# Patient Record
Sex: FEMALE | Race: WHITE | NOT HISPANIC OR LATINO | Employment: UNEMPLOYED | URBAN - METROPOLITAN AREA
[De-identification: names, ages, dates, MRNs, and addresses within clinical notes are randomized per-mention and may not be internally consistent; named-entity substitution may affect disease eponyms.]

---

## 2020-07-21 ENCOUNTER — HOSPITAL ENCOUNTER (OUTPATIENT)
Facility: HOSPITAL | Age: 60
Discharge: HOME HEALTH | End: 2020-07-23
Attending: EMERGENCY MEDICINE | Admitting: INTERNAL MEDICINE
Payer: COMMERCIAL

## 2020-07-21 DIAGNOSIS — L73.2 HIDRADENITIS SUPPURATIVA: ICD-10-CM

## 2020-07-21 DIAGNOSIS — I24.9 ACUTE CORONARY SYNDROME: Primary | ICD-10-CM

## 2020-07-21 DIAGNOSIS — R09.02 HYPOXEMIA: ICD-10-CM

## 2020-07-21 DIAGNOSIS — R07.89 CHEST PRESSURE: ICD-10-CM

## 2020-07-21 DIAGNOSIS — I25.110 ATHEROSCLEROSIS OF NATIVE CORONARY ARTERY OF NATIVE HEART WITH UNSTABLE ANGINA PECTORIS: ICD-10-CM

## 2020-07-21 DIAGNOSIS — E03.9 ACQUIRED HYPOTHYROIDISM: ICD-10-CM

## 2020-07-21 DIAGNOSIS — R07.89 OTHER CHEST PAIN: ICD-10-CM

## 2020-07-21 DIAGNOSIS — R06.00 DYSPNEA, UNSPECIFIED TYPE: ICD-10-CM

## 2020-07-21 DIAGNOSIS — R07.9 CHEST PAIN: ICD-10-CM

## 2020-07-21 DIAGNOSIS — I10 ESSENTIAL HYPERTENSION: ICD-10-CM

## 2020-07-21 DIAGNOSIS — R53.81 PHYSICAL DEBILITY: ICD-10-CM

## 2020-07-21 DIAGNOSIS — E11.65 UNCONTROLLED TYPE 2 DIABETES MELLITUS WITH HYPERGLYCEMIA: ICD-10-CM

## 2020-07-21 LAB
ALBUMIN SERPL BCP-MCNC: 3.5 G/DL (ref 3.5–5.2)
ALP SERPL-CCNC: 97 U/L (ref 55–135)
ALT SERPL W/O P-5'-P-CCNC: 15 U/L (ref 10–44)
ANION GAP SERPL CALC-SCNC: 10 MMOL/L (ref 8–16)
AST SERPL-CCNC: 20 U/L (ref 10–40)
BASOPHILS # BLD AUTO: 0.05 K/UL (ref 0–0.2)
BASOPHILS NFR BLD: 0.3 % (ref 0–1.9)
BILIRUB SERPL-MCNC: 0.5 MG/DL (ref 0.1–1)
BNP SERPL-MCNC: 61 PG/ML (ref 0–99)
BUN SERPL-MCNC: 13 MG/DL (ref 8–23)
CALCIUM SERPL-MCNC: 8.9 MG/DL (ref 8.7–10.5)
CHLORIDE SERPL-SCNC: 91 MMOL/L (ref 95–110)
CK MB SERPL-MCNC: 14.9 NG/ML (ref 0.1–6.5)
CK SERPL-CCNC: 445 U/L (ref 20–180)
CO2 SERPL-SCNC: 30 MMOL/L (ref 23–29)
CREAT SERPL-MCNC: 0.7 MG/DL (ref 0.5–1.4)
DIFFERENTIAL METHOD: ABNORMAL
EOSINOPHIL # BLD AUTO: 0.1 K/UL (ref 0–0.5)
EOSINOPHIL NFR BLD: 0.8 % (ref 0–8)
ERYTHROCYTE [DISTWIDTH] IN BLOOD BY AUTOMATED COUNT: 15 % (ref 11.5–14.5)
EST. GFR  (AFRICAN AMERICAN): >60 ML/MIN/1.73 M^2
EST. GFR  (NON AFRICAN AMERICAN): >60 ML/MIN/1.73 M^2
GLUCOSE SERPL-MCNC: 181 MG/DL (ref 70–110)
HCT VFR BLD AUTO: 42.4 % (ref 37–48.5)
HGB BLD-MCNC: 12.8 G/DL (ref 12–16)
IMM GRANULOCYTES # BLD AUTO: 0.06 K/UL (ref 0–0.04)
IMM GRANULOCYTES NFR BLD AUTO: 0.4 % (ref 0–0.5)
LYMPHOCYTES # BLD AUTO: 2.4 K/UL (ref 1–4.8)
LYMPHOCYTES NFR BLD: 14.2 % (ref 18–48)
MCH RBC QN AUTO: 26 PG (ref 27–31)
MCHC RBC AUTO-ENTMCNC: 30.2 G/DL (ref 32–36)
MCV RBC AUTO: 86 FL (ref 82–98)
MONOCYTES # BLD AUTO: 1.4 K/UL (ref 0.3–1)
MONOCYTES NFR BLD: 7.9 % (ref 4–15)
NEUTROPHILS # BLD AUTO: 13.1 K/UL (ref 1.8–7.7)
NEUTROPHILS NFR BLD: 76.4 % (ref 38–73)
NRBC BLD-RTO: 0 /100 WBC
PLATELET # BLD AUTO: 355 K/UL (ref 150–350)
PMV BLD AUTO: 9.9 FL (ref 9.2–12.9)
POTASSIUM SERPL-SCNC: 3.8 MMOL/L (ref 3.5–5.1)
PROT SERPL-MCNC: 8.4 G/DL (ref 6–8.4)
RBC # BLD AUTO: 4.92 M/UL (ref 4–5.4)
SARS-COV-2 RDRP RESP QL NAA+PROBE: NEGATIVE
SODIUM SERPL-SCNC: 131 MMOL/L (ref 136–145)
TROPONIN I SERPL DL<=0.01 NG/ML-MCNC: <0.03 NG/ML
WBC # BLD AUTO: 17.07 K/UL (ref 3.9–12.7)

## 2020-07-21 PROCEDURE — 82553 CREATINE MB FRACTION: CPT

## 2020-07-21 PROCEDURE — G0378 HOSPITAL OBSERVATION PER HR: HCPCS

## 2020-07-21 PROCEDURE — 25000003 PHARM REV CODE 250: Performed by: EMERGENCY MEDICINE

## 2020-07-21 PROCEDURE — 85025 COMPLETE CBC W/AUTO DIFF WBC: CPT

## 2020-07-21 PROCEDURE — 80053 COMPREHEN METABOLIC PANEL: CPT

## 2020-07-21 PROCEDURE — 99285 EMERGENCY DEPT VISIT HI MDM: CPT | Mod: 25

## 2020-07-21 PROCEDURE — 84484 ASSAY OF TROPONIN QUANT: CPT | Mod: 91

## 2020-07-21 PROCEDURE — 82550 ASSAY OF CK (CPK): CPT

## 2020-07-21 PROCEDURE — U0002 COVID-19 LAB TEST NON-CDC: HCPCS

## 2020-07-21 PROCEDURE — 84484 ASSAY OF TROPONIN QUANT: CPT

## 2020-07-21 PROCEDURE — 83880 ASSAY OF NATRIURETIC PEPTIDE: CPT

## 2020-07-21 PROCEDURE — 83036 HEMOGLOBIN GLYCOSYLATED A1C: CPT

## 2020-07-21 PROCEDURE — 93005 ELECTROCARDIOGRAM TRACING: CPT | Performed by: INTERNAL MEDICINE

## 2020-07-21 PROCEDURE — 36415 COLL VENOUS BLD VENIPUNCTURE: CPT

## 2020-07-21 RX ORDER — ENOXAPARIN SODIUM 100 MG/ML
40 INJECTION SUBCUTANEOUS EVERY 24 HOURS
Status: DISCONTINUED | OUTPATIENT
Start: 2020-07-21 | End: 2020-07-21

## 2020-07-21 RX ORDER — HYDROCODONE BITARTRATE AND ACETAMINOPHEN 10; 325 MG/1; MG/1
1 TABLET ORAL EVERY 4 HOURS PRN
Status: DISCONTINUED | OUTPATIENT
Start: 2020-07-21 | End: 2020-07-23

## 2020-07-21 RX ORDER — AMLODIPINE BESYLATE 5 MG/1
5 TABLET ORAL DAILY
COMMUNITY

## 2020-07-21 RX ORDER — PROPRANOLOL HYDROCHLORIDE 20 MG/1
40 TABLET ORAL 4 TIMES DAILY
Status: DISCONTINUED | OUTPATIENT
Start: 2020-07-22 | End: 2020-07-23 | Stop reason: HOSPADM

## 2020-07-21 RX ORDER — IPRATROPIUM BROMIDE 21 UG/1
1 SPRAY, METERED NASAL 2 TIMES DAILY
Status: ON HOLD | COMMUNITY
End: 2022-06-12 | Stop reason: HOSPADM

## 2020-07-21 RX ORDER — TRIMETHOBENZAMIDE HCL 300 MG
300 CAPSULE ORAL 3 TIMES DAILY PRN
Status: DISCONTINUED | OUTPATIENT
Start: 2020-07-21 | End: 2020-07-23

## 2020-07-21 RX ORDER — ACETAMINOPHEN 325 MG/1
650 TABLET ORAL EVERY 8 HOURS PRN
Status: DISCONTINUED | OUTPATIENT
Start: 2020-07-21 | End: 2020-07-23 | Stop reason: HOSPADM

## 2020-07-21 RX ORDER — CHLORZOXAZONE 250 MG/1
250 TABLET ORAL 3 TIMES DAILY PRN
COMMUNITY

## 2020-07-21 RX ORDER — ATORVASTATIN CALCIUM 80 MG/1
80 TABLET, FILM COATED ORAL NIGHTLY
COMMUNITY

## 2020-07-21 RX ORDER — HYDROCODONE BITARTRATE AND ACETAMINOPHEN 5; 325 MG/1; MG/1
1 TABLET ORAL EVERY 4 HOURS PRN
Status: DISCONTINUED | OUTPATIENT
Start: 2020-07-21 | End: 2020-07-23

## 2020-07-21 RX ORDER — CLOPIDOGREL BISULFATE 75 MG/1
75 TABLET ORAL DAILY
Status: ON HOLD | COMMUNITY
End: 2022-06-12 | Stop reason: SDUPTHER

## 2020-07-21 RX ORDER — TRAMADOL HYDROCHLORIDE 50 MG/1
50 TABLET ORAL 3 TIMES DAILY
Status: DISCONTINUED | OUTPATIENT
Start: 2020-07-22 | End: 2020-07-23

## 2020-07-21 RX ORDER — HYDROXYZINE PAMOATE 25 MG/1
25 CAPSULE ORAL EVERY 6 HOURS PRN
Status: DISCONTINUED | OUTPATIENT
Start: 2020-07-21 | End: 2020-07-23

## 2020-07-21 RX ORDER — ENOXAPARIN SODIUM 100 MG/ML
40 INJECTION SUBCUTANEOUS EVERY 12 HOURS
Status: DISCONTINUED | OUTPATIENT
Start: 2020-07-22 | End: 2020-07-23 | Stop reason: HOSPADM

## 2020-07-21 RX ORDER — NAPROXEN SODIUM 220 MG/1
81 TABLET, FILM COATED ORAL DAILY
COMMUNITY

## 2020-07-21 RX ORDER — ASPIRIN 325 MG
325 TABLET ORAL
Status: COMPLETED | OUTPATIENT
Start: 2020-07-21 | End: 2020-07-21

## 2020-07-21 RX ORDER — MECLIZINE HCL 12.5 MG 12.5 MG/1
25 TABLET ORAL 2 TIMES DAILY PRN
Status: DISCONTINUED | OUTPATIENT
Start: 2020-07-21 | End: 2020-07-23

## 2020-07-21 RX ORDER — ESTRADIOL 0.1 MG/G
1 CREAM VAGINAL
COMMUNITY

## 2020-07-21 RX ORDER — PROPRANOLOL HYDROCHLORIDE 80 MG/1
160 CAPSULE, EXTENDED RELEASE ORAL DAILY
COMMUNITY

## 2020-07-21 RX ORDER — PROCHLORPERAZINE MALEATE 5 MG
10 TABLET ORAL 2 TIMES DAILY PRN
Status: DISCONTINUED | OUTPATIENT
Start: 2020-07-21 | End: 2020-07-23 | Stop reason: HOSPADM

## 2020-07-21 RX ORDER — METFORMIN HYDROCHLORIDE 850 MG/1
850 TABLET ORAL NIGHTLY
COMMUNITY

## 2020-07-21 RX ADMIN — NITROGLYCERIN 0.5 INCH: 20 OINTMENT TOPICAL at 06:07

## 2020-07-21 RX ADMIN — ASPIRIN 325 MG ORAL TABLET 325 MG: 325 PILL ORAL at 06:07

## 2020-07-21 NOTE — ED PROVIDER NOTES
Encounter Date: 7/21/2020       History     Chief Complaint   Patient presents with    Shortness of Breath     for 2 weeks, chest pressure for several days    Nausea     59 yo with history of morbid obesity, hypertension, diabetes mellitus, coronary artery disease, with stent placement single-vessel and October 2018.  Patient presents emergency department complaint of PND orthopnea midsternal intermittent chest pain over the last 7-10 days.  Patient states that her dyspnea has progressively gotten worse.  She has had increasing pain throughout the day.  Currently patient states that she has been compliant with medications including aspirin and Plavix.  Currently patient is chest pain-free.  Patient states she was seen by Dr. Lin cardiology approximately 1 and half years ago.        Review of patient's allergies indicates:   Allergen Reactions    Pcn [penicillins] Anaphylaxis    Adhesive     Floxacillin     Keflex [cephalexin]     Sulfa (sulfonamide antibiotics)     Zithromax [azithromycin]     Zofran [ondansetron hcl (pf)]      Dizzy vomiting       No past medical history on file.  No past surgical history on file.  No family history on file.  Social History     Tobacco Use    Smoking status: Current Every Day Smoker     Types: Cigarettes    Smokeless tobacco: Never Used   Substance Use Topics    Alcohol use: No    Drug use: No     Review of Systems   Constitutional: Positive for fatigue. Negative for fever.   HENT: Negative for sore throat.    Respiratory: Positive for shortness of breath.    Cardiovascular: Positive for chest pain. Negative for palpitations and leg swelling.   Gastrointestinal: Negative for abdominal pain, nausea and vomiting.   Genitourinary: Negative for dysuria.   Musculoskeletal: Negative for back pain.   Skin: Negative for rash.   Neurological: Negative for dizziness, weakness and light-headedness.   Hematological: Does not bruise/bleed easily.       Physical Exam     Initial  Vitals [07/21/20 1728]   BP Pulse Resp Temp SpO2   135/80 68 20 -- (!) 93 %      MAP       --         Physical Exam    Nursing note and vitals reviewed.  Constitutional: She appears well-developed and well-nourished.   HENT:   Head: Normocephalic and atraumatic.   Nose: Nose normal.   Mouth/Throat: Oropharynx is clear and moist.   Eyes: Conjunctivae and EOM are normal. Pupils are equal, round, and reactive to light.   Neck: Normal range of motion. Neck supple. No thyromegaly present. No tracheal deviation present.   Cardiovascular: Normal rate, regular rhythm, normal heart sounds and intact distal pulses. Exam reveals no gallop and no friction rub.    No murmur heard.  Pulmonary/Chest: No stridor. No respiratory distress.   Course bilateral breath sounds no adventitious sounds   Abdominal: Soft. Bowel sounds are normal. She exhibits no mass. There is no rebound and no guarding.   Musculoskeletal: Normal range of motion. No edema.   Lymphadenopathy:     She has no cervical adenopathy.   Neurological: She is alert and oriented to person, place, and time. She has normal strength and normal reflexes. GCS score is 15. GCS eye subscore is 4. GCS verbal subscore is 5. GCS motor subscore is 6.   Skin: Skin is warm and dry. Capillary refill takes less than 2 seconds.   Psychiatric: She has a normal mood and affect.         ED Course   Procedures  Labs Reviewed   CBC W/ AUTO DIFFERENTIAL   COMPREHENSIVE METABOLIC PANEL   TROPONIN I   TROPONIN I   B-TYPE NATRIURETIC PEPTIDE   CK   CK-MB   SARS-COV-2 RNA AMPLIFICATION, QUAL     EKG Readings: (Independently Interpreted)   Initial Reading: No STEMI. Rhythm: Normal Sinus Rhythm. Ectopy: No Ectopy. Axis: Normal.   Normal sinus rhythm, 67, normal axis, nonspecific ST segment changes       Imaging Results    None          Medical Decision Making:   Initial Assessment:   60-year-old female with history hypertension, coronary artery disease, diabetes mellitus, obesity here with 7-10  day history of midsternal chest pain and progressive dyspnea on exertion.  Differential Diagnosis:   Acute coronary disease, unstable angina, congestive heart failure  Clinical Tests:   Lab Tests: Ordered and Reviewed  Radiological Study: Ordered and Reviewed  Medical Tests: Ordered and Reviewed  ED Management:  Patient evaluated emergency department with complaint of chest pain.  Current at this time patient with initial 1st at the cardiac markers found to be within normal limits.  Patient with CHRISSY score 4.  Patient at this time will be admitted for further evaluation and treatment.  Patient remained hemodynamically stable in emergency department.    Additional MDM:     CHRISSY Score:   Age over 65:                                    0.00   > or = to 3 CAD risk factors:           1.00  Established CAD:                            1.00  > or = to 2 anginal events in the past 24 hours: 1.00  Use of ASA in past 7 days:              1.00  Elevated Enzymes:                         0.00  ST Depression > or = to 0.05 mV:  0.00  CHRISSY score: 4                              Clinical Impression:       ICD-10-CM ICD-9-CM   1. Acute coronary syndrome  I24.9 411.1   2. Chest pain  R07.9 786.50   3. Dyspnea, unspecified type  R06.00 786.09                                Vincent Travis MD  07/21/20 1819       Vincent Travis MD  07/21/20 2049       Vincent Travis MD  07/21/20 2052

## 2020-07-22 ENCOUNTER — HOSPITAL ENCOUNTER (OUTPATIENT)
Dept: RADIOLOGY | Facility: HOSPITAL | Age: 60
Discharge: HOME | End: 2020-07-22
Attending: INTERNAL MEDICINE | Admitting: INTERNAL MEDICINE
Payer: COMMERCIAL

## 2020-07-22 ENCOUNTER — CLINICAL SUPPORT (OUTPATIENT)
Dept: CARDIOLOGY | Facility: HOSPITAL | Age: 60
End: 2020-07-22
Attending: INTERNAL MEDICINE
Payer: COMMERCIAL

## 2020-07-22 PROBLEM — I25.110 ATHEROSCLEROSIS OF NATIVE CORONARY ARTERY OF NATIVE HEART WITH UNSTABLE ANGINA PECTORIS: Status: ACTIVE | Noted: 2020-07-22

## 2020-07-22 PROBLEM — R07.89 CHEST PRESSURE: Status: ACTIVE | Noted: 2020-07-22

## 2020-07-22 PROBLEM — E11.9 DIABETES TYPE 2, CONTROLLED: Status: ACTIVE | Noted: 2020-07-22

## 2020-07-22 LAB
ANION GAP SERPL CALC-SCNC: 7 MMOL/L (ref 8–16)
BASOPHILS # BLD AUTO: 0.07 K/UL (ref 0–0.2)
BASOPHILS NFR BLD: 0.4 % (ref 0–1.9)
BUN SERPL-MCNC: 17 MG/DL (ref 8–23)
CALCIUM SERPL-MCNC: 8.3 MG/DL (ref 8.7–10.5)
CHLORIDE SERPL-SCNC: 95 MMOL/L (ref 95–110)
CO2 SERPL-SCNC: 29 MMOL/L (ref 23–29)
CREAT SERPL-MCNC: 0.7 MG/DL (ref 0.5–1.4)
CV PHARM DOSE: 0.4 MG
CV STRESS BASE HR: 68 BPM
DIASTOLIC BLOOD PRESSURE: 96 MMHG
DIFFERENTIAL METHOD: ABNORMAL
EOSINOPHIL # BLD AUTO: 0.2 K/UL (ref 0–0.5)
EOSINOPHIL NFR BLD: 1.1 % (ref 0–8)
ERYTHROCYTE [DISTWIDTH] IN BLOOD BY AUTOMATED COUNT: 14.9 % (ref 11.5–14.5)
EST. GFR  (AFRICAN AMERICAN): >60 ML/MIN/1.73 M^2
EST. GFR  (NON AFRICAN AMERICAN): >60 ML/MIN/1.73 M^2
ESTIMATED AVG GLUCOSE: 180 MG/DL (ref 68–131)
GLUCOSE SERPL-MCNC: 142 MG/DL (ref 70–110)
GLUCOSE SERPL-MCNC: 180 MG/DL (ref 70–110)
GLUCOSE SERPL-MCNC: 208 MG/DL (ref 70–110)
GLUCOSE SERPL-MCNC: 270 MG/DL (ref 70–110)
HBA1C MFR BLD HPLC: 7.9 % (ref 4.5–6.2)
HCT VFR BLD AUTO: 39.8 % (ref 37–48.5)
HGB BLD-MCNC: 12 G/DL (ref 12–16)
IMM GRANULOCYTES # BLD AUTO: 0.09 K/UL (ref 0–0.04)
IMM GRANULOCYTES NFR BLD AUTO: 0.5 % (ref 0–0.5)
LYMPHOCYTES # BLD AUTO: 2.7 K/UL (ref 1–4.8)
LYMPHOCYTES NFR BLD: 15.7 % (ref 18–48)
MCH RBC QN AUTO: 26.7 PG (ref 27–31)
MCHC RBC AUTO-ENTMCNC: 30.2 G/DL (ref 32–36)
MCV RBC AUTO: 88 FL (ref 82–98)
MONOCYTES # BLD AUTO: 1.5 K/UL (ref 0.3–1)
MONOCYTES NFR BLD: 9 % (ref 4–15)
NEUTROPHILS # BLD AUTO: 12.6 K/UL (ref 1.8–7.7)
NEUTROPHILS NFR BLD: 73.3 % (ref 38–73)
NRBC BLD-RTO: 0 /100 WBC
OHS CV CPX 85 PERCENT MAX PREDICTED HEART RATE MALE: 129
OHS CV CPX MAX PREDICTED HEART RATE: 151
OHS CV CPX PATIENT IS FEMALE: 1
OHS CV CPX PATIENT IS MALE: 0
OHS CV CPX PEAK DIASTOLIC BLOOD PRESSURE: 96 MMHG
OHS CV CPX PEAK HEAR RATE: 91 BPM
OHS CV CPX PEAK RATE PRESSURE PRODUCT: NORMAL
OHS CV CPX PEAK SYSTOLIC BLOOD PRESSURE: 174 MMHG
OHS CV CPX PERCENT MAX PREDICTED HEART RATE ACHIEVED: 60
OHS CV CPX RATE PRESSURE PRODUCT PRESENTING: 9792
PLATELET # BLD AUTO: 358 K/UL (ref 150–350)
PMV BLD AUTO: 10.2 FL (ref 9.2–12.9)
POTASSIUM SERPL-SCNC: 4 MMOL/L (ref 3.5–5.1)
RBC # BLD AUTO: 4.5 M/UL (ref 4–5.4)
SODIUM SERPL-SCNC: 131 MMOL/L (ref 136–145)
SYSTOLIC BLOOD PRESSURE: 144 MMHG
TROPONIN I SERPL DL<=0.01 NG/ML-MCNC: <0.03 NG/ML
WBC # BLD AUTO: 17.17 K/UL (ref 3.9–12.7)

## 2020-07-22 PROCEDURE — 85025 COMPLETE CBC W/AUTO DIFF WBC: CPT

## 2020-07-22 PROCEDURE — 63600175 PHARM REV CODE 636 W HCPCS: Performed by: INTERNAL MEDICINE

## 2020-07-22 PROCEDURE — C9113 INJ PANTOPRAZOLE SODIUM, VIA: HCPCS | Performed by: INTERNAL MEDICINE

## 2020-07-22 PROCEDURE — 36415 COLL VENOUS BLD VENIPUNCTURE: CPT

## 2020-07-22 PROCEDURE — 96374 THER/PROPH/DIAG INJ IV PUSH: CPT

## 2020-07-22 PROCEDURE — A9502 TC99M TETROFOSMIN: HCPCS

## 2020-07-22 PROCEDURE — 93017 CV STRESS TEST TRACING ONLY: CPT

## 2020-07-22 PROCEDURE — 96372 THER/PROPH/DIAG INJ SC/IM: CPT | Mod: 59

## 2020-07-22 PROCEDURE — 25000003 PHARM REV CODE 250: Performed by: INTERNAL MEDICINE

## 2020-07-22 PROCEDURE — 80048 BASIC METABOLIC PNL TOTAL CA: CPT

## 2020-07-22 PROCEDURE — 84484 ASSAY OF TROPONIN QUANT: CPT

## 2020-07-22 PROCEDURE — G0378 HOSPITAL OBSERVATION PER HR: HCPCS

## 2020-07-22 RX ORDER — PANTOPRAZOLE SODIUM 40 MG/10ML
40 INJECTION, POWDER, LYOPHILIZED, FOR SOLUTION INTRAVENOUS DAILY
Status: DISCONTINUED | OUTPATIENT
Start: 2020-07-22 | End: 2020-07-23 | Stop reason: HOSPADM

## 2020-07-22 RX ORDER — REGADENOSON 0.08 MG/ML
0.4 INJECTION, SOLUTION INTRAVENOUS
Status: COMPLETED | OUTPATIENT
Start: 2020-07-22 | End: 2020-07-22

## 2020-07-22 RX ADMIN — PROCHLORPERAZINE MALEATE 10 MG: 5 TABLET ORAL at 10:07

## 2020-07-22 RX ADMIN — PROCHLORPERAZINE MALEATE 10 MG: 5 TABLET ORAL at 04:07

## 2020-07-22 RX ADMIN — HYDROCODONE BITARTRATE AND ACETAMINOPHEN 1 TABLET: 5; 325 TABLET ORAL at 02:07

## 2020-07-22 RX ADMIN — PROPRANOLOL HYDROCHLORIDE 40 MG: 20 TABLET ORAL at 09:07

## 2020-07-22 RX ADMIN — HYDROCODONE BITARTRATE AND ACETAMINOPHEN 1 TABLET: 5; 325 TABLET ORAL at 10:07

## 2020-07-22 RX ADMIN — REGADENOSON 0.4 MG: 0.08 INJECTION, SOLUTION INTRAVENOUS at 10:07

## 2020-07-22 RX ADMIN — PANTOPRAZOLE SODIUM 40 MG: 40 INJECTION, POWDER, FOR SOLUTION INTRAVENOUS at 06:07

## 2020-07-22 RX ADMIN — ENOXAPARIN SODIUM 40 MG: 100 INJECTION SUBCUTANEOUS at 09:07

## 2020-07-22 NOTE — PROGRESS NOTES
Myocardial perfusion rest images in progress at 08:18.  Patient to remain NPO status.  Theron Wilson [facility] [place]

## 2020-07-22 NOTE — PROGRESS NOTES
Pharmacist Dose Adjustment Note    Karo Leonard is a 60 y.o. female being treated with the medication enoxaparin    Patient Data:    Vital Signs (Most Recent):  Temp: 98.7 °F (37.1 °C) (07/21/20 1728)  Pulse: 60 (07/21/20 2210)  Resp: (!) 23 (07/21/20 2210)  BP: (!) 112/58 (07/21/20 2210)  SpO2: 98 % (07/21/20 2210)   Vital Signs (72h Range):  Temp:  [98.7 °F (37.1 °C)]   Pulse:  [54-68]   Resp:  [17-23]   BP: (104-135)/(50-80)   SpO2:  [93 %-98 %]      Recent Labs   Lab 07/21/20  1805   CREATININE 0.7     Serum creatinine: 0.7 mg/dL 07/21/20 1805  Estimated creatinine clearance: 103.9 mL/min    Medication:enoxaparin 40 mg q 24 hours will be changed to 40 mg q 12 hours per bmi > 40 (41.10)     Pharmacist's Name: Alex Berrios  Pharmacist's Extension: [phone]

## 2020-07-22 NOTE — SUBJECTIVE & OBJECTIVE
Past Medical History:   Diagnosis Date    Coronary artery disease     cardiac stent    Diabetes mellitus     Hypertension        History reviewed. No pertinent surgical history.    Review of patient's allergies indicates:   Allergen Reactions    Pcn [penicillins] Anaphylaxis    Adhesive     Floxacillin     Keflex [cephalexin]     Sulfa (sulfonamide antibiotics)     Zithromax [azithromycin]     Zofran [ondansetron hcl (pf)]      Dizzy vomiting         No current facility-administered medications on file prior to encounter.      Current Outpatient Medications on File Prior to Encounter   Medication Sig    hydrOXYzine (VISTARIL) 25 MG Cap Take 1 capsule (25 mg total) by mouth every 6 (six) hours as needed.    levothyroxine (SYNTHROID, LEVOTHROID) 175 MCG tablet Take 1 tablet (175 mcg total) by mouth once daily.    meclizine (ANTIVERT) 25 mg tablet Take 1 tablet (25 mg total) by mouth 2 (two) times daily as needed.    propranolol (INDERAL) 40 MG tablet Take 1 tablet (40 mg total) by mouth 4 (four) times daily.    tramadol (ULTRAM) 50 mg tablet TAKE ONE TABLET BY MOUTH THREE TIMES DAILY    benazepril (LOTENSIN) 20 MG tablet Take 1 tablet (20 mg total) by mouth once daily.    prochlorperazine (COMPAZINE) 10 MG tablet Take 1 tablet (10 mg total) by mouth 2 (two) times daily as needed.    promethazine (PHENERGAN) 25 MG tablet Take 1 tablet (25 mg total) by mouth every 4 (four) hours.    trimethobenzamide (TIGAN) 300 mg capsule Take 1 capsule (300 mg total) by mouth 3 (three) times daily as needed.     Family History     None        Tobacco Use    Smoking status: Former Smoker     Types: Cigarettes    Smokeless tobacco: Never Used    Tobacco comment: 1 year ago   Substance and Sexual Activity    Alcohol use: No    Drug use: No    Sexual activity: Not on file     Review of Systems   Constitutional: Positive for fatigue.   HENT: Negative.    Eyes: Negative.    Respiratory: Positive for shortness of  breath.    Cardiovascular: Positive for chest pain.   Gastrointestinal: Negative.    Endocrine: Negative.    Genitourinary: Negative.    Musculoskeletal: Negative.    Skin: Negative.    Allergic/Immunologic: Negative.    Neurological: Negative.    Hematological: Negative.    All other systems reviewed and are negative.    Objective:     Vital Signs (Most Recent):  Temp: 98.7 °F (37.1 °C) (07/21/20 1728)  Pulse: (!) 56 (07/21/20 2037)  Resp: 17 (07/21/20 2037)  BP: (!) 104/50 (07/21/20 2031)  SpO2: 95 % (07/21/20 2037) Vital Signs (24h Range):  Temp:  [98.7 °F (37.1 °C)] 98.7 °F (37.1 °C)  Pulse:  [54-68] 56  Resp:  [17-22] 17  SpO2:  [93 %-96 %] 95 %  BP: (104-135)/(50-80) 104/50     Weight: 112 kg (247 lb)  Body mass index is 41.1 kg/m².    Physical Exam  Vitals signs and nursing note reviewed.   Constitutional:       Appearance: She is well-developed. She is obese.   HENT:      Head: Normocephalic and atraumatic.      Right Ear: External ear normal.      Left Ear: External ear normal.      Nose: Nose normal.   Eyes:      Conjunctiva/sclera: Conjunctivae normal.      Pupils: Pupils are equal, round, and reactive to light.   Neck:      Musculoskeletal: Normal range of motion and neck supple.   Cardiovascular:      Rate and Rhythm: Normal rate and regular rhythm.      Heart sounds: Normal heart sounds.   Pulmonary:      Effort: Pulmonary effort is normal.      Breath sounds: Normal breath sounds.      Comments: Decreased entry without adventitious sounds  Abdominal:      General: Bowel sounds are normal.      Palpations: Abdomen is soft.   Musculoskeletal: Normal range of motion.   Skin:     General: Skin is warm and dry.      Capillary Refill: Capillary refill takes less than 2 seconds.   Neurological:      Mental Status: She is alert and oriented to person, place, and time.   Psychiatric:         Behavior: Behavior normal.         Thought Content: Thought content normal.         Judgment: Judgment normal.            CRANIAL NERVES     CN III, IV, VI   Pupils are equal, round, and reactive to light.       Significant Labs:   CBC:   Recent Labs   Lab 07/21/20  1805   WBC 17.07*   HGB 12.8   HCT 42.4   *     CMP:   Recent Labs   Lab 07/21/20  1805   *   K 3.8   CL 91*   CO2 30*   *   BUN 13   CREATININE 0.7   CALCIUM 8.9   PROT 8.4   ALBUMIN 3.5   BILITOT 0.5   ALKPHOS 97   AST 20   ALT 15   ANIONGAP 10   EGFRNONAA >60.0     Cardiac Markers:   Recent Labs   Lab 07/21/20  1805   BNP 61     Troponin:   Recent Labs   Lab 07/21/20  1805   TROPONINI <0.030       Significant Imaging: I have reviewed and interpreted all pertinent imaging results/findings within the past 24 hours.

## 2020-07-22 NOTE — PROGRESS NOTES
Myocardial Perfusion Rest injection R arm IV at 07:30  -Patient to remain NPO status    CHARITO Blandon MT

## 2020-07-22 NOTE — HPI
60 year old female with history of hypothyroidism, essential hypertension, morbid obesity (>250 lb), hidradenitis supprativa, and coronary artery disease (s/p 1 stent with known disease in 2 other arteries) presented to ED complaining worsening chest discomfort: tightness--patient clenching fist on chest wall, 8/10 with rads to left shoulder accompanied by dyspnea. Symptoms come and go by own accord but have been occurring more frequently over the past week. Her Cardiologist left the practice area and she has not established with another. No cough or sputum. In ED: EKG no acute segments. CXR: NAPD. Labs: leukocytosis, normal H/H; normal renal function; CKMB elevated, troponin <0.03.

## 2020-07-22 NOTE — CONSULTS
[facility]  Department of Cardiology  Consult Note      PATIENT NAME: Karo Leonard  MRN: [id number]  TODAY'S DATE: 07/22/2020  ADMIT DATE: 7/21/2020                          CONSULT REQUESTED BY: Damion Unger MD    SUBJECTIVE     PRINCIPAL PROBLEM: Chest pressure      REASON FOR CONSULT:      HPI:60-year-old lady presenting with the 2 weeks history of increasing shortness of breath and 5 day history of chest pressure on and off mostly at rest.  She is very sedentary.  The last time she left the house was for a follow-up visit with Dr. Lin after initial stenting in 2017.  Present chest discomfort did feel somewhat like previous MI but less intense.  It is in the middle of the chest.  She also reports she has been having lots of indigestion.    Have reviewed her angiogram from 2017 which demonstrates a totally occluded circumflex was opened nicely with a drug-eluting stent.  First diagonal branch had a 50% stenosis.  Right coronary has 40% lesion.  Ejection fraction was normal.  EDP was elevated.    She has chronic hidradenitis.  Hypertension and hyperlipidemia.  She has type 2 diabetes            Review of patient's allergies indicates:   Allergen Reactions    Pcn [penicillins] Anaphylaxis    Adhesive     Floxacillin     Keflex [cephalexin]     Sulfa (sulfonamide antibiotics)     Zithromax [azithromycin]     Zofran [ondansetron hcl (pf)]      Dizzy vomiting         Past Medical History:   Diagnosis Date    Coronary artery disease     cardiac stent    Diabetes mellitus     Hypertension      History reviewed. No pertinent surgical history.  Social History     Tobacco Use    Smoking status: Former Smoker     Types: Cigarettes    Smokeless tobacco: Never Used    Tobacco comment: 1 year ago   Substance Use Topics    Alcohol use: No    Drug use: No        REVIEW OF SYSTEMS  CONSTITUTIONAL:   EYES:   NEURO:   RESPIRATORY:     CARDIOVASCULAR:  Chest pressure as in HPI  GI:  Frequent  indigestion  :   SKIN:  Hidradenitis in multiple areas of the body  ENDOCRINE:   PSYCHIATRIC:   MUSCULOSKELETAL:  Has chronic back pain    OBJECTIVE     VITAL SIGNS (Most Recent)  Temp: 98.7 °F (37.1 °C) (07/22/20 1530)  Pulse: 73 (07/22/20 1530)  Resp: (!) 22 (07/22/20 1530)  BP: (!) 147/67 (07/22/20 1530)  SpO2: (!) 92 % (07/22/20 1530)    VENTILATION STATUS  Resp: (!) 22 (07/22/20 1530)  SpO2: (!) 92 % (07/22/20 1530)       I & O (Last 24H):    Intake/Output Summary (Last 24 hours) at 7/22/2020 1814  Last data filed at 7/22/2020 1300  Gross per 24 hour   Intake 440 ml   Output 2 ml   Net 438 ml       WEIGHTS  Wt Readings from Last 1 Encounters:   07/21/20 2257 108.8 kg (239 lb 13.8 oz)   07/21/20 1728 112 kg (247 lb)       PHYSICAL EXAM  GENERAL: overweight lady lying on her left side   HEENT:   NECK:   THYROID:   CARDIAC:  Normal heart sounds no murmurs  CHEST ANATOMY:   LUNGS:  Lungs clear to examination  ABDOMEN:   URINARY:   EXTREMITIES:   PERIPHERAL VASCULAR SYSTEM:   CENTRAL NERVOUS SYSTEM:  Alert oriented x3  SKIN:   MUSCLE STRENGTH & TONE:     HOME MEDICATIONS:  No current facility-administered medications on file prior to encounter.      Current Outpatient Medications on File Prior to Encounter   Medication Sig Dispense Refill    amLODIPine (NORVASC) 5 MG tablet Take 5 mg by mouth once daily.      aspirin 81 MG Chew Take 81 mg by mouth once daily.      atorvastatin (LIPITOR) 80 MG tablet Take 80 mg by mouth every evening.      chlorzoxazone (PARAFON FORTE) 250 MG tablet Take 250 mg by mouth 6 (six) times daily.      clopidogreL (PLAVIX) 75 mg tablet Take 75 mg by mouth once daily.      estradioL (ESTRACE) 0.01 % (0.1 mg/gram) vaginal cream Place 1 g vaginally every 7 days.      ipratropium (ATROVENT) 0.03 % nasal spray 1 spray by Nasal route 2 (two) times daily.      levothyroxine (SYNTHROID, LEVOTHROID) 175 MCG tablet Take 1 tablet (175 mcg total) by mouth once daily. (Patient taking  differently: Take 150 mcg by mouth once daily. ) 30 tablet 0    metFORMIN (GLUCOPHAGE) 850 MG tablet Take 850 mg by mouth nightly.      propranoloL (INDERAL LA) 80 MG 24 hr capsule Take 160 mg by mouth once daily.      prochlorperazine (COMPAZINE) 10 MG tablet Take 1 tablet (10 mg total) by mouth 2 (two) times daily as needed. 60 tablet 5       SCHEDULED MEDS:   enoxaparin  40 mg Subcutaneous Q12H    levothyroxine  175 mcg Oral Daily    pantoprazole  40 mg Intravenous Daily    propranoloL  40 mg Oral QID    traMADoL  50 mg Oral TID       CONTINUOUS INFUSIONS:    PRN MEDS:acetaminophen, dextrose 50%, dextrose 50%, HYDROcodone-acetaminophen, HYDROcodone-acetaminophen, hydrOXYzine pamoate, insulin regular, meclizine, prochlorperazine, trimethobenzamide    LABS AND DIAGNOSTICS     CBC LAST 3 DAYS  Recent Labs   Lab 07/21/20 1805 07/22/20  0318   WBC 17.07* 17.17*   RBC 4.92 4.50   HGB 12.8 12.0   HCT 42.4 39.8   MCV 86 88   MCH 26.0* 26.7*   MCHC 30.2* 30.2*   RDW 15.0* 14.9*   * 358*   MPV 9.9 10.2   GRAN 76.4*  13.1* 73.3*  12.6*   LYMPH 14.2*  2.4 15.7*  2.7   MONO 7.9  1.4* 9.0  1.5*   BASO 0.05 0.07   NRBC 0 0       COAGULATION LAST 3 DAYS  No results for input(s): LABPT, INR, APTT in the last 168 hours.    CHEMISTRY LAST 3 DAYS  Recent Labs   Lab 07/21/20 1805 07/22/20  0318   * 131*   K 3.8 4.0   CL 91* 95   CO2 30* 29   ANIONGAP 10 7*   BUN 13 17   CREATININE 0.7 0.7   * 142*   CALCIUM 8.9 8.3*   ALBUMIN 3.5  --    PROT 8.4  --    ALKPHOS 97  --    ALT 15  --    AST 20  --    BILITOT 0.5  --        CARDIAC PROFILE LAST 3 DAYS  Recent Labs   Lab 07/21/20  1805 07/21/20  2150 07/21/20 2201 07/22/20  0318   BNP 61  --   --   --    *  --   --   --    CPKMB 14.9*  --   --   --    TROPONINI <0.030 <0.030 <0.030 <0.030       ENDOCRINE LAST 3 DAYS  No results for input(s): TSH, PROCAL in the last 168 hours.    LAST ARTERIAL BLOOD GAS  ABG  No results for input(s): PH, PO2,  PCO2, HCO3, BE in the last 168 hours.    LAST 7 DAYS MICROBIOLOGY   Microbiology Results (last 7 days)     ** No results found for the last 168 hours. **          MOST RECENT IMAGING  NM Myocardial Perfusion Spect Multi Pharmacologic  CLINICAL HISTORY:  60 years (1960) Female Chest pain, acute, nonspecific  hypertension, chest tightness, previous angiogram of one stent placed  in October 2017, shortness of breath and dizziness.    TECHNIQUE:  NM MYOCARDIAL PERFUSION SPECT MULTI PHARM. 9 images obtained. A total  of 11.0 millicuries was utilized for the rest portion of the  examination with 26.8 millicuries for the stress portion of the  examination following a 1 day protocol.  The patient was stressed with  Lexiscan, 0.4 milligrams intravenously and achieved a maximum heart  rate of 91 beats per minute with a blood pressure of 163/82, (Resting  heart rate of 68, and blood pressure of 144/96).    COMPARISON:  None available.    FINDINGS:  There is normal and homogeneous radiotracer uptake by the left  ventricular myocardium with no mismatched photopenic defects to  suggest pharmacologically induced reversible ischemia.  There is a  tiny matched defect in the mid inferolateral left ventricular  myocardial wall.    There are no wall motion abnormalities with no abnormal transit left  ventricular dilatation on stress images.    Ejection fraction is calculated at 59 %.  The polar map images confirm  the planar SPECT findings, showing a tiny matched defect in the mid  inferolateral left ventricular myocardial wall covering approximately  12% of the overall left ventricular volume, favored to represent  artifact or a remote infarct. Consider correlation with EKG, symptoms  and history.    IMPRESSION:  1. No scintigraphic evidence of pharmacologically induced reversible  ischemia.  2. Estimated ejection fraction of  59 %.  3. Tiny matched defect in the mid inferolateral left ventricular  myocardial wall suggesting a  small remote infarct.    Electronically Signed by CHRAITO Herbert on 7/22/2020 12:41 PM  Treadmill Stress Test    The EKG portion of this study is negative for ischemia.    The patient reported no chest pain during the stress test.    There were no arrhythmias during stress.    ECG Stress Nuclear portion of this study will be reported separately.      ECHOCARDIOGRAM RESULTS (last 5)  No results found for this or any previous visit.    CURRENT/PREVIOUS VISIT EKG  Results for orders placed or performed during the hospital encounter of 07/21/20   EKG 12-lead    Collection Time: 07/21/20  5:31 PM    Narrative    Test Reason : R07.9,    Vent. Rate : 067 BPM     Atrial Rate : 067 BPM     P-R Int : 182 ms          QRS Dur : 088 ms      QT Int : 430 ms       P-R-T Axes : 069 038 039 degrees     QTc Int : 454 ms    Normal sinus rhythm  Borderline Nonspecific ST and T wave abnormality  No previous ECGs available  Confirmed by Jerod Moe MD ([id number]) on 7/22/2020 6:32:57 AM    Referred By: [email or web address]   SELF           Confirmed By:Jerod Moe MD           ASSESSMENT/PLAN:     Active Hospital Problems    Diagnosis    *Chest pressure    BMI 39.0-39.9,adult    Diabetes type 2, uncontrolled    Atherosclerosis of native coronary artery of native heart with unstable angina pectoris    Acute coronary syndrome    Hypothyroid    Hypertension    Hidradenitis suppurativa       ASSESSMENT & PLAN:   Five-day history of chest pressure.  Known coronary artery disease.  Type 2 diabetes  Multiple risk factors for CAD  Frequent indigestion      RECOMMENDATIONS:  If this is unstable angina I would expect some elevation of her cardiac enzymes.  Nuclear images show small amount of scarring in the lateral wall consistent with her history of lateral wall infarction.  There is no reversible ischemia.  My suspicion is that this is GI in origin.  I would restart all home medications  I would give her IV Protonix 40 mg tonight and  p.o. 40 mg twice daily  I would like to repeat ECG in the morning.  If there is no objective evidence of cardiac problem she may be discharged tomorrow        Colton Cano MD  [facility]  Department of Cardiology  Date of Service: 07/22/2020  6:14 PM

## 2020-07-22 NOTE — PROGRESS NOTES
Myocardial Perfusion  Stress images in progress at 11:27    -Myocardial perfusion Stress study images will be complete at 11:37    [facility]

## 2020-07-22 NOTE — PROGRESS NOTES
@  PATIENT INJECTED WITH 26.8mCi Tc99m MYOVIEW FOR: STRESS IMAGES.    LEXISCAN STRESS      RELEASE NPO STATUS FROM NUCLEAR MEDICINE.                   HAWA MURDOCK

## 2020-07-22 NOTE — ED NOTES
"Patient identifiers for Karo Leonard checked and correct.  LOC:  Karo Leonard is awake, alert, and aware of environment with an appropriate affect. She is oriented x 3 and speaking appropriately.  APPEARANCE:  She is resting comfortably and in no acute distress. She is clean and well groomed, patient's clothing is properly fastened.  SKIN:  The skin is warm and dry. She has normal skin turgor and moist mucus membranes. Skin is intact; no bruising or breakdown noted.  MUSCULOSKELETAL:  She is moving all extremities well, no obvious deformities noted. Pulses intact.   RESPIRATORY:  Airway is open and patent. Respirations are spontaneous and non-labored with normal effort and rate. BBS Clear  CARDIAC:  She has a normal rate and rhythm. Capillary refill < 3 seconds.  ABDOMEN:  No distention noted.  Soft and non-tender upon palpation.  NEUROLOGICAL:  PERRL. Facial expression is symmetrical. Hand grasps are equal bilaterally. Normal sensation in all extremities when touched with finger.  Allergies reported:    Review of patient's allergies indicates:   Allergen Reactions    Pcn [penicillins] Anaphylaxis    Adhesive     Floxacillin     Keflex [cephalexin]     Sulfa (sulfonamide antibiotics)     Zithromax [azithromycin]     Zofran [ondansetron hcl (pf)]      Dizzy vomiting       OTHER NOTES:    Pt complaining of SOB and chest pressure x2 weeks. Pt stated, "my shortness of breath has just been getting worse."  Pt denies fever or emesis.  +nausea reported.    "

## 2020-07-22 NOTE — H&P
[facility] Medicine  History & Physical    Patient Name: Karo Leonard  MRN: [id number]  Admission Date: 7/21/2020  Attending Physician: Teodoro Mandel MD  Primary Care Provider: Primary Doctor No         Patient information was obtained from patient, spouse/SO and ER records.     Subjective:     Principal Problem:Other chest pain    Chief Complaint:   Chief Complaint   Patient presents with    Shortness of Breath     for 2 weeks, chest pressure for several days    Nausea        HPI: 60 year old female with history of hypothyroidism, essential hypertension, morbid obesity (>250 lb), hidradenitis supprativa, and coronary artery disease (s/p 1 stent with known disease in 2 other arteries) presented to ED complaining worsening chest discomfort: tightness--patient clenching fist on chest wall, 8/10 with rads to left shoulder accompanied by dyspnea. Symptoms come and go by own accord but have been occurring more frequently over the past week. Her Cardiologist left the practice area and she has not established with another. No cough or sputum. In ED: EKG no acute segments. CXR: NAPD. Labs: leukocytosis, normal H/H; normal renal function; CKMB elevated, troponin <0.03.    Past Medical History:   Diagnosis Date    Coronary artery disease     cardiac stent    Diabetes mellitus     Hypertension        History reviewed. No pertinent surgical history.    Review of patient's allergies indicates:   Allergen Reactions    Pcn [penicillins] Anaphylaxis    Adhesive     Floxacillin     Keflex [cephalexin]     Sulfa (sulfonamide antibiotics)     Zithromax [azithromycin]     Zofran [ondansetron hcl (pf)]      Dizzy vomiting         No current facility-administered medications on file prior to encounter.      Current Outpatient Medications on File Prior to Encounter   Medication Sig    hydrOXYzine (VISTARIL) 25 MG Cap Take 1 capsule (25 mg total) by mouth every 6 (six) hours as needed.     levothyroxine (SYNTHROID, LEVOTHROID) 175 MCG tablet Take 1 tablet (175 mcg total) by mouth once daily.    meclizine (ANTIVERT) 25 mg tablet Take 1 tablet (25 mg total) by mouth 2 (two) times daily as needed.    propranolol (INDERAL) 40 MG tablet Take 1 tablet (40 mg total) by mouth 4 (four) times daily.    tramadol (ULTRAM) 50 mg tablet TAKE ONE TABLET BY MOUTH THREE TIMES DAILY    benazepril (LOTENSIN) 20 MG tablet Take 1 tablet (20 mg total) by mouth once daily.    prochlorperazine (COMPAZINE) 10 MG tablet Take 1 tablet (10 mg total) by mouth 2 (two) times daily as needed.    promethazine (PHENERGAN) 25 MG tablet Take 1 tablet (25 mg total) by mouth every 4 (four) hours.    trimethobenzamide (TIGAN) 300 mg capsule Take 1 capsule (300 mg total) by mouth 3 (three) times daily as needed.     Family History     None        Tobacco Use    Smoking status: Former Smoker     Types: Cigarettes    Smokeless tobacco: Never Used    Tobacco comment: 1 year ago   Substance and Sexual Activity    Alcohol use: No    Drug use: No    Sexual activity: Not on file     Review of Systems   Constitutional: Positive for fatigue.   HENT: Negative.    Eyes: Negative.    Respiratory: Positive for shortness of breath.    Cardiovascular: Positive for chest pain.   Gastrointestinal: Negative.    Endocrine: Negative.    Genitourinary: Negative.    Musculoskeletal: Negative.    Skin: Negative.    Allergic/Immunologic: Negative.    Neurological: Negative.    Hematological: Negative.    All other systems reviewed and are negative.    Objective:     Vital Signs (Most Recent):  Temp: 98.7 °F (37.1 °C) (07/21/20 1728)  Pulse: (!) 56 (07/21/20 2037)  Resp: 17 (07/21/20 2037)  BP: (!) 104/50 (07/21/20 2031)  SpO2: 95 % (07/21/20 2037) Vital Signs (24h Range):  Temp:  [98.7 °F (37.1 °C)] 98.7 °F (37.1 °C)  Pulse:  [54-68] 56  Resp:  [17-22] 17  SpO2:  [93 %-96 %] 95 %  BP: (104-135)/(50-80) 104/50     Weight: 112 kg (247 lb)  Body mass  index is 41.1 kg/m².    Physical Exam  Vitals signs and nursing note reviewed.   Constitutional:       Appearance: She is well-developed. She is obese.   HENT:      Head: Normocephalic and atraumatic.      Right Ear: External ear normal.      Left Ear: External ear normal.      Nose: Nose normal.   Eyes:      Conjunctiva/sclera: Conjunctivae normal.      Pupils: Pupils are equal, round, and reactive to light.   Neck:      Musculoskeletal: Normal range of motion and neck supple.   Cardiovascular:      Rate and Rhythm: Normal rate and regular rhythm.      Heart sounds: Normal heart sounds.   Pulmonary:      Effort: Pulmonary effort is normal.      Breath sounds: Normal breath sounds.      Comments: Decreased entry without adventitious sounds  Abdominal:      General: Bowel sounds are normal.      Palpations: Abdomen is soft.   Musculoskeletal: Normal range of motion.   Skin:     General: Skin is warm and dry.      Capillary Refill: Capillary refill takes less than 2 seconds.   Neurological:      Mental Status: She is alert and oriented to person, place, and time.   Psychiatric:         Behavior: Behavior normal.         Thought Content: Thought content normal.         Judgment: Judgment normal.           CRANIAL NERVES     CN III, IV, VI   Pupils are equal, round, and reactive to light.       Significant Labs:   CBC:   Recent Labs   Lab 07/21/20  1805   WBC 17.07*   HGB 12.8   HCT 42.4   *     CMP:   Recent Labs   Lab 07/21/20  1805   *   K 3.8   CL 91*   CO2 30*   *   BUN 13   CREATININE 0.7   CALCIUM 8.9   PROT 8.4   ALBUMIN 3.5   BILITOT 0.5   ALKPHOS 97   AST 20   ALT 15   ANIONGAP 10   EGFRNONAA >60.0     Cardiac Markers:   Recent Labs   Lab 07/21/20  1805   BNP 61     Troponin:   Recent Labs   Lab 07/21/20  1805   TROPONINI <0.030       Significant Imaging: I have reviewed and interpreted all pertinent imaging results/findings within the past 24 hours.    Assessment/Plan:     Acute coronary  syndrome  Serial biomarkers; stress myoview; Cardfiology consultation        VTE Risk Mitigation (From admission, onward)         Ordered     enoxaparin injection 40 mg  Every 24 hours      07/21/20 2113     IP VTE HIGH RISK PATIENT  Once      07/21/20 2113     Place sequential compression device  Until discontinued      07/21/20 2113                   Teodoro Mandel MD  Department of Hospital Medicine   [facility]

## 2020-07-22 NOTE — PROGRESS NOTES
Count includes the [facility] Medicine    Progress Note    Patient Name: Karo Leonard  MRN: [id number]  Patient Class: OP- Observation   Admission Date: 7/21/2020  6:40 PM  Length of Stay: 0  Attending Physician: GILBERT CHARLES MD  Primary Care Provider: Primary Doctor No  Face-to-Face encounter date: 07/22/2020  Chief Complaint: Shortness of Breath (for 2 weeks, chest pressure for several days) and Nausea         Patient ID: Karo Leonard is a 60 y.o. female admitted for   Active Hospital Problems    Diagnosis  POA    Acute coronary syndrome [I24.9]  Yes      Resolved Hospital Problems    Diagnosis Date Resolved POA    *Other chest pain [R07.89] 07/21/2020 Yes      HPI by Dr Mandel 07/21/2020: 60 year old female with history of hypothyroidism, essential hypertension, morbid obesity (>250 lb), hidradenitis supprativa, and coronary artery disease (s/p 1 stent with known disease in 2 other arteries) presented to ED complaining worsening chest discomfort: tightness--patient clenching fist on chest wall, 8/10 with rads to left shoulder accompanied by dyspnea. Symptoms come and go by own accord but have been occurring more frequently over the past week. Her Cardiologist left the practice area and she has not established with another. No cough or sputum. In ED: EKG no acute segments. CXR: NAPD. Labs: leukocytosis, normal H/H; normal renal function; CKMB elevated, troponin <0.03.    Subjective:    Interval History: Patient seen and examined this morning, sitting in bed, just returned from part 1 of her stress test. Reports she has been experiencing central chest pain off and on, states its a pressure not pain. Not as pronounced as her last MI in 2017 when she received a stent and reports she had 2 more blockages that were 40 % at the time and did not needed intervention then, but 1 of the 2 was the  maker which patient is concerned that the chest pain is probably coming from that blockage.    Discussed  Dr. Cano has been consulted and will await for his evaluation and recommendation.  Patient currently does not have a cardiologist  Patient also have hidradenitis supprativa, which she reports it in every fold and bleeds from time to time.   Pt did mentioned SOB which is slightly more than her usual   No Family present at bedside.  No concerns/issues overnight reported by the patient or the nursing staff.    Review of Systems All other Review of Systems were found to be negative expect for that mentioned already in HPI.     Objective:     Physical Exam  Vitals:    07/22/20 0331   BP: (!) 117/55   Pulse: 64   Resp: 19   Temp: 98.5 °F (36.9 °C)     Wt Readings from Last 1 Encounters:   07/21/20 108.8 kg (239 lb 13.8 oz)      Body mass index is 39.91 kg/m².    Vitals reviewed.  Constitutional: No distress.  Morbidly obese  HENT: NC, large neck circumference.  Supplemental oxygen via nasal cannula at 2 liter/minute  Head: Atraumatic.   Cardiovascular: Normal rate, regular rhythm and normal heart sounds.   Pulmonary/Chest: Effort normal. No wheezes.  Decrease breath sounds due to body habitus   Abdominal: Soft.  obese.  Bowel sounds are normal.  Neurological: Alert.   Skin: rash in body folds   Psych: Appropriate mood and affect, concerned     Following labs were Reviewed   CBC:  Recent Labs   Lab 07/22/20  0318   WBC 17.17*   HGB 12.0   HCT 39.8   *     CMP:  Recent Labs   Lab 07/21/20  1805 07/22/20  0318   CALCIUM 8.9 8.3*   ALBUMIN 3.5  --    PROT 8.4  --    * 131*   K 3.8 4.0   CO2 30* 29   CL 91* 95   BUN 13 17   CREATININE 0.7 0.7   ALKPHOS 97  --    ALT 15  --    AST 20  --    BILITOT 0.5  --      Last 72 hour POCT GLUCOSE  No results found for: POCTGLUCOSE     Microbiology Results (last 7 days)     ** No results found for the last 168 hours. **            X-Ray Chest AP Portable   Final Result      NM Myocardial Perfusion Spect Multi Pharmacologic    (Results Pending)         Scheduled Meds:    enoxaparin  40 mg Subcutaneous Q12H    levothyroxine  175 mcg Oral Daily    propranoloL  40 mg Oral QID    traMADoL  50 mg Oral TID     Continuous Infusions:  PRN Meds:.acetaminophen, HYDROcodone-acetaminophen, HYDROcodone-acetaminophen, hydrOXYzine pamoate, meclizine, prochlorperazine, trimethobenzamide      Assessment & Plan:     Chest Pressure r/o Acute coronary syndrome  Known CAD with Hx of Stent 2017 x1  Admitted to Cardio B  Troponin negative x3  Nuclear stress test negative  Continue aspirin Plavix  Cardiology Dr. Cano consulted, awaiting eval and recs    Diabetes type 2, uncontrolled   a1c 7.9  Hold metformin  I will initiate insulin sliding scale and Accu-Cheks AC and HS  Hypoglycemia protocol in place  Will change diet to diabetic/cardiac    Hypothyroidism  Chronic medical condition  Continue levothyroxine 175 mcg q.a.m.    Hidradenitis supprativa  Chronic medical condition  Will consult wound care as patient is bleeding from some of her lesions    BMI 39.91  Aware    VTE prophylaxis:  Enoxaparin 40 mg subcu q.12    Discharge Plannin-2 DAYS     Above encounter included review of the medical records, interviewing and examining the patient face-to-face, discussion with family and other health care providers, ordering and interpreting lab/test results and formulating a plan of care.     Medical Decision Making:      [_] Low Complexity  [_] Moderate Complexity  [x] High Complexity      Damion Unger MD  Department of Hospital Medicine   [facility]

## 2020-07-23 VITALS
WEIGHT: 239.88 LBS | OXYGEN SATURATION: 92 % | HEIGHT: 65 IN | BODY MASS INDEX: 39.97 KG/M2 | SYSTOLIC BLOOD PRESSURE: 131 MMHG | DIASTOLIC BLOOD PRESSURE: 76 MMHG | HEART RATE: 58 BPM | RESPIRATION RATE: 20 BRPM | TEMPERATURE: 97 F

## 2020-07-23 PROBLEM — R53.81 PHYSICAL DEBILITY: Status: ACTIVE | Noted: 2020-07-23

## 2020-07-23 PROBLEM — R09.02 HYPOXEMIA: Status: ACTIVE | Noted: 2020-07-23

## 2020-07-23 PROBLEM — R07.89 CHEST PRESSURE: Status: RESOLVED | Noted: 2020-07-22 | Resolved: 2020-07-23

## 2020-07-23 PROBLEM — I24.9 ACUTE CORONARY SYNDROME: Status: RESOLVED | Noted: 2020-07-21 | Resolved: 2020-07-23

## 2020-07-23 LAB
ANION GAP SERPL CALC-SCNC: 8 MMOL/L (ref 8–16)
BUN SERPL-MCNC: 17 MG/DL (ref 8–23)
CALCIUM SERPL-MCNC: 8.1 MG/DL (ref 8.7–10.5)
CHLORIDE SERPL-SCNC: 94 MMOL/L (ref 95–110)
CO2 SERPL-SCNC: 28 MMOL/L (ref 23–29)
CREAT SERPL-MCNC: 0.6 MG/DL (ref 0.5–1.4)
ERYTHROCYTE [DISTWIDTH] IN BLOOD BY AUTOMATED COUNT: 14.7 % (ref 11.5–14.5)
EST. GFR  (AFRICAN AMERICAN): >60 ML/MIN/1.73 M^2
EST. GFR  (NON AFRICAN AMERICAN): >60 ML/MIN/1.73 M^2
GLUCOSE SERPL-MCNC: 167 MG/DL (ref 70–110)
GLUCOSE SERPL-MCNC: 170 MG/DL (ref 70–110)
GLUCOSE SERPL-MCNC: 204 MG/DL (ref 70–110)
HCT VFR BLD AUTO: 38.6 % (ref 37–48.5)
HGB BLD-MCNC: 11.6 G/DL (ref 12–16)
MAGNESIUM SERPL-MCNC: 2 MG/DL (ref 1.6–2.6)
MCH RBC QN AUTO: 26.7 PG (ref 27–31)
MCHC RBC AUTO-ENTMCNC: 30.1 G/DL (ref 32–36)
MCV RBC AUTO: 89 FL (ref 82–98)
PLATELET # BLD AUTO: 328 K/UL (ref 150–350)
PMV BLD AUTO: 10.2 FL (ref 9.2–12.9)
POTASSIUM SERPL-SCNC: 4.1 MMOL/L (ref 3.5–5.1)
RBC # BLD AUTO: 4.35 M/UL (ref 4–5.4)
SODIUM SERPL-SCNC: 130 MMOL/L (ref 136–145)
WBC # BLD AUTO: 11.93 K/UL (ref 3.9–12.7)

## 2020-07-23 PROCEDURE — 96376 TX/PRO/DX INJ SAME DRUG ADON: CPT

## 2020-07-23 PROCEDURE — 93005 ELECTROCARDIOGRAM TRACING: CPT | Performed by: INTERNAL MEDICINE

## 2020-07-23 PROCEDURE — 63600175 PHARM REV CODE 636 W HCPCS: Performed by: INTERNAL MEDICINE

## 2020-07-23 PROCEDURE — 27000221 HC OXYGEN, UP TO 24 HOURS

## 2020-07-23 PROCEDURE — 36415 COLL VENOUS BLD VENIPUNCTURE: CPT

## 2020-07-23 PROCEDURE — 94640 AIRWAY INHALATION TREATMENT: CPT

## 2020-07-23 PROCEDURE — 99900035 HC TECH TIME PER 15 MIN (STAT)

## 2020-07-23 PROCEDURE — 97161 PT EVAL LOW COMPLEX 20 MIN: CPT

## 2020-07-23 PROCEDURE — 82962 GLUCOSE BLOOD TEST: CPT

## 2020-07-23 PROCEDURE — 97530 THERAPEUTIC ACTIVITIES: CPT

## 2020-07-23 PROCEDURE — G0378 HOSPITAL OBSERVATION PER HR: HCPCS

## 2020-07-23 PROCEDURE — 83735 ASSAY OF MAGNESIUM: CPT

## 2020-07-23 PROCEDURE — 25000003 PHARM REV CODE 250: Performed by: INTERNAL MEDICINE

## 2020-07-23 PROCEDURE — 85027 COMPLETE CBC AUTOMATED: CPT

## 2020-07-23 PROCEDURE — 94761 N-INVAS EAR/PLS OXIMETRY MLT: CPT

## 2020-07-23 PROCEDURE — 80048 BASIC METABOLIC PNL TOTAL CA: CPT

## 2020-07-23 PROCEDURE — 96372 THER/PROPH/DIAG INJ SC/IM: CPT | Mod: 59

## 2020-07-23 PROCEDURE — 25000242 PHARM REV CODE 250 ALT 637 W/ HCPCS: Performed by: INTERNAL MEDICINE

## 2020-07-23 PROCEDURE — C9113 INJ PANTOPRAZOLE SODIUM, VIA: HCPCS | Performed by: INTERNAL MEDICINE

## 2020-07-23 RX ORDER — IPRATROPIUM BROMIDE AND ALBUTEROL SULFATE 2.5; .5 MG/3ML; MG/3ML
3 SOLUTION RESPIRATORY (INHALATION) EVERY 6 HOURS PRN
Status: DISCONTINUED | OUTPATIENT
Start: 2020-07-23 | End: 2020-07-23 | Stop reason: HOSPADM

## 2020-07-23 RX ORDER — PANTOPRAZOLE SODIUM 40 MG/1
40 TABLET, DELAYED RELEASE ORAL 2 TIMES DAILY
Qty: 60 TABLET | Refills: 1 | Status: SHIPPED | OUTPATIENT
Start: 2020-07-23

## 2020-07-23 RX ADMIN — PROPRANOLOL HYDROCHLORIDE 40 MG: 20 TABLET ORAL at 12:07

## 2020-07-23 RX ADMIN — HUMAN INSULIN 2 UNITS: 100 INJECTION, SOLUTION SUBCUTANEOUS at 12:07

## 2020-07-23 RX ADMIN — ENOXAPARIN SODIUM 40 MG: 100 INJECTION SUBCUTANEOUS at 09:07

## 2020-07-23 RX ADMIN — LEVOTHYROXINE SODIUM 175 MCG: 25 TABLET ORAL at 05:07

## 2020-07-23 RX ADMIN — PROCHLORPERAZINE MALEATE 10 MG: 5 TABLET ORAL at 01:07

## 2020-07-23 RX ADMIN — HYDROCODONE BITARTRATE AND ACETAMINOPHEN 1 TABLET: 5; 325 TABLET ORAL at 01:07

## 2020-07-23 RX ADMIN — ACETAMINOPHEN 650 MG: 325 TABLET, FILM COATED ORAL at 05:07

## 2020-07-23 RX ADMIN — HYDROCODONE BITARTRATE AND ACETAMINOPHEN 1 TABLET: 10; 325 TABLET ORAL at 05:07

## 2020-07-23 RX ADMIN — TRIMETHOBENZAMIDE HYDROCHLORIDE 300 MG: 300 CAPSULE ORAL at 05:07

## 2020-07-23 RX ADMIN — PANTOPRAZOLE SODIUM 40 MG: 40 INJECTION, POWDER, FOR SOLUTION INTRAVENOUS at 09:07

## 2020-07-23 RX ADMIN — PROPRANOLOL HYDROCHLORIDE 40 MG: 20 TABLET ORAL at 09:07

## 2020-07-23 RX ADMIN — IPRATROPIUM BROMIDE AND ALBUTEROL SULFATE 3 ML: .5; 3 SOLUTION RESPIRATORY (INHALATION) at 09:07

## 2020-07-23 NOTE — NURSING
Notified Dr. Abrams that during initial assessment pt was found to be awake and alert on room air. During med pass at 2130 she was found to be drowsy and unable to stay awake O2 sats were 80-82 %. Placed pt on 2L NC and repeatedly aroused her and asked her to stay awake. She continued to fall back asleep and sats were still low. Pts respirations 10-12 BPM and shallow. Placed pt on oxymask 8 L and again continued to arouse her. Pts O2 sats came up to 94-95%. Had pt sit up and take deep breaths and cough. Pt stated she does not want to cough b/c it hurts to cough. Gave pt a pillow to splint with . Notified  pt stated she has had cough and sob x 5 days. Narcotics held due to drowsiness. No new orders received.       2205-pt is awake and maintaining O2 sats 92 %. On 7 L oxymask.

## 2020-07-23 NOTE — DISCHARGE SUMMARY
[facility] Medicine  Discharge Summary      Patient Name: Karo Leonard  MRN: [id number]  Admission Date: 7/21/2020  Hospital Length of Stay: 0 days  Discharge Date and Time:  07/23/2020 1:37 PM  Attending Physician: Damion Unger MD   Discharging Provider: Damion Unger MD  Primary Care Provider: Primary Doctor No      HPI:   60 year old female with history of hypothyroidism, essential hypertension, morbid obesity (>250 lb), hidradenitis supprativa, and coronary artery disease (s/p 1 stent with known disease in 2 other arteries) presented to ED complaining worsening chest discomfort: tightness--patient clenching fist on chest wall, 8/10 with rads to left shoulder accompanied by dyspnea. Symptoms come and go by own accord but have been occurring more frequently over the past week. Her Cardiologist left the practice area and she has not established with another. No cough or sputum. In ED: EKG no acute segments. CXR: NAPD. Labs: leukocytosis, normal H/H; normal renal function; CKMB elevated, troponin <0.03.    * No surgery found *      Hospital Course:   60-year-old white female with known past medical history of hypothyroidism, essential hypertension, morbid obesity, hidradenitis suppurativa and coronary artery disease status post stent 2017, previous smoker, quit 2017 admitted 07/21/2020 with chest pressure rule out ACS.  Patient was admitted to Cardio B.  Troponins were negative x3.  Nuclear stress test was negative.  Cardiology Dr. Cano was consulted who recommended resuming home medications, low-salt diet, lifestyle modification.  Patient also has uncontrolled diabetes with A1c of 7.9.  I discussed diabetic diet in depth.  Patient informed that she has dry mouth and the only thing she can take is regular hard candy to suck on as she cannot tolerate any kind of diet/sugar free candies.  Patient is mostly bedbound/chair bound.  Reports she cannot get up and walk due to her back pain and  lower extremity weakness.  PT was consulted to evaluated and recommended home health with PT  Also we noted that patient has been requiring oxygen.  We evaluated the patient for home oxygen and patient drops to 87% on room air and recovered with 5 L. requested home oxygen, patient did inform me that she was a previous smoker and stopped smoking in 2017 after her MI.  Discussed with patient that she will need to follow with a pulmonologist to rule out COPD/obesity hypoventilation syndrome/restrictive lung disease.  Patient will also need outpatient sleep study to evaluate and assess for need for CPAP at night.  Patient verbalized understanding  Patient informed me that she has a nurse practitioner that comes to her house to evaluate her.  Advised patient to make an appointment for her to come see her within 1 week of hospital discharge and to discuss the above.  Patient verbalized understanding  Discussed with patient that I will send new prescription of pantoprazole 40 mg twice a day for 6 weeks to her [facility] pharmacy, further refills to come from her primary care physician  Advised patient to avoid any medication that can cause drowsiness/sleepiness and it will suppress hers respiratory center.  Patient verbalized understanding    Patient was seen and examined on the day of discharge    Vitals reviewed.  Constitutional: No distress.  Morbidly obese  HENT: NC, large neck circumference.  Supplemental oxygen via nasal cannula at 5 liter/minute.  Broken teeth ?  Diabetic Periodontal disease  Head: Atraumatic.   Cardiovascular: Normal rate, regular rhythm and normal heart sounds.   Pulmonary/Chest: Effort normal. No wheezes.  Decrease breath sounds due to body habitus   Abdominal: Soft.  obese.  Bowel sounds are normal.  Neurological: Alert.   Skin: rash in body folds   Psych: Appropriate mood and affect,    Patient was discharged home with home oxygen at 5 liters/minute and home health with skilled nursing once a week and  PT 3 times a week       Consults:   Consults (From admission, onward)        Status Ordering Provider     Inpatient consult to Cardiology  Once     Provider:  Jerod Moe MD    Completed ELICIA LINDSAY     Inpatient consult to Hospitalist  Once     Provider:  Elicia Lindsay MD    Acknowledged ALYCE CRUZ     Inpatient consult to Registered Dietitian/Nutritionist  Once     Provider:  (Not yet assigned)    Completed MELVIN, GILBERT          No new Assessment & Plan notes have been filed under this hospital service since the last note was generated.  Service: Hospital Medicine    Final Active Diagnoses:    Diagnosis Date Noted POA    Hypoxemia [R09.02] 07/23/2020 Yes    Physical debility [R53.81] 07/23/2020 Yes    BMI 39.0-39.9,adult [Z68.39] 07/22/2020 Not Applicable    Diabetes type 2, uncontrolled [E11.65] 07/22/2020 Yes    Atherosclerosis of native coronary artery of native heart with unstable angina pectoris [I25.110] 07/22/2020 Yes    Hypothyroid [E03.9] 08/13/2013 Yes    Hypertension [I10] 08/13/2013 Yes    Hidradenitis suppurativa [L73.2] 08/13/2013 Yes      Problems Resolved During this Admission:    Diagnosis Date Noted Date Resolved POA    PRINCIPAL PROBLEM:  Chest pressure [R07.89] 07/22/2020 07/23/2020 Yes    Other chest pain [R07.89] 07/21/2020 07/21/2020 Yes    Acute coronary syndrome [I24.9] 07/21/2020 07/23/2020 Yes       Discharged Condition: fair    [place] [facility] Up:  Follow-up Information     Pls ask your Primary NP for home visit 1 wk post discharge. Schedule an appointment as soon as possible for a visit in 1 week.    Why: post hospital D/C F/U, acid reflux, uncontrolled DM2 with a1c of 7.9, need Sleep study for possible ABDI vs Obesity Hypoventilation syndrome, now on Home oxygen            pRIMARY CARDIOLOGIST. Schedule an appointment as soon as possible for a visit in 2 weeks.    Why: POST HOSP D/C F/U               Patient Instructions:     "  OXYGEN FOR HOME USE     Order Specific Question Answer Comments   Liter Flow 5    Duration Continuous    Qualifying SpO2: 87%    Testing done at: [facility]    Device home concentrator with portable unit    Length of need (in months): 99 mos    Height: 5' 5" (1.651 m)    Weight: 108.8 kg (239 lb 13.8 oz)    Alternative treatment measures have been tried or considered and deemed clinically ineffective. Yes      Ambulatory referral/consult to Home Health   Standing Status: Future   Referral Priority: Routine Referral Type: Home Health   Referral Reason: Specialty Services Required   Requested Specialty: Home Health Services   Number of Visits Requested: 1     Notify your health care provider if you experience any of the following:  difficulty breathing or increased cough     Activity as tolerated       Pending Diagnostic Studies:     None         Medications:  Reconciled Home Medications:      Medication List      START taking these medications    pantoprazole 40 MG tablet  Commonly known as: PROTONIX  Take 1 tablet (40 mg total) by mouth 2 (two) times daily.        CHANGE how you take these medications    levothyroxine 175 MCG tablet  Commonly known as: SYNTHROID, LEVOTHROID  Take 1 tablet (175 mcg total) by mouth once daily.  What changed: how much to take        CONTINUE taking these medications    amLODIPine 5 MG tablet  Commonly known as: NORVASC  Take 5 mg by mouth once daily.     aspirin 81 MG Chew  Take 81 mg by mouth once daily.     atorvastatin 80 MG tablet  Commonly known as: LIPITOR  Take 80 mg by mouth every evening.     chlorzoxazone 250 MG tablet  Commonly known as: PARAFON FORTE  Take 250 mg by mouth 6 (six) times daily.     clopidogreL 75 mg tablet  Commonly known as: PLAVIX  Take 75 mg by mouth once daily.     ESTRACE 0.01 % (0.1 mg/gram) vaginal cream  Generic drug: estradioL  Place 1 g vaginally every 7 days.     ipratropium 0.03 % nasal spray  Commonly known as: ATROVENT  1 spray by Nasal route 2 " (two) times daily.     metFORMIN 850 MG tablet  Commonly known as: GLUCOPHAGE  Take 850 mg by mouth nightly.     propranoloL 80 MG 24 hr capsule  Commonly known as: INDERAL LA  Take 160 mg by mouth once daily.        STOP taking these medications    prochlorperazine 10 MG tablet  Commonly known as: COMPAZINE            Indwelling Lines/Drains at time of discharge:   Lines/Drains/Airways     None                 Time spent on the discharge of patient: 30 minutes  Patient was seen and examined on the date of discharge and determined to be suitable for discharge.         Damion Unger MD  Department of Hospital Medicine  [facility]

## 2020-07-23 NOTE — HOSPITAL COURSE
60-year-old white female with known past medical history of hypothyroidism, essential hypertension, morbid obesity, hidradenitis suppurativa and coronary artery disease status post stent 2017, previous smoker, quit 2017 admitted 07/21/2020 with chest pressure rule out ACS.  Patient was admitted to Cardio B.  Troponins were negative x3.  Nuclear stress test was negative.  Cardiology Dr. Cano was consulted who recommended resuming home medications, low-salt diet, lifestyle modification.  Patient also has uncontrolled diabetes with A1c of 7.9.  I discussed diabetic diet in depth.  Patient informed that she has dry mouth and the only thing she can take is regular hard candy to suck on as she cannot tolerate any kind of diet/sugar free candies.  Patient is mostly bedbound/chair bound.  Reports she cannot get up and walk due to her back pain and lower extremity weakness.  PT was consulted to evaluated and recommended home health with PT  Also we noted that patient has been requiring oxygen.  We evaluated the patient for home oxygen and patient drops to 87% on room air and recovered with 5 L. requested home oxygen, patient did inform me that she was a previous smoker and stopped smoking in 2017 after her MI.  Discussed with patient that she will need to follow with a pulmonologist to rule out COPD/obesity hypoventilation syndrome/restrictive lung disease.  Patient will also need outpatient sleep study to evaluate and assess for need for CPAP at night.  Patient verbalized understanding  Patient informed me that she has a nurse practitioner that comes to her house to evaluate her.  Advised patient to make an appointment for her to come see her within 1 week of hospital discharge and to discuss the above.  Patient verbalized understanding  Discussed with patient that I will send new prescription of pantoprazole 40 mg twice a day for 6 weeks to her [facility] pharmacy, further refills to come from her primary care  physician  Advised patient to avoid any medication that can cause drowsiness/sleepiness and it will suppress hers respiratory center.  Patient verbalized understanding    Patient was seen and examined on the day of discharge    Vitals reviewed.  Constitutional: No distress.  Morbidly obese  HENT: NC, large neck circumference.  Supplemental oxygen via nasal cannula at 5 liter/minute.  Broken teeth ?  Diabetic Periodontal disease  Head: Atraumatic.   Cardiovascular: Normal rate, regular rhythm and normal heart sounds.   Pulmonary/Chest: Effort normal. No wheezes.  Decrease breath sounds due to body habitus   Abdominal: Soft.  obese.  Bowel sounds are normal.  Neurological: Alert.   Skin: rash in body folds   Psych: Appropriate mood and affect,    Patient was discharged home with home oxygen at 5 liters/minute and home health with skilled nursing once a week and PT 3 times a week

## 2020-07-23 NOTE — PT/OT/SLP EVAL
"Physical Therapy Evaluation and Discharge Note    Patient Name:  Karo Leonard   MRN:  [id number]    Recommendations:     Discharge Recommendations:  home health PT   Discharge Equipment Recommendations: oxygen   Barriers to discharge: None    Assessment:     Karo Leonard is a 60 y.o. female admitted with a medical diagnosis of Chest pressure. .  At this time, patient is functioning at their prior level of function and does not require further acute PT services. Pt lives at home with her  and reported "I've been bed ridden since 2000." Pt did report short distance ambulation while holding on to the andrade. Pt is very sedentary and appears comfortable at that level. Home Health PT is recommended  for generalized reconditioning with increased  endurance for mobilization.Pt required 5 L 02 to maintain oxygenation at 88%.  On RA at rest 02 sat was 80%.    Recent Surgery: * No surgery found *      Plan:     During this hospitalization, patient does not require further acute PT services.  Please re-consult if situation changes.      Subjective     Chief Complaint:   Patient/Family Comments/goals: home with   Pain/Comfort:  ·      Patients cultural, spiritual, Sabianist conflicts given the current situation:      Living Environment:    Prior to admission, patients level of function was independent .  Equipment used at home: none.  DME owned (not currently used): rolling walker, single point cane, bedside commode and wheelchair.  Upon discharge, patient will have assistance from her .    Objective:     Communicated with nurse prior to session.  Patient found supine with   upon PT entry to room.    General Precautions: Standard, fall   Orthopedic Precautions:    Braces:       Exams:  · Cognitive Exam:  Patient is oriented to Person, Place, Time and Situation  · RUE ROM: limited shoulder elevation to 90 degrees  · LUE ROM: limited shoulder ROM to 90 degrees of elevation  · RLE ROM: WNL  · RLE " Strength: WNL  · LLE ROM: WNL  · LLE Strength: WNL    Functional Mobility:  · Bed Mobility:     · Supine to Sit: supervision  · Sit to Supine: supervision  · Transfers:     · Sit to Stand:  supervision with no AD  · Gait: 20 ft SBA no AD 10 ft RW with greater ease.    AM-PAC 6 CLICK MOBILITY  Total Score:        Therapeutic Activities and Exercises:  PT eval and  D/C with recommendation for home 02  AM-PAC 6 CLICK MOBILITY  Total Score:      Patient left sitting at EOB with all lines intact, call button in reach and  present.    GOALS:   Multidisciplinary Problems     Physical Therapy Goals     Not on file                History:     Past Medical History:   Diagnosis Date    Coronary artery disease     cardiac stent    Diabetes mellitus     Hypertension        History reviewed. No pertinent surgical history.    Time Tracking:     PT Received On: 07/23/20  PT Start Time: 1020     PT Stop Time: 1040  PT Total Time (min): 20 min     Billable Minutes: Evaluation 10 minutes and Therapeutic Activity 10 minutes      Gloria Michael, PT  07/23/2020

## 2020-07-23 NOTE — DISCHARGE INSTRUCTIONS
New prescription sent to your pharmacy    Pantoprazole 40 mg twice a day for 6 weeks, then once daily.  Further refills to come from your primary care physician    Follow-up with your primary cardiologist in 2 weeks    Please ask your nurse practitioner who do home visits for you to visit U in 1 week post hospital discharge for referral for sleep study to rule out obstructive sleep apnea, obesity hypoventilation syndrome.  Need strict diabetic control, decrease calorie intake, weight loss for overall improvement in health

## 2020-07-23 NOTE — PLAN OF CARE
07/23/20 0911   Patient Assessment/Suction   Level of Consciousness (AVPU) alert   All Lung Fields Breath Sounds clear;diminished   PRE-TX-O2   O2 Device (Oxygen Therapy) nasal cannula   $ Is the patient on Low Flow Oxygen? Yes   Flow (L/min) 5   SpO2 (!) 94 %   Pulse Oximetry Type Intermittent   $ Pulse Oximetry - Multiple Charge Pulse Oximetry - Multiple   Pulse 73   Resp 16   Aerosol Therapy   $ Aerosol Therapy Charges Aerosol Treatment   Daily Review of Necessity (SVN) completed   Respiratory Treatment Status (SVN) given   Treatment Route (SVN) mask   Patient Position (SVN) semi-Brand's   Post Treatment Assessment (SVN) breath sounds improved;increased aeration   Signs of Intolerance (SVN) none   Breath Sounds Post-Respiratory Treatment   Post-treatment Heart Rate (beats/min) 71   Post-treatment Resp Rate (breaths/min) 15   Respiratory Evaluation   $ Care Plan Tech Time 15 min

## 2020-07-23 NOTE — PLAN OF CARE
07/23/20 1443   Final Note   Assessment Type Final Discharge Note   Anticipated Discharge Disposition Home-Health     Patient discharged with home oxygen provided by [facility]. Patient's insurance would not cover cost due to observation status and medical diagnosis. Pt to private pay for home oxygen. Home Health set-up with [facility]. Faxed need discharged documents to [facility].Spoke with Suzanne to confirm visit for Sunday.     1500 Delivered 2 portable O2 tank to room. Pt instructed to call [facility] to notify of leaving.    5440 Notified that [facility] doesn't accept [employer] so new referral sent to [facility]. Called to verify and they do accept patient's insurance.

## 2020-07-23 NOTE — PLAN OF CARE
07/23/20 1134   Home Oxygen Qualification   Room Air SpO2 At Rest (!) 87 %   SpO2 on Recovery 93 %   Recovery O2 LPM 5 LPM

## 2020-07-23 NOTE — NURSING
Patient transported downstairs via wheelchair for discharge, Oxygen in use.  to drive patient home.

## 2020-07-30 ENCOUNTER — EXTERNAL HOME HEALTH (OUTPATIENT)
Dept: HOME HEALTH SERVICES | Facility: HOSPITAL | Age: 60
End: 2020-07-30

## 2020-07-30 NOTE — PROGRESS NOTES
SOC Order # [id number]  Cert Period: 07/25 to 09/22/2020 with [facility] - Dr. Teodoro Mandel

## 2022-06-07 ENCOUNTER — HOSPITAL ENCOUNTER (OUTPATIENT)
Facility: HOSPITAL | Age: 62
Discharge: HOME | End: 2022-06-12
Attending: EMERGENCY MEDICINE | Admitting: INTERNAL MEDICINE
Payer: COMMERCIAL

## 2022-06-07 DIAGNOSIS — J44.1 COPD EXACERBATION: Primary | ICD-10-CM

## 2022-06-07 DIAGNOSIS — R07.9 CHEST PAIN, UNSPECIFIED TYPE: ICD-10-CM

## 2022-06-07 DIAGNOSIS — R07.9 CHEST PAIN: ICD-10-CM

## 2022-06-07 LAB
ALBUMIN SERPL BCP-MCNC: 3.4 G/DL (ref 3.5–5.2)
ALP SERPL-CCNC: 238 U/L (ref 55–135)
ALT SERPL W/O P-5'-P-CCNC: 37 U/L (ref 10–44)
ANION GAP SERPL CALC-SCNC: 10 MMOL/L (ref 8–16)
ANION GAP SERPL CALC-SCNC: 12 MMOL/L (ref 8–16)
AST SERPL-CCNC: 34 U/L (ref 10–40)
BASOPHILS # BLD AUTO: 0.03 K/UL (ref 0–0.2)
BASOPHILS # BLD AUTO: 0.05 K/UL (ref 0–0.2)
BASOPHILS NFR BLD: 0.3 % (ref 0–1.9)
BASOPHILS NFR BLD: 0.3 % (ref 0–1.9)
BILIRUB SERPL-MCNC: 0.8 MG/DL (ref 0.1–1)
BNP SERPL-MCNC: 70 PG/ML (ref 0–99)
BUN SERPL-MCNC: 11 MG/DL (ref 8–23)
BUN SERPL-MCNC: 9 MG/DL (ref 8–23)
CALCIUM SERPL-MCNC: 9 MG/DL (ref 8.7–10.5)
CALCIUM SERPL-MCNC: 9.2 MG/DL (ref 8.7–10.5)
CHLORIDE SERPL-SCNC: 91 MMOL/L (ref 95–110)
CHLORIDE SERPL-SCNC: 91 MMOL/L (ref 95–110)
CO2 SERPL-SCNC: 31 MMOL/L (ref 23–29)
CO2 SERPL-SCNC: 32 MMOL/L (ref 23–29)
CREAT SERPL-MCNC: 0.6 MG/DL (ref 0.5–1.4)
CREAT SERPL-MCNC: 0.6 MG/DL (ref 0.5–1.4)
DIFFERENTIAL METHOD: ABNORMAL
DIFFERENTIAL METHOD: ABNORMAL
EOSINOPHIL # BLD AUTO: 0 K/UL (ref 0–0.5)
EOSINOPHIL # BLD AUTO: 0.1 K/UL (ref 0–0.5)
EOSINOPHIL NFR BLD: 0.1 % (ref 0–8)
EOSINOPHIL NFR BLD: 0.7 % (ref 0–8)
ERYTHROCYTE [DISTWIDTH] IN BLOOD BY AUTOMATED COUNT: 15.4 % (ref 11.5–14.5)
ERYTHROCYTE [DISTWIDTH] IN BLOOD BY AUTOMATED COUNT: 15.7 % (ref 11.5–14.5)
EST. GFR  (AFRICAN AMERICAN): >60 ML/MIN/1.73 M^2
EST. GFR  (AFRICAN AMERICAN): >60 ML/MIN/1.73 M^2
EST. GFR  (NON AFRICAN AMERICAN): >60 ML/MIN/1.73 M^2
EST. GFR  (NON AFRICAN AMERICAN): >60 ML/MIN/1.73 M^2
GLUCOSE SERPL-MCNC: 185 MG/DL (ref 70–110)
GLUCOSE SERPL-MCNC: 210 MG/DL (ref 70–110)
GLUCOSE SERPL-MCNC: 298 MG/DL (ref 70–110)
HCT VFR BLD AUTO: 37.1 % (ref 37–48.5)
HCT VFR BLD AUTO: 38.1 % (ref 37–48.5)
HGB BLD-MCNC: 11.6 G/DL (ref 12–16)
HGB BLD-MCNC: 11.8 G/DL (ref 12–16)
IMM GRANULOCYTES # BLD AUTO: 0.05 K/UL (ref 0–0.04)
IMM GRANULOCYTES # BLD AUTO: 0.05 K/UL (ref 0–0.04)
IMM GRANULOCYTES NFR BLD AUTO: 0.3 % (ref 0–0.5)
IMM GRANULOCYTES NFR BLD AUTO: 0.5 % (ref 0–0.5)
INFLUENZA A, MOLECULAR: NEGATIVE
INFLUENZA B, MOLECULAR: NEGATIVE
LYMPHOCYTES # BLD AUTO: 1.2 K/UL (ref 1–4.8)
LYMPHOCYTES # BLD AUTO: 1.4 K/UL (ref 1–4.8)
LYMPHOCYTES NFR BLD: 10.6 % (ref 18–48)
LYMPHOCYTES NFR BLD: 9.2 % (ref 18–48)
MCH RBC QN AUTO: 27.4 PG (ref 27–31)
MCH RBC QN AUTO: 27.6 PG (ref 27–31)
MCHC RBC AUTO-ENTMCNC: 31 G/DL (ref 32–36)
MCHC RBC AUTO-ENTMCNC: 31.3 G/DL (ref 32–36)
MCV RBC AUTO: 88 FL (ref 82–98)
MCV RBC AUTO: 89 FL (ref 82–98)
MONOCYTES # BLD AUTO: 0.1 K/UL (ref 0.3–1)
MONOCYTES # BLD AUTO: 1.3 K/UL (ref 0.3–1)
MONOCYTES NFR BLD: 1.1 % (ref 4–15)
MONOCYTES NFR BLD: 8.7 % (ref 4–15)
NEUTROPHILS # BLD AUTO: 12.4 K/UL (ref 1.8–7.7)
NEUTROPHILS # BLD AUTO: 9.5 K/UL (ref 1.8–7.7)
NEUTROPHILS NFR BLD: 80.8 % (ref 38–73)
NEUTROPHILS NFR BLD: 87.4 % (ref 38–73)
NRBC BLD-RTO: 0 /100 WBC
NRBC BLD-RTO: 0 /100 WBC
PLATELET # BLD AUTO: 254 K/UL (ref 150–450)
PLATELET # BLD AUTO: 269 K/UL (ref 150–450)
PMV BLD AUTO: 10.1 FL (ref 9.2–12.9)
PMV BLD AUTO: 10.6 FL (ref 9.2–12.9)
POTASSIUM SERPL-SCNC: 3.7 MMOL/L (ref 3.5–5.1)
POTASSIUM SERPL-SCNC: 4 MMOL/L (ref 3.5–5.1)
PROCALCITONIN SERPL IA-MCNC: <0.05 NG/ML (ref 0–0.5)
PROCALCITONIN SERPL IA-MCNC: <0.05 NG/ML (ref 0–0.5)
PROT SERPL-MCNC: 8 G/DL (ref 6–8.4)
RBC # BLD AUTO: 4.2 M/UL (ref 4–5.4)
RBC # BLD AUTO: 4.3 M/UL (ref 4–5.4)
SARS-COV-2 RDRP RESP QL NAA+PROBE: NEGATIVE
SODIUM SERPL-SCNC: 132 MMOL/L (ref 136–145)
SODIUM SERPL-SCNC: 135 MMOL/L (ref 136–145)
SPECIMEN SOURCE: NORMAL
TROPONIN I SERPL DL<=0.01 NG/ML-MCNC: <0.03 NG/ML
TROPONIN I SERPL DL<=0.01 NG/ML-MCNC: <0.03 NG/ML
WBC # BLD AUTO: 10.89 K/UL (ref 3.9–12.7)
WBC # BLD AUTO: 15.38 K/UL (ref 3.9–12.7)

## 2022-06-07 PROCEDURE — 83880 ASSAY OF NATRIURETIC PEPTIDE: CPT | Performed by: EMERGENCY MEDICINE

## 2022-06-07 PROCEDURE — 84145 PROCALCITONIN (PCT): CPT | Performed by: STUDENT IN AN ORGANIZED HEALTH CARE EDUCATION/TRAINING PROGRAM

## 2022-06-07 PROCEDURE — 84484 ASSAY OF TROPONIN QUANT: CPT | Performed by: EMERGENCY MEDICINE

## 2022-06-07 PROCEDURE — 83036 HEMOGLOBIN GLYCOSYLATED A1C: CPT | Performed by: NURSE PRACTITIONER

## 2022-06-07 PROCEDURE — 94760 N-INVAS EAR/PLS OXIMETRY 1: CPT

## 2022-06-07 PROCEDURE — G0378 HOSPITAL OBSERVATION PER HR: HCPCS

## 2022-06-07 PROCEDURE — 80048 BASIC METABOLIC PNL TOTAL CA: CPT | Performed by: NURSE PRACTITIONER

## 2022-06-07 PROCEDURE — 93010 ELECTROCARDIOGRAM REPORT: CPT | Mod: ,,, | Performed by: INTERNAL MEDICINE

## 2022-06-07 PROCEDURE — 85025 COMPLETE CBC W/AUTO DIFF WBC: CPT | Mod: 91 | Performed by: NURSE PRACTITIONER

## 2022-06-07 PROCEDURE — 87502 INFLUENZA DNA AMP PROBE: CPT | Performed by: EMERGENCY MEDICINE

## 2022-06-07 PROCEDURE — 27000221 HC OXYGEN, UP TO 24 HOURS

## 2022-06-07 PROCEDURE — 96365 THER/PROPH/DIAG IV INF INIT: CPT

## 2022-06-07 PROCEDURE — 80053 COMPREHEN METABOLIC PANEL: CPT | Performed by: EMERGENCY MEDICINE

## 2022-06-07 PROCEDURE — 85025 COMPLETE CBC W/AUTO DIFF WBC: CPT | Performed by: EMERGENCY MEDICINE

## 2022-06-07 PROCEDURE — 63600175 PHARM REV CODE 636 W HCPCS: Performed by: NURSE PRACTITIONER

## 2022-06-07 PROCEDURE — 84145 PROCALCITONIN (PCT): CPT | Mod: 91 | Performed by: NURSE PRACTITIONER

## 2022-06-07 PROCEDURE — 94644 CONT INHLJ TX 1ST HOUR: CPT

## 2022-06-07 PROCEDURE — 25000242 PHARM REV CODE 250 ALT 637 W/ HCPCS: Performed by: STUDENT IN AN ORGANIZED HEALTH CARE EDUCATION/TRAINING PROGRAM

## 2022-06-07 PROCEDURE — 94640 AIRWAY INHALATION TREATMENT: CPT

## 2022-06-07 PROCEDURE — U0002 COVID-19 LAB TEST NON-CDC: HCPCS | Performed by: EMERGENCY MEDICINE

## 2022-06-07 PROCEDURE — 96372 THER/PROPH/DIAG INJ SC/IM: CPT | Mod: 59 | Performed by: NURSE PRACTITIONER

## 2022-06-07 PROCEDURE — 93005 ELECTROCARDIOGRAM TRACING: CPT | Performed by: INTERNAL MEDICINE

## 2022-06-07 PROCEDURE — 25000242 PHARM REV CODE 250 ALT 637 W/ HCPCS: Performed by: NURSE PRACTITIONER

## 2022-06-07 PROCEDURE — 25000003 PHARM REV CODE 250: Performed by: NURSE PRACTITIONER

## 2022-06-07 PROCEDURE — 93010 EKG 12-LEAD: ICD-10-PCS | Mod: ,,, | Performed by: INTERNAL MEDICINE

## 2022-06-07 PROCEDURE — 99285 EMERGENCY DEPT VISIT HI MDM: CPT | Mod: 25

## 2022-06-07 PROCEDURE — 63600175 PHARM REV CODE 636 W HCPCS: Performed by: STUDENT IN AN ORGANIZED HEALTH CARE EDUCATION/TRAINING PROGRAM

## 2022-06-07 PROCEDURE — 25000003 PHARM REV CODE 250: Performed by: STUDENT IN AN ORGANIZED HEALTH CARE EDUCATION/TRAINING PROGRAM

## 2022-06-07 PROCEDURE — 84484 ASSAY OF TROPONIN QUANT: CPT | Mod: 91 | Performed by: NURSE PRACTITIONER

## 2022-06-07 PROCEDURE — 96375 TX/PRO/DX INJ NEW DRUG ADDON: CPT

## 2022-06-07 PROCEDURE — 25000003 PHARM REV CODE 250: Performed by: EMERGENCY MEDICINE

## 2022-06-07 RX ORDER — ATORVASTATIN CALCIUM 40 MG/1
80 TABLET, FILM COATED ORAL NIGHTLY
Status: DISCONTINUED | OUTPATIENT
Start: 2022-06-07 | End: 2022-06-12 | Stop reason: HOSPADM

## 2022-06-07 RX ORDER — CLOPIDOGREL BISULFATE 75 MG/1
75 TABLET ORAL NIGHTLY
Status: DISCONTINUED | OUTPATIENT
Start: 2022-06-07 | End: 2022-06-12 | Stop reason: HOSPADM

## 2022-06-07 RX ORDER — IPRATROPIUM BROMIDE AND ALBUTEROL SULFATE 2.5; .5 MG/3ML; MG/3ML
3 SOLUTION RESPIRATORY (INHALATION) EVERY 6 HOURS
Status: DISCONTINUED | OUTPATIENT
Start: 2022-06-07 | End: 2022-06-12 | Stop reason: HOSPADM

## 2022-06-07 RX ORDER — IBUPROFEN 200 MG
16 TABLET ORAL
Status: DISCONTINUED | OUTPATIENT
Start: 2022-06-07 | End: 2022-06-10

## 2022-06-07 RX ORDER — GUAIFENESIN 600 MG/1
600 TABLET, EXTENDED RELEASE ORAL 2 TIMES DAILY
Status: DISCONTINUED | OUTPATIENT
Start: 2022-06-07 | End: 2022-06-12 | Stop reason: HOSPADM

## 2022-06-07 RX ORDER — AMLODIPINE BESYLATE 5 MG/1
5 TABLET ORAL DAILY
Status: DISCONTINUED | OUTPATIENT
Start: 2022-06-08 | End: 2022-06-12 | Stop reason: HOSPADM

## 2022-06-07 RX ORDER — TALC
6 POWDER (GRAM) TOPICAL NIGHTLY PRN
Status: DISCONTINUED | OUTPATIENT
Start: 2022-06-07 | End: 2022-06-12 | Stop reason: HOSPADM

## 2022-06-07 RX ORDER — PROPRANOLOL HYDROCHLORIDE 40 MG/1
40 TABLET ORAL DAILY PRN
Status: ON HOLD | COMMUNITY
Start: 2022-05-09 | End: 2022-06-12 | Stop reason: HOSPADM

## 2022-06-07 RX ORDER — IBUPROFEN 200 MG
24 TABLET ORAL
Status: DISCONTINUED | OUTPATIENT
Start: 2022-06-07 | End: 2022-06-10

## 2022-06-07 RX ORDER — LEVOTHYROXINE SODIUM 50 UG/1
150 TABLET ORAL DAILY
Status: DISCONTINUED | OUTPATIENT
Start: 2022-06-08 | End: 2022-06-07

## 2022-06-07 RX ORDER — GABAPENTIN 300 MG/1
600 CAPSULE ORAL 3 TIMES DAILY
Status: DISCONTINUED | OUTPATIENT
Start: 2022-06-07 | End: 2022-06-12 | Stop reason: HOSPADM

## 2022-06-07 RX ORDER — INSULIN ASPART 100 [IU]/ML
0-5 INJECTION, SOLUTION INTRAVENOUS; SUBCUTANEOUS
Status: DISCONTINUED | OUTPATIENT
Start: 2022-06-07 | End: 2022-06-10

## 2022-06-07 RX ORDER — ASPIRIN 325 MG
325 TABLET ORAL
Status: COMPLETED | OUTPATIENT
Start: 2022-06-07 | End: 2022-06-07

## 2022-06-07 RX ORDER — GABAPENTIN 600 MG/1
600 TABLET ORAL 3 TIMES DAILY
COMMUNITY

## 2022-06-07 RX ORDER — FLUCONAZOLE 150 MG/1
TABLET ORAL
Status: ON HOLD | COMMUNITY
Start: 2022-04-06 | End: 2022-06-12 | Stop reason: SDUPTHER

## 2022-06-07 RX ORDER — PREDNISONE 20 MG/1
40 TABLET ORAL DAILY
Status: COMPLETED | OUTPATIENT
Start: 2022-06-08 | End: 2022-06-12

## 2022-06-07 RX ORDER — IPRATROPIUM BROMIDE AND ALBUTEROL SULFATE 2.5; .5 MG/3ML; MG/3ML
3 SOLUTION RESPIRATORY (INHALATION)
Status: COMPLETED | OUTPATIENT
Start: 2022-06-07 | End: 2022-06-07

## 2022-06-07 RX ORDER — NAPROXEN SODIUM 220 MG/1
81 TABLET, FILM COATED ORAL NIGHTLY
Status: DISCONTINUED | OUTPATIENT
Start: 2022-06-07 | End: 2022-06-07

## 2022-06-07 RX ORDER — NITROGLYCERIN 0.4 MG/1
0.4 TABLET SUBLINGUAL EVERY 5 MIN PRN
Status: DISCONTINUED | OUTPATIENT
Start: 2022-06-08 | End: 2022-06-12 | Stop reason: HOSPADM

## 2022-06-07 RX ORDER — ASPIRIN 325 MG
325 TABLET, DELAYED RELEASE (ENTERIC COATED) ORAL DAILY
Status: DISCONTINUED | OUTPATIENT
Start: 2022-06-07 | End: 2022-06-12 | Stop reason: HOSPADM

## 2022-06-07 RX ORDER — DESONIDE 0.5 MG/G
1 CREAM TOPICAL 2 TIMES DAILY
COMMUNITY

## 2022-06-07 RX ORDER — DOXYCYCLINE 100 MG/1
1 TABLET ORAL 2 TIMES DAILY
Status: ON HOLD | COMMUNITY
Start: 2022-04-06 | End: 2022-06-12 | Stop reason: HOSPADM

## 2022-06-07 RX ORDER — AMLODIPINE BESYLATE 5 MG/1
5 TABLET ORAL
Status: COMPLETED | OUTPATIENT
Start: 2022-06-07 | End: 2022-06-07

## 2022-06-07 RX ORDER — PANTOPRAZOLE SODIUM 40 MG/1
40 TABLET, DELAYED RELEASE ORAL
Status: DISCONTINUED | OUTPATIENT
Start: 2022-06-07 | End: 2022-06-12 | Stop reason: HOSPADM

## 2022-06-07 RX ORDER — CHLORZOXAZONE 500 MG/1
500 TABLET ORAL 2 TIMES DAILY
Status: DISCONTINUED | OUTPATIENT
Start: 2022-06-07 | End: 2022-06-12 | Stop reason: HOSPADM

## 2022-06-07 RX ORDER — ENOXAPARIN SODIUM 100 MG/ML
40 INJECTION SUBCUTANEOUS EVERY 24 HOURS
Status: DISCONTINUED | OUTPATIENT
Start: 2022-06-07 | End: 2022-06-12 | Stop reason: HOSPADM

## 2022-06-07 RX ORDER — SODIUM CHLORIDE 0.9 % (FLUSH) 0.9 %
10 SYRINGE (ML) INJECTION
Status: DISCONTINUED | OUTPATIENT
Start: 2022-06-07 | End: 2022-06-12 | Stop reason: HOSPADM

## 2022-06-07 RX ORDER — GLUCAGON 1 MG
1 KIT INJECTION
Status: DISCONTINUED | OUTPATIENT
Start: 2022-06-07 | End: 2022-06-10

## 2022-06-07 RX ORDER — FLUCONAZOLE 50 MG/1
150 TABLET ORAL
Status: COMPLETED | OUTPATIENT
Start: 2022-06-07 | End: 2022-06-07

## 2022-06-07 RX ORDER — METHYLPREDNISOLONE SOD SUCC 125 MG
125 VIAL (EA) INJECTION
Status: COMPLETED | OUTPATIENT
Start: 2022-06-07 | End: 2022-06-07

## 2022-06-07 RX ORDER — LEVOTHYROXINE SODIUM 150 UG/1
150 TABLET ORAL DAILY
COMMUNITY
Start: 2022-05-09

## 2022-06-07 RX ORDER — CHLORZOXAZONE 250 MG/1
250 TABLET ORAL 3 TIMES DAILY
Status: DISCONTINUED | OUTPATIENT
Start: 2022-06-08 | End: 2022-06-07

## 2022-06-07 RX ORDER — PROPRANOLOL HYDROCHLORIDE 80 MG/1
160 CAPSULE, EXTENDED RELEASE ORAL DAILY
Status: DISCONTINUED | OUTPATIENT
Start: 2022-06-08 | End: 2022-06-12 | Stop reason: HOSPADM

## 2022-06-07 RX ORDER — ADALIMUMAB 40MG/0.4ML
0.4 KIT SUBCUTANEOUS
COMMUNITY
Start: 2022-03-02

## 2022-06-07 RX ADMIN — FLUCONAZOLE 150 MG: 50 TABLET ORAL at 05:06

## 2022-06-07 RX ADMIN — ASPIRIN 81 MG 81 MG: 81 TABLET ORAL at 10:06

## 2022-06-07 RX ADMIN — CHLORZOXAZONE 500 MG: 500 TABLET ORAL at 11:06

## 2022-06-07 RX ADMIN — ASPIRIN 325 MG ORAL TABLET 325 MG: 325 PILL ORAL at 12:06

## 2022-06-07 RX ADMIN — CLOPIDOGREL BISULFATE 75 MG: 75 TABLET, FILM COATED ORAL at 10:06

## 2022-06-07 RX ADMIN — IPRATROPIUM BROMIDE AND ALBUTEROL SULFATE 3 ML: .5; 3 SOLUTION RESPIRATORY (INHALATION) at 09:06

## 2022-06-07 RX ADMIN — ENOXAPARIN SODIUM 40 MG: 40 INJECTION SUBCUTANEOUS at 08:06

## 2022-06-07 RX ADMIN — METHYLPREDNISOLONE SODIUM SUCCINATE 125 MG: 125 INJECTION, POWDER, FOR SOLUTION INTRAMUSCULAR; INTRAVENOUS at 02:06

## 2022-06-07 RX ADMIN — IPRATROPIUM BROMIDE AND ALBUTEROL SULFATE 3 ML: .5; 3 SOLUTION RESPIRATORY (INHALATION) at 02:06

## 2022-06-07 RX ADMIN — AMLODIPINE BESYLATE 5 MG: 5 TABLET ORAL at 05:06

## 2022-06-07 RX ADMIN — PANTOPRAZOLE SODIUM 40 MG: 40 TABLET, DELAYED RELEASE ORAL at 08:06

## 2022-06-07 RX ADMIN — DOXYCYCLINE 100 MG: 100 INJECTION, POWDER, LYOPHILIZED, FOR SOLUTION INTRAVENOUS at 02:06

## 2022-06-07 RX ADMIN — GABAPENTIN 600 MG: 300 CAPSULE ORAL at 10:06

## 2022-06-07 RX ADMIN — ATORVASTATIN CALCIUM 80 MG: 40 TABLET, FILM COATED ORAL at 10:06

## 2022-06-07 RX ADMIN — GUAIFENESIN 600 MG: 600 TABLET, EXTENDED RELEASE ORAL at 10:06

## 2022-06-07 NOTE — ED PROVIDER NOTES
Encounter Date: 6/7/2022       History     Chief Complaint   Patient presents with    Shortness of Breath     HPI   62-year-old female with a past medical history of CAD s/p stent x1 (2017), hypothyroidism, HTN, HLD, morbid obesity, chronic respiratory failure of unknown etiology requiring p.r.n. oxygen supplementation presenting with shortness of breath.  At baseline, patient is intermittently in a wheelchair due to mobility issues, however she reports that approximately 2 days ago she was walking around her signs of which she began experiencing acutely worsening shortness of breath.  These symptoms have persisted throughout the past day; this morning, she noticed that she became acutely short of breath with minimal movement including adjusting herself in bed or sitting up, prompting emergency department evaluation. She is also experiencing some mild chest tightness, but denies chest pain, nausea/emesis, diaphoresis, pain into lower jaw or upper extremities.  Patient wheezes approximately 4 L of oxygen and as needed, reports that previously she only uses oxygen couple hours per day, although over the past approximately 2 months, she is using oxygen for several hours per day due to noticeable shortness of breath.  Patient was last seen for hospital admission 2/2 chest discomfort approximately 2 years ago; was discharged at that time after negative cardiac workup with instructions to follow-up with outpatient pulmonology due to persistent O2 requirement during her admission.  She reports that she never followed up with pulmonology.  Has continued to smoke cigarettes, but is attempting to quit currently.  Family history of reactive air disease in her mother.    Review of patient's allergies indicates:   Allergen Reactions    Pcn [penicillins] Anaphylaxis    Adhesive     Floxacillin     Keflex [cephalexin]     Sulfa (sulfonamide antibiotics)     Zithromax [azithromycin]     Zofran [ondansetron hcl (pf)]       Dizzy vomiting       Past Medical History:   Diagnosis Date    Coronary artery disease     cardiac stent    Diabetes mellitus     Hypertension      History reviewed. No pertinent surgical history.  History reviewed. No pertinent family history.  Social History     Tobacco Use    Smoking status: Former Smoker     Types: Cigarettes    Smokeless tobacco: Never Used    Tobacco comment: 1 year ago   Substance Use Topics    Alcohol use: No    Drug use: No     Review of Systems   Constitutional: Positive for fatigue. Negative for fever.   HENT: Negative for sore throat.    Respiratory: Positive for chest tightness, shortness of breath and wheezing.    Cardiovascular: Negative for chest pain, palpitations and leg swelling.   Gastrointestinal: Negative for abdominal pain, blood in stool, diarrhea, nausea and vomiting.   Genitourinary: Negative for dysuria, frequency and hematuria.   Musculoskeletal: Negative for back pain.   Skin: Negative for rash.   Neurological: Negative for weakness.   Hematological: Does not bruise/bleed easily.       Physical Exam     Initial Vitals [06/07/22 1020]   BP Pulse Resp Temp SpO2   (!) 149/103 91 17 98.9 °F (37.2 °C) 95 %      MAP       --         Physical Exam    Nursing note and vitals reviewed.  Constitutional: She appears well-developed and well-nourished. She is not diaphoretic. No distress.   Obese habitus   HENT:   Head: Normocephalic and atraumatic.   Right Ear: External ear normal.   Left Ear: External ear normal.   Eyes: Conjunctivae and EOM are normal. Pupils are equal, round, and reactive to light.   Neck: Neck supple. No JVD present.   Cardiovascular: Normal rate, regular rhythm, normal heart sounds and intact distal pulses.   Pulmonary/Chest:   Mildly increased work of breathing, respiratory rate low 20s.  Becomes more short of breath with prolonged speaking.  Scattered end-expiratory wheezing appreciable bilateral lung fields.   Abdominal: Abdomen is soft. There is no  abdominal tenderness. There is no rebound and no guarding.   Musculoskeletal:      Cervical back: Neck supple.      Comments: No appreciable lower extremity swelling bilaterally     Neurological: She is alert and oriented to person, place, and time. GCS score is 15. GCS eye subscore is 4. GCS verbal subscore is 5. GCS motor subscore is 6.   Skin: Skin is warm. Capillary refill takes less than 2 seconds. No rash noted.   Psychiatric: She has a normal mood and affect.         ED Course   Procedures  Labs Reviewed   CBC W/ AUTO DIFFERENTIAL - Abnormal; Notable for the following components:       Result Value    WBC 15.38 (*)     Hemoglobin 11.8 (*)     MCHC 31.0 (*)     RDW 15.4 (*)     Gran # (ANC) 12.4 (*)     Immature Grans (Abs) 0.05 (*)     Mono # 1.3 (*)     Gran % 80.8 (*)     Lymph % 9.2 (*)     All other components within normal limits   COMPREHENSIVE METABOLIC PANEL - Abnormal; Notable for the following components:    Sodium 135 (*)     Chloride 91 (*)     CO2 32 (*)     Glucose 185 (*)     Albumin 3.4 (*)     Alkaline Phosphatase 238 (*)     All other components within normal limits   TROPONIN I   B-TYPE NATRIURETIC PEPTIDE   SARS-COV-2 RNA AMPLIFICATION, QUAL   INFLUENZA A AND B ANTIGEN    Narrative:     Specimen Source->Nasopharyngeal Swab   PROCALCITONIN        ECG Results          EKG 12-lead (In process)  Result time 06/07/22 11:29:18    In process by Interface, Lab In [facility] (06/07/22 11:29:18)                 Narrative:    Test Reason : R07.9,    Vent. Rate : 086 BPM     Atrial Rate : 086 BPM     P-R Int : 162 ms          QRS Dur : 084 ms      QT Int : 382 ms       P-R-T Axes : 052 021 036 degrees     QTc Int : 457 ms    Normal sinus rhythm  Nonspecific ST abnormality  Abnormal ECG  When compared with ECG of 23-JUL-2020 07:21,  ND interval has decreased  Vent. rate has increased BY  28 BPM    Referred By: [email or web address]   SELF           Confirmed By:                             Imaging Results           X-Ray Chest AP Portable (Final result)  Result time 06/07/22 11:44:07    Final result by Alyssa Antonio MD (06/07/22 11:44:07)                 Narrative:    Portable chest x-ray 11:42 AM is compared to prior study dated 7/21/2020    Clinical history is chest pain    The heart is mildly enlarged. The lungs are clear. There are no acute osseous abnormalities.    IMPRESSION: Mild cardiomegaly with no acute pulmonary process    Electronically signed by:  Alyssa Antonio MD  6/7/2022 11:44 AM CDT Workstation: [id number]                               Medications   methylPREDNISolone sodium succinate injection 125 mg (has no administration in time range)   doxycycline (VIBRAMYCIN) 100mg in dextrose 5% 250 mL IVPB (ready to mix) (has no administration in time range)   aspirin tablet 325 mg (325 mg Oral Given 6/7/22 1200)   albuterol-ipratropium 2.5 mg-0.5 mg/3 mL nebulizer solution 3 mL (3 mLs Nebulization Given 6/7/22 1425)     Medical Decision Making:   ED Management:  62-year-old female presenting with acute shortness of breath.  On presentation, patient found to be mildly tachypneic with oxygen saturations in the upper 80s to low 90s.  Subsequently placed on 4 L nasal cannula with improvement in O2 sats to upper 90s.  Hypertensive 150s to 200 systolic, patient reports that she missed her home blood pressure medication dose this morning. Exam significant for mildly increased work of breathing with scattered end-expiratory wheezing. EKG normal sinus rhythm with no appreciable/concerning ischemic changes, dysrhythmias, severe neural derangements. Lab significant for leukocytosis with left shift, mild hyponatremia and hypokalemia, isolated alk-phos elevation. Cardiac biomarkers WNL. Flu/COVID swab negative.  Patient's plain film chest with her worsening cardiomegaly when compared to previous films, trace left-sided pleural effusion.  Suspect patient's respiratory distress 2/2 undiagnosed COPD, possibly with  superimposed obesity hyperventilation syndrome given body habitus.  Patient subsequently given DuoNeb breathing treatment, 125 mg IV Solu-Medrol, IV doxycycline for initial resuscitation.  Procal, and urinalysis currently pending.  Patient consult in subsequently admitted to Hospital Medicine for ongoing cardiopulmonary evaluation and treatment, as warranted.    Moody Hernandez MD PGY-3  [facility]  2:55 PM                     Clinical Impression:   Final diagnoses:  [R07.9] Chest pain                 Moody Hernandez MD  Resident  06/07/22 [id number]     negative

## 2022-06-08 ENCOUNTER — CLINICAL SUPPORT (OUTPATIENT)
Dept: CARDIOLOGY | Facility: HOSPITAL | Age: 62
End: 2022-06-08
Payer: COMMERCIAL

## 2022-06-08 VITALS — BODY MASS INDEX: 38.31 KG/M2 | WEIGHT: 229.94 LBS | HEIGHT: 65 IN

## 2022-06-08 LAB
AV INDEX (PROSTH): 0.63
AV MEAN GRADIENT: 7 MMHG
AV VALVE AREA: 2.94 CM2
BACTERIA #/AREA URNS HPF: NEGATIVE /HPF
BILIRUB UR QL STRIP: NEGATIVE
BSA FOR ECHO PROCEDURE: 2.19 M2
CLARITY UR: CLEAR
COLOR UR: YELLOW
DOP CALC AO VTI: 40.01 CM
DOP CALC LVOT AREA: 4.7 CM2
DOP CALC LVOT DIAMETER: 2.44 CM
DOP CALC LVOT PEAK VEL: 130.04 M/S
DOP CALC LVOT STROKE VOLUME: 117.59 CM3
DOP CALCLVOT PEAK VEL VTI: 25.16 CM
E WAVE DECELERATION TIME: 198.91 MSEC
E/A RATIO: 0.92
E/E' RATIO: 14.71 M/S
EJECTION FRACTION: 55 %
ESTIMATED AVG GLUCOSE: 154 MG/DL (ref 68–131)
FRACTIONAL SHORTENING: 29 % (ref 28–44)
GLUCOSE SERPL-MCNC: 288 MG/DL (ref 70–110)
GLUCOSE SERPL-MCNC: 339 MG/DL (ref 70–110)
GLUCOSE SERPL-MCNC: 377 MG/DL (ref 70–110)
GLUCOSE SERPL-MCNC: 397 MG/DL (ref 70–110)
GLUCOSE UR QL STRIP: ABNORMAL
HBA1C MFR BLD: 7 % (ref 4.5–6.2)
HGB UR QL STRIP: NEGATIVE
HYALINE CASTS #/AREA URNS LPF: 8 /LPF
IVRT: 62.16 MSEC
KETONES UR QL STRIP: ABNORMAL
LEFT ATRIUM SIZE: 4.24 CM
LEFT INTERNAL DIMENSION IN SYSTOLE: 4.19 CM (ref 2.1–4)
LEFT VENTRICLE DIASTOLIC VOLUME INDEX: 98.43 ML/M2
LEFT VENTRICLE DIASTOLIC VOLUME: 206.71 ML
LEFT VENTRICLE SYSTOLIC VOLUME INDEX: 34.9 ML/M2
LEFT VENTRICLE SYSTOLIC VOLUME: 73.39 ML
LEFT VENTRICULAR INTERNAL DIMENSION IN DIASTOLE: 5.91 CM (ref 3.5–6)
LEUKOCYTE ESTERASE UR QL STRIP: NEGATIVE
LV LATERAL E/E' RATIO: 14.71 M/S
LV SEPTAL E/E' RATIO: 14.71 M/S
MICROSCOPIC COMMENT: ABNORMAL
MV PEAK A VEL: 1.12 M/S
MV PEAK E VEL: 1.03 M/S
NITRITE UR QL STRIP: NEGATIVE
PH UR STRIP: 6 [PH] (ref 5–8)
PROT UR QL STRIP: ABNORMAL
RA PRESSURE: 3 MMHG
RBC #/AREA URNS HPF: 1 /HPF (ref 0–4)
RIGHT VENTRICULAR END-DIASTOLIC DIMENSION: 214 CM
SP GR UR STRIP: >1.03 (ref 1–1.03)
SQUAMOUS #/AREA URNS HPF: 5 /HPF
TDI LATERAL: 0.07 M/S
TDI SEPTAL: 0.07 M/S
TDI: 0.07 M/S
TRICUSPID ANNULAR PLANE SYSTOLIC EXCURSION: 236 CM
URN SPEC COLLECT METH UR: ABNORMAL
UROBILINOGEN UR STRIP-ACNC: NEGATIVE EU/DL
WBC #/AREA URNS HPF: 5 /HPF (ref 0–5)
YEAST URNS QL MICRO: ABNORMAL

## 2022-06-08 PROCEDURE — 99204 OFFICE O/P NEW MOD 45 MIN: CPT | Mod: 25,FS,,

## 2022-06-08 PROCEDURE — 99204 PR OFFICE/OUTPT VISIT, NEW, LEVL IV, 45-59 MIN: ICD-10-PCS | Mod: 25,FS,,

## 2022-06-08 PROCEDURE — 94760 N-INVAS EAR/PLS OXIMETRY 1: CPT

## 2022-06-08 PROCEDURE — 94799 UNLISTED PULMONARY SVC/PX: CPT

## 2022-06-08 PROCEDURE — 93306 ECHO (CUPID ONLY): ICD-10-PCS | Mod: 26,,, | Performed by: INTERNAL MEDICINE

## 2022-06-08 PROCEDURE — 93306 TTE W/DOPPLER COMPLETE: CPT | Mod: 26,,, | Performed by: INTERNAL MEDICINE

## 2022-06-08 PROCEDURE — 82962 GLUCOSE BLOOD TEST: CPT

## 2022-06-08 PROCEDURE — 99900031 HC PATIENT EDUCATION (STAT)

## 2022-06-08 PROCEDURE — 25000003 PHARM REV CODE 250: Performed by: NURSE PRACTITIONER

## 2022-06-08 PROCEDURE — 94640 AIRWAY INHALATION TREATMENT: CPT

## 2022-06-08 PROCEDURE — 25000003 PHARM REV CODE 250: Performed by: INTERNAL MEDICINE

## 2022-06-08 PROCEDURE — 94761 N-INVAS EAR/PLS OXIMETRY MLT: CPT

## 2022-06-08 PROCEDURE — 25000242 PHARM REV CODE 250 ALT 637 W/ HCPCS: Performed by: NURSE PRACTITIONER

## 2022-06-08 PROCEDURE — 81001 URINALYSIS AUTO W/SCOPE: CPT | Performed by: STUDENT IN AN ORGANIZED HEALTH CARE EDUCATION/TRAINING PROGRAM

## 2022-06-08 PROCEDURE — 99900035 HC TECH TIME PER 15 MIN (STAT)

## 2022-06-08 PROCEDURE — 63600175 PHARM REV CODE 636 W HCPCS: Performed by: NURSE PRACTITIONER

## 2022-06-08 PROCEDURE — 96372 THER/PROPH/DIAG INJ SC/IM: CPT | Performed by: NURSE PRACTITIONER

## 2022-06-08 PROCEDURE — 27000221 HC OXYGEN, UP TO 24 HOURS

## 2022-06-08 PROCEDURE — G0378 HOSPITAL OBSERVATION PER HR: HCPCS

## 2022-06-08 PROCEDURE — 93306 TTE W/DOPPLER COMPLETE: CPT

## 2022-06-08 RX ADMIN — ASPIRIN 325 MG: 325 TABLET, COATED ORAL at 08:06

## 2022-06-08 RX ADMIN — INSULIN ASPART 5 UNITS: 100 INJECTION, SOLUTION INTRAVENOUS; SUBCUTANEOUS at 11:06

## 2022-06-08 RX ADMIN — PROPRANOLOL HYDROCHLORIDE 160 MG: 80 CAPSULE, EXTENDED RELEASE ORAL at 08:06

## 2022-06-08 RX ADMIN — GABAPENTIN 600 MG: 300 CAPSULE ORAL at 10:06

## 2022-06-08 RX ADMIN — ENOXAPARIN SODIUM 40 MG: 40 INJECTION SUBCUTANEOUS at 04:06

## 2022-06-08 RX ADMIN — INSULIN ASPART 3 UNITS: 100 INJECTION, SOLUTION INTRAVENOUS; SUBCUTANEOUS at 08:06

## 2022-06-08 RX ADMIN — PANTOPRAZOLE SODIUM 40 MG: 40 TABLET, DELAYED RELEASE ORAL at 06:06

## 2022-06-08 RX ADMIN — IPRATROPIUM BROMIDE AND ALBUTEROL SULFATE 3 ML: .5; 3 SOLUTION RESPIRATORY (INHALATION) at 12:06

## 2022-06-08 RX ADMIN — IPRATROPIUM BROMIDE AND ALBUTEROL SULFATE 3 ML: .5; 3 SOLUTION RESPIRATORY (INHALATION) at 07:06

## 2022-06-08 RX ADMIN — INSULIN ASPART 5 UNITS: 100 INJECTION, SOLUTION INTRAVENOUS; SUBCUTANEOUS at 04:06

## 2022-06-08 RX ADMIN — IPRATROPIUM BROMIDE AND ALBUTEROL SULFATE 3 ML: .5; 3 SOLUTION RESPIRATORY (INHALATION) at 01:06

## 2022-06-08 RX ADMIN — PANTOPRAZOLE SODIUM 40 MG: 40 TABLET, DELAYED RELEASE ORAL at 03:06

## 2022-06-08 RX ADMIN — PREDNISONE 40 MG: 20 TABLET ORAL at 08:06

## 2022-06-08 RX ADMIN — ASPIRIN 325 MG: 325 TABLET, COATED ORAL at 01:06

## 2022-06-08 RX ADMIN — AMLODIPINE BESYLATE 5 MG: 5 TABLET ORAL at 08:06

## 2022-06-08 RX ADMIN — CLOPIDOGREL BISULFATE 75 MG: 75 TABLET, FILM COATED ORAL at 09:06

## 2022-06-08 RX ADMIN — CHLORZOXAZONE 500 MG: 500 TABLET ORAL at 09:06

## 2022-06-08 RX ADMIN — CHLORZOXAZONE 500 MG: 500 TABLET ORAL at 10:06

## 2022-06-08 RX ADMIN — GUAIFENESIN 600 MG: 600 TABLET, EXTENDED RELEASE ORAL at 08:06

## 2022-06-08 RX ADMIN — LEVOTHYROXINE SODIUM 150 MCG: 0.1 TABLET ORAL at 06:06

## 2022-06-08 RX ADMIN — ATORVASTATIN CALCIUM 80 MG: 40 TABLET, FILM COATED ORAL at 09:06

## 2022-06-08 NOTE — CONSULTS
[facility]  Department of Cardiology  Consult Note      PATIENT NAME: Karo Leonard  MRN: [id number]  TODAY'S DATE: 06/08/2022  ADMIT DATE: 6/7/2022                          CONSULT REQUESTED BY: Vincent Camacho MD    SUBJECTIVE     PRINCIPAL PROBLEM: COPD exacerbation      REASON FOR CONSULT:  CARLTON      HPI:  62 year old F with a PMHC of DM and essential HTn who is on home O2 for an unkwon reason came tot he Ed because Sunday she got out of her house for the first time since [place] to meet her [facility] and was then SOB having to use her O2 24/7 the days following. She normally is not dependent on her home O2. She is currently requiring 3L oxymask. She does have a hx of PCI to the L circ in 2018, but states she was asymptomatic prior to PCI. Her BNP and trop are both negative. Echo with preserved EF and grade I diastolic dysfunction. CXR clear.      Per H and P:  62-year-old  female presents to emergency room with exertional dyspnea     The patient states about 3 days ago she had onset of shortness of breath with exertion.  The shortness of breath also occurred with rest over the past 24 hours.  This concerned her so she came to the emergency room for further evaluation     The patient states she had exertional dyspnea and chest discomfort in 2017 and this is similar to the symptom she had right before she had her cardiac stent placed     The patient also complaints occasional mid sternum chest pain with radiation to her back this no associated nausea vomiting or diaphoresis with the chest discomfort.  She denied any hematemesis hemoptysis black or bloody stools lightheadedness dizziness or syncope she describes her symptoms as moderate to severe severity with no exacerbating or alleviating factors     Past medical history significant for coronary artery disease status post cardiac stent in 2017 to her circumflex.  She did have some other areas of vessel disease in her heart at that time  see below, obesity, hypertension hyperlipidemia type 2 diabetes     In the emergency room the patient was found have leukocytosis and was given doxycycline.  In the emergency room her procalcitonin level came back within normal limits and her repeat CBC wbc's were within normal limits so the doxycycline was held     Her 1st set of cardiac enzymes within normal limits her H&H were within normal limits     She will be admitted for cardiac workup.  A CT of the chest was held secondary to the limited availability     Review of patient's allergies indicates:   Allergen Reactions    Pcn [penicillins] Anaphylaxis    Adhesive     Floxacillin     Keflex [cephalexin]     Sulfa (sulfonamide antibiotics)     Zithromax [azithromycin]     Zofran [ondansetron hcl (pf)]      Dizzy vomiting         Past Medical History:   Diagnosis Date    Coronary artery disease     cardiac stent    Diabetes mellitus     Hypertension      History reviewed. No pertinent surgical history.  Social History     Tobacco Use    Smoking status: Former Smoker     Types: Cigarettes    Smokeless tobacco: Never Used    Tobacco comment: 1 year ago   Substance Use Topics    Alcohol use: No    Drug use: No        REVIEW OF SYSTEMS  CONSTITUTIONAL: Negative for chills, fatigue and fever.   EYES: No double vision, No blurred vision  NEURO: No headaches, No dizziness  RESPIRATORY: Negative for cough, shortness of breath and wheezing.    CARDIOVASCULAR: Negative for chest pain. Negative for palpitations and leg swelling.   GI: Negative for abdominal pain, No melena, diarrhea, nausea and vomiting.   : Negative for dysuria and frequency, Negative for hematuria  SKIN: Negative for bruising, Negative for edema or discoloration noted.   ENDOCRINE: Negative for polyphagia, Negative for heat intolerance, Negative for cold intolerance  PSYCHIATRIC: Negative for depression, Negative for anxiety, Negative for memory loss  MUSCULOSKELETAL: Negative for neck  pain, Negative for muscle weakness, Negative for back pain     OBJECTIVE     VITAL SIGNS (Most Recent)  Temp: 98.4 °F (36.9 °C) (06/08/22 1505)  Pulse: 68 (06/08/22 1505)  Resp: 20 (06/08/22 1505)  BP: (!) 127/59 (06/08/22 1505)  SpO2: 97 % (06/08/22 1505)    VENTILATION STATUS  Resp: 20 (06/08/22 1505)  SpO2: 97 % (06/08/22 1505)       I & O (Last 24H):    Intake/Output Summary (Last 24 hours) at 6/8/2022 1629  Last data filed at 6/8/2022 1504  Gross per 24 hour   Intake 605 ml   Output --   Net 605 ml       WEIGHTS  Wt Readings from Last 3 Encounters:   06/07/22 2333 104.3 kg (230 lb)   06/07/22 1020 104.3 kg (230 lb)   06/08/22 0938 104.3 kg (229 lb 15 oz)   07/21/20 2257 108.8 kg (239 lb 13.8 oz)   07/21/20 1728 112 kg (247 lb)       PHYSICAL EXAM  GENERAL: well built, well nourished, well-developed in no apparent distress alert and oriented.   HEENT: Normocephalic. Pupils normal and conjunctivae normal.  Mucous membranes normal, no cyanosis or icterus, trachea central,no pallor or icterus is noted..   NECK: No JVD. No bruit..   THYROID: Thyroid not enlarged. No nodules present..   CARDIAC: Regular rate and rhythm. S1 is normal.S2 is normal.No gallops, clicks or murmurs noted at this time.  CHEST ANATOMY: normal.   LUNGS: Clear to auscultation. No wheezing or rhonchi..   ABDOMEN: Soft no masses or organomegaly.  No abdomen pulsations or bruits.  Normal bowel sounds. No pulsations and no masses felt, No guarding or rebound.   URINARY: No shah catheter   EXTREMITIES: No cyanosis, clubbing or edema noted at this time., no calf tenderness bilaterally.   PERIPHERAL VASCULAR SYSTEM: Good palpable distal pulses.   CENTRAL NERVOUS SYSTEM: No focal motor or sensory deficits noted.   SKIN: Skin without lesions, moist, well perfused.   MUSCLE STRENGTH & TONE: No noteable weakness, atrophy or abnormal movement.     HOME MEDICATIONS:  No current facility-administered medications on file prior to encounter.     Current  Outpatient Medications on File Prior to Encounter   Medication Sig Dispense Refill    amLODIPine (NORVASC) 5 MG tablet Take 5 mg by mouth once daily.      aspirin 81 MG Chew Take 81 mg by mouth once daily.      atorvastatin (LIPITOR) 80 MG tablet Take 80 mg by mouth every evening.      chlorzoxazone (PARAFON FORTE) 250 MG tablet Take 250 mg by mouth 6 (six) times daily.      clopidogreL (PLAVIX) 75 mg tablet Take 75 mg by mouth once daily.      desonide (DESOWEN) 0.05 % cream Apply topically 2 (two) times daily.      estradioL (ESTRACE) 0.01 % (0.1 mg/gram) vaginal cream Place 1 g vaginally every 7 days.      gabapentin (NEURONTIN) 600 MG tablet Take 600 mg by mouth 3 (three) times daily.      HUMIRA,CF, 40 mg/0.4 mL SyKt Inject 0.4 mLs into the skin every 7 days.      LEVOXYL 150 mcg tablet Take 150 mcg by mouth once daily.      metFORMIN (GLUCOPHAGE) 850 MG tablet Take 850 mg by mouth nightly.      pantoprazole (PROTONIX) 40 MG tablet Take 1 tablet (40 mg total) by mouth 2 (two) times daily. 60 tablet 1    propranoloL (INDERAL LA) 80 MG 24 hr capsule Take 160 mg by mouth once daily.      doxycycline monohydrate 100 mg Tab Take 1 tablet by mouth 2 (two) times daily.      fluconazole (DIFLUCAN) 150 MG Tab Take by mouth.      ipratropium (ATROVENT) 0.03 % nasal spray 1 spray by Nasal route 2 (two) times daily.      levothyroxine (SYNTHROID, LEVOTHROID) 175 MCG tablet Take 1 tablet (175 mcg total) by mouth once daily. (Patient taking differently: Take 150 mcg by mouth once daily.) 30 tablet 0    propranoloL (INDERAL) 40 MG tablet Take 40 mg by mouth daily as needed.         SCHEDULED MEDS:   albuterol-ipratropium  3 mL Nebulization Q6H    amLODIPine  5 mg Oral Daily    aspirin  325 mg Oral Daily    atorvastatin  80 mg Oral QHS    chlorzoxazone  500 mg Oral BID    clopidogreL  75 mg Oral QHS    enoxaparin  40 mg Subcutaneous Daily    gabapentin  600 mg Oral TID    guaiFENesin  600 mg Oral BID     levothyroxine  150 mcg Oral Before breakfast    pantoprazole  40 mg Oral BID AC    predniSONE  40 mg Oral Daily    propranoloL  160 mg Oral Daily       CONTINUOUS INFUSIONS:    PRN MEDS:dextrose 50%, dextrose 50%, glucagon (human recombinant), glucose, glucose, insulin aspart U-100, melatonin, nitroGLYCERIN, sodium chloride 0.9%    LABS AND DIAGNOSTICS     CBC LAST 3 DAYS  Recent Labs   Lab 06/07/22  1145 06/07/22  1906   WBC 15.38* 10.89   RBC 4.30 4.20   HGB 11.8* 11.6*   HCT 38.1 37.1   MCV 89 88   MCH 27.4 27.6   MCHC 31.0* 31.3*   RDW 15.4* 15.7*    254   MPV 10.6 10.1   GRAN 80.8*  12.4* 87.4*  9.5*   LYMPH 9.2*  1.4 10.6*  1.2   MONO 8.7  1.3* 1.1*  0.1*   BASO 0.05 0.03   NRBC 0 0       COAGULATION LAST 3 DAYS  No results for input(s): LABPT, INR, APTT in the last 168 hours.    CHEMISTRY LAST 3 DAYS  Recent Labs   Lab 06/07/22  1145 06/07/22  1906   * 132*   K 3.7 4.0   CL 91* 91*   CO2 32* 31*   ANIONGAP 12 10   BUN 9 11   CREATININE 0.6 0.6   * 210*   CALCIUM 9.2 9.0   ALBUMIN 3.4*  --    PROT 8.0  --    ALKPHOS 238*  --    ALT 37  --    AST 34  --    BILITOT 0.8  --        CARDIAC PROFILE LAST 3 DAYS  Recent Labs   Lab 06/07/22  1145 06/07/22  1906   BNP 70  --    TROPONINI <0.030 <0.030       ENDOCRINE LAST 3 DAYS  Recent Labs   Lab 06/07/22  1455 06/07/22 1906   PROCAL <0.05 <0.05       LAST ARTERIAL BLOOD GAS  ABG  No results for input(s): PH, PO2, PCO2, HCO3, BE in the last 168 hours.    LAST 7 DAYS MICROBIOLOGY   Microbiology Results (last 7 days)       ** No results found for the last 168 hours. **            MOST RECENT IMAGING  Echo Saline Bubble? No  · The left ventricle is mildly enlarged with concentric remodeling and   normal systolic function.  · The estimated ejection fraction is 55%.  · Grade I left ventricular diastolic dysfunction.  · Normal right ventricular size with normal right ventricular systolic   function.  · Moderate left atrial enlargement.  ·  Normal central venous pressure (3 mmHg).         ECHOCARDIOGRAM RESULTS (last 5)  Results for orders placed during the hospital encounter of 06/07/22    Echo Saline Bubble? No    Interpretation Summary  · The left ventricle is mildly enlarged with concentric remodeling and normal systolic function.  · The estimated ejection fraction is 55%.  · Grade I left ventricular diastolic dysfunction.  · Normal right ventricular size with normal right ventricular systolic function.  · Moderate left atrial enlargement.  · Normal central venous pressure (3 mmHg).      CURRENT/PREVIOUS VISIT EKG  Results for orders placed or performed during the hospital encounter of 06/07/22   EKG 12-lead    Collection Time: 06/07/22 10:28 AM    Narrative    Test Reason : R07.9,    Vent. Rate : 086 BPM     Atrial Rate : 086 BPM     P-R Int : 162 ms          QRS Dur : 084 ms      QT Int : 382 ms       P-R-T Axes : 052 021 036 degrees     QTc Int : 457 ms    Normal sinus rhythm  Nonspecific ST abnormality  Abnormal ECG  When compared with ECG of 23-JUL-2020 07:21,  OH interval has decreased  Vent. rate has increased BY  28 BPM    Referred By: [email or web address]   SELF           Confirmed By:            ASSESSMENT/PLAN:     Active Hospital Problems    Diagnosis    *COPD exacerbation    Physical debility    Diabetes type 2, uncontrolled    Chest pain    Hypothyroid    Hypertension       ASSESSMENT & PLAN:   1. Chest pain   2. Essential HTN   3. On home O2, unsure why?   4. CAD s/p PCI 2018 to circ       RECOMMENDATIONS:  Troponin and EKG not suggestive of ACS   BNP and CXR not suggestive of acute CHF   Will obtain lexiscan in the AM as patient has not had cardiac workup in 2 years   Echo with EF 55%, moderate LA enlargement, mild LVH, grade I DD       Dasha Arcos PA-C  [facility]  Department of Cardiology  Date of Service: 06/08/2022

## 2022-06-08 NOTE — PLAN OF CARE
06/08/22 0012   Patient Assessment/Suction   Level of Consciousness (AVPU) alert   Respiratory Effort Unlabored   Expansion/Accessory Muscles/Retractions expansion symmetric   All Lung Fields Breath Sounds clear   Rhythm/Pattern, Respiratory depth regular;pattern regular   Cough Frequency infrequent   Cough Type good;nonproductive   PRE-TX-O2   O2 Device (Oxygen Therapy) Oxymask   $ Is the patient on Low Flow Oxygen? Yes   Flow (L/min) 3   SpO2 97 %   Pulse Oximetry Type Intermittent   $ Pulse Oximetry - Single Charge Pulse Oximetry - Single   Pulse 68   Resp 16   Aerosol Therapy   $ Aerosol Therapy Charges Aerosol Treatment   Daily Review of Necessity (SVN) completed   Respiratory Treatment Status (SVN) given   Treatment Route (SVN) mask   Patient Position (SVN) semi-Brand's   Post Treatment Assessment (SVN) breath sounds unchanged   Signs of Intolerance (SVN) none   Breath Sounds Post-Respiratory Treatment   Throughout All Fields Post-Treatment All Fields   Throughout All Fields Post-Treatment no change   Post-treatment Heart Rate (beats/min) 71   Post-treatment Resp Rate (breaths/min) 18

## 2022-06-08 NOTE — PLAN OF CARE
[facility]  Initial Discharge Assessment       Primary Care Provider: JED Ragland    Admission Diagnosis: Chest pain [R07.9]    Admission Date: 2022  Expected Discharge Date:     Discharge Barriers Identified: None    Payor: [employer]ET[employer] / Plan: [employer]ET[employer] CHOICE POS / Product Type: Commercial /     Extended Emergency Contact Information  Primary Emergency Contact: Wayne Leonard  Address: [address] [place] [address] [place]  Home Phone: [phone]  Mobile Phone: [phone]  Relation: Spouse    Discharge Plan A: Home  Discharge Plan B: Home    Met with patient at bedside. Verified name and . Lives with spouse in private residence. Does not leave home. Has PCP HARINDER Hernandez that comes to her home. Has DME. Does not drive. Spouse does errands and assists with ADLs. Denies going to HD or having provider services.   DCP is to home pending clinical progression     Initial Assessment (most recent)     Adult Discharge Assessment - 22 1442        Discharge Assessment    Assessment Type Discharge Planning Assessment     Confirmed/corrected address, phone number and insurance Yes     Confirmed Demographics Correct on Facesheet     Source of Information patient;family     When was your last doctors appointment? --   6 months    Does patient/caregiver understand observation status Yes     Communicated CHELE with patient/caregiver Date not available/Unable to determine     Reason For Admission Chest Pain     Lives With spouse     Do you expect to return to your current living situation? Yes     Do you have help at home or someone to help you manage your care at home? Yes     Who are your caregiver(s) and their phone number(s)? Laura Black [phone]     Prior to hospitilization cognitive status: Alert/Oriented     Current cognitive status: Alert/Oriented     Walking or Climbing Stairs Difficulty ambulation difficulty, requires equipment     Mobility Management  W/C,     Dressing/Bathing Difficulty bathing difficulty, requires equipment;bathing difficulty, assistance 1 person     Dressing/Bathing Management Shower chair; bedside commode     Do you have any problems with: --   spouse does errands    Equipment Currently Used at Home wheelchair;bedside commode     Readmission within 30 days? No     Do you currently have service(s) that help you manage your care at home? No     Do you take prescription medications? Yes     Do you have any problems affording any of your prescribed medications? --   [employer]    Is the patient taking medications as prescribed? yes     Who is going to help you get home at discharge? Spouse     How do you get to doctors appointments? family or friend will provide     Are you on dialysis? No     Do you take coumadin? No     Discharge Plan A Home     Discharge Plan B Home     Discharge Plan discussed with: Patient     Discharge Barriers Identified None        Relationship/Environment    Name(s) of Who Lives With Patient Spouse Wayne

## 2022-06-08 NOTE — CARE UPDATE
06/08/22 0743   Patient Assessment/Suction   Level of Consciousness (AVPU) alert   Respiratory Effort Unlabored   Expansion/Accessory Muscles/Retractions expansion symmetric   All Lung Fields Breath Sounds clear;diminished   Rhythm/Pattern, Respiratory pattern regular   Cough Frequency no cough   PRE-TX-O2   O2 Device (Oxygen Therapy) Oxymask   $ Is the patient on Low Flow Oxygen? Yes   Flow (L/min) 3   SpO2 95 %   Pulse Oximetry Type Intermittent   $ Pulse Oximetry - Multiple Charge Pulse Oximetry - Multiple   Pulse 88   Resp 20   Aerosol Therapy   $ Aerosol Therapy Charges Aerosol Treatment   Daily Review of Necessity (SVN) completed   Respiratory Treatment Status (SVN) given   Treatment Route (SVN) mask;oxygen   Patient Position (SVN) HOB elevated   Post Treatment Assessment (SVN) breath sounds unchanged;vital signs unchanged   Signs of Intolerance (SVN) none   Education   $ Education Bronchodilator;15 min   Respiratory Evaluation   $ Care Plan Tech Time 15 min   $ Eval/Re-eval Charges Evaluation

## 2022-06-08 NOTE — PROGRESS NOTES
"[facility] Medicine Progress Note    Subjective:     Complains of feeling tired but otherwise better.  Denies any chest pain shortness with the rest.  Has not ambulated much  Objective:   Last 24 Hour Vital Signs:  BP  Min: 118/58  Max: 129/64  Temp  Av °F (36.7 °C)  Min: 97.5 °F (36.4 °C)  Max: 98.4 °F (36.9 °C)  Pulse  Av.6  Min: 65  Max: 88  Resp  Av.1  Min: 16  Max: 20  SpO2  Av.1 %  Min: 95 %  Max: 99 %  Height  Av' 5" (165.1 cm)  Min: 5' 5" (165.1 cm)  Max: 5' 5" (165.1 cm)  Weight  Av.3 kg (229 lb 15.5 oz)  Min: 104.3 kg (229 lb 15 oz)  Max: 104.3 kg (230 lb)  I/O last 3 completed shifts:  In: 375 [P.O.:125; IV Piggyback:250]  Out: -   PHYSICAL EXAM  GENERAL: well built, well nourished, well-developed in no apparent distress alert and oriented.   HEENT: Normocephalic. Pupils normal and conjunctivae normal.  Mucous membranes normal, no cyanosis or icterus, trachea central,no pallor or icterus is noted..   NECK: No JVD. No bruit..   THYROID: Thyroid not enlarged. No nodules present..   CARDIAC: Regular rate and rhythm. S1 is normal.S2 is normal.No gallops, clicks or murmurs noted at this time.  CHEST ANATOMY: normal.   LUNGS: Clear to auscultation. No wheezing or rhonchi..   ABDOMEN: Soft no masses or organomegaly.  No abdomen pulsations or bruits.  Normal bowel sounds. No pulsations and no masses felt, No guarding or rebound.   URINARY: No shah catheter   EXTREMITIES: No cyanosis, clubbing or edema noted at this time., no calf tenderness bilaterally.   PERIPHERAL VASCULAR SYSTEM: Good palpable distal pulses.   CENTRAL NERVOUS SYSTEM: No focal motor or sensory deficits noted.   SKIN: Skin without lesions, moist, well perfused.   MUSCLE STRENGTH & TONE: No noteable weakness, atrophy or abnormal movement.      HOME MEDICATIONS:  No current facility-administered medications on file prior to encounter.         Laboratory:  Laboratory Data Reviewed: yes    Most Recent Data:  CBC: "   Lab Results   Component Value Date    WBC 10.89 06/07/2022    HGB 11.6 (L) 06/07/2022    HCT 37.1 06/07/2022     06/07/2022    MCV 88 06/07/2022    RDW 15.7 (H) 06/07/2022     BMP:   Lab Results   Component Value Date     (L) 06/07/2022    K 4.0 06/07/2022    CL 91 (L) 06/07/2022    CO2 31 (H) 06/07/2022    BUN 11 06/07/2022     (H) 06/07/2022    CALCIUM 9.0 06/07/2022    MG 2.0 07/23/2020     LFTs:   Lab Results   Component Value Date    PROT 8.0 06/07/2022    ALBUMIN 3.4 (L) 06/07/2022    BILITOT 0.8 06/07/2022    AST 34 06/07/2022    ALKPHOS 238 (H) 06/07/2022    ALT 37 06/07/2022     Coags: No results found for: INR, PROTIME, PTT  FLP: No results found for: CHOL, HDL, LDLCALC, TRIG, CHOLHDL  DM:   Lab Results   Component Value Date    HGBA1C 7.0 (H) 06/07/2022    HGBA1C 7.9 (H) 07/21/2020    CREATININE 0.6 06/07/2022     Thyroid: No results found for: TSH, FREET4, D7USRED, J5MSMAR, THYROIDAB  Anemia: No results found for: IRON, TIBC, FERRITIN, QLRRNEPV60, FOLATE  Cardiac:   Lab Results   Component Value Date    TROPONINI <0.030 06/07/2022    BNP 70 06/07/2022     Urinalysis:   Lab Results   Component Value Date    COLORU Yellow 06/08/2022    SPECGRAV >1.030 (A) 06/08/2022    NITRITE Negative 06/08/2022    KETONESU 2+ (A) 06/08/2022    UROBILINOGEN Negative 06/08/2022    WBCUA 5 06/08/2022        Radiology:  Data Reviewed: yes  XR CHEST AP PORTABLE  US LOWER EXTREMITY VEINS BILATERAL  NM MYOCARDIAL PERFUSION SPECT MULTI PHARM      Current Medications:     Infusions:       Scheduled:   albuterol-ipratropium  3 mL Nebulization Q6H    amLODIPine  5 mg Oral Daily    aspirin  325 mg Oral Daily    atorvastatin  80 mg Oral QHS    chlorzoxazone  500 mg Oral BID    clopidogreL  75 mg Oral QHS    enoxaparin  40 mg Subcutaneous Daily    gabapentin  600 mg Oral TID    guaiFENesin  600 mg Oral BID    levothyroxine  150 mcg Oral Before breakfast    pantoprazole  40 mg Oral BID AC    predniSONE   40 mg Oral Daily    propranoloL  160 mg Oral Daily        PRN:  dextrose 50%, dextrose 50%, glucagon (human recombinant), glucose, glucose, insulin aspart U-100, melatonin, nitroGLYCERIN, sodium chloride 0.9%    Lines and Day Number of Therapy:      Microbiology Data:  Reviewed: yes  Microbiology Results (last 7 days)     ** No results found for the last 168 hours. **           Antibiotics and Day Number of Therapy:  Antibiotics (From admission, onward)            None         Antivirals (From admission, onward)    None             Assessment/Plan:     Karo Leonard is a 62 y.o.female with     1.  Exertional dyspnea  -trend cardiac enzymes and troponin  -2D echo complete  -cardioprotective medications     2.  Possible bronchitis  -given a dose of doxycycline for now  -check procalcitonin results were within normal limits  -repeat CBC wbc's within normal limits  -elevated wbc's may have been reactive  -urine analysis is pending     3.  History of coronary artery disease status post cardiac stent to her circumflex in 2017  -had a 1st diagonal branch with 50% stenosis in the right coronary with a 40% lesion ejection fraction was within normal limits seen by Dr. Lin  -consult Cardiology     4.  Obesity-BMI 38.27  -follow-up PCP with discharge for weight reduction weight modification     5.  Tobacco use  -cessation discussed and encouraged  -patient states she stopped smoking cigarettes about 4 weeks ago     6.  Type 2 diabetes  -low dose NovoLog insulin sliding scale  -sliding scale protocol  -diabetic cardiac diet once eating  -hold metformin     7.  Hypothyroidism  -check TSH level  -continue levothyroxine.    Pharmacologic stress test pending    Vincent Camacho  [facility] Medicine

## 2022-06-09 ENCOUNTER — TELEPHONE (OUTPATIENT)
Dept: BARIATRICS | Facility: CLINIC | Age: 62
End: 2022-06-09
Payer: COMMERCIAL

## 2022-06-09 ENCOUNTER — CLINICAL SUPPORT (OUTPATIENT)
Dept: CARDIOLOGY | Facility: HOSPITAL | Age: 62
End: 2022-06-09
Payer: COMMERCIAL

## 2022-06-09 PROBLEM — K80.10 CALCULUS OF GALLBLADDER WITH CHRONIC CHOLECYSTITIS WITHOUT OBSTRUCTION: Status: ACTIVE | Noted: 2022-06-09

## 2022-06-09 LAB
CV STRESS BASE HR: 62 BPM
DIASTOLIC BLOOD PRESSURE: 69 MMHG
GLUCOSE SERPL-MCNC: 185 MG/DL (ref 70–110)
GLUCOSE SERPL-MCNC: 236 MG/DL (ref 70–110)
GLUCOSE SERPL-MCNC: 240 MG/DL (ref 70–110)
GLUCOSE SERPL-MCNC: 318 MG/DL (ref 70–110)
GLUCOSE SERPL-MCNC: 331 MG/DL (ref 70–110)
OHS CV CPX 85 PERCENT MAX PREDICTED HEART RATE MALE: 127
OHS CV CPX MAX PREDICTED HEART RATE: 150
OHS CV CPX PATIENT IS FEMALE: 1
OHS CV CPX PATIENT IS MALE: 0
OHS CV CPX PEAK DIASTOLIC BLOOD PRESSURE: 63 MMHG
OHS CV CPX PEAK HEAR RATE: 88 BPM
OHS CV CPX PEAK RATE PRESSURE PRODUCT: NORMAL
OHS CV CPX PEAK SYSTOLIC BLOOD PRESSURE: 127 MMHG
OHS CV CPX PERCENT MAX PREDICTED HEART RATE ACHIEVED: 59
OHS CV CPX RATE PRESSURE PRODUCT PRESENTING: 8804
SYSTOLIC BLOOD PRESSURE: 142 MMHG

## 2022-06-09 PROCEDURE — 93018 NUCLEAR STRESS TEST (CUPID ONLY): ICD-10-PCS | Mod: ,,, | Performed by: INTERNAL MEDICINE

## 2022-06-09 PROCEDURE — 63600175 PHARM REV CODE 636 W HCPCS

## 2022-06-09 PROCEDURE — 93017 CV STRESS TEST TRACING ONLY: CPT

## 2022-06-09 PROCEDURE — 93016 NUCLEAR STRESS TEST (CUPID ONLY): ICD-10-PCS | Mod: ,,, | Performed by: INTERNAL MEDICINE

## 2022-06-09 PROCEDURE — 96372 THER/PROPH/DIAG INJ SC/IM: CPT | Performed by: NURSE PRACTITIONER

## 2022-06-09 PROCEDURE — 94799 UNLISTED PULMONARY SVC/PX: CPT

## 2022-06-09 PROCEDURE — 99204 PR OFFICE/OUTPT VISIT, NEW, LEVL IV, 45-59 MIN: ICD-10-PCS | Mod: ,,, | Performed by: SURGERY

## 2022-06-09 PROCEDURE — 25000003 PHARM REV CODE 250: Performed by: NURSE PRACTITIONER

## 2022-06-09 PROCEDURE — 93018 CV STRESS TEST I&R ONLY: CPT | Mod: ,,, | Performed by: INTERNAL MEDICINE

## 2022-06-09 PROCEDURE — 99204 OFFICE O/P NEW MOD 45 MIN: CPT | Mod: ,,, | Performed by: SURGERY

## 2022-06-09 PROCEDURE — 25000242 PHARM REV CODE 250 ALT 637 W/ HCPCS: Performed by: NURSE PRACTITIONER

## 2022-06-09 PROCEDURE — 63600175 PHARM REV CODE 636 W HCPCS: Performed by: NURSE PRACTITIONER

## 2022-06-09 PROCEDURE — 99900031 HC PATIENT EDUCATION (STAT)

## 2022-06-09 PROCEDURE — 82962 GLUCOSE BLOOD TEST: CPT

## 2022-06-09 PROCEDURE — 93016 CV STRESS TEST SUPVJ ONLY: CPT | Mod: ,,, | Performed by: INTERNAL MEDICINE

## 2022-06-09 PROCEDURE — 94761 N-INVAS EAR/PLS OXIMETRY MLT: CPT

## 2022-06-09 PROCEDURE — 27000221 HC OXYGEN, UP TO 24 HOURS

## 2022-06-09 PROCEDURE — 99499 UNLISTED E&M SERVICE: CPT | Mod: ,,, | Performed by: INTERNAL MEDICINE

## 2022-06-09 PROCEDURE — G0378 HOSPITAL OBSERVATION PER HR: HCPCS

## 2022-06-09 PROCEDURE — 94640 AIRWAY INHALATION TREATMENT: CPT

## 2022-06-09 PROCEDURE — 99499 NO LOS: ICD-10-PCS | Mod: ,,, | Performed by: INTERNAL MEDICINE

## 2022-06-09 PROCEDURE — 99900035 HC TECH TIME PER 15 MIN (STAT)

## 2022-06-09 RX ORDER — REGADENOSON 0.08 MG/ML
0.4 INJECTION, SOLUTION INTRAVENOUS
Status: COMPLETED | OUTPATIENT
Start: 2022-06-09 | End: 2022-06-09

## 2022-06-09 RX ADMIN — PROPRANOLOL HYDROCHLORIDE 160 MG: 80 CAPSULE, EXTENDED RELEASE ORAL at 11:06

## 2022-06-09 RX ADMIN — REGADENOSON 0.4 MG: 0.08 INJECTION, SOLUTION INTRAVENOUS at 08:06

## 2022-06-09 RX ADMIN — CHLORZOXAZONE 500 MG: 500 TABLET ORAL at 09:06

## 2022-06-09 RX ADMIN — GUAIFENESIN 600 MG: 600 TABLET, EXTENDED RELEASE ORAL at 11:06

## 2022-06-09 RX ADMIN — IPRATROPIUM BROMIDE AND ALBUTEROL SULFATE 3 ML: .5; 3 SOLUTION RESPIRATORY (INHALATION) at 08:06

## 2022-06-09 RX ADMIN — ASPIRIN 325 MG: 325 TABLET, COATED ORAL at 11:06

## 2022-06-09 RX ADMIN — INSULIN ASPART 2 UNITS: 100 INJECTION, SOLUTION INTRAVENOUS; SUBCUTANEOUS at 11:06

## 2022-06-09 RX ADMIN — IPRATROPIUM BROMIDE AND ALBUTEROL SULFATE 3 ML: .5; 3 SOLUTION RESPIRATORY (INHALATION) at 01:06

## 2022-06-09 RX ADMIN — AMLODIPINE BESYLATE 5 MG: 5 TABLET ORAL at 11:06

## 2022-06-09 RX ADMIN — ATORVASTATIN CALCIUM 80 MG: 40 TABLET, FILM COATED ORAL at 09:06

## 2022-06-09 RX ADMIN — PREDNISONE 40 MG: 20 TABLET ORAL at 11:06

## 2022-06-09 RX ADMIN — GUAIFENESIN 600 MG: 600 TABLET, EXTENDED RELEASE ORAL at 09:06

## 2022-06-09 RX ADMIN — ENOXAPARIN SODIUM 40 MG: 40 INJECTION SUBCUTANEOUS at 04:06

## 2022-06-09 RX ADMIN — GABAPENTIN 600 MG: 300 CAPSULE ORAL at 09:06

## 2022-06-09 RX ADMIN — PANTOPRAZOLE SODIUM 40 MG: 40 TABLET, DELAYED RELEASE ORAL at 04:06

## 2022-06-09 RX ADMIN — GABAPENTIN 600 MG: 300 CAPSULE ORAL at 11:06

## 2022-06-09 RX ADMIN — CHLORZOXAZONE 500 MG: 500 TABLET ORAL at 11:06

## 2022-06-09 RX ADMIN — INSULIN ASPART 4 UNITS: 100 INJECTION, SOLUTION INTRAVENOUS; SUBCUTANEOUS at 04:06

## 2022-06-09 NOTE — TELEPHONE ENCOUNTER
----- Message from Miriam Hahn sent at 6/9/2022 10:14 AM CDT -----  Contact: HAILEY Paula; Nurse Rangel ext [phone]      Name of Who is Calling: HAILEY Rangel ext [phone]           Room/Bed# : [address] FLO[address]           Diagnosis: Biliary Pain           Referring Physician:  Dr. Bullock           Call Back Number: [phone]           Thank you

## 2022-06-09 NOTE — HPI
61 y/o with right upper quadrant post prandial sharp stabbing pain that radiates to the back.  Multiple episodes in the past.  Current one has resolved and she has just eaten.

## 2022-06-09 NOTE — PROGRESS NOTES
[facility]  Department of Cardiology  Progress Note    PATIENT NAME: Karo Leonard  MRN: [id number]  TODAY'S DATE: 06/09/2022  ADMIT DATE: 6/7/2022    SUBJECTIVE     PRINCIPLE PROBLEM: COPD exacerbation    INTERVAL HISTORY:    6/9/2022  Requiring 2L oxy mask, usually needs 4L at home on a PRN basis. Stress test negative for RI. Had a gal bladder attack last night and US showed gallstones. VSS.     Review of patient's allergies indicates:   Allergen Reactions    Pcn [penicillins] Anaphylaxis    Adhesive     Floxacillin     Keflex [cephalexin]     Sulfa (sulfonamide antibiotics)     Zithromax [azithromycin]     Zofran [ondansetron hcl (pf)]      Dizzy vomiting         REVIEW OF SYSTEMS  CARDIOVASCULAR: No recent chest pain, palpitations, arm, neck, or jaw pain  RESPIRATORY: No recent fever, cough chills, SOB or congestion  : No blood in the urine  GI: No Nausea, vomiting, constipation, diarrhea, blood, or reflux.  MUSCULOSKELETAL: No myalgias  NEURO: No lightheadedness or dizziness  EYES: No Double vision, blurry, vision or headache     OBJECTIVE     VITAL SIGNS (Most Recent)  Temp: 98.6 °F (37 °C) (06/09/22 1523)  Pulse: 69 (06/09/22 1523)  Resp: 20 (06/09/22 1523)  BP: 127/65 (06/09/22 1523)  SpO2: 99 % (06/09/22 1523)    VENTILATION STATUS  Resp: 20 (06/09/22 1523)  SpO2: 99 % (06/09/22 1523)       I & O (Last 24H):    Intake/Output Summary (Last 24 hours) at 6/9/2022 1620  Last data filed at 6/9/2022 1528  Gross per 24 hour   Intake 500 ml   Output --   Net 500 ml       WEIGHTS  Wt Readings from Last 3 Encounters:   06/09/22 0500 104.4 kg (230 lb 3.2 oz)   06/07/22 2333 104.3 kg (230 lb)   06/07/22 1020 104.3 kg (230 lb)   06/08/22 0938 104.3 kg (229 lb 15 oz)   07/21/20 2257 108.8 kg (239 lb 13.8 oz)   07/21/20 1728 112 kg (247 lb)       PHYSICAL EXAM  CONSTITUTIONAL: Well built, well nourished in no apparent distress  NECK: no carotid bruit, no JVD  LUNGS: CTA  CHEST WALL: no  tenderness  HEART: regular rate and rhythm, S1, S2 normal, no murmur, click, rub or gallop   ABDOMEN: soft, non-tender; bowel sounds normal; no masses,  no organomegaly  EXTREMITIES: Extremities normal, no edema  NEURO: AAO X 3    SCHEDULED MEDS:   albuterol-ipratropium  3 mL Nebulization Q6H    amLODIPine  5 mg Oral Daily    aspirin  325 mg Oral Daily    atorvastatin  80 mg Oral QHS    chlorzoxazone  500 mg Oral BID    clopidogreL  75 mg Oral QHS    enoxaparin  40 mg Subcutaneous Daily    gabapentin  600 mg Oral TID    guaiFENesin  600 mg Oral BID    levothyroxine  150 mcg Oral Before breakfast    pantoprazole  40 mg Oral BID AC    predniSONE  40 mg Oral Daily    propranoloL  160 mg Oral Daily       CONTINUOUS INFUSIONS:    PRN MEDS:dextrose 50%, dextrose 50%, glucagon (human recombinant), glucose, glucose, insulin aspart U-100, melatonin, nitroGLYCERIN, sodium chloride 0.9%    LABS AND DIAGNOSTICS     CBC LAST 3 DAYS  Recent Labs   Lab 06/07/22  1145 06/07/22  1906   WBC 15.38* 10.89   RBC 4.30 4.20   HGB 11.8* 11.6*   HCT 38.1 37.1   MCV 89 88   MCH 27.4 27.6   MCHC 31.0* 31.3*   RDW 15.4* 15.7*    254   MPV 10.6 10.1   GRAN 80.8*  12.4* 87.4*  9.5*   LYMPH 9.2*  1.4 10.6*  1.2   MONO 8.7  1.3* 1.1*  0.1*   BASO 0.05 0.03   NRBC 0 0       COAGULATION LAST 3 DAYS  No results for input(s): LABPT, INR, APTT in the last 168 hours.    CHEMISTRY LAST 3 DAYS  Recent Labs   Lab 06/07/22  1145 06/07/22  1906   * 132*   K 3.7 4.0   CL 91* 91*   CO2 32* 31*   ANIONGAP 12 10   BUN 9 11   CREATININE 0.6 0.6   * 210*   CALCIUM 9.2 9.0   ALBUMIN 3.4*  --    PROT 8.0  --    ALKPHOS 238*  --    ALT 37  --    AST 34  --    BILITOT 0.8  --        CARDIAC PROFILE LAST 3 DAYS  Recent Labs   Lab 06/07/22  1145 06/07/22  1906   BNP 70  --    TROPONINI <0.030 <0.030       ENDOCRINE LAST 3 DAYS  Recent Labs   Lab 06/07/22  1455 06/07/22  1906   PROCAL <0.05 <0.05       LAST ARTERIAL BLOOD  GAS  ABG  No results for input(s): PH, PO2, PCO2, HCO3, BE in the last 168 hours.    LAST 7 DAYS MICROBIOLOGY   Microbiology Results (last 7 days)     ** No results found for the last 168 hours. **          MOST RECENT IMAGING  US Abdomen Limited  Limited abdominal ultrasound (right upper quadrant)    HISTORY: Right upper quadrant abdominal pain.    The liver is at the upper end of normal in sagittal dimension measuring 16.6 cm. No focal hepatic parenchymal abnormality is demonstrated. There is a mild diffuse increase in hepatic echogenicity which suggests mild fatty infiltration. Hepatopedal flow is demonstrated within the portal vein.    Multiple gallstones are observed within the gallbladder lumen. There is borderline prominence of the gallbladder wall measuring up to 3 mm. The patient exhibits tenderness over the gallbladder fossa. There is no biliary dilatation. The common bile duct measures 4 mm.    The pancreas is partially visualized with no focal parenchymal abnormality demonstrated. Portions of the head and tail of the pancreas are obscured.    IMPRESSION:    Cholelithiasis and borderline prominence of the gallbladder wall. The patient exhibits tenderness over the gallbladder fossa.    Borderline hepatomegaly with increase in hepatic echogenicity suggestive of fatty infiltration.    Electronically signed by:  Neal Killian MD  6/9/2022 11:35 AM CDT Workstation: [id number]  NM Myocardial Perfusion Spect Multi Pharmacologic  HISTORY:  Chest pain, dyspnea, hypertension, previous myocardial infarction.    TECHNIQUE:  11 mCi technetium 99m Myoview was administered IV for the rest portion of the exam, with 25.4 mCi for the stress portion of the exam, following a 1 day protocol.  The patient was stressed with Lexiscan 0.4 mg IV, and achieved a maximum heart rate of 88 beats per minute.    FINDINGS:  Comparison to prior exam of 07/22/2020. There is normal and homogeneous radiotracer uptake by the left ventricular  myocardium, with no photopenic defects to suggest pharmacologically induced reversible ischemia or infarct.  There are no wall motion abnormalities on the gated cine images, with no abnormal transient left ventricular dilatation on stress images.    Ejection fraction is calculated at 55%.  The polar map images confirm the planar SPECT findings.    IMPRESSION:  1. No evidence of pharmacologically induced reversible ischemia or infarct.  2. Normal left ventricular wall motion.  3. Calculated ejection fraction of 55%.    Electronically signed by:  Christiano Tirado MD  6/9/2022 11:11 AM CDT Workstation: [id number]      [employer]  Results for orders placed during the hospital encounter of 06/07/22    Echo Saline Bubble? No    Interpretation Summary  · The left ventricle is mildly enlarged with concentric remodeling and normal systolic function.  · The estimated ejection fraction is 55%.  · Grade I left ventricular diastolic dysfunction.  · Normal right ventricular size with normal right ventricular systolic function.  · Moderate left atrial enlargement.  · Normal central venous pressure (3 mmHg).      CURRENT/PREVIOUS VISIT EKG  Results for orders placed or performed during the hospital encounter of 06/07/22   EKG 12-lead    Collection Time: 06/07/22 10:28 AM    Narrative    Test Reason : R07.9,    Vent. Rate : 086 BPM     Atrial Rate : 086 BPM     P-R Int : 162 ms          QRS Dur : 084 ms      QT Int : 382 ms       P-R-T Axes : 052 021 036 degrees     QTc Int : 457 ms    Normal sinus rhythm  Nonspecific ST abnormality  Abnormal ECG  When compared with ECG of 23-JUL-2020 07:21,  FL interval has decreased  Vent. rate has increased BY  28 BPM    Referred By: [email or web address]   SELF           Confirmed By:        ASSESSMENT/PLAN:     Active Hospital Problems    Diagnosis    *COPD exacerbation    Physical debility    Diabetes type 2, uncontrolled    Chest pain    Hypothyroid    Hypertension       ASSESSMENT & PLAN:   1. Chest  pain   2. Essential HTN   3. On home O2, unsure why?   4. CAD s/p PCI 2018 to circ         RECOMMENDATIONS:  Troponin and EKG not suggestive of ACS   BNP and CXR not suggestive of acute CHF   Lexiscan negative for any RI   It is uncertain why patient was placed on O2 several years ago and she does not have pulmonology care, place consult   Ok to hold Plavix in order to proceed with lap kyra if necessary   We will sign off, re-consult if needed     Dasha Arcos PA-C  [facility]  Department of Cardiology  Date of Service: 06/09/2022

## 2022-06-09 NOTE — CARE UPDATE
06/09/22 0109   Patient Assessment/Suction   Level of Consciousness (AVPU) alert   Respiratory Effort Normal   Expansion/Accessory Muscles/Retractions no use of accessory muscles   All Lung Fields Breath Sounds clear   Rhythm/Pattern, Respiratory unlabored   Cough Frequency no cough   PRE-TX-O2   O2 Device (Oxygen Therapy) Oxymask   $ Is the patient on Low Flow Oxygen? Yes   Flow (L/min) 3   SpO2 98 %   Pulse Oximetry Type Intermittent   $ Pulse Oximetry - Multiple Charge Pulse Oximetry - Multiple   Pulse 72   Resp 19   Aerosol Therapy   $ Aerosol Therapy Charges Aerosol Treatment   Daily Review of Necessity (SVN) completed   Respiratory Treatment Status (SVN) given   Treatment Route (SVN) mask   Patient Position (SVN) HOB elevated   Post Treatment Assessment (SVN) breath sounds unchanged;vital signs unchanged   Signs of Intolerance (SVN) none   Education   $ Education 15 min   Respiratory Evaluation   $ Care Plan Tech Time 15 min

## 2022-06-09 NOTE — CARE UPDATE
06/09/22 1321   Patient Assessment/Suction   Level of Consciousness (AVPU) alert   Respiratory Effort Normal   Expansion/Accessory Muscles/Retractions no use of accessory muscles   All Lung Fields Breath Sounds clear;diminished   Rhythm/Pattern, Respiratory unlabored   Cough Frequency infrequent   Cough Type nonproductive   PRE-TX-O2   O2 Device (Oxygen Therapy) room air  (placed on oxymask)   Flow (L/min)   (placed on 2)   SpO2 (!) 90 %   Pulse 67   Resp 18   Positioning HOB elevated 90 degrees   Aerosol Therapy   $ Aerosol Therapy Charges Aerosol Treatment   Daily Review of Necessity (SVN) completed   Respiratory Treatment Status (SVN) given   Treatment Route (SVN) mask;oxygen   Patient Position (SVN) HOB elevated   Post Treatment Assessment (SVN) breath sounds unchanged;vital signs unchanged   Signs of Intolerance (SVN) none   Education   $ Education Bronchodilator;15 min   Respiratory Evaluation   $ Care Plan Tech Time 15 min   $ Eval/Re-eval Charges Re-evaluation   Patient on RA to eat placed back on 2L oxymask

## 2022-06-09 NOTE — TELEPHONE ENCOUNTER
Spoke with pt's nurse. She reports that the consulting doctor has reached out to Dr. Sauer directly.

## 2022-06-09 NOTE — SUBJECTIVE & OBJECTIVE
No current facility-administered medications on file prior to encounter.     Current Outpatient Medications on File Prior to Encounter   Medication Sig    amLODIPine (NORVASC) 5 MG tablet Take 5 mg by mouth once daily.    aspirin 81 MG Chew Take 81 mg by mouth once daily.    atorvastatin (LIPITOR) 80 MG tablet Take 80 mg by mouth every evening.    chlorzoxazone (PARAFON FORTE) 250 MG tablet Take 250 mg by mouth 6 (six) times daily.    clopidogreL (PLAVIX) 75 mg tablet Take 75 mg by mouth once daily.    desonide (DESOWEN) 0.05 % cream Apply topically 2 (two) times daily.    estradioL (ESTRACE) 0.01 % (0.1 mg/gram) vaginal cream Place 1 g vaginally every 7 days.    gabapentin (NEURONTIN) 600 MG tablet Take 600 mg by mouth 3 (three) times daily.    HUMIRA,CF, 40 mg/0.4 mL SyKt Inject 0.4 mLs into the skin every 7 days.    LEVOXYL 150 mcg tablet Take 150 mcg by mouth once daily.    metFORMIN (GLUCOPHAGE) 850 MG tablet Take 850 mg by mouth nightly.    pantoprazole (PROTONIX) 40 MG tablet Take 1 tablet (40 mg total) by mouth 2 (two) times daily.    propranoloL (INDERAL LA) 80 MG 24 hr capsule Take 160 mg by mouth once daily.    doxycycline monohydrate 100 mg Tab Take 1 tablet by mouth 2 (two) times daily.    fluconazole (DIFLUCAN) 150 MG Tab Take by mouth.    ipratropium (ATROVENT) 0.03 % nasal spray 1 spray by Nasal route 2 (two) times daily.    levothyroxine (SYNTHROID, LEVOTHROID) 175 MCG tablet Take 1 tablet (175 mcg total) by mouth once daily. (Patient taking differently: Take 150 mcg by mouth once daily.)    propranoloL (INDERAL) 40 MG tablet Take 40 mg by mouth daily as needed.       Review of patient's allergies indicates:   Allergen Reactions    Pcn [penicillins] Anaphylaxis    Adhesive     Floxacillin     Keflex [cephalexin]     Sulfa (sulfonamide antibiotics)     Zithromax [azithromycin]     Zofran [ondansetron hcl (pf)]      Dizzy vomiting         Past Medical History:   Diagnosis Date    Coronary artery  disease     cardiac stent    Diabetes mellitus     Hypertension      History reviewed. No pertinent surgical history.  Family History    None       Tobacco Use    Smoking status: Former Smoker     Types: Cigarettes    Smokeless tobacco: Never Used    Tobacco comment: 1 year ago   Substance and Sexual Activity    Alcohol use: No    Drug use: No    Sexual activity: Not on file     Review of Systems   Respiratory:  Positive for shortness of breath.    Gastrointestinal:  Positive for abdominal pain and nausea.   All other systems reviewed and are negative.  Objective:     Vital Signs (Most Recent):  Temp: 98.6 °F (37 °C) (06/09/22 1523)  Pulse: 69 (06/09/22 1523)  Resp: 20 (06/09/22 1523)  BP: 127/65 (06/09/22 1523)  SpO2: 99 % (06/09/22 1523) Vital Signs (24h Range):  Temp:  [97.8 °F (36.6 °C)-98.6 °F (37 °C)] 98.6 °F (37 °C)  Pulse:  [55-75] 69  Resp:  [18-20] 20  SpO2:  [90 %-99 %] 99 %  BP: (125-165)/(56-74) 127/65     Weight: 104.4 kg (230 lb 3.2 oz)  Body mass index is 38.31 kg/m².    Physical Exam  Vitals and nursing note reviewed.   Constitutional:       General: She is not in acute distress.     Appearance: She is well-developed. She is not diaphoretic.   HENT:      Head: Normocephalic and atraumatic.      Mouth/Throat:      Pharynx: No oropharyngeal exudate.   Eyes:      General: No scleral icterus.     Conjunctiva/sclera: Conjunctivae normal.      Pupils: Pupils are equal, round, and reactive to light.   Neck:      Thyroid: No thyromegaly.      Vascular: No JVD.      Trachea: No tracheal deviation.   Cardiovascular:      Rate and Rhythm: Normal rate and regular rhythm.      Heart sounds: Normal heart sounds. No murmur heard.    No friction rub. No gallop.   Pulmonary:      Effort: Pulmonary effort is normal. No respiratory distress.      Breath sounds: Normal breath sounds. No stridor. No wheezing or rales.   Chest:      Chest wall: No tenderness.   Abdominal:      General: Bowel sounds are normal. There is  no distension.      Palpations: Abdomen is soft. There is no mass.      Tenderness: There is abdominal tenderness (very mild in ruq). There is no guarding or rebound.   Musculoskeletal:         General: No tenderness. Normal range of motion.      Cervical back: Normal range of motion and neck supple.   Lymphadenopathy:      Cervical: No cervical adenopathy.   Skin:     General: Skin is warm and dry.      Findings: No erythema or rash.   Neurological:      Mental Status: She is alert and oriented to person, place, and time.      Cranial Nerves: No cranial nerve deficit.   Psychiatric:         Behavior: Behavior normal.       Significant Labs:  I have reviewed all pertinent lab results within the past 24 hours.  CBC:   Recent Labs   Lab 06/07/22  1906   WBC 10.89   RBC 4.20   HGB 11.6*   HCT 37.1      MCV 88   MCH 27.6   MCHC 31.3*     CMP:   Recent Labs   Lab 06/07/22  1145 06/07/22  1906   * 210*   CALCIUM 9.2 9.0   ALBUMIN 3.4*  --    PROT 8.0  --    * 132*   K 3.7 4.0   CO2 32* 31*   CL 91* 91*   BUN 9 11   CREATININE 0.6 0.6   ALKPHOS 238*  --    ALT 37  --    AST 34  --    BILITOT 0.8  --        Significant Diagnostics:  I have reviewed all pertinent imaging results/findings within the past 24 hours.    US with gallstones- images independently interpretted

## 2022-06-09 NOTE — ASSESSMENT & PLAN NOTE
Suspect current episode resolving  Would continue course of antibiotics until cholecystectomy  She is eating, would all her to eat and if tolerates can be discharged  She should stop the plavix now and we will plan for surgery next week.    I spoke with primary team about plan

## 2022-06-09 NOTE — PROGRESS NOTES
[facility] Progress Note    Subjective:     Denies any chest pain, no shortness of breath.  Has not ambulated much. Eating ok with no postprandial pain.   Objective:   Last 24 Hour Vital Signs:  BP  Min: 125/56  Max: 165/68  Temp  Av.2 °F (36.8 °C)  Min: 97.8 °F (36.6 °C)  Max: 98.6 °F (37 °C)  Pulse  Av.8  Min: 55  Max: 75  Resp  Av.3  Min: 18  Max: 20  SpO2  Av.5 %  Min: 90 %  Max: 99 %  Weight  Av.4 kg (230 lb 3.2 oz)  Min: 104.4 kg (230 lb 3.2 oz)  Max: 104.4 kg (230 lb 3.2 oz)  I/O last 3 completed shifts:  In: 865 [P.O.:845; I.V.:20]  Out: -   PHYSICAL EXAM  GENERAL: well built, well nourished, well-developed in no apparent distress alert and oriented.   HEENT: Normocephalic. Pupils normal and conjunctivae normal.  Mucous membranes normal, no cyanosis or icterus, trachea central,no pallor or icterus is noted..   NECK: No JVD. No bruit..   THYROID: Thyroid not enlarged. No nodules present..   CARDIAC: Regular rate and rhythm. S1 is normal.S2 is normal.No gallops, clicks or murmurs noted at this time.  CHEST ANATOMY: normal.   LUNGS: Clear to auscultation. No wheezing or rhonchi..   ABDOMEN: Soft no masses or organomegaly.  No abdomen pulsations or bruits.  Normal bowel sounds. No pulsations and no masses felt, No guarding or rebound.   URINARY: No shah catheter   EXTREMITIES: No cyanosis, clubbing or edema noted at this time., no calf tenderness bilaterally.   PERIPHERAL VASCULAR SYSTEM: Good palpable distal pulses.   CENTRAL NERVOUS SYSTEM: No focal motor or sensory deficits noted.   SKIN: Skin without lesions, moist, well perfused.   MUSCLE STRENGTH & TONE: No noteable weakness, atrophy or abnormal movement.      HOME MEDICATIONS:  No current facility-administered medications on file prior to encounter.         Laboratory:  Laboratory Data Reviewed: yes    Most Recent Data:  CBC:   Lab Results   Component Value Date    WBC 10.89 2022    HGB 11.6 (L) 2022     HCT 37.1 06/07/2022     06/07/2022    MCV 88 06/07/2022    RDW 15.7 (H) 06/07/2022     BMP:   Lab Results   Component Value Date     (L) 06/07/2022    K 4.0 06/07/2022    CL 91 (L) 06/07/2022    CO2 31 (H) 06/07/2022    BUN 11 06/07/2022     (H) 06/07/2022    CALCIUM 9.0 06/07/2022    MG 2.0 07/23/2020     LFTs:   Lab Results   Component Value Date    PROT 8.0 06/07/2022    ALBUMIN 3.4 (L) 06/07/2022    BILITOT 0.8 06/07/2022    AST 34 06/07/2022    ALKPHOS 238 (H) 06/07/2022    ALT 37 06/07/2022     Coags: No results found for: INR, PROTIME, PTT  FLP: No results found for: CHOL, HDL, LDLCALC, TRIG, CHOLHDL  DM:   Lab Results   Component Value Date    HGBA1C 7.0 (H) 06/07/2022    HGBA1C 7.9 (H) 07/21/2020    CREATININE 0.6 06/07/2022     Thyroid: No results found for: TSH, FREET4, E6WBPEP, R6VDPVJ, THYROIDAB  Anemia: No results found for: IRON, TIBC, FERRITIN, [id number], FOLATE  Cardiac:   Lab Results   Component Value Date    TROPONINI <0.030 06/07/2022    BNP 70 06/07/2022     Urinalysis:   Lab Results   Component Value Date    COLORU Yellow 06/08/2022    SPECGRAV >1.030 (A) 06/08/2022    NITRITE Negative 06/08/2022    KETONESU 2+ (A) 06/08/2022    UROBILINOGEN Negative 06/08/2022    WBCUA 5 06/08/2022        Radiology:  Data Reviewed: yes  XR CHEST AP PORTABLE  US LOWER EXTREMITY VEINS BILATERAL  NM MYOCARDIAL PERFUSION SPECT MULTI PHARM  US ABDOMEN LIMITED      Current Medications:     Infusions:       Scheduled:   albuterol-ipratropium  3 mL Nebulization Q6H    amLODIPine  5 mg Oral Daily    aspirin  325 mg Oral Daily    atorvastatin  80 mg Oral QHS    chlorzoxazone  500 mg Oral BID    clopidogreL  75 mg Oral QHS    enoxaparin  40 mg Subcutaneous Daily    gabapentin  600 mg Oral TID    guaiFENesin  600 mg Oral BID    levothyroxine  150 mcg Oral Before breakfast    pantoprazole  40 mg Oral BID AC    predniSONE  40 mg Oral Daily    propranoloL  160 mg Oral Daily         PRN:  dextrose 50%, dextrose 50%, glucagon (human recombinant), glucose, glucose, insulin aspart U-100, melatonin, nitroGLYCERIN, sodium chloride 0.9%    Lines and Day Number of Therapy:      Microbiology Data:  Reviewed: yes  Microbiology Results (last 7 days)     ** No results found for the last 168 hours. **           Antibiotics and Day Number of Therapy:  Antibiotics (From admission, onward)            None         Antivirals (From admission, onward)    None             Assessment/Plan:     Karo Leonard is a 62 y.o.female with    Biliary colic   Atypical chest pain   Bronchitis      3.  History of coronary artery disease status post cardiac stent to her circumflex in 2017  -had a 1st diagonal branch with 50% stenosis in the right coronary with a 40% lesion ejection fraction was within normal limits seen by Dr. Lin  -consult Cardiology     4.  Obesity-BMI 38.27  -follow-up PCP with discharge for weight reduction weight modification     5.  Tobacco use  -cessation discussed and encouraged  -patient states she stopped smoking cigarettes about 4 weeks ago     6.  Type 2 diabetes  -low dose NovoLog insulin sliding scale  -sliding scale protocol  -diabetic cardiac diet once eating  -hold metformin     7.  Hypothyroidism  -check TSH level  -continue levothyroxine.    Pharmacologic stress test pending  General surgery eval pending    Vincent Camacho  [facility]

## 2022-06-09 NOTE — CONSULTS
[facility]  General Surgery  Consult Note    Patient Name: Karo Leonard  MRN: [id number]  Code Status: Full Code  Admission Date: 6/7/2022  Hospital Length of Stay: 0 days  Attending Physician: Vincent Camacho MD  Primary Care Provider: JED Ragland    Patient information was obtained from patient and ER records.     Inpatient consult to General Surgery  Consult performed by: Casey Sauer MD  Consult ordered by: Vincent Camacho MD  Reason for consult: cholecystitis  Assessment/Recommendations: Surgery when off plavix        Subjective:     Principal Problem: COPD exacerbation    History of Present Illness: 61 y/o with right upper quadrant post prandial sharp stabbing pain that radiates to the back.  Multiple episodes in the past.  Current one has resolved and she has just eaten.      No current facility-administered medications on file prior to encounter.     Current Outpatient Medications on File Prior to Encounter   Medication Sig    amLODIPine (NORVASC) 5 MG tablet Take 5 mg by mouth once daily.    aspirin 81 MG Chew Take 81 mg by mouth once daily.    atorvastatin (LIPITOR) 80 MG tablet Take 80 mg by mouth every evening.    chlorzoxazone (PARAFON FORTE) 250 MG tablet Take 250 mg by mouth 6 (six) times daily.    clopidogreL (PLAVIX) 75 mg tablet Take 75 mg by mouth once daily.    desonide (DESOWEN) 0.05 % cream Apply topically 2 (two) times daily.    estradioL (ESTRACE) 0.01 % (0.1 mg/gram) vaginal cream Place 1 g vaginally every 7 days.    gabapentin (NEURONTIN) 600 MG tablet Take 600 mg by mouth 3 (three) times daily.    HUMIRA,CF, 40 mg/0.4 mL SyKt Inject 0.4 mLs into the skin every 7 days.    LEVOXYL 150 mcg tablet Take 150 mcg by mouth once daily.    metFORMIN (GLUCOPHAGE) 850 MG tablet Take 850 mg by mouth nightly.    pantoprazole (PROTONIX) 40 MG tablet Take 1 tablet (40 mg total) by mouth 2 (two) times daily.    propranoloL (INDERAL LA) 80 MG 24 hr capsule  Take 160 mg by mouth once daily.    doxycycline monohydrate 100 mg Tab Take 1 tablet by mouth 2 (two) times daily.    fluconazole (DIFLUCAN) 150 MG Tab Take by mouth.    ipratropium (ATROVENT) 0.03 % nasal spray 1 spray by Nasal route 2 (two) times daily.    levothyroxine (SYNTHROID, LEVOTHROID) 175 MCG tablet Take 1 tablet (175 mcg total) by mouth once daily. (Patient taking differently: Take 150 mcg by mouth once daily.)    propranoloL (INDERAL) 40 MG tablet Take 40 mg by mouth daily as needed.       Review of patient's allergies indicates:   Allergen Reactions    Pcn [penicillins] Anaphylaxis    Adhesive     Floxacillin     Keflex [cephalexin]     Sulfa (sulfonamide antibiotics)     Zithromax [azithromycin]     Zofran [ondansetron hcl (pf)]      Dizzy vomiting         Past Medical History:   Diagnosis Date    Coronary artery disease     cardiac stent    Diabetes mellitus     Hypertension      History reviewed. No pertinent surgical history.  Family History    None       Tobacco Use    Smoking status: Former Smoker     Types: Cigarettes    Smokeless tobacco: Never Used    Tobacco comment: 1 year ago   Substance and Sexual Activity    Alcohol use: No    Drug use: No    Sexual activity: Not on file     Review of Systems   Respiratory:  Positive for shortness of breath.    Gastrointestinal:  Positive for abdominal pain and nausea.   All other systems reviewed and are negative.  Objective:     Vital Signs (Most Recent):  Temp: 98.6 °F (37 °C) (06/09/22 1523)  Pulse: 69 (06/09/22 1523)  Resp: 20 (06/09/22 1523)  BP: 127/65 (06/09/22 1523)  SpO2: 99 % (06/09/22 1523) Vital Signs (24h Range):  Temp:  [97.8 °F (36.6 °C)-98.6 °F (37 °C)] 98.6 °F (37 °C)  Pulse:  [55-75] 69  Resp:  [18-20] 20  SpO2:  [90 %-99 %] 99 %  BP: (125-165)/(56-74) 127/65     Weight: 104.4 kg (230 lb 3.2 oz)  Body mass index is 38.31 kg/m².    Physical Exam  Vitals and nursing note reviewed.   Constitutional:       General: She  is not in acute distress.     Appearance: She is well-developed. She is not diaphoretic.   HENT:      Head: Normocephalic and atraumatic.      Mouth/Throat:      Pharynx: No oropharyngeal exudate.   Eyes:      General: No scleral icterus.     Conjunctiva/sclera: Conjunctivae normal.      Pupils: Pupils are equal, round, and reactive to light.   Neck:      Thyroid: No thyromegaly.      Vascular: No JVD.      Trachea: No tracheal deviation.   Cardiovascular:      Rate and Rhythm: Normal rate and regular rhythm.      Heart sounds: Normal heart sounds. No murmur heard.    No friction rub. No gallop.   Pulmonary:      Effort: Pulmonary effort is normal. No respiratory distress.      Breath sounds: Normal breath sounds. No stridor. No wheezing or rales.   Chest:      Chest wall: No tenderness.   Abdominal:      General: Bowel sounds are normal. There is no distension.      Palpations: Abdomen is soft. There is no mass.      Tenderness: There is abdominal tenderness (very mild in ruq). There is no guarding or rebound.   Musculoskeletal:         General: No tenderness. Normal range of motion.      Cervical back: Normal range of motion and neck supple.   Lymphadenopathy:      Cervical: No cervical adenopathy.   Skin:     General: Skin is warm and dry.      Findings: No erythema or rash.   Neurological:      Mental Status: She is alert and oriented to person, place, and time.      Cranial Nerves: No cranial nerve deficit.   Psychiatric:         Behavior: Behavior normal.       Significant Labs:  I have reviewed all pertinent lab results within the past 24 hours.  CBC:   Recent Labs   Lab 06/07/22  1906   WBC 10.89   RBC 4.20   HGB 11.6*   HCT 37.1      MCV 88   MCH 27.6   MCHC 31.3*     CMP:   Recent Labs   Lab 06/07/22  1145 06/07/22  1906   * 210*   CALCIUM 9.2 9.0   ALBUMIN 3.4*  --    PROT 8.0  --    * 132*   K 3.7 4.0   CO2 32* 31*   CL 91* 91*   BUN 9 11   CREATININE 0.6 0.6   ALKPHOS 238*  --    ALT  37  --    AST 34  --    BILITOT 0.8  --        Significant Diagnostics:  I have reviewed all pertinent imaging results/findings within the past 24 hours.    US with gallstones- images independently interpretted      Assessment/Plan:     Calculus of gallbladder with chronic cholecystitis without obstruction  Suspect current episode resolving  Would continue course of antibiotics until cholecystectomy  She is eating, would all her to eat and if tolerates can be discharged  She should stop the plavix now and we will plan for surgery next week.    I spoke with primary team about plan      VTE Risk Mitigation (From admission, onward)         Ordered     enoxaparin injection 40 mg  Daily         06/07/22 1531     IP VTE HIGH RISK PATIENT  Once         06/07/22 1531                Thank you for your consult. I will sign off. Please contact us if you have any additional questions.    Casey Sauer MD  General Surgery  [facility]

## 2022-06-10 LAB
GLUCOSE SERPL-MCNC: 237 MG/DL (ref 70–110)
GLUCOSE SERPL-MCNC: 239 MG/DL (ref 70–110)
GLUCOSE SERPL-MCNC: 257 MG/DL (ref 70–110)
GLUCOSE SERPL-MCNC: 353 MG/DL (ref 70–110)
GLUCOSE SERPL-MCNC: 359 MG/DL (ref 70–110)
GLUCOSE SERPL-MCNC: 387 MG/DL (ref 70–110)

## 2022-06-10 PROCEDURE — 27000221 HC OXYGEN, UP TO 24 HOURS

## 2022-06-10 PROCEDURE — 25000003 PHARM REV CODE 250: Performed by: INTERNAL MEDICINE

## 2022-06-10 PROCEDURE — 94640 AIRWAY INHALATION TREATMENT: CPT

## 2022-06-10 PROCEDURE — 96372 THER/PROPH/DIAG INJ SC/IM: CPT | Performed by: NURSE PRACTITIONER

## 2022-06-10 PROCEDURE — 63600175 PHARM REV CODE 636 W HCPCS: Performed by: NURSE PRACTITIONER

## 2022-06-10 PROCEDURE — 25000003 PHARM REV CODE 250: Performed by: NURSE PRACTITIONER

## 2022-06-10 PROCEDURE — 0051A HC IMMUNIZ ADMIN, SARS-COV-2 COVID-19 VACC, TRI SUCROSE, 30MCG/0.3ML, 1ST DOSE: CPT | Performed by: INTERNAL MEDICINE

## 2022-06-10 PROCEDURE — 94761 N-INVAS EAR/PLS OXIMETRY MLT: CPT

## 2022-06-10 PROCEDURE — 94760 N-INVAS EAR/PLS OXIMETRY 1: CPT

## 2022-06-10 PROCEDURE — 25000242 PHARM REV CODE 250 ALT 637 W/ HCPCS: Performed by: NURSE PRACTITIONER

## 2022-06-10 PROCEDURE — 99900031 HC PATIENT EDUCATION (STAT)

## 2022-06-10 PROCEDURE — 99900035 HC TECH TIME PER 15 MIN (STAT)

## 2022-06-10 PROCEDURE — G0378 HOSPITAL OBSERVATION PER HR: HCPCS

## 2022-06-10 PROCEDURE — 96372 THER/PROPH/DIAG INJ SC/IM: CPT | Performed by: INTERNAL MEDICINE

## 2022-06-10 PROCEDURE — 63600175 PHARM REV CODE 636 W HCPCS: Performed by: INTERNAL MEDICINE

## 2022-06-10 PROCEDURE — 82962 GLUCOSE BLOOD TEST: CPT

## 2022-06-10 PROCEDURE — 91305 PHARM REV CODE 636 W HCPCS: CPT | Performed by: INTERNAL MEDICINE

## 2022-06-10 RX ORDER — INSULIN ASPART 100 [IU]/ML
1-10 INJECTION, SOLUTION INTRAVENOUS; SUBCUTANEOUS
Status: DISCONTINUED | OUTPATIENT
Start: 2022-06-10 | End: 2022-06-12 | Stop reason: HOSPADM

## 2022-06-10 RX ORDER — IBUPROFEN 200 MG
16 TABLET ORAL
Status: DISCONTINUED | OUTPATIENT
Start: 2022-06-10 | End: 2022-06-12 | Stop reason: HOSPADM

## 2022-06-10 RX ORDER — GLUCAGON 1 MG
1 KIT INJECTION
Status: DISCONTINUED | OUTPATIENT
Start: 2022-06-10 | End: 2022-06-12 | Stop reason: HOSPADM

## 2022-06-10 RX ORDER — IBUPROFEN 200 MG
24 TABLET ORAL
Status: DISCONTINUED | OUTPATIENT
Start: 2022-06-10 | End: 2022-06-12 | Stop reason: HOSPADM

## 2022-06-10 RX ADMIN — ASPIRIN 325 MG: 325 TABLET, COATED ORAL at 10:06

## 2022-06-10 RX ADMIN — ENOXAPARIN SODIUM 40 MG: 40 INJECTION SUBCUTANEOUS at 05:06

## 2022-06-10 RX ADMIN — INSULIN ASPART 2 UNITS: 100 INJECTION, SOLUTION INTRAVENOUS; SUBCUTANEOUS at 12:06

## 2022-06-10 RX ADMIN — PREDNISONE 40 MG: 20 TABLET ORAL at 10:06

## 2022-06-10 RX ADMIN — GABAPENTIN 600 MG: 300 CAPSULE ORAL at 04:06

## 2022-06-10 RX ADMIN — IPRATROPIUM BROMIDE AND ALBUTEROL SULFATE 3 ML: .5; 3 SOLUTION RESPIRATORY (INHALATION) at 07:06

## 2022-06-10 RX ADMIN — GABAPENTIN 600 MG: 300 CAPSULE ORAL at 10:06

## 2022-06-10 RX ADMIN — PROPRANOLOL HYDROCHLORIDE 160 MG: 80 CAPSULE, EXTENDED RELEASE ORAL at 10:06

## 2022-06-10 RX ADMIN — GUAIFENESIN 600 MG: 600 TABLET, EXTENDED RELEASE ORAL at 10:06

## 2022-06-10 RX ADMIN — INSULIN ASPART 10 UNITS: 100 INJECTION, SOLUTION INTRAVENOUS; SUBCUTANEOUS at 05:06

## 2022-06-10 RX ADMIN — ATORVASTATIN CALCIUM 80 MG: 40 TABLET, FILM COATED ORAL at 10:06

## 2022-06-10 RX ADMIN — AMLODIPINE BESYLATE 5 MG: 5 TABLET ORAL at 10:06

## 2022-06-10 RX ADMIN — CHLORZOXAZONE 500 MG: 500 TABLET ORAL at 10:06

## 2022-06-10 RX ADMIN — IPRATROPIUM BROMIDE AND ALBUTEROL SULFATE 3 ML: .5; 3 SOLUTION RESPIRATORY (INHALATION) at 12:06

## 2022-06-10 RX ADMIN — PANTOPRAZOLE SODIUM 40 MG: 40 TABLET, DELAYED RELEASE ORAL at 07:06

## 2022-06-10 RX ADMIN — PANTOPRAZOLE SODIUM 40 MG: 40 TABLET, DELAYED RELEASE ORAL at 04:06

## 2022-06-10 RX ADMIN — BNT162B2 0.3 ML: 0.23 INJECTION, SUSPENSION INTRAMUSCULAR at 11:06

## 2022-06-10 RX ADMIN — CHLORZOXAZONE 500 MG: 500 TABLET ORAL at 12:06

## 2022-06-10 RX ADMIN — LEVOTHYROXINE SODIUM 150 MCG: 0.1 TABLET ORAL at 07:06

## 2022-06-10 RX ADMIN — IPRATROPIUM BROMIDE AND ALBUTEROL SULFATE 3 ML: .5; 3 SOLUTION RESPIRATORY (INHALATION) at 01:06

## 2022-06-10 RX ADMIN — INSULIN ASPART 5 UNITS: 100 INJECTION, SOLUTION INTRAVENOUS; SUBCUTANEOUS at 10:06

## 2022-06-10 NOTE — PLAN OF CARE
Problem: Adult Inpatient Plan of Care  Goal: Absence of Hospital-Acquired Illness or Injury  Outcome: Ongoing, Progressing  Goal: Optimal Comfort and Wellbeing  Outcome: Ongoing, Progressing  Goal: Readiness for Transition of Care  Outcome: Ongoing, Progressing     Problem: Fall Injury Risk  Goal: Absence of Fall and Fall-Related Injury  Outcome: Ongoing, Progressing     Problem: Diabetes Comorbidity  Goal: Blood Glucose Level Within Targeted Range  Outcome: Ongoing, Not Progressing

## 2022-06-10 NOTE — PROGRESS NOTES
[facility] Medicine Progress Note    Subjective:     She denies any pain today.  Tolerating p.o. without issues    Objective:   Last 24 Hour Vital Signs:  BP  Min: 123/78  Max: 153/86  Temp  Av.1 °F (36.7 °C)  Min: 97.9 °F (36.6 °C)  Max: 98.4 °F (36.9 °C)  Pulse  Av.6  Min: 57  Max: 73  Resp  Av  Min: 16  Max: 20  SpO2  Av.8 %  Min: 85 %  Max: 99 %  Weight  Av.9 kg (231 lb 3.2 oz)  Min: 104.9 kg (231 lb 3.2 oz)  Max: 104.9 kg (231 lb 3.2 oz)  I/O last 3 completed shifts:  In: 1100 [P.O.:1080; I.V.:20]  Out: 1 [Urine:1]  PHYSICAL EXAM  GENERAL: well built, well nourished, well-developed in no apparent distress alert and oriented.   HEENT: Normocephalic. Pupils normal and conjunctivae normal.  Mucous membranes normal, no cyanosis or icterus, trachea central,no pallor or icterus is noted..   NECK: No JVD. No bruit..   THYROID: Thyroid not enlarged. No nodules present..   CARDIAC: Regular rate and rhythm. S1 is normal.S2 is normal.No gallops, clicks or murmurs noted at this time.  CHEST ANATOMY: normal.   LUNGS: Clear to auscultation. No wheezing or rhonchi..   ABDOMEN: Soft no masses or organomegaly.  No abdomen pulsations or bruits.  Normal bowel sounds. No pulsations and no masses felt, No guarding or rebound.   URINARY: No shah catheter   EXTREMITIES: No cyanosis, clubbing or edema noted at this time., no calf tenderness bilaterally.   PERIPHERAL VASCULAR SYSTEM: Good palpable distal pulses.   CENTRAL NERVOUS SYSTEM: No focal motor or sensory deficits noted.   SKIN: Skin without lesions, moist, well perfused.   MUSCLE STRENGTH & TONE: No noteable weakness, atrophy or abnormal movement.      HOME MEDICATIONS:  No current facility-administered medications on file prior to encounter.         Laboratory:  Laboratory Data Reviewed: yes    Most Recent Data:  CBC:   Lab Results   Component Value Date    WBC 10.89 2022    HGB 11.6 (L) 2022    HCT 37.1 2022      06/07/2022    MCV 88 06/07/2022    RDW 15.7 (H) 06/07/2022     BMP:   Lab Results   Component Value Date     (L) 06/07/2022    K 4.0 06/07/2022    CL 91 (L) 06/07/2022    CO2 31 (H) 06/07/2022    BUN 11 06/07/2022     (H) 06/07/2022    CALCIUM 9.0 06/07/2022    MG 2.0 07/23/2020     LFTs:   Lab Results   Component Value Date    PROT 8.0 06/07/2022    ALBUMIN 3.4 (L) 06/07/2022    BILITOT 0.8 06/07/2022    AST 34 06/07/2022    ALKPHOS 238 (H) 06/07/2022    ALT 37 06/07/2022     Coags: No results found for: INR, PROTIME, PTT  FLP: No results found for: CHOL, HDL, LDLCALC, TRIG, CHOLHDL  DM:   Lab Results   Component Value Date    HGBA1C 7.0 (H) 06/07/2022    HGBA1C 7.9 (H) 07/21/2020    CREATININE 0.6 06/07/2022     Thyroid: No results found for: TSH, FREET4, Q6YWNTC, U6VDPPT, THYROIDAB  Anemia: No results found for: IRON, TIBC, FERRITIN, [id number], FOLATE  Cardiac:   Lab Results   Component Value Date    TROPONINI <0.030 06/07/2022    BNP 70 06/07/2022     Urinalysis:   Lab Results   Component Value Date    COLORU Yellow 06/08/2022    SPECGRAV >1.030 (A) 06/08/2022    NITRITE Negative 06/08/2022    KETONESU 2+ (A) 06/08/2022    UROBILINOGEN Negative 06/08/2022    WBCUA 5 06/08/2022        Radiology:  Data Reviewed: yes  XR CHEST AP PORTABLE  US LOWER EXTREMITY VEINS BILATERAL  NM MYOCARDIAL PERFUSION SPECT MULTI PHARM  US ABDOMEN LIMITED      Current Medications:     Infusions:       Scheduled:   albuterol-ipratropium  3 mL Nebulization Q6H    amLODIPine  5 mg Oral Daily    aspirin  325 mg Oral Daily    atorvastatin  80 mg Oral QHS    chlorzoxazone  500 mg Oral BID    clopidogreL  75 mg Oral QHS    sars-cov-2 (covid-19)  0.3 mL Intramuscular ED 1 Time    enoxaparin  40 mg Subcutaneous Daily    gabapentin  600 mg Oral TID    guaiFENesin  600 mg Oral BID    levothyroxine  150 mcg Oral Before breakfast    pantoprazole  40 mg Oral BID AC    predniSONE  40 mg Oral Daily    propranoloL  160 mg  Oral Daily        PRN:  dextrose 50%, dextrose 50%, glucagon (human recombinant), glucose, glucose, influenza, insulin aspart U-100, melatonin, nitroGLYCERIN, sodium chloride 0.9%    Lines and Day Number of Therapy:      Microbiology Data:  Reviewed: yes  Microbiology Results (last 7 days)     ** No results found for the last 168 hours. **           Antibiotics and Day Number of Therapy:  Antibiotics (From admission, onward)            None         Antivirals (From admission, onward)    None             Assessment/Plan:     Karo Leonard is a 62 y.o.female with    Biliary colic   Atypical chest pain   Bronchitis      3.  History of coronary artery disease status post cardiac stent to her circumflex in 2017  -had a 1st diagonal branch with 50% stenosis in the right coronary with a 40% lesion ejection fraction was within normal limits seen by Dr. Lin  -consult Cardiology     4.  Obesity-BMI 38.27  -follow-up PCP with discharge for weight reduction weight modification     5.  Tobacco use  -cessation discussed and encouraged  -patient states she stopped smoking cigarettes about 4 weeks ago     6.  Type 2 diabetes  -increase NovoLog insulin sliding scale  -sliding scale protocol  -diabetic cardiac diet once eating  -hold metformin     7.  Hypothyroidism  -check TSH level  -continue levothyroxine.      Discussed case with General surgery  Plan for outpatient cholecystectomy after holding Plavix and discharged with antibiotics in the interim   Home O2 eval and Pulmonary follow-up as outpatient for suspected COPD    Vincent Camacho  [facility] Medicine

## 2022-06-10 NOTE — PROGRESS NOTES
Spoke with patient  She is tolerating a diet  Pain is minimal  She is very anxious and wants her gb out now  I think if she was having an acute flair and unable to even eat with extreme right upper quadrant tenderness I could justify going to surgery while on plavix.  Given she has had multiple episodes in the past and is currently recovering I think the safest course of action is to hold her plavix and plan for outpatient surgery next week.    I have asked Dr. Lim to do her surgery next week, he will schedule her.  She can return if she develops another severe episode prior to this.  She should go home with antibiotics.

## 2022-06-10 NOTE — CARE UPDATE
06/09/22 2022   Patient Assessment/Suction   Level of Consciousness (AVPU) alert   Respiratory Effort Normal;Unlabored   Expansion/Accessory Muscles/Retractions no use of accessory muscles   All Lung Fields Breath Sounds Anterior:;clear;diminished   Rhythm/Pattern, Respiratory unlabored   Cough Frequency infrequent   PRE-TX-O2   O2 Device (Oxygen Therapy) Oxymask   $ Is the patient on Low Flow Oxygen? Yes   Flow (L/min) 3   SpO2 99 %   Pulse Oximetry Type Intermittent   $ Pulse Oximetry - Multiple Charge Pulse Oximetry - Multiple   Pulse 62   Resp 18   Aerosol Therapy   $ Aerosol Therapy Charges Aerosol Treatment   Daily Review of Necessity (SVN) completed   Respiratory Treatment Status (SVN) given   Treatment Route (SVN) mask;oxygen   Patient Position (SVN) HOB elevated   Post Treatment Assessment (SVN) breath sounds unchanged   Signs of Intolerance (SVN) none   Breath Sounds Post-Respiratory Treatment   Post-treatment Heart Rate (beats/min) 72   Post-treatment Resp Rate (breaths/min) 18   Education   $ Education Bronchodilator;15 min   Respiratory Evaluation   $ Care Plan Tech Time 15 min

## 2022-06-10 NOTE — PLAN OF CARE
06/10/22 0734   Patient Assessment/Suction   Level of Consciousness (AVPU) alert   Respiratory Effort Unlabored   All Lung Fields Breath Sounds clear;diminished   Cough Type nonproductive   PRE-TX-O2   O2 Device (Oxygen Therapy) Oxymask   $ Is the patient on Low Flow Oxygen? Yes   Flow (L/min) 3  (decreased to 2LPM at this time)   SpO2 99 %   Pulse Oximetry Type Intermittent   $ Pulse Oximetry - Multiple Charge Pulse Oximetry - Multiple   Pulse (!) 57   Resp 18   Aerosol Therapy   $ Aerosol Therapy Charges Aerosol Treatment   Respiratory Treatment Status (SVN) given   Treatment Route (SVN) mask   Patient Position (SVN) HOB elevated   Post Treatment Assessment (SVN) increased aeration   Signs of Intolerance (SVN) none   Breath Sounds Post-Respiratory Treatment   Post-treatment Heart Rate (beats/min) 62   Post-treatment Resp Rate (breaths/min) 18   Respiratory Evaluation   $ Care Plan Tech Time 15 min

## 2022-06-10 NOTE — CONSULTS
CM acknowledges consult. But need more information. Unable to arrange Home Oxygen without walk test.   Need order for HH.

## 2022-06-11 LAB
GLUCOSE SERPL-MCNC: 235 MG/DL (ref 70–110)
GLUCOSE SERPL-MCNC: 241 MG/DL (ref 70–110)
GLUCOSE SERPL-MCNC: 333 MG/DL (ref 70–110)
GLUCOSE SERPL-MCNC: 426 MG/DL (ref 70–110)

## 2022-06-11 PROCEDURE — 27000221 HC OXYGEN, UP TO 24 HOURS

## 2022-06-11 PROCEDURE — 25000003 PHARM REV CODE 250: Performed by: INTERNAL MEDICINE

## 2022-06-11 PROCEDURE — 82962 GLUCOSE BLOOD TEST: CPT

## 2022-06-11 PROCEDURE — 25000242 PHARM REV CODE 250 ALT 637 W/ HCPCS: Performed by: NURSE PRACTITIONER

## 2022-06-11 PROCEDURE — 63600175 PHARM REV CODE 636 W HCPCS: Performed by: NURSE PRACTITIONER

## 2022-06-11 PROCEDURE — 94760 N-INVAS EAR/PLS OXIMETRY 1: CPT

## 2022-06-11 PROCEDURE — 99900035 HC TECH TIME PER 15 MIN (STAT)

## 2022-06-11 PROCEDURE — 94761 N-INVAS EAR/PLS OXIMETRY MLT: CPT

## 2022-06-11 PROCEDURE — 94640 AIRWAY INHALATION TREATMENT: CPT

## 2022-06-11 PROCEDURE — 25000003 PHARM REV CODE 250: Performed by: NURSE PRACTITIONER

## 2022-06-11 PROCEDURE — 94799 UNLISTED PULMONARY SVC/PX: CPT

## 2022-06-11 PROCEDURE — 96372 THER/PROPH/DIAG INJ SC/IM: CPT | Performed by: NURSE PRACTITIONER

## 2022-06-11 PROCEDURE — G0378 HOSPITAL OBSERVATION PER HR: HCPCS

## 2022-06-11 PROCEDURE — 99900031 HC PATIENT EDUCATION (STAT)

## 2022-06-11 RX ORDER — LEVOFLOXACIN 500 MG/1
500 TABLET, FILM COATED ORAL DAILY
Status: DISCONTINUED | OUTPATIENT
Start: 2022-06-11 | End: 2022-06-12 | Stop reason: HOSPADM

## 2022-06-11 RX ORDER — CIPROFLOXACIN 500 MG/1
500 TABLET ORAL EVERY 12 HOURS
Status: DISCONTINUED | OUTPATIENT
Start: 2022-06-11 | End: 2022-06-11

## 2022-06-11 RX ADMIN — GABAPENTIN 600 MG: 300 CAPSULE ORAL at 08:06

## 2022-06-11 RX ADMIN — ENOXAPARIN SODIUM 40 MG: 40 INJECTION SUBCUTANEOUS at 05:06

## 2022-06-11 RX ADMIN — GABAPENTIN 600 MG: 300 CAPSULE ORAL at 05:06

## 2022-06-11 RX ADMIN — LEVOFLOXACIN 500 MG: 500 TABLET, FILM COATED ORAL at 12:06

## 2022-06-11 RX ADMIN — CHLORZOXAZONE 500 MG: 500 TABLET ORAL at 08:06

## 2022-06-11 RX ADMIN — INSULIN ASPART 6 UNITS: 100 INJECTION, SOLUTION INTRAVENOUS; SUBCUTANEOUS at 08:06

## 2022-06-11 RX ADMIN — GUAIFENESIN 600 MG: 600 TABLET, EXTENDED RELEASE ORAL at 08:06

## 2022-06-11 RX ADMIN — IPRATROPIUM BROMIDE AND ALBUTEROL SULFATE 3 ML: .5; 3 SOLUTION RESPIRATORY (INHALATION) at 08:06

## 2022-06-11 RX ADMIN — LEVOTHYROXINE SODIUM 150 MCG: 0.1 TABLET ORAL at 05:06

## 2022-06-11 RX ADMIN — INSULIN ASPART 4 UNITS: 100 INJECTION, SOLUTION INTRAVENOUS; SUBCUTANEOUS at 09:06

## 2022-06-11 RX ADMIN — PANTOPRAZOLE SODIUM 40 MG: 40 TABLET, DELAYED RELEASE ORAL at 05:06

## 2022-06-11 RX ADMIN — IPRATROPIUM BROMIDE AND ALBUTEROL SULFATE 3 ML: .5; 3 SOLUTION RESPIRATORY (INHALATION) at 12:06

## 2022-06-11 RX ADMIN — AMLODIPINE BESYLATE 5 MG: 5 TABLET ORAL at 08:06

## 2022-06-11 RX ADMIN — IPRATROPIUM BROMIDE AND ALBUTEROL SULFATE 3 ML: .5; 3 SOLUTION RESPIRATORY (INHALATION) at 01:06

## 2022-06-11 RX ADMIN — ASPIRIN 325 MG: 325 TABLET, COATED ORAL at 08:06

## 2022-06-11 RX ADMIN — IPRATROPIUM BROMIDE AND ALBUTEROL SULFATE 3 ML: .5; 3 SOLUTION RESPIRATORY (INHALATION) at 07:06

## 2022-06-11 RX ADMIN — ATORVASTATIN CALCIUM 80 MG: 40 TABLET, FILM COATED ORAL at 08:06

## 2022-06-11 RX ADMIN — PREDNISONE 40 MG: 20 TABLET ORAL at 08:06

## 2022-06-11 RX ADMIN — PROPRANOLOL HYDROCHLORIDE 160 MG: 80 CAPSULE, EXTENDED RELEASE ORAL at 08:06

## 2022-06-11 RX ADMIN — INSULIN ASPART 10 UNITS: 100 INJECTION, SOLUTION INTRAVENOUS; SUBCUTANEOUS at 03:06

## 2022-06-11 NOTE — PLAN OF CARE
06/10/22 1957   Patient Assessment/Suction   Level of Consciousness (AVPU) alert   Respiratory Effort Unlabored   Expansion/Accessory Muscles/Retractions expansion symmetric   All Lung Fields Breath Sounds coarse;diminished   Rhythm/Pattern, Respiratory depth regular;pattern regular   Cough Frequency infrequent   Cough Type good;nonproductive   PRE-TX-O2   O2 Device (Oxygen Therapy) Oxymask   $ Is the patient on Low Flow Oxygen? Yes   Flow (L/min) 2.5   SpO2 97 %   Pulse Oximetry Type Intermittent   $ Pulse Oximetry - Single Charge Pulse Oximetry - Single   Pulse 66   Resp 16   Aerosol Therapy   $ Aerosol Therapy Charges Aerosol Treatment   Daily Review of Necessity (SVN) completed   Respiratory Treatment Status (SVN) given   Treatment Route (SVN) mask   Patient Position (SVN) semi-Brand's   Post Treatment Assessment (SVN) increased aeration   Signs of Intolerance (SVN) none   Breath Sounds Post-Respiratory Treatment   Throughout All Fields Post-Treatment All Fields   Throughout All Fields Post-Treatment no change   Post-treatment Heart Rate (beats/min) 68   Post-treatment Resp Rate (breaths/min) 18

## 2022-06-11 NOTE — PLAN OF CARE
06/11/22 0816   Patient Assessment/Suction   Level of Consciousness (AVPU) alert   Respiratory Effort Unlabored;Normal   Expansion/Accessory Muscles/Retractions no use of accessory muscles   All Lung Fields Breath Sounds clear;diminished   HARRISON Breath Sounds clear   LLL Breath Sounds diminished   RUL Breath Sounds clear   RML Breath Sounds clear   RLL Breath Sounds diminished   Rhythm/Pattern, Respiratory depth regular;pattern regular   Cough Frequency infrequent   Cough Type good;nonproductive   PRE-TX-O2   O2 Device (Oxygen Therapy) Oxymask   $ Is the patient on Low Flow Oxygen? Yes   Flow (L/min) 2   SpO2 96 %   Pulse Oximetry Type Intermittent   $ Pulse Oximetry - Multiple Charge Pulse Oximetry - Multiple   Pulse 64   Resp 18   Aerosol Therapy   $ Aerosol Therapy Charges Aerosol Treatment   Daily Review of Necessity (SVN) completed   Respiratory Treatment Status (SVN) given   Treatment Route (SVN) mask   Patient Position (SVN) Brand's   Post Treatment Assessment (SVN) breath sounds improved   Signs of Intolerance (SVN) none   Breath Sounds Post-Respiratory Treatment   Throughout All Fields Post-Treatment All Fields   Throughout All Fields Post-Treatment aeration increased   Post-treatment Heart Rate (beats/min) 66   Post-treatment Resp Rate (breaths/min) 18   Education   $ Education Bronchodilator;15 min   Respiratory Evaluation   $ Care Plan Tech Time 15 min   $ Eval/Re-eval Charges Re-evaluation   Evaluation For Re-Eval 3 day

## 2022-06-11 NOTE — PROGRESS NOTES
[facility] Medicine Progress Note    Subjective:   6/10:  She denies any pain today.  Tolerating p.o. without issues    :   No major issues overnight. VSS    Objective:   Last 24 Hour Vital Signs:  BP  Min: 114/66  Max: 164/104  Temp  Av.7 °F (36.5 °C)  Min: 97.5 °F (36.4 °C)  Max: 97.9 °F (36.6 °C)  Pulse  Av.5  Min: 60  Max: 70  Resp  Av  Min: 14  Max: 22  SpO2  Av.1 %  Min: 93 %  Max: 98 %  I/O last 3 completed shifts:  In: 970 [P.O.:960; I.V.:10]  Out: 2 [Urine:2]  PHYSICAL EXAM  GENERAL: well built, well nourished, well-developed in no apparent distress alert and oriented.   HEENT: Normocephalic. Pupils normal and conjunctivae normal.  Mucous membranes normal, no cyanosis or icterus, trachea central,no pallor or icterus is noted..   NECK: No JVD. No bruit..   THYROID: Thyroid not enlarged. No nodules present..   CARDIAC: Regular rate and rhythm. S1 is normal.S2 is normal.No gallops, clicks or murmurs noted at this time.  CHEST ANATOMY: normal.   LUNGS: Clear to auscultation. No wheezing or rhonchi..   ABDOMEN: Soft no masses or organomegaly.  No abdomen pulsations or bruits.  Normal bowel sounds. No pulsations and no masses felt, No guarding or rebound.   URINARY: No shah catheter   EXTREMITIES: No cyanosis, clubbing or edema noted at this time., no calf tenderness bilaterally.   PERIPHERAL VASCULAR SYSTEM: Good palpable distal pulses.   CENTRAL NERVOUS SYSTEM: No focal motor or sensory deficits noted.   SKIN: Skin without lesions, moist, well perfused.   MUSCLE STRENGTH & TONE: No noteable weakness, atrophy or abnormal movement.      HOME MEDICATIONS:  No current facility-administered medications on file prior to encounter.         Laboratory:  Laboratory Data Reviewed: yes    Most Recent Data:  CBC:   Lab Results   Component Value Date    WBC 10.89 2022    HGB 11.6 (L) 2022    HCT 37.1 2022     2022    MCV 88 2022    RDW 15.7 (H) 2022      BMP:   Lab Results   Component Value Date     (L) 06/07/2022    K 4.0 06/07/2022    CL 91 (L) 06/07/2022    CO2 31 (H) 06/07/2022    BUN 11 06/07/2022     (H) 06/07/2022    CALCIUM 9.0 06/07/2022    MG 2.0 07/23/2020     LFTs:   Lab Results   Component Value Date    PROT 8.0 06/07/2022    ALBUMIN 3.4 (L) 06/07/2022    BILITOT 0.8 06/07/2022    AST 34 06/07/2022    ALKPHOS 238 (H) 06/07/2022    ALT 37 06/07/2022     Coags: No results found for: INR, PROTIME, PTT  FLP: No results found for: CHOL, HDL, LDLCALC, TRIG, CHOLHDL  DM:   Lab Results   Component Value Date    HGBA1C 7.0 (H) 06/07/2022    HGBA1C 7.9 (H) 07/21/2020    CREATININE 0.6 06/07/2022     Thyroid: No results found for: TSH, FREET4, E9OYPWU, C6GRBKL, THYROIDAB  Anemia: No results found for: IRON, TIBC, FERRITIN, JEIKJDIA63, FOLATE  Cardiac:   Lab Results   Component Value Date    TROPONINI <0.030 06/07/2022    BNP 70 06/07/2022     Urinalysis:   Lab Results   Component Value Date    COLORU Yellow 06/08/2022    SPECGRAV >1.030 (A) 06/08/2022    NITRITE Negative 06/08/2022    KETONESU 2+ (A) 06/08/2022    UROBILINOGEN Negative 06/08/2022    WBCUA 5 06/08/2022        Radiology:  Data Reviewed: yes  XR CHEST AP PORTABLE  US LOWER EXTREMITY VEINS BILATERAL  NM MYOCARDIAL PERFUSION SPECT MULTI PHARM  US ABDOMEN LIMITED      Current Medications:     Infusions:       Scheduled:   albuterol-ipratropium  3 mL Nebulization Q6H    amLODIPine  5 mg Oral Daily    aspirin  325 mg Oral Daily    atorvastatin  80 mg Oral QHS    chlorzoxazone  500 mg Oral BID    clopidogreL  75 mg Oral QHS    enoxaparin  40 mg Subcutaneous Daily    gabapentin  600 mg Oral TID    guaiFENesin  600 mg Oral BID    levoFLOXacin  500 mg Oral Daily    levothyroxine  150 mcg Oral Before breakfast    pantoprazole  40 mg Oral BID AC    predniSONE  40 mg Oral Daily    propranoloL  160 mg Oral Daily        PRN:  dextrose 50%, dextrose 50%, glucagon (human recombinant),  "glucose, glucose, influenza, insulin aspart U-100, melatonin, nitroGLYCERIN, sodium chloride 0.9%    Lines and Day Number of Therapy:      Microbiology Data:  Reviewed: yes  Microbiology Results (last 7 days)     ** No results found for the last 168 hours. **           Antibiotics and Day Number of Therapy:  Antibiotics (From admission, onward)            Start     Stop Route Frequency Ordered    06/11/22 1200  levoFLOXacin tablet 500 mg        Note to Pharmacy: Ht: 5' 5" (1.651 m)  Wt: 104.9 kg (231 lb 3.2 oz)  Estimated Creatinine Clearance: 113.9 mL/min (based on SCr of 0.6 mg/dL).  Body mass index is 38.47 kg/m².    -- Oral Daily 06/11/22 1125         Antivirals (From admission, onward)    None             Assessment/Plan:     Karo Leonard is a 62 y.o.female with    Biliary colic   Atypical chest pain   Bronchitis      3.  History of coronary artery disease status post cardiac stent to her circumflex in 2017  -had a 1st diagonal branch with 50% stenosis in the right coronary with a 40% lesion ejection fraction was within normal limits seen by Dr. Lin  -consult Cardiology     4.  Obesity-BMI 38.27  -follow-up PCP with discharge for weight reduction weight modification     5.  Tobacco use  -cessation discussed and encouraged  -patient states she stopped smoking cigarettes about 4 weeks ago     6.  Type 2 diabetes  -increase NovoLog insulin sliding scale  -sliding scale protocol  -diabetic cardiac diet once eating  -hold metformin     7.  Hypothyroidism  -check TSH level  -continue levothyroxine.      Discussed case with General surgery  Plan for outpatient cholecystectomy after holding Plavix and discharged with antibiotics in the interim   Home O2 eval and Pulmonary follow-up as outpatient for suspected COPD  Home when able to go with O2    [facility] Medicine    "

## 2022-06-11 NOTE — CARE UPDATE
06/11/22 1115   Home Oxygen Qualification   $ Home O2 Qualification Tech time 15 minutes   Room Air SpO2 At Rest (!) 87 %   Room Air SpO2 During Ambulation (!) 87 %   SpO2 During Ambulation on O2 94 %   Heart Rate on O2 68 bpm   Ambulation O2 LPM 2 LPM   SpO2 Post Ambulation 96 %   Post Ambulation Heart Rate 64 bpm   Post Ambulation O2 LPM 2 LPM   Home O2 Eval Comments Patient requires nasal cannula at 2 liters per minute while walking to maintain SpO2 greater than or equal to 92%

## 2022-06-11 NOTE — NURSING
S/s insulin coverage increased from low to moderate at dinner time. Covid vaccine and flu vaccine ordered per pt request. Home 02 eval ordered for am. Shift uneventful.

## 2022-06-12 VITALS
BODY MASS INDEX: 37.28 KG/M2 | WEIGHT: 223.75 LBS | HEIGHT: 65 IN | SYSTOLIC BLOOD PRESSURE: 146 MMHG | RESPIRATION RATE: 18 BRPM | TEMPERATURE: 98 F | DIASTOLIC BLOOD PRESSURE: 71 MMHG | HEART RATE: 97 BPM | OXYGEN SATURATION: 97 %

## 2022-06-12 DIAGNOSIS — K80.10 CHRONIC CALCULOUS CHOLECYSTITIS: Primary | ICD-10-CM

## 2022-06-12 PROBLEM — J44.1 COPD EXACERBATION: Status: RESOLVED | Noted: 2022-06-07 | Resolved: 2022-06-12

## 2022-06-12 PROBLEM — R07.9 CHEST PAIN: Status: RESOLVED | Noted: 2020-07-21 | Resolved: 2022-06-12

## 2022-06-12 LAB
GLUCOSE SERPL-MCNC: 209 MG/DL (ref 70–110)
GLUCOSE SERPL-MCNC: 264 MG/DL (ref 70–110)

## 2022-06-12 PROCEDURE — 27000221 HC OXYGEN, UP TO 24 HOURS

## 2022-06-12 PROCEDURE — 63600175 PHARM REV CODE 636 W HCPCS: Performed by: NURSE PRACTITIONER

## 2022-06-12 PROCEDURE — 94761 N-INVAS EAR/PLS OXIMETRY MLT: CPT

## 2022-06-12 PROCEDURE — G0378 HOSPITAL OBSERVATION PER HR: HCPCS

## 2022-06-12 PROCEDURE — 25000003 PHARM REV CODE 250: Performed by: NURSE PRACTITIONER

## 2022-06-12 PROCEDURE — 25000003 PHARM REV CODE 250: Performed by: INTERNAL MEDICINE

## 2022-06-12 PROCEDURE — 94640 AIRWAY INHALATION TREATMENT: CPT

## 2022-06-12 PROCEDURE — 94760 N-INVAS EAR/PLS OXIMETRY 1: CPT

## 2022-06-12 PROCEDURE — 25000242 PHARM REV CODE 250 ALT 637 W/ HCPCS: Performed by: NURSE PRACTITIONER

## 2022-06-12 RX ORDER — IPRATROPIUM BROMIDE AND ALBUTEROL SULFATE 2.5; .5 MG/3ML; MG/3ML
3 SOLUTION RESPIRATORY (INHALATION) EVERY 6 HOURS PRN
Qty: 75 ML | Refills: 3 | Status: SHIPPED | OUTPATIENT
Start: 2022-06-12 | End: 2023-06-12

## 2022-06-12 RX ORDER — FLUCONAZOLE 150 MG/1
150 TABLET ORAL EVERY OTHER DAY
Qty: 2 TABLET | Refills: 0 | Status: SHIPPED | OUTPATIENT
Start: 2022-06-13

## 2022-06-12 RX ORDER — CLOPIDOGREL BISULFATE 75 MG/1
75 TABLET ORAL DAILY
Start: 2022-06-12

## 2022-06-12 RX ORDER — METRONIDAZOLE 500 MG/100ML
500 INJECTION, SOLUTION INTRAVENOUS
Status: CANCELLED | OUTPATIENT
Start: 2022-06-12

## 2022-06-12 RX ORDER — CIPROFLOXACIN 2 MG/ML
400 INJECTION, SOLUTION INTRAVENOUS
Status: CANCELLED | OUTPATIENT
Start: 2022-06-12

## 2022-06-12 RX ORDER — CIPROFLOXACIN 500 MG/1
500 TABLET ORAL EVERY 12 HOURS
Qty: 16 TABLET | Refills: 0 | Status: SHIPPED | OUTPATIENT
Start: 2022-06-12

## 2022-06-12 RX ADMIN — IPRATROPIUM BROMIDE AND ALBUTEROL SULFATE 3 ML: .5; 3 SOLUTION RESPIRATORY (INHALATION) at 12:06

## 2022-06-12 RX ADMIN — CHLORZOXAZONE 500 MG: 500 TABLET ORAL at 09:06

## 2022-06-12 RX ADMIN — PROPRANOLOL HYDROCHLORIDE 160 MG: 80 CAPSULE, EXTENDED RELEASE ORAL at 09:06

## 2022-06-12 RX ADMIN — GABAPENTIN 600 MG: 300 CAPSULE ORAL at 09:06

## 2022-06-12 RX ADMIN — PANTOPRAZOLE SODIUM 40 MG: 40 TABLET, DELAYED RELEASE ORAL at 06:06

## 2022-06-12 RX ADMIN — IPRATROPIUM BROMIDE AND ALBUTEROL SULFATE 3 ML: .5; 3 SOLUTION RESPIRATORY (INHALATION) at 07:06

## 2022-06-12 RX ADMIN — AMLODIPINE BESYLATE 5 MG: 5 TABLET ORAL at 09:06

## 2022-06-12 RX ADMIN — GUAIFENESIN 600 MG: 600 TABLET, EXTENDED RELEASE ORAL at 09:06

## 2022-06-12 RX ADMIN — PREDNISONE 40 MG: 20 TABLET ORAL at 09:06

## 2022-06-12 RX ADMIN — LEVOTHYROXINE SODIUM 150 MCG: 0.1 TABLET ORAL at 06:06

## 2022-06-12 RX ADMIN — LEVOFLOXACIN 500 MG: 500 TABLET, FILM COATED ORAL at 06:06

## 2022-06-12 RX ADMIN — ASPIRIN 325 MG: 325 TABLET, COATED ORAL at 09:06

## 2022-06-12 NOTE — PLAN OF CARE
06/11/22 1928   Patient Assessment/Suction   Level of Consciousness (AVPU) alert   Respiratory Effort Unlabored   Expansion/Accessory Muscles/Retractions expansion symmetric   All Lung Fields Breath Sounds coarse   Rhythm/Pattern, Respiratory depth regular;pattern regular   Cough Frequency infrequent   Cough Type good;nonproductive   PRE-TX-O2   O2 Device (Oxygen Therapy) Oxymask   $ Is the patient on Low Flow Oxygen? Yes   Oxygen Concentration (%) 1.5   SpO2 97 %   Pulse Oximetry Type Intermittent   $ Pulse Oximetry - Multiple Charge Pulse Oximetry - Multiple   Pulse 73   Resp 16   Temp 98.1 °F (36.7 °C)   BP (!) 125/59   Aerosol Therapy   $ Aerosol Therapy Charges Aerosol Treatment   Daily Review of Necessity (SVN) completed   Respiratory Treatment Status (SVN) given   Treatment Route (SVN) mask   Patient Position (SVN) semi-Brand's   Post Treatment Assessment (SVN) breath sounds unchanged   Signs of Intolerance (SVN) none   Breath Sounds Post-Respiratory Treatment   Throughout All Fields Post-Treatment All Fields   Throughout All Fields Post-Treatment aeration increased   Post-treatment Heart Rate (beats/min) 68   Post-treatment Resp Rate (breaths/min) 16   Ready to Wean/Extubation Screen   FIO2<=50 (chart decimal) 0.02

## 2022-06-12 NOTE — PLAN OF CARE
Problem: Adult Inpatient Plan of Care  Goal: Plan of Care Review  Outcome: Ongoing, Progressing  Goal: Patient-Specific Goal (Individualized)  Outcome: Ongoing, Progressing  Goal: Absence of Hospital-Acquired Illness or Injury  Outcome: Ongoing, Progressing  Goal: Optimal Comfort and Wellbeing  Outcome: Ongoing, Progressing  Goal: Readiness for Transition of Care  Outcome: Ongoing, Progressing     Problem: Diabetes Comorbidity  Goal: Blood Glucose Level Within Targeted Range  Outcome: Ongoing, Progressing     Problem: Fall Injury Risk  Goal: Absence of Fall and Fall-Related Injury  Outcome: Ongoing, Progressing     Problem: Adjustment to Illness COPD (Chronic Obstructive Pulmonary Disease)  Goal: Optimal Chronic Illness Coping  Outcome: Ongoing, Progressing     Problem: Functional Ability Impaired COPD (Chronic Obstructive Pulmonary Disease)  Goal: Optimal Level of Functional [place]  Outcome: Ongoing, Progressing     Problem: Infection COPD (Chronic Obstructive Pulmonary Disease)  Goal: Absence of Infection Signs and Symptoms  Outcome: Ongoing, Progressing     Problem: Oral Intake Inadequate COPD (Chronic Obstructive Pulmonary Disease)  Goal: Improved Nutrition Intake  Outcome: Ongoing, Progressing     Problem: Respiratory Compromise COPD (Chronic Obstructive Pulmonary Disease)  Goal: Effective Oxygenation and Ventilation  Outcome: Ongoing, Progressing

## 2022-06-12 NOTE — CARE UPDATE
Continue therapy   06/12/22 0730   Patient Assessment/Suction   Level of Consciousness (AVPU) alert   Respiratory Effort Normal;Unlabored   Expansion/Accessory Muscles/Retractions no use of accessory muscles;expansion symmetric   All Lung Fields Breath Sounds clear;diminished   HARRISON Breath Sounds clear   LLL Breath Sounds diminished   RUL Breath Sounds clear   RML Breath Sounds clear   RLL Breath Sounds diminished   Rhythm/Pattern, Respiratory unlabored;depth regular   Cough Frequency infrequent   Cough Type nonproductive;good   PRE-TX-O2   O2 Device (Oxygen Therapy) Oxymask   Flow (L/min) 1.5   SpO2 97 %   Pulse Oximetry Type Intermittent   $ Pulse Oximetry - Multiple Charge Pulse Oximetry - Multiple   Pulse 97   Resp 18   Aerosol Therapy   $ Aerosol Therapy Charges Aerosol Treatment   Daily Review of Necessity (SVN) completed   Respiratory Treatment Status (SVN) given   Treatment Route (SVN) mask;oxygen   Patient Position (SVN) semi-Brand's   Post Treatment Assessment (SVN) breath sounds unchanged   Signs of Intolerance (SVN) none   Breath Sounds Post-Respiratory Treatment   Throughout All Fields Post-Treatment All Fields   Throughout All Fields Post-Treatment no change   Post-treatment Heart Rate (beats/min) 88   Post-treatment Resp Rate (breaths/min) 18

## 2022-06-12 NOTE — PLAN OF CARE
06/12/22 1151   Final Note   Assessment Type Final Discharge Note   Anticipated Discharge Disposition Home   What phone number can be called within the next 1-3 days to see how you are doing after discharge? [phone]   Post-Acute Status   Post-Acute Authorization HME   HME Status Set-up Complete/Auth obtained   Discharge Delays None known at this time     Pt is discharging home with family. This SW got permission to pull home O2 and a home nebulizer kit from the [facility] closet and both were delivered to pt's bedside.     Pt has no further CM needs and is clear to discharge from a CM standpoint.

## 2022-06-12 NOTE — PLAN OF CARE
Problem: Adult Inpatient Plan of Care  Goal: Plan of Care Review  Outcome: Met  Goal: Patient-Specific Goal (Individualized)  Outcome: Met  Goal: Absence of Hospital-Acquired Illness or Injury  Outcome: Met  Goal: Optimal Comfort and Wellbeing  Outcome: Met  Goal: Readiness for Transition of Care  Outcome: Met     Problem: Diabetes Comorbidity  Goal: Blood Glucose Level Within Targeted Range  Outcome: Met     Problem: Fall Injury Risk  Goal: Absence of Fall and Fall-Related Injury  Outcome: Met     Problem: Adjustment to Illness COPD (Chronic Obstructive Pulmonary Disease)  Goal: Optimal Chronic Illness Coping  Outcome: Met     Problem: Functional Ability Impaired COPD (Chronic Obstructive Pulmonary Disease)  Goal: Optimal Level of Functional [place]  Outcome: Met     Problem: Infection COPD (Chronic Obstructive Pulmonary Disease)  Goal: Absence of Infection Signs and Symptoms  Outcome: Met     Problem: Oral Intake Inadequate COPD (Chronic Obstructive Pulmonary Disease)  Goal: Improved Nutrition Intake  Outcome: Met     Problem: Respiratory Compromise COPD (Chronic Obstructive Pulmonary Disease)  Goal: Effective Oxygenation and Ventilation  Outcome: Met

## 2022-06-12 NOTE — DISCHARGE SUMMARY
[facility] Hospital Medicine Discharge Summary    Date of Admit: 6/7/2022  Date of Discharge: 6/12/2022    Discharge to: Home or Self Care  Condition: good    Discharge Diagnoses     Problem Noted   Calculus of Gallbladder With Chronic Cholecystitis Without Obstruction 6/9/2022   Physical Debility 7/23/2020   Diabetes Type 2, Uncontrolled 7/22/2020   Hypothyroid 8/13/2013   Hypertension 8/13/2013   Copd Exacerbation (Resolved) 6/7/2022   Chest Pain (Resolved) 7/21/2020        Patient Active Problem List   Diagnosis    Hypothyroid    Hypertension    Hidradenitis suppurativa    Pain, chronic    BMI 39.0-39.9,adult    Diabetes type 2, uncontrolled    Atherosclerosis of native coronary artery of native heart with unstable angina pectoris    Hypoxemia    Physical debility    Calculus of gallbladder with chronic cholecystitis without obstruction        Brief History of Present Illness      Karo Leonard is a 62 y.o. female who  has a past medical history of Coronary artery disease, Diabetes mellitus, and Hypertension.  The patient presented to [facility] on 6/7/2022 with a primary complaint of Shortness of Breath  .       For the full HPI please refer to the History & Physical from this admission.    Hospital Course     Admitted with atypical chest pain and biliary colic. Underwent pharmacologic stress testing with no evidence for reversible ischemia.   Seen by general surgery and plans in place for CCY once off plavix for 5-7 days. Will be on abx in the interim.   D/c with resumed home O2 orders for COPD - pulmonary follow up established for outpt PFTs and ongoing mgmt of her COPD.   Advised her to return to the ED with any new, worsening, or recurrent symptoms.     Physical exam     Physical Exam  Vitals reviewed.   Constitutional:       Appearance: Normal appearance.   HENT:      Head: Normocephalic and atraumatic.      Mouth/Throat:      Pharynx: Oropharynx is clear.   Eyes:      General: No scleral icterus.      Pupils: Pupils are equal, round, and reactive to light.   Cardiovascular:      Rate and Rhythm: Normal rate and regular rhythm.   Pulmonary:      Effort: Pulmonary effort is normal.   Abdominal:      General: There is no distension.      Palpations: Abdomen is soft.      Tenderness: There is no guarding.   Musculoskeletal:      Cervical back: Normal range of motion.   Skin:     Capillary Refill: Capillary refill takes less than 2 seconds.   Neurological:      General: No focal deficit present.      Mental Status: She is alert and oriented to person, place, and time.           Discharge Medications        Medication List      START taking these medications    albuterol-ipratropium 2.5 mg-0.5 mg/3 mL nebulizer solution  Commonly known as: DUO-NEB  Take 3 mLs by nebulization every 6 (six) hours as needed for Wheezing or Shortness of Breath. Rescue     ciprofloxacin HCl 500 MG tablet  Commonly known as: CIPRO  Take 1 tablet (500 mg total) by mouth every 12 (twelve) hours.        CHANGE how you take these medications    fluconazole 150 MG Tab  Commonly known as: DIFLUCAN  Take 1 tablet (150 mg total) by mouth every other day.  Start taking on: June 13, 2022  What changed:   · how much to take  · when to take this     * levothyroxine 175 MCG tablet  Commonly known as: SYNTHROID, LEVOTHROID  Take 1 tablet (175 mcg total) by mouth once daily.  What changed: how much to take     * LevoxyL 150 MCG tablet  Generic drug: levothyroxine  What changed: Another medication with the same name was changed. Make sure you understand how and when to take each.     propranoloL 80 MG 24 hr capsule  Commonly known as: INDERAL [place]  What changed: Another medication with the same name was removed. Continue taking this medication, and follow the directions you see here.         * This list has 2 medication(s) that are the same as other medications prescribed for you. Read the directions carefully, and ask your doctor or other care provider to  review them with you.            CONTINUE taking these medications    amLODIPine 5 MG tablet  Commonly known as: NORVASC     aspirin 81 MG Chew     atorvastatin 80 MG tablet  Commonly known as: LIPITOR     chlorzoxazone 250 MG tablet  Commonly known as: PARAFON FORTE     clopidogreL 75 mg tablet  Commonly known as: PLAVIX  Take 1 tablet (75 mg total) by mouth once daily.     desonide 0.05 % cream  Commonly known as: DESOWEN     estradioL 0.01 % (0.1 mg/gram) vaginal cream  Commonly known as: ESTRACE     gabapentin 600 MG tablet  Commonly known as: NEURONTIN     HUMIRA(CF) 40 mg/0.4 mL Sykt  Generic drug: adalimumab     metFORMIN 850 MG tablet  Commonly known as: GLUCOPHAGE     pantoprazole 40 MG tablet  Commonly known as: PROTONIX  Take 1 tablet (40 mg total) by mouth 2 (two) times daily.        STOP taking these medications    doxycycline monohydrate 100 mg Tab     ipratropium 21 mcg (0.03 %) nasal spray  Commonly known as: ATROVENT           Where to Get Your Medications      These medications were sent to [facility]/pharmacy #[id number] - [place] - [address]  [address] [place] [address]    Hours: 24-hours Phone: [phone]   · albuterol-ipratropium 2.5 mg-0.5 mg/3 mL nebulizer solution  · ciprofloxacin HCl 500 MG tablet  · fluconazole 150 MG Tab     Information about where to get these medications is not yet available    Ask your nurse or doctor about these medications  · clopidogreL 75 mg tablet         Instructions:  1. Take all medications as prescribed  2. Keep all follow-up appointments  3. Return to the hospital or call your primary care physicians for any new, worsening, or recurrent symptoms.    Follow-Up:   Follow-up Information     [facility] Follow up.    Specialty: Family Medicine  Contact information:  [address] [facility] [address] [facility] [address] [phone]             Leonardo Wilson III, MD Follow up.    Specialties: General Surgery, Surgery  Contact information:  [address]  [address]  [place] [address]  [phone]             Heidi Luong MD Follow up.    Specialties: Pulmonary Disease, Sleep Medicine  Contact information:  [address]  [address]  [place] [id number] [phone]                       Time spent on discharge totalled 33 minutes       Vincent Camacho MD  10:59 AM  06/12/2022

## 2022-06-13 ENCOUNTER — TELEPHONE (OUTPATIENT)
Dept: SURGERY | Facility: CLINIC | Age: 62
End: 2022-06-13
Payer: COMMERCIAL

## 2022-06-13 NOTE — TELEPHONE ENCOUNTER
Spoke with patient informed message will be forwarded to  and Dr Wilson to review and advise. They will f/u with appt Date time . Understanding verbalized

## 2022-06-13 NOTE — TELEPHONE ENCOUNTER
----- Message from Lucy Chong sent at 6/13/2022  9:04 AM CDT -----  Contact: pt  Type: Needs Medical Advice    Who Called: pt  Best Call Back Number: [phone]  Inquiry/Question: pt seen in hospital over weekend advised to see provider today as will be having surgery sched for Thursday, please advise pt with schedule   Thank you~

## 2022-06-13 NOTE — TELEPHONE ENCOUNTER
----- Message from Lucy Chong sent at 6/13/2022  9:04 AM CDT -----  Contact: pt  Type: Needs Medical Advice    Who Called: pt  Best Call Back Number: [phone]  Inquiry/Question: pt seen in hospital over weekend advised to see provider today as will be having surgery sched for Thursday, please advise pt with schedule   Thank you~

## 2022-06-14 ENCOUNTER — OFFICE VISIT (OUTPATIENT)
Dept: SURGERY | Facility: CLINIC | Age: 62
End: 2022-06-14
Payer: COMMERCIAL

## 2022-06-14 ENCOUNTER — HOSPITAL ENCOUNTER (OUTPATIENT)
Dept: PREADMISSION TESTING | Facility: HOSPITAL | Age: 62
Discharge: HOME | End: 2022-06-14
Attending: SURGERY
Payer: COMMERCIAL

## 2022-06-14 VITALS
BODY MASS INDEX: 34.37 KG/M2 | SYSTOLIC BLOOD PRESSURE: 124 MMHG | HEIGHT: 66 IN | OXYGEN SATURATION: 93 % | WEIGHT: 213.88 LBS | TEMPERATURE: 98 F | RESPIRATION RATE: 18 BRPM | HEART RATE: 78 BPM | DIASTOLIC BLOOD PRESSURE: 80 MMHG

## 2022-06-14 VITALS
TEMPERATURE: 97 F | BODY MASS INDEX: 37.28 KG/M2 | WEIGHT: 223.75 LBS | HEIGHT: 65 IN | DIASTOLIC BLOOD PRESSURE: 64 MMHG | HEART RATE: 81 BPM | RESPIRATION RATE: 16 BRPM | SYSTOLIC BLOOD PRESSURE: 96 MMHG

## 2022-06-14 DIAGNOSIS — K80.10 CHRONIC CALCULOUS CHOLECYSTITIS: Primary | ICD-10-CM

## 2022-06-14 DIAGNOSIS — Z01.818 PRE-OP TESTING: Primary | ICD-10-CM

## 2022-06-14 LAB
ANION GAP SERPL CALC-SCNC: 7 MMOL/L (ref 8–16)
BUN SERPL-MCNC: 12 MG/DL (ref 8–23)
CALCIUM SERPL-MCNC: 8.7 MG/DL (ref 8.7–10.5)
CHLORIDE SERPL-SCNC: 92 MMOL/L (ref 95–110)
CO2 SERPL-SCNC: 32 MMOL/L (ref 23–29)
CREAT SERPL-MCNC: 0.8 MG/DL (ref 0.5–1.4)
EST. GFR  (AFRICAN AMERICAN): >60 ML/MIN/1.73 M^2
EST. GFR  (NON AFRICAN AMERICAN): >60 ML/MIN/1.73 M^2
GLUCOSE SERPL-MCNC: 271 MG/DL (ref 70–110)
POTASSIUM SERPL-SCNC: 3.6 MMOL/L (ref 3.5–5.1)
SARS-COV-2 RDRP RESP QL NAA+PROBE: NEGATIVE
SODIUM SERPL-SCNC: 131 MMOL/L (ref 136–145)

## 2022-06-14 PROCEDURE — 36415 COLL VENOUS BLD VENIPUNCTURE: CPT | Performed by: SURGERY

## 2022-06-14 PROCEDURE — U0002 COVID-19 LAB TEST NON-CDC: HCPCS | Performed by: SURGERY

## 2022-06-14 PROCEDURE — 99214 PR OFFICE/OUTPT VISIT, EST, LEVL IV, 30-39 MIN: ICD-10-PCS | Mod: S$GLB,,, | Performed by: SURGERY

## 2022-06-14 PROCEDURE — 99214 OFFICE O/P EST MOD 30 MIN: CPT | Mod: S$GLB,,, | Performed by: SURGERY

## 2022-06-14 PROCEDURE — 80048 BASIC METABOLIC PNL TOTAL CA: CPT | Performed by: SURGERY

## 2022-06-14 RX ORDER — DIAPER,BRIEF,INFANT-TODD,DISP
1 EACH MISCELLANEOUS 2 TIMES DAILY PRN
COMMUNITY

## 2022-06-14 RX ORDER — ASCORBIC ACID 500 MG
500 TABLET ORAL DAILY
COMMUNITY

## 2022-06-14 RX ORDER — ARNICA 200 MG/ML
1 LIQUID TOPICAL DAILY
COMMUNITY

## 2022-06-14 NOTE — PROGRESS NOTES
Subjective:       Patient ID: Karo Leonard is a 62 y.o. female.    Chief Complaint:     HPI:  62 year old female with history of COPD on home oxygen - 2L - presents to the office to discuss cholecystectomy. She was admitted to the hospital last week with shortness of breath. She also had RUQ pain. She was found to have gallstones without obvious signs of cholecystitis.  LFTs were okay on .  She was treated with breathing treatments with relief. She had cardiac evaluation with echo and stress test..  She was cleared for an acute cardiac event. Cholecystectomy recommended. She is on Plavix. Surgery postponed to hold plavix. She was been off plavix since last Friday.  She has past surgical history of  and diagnostic laparoscopy in the s.    Past Medical History:   Diagnosis Date    Coronary artery disease     cardiac stent    Diabetes mellitus     Hypertension      No past surgical history on file.  Review of patient's allergies indicates:   Allergen Reactions    Pcn [penicillins] Anaphylaxis    Adhesive     Floxacillin     Keflex [cephalexin]     Sulfa (sulfonamide antibiotics)     Zithromax [azithromycin]     Zofran [ondansetron hcl (pf)]      Dizzy vomiting       Medication List with Changes/Refills   Current Medications    ALBUTEROL-IPRATROPIUM (DUO-NEB) 2.5 MG-0.5 MG/3 ML NEBULIZER SOLUTION    Take 3 mLs by nebulization every 6 (six) hours as needed for Wheezing or Shortness of Breath. Rescue    AMLODIPINE (NORVASC) 5 MG TABLET    Take 5 mg by mouth once daily.    ASPIRIN 81 MG CHEW    Take 81 mg by mouth once daily.    ATORVASTATIN (LIPITOR) 80 MG TABLET    Take 80 mg by mouth every evening.    CHLORZOXAZONE (PARAFON FORTE) 250 MG TABLET    Take 250 mg by mouth 6 (six) times daily.    CIPROFLOXACIN HCL (CIPRO) 500 MG TABLET    Take 1 tablet (500 mg total) by mouth every 12 (twelve) hours.    CLOPIDOGREL (PLAVIX) 75 MG TABLET    Take 1 tablet (75 mg total) by mouth once daily.     DESONIDE (DESOWEN) 0.05 % CREAM    Apply topically 2 (two) times daily.    ESTRADIOL (ESTRACE) 0.01 % (0.1 MG/GRAM) VAGINAL CREAM    Place 1 g vaginally every 7 days.    FLUCONAZOLE (DIFLUCAN) 150 MG TAB    Take 1 tablet (150 mg total) by mouth every other day.    GABAPENTIN (NEURONTIN) 600 MG TABLET    Take 600 mg by mouth 3 (three) times daily.    HUMIRA,CF, 40 MG/0.4 ML SYKT    Inject 0.4 mLs into the skin every 7 days.    LEVOTHYROXINE (SYNTHROID, LEVOTHROID) 175 MCG TABLET    Take 1 tablet (175 mcg total) by mouth once daily.    LEVOXYL 150 MCG TABLET    Take 150 mcg by mouth once daily.    METFORMIN (GLUCOPHAGE) 850 MG TABLET    Take 850 mg by mouth nightly.    PANTOPRAZOLE (PROTONIX) 40 MG TABLET    Take 1 tablet (40 mg total) by mouth 2 (two) times daily.    PROPRANOLOL (INDERAL LA) 80 MG 24 HR CAPSULE    Take 160 mg by mouth once daily.     No family history on file.  Social History     Socioeconomic History    Marital status:    Tobacco Use    Smoking status: Former Smoker     Types: Cigarettes    Smokeless tobacco: Never Used    Tobacco comment: 1 year ago   Substance and Sexual Activity    Alcohol use: No    Drug use: No         Review of Systems   Constitutional: Negative for appetite change, chills, fever and unexpected weight change.   HENT: Negative for hearing loss, rhinorrhea, sore throat and voice change.    Eyes: Negative for photophobia and visual disturbance.   Respiratory: Negative for cough, choking and shortness of breath.    Cardiovascular: Negative for chest pain, palpitations and leg swelling.   Gastrointestinal: Negative for abdominal pain, blood in stool, constipation, diarrhea, nausea and vomiting.   Endocrine: Negative for cold intolerance, heat intolerance, polydipsia and polyuria.   Musculoskeletal: Negative for arthralgias, back pain, joint swelling and neck stiffness.   Skin: Negative for color change, pallor and rash.   Neurological: Negative for dizziness, seizures,  syncope and headaches.   Hematological: Negative for adenopathy. Does not bruise/bleed easily.   Psychiatric/Behavioral: Negative for agitation, behavioral problems and confusion.       Objective:      Physical Exam  Vitals reviewed.   Constitutional:       General: She is awake.      Appearance: She is obese. She is not toxic-appearing.      Comments: Patient in wheelchair.  On oxygen.   HENT:      Head: Normocephalic and atraumatic.   Pulmonary:      Effort: Pulmonary effort is normal. No tachypnea, bradypnea or respiratory distress.      Comments: Oxygen via nasal cannula.  No respiratory distress.  Abdominal:      General: There is no distension.      Palpations: Abdomen is soft.      Tenderness: There is no abdominal tenderness. There is no guarding.      Comments: Chronic red rash under the breasts and chest wall.  Rash free over the majority of the abdomen.   Musculoskeletal:      Cervical back: Neck supple.   Skin:     Coloration: Skin is not jaundiced.   Neurological:      Mental Status: She is alert and oriented to person, place, and time.   Psychiatric:         Behavior: Behavior is cooperative.         Assessment/Plan:   Chronic calculous cholecystitis    will plan on cholecystectomy this Thursday. Patient at higher risk for pulmonary issues. She has home oxygen and home CPAP.       I discussed the proposed procedures with the patient including risks, benefits, indications, alternatives and special concerns.  The patient appears to understand and agrees to go ahead with surgery.  I have made no promises, warranties or verbal agreements beyond what was discussed above.

## 2022-06-14 NOTE — DISCHARGE INSTRUCTIONS
To confirm, Your doctor has instructed you that surgery is scheduled for:   Thursday, June 16, 2022    Pre-Op will call the afternoon prior to surgery between 4:00 and 6:00 PM with the final arrival time.   Wednesday, Fiorella 15, 2022     Please report to [facility] via [facility] entrance. Check in at registration desk.    Do not eat or drink anything after midnight the night before your surgery - THIS INCLUDES  WATER, GUM, MINTS AND CANDY.  YOU MAY BRUSH YOUR TEETH BUT DO NOT SWALLOW     TAKE ONLY THESE MEDICATIONS WITH A SMALL SIP OF WATER THE MORNING OF YOUR PROCEDURE:  AMLODIPINE/INDERAL/LEVOXYL    ONLY if you are diabetic, check your sugar in the morning before your procedure.       Do not take any diabetic medicines or insulin the morning of surgery .     PLEASE NOTE:  The surgery schedule has many variables which may affect the time of your surgery case.  Family members should be available if your surgery time changes.  Plan to be here the day of your procedure between 4-6 hours.      DO NOT TAKE THESE MEDICATIONS 5-7 DAYS PRIOR to your procedure or per your surgeon's request: ALEVE, ADVIL, IBUPROFEN,  SURI SELTZER, BC , FISH OIL , VITAMIN E, HERBALS  (May take Tylenol)                                                        IMPORTANT INSTRUCTIONS    Shower the night before AND the morning of your procedure with a Chlorhexidine wash such as Hibiclens or Dial antibacterial soap from the neck down.    Do not get it on your face or in your eyes.  You may use your own shampoo and face wash. This helps your skin to be as bacteria free as possible.    DO NOT remove hair from the surgery site.  Do not shave the incision site unless you are given specific instructions to do so.    Sleep in a bed with clean sheets.  Do not sleep with a pet in the bed.   Please leave all jewelry, piercing's and valuables at home.   ONLY if you have been diagnosed with sleep apnea please bring your C-PAP machine.  ONLY if you  wear home oxygen please bring your portable oxygen tank the day of your procedure.     Make arrangements in advance for transportation home by a responsible adult.    You must make arrangements for transportation, TAXI'S, UBER'S OR LYFTS ARE NOT ALLOWED.      If you have any questions about these instructions, call Pre-Op Admit  Nursing at [phone] or the Pre-Op Day Surgery Unit at [phone].

## 2022-06-16 ENCOUNTER — ANESTHESIA EVENT (OUTPATIENT)
Dept: SURGERY | Facility: HOSPITAL | Age: 62
End: 2022-06-16
Payer: COMMERCIAL

## 2022-06-16 ENCOUNTER — ANESTHESIA (OUTPATIENT)
Dept: SURGERY | Facility: HOSPITAL | Age: 62
End: 2022-06-16
Payer: COMMERCIAL

## 2022-06-16 ENCOUNTER — HOSPITAL ENCOUNTER (OUTPATIENT)
Facility: HOSPITAL | Age: 62
Discharge: HOME | End: 2022-06-16
Attending: SURGERY | Admitting: SURGERY
Payer: COMMERCIAL

## 2022-06-16 VITALS
WEIGHT: 213.88 LBS | OXYGEN SATURATION: 92 % | HEART RATE: 88 BPM | BODY MASS INDEX: 35.63 KG/M2 | HEIGHT: 65 IN | RESPIRATION RATE: 18 BRPM | DIASTOLIC BLOOD PRESSURE: 74 MMHG | SYSTOLIC BLOOD PRESSURE: 136 MMHG | TEMPERATURE: 98 F

## 2022-06-16 DIAGNOSIS — K80.10 CALCULUS OF GALLBLADDER WITH CHRONIC CHOLECYSTITIS WITHOUT OBSTRUCTION: Primary | ICD-10-CM

## 2022-06-16 LAB
GLUCOSE SERPL-MCNC: 220 MG/DL (ref 70–110)
GLUCOSE SERPL-MCNC: 235 MG/DL (ref 70–110)

## 2022-06-16 PROCEDURE — 71000016 HC POSTOP RECOV ADDL HR: Performed by: SURGERY

## 2022-06-16 PROCEDURE — 27000673 HC TUBING BLOOD Y: Performed by: ANESTHESIOLOGY

## 2022-06-16 PROCEDURE — 63600175 PHARM REV CODE 636 W HCPCS: Performed by: SURGERY

## 2022-06-16 PROCEDURE — 25000003 PHARM REV CODE 250: Performed by: ANESTHESIOLOGY

## 2022-06-16 PROCEDURE — 71000039 HC RECOVERY, EACH ADD'L HOUR: Performed by: SURGERY

## 2022-06-16 PROCEDURE — 25000003 PHARM REV CODE 250: Performed by: NURSE ANESTHETIST, CERTIFIED REGISTERED

## 2022-06-16 PROCEDURE — 37000008 HC ANESTHESIA 1ST 15 MINUTES: Performed by: SURGERY

## 2022-06-16 PROCEDURE — 71000033 HC RECOVERY, INTIAL HOUR: Performed by: SURGERY

## 2022-06-16 PROCEDURE — 63600175 PHARM REV CODE 636 W HCPCS: Performed by: NURSE ANESTHETIST, CERTIFIED REGISTERED

## 2022-06-16 PROCEDURE — C9290 INJ, BUPIVACAINE LIPOSOME: HCPCS | Performed by: SURGERY

## 2022-06-16 PROCEDURE — 71000015 HC POSTOP RECOV 1ST HR: Performed by: SURGERY

## 2022-06-16 PROCEDURE — 36000709 HC OR TIME LEV III EA ADD 15 MIN: Performed by: SURGERY

## 2022-06-16 PROCEDURE — 47562 LAPAROSCOPIC CHOLECYSTECTOMY: CPT | Mod: ,,, | Performed by: SURGERY

## 2022-06-16 PROCEDURE — 47562 PR LAP,CHOLECYSTECTOMY: ICD-10-PCS | Mod: ,,, | Performed by: SURGERY

## 2022-06-16 PROCEDURE — 27000671 HC TUBING MICROBORE EXT: Performed by: ANESTHESIOLOGY

## 2022-06-16 PROCEDURE — 36000708 HC OR TIME LEV III 1ST 15 MIN: Performed by: SURGERY

## 2022-06-16 PROCEDURE — 25000003 PHARM REV CODE 250: Performed by: SURGERY

## 2022-06-16 PROCEDURE — 27201423 OPTIME MED/SURG SUP & DEVICES STERILE SUPPLY: Performed by: SURGERY

## 2022-06-16 PROCEDURE — 63600175 PHARM REV CODE 636 W HCPCS: Performed by: ANESTHESIOLOGY

## 2022-06-16 PROCEDURE — 27202177 HC INTRODUCER, TRACHEAL TUBE: Performed by: ANESTHESIOLOGY

## 2022-06-16 PROCEDURE — 27000080 OPTIME MED/SURG SUP & DEVICES GENERAL CLASSIFICATION: Performed by: SURGERY

## 2022-06-16 PROCEDURE — 37000009 HC ANESTHESIA EA ADD 15 MINS: Performed by: SURGERY

## 2022-06-16 PROCEDURE — 27202107 HC XP QUATRO SENSOR: Performed by: ANESTHESIOLOGY

## 2022-06-16 RX ORDER — FAMOTIDINE 10 MG/ML
INJECTION INTRAVENOUS
Status: DISCONTINUED | OUTPATIENT
Start: 2022-06-16 | End: 2022-06-16

## 2022-06-16 RX ORDER — FENTANYL CITRATE 50 UG/ML
INJECTION, SOLUTION INTRAMUSCULAR; INTRAVENOUS
Status: DISCONTINUED | OUTPATIENT
Start: 2022-06-16 | End: 2022-06-16

## 2022-06-16 RX ORDER — LIDOCAINE HYDROCHLORIDE 20 MG/ML
JELLY TOPICAL
Status: DISCONTINUED | OUTPATIENT
Start: 2022-06-16 | End: 2022-06-16

## 2022-06-16 RX ORDER — SUCCINYLCHOLINE CHLORIDE 20 MG/ML
INJECTION INTRAMUSCULAR; INTRAVENOUS
Status: DISCONTINUED | OUTPATIENT
Start: 2022-06-16 | End: 2022-06-16

## 2022-06-16 RX ORDER — SODIUM CHLORIDE, SODIUM LACTATE, POTASSIUM CHLORIDE, CALCIUM CHLORIDE 600; 310; 30; 20 MG/100ML; MG/100ML; MG/100ML; MG/100ML
INJECTION, SOLUTION INTRAVENOUS CONTINUOUS PRN
Status: DISCONTINUED | OUTPATIENT
Start: 2022-06-16 | End: 2022-06-16

## 2022-06-16 RX ORDER — HYDROCODONE BITARTRATE AND ACETAMINOPHEN 5; 325 MG/1; MG/1
1 TABLET ORAL EVERY 6 HOURS PRN
Qty: 25 TABLET | Refills: 0 | Status: SHIPPED | OUTPATIENT
Start: 2022-06-16

## 2022-06-16 RX ORDER — BUPIVACAINE HYDROCHLORIDE AND EPINEPHRINE 2.5; 5 MG/ML; UG/ML
INJECTION, SOLUTION EPIDURAL; INFILTRATION; INTRACAUDAL; PERINEURAL
Status: DISCONTINUED | OUTPATIENT
Start: 2022-06-16 | End: 2022-06-16 | Stop reason: HOSPADM

## 2022-06-16 RX ORDER — PROCHLORPERAZINE EDISYLATE 5 MG/ML
5 INJECTION INTRAMUSCULAR; INTRAVENOUS ONCE AS NEEDED
Status: DISCONTINUED | OUTPATIENT
Start: 2022-06-16 | End: 2022-06-16 | Stop reason: HOSPADM

## 2022-06-16 RX ORDER — GABAPENTIN 100 MG/1
200 CAPSULE ORAL
Status: COMPLETED | OUTPATIENT
Start: 2022-06-16 | End: 2022-06-16

## 2022-06-16 RX ORDER — CEFOXITIN SODIUM 2 G/50ML
INJECTION, SOLUTION INTRAVENOUS
Status: DISCONTINUED | OUTPATIENT
Start: 2022-06-16 | End: 2022-06-16

## 2022-06-16 RX ORDER — MIDAZOLAM HYDROCHLORIDE 1 MG/ML
INJECTION INTRAMUSCULAR; INTRAVENOUS
Status: DISCONTINUED | OUTPATIENT
Start: 2022-06-16 | End: 2022-06-16

## 2022-06-16 RX ORDER — EPHEDRINE SULFATE 50 MG/ML
INJECTION, SOLUTION INTRAVENOUS
Status: DISCONTINUED | OUTPATIENT
Start: 2022-06-16 | End: 2022-06-16

## 2022-06-16 RX ORDER — PROPOFOL 10 MG/ML
VIAL (ML) INTRAVENOUS
Status: DISCONTINUED | OUTPATIENT
Start: 2022-06-16 | End: 2022-06-16

## 2022-06-16 RX ORDER — OXYCODONE HYDROCHLORIDE 5 MG/1
5 TABLET ORAL
Status: DISCONTINUED | OUTPATIENT
Start: 2022-06-16 | End: 2022-06-16 | Stop reason: HOSPADM

## 2022-06-16 RX ORDER — PROMETHAZINE HYDROCHLORIDE 12.5 MG/1
12.5 TABLET ORAL 4 TIMES DAILY
Qty: 25 TABLET | Refills: 0 | Status: SHIPPED | OUTPATIENT
Start: 2022-06-16

## 2022-06-16 RX ORDER — FENTANYL CITRATE 50 UG/ML
25 INJECTION, SOLUTION INTRAMUSCULAR; INTRAVENOUS EVERY 5 MIN PRN
Status: DISCONTINUED | OUTPATIENT
Start: 2022-06-16 | End: 2022-06-16 | Stop reason: HOSPADM

## 2022-06-16 RX ORDER — LIDOCAINE HYDROCHLORIDE 40 MG/ML
SOLUTION TOPICAL
Status: DISCONTINUED | OUTPATIENT
Start: 2022-06-16 | End: 2022-06-16

## 2022-06-16 RX ORDER — DIPHENHYDRAMINE HYDROCHLORIDE 50 MG/ML
INJECTION INTRAMUSCULAR; INTRAVENOUS
Status: DISCONTINUED | OUTPATIENT
Start: 2022-06-16 | End: 2022-06-16

## 2022-06-16 RX ORDER — LIDOCAINE HYDROCHLORIDE 20 MG/ML
INJECTION, SOLUTION EPIDURAL; INFILTRATION; INTRACAUDAL; PERINEURAL
Status: DISCONTINUED | OUTPATIENT
Start: 2022-06-16 | End: 2022-06-16

## 2022-06-16 RX ORDER — ROCURONIUM BROMIDE 10 MG/ML
INJECTION, SOLUTION INTRAVENOUS
Status: DISCONTINUED | OUTPATIENT
Start: 2022-06-16 | End: 2022-06-16

## 2022-06-16 RX ORDER — SODIUM CHLORIDE 0.9 % (FLUSH) 0.9 %
10 SYRINGE (ML) INJECTION
Status: DISCONTINUED | OUTPATIENT
Start: 2022-06-16 | End: 2022-06-16 | Stop reason: HOSPADM

## 2022-06-16 RX ORDER — SCOLOPAMINE TRANSDERMAL SYSTEM 1 MG/1
1 PATCH, EXTENDED RELEASE TRANSDERMAL ONCE
Status: DISCONTINUED | OUTPATIENT
Start: 2022-06-16 | End: 2022-06-16 | Stop reason: HOSPADM

## 2022-06-16 RX ORDER — DIPHENHYDRAMINE HYDROCHLORIDE 50 MG/ML
6.25 INJECTION INTRAMUSCULAR; INTRAVENOUS
Status: DISCONTINUED | OUTPATIENT
Start: 2022-06-16 | End: 2022-06-16 | Stop reason: HOSPADM

## 2022-06-16 RX ADMIN — LIDOCAINE HYDROCHLORIDE 80 MG: 20 INJECTION, SOLUTION EPIDURAL; INFILTRATION; INTRACAUDAL; PERINEURAL at 01:06

## 2022-06-16 RX ADMIN — EPHEDRINE SULFATE 10 MG: 50 INJECTION INTRAVENOUS at 01:06

## 2022-06-16 RX ADMIN — FENTANYL CITRATE 25 MCG: 50 INJECTION INTRAMUSCULAR; INTRAVENOUS at 01:06

## 2022-06-16 RX ADMIN — SODIUM CHLORIDE, SODIUM LACTATE, POTASSIUM CHLORIDE, AND CALCIUM CHLORIDE: .6; .31; .03; .02 INJECTION, SOLUTION INTRAVENOUS at 01:06

## 2022-06-16 RX ADMIN — LIDOCAINE HYDROCHLORIDE 5 ML: 20 JELLY TOPICAL at 01:06

## 2022-06-16 RX ADMIN — DIPHENHYDRAMINE HYDROCHLORIDE 6.25 MG: 50 INJECTION, SOLUTION INTRAMUSCULAR; INTRAVENOUS at 01:06

## 2022-06-16 RX ADMIN — PROPOFOL 110 MG: 10 INJECTION, EMULSION INTRAVENOUS at 01:06

## 2022-06-16 RX ADMIN — FENTANYL CITRATE 25 MCG: 50 INJECTION, SOLUTION INTRAMUSCULAR; INTRAVENOUS at 03:06

## 2022-06-16 RX ADMIN — EPHEDRINE SULFATE 15 MG: 50 INJECTION INTRAVENOUS at 01:06

## 2022-06-16 RX ADMIN — MIDAZOLAM HYDROCHLORIDE 1 MG: 1 INJECTION, SOLUTION INTRAMUSCULAR; INTRAVENOUS at 01:06

## 2022-06-16 RX ADMIN — Medication 120 MG: at 01:06

## 2022-06-16 RX ADMIN — SCOPOLAMINE 1 PATCH: 1 PATCH TRANSDERMAL at 01:06

## 2022-06-16 RX ADMIN — SUGAMMADEX 200 MG: 100 INJECTION, SOLUTION INTRAVENOUS at 02:06

## 2022-06-16 RX ADMIN — LIDOCAINE HYDROCHLORIDE 4 ML: 40 SOLUTION TOPICAL at 01:06

## 2022-06-16 RX ADMIN — CEFOXITIN SODIUM 2 G: 2 INJECTION, SOLUTION INTRAVENOUS at 01:06

## 2022-06-16 RX ADMIN — FAMOTIDINE 20 MG: 10 INJECTION, SOLUTION INTRAVENOUS at 01:06

## 2022-06-16 RX ADMIN — GABAPENTIN 200 MG: 100 CAPSULE ORAL at 01:06

## 2022-06-16 RX ADMIN — ROCURONIUM BROMIDE 20 MG: 10 INJECTION, SOLUTION INTRAVENOUS at 01:06

## 2022-06-16 RX ADMIN — OXYCODONE HYDROCHLORIDE 5 MG: 5 TABLET ORAL at 03:06

## 2022-06-16 RX ADMIN — ROCURONIUM BROMIDE 5 MG: 10 INJECTION, SOLUTION INTRAVENOUS at 01:06

## 2022-06-16 NOTE — DISCHARGE INSTRUCTIONS
Follow the written instructions I reviewed with you and a copy given to you.  Keep follow up appointment with Dr Lim  Do not drive, operate machinery or sign legal documents for the next 24 hours.  No lifting over 20 pounds for six weeks.

## 2022-06-16 NOTE — BRIEF OP NOTE
[facility]  Brief Operative Note    SUMMARY     Surgery Date: 6/16/2022     Surgeon(s) and Role:     * Leonardo Wilson III, MD - Primary    Assisting Surgeon: None    Pre-op Diagnosis:  Chronic calculous cholecystitis [K80.10]    Post-op Diagnosis:  Post-Op Diagnosis Codes:     * Chronic calculous cholecystitis [K80.10]    Procedure(s) (LRB):  CHOLECYSTECTOMY, LAPAROSCOPIC (N/A)    Anesthesia: General    Operative Findings: The patient was brought to the operating room and transferred to the operating room table in the supine position.  Patient was given general anesthesia and intubated.  The abdomen was prepped and draped.  A transverse infraumbilical incision was made.  Dissection was carried down through the skin and subcutaneous tissue.  The abdomen was insufflated with the Veress needle. The abdomen was entered with the 5 mm visiport. Under direct visualization, three ports were placed.  One 12 mm was placed in the subxiphoid position, one 5 mm in the right anterior axillary line and the other 5 mm in the mid clavicular line.  The gallbladder body was retracted superiorly and the infundibulum was retracted laterally. The wall was thick. Little fibrotic.  The peritoneum overlying the cystic duct and artery was carefully taken down.  The cystic duct and cystic artery were individually isolated and dissected free 360 degrees.  They were individually divided proximally and distally. The duct was thickened. It was little too wide to accommodate a clip. It was divided with a 45 mm laparoscopic stapler.   Electrocautery was used to take the gallbladder off the liver bed.  The Endo-Catch bag was used to remove the gallbladder from the abdomen.  We irrigated out the right upper quadrant with irrigation.  We checked again and there was no evidence of any bile leak or bleeding from our clips or from the liver bed.  The patient tolerated the procedure well.  We removed all of our ports. We repaired the  skin with 4-0 Monocryl.  After that was done, the patient was extubated and taken to the recovery room in stable condition.  All lap and instrument counts were correct.    Estimated Blood Loss: 5 mL    Estimated Blood Loss has been documented.         Specimens:   Specimen (24h ago, onward)             Start     Ordered    06/16/22 1433  Specimen to Pathology - Surgery  Once        Comments: Pre-op Diagnosis: Chronic calculous cholecystitis [K80.10]Procedure(s):CHOLECYSTECTOMY, LAPAROSCOPIC Number of specimens: 1Name of specimens: GALLBLADDER     Question:  Release to patient  Answer:  Immediate    06/16/22 0795                [id number]

## 2022-06-16 NOTE — OP NOTE
Surgery Date: 6/16/2022     Surgeon(s) and Role:     * Leonardo Wilson III, MD - Primary    Assisting Surgeon: None    Pre-op Diagnosis:  Chronic calculous cholecystitis [K80.10]    Post-op Diagnosis:  Post-Op Diagnosis Codes:     * Chronic calculous cholecystitis [K80.10]    Procedure(s) (LRB):  CHOLECYSTECTOMY, LAPAROSCOPIC (N/A)    Anesthesia: General    Operative Findings: The patient was brought to the operating room and transferred to the operating room table in the supine position.  Patient was given general anesthesia and intubated.  The abdomen was prepped and draped.  A transverse infraumbilical incision was made.  Dissection was carried down through the skin and subcutaneous tissue.  The abdomen was insufflated with the Veress needle. The abdomen was entered with the 5 mm visiport. Under direct visualization, three ports were placed.  One 12 mm was placed in the subxiphoid position, one 5 mm in the right anterior axillary line and the other 5 mm in the mid clavicular line.  The gallbladder body was retracted superiorly and the infundibulum was retracted laterally. The wall was thick. Little fibrotic.  The peritoneum overlying the cystic duct and artery was carefully taken down.  The cystic duct and cystic artery were individually isolated and dissected free 360 degrees.  They were individually divided proximally and distally. The duct was thickened. It was little too wide to accommodate a clip. It was divided with a 45 mm laparoscopic stapler.   Electrocautery was used to take the gallbladder off the liver bed.  The Endo-Catch bag was used to remove the gallbladder from the abdomen.  We irrigated out the right upper quadrant with irrigation.  We checked again and there was no evidence of any bile leak or bleeding from our clips or from the liver bed.  The patient tolerated the procedure well.  We removed all of our ports. We repaired the skin with 4-0 Monocryl.  After that was done, the patient was  extubated and taken to the recovery room in stable condition.  All lap and instrument counts were correct.    Estimated Blood Loss: 5 mL    Estimated Blood Loss has been documented.         Specimens:   Specimen (24h ago, onward)             Start     Ordered    06/16/22 1433  Specimen to Pathology - Surgery  Once        Comments: Pre-op Diagnosis: Chronic calculous cholecystitis [K80.10]Procedure(s):CHOLECYSTECTOMY, LAPAROSCOPIC Number of specimens: 1Name of specimens: GALLBLADDER     Question:  Release to patient  Answer:  Immediate    06/16/22 0894                [id number]

## 2022-06-16 NOTE — TRANSFER OF CARE
"Anesthesia Transfer of Care Note    Patient: Karo Leonard    Procedure(s) Performed: Procedure(s) (LRB):  CHOLECYSTECTOMY, LAPAROSCOPIC (N/A)    Patient location: PACU    Anesthesia Type: general    Transport from OR: Transported from OR on room air with adequate spontaneous ventilation    Post pain: adequate analgesia    Post assessment: no apparent anesthetic complications    Post vital signs: stable    Level of consciousness: awake and alert    Nausea/Vomiting: no nausea/vomiting    Complications: none    Transfer of care protocol was followed      Last vitals:   Visit Vitals  BP (!) 145/95 (BP Location: Left arm, Patient Position: Sitting)   Pulse 73   Temp 36.6 °C (97.8 °F)   Resp 18   Ht 5' 5" (1.651 m)   Wt 97 kg (213 lb 13.5 oz)   SpO2 (S) (!) 92%   Breastfeeding No   BMI 35.59 kg/m²     "

## 2022-06-16 NOTE — ANESTHESIA POSTPROCEDURE EVALUATION
Anesthesia Post Evaluation    Patient: Karo Leonard    Procedure(s) Performed: Procedure(s) (LRB):  CHOLECYSTECTOMY, LAPAROSCOPIC (N/A)    Final Anesthesia Type: general      Patient location during evaluation: PACU  Patient participation: Yes- Able to Participate  Level of consciousness: awake and alert and oriented  Post-procedure vital signs: reviewed and stable  Pain management: adequate  Airway patency: patent    PONV status at discharge: No PONV  Anesthetic complications: no      Cardiovascular status: blood pressure returned to baseline and hemodynamically stable  Respiratory status: unassisted, spontaneous ventilation and face mask  Hydration status: euvolemic  Follow-up not needed.    Release to ASU with O2 via oxy-mask. Pt resp status at baseline, on chronic O2. Breathing comfortably and without complaints.    Vitals Value Taken Time   /70 06/16/22 1546   Temp 36.5 °C (97.7 °F) 06/16/22 1530   Pulse 60 06/16/22 1557   Resp 23 06/16/22 1543   SpO2 95 % 06/16/22 1557   Vitals shown include unvalidated device data.      No case tracking events are documented in the log.      Pain/Evonne Score: Pain Rating Prior to Med Admin: 6 (6/16/2022  3:43 PM)  Evonne Score: 9 (6/16/2022  3:30 PM)

## 2022-06-16 NOTE — ANESTHESIA PROCEDURE NOTES
Intubation    Date/Time: 6/16/2022 1:12 PM  Performed by: Guillaume Stanley CRNA  Authorized by: Sonido Hills MD     Intubation:     Induction:  Intravenous    Intubated:  Postinduction    Mask Ventilation:  Easy mask    Attempts:  1    Attempted By:  CRNA    Method of Intubation:  Video laryngoscopy    Blade:  Holliday 3    Laryngeal View Grade: Grade I - full view of cords      Difficult Airway Encountered?: No      Complications:  None    Airway Device:  Oral endotracheal tube    Airway Device Size:  7.0    Style/Cuff Inflation:  Cuffed    Tube secured:  21    Secured at:  The lips    Placement Verified By:  Capnometry    Complicating Factors:  None    Findings Post-Intubation:  BS equal bilateral and atraumatic/condition of teeth unchanged

## 2022-06-16 NOTE — ANESTHESIA PREPROCEDURE EVALUATION
2022  Karo Leonard is a 62 y.o., female.      Patient Active Problem List   Diagnosis    Hypothyroid    Hypertension    Hidradenitis suppurativa    Pain, chronic    BMI 39.0-39.9,adult    Diabetes type 2, uncontrolled    Atherosclerosis of native coronary artery of native heart with unstable angina pectoris    Hypoxemia    Physical debility    Calculus of gallbladder with chronic cholecystitis without obstruction       Past Surgical History:   Procedure Laterality Date     SECTION  08/1987    x2 1993    CORONARY STENT PLACEMENT  2017    EXPLORATORY LAPAROTOMY      Rast[employer]ari     HEMORRHOID SURGERY      LUMBAR DISC SURGERY      PILONIDAL CYST DRAINAGE      TONSILLECTOMY      as a child (also adenoids)        Tobacco Use:  The patient  reports that she quit smoking about 6 weeks ago. Her smoking use included cigarettes. She has a 20.00 pack-year smoking history. She has never used smokeless tobacco.     Results for orders placed or performed during the hospital encounter of 22   EKG 12-lead    Collection Time: 22 10:28 AM    Narrative    Test Reason : R07.9,    Vent. Rate : 086 BPM     Atrial Rate : 086 BPM     P-R Int : 162 ms          QRS Dur : 084 ms      QT Int : 382 ms       P-R-T Axes : 052 021 036 degrees     QTc Int : 457 ms    Normal sinus rhythm  Nonspecific ST abnormality  Abnormal ECG  When compared with ECG of 2020 07:21,  OH interval has decreased  Vent. rate has increased BY  28 BPM  Confirmed by Albert Moe MD ([id number]) on 2022 5:15:19 PM    Referred By: [email or web address]   SELF           Confirmed By:Albert Moe MD             Lab Results   Component Value Date    WBC 10.89 2022    HGB 11.6 (L) 2022    HCT 37.1 2022    MCV 88 2022     2022     BMP  Lab Results   Component  Value Date     (L) 06/14/2022    K 3.6 06/14/2022    CL 92 (L) 06/14/2022    CO2 32 (H) 06/14/2022    BUN 12 06/14/2022    CREATININE 0.8 06/14/2022    CALCIUM 8.7 06/14/2022    ANIONGAP 7 (L) 06/14/2022     (H) 06/14/2022     (H) 06/07/2022     (H) 06/07/2022       Results for orders placed during the hospital encounter of 06/07/22    Echo Saline Bubble? No    Interpretation Summary  · The left ventricle is mildly enlarged with concentric remodeling and normal systolic function.  · The estimated ejection fraction is 55%.  · Grade I left ventricular diastolic dysfunction.  · Normal right ventricular size with normal right ventricular systolic function.  · Moderate left atrial enlargement.  · Normal central venous pressure (3 mmHg).    NM Myocardial Perfusion Spect Multi Pharmacologic  Order: [id number]   Status: Final result     Visible to patient: Yes (not seen)     Next appt: 06/30/2022 at 09:00 AM in General Surgery (Leonardo Wilson III, MD)     Dx: Chest pain, unspecified type     0 Result Notes    Details    Reading Physician Reading Date Result Priority   Christiano Tirado MD  [phone] 6/9/2022      Narrative & Impression  HISTORY:  Chest pain, dyspnea, hypertension, previous myocardial infarction.     TECHNIQUE:  11 mCi technetium 99m Myoview was administered IV for the rest portion of the exam, with 25.4 mCi for the stress portion of the exam, following a 1 day protocol.  The patient was stressed with Lexiscan 0.4 mg IV, and achieved a maximum heart rate of 88 beats per minute.     FINDINGS:  Comparison to prior exam of 07/22/2020. There is normal and homogeneous radiotracer uptake by the left ventricular myocardium, with no photopenic defects to suggest pharmacologically induced reversible ischemia or infarct.  There are no wall motion abnormalities on the gated cine images, with no abnormal transient left ventricular dilatation on stress images.     Ejection fraction is  calculated at 55%.  The polar map images confirm the planar SPECT findings.     IMPRESSION:  1. No evidence of pharmacologically induced reversible ischemia or infarct.  2. Normal left ventricular wall motion.  3. Calculated ejection fraction of 55%.     Electronically signed by:  Christiano Tirado MD  6/9/2022 11:11 AM CDT Workstation: [id number]         Pre-op Assessment    I have reviewed the Patient Summary Reports.     I have reviewed the Nursing Notes. I have reviewed the NPO Status.   I have reviewed the Medications.     Review of Systems  Anesthesia Hx:  No problems with previous Anesthesia  Denies Family Hx of Anesthesia complications.   Denies Personal Hx of Anesthesia complications.   Social:  No Alcohol Use, Former Smoker    Hematology/Oncology:     Oncology Normal   Hematology Comments: Plavix therapy   EENT/Dental:EENT/Dental Normal   Cardiovascular:   Hypertension Past MI CAD asymptomatic CABG/stent  ECG has been reviewed. Patient with coronary artery stent placed 5 years ago  Myocardial infarction 5 years ago  Patient without cardiologist at this time  Recent stress test 06/09/2022 negative for reversible ischemia   Pulmonary:   Shortness of breath Sleep Apnea, CPAP Home oxygen therapy 2 L per nasal cannula  Recent hospital admission for shortness of breath - patient treatment respiratory therapies  Patient followed by Dr. Luong  Patient without daily maintenance inhaler use  Patient without rescue inhaler use  Nebulizer use q.6 hours  Oxygen saturation 92% 2 L per nasal cannula prior to procedure   Education provided regarding risk of obstructive sleep apnea     Renal/:   renal calculi    Hepatic/GI:   Patient denies nausea vomiting at this time   Musculoskeletal:   Arthritis   Spine Disorders: lumbar Degenerative disease and Chronic Pain    Neurological:   Chronic Pain Syndrome  Peripheral Neuropathy  Movement Disorder Dx, tremor   Endocrine:   Diabetes, poorly controlled, type 2 Hypothyroidism   Obesity / BMI > 30  Dermatological:   Hidradenitis suppurativa       Physical Exam  General: Well nourished, Cooperative, Alert and Oriented    Airway:  Mallampati: III   Mouth Opening: Normal  TM Distance: > 6 cm  Tongue: Normal  Neck ROM: Normal ROM    Dental:  Caps / Implants, Periodontal disease    Chest/Lungs:  Clear to auscultation, Normal Respiratory Rate    Heart:  Rate: Normal  Rhythm: Regular Rhythm  Sounds: Normal    Abdomen:  Normal, Soft, Nontender        Anesthesia Plan  Type of Anesthesia, risks & benefits discussed:    Anesthesia Type: Gen ETT  Intra-op Monitoring Plan: Standard ASA Monitors  Post Op Pain Control Plan: multimodal analgesia and IV/PO Opioids PRN  Induction:  IV  Airway Plan: Video and Direct, Post-Induction  Informed Consent: Informed consent signed with the Patient and all parties understand the risks and agree with anesthesia plan.  All questions answered.   ASA Score: 3  Anesthesia Plan Notes:   ++ check point of care glucose prior to procedure++  GETA  LTA  No Zofran  No Decadron  Benadryl 6.25 mg iv  Pepcid 20 mg iv   Scopolamine Patch ( patient informed if adhesive on scopolamine patch results in skin irritation patient is to have patch in removed immediately )  Ofirmev 1000 mg iv  Neurontin 200 mg po   Sugammadex   ABDI Precautions: Extubate patient Awake, with HOB elevated and Oral Airway in Place    Ready For Surgery From Anesthesia Perspective.     .

## 2022-06-16 NOTE — DISCHARGE SUMMARY
Discharge Summary  General Surgery      Admit Date: 6/16/2022    Discharge Date :6/16/2022    Attending Physician: Leonardo Wilson III     Discharge Physician: Leonardo Wilson III    Discharged Condition: good    Discharge Diagnosis: Chronic calculous cholecystitis [K80.10]    Treatments/Procedures: Procedure(s) (LRB):  CHOLECYSTECTOMY, LAPAROSCOPIC (N/A)    Hospital Course: Uneventful; Discharged home from Recovery    Significant Diagnostic Studies: none    Disposition: Home or Self Care    Diet: Regular    Follow up: Office 10-14 days    Activity: No heavy lifting till seen in office.    Patient Instructions:   Current Discharge Medication List      START taking these medications    Details   HYDROcodone-acetaminophen (NORCO) 5-325 mg per tablet Take 1 tablet by mouth every 6 (six) hours as needed for Pain.  Qty: 25 tablet, Refills: 0    Comments: Quantity prescribed more than 7 day supply? No         CONTINUE these medications which have NOT CHANGED    Details   albuterol-ipratropium (DUO-NEB) 2.5 mg-0.5 mg/3 mL nebulizer solution Take 3 mLs by nebulization every 6 (six) hours as needed for Wheezing or Shortness of Breath. Rescue  Qty: 75 mL, Refills: 3      amLODIPine (NORVASC) 5 MG tablet Take 5 mg by mouth once daily.    Comments: .      arnica 20 % Tinc Apply 1 application topically once daily.      ascorbic acid, vitamin C, (VITAMIN C) 500 MG tablet Take 500 mg by mouth once daily.      aspirin 81 MG Chew Take 81 mg by mouth once daily.      atorvastatin (LIPITOR) 80 MG tablet Take 80 mg by mouth every evening.      chlorzoxazone (PARAFON FORTE) 250 MG tablet Take 250 mg by mouth 3 (three) times daily as needed.      ciprofloxacin HCl (CIPRO) 500 MG tablet Take 1 tablet (500 mg total) by mouth every 12 (twelve) hours.  Qty: 16 tablet, Refills: 0      LEVOXYL 150 mcg tablet Take 150 mcg by mouth once daily.      pantoprazole (PROTONIX) 40 MG tablet Take 1 tablet (40 mg total) by mouth 2 (two) times  daily.  Qty: 60 tablet, Refills: 1    Comments: Further refills to come from your primary care physician      propranoloL (INDERAL LA) 80 MG 24 hr capsule Take 160 mg by mouth once daily.      clopidogreL (PLAVIX) 75 mg tablet Take 1 tablet (75 mg total) by mouth once daily.    Comments: HOLD UNTIL FOLLOW UP WITH SURGERY      desonide (DESOWEN) 0.05 % cream Apply 1 application topically 2 (two) times daily.      estradioL (ESTRACE) 0.01 % (0.1 mg/gram) vaginal cream Place 1 g vaginally every 7 days. Monday's      fluconazole (DIFLUCAN) 150 MG Tab Take 1 tablet (150 mg total) by mouth every other day.  Qty: 2 tablet, Refills: 0      gabapentin (NEURONTIN) 600 MG tablet Take 600 mg by mouth 3 (three) times daily.      HUMIRA,CF, 40 mg/0.4 mL SyKt Inject 0.4 mLs into the skin every 7 days. Monday's      hydrocortisone 0.5 % cream Apply 1 application topically 2 (two) times daily as needed.      metFORMIN (GLUCOPHAGE) 850 MG tablet Take 850 mg by mouth nightly.             Discharge Procedure Orders   Diet Adult Regular     Lifting restrictions   Order Comments: No lifting over twenty pounds for six weeks     Remove dressing in 48 hours

## 2022-06-30 ENCOUNTER — OFFICE VISIT (OUTPATIENT)
Dept: SURGERY | Facility: CLINIC | Age: 62
End: 2022-06-30
Payer: COMMERCIAL

## 2022-06-30 VITALS — TEMPERATURE: 98 F | HEART RATE: 73 BPM | SYSTOLIC BLOOD PRESSURE: 117 MMHG | DIASTOLIC BLOOD PRESSURE: 73 MMHG

## 2022-06-30 DIAGNOSIS — K81.2 ACUTE ON CHRONIC CHOLECYSTITIS: Primary | ICD-10-CM

## 2022-06-30 PROCEDURE — 99024 PR POST-OP FOLLOW-UP VISIT: ICD-10-PCS | Mod: S$GLB,,, | Performed by: SURGERY

## 2022-06-30 PROCEDURE — 99024 POSTOP FOLLOW-UP VISIT: CPT | Mod: S$GLB,,, | Performed by: SURGERY

## 2022-06-30 NOTE — PROGRESS NOTES
Subjective:       Patient ID: Karo Leonard is a 62 y.o. female.    Chief Complaint: Post-op Evaluation      HPI:  Patient is status post cholecystectomy.  She is feeling well.  No new complaints.  Pathology is benign in listed below.    GALLBLADDER:   - ACUTE AND CHRONIC CHOLECYSTITIS AND CHOLELITHIASIS.  Review of Systems    Objective:      Physical Exam  Constitutional:       General: She is not in acute distress.  Abdominal:      General: There is no distension.      Palpations: Abdomen is soft.      Tenderness: There is no abdominal tenderness.      Comments: Incisions are clean dry and intact.   Neurological:      Mental Status: She is alert and oriented to person, place, and time.         Assessment/Plan:   Karo was seen today for post-op evaluation.    Diagnoses and all orders for this visit:    Acute on chronic cholecystitis      Patient is status post cholecystectomy.  Pathology benign.  Doing well.  Return to clinic p.r.n..

## 2022-07-05 ENCOUNTER — IMMUNIZATION (OUTPATIENT)
Dept: PRIMARY CARE CLINIC | Facility: CLINIC | Age: 62
End: 2022-07-05
Payer: COMMERCIAL

## 2022-07-05 DIAGNOSIS — Z23 NEED FOR VACCINATION: Primary | ICD-10-CM

## 2022-07-05 PROCEDURE — 0052A COVID-19, MRNA, LNP-S, PF, 30 MCG/0.3 ML DOSE VACCINE (PFIZER): CPT | Mod: S$GLB,,, | Performed by: FAMILY MEDICINE

## 2022-07-05 PROCEDURE — 0052A COVID-19, MRNA, LNP-S, PF, 30 MCG/0.3 ML DOSE VACCINE (PFIZER): ICD-10-PCS | Mod: S$GLB,,, | Performed by: FAMILY MEDICINE

## 2022-07-05 PROCEDURE — 91305 COVID-19, MRNA, LNP-S, PF, 30 MCG/0.3 ML DOSE VACCINE (PFIZER): CPT | Mod: S$GLB,,, | Performed by: FAMILY MEDICINE

## 2022-07-05 PROCEDURE — 91305 COVID-19, MRNA, LNP-S, PF, 30 MCG/0.3 ML DOSE VACCINE (PFIZER): ICD-10-PCS | Mod: S$GLB,,, | Performed by: FAMILY MEDICINE

## 2024-06-14 ENCOUNTER — HOSPITAL ENCOUNTER (INPATIENT)
Facility: HOSPITAL | Age: 64
LOS: 6 days | Discharge: HOME | DRG: 189 | End: 2024-06-20
Attending: EMERGENCY MEDICINE | Admitting: INTERNAL MEDICINE
Payer: MEDICARE

## 2024-06-14 DIAGNOSIS — I49.9 ARRHYTHMIA: ICD-10-CM

## 2024-06-14 DIAGNOSIS — I48.0 PAROXYSMAL ATRIAL FIBRILLATION: ICD-10-CM

## 2024-06-14 DIAGNOSIS — R09.02 HYPOXIA: ICD-10-CM

## 2024-06-14 DIAGNOSIS — R07.9 CHEST PAIN: ICD-10-CM

## 2024-06-14 DIAGNOSIS — J44.1 COPD EXACERBATION: Primary | ICD-10-CM

## 2024-06-14 DIAGNOSIS — R06.02 SHORTNESS OF BREATH: ICD-10-CM

## 2024-06-14 DIAGNOSIS — J18.9 PNEUMONIA OF LEFT LOWER LOBE DUE TO INFECTIOUS ORGANISM: ICD-10-CM

## 2024-06-14 DIAGNOSIS — I48.91 A-FIB: ICD-10-CM

## 2024-06-14 PROBLEM — E11.9 DIABETES: Status: ACTIVE | Noted: 2024-06-14

## 2024-06-14 PROBLEM — Z95.5 HISTORY OF CORONARY ARTERY STENT PLACEMENT: Status: ACTIVE | Noted: 2024-06-14

## 2024-06-14 LAB
ALBUMIN SERPL BCP-MCNC: 3.4 G/DL (ref 3.5–5.2)
ALLENS TEST: ABNORMAL
ALP SERPL-CCNC: 105 U/L (ref 55–135)
ALT SERPL W/O P-5'-P-CCNC: 9 U/L (ref 10–44)
ANION GAP SERPL CALC-SCNC: 3 MMOL/L (ref 8–16)
AST SERPL-CCNC: 11 U/L (ref 10–40)
BASOPHILS # BLD AUTO: 0.03 K/UL (ref 0–0.2)
BASOPHILS NFR BLD: 0.2 % (ref 0–1.9)
BILIRUB SERPL-MCNC: 0.5 MG/DL (ref 0.1–1)
BNP SERPL-MCNC: 136 PG/ML (ref 0–99)
BUN SERPL-MCNC: 10 MG/DL (ref 8–23)
CALCIUM SERPL-MCNC: 9.2 MG/DL (ref 8.7–10.5)
CHLORIDE SERPL-SCNC: 91 MMOL/L (ref 95–110)
CO2 SERPL-SCNC: >45 MMOL/L (ref 23–29)
CREAT SERPL-MCNC: 0.5 MG/DL (ref 0.5–1.4)
DELSYS: ABNORMAL
DIFFERENTIAL METHOD BLD: ABNORMAL
EOSINOPHIL # BLD AUTO: 0.2 K/UL (ref 0–0.5)
EOSINOPHIL NFR BLD: 1.1 % (ref 0–8)
EP: 6
ERYTHROCYTE [DISTWIDTH] IN BLOOD BY AUTOMATED COUNT: 13.2 % (ref 11.5–14.5)
ERYTHROCYTE [SEDIMENTATION RATE] IN BLOOD BY WESTERGREN METHOD: 15 MM/H
EST. GFR  (NO RACE VARIABLE): >60 ML/MIN/1.73 M^2
FIO2: 45
GLUCOSE SERPL-MCNC: 241 MG/DL (ref 70–110)
GLUCOSE SERPL-MCNC: 248 MG/DL (ref 70–110)
GLUCOSE SERPL-MCNC: 325 MG/DL (ref 70–110)
HCO3 UR-SCNC: 48 MMOL/L (ref 24–28)
HCT VFR BLD AUTO: 36.9 % (ref 37–48.5)
HCT VFR BLD CALC: 37 %PCV (ref 36–54)
HGB BLD-MCNC: 10.4 G/DL (ref 12–16)
IMM GRANULOCYTES # BLD AUTO: 0.07 K/UL (ref 0–0.04)
IMM GRANULOCYTES NFR BLD AUTO: 0.5 % (ref 0–0.5)
IP: 14
LYMPHOCYTES # BLD AUTO: 1.7 K/UL (ref 1–4.8)
LYMPHOCYTES NFR BLD: 10.9 % (ref 18–48)
MCH RBC QN AUTO: 29.1 PG (ref 27–31)
MCHC RBC AUTO-ENTMCNC: 28.2 G/DL (ref 32–36)
MCV RBC AUTO: 103 FL (ref 82–98)
MIN VOL: 7.8
MODE: ABNORMAL
MONOCYTES # BLD AUTO: 1.4 K/UL (ref 0.3–1)
MONOCYTES NFR BLD: 9.2 % (ref 4–15)
NEUTROPHILS # BLD AUTO: 11.8 K/UL (ref 1.8–7.7)
NEUTROPHILS NFR BLD: 78.1 % (ref 38–73)
NRBC BLD-RTO: 0 /100 WBC
PCO2 BLDA: 98.3 MMHG (ref 35–45)
PH SMN: 7.3 [PH] (ref 7.35–7.45)
PLATELET # BLD AUTO: 208 K/UL (ref 150–450)
PMV BLD AUTO: 10.6 FL (ref 9.2–12.9)
PO2 BLDA: 54 MMHG (ref 80–100)
POC BE: 22 MMOL/L
POC IONIZED CALCIUM: 1.22 MMOL/L (ref 1.06–1.42)
POC SATURATED O2: 81 % (ref 95–100)
POC TCO2: >50 MMOL/L (ref 23–27)
POTASSIUM BLD-SCNC: 4 MMOL/L (ref 3.5–5.1)
POTASSIUM SERPL-SCNC: 4 MMOL/L (ref 3.5–5.1)
PROT SERPL-MCNC: 7.2 G/DL (ref 6–8.4)
RBC # BLD AUTO: 3.58 M/UL (ref 4–5.4)
SAMPLE: ABNORMAL
SITE: ABNORMAL
SODIUM BLD-SCNC: 138 MMOL/L (ref 136–145)
SODIUM SERPL-SCNC: 139 MMOL/L (ref 136–145)
SP02: 91
SPONT RATE: 16
TROPONIN I SERPL HS-MCNC: 4.7 PG/ML (ref 0–14.9)
WBC # BLD AUTO: 15.07 K/UL (ref 3.9–12.7)

## 2024-06-14 PROCEDURE — 96365 THER/PROPH/DIAG IV INF INIT: CPT

## 2024-06-14 PROCEDURE — 83880 ASSAY OF NATRIURETIC PEPTIDE: CPT | Performed by: EMERGENCY MEDICINE

## 2024-06-14 PROCEDURE — 99900035 HC TECH TIME PER 15 MIN (STAT)

## 2024-06-14 PROCEDURE — 85025 COMPLETE CBC W/AUTO DIFF WBC: CPT | Performed by: EMERGENCY MEDICINE

## 2024-06-14 PROCEDURE — 82330 ASSAY OF CALCIUM: CPT

## 2024-06-14 PROCEDURE — 93005 ELECTROCARDIOGRAM TRACING: CPT | Performed by: INTERNAL MEDICINE

## 2024-06-14 PROCEDURE — 36415 COLL VENOUS BLD VENIPUNCTURE: CPT | Performed by: HOSPITALIST

## 2024-06-14 PROCEDURE — 94644 CONT INHLJ TX 1ST HOUR: CPT

## 2024-06-14 PROCEDURE — 99900031 HC PATIENT EDUCATION (STAT)

## 2024-06-14 PROCEDURE — 21000000 HC CCU ICU ROOM CHARGE

## 2024-06-14 PROCEDURE — 84484 ASSAY OF TROPONIN QUANT: CPT | Performed by: EMERGENCY MEDICINE

## 2024-06-14 PROCEDURE — 82803 BLOOD GASES ANY COMBINATION: CPT

## 2024-06-14 PROCEDURE — 63600175 PHARM REV CODE 636 W HCPCS: Performed by: HOSPITALIST

## 2024-06-14 PROCEDURE — 99285 EMERGENCY DEPT VISIT HI MDM: CPT | Mod: 25

## 2024-06-14 PROCEDURE — 80053 COMPREHEN METABOLIC PANEL: CPT | Performed by: EMERGENCY MEDICINE

## 2024-06-14 PROCEDURE — 36415 COLL VENOUS BLD VENIPUNCTURE: CPT | Performed by: EMERGENCY MEDICINE

## 2024-06-14 PROCEDURE — 63600175 PHARM REV CODE 636 W HCPCS: Performed by: INTERNAL MEDICINE

## 2024-06-14 PROCEDURE — 94640 AIRWAY INHALATION TREATMENT: CPT

## 2024-06-14 PROCEDURE — 25000003 PHARM REV CODE 250: Performed by: EMERGENCY MEDICINE

## 2024-06-14 PROCEDURE — 84295 ASSAY OF SERUM SODIUM: CPT

## 2024-06-14 PROCEDURE — 25000003 PHARM REV CODE 250: Performed by: HOSPITALIST

## 2024-06-14 PROCEDURE — 5A09357 ASSISTANCE WITH RESPIRATORY VENTILATION, LESS THAN 24 CONSECUTIVE HOURS, CONTINUOUS POSITIVE AIRWAY PRESSURE: ICD-10-PCS | Performed by: HOSPITALIST

## 2024-06-14 PROCEDURE — 83036 HEMOGLOBIN GLYCOSYLATED A1C: CPT | Performed by: HOSPITALIST

## 2024-06-14 PROCEDURE — 84132 ASSAY OF SERUM POTASSIUM: CPT

## 2024-06-14 PROCEDURE — 94799 UNLISTED PULMONARY SVC/PX: CPT

## 2024-06-14 PROCEDURE — 25000242 PHARM REV CODE 250 ALT 637 W/ HCPCS: Performed by: INTERNAL MEDICINE

## 2024-06-14 PROCEDURE — 36600 WITHDRAWAL OF ARTERIAL BLOOD: CPT

## 2024-06-14 PROCEDURE — 27000221 HC OXYGEN, UP TO 24 HOURS

## 2024-06-14 PROCEDURE — 94761 N-INVAS EAR/PLS OXIMETRY MLT: CPT | Mod: XB

## 2024-06-14 PROCEDURE — 63600175 PHARM REV CODE 636 W HCPCS: Performed by: EMERGENCY MEDICINE

## 2024-06-14 PROCEDURE — 85014 HEMATOCRIT: CPT

## 2024-06-14 PROCEDURE — 96366 THER/PROPH/DIAG IV INF ADDON: CPT

## 2024-06-14 PROCEDURE — 93010 ELECTROCARDIOGRAM REPORT: CPT | Mod: ,,, | Performed by: INTERNAL MEDICINE

## 2024-06-14 PROCEDURE — 87040 BLOOD CULTURE FOR BACTERIA: CPT | Mod: 59 | Performed by: EMERGENCY MEDICINE

## 2024-06-14 PROCEDURE — 96375 TX/PRO/DX INJ NEW DRUG ADDON: CPT

## 2024-06-14 PROCEDURE — 25000242 PHARM REV CODE 250 ALT 637 W/ HCPCS: Performed by: EMERGENCY MEDICINE

## 2024-06-14 RX ORDER — CHLORZOXAZONE 750 MG/1
750 TABLET ORAL 3 TIMES DAILY PRN
Status: DISCONTINUED | OUTPATIENT
Start: 2024-06-14 | End: 2024-06-20 | Stop reason: HOSPADM

## 2024-06-14 RX ORDER — EMPAGLIFLOZIN 10 MG/1
10 TABLET, FILM COATED ORAL DAILY
COMMUNITY
Start: 2024-03-05 | End: 2024-06-14 | Stop reason: SDUPTHER

## 2024-06-14 RX ORDER — NYSTATIN 100000 [USP'U]/ML
5 SUSPENSION ORAL 4 TIMES DAILY
Status: ON HOLD | COMMUNITY
Start: 2024-05-15 | End: 2024-06-20 | Stop reason: HOSPADM

## 2024-06-14 RX ORDER — IBUPROFEN 200 MG
16 TABLET ORAL
Status: DISCONTINUED | OUTPATIENT
Start: 2024-06-14 | End: 2024-06-14

## 2024-06-14 RX ORDER — ALBUTEROL SULFATE 90 UG/1
1 AEROSOL, METERED RESPIRATORY (INHALATION) EVERY 4 HOURS PRN
COMMUNITY
Start: 2024-06-13

## 2024-06-14 RX ORDER — SITAGLIPTIN 50 MG/1
50 TABLET, FILM COATED ORAL DAILY
COMMUNITY
Start: 2024-06-10

## 2024-06-14 RX ORDER — MECLIZINE HYDROCHLORIDE 25 MG/1
25 TABLET ORAL 4 TIMES DAILY PRN
COMMUNITY
Start: 2024-05-14

## 2024-06-14 RX ORDER — EZETIMIBE 10 MG/1
10 TABLET ORAL DAILY
COMMUNITY
Start: 2024-05-22

## 2024-06-14 RX ORDER — MECLIZINE HCL 12.5 MG 12.5 MG/1
25 TABLET ORAL 4 TIMES DAILY PRN
Status: DISCONTINUED | OUTPATIENT
Start: 2024-06-14 | End: 2024-06-20 | Stop reason: HOSPADM

## 2024-06-14 RX ORDER — GLUCAGON 1 MG
1 KIT INJECTION
Status: DISCONTINUED | OUTPATIENT
Start: 2024-06-14 | End: 2024-06-15

## 2024-06-14 RX ORDER — METHYLPREDNISOLONE SOD SUCC 125 MG
125 VIAL (EA) INJECTION
Status: DISCONTINUED | OUTPATIENT
Start: 2024-06-14 | End: 2024-06-14

## 2024-06-14 RX ORDER — CHLORZOXAZONE 250 MG/1
250 TABLET ORAL 3 TIMES DAILY PRN
Status: DISCONTINUED | OUTPATIENT
Start: 2024-06-14 | End: 2024-06-14

## 2024-06-14 RX ORDER — IBUPROFEN 200 MG
16 TABLET ORAL
Status: DISCONTINUED | OUTPATIENT
Start: 2024-06-14 | End: 2024-06-15

## 2024-06-14 RX ORDER — NALOXONE HCL 0.4 MG/ML
0.02 VIAL (ML) INJECTION
Status: DISCONTINUED | OUTPATIENT
Start: 2024-06-14 | End: 2024-06-20 | Stop reason: HOSPADM

## 2024-06-14 RX ORDER — IPRATROPIUM BROMIDE AND ALBUTEROL SULFATE 2.5; .5 MG/3ML; MG/3ML
3 SOLUTION RESPIRATORY (INHALATION)
Status: DISCONTINUED | OUTPATIENT
Start: 2024-06-14 | End: 2024-06-20 | Stop reason: HOSPADM

## 2024-06-14 RX ORDER — ATORVASTATIN CALCIUM 40 MG/1
80 TABLET, FILM COATED ORAL NIGHTLY
Status: DISCONTINUED | OUTPATIENT
Start: 2024-06-14 | End: 2024-06-20 | Stop reason: HOSPADM

## 2024-06-14 RX ORDER — SODIUM CHLORIDE 0.9 % (FLUSH) 0.9 %
3 SYRINGE (ML) INJECTION
Status: DISCONTINUED | OUTPATIENT
Start: 2024-06-14 | End: 2024-06-20 | Stop reason: HOSPADM

## 2024-06-14 RX ORDER — IBUPROFEN 200 MG
24 TABLET ORAL
Status: DISCONTINUED | OUTPATIENT
Start: 2024-06-14 | End: 2024-06-14

## 2024-06-14 RX ORDER — GUAIFENESIN 600 MG/1
1200 TABLET, EXTENDED RELEASE ORAL 2 TIMES DAILY
COMMUNITY

## 2024-06-14 RX ORDER — GUAIFENESIN 600 MG/1
1200 TABLET, EXTENDED RELEASE ORAL 2 TIMES DAILY
Status: DISCONTINUED | OUTPATIENT
Start: 2024-06-14 | End: 2024-06-20 | Stop reason: HOSPADM

## 2024-06-14 RX ORDER — INSULIN ASPART 100 [IU]/ML
0-10 INJECTION, SOLUTION INTRAVENOUS; SUBCUTANEOUS
Status: DISCONTINUED | OUTPATIENT
Start: 2024-06-14 | End: 2024-06-15

## 2024-06-14 RX ORDER — SODIUM CHLORIDE 0.9 % (FLUSH) 0.9 %
10 SYRINGE (ML) INJECTION EVERY 12 HOURS PRN
Status: DISCONTINUED | OUTPATIENT
Start: 2024-06-14 | End: 2024-06-20 | Stop reason: HOSPADM

## 2024-06-14 RX ORDER — GLUCAGON 1 MG
1 KIT INJECTION
Status: DISCONTINUED | OUTPATIENT
Start: 2024-06-14 | End: 2024-06-14

## 2024-06-14 RX ORDER — IPRATROPIUM BROMIDE AND ALBUTEROL SULFATE 2.5; .5 MG/3ML; MG/3ML
3 SOLUTION RESPIRATORY (INHALATION)
Status: COMPLETED | OUTPATIENT
Start: 2024-06-14 | End: 2024-06-14

## 2024-06-14 RX ORDER — TIOTROPIUM BROMIDE 18 UG/1
1 CAPSULE ORAL; RESPIRATORY (INHALATION) DAILY
COMMUNITY
Start: 2024-05-13

## 2024-06-14 RX ORDER — ASPIRIN 325 MG
325 TABLET ORAL
Status: COMPLETED | OUTPATIENT
Start: 2024-06-14 | End: 2024-06-14

## 2024-06-14 RX ORDER — AMLODIPINE BESYLATE 5 MG/1
5 TABLET ORAL DAILY
Status: DISCONTINUED | OUTPATIENT
Start: 2024-06-14 | End: 2024-06-15

## 2024-06-14 RX ORDER — GABAPENTIN 300 MG/1
600 CAPSULE ORAL 3 TIMES DAILY
Status: DISCONTINUED | OUTPATIENT
Start: 2024-06-14 | End: 2024-06-20 | Stop reason: HOSPADM

## 2024-06-14 RX ORDER — PANTOPRAZOLE SODIUM 40 MG/1
40 TABLET, DELAYED RELEASE ORAL 2 TIMES DAILY
Status: DISCONTINUED | OUTPATIENT
Start: 2024-06-14 | End: 2024-06-20 | Stop reason: HOSPADM

## 2024-06-14 RX ORDER — LEVOFLOXACIN 5 MG/ML
750 INJECTION, SOLUTION INTRAVENOUS
Status: COMPLETED | OUTPATIENT
Start: 2024-06-14 | End: 2024-06-14

## 2024-06-14 RX ORDER — LEVOFLOXACIN 750 MG/1
750 TABLET ORAL DAILY
Status: DISCONTINUED | OUTPATIENT
Start: 2024-06-15 | End: 2024-06-18

## 2024-06-14 RX ORDER — LEVOFLOXACIN 750 MG/1
750 TABLET ORAL DAILY
Status: DISCONTINUED | OUTPATIENT
Start: 2024-06-14 | End: 2024-06-14

## 2024-06-14 RX ORDER — IBUPROFEN 200 MG
24 TABLET ORAL
Status: DISCONTINUED | OUTPATIENT
Start: 2024-06-14 | End: 2024-06-15

## 2024-06-14 RX ORDER — PREDNISONE 20 MG/1
20 TABLET ORAL 2 TIMES DAILY
Status: ON HOLD | COMMUNITY
Start: 2024-05-27 | End: 2024-06-20 | Stop reason: HOSPADM

## 2024-06-14 RX ADMIN — ASPIRIN 325 MG ORAL TABLET 325 MG: 325 PILL ORAL at 05:06

## 2024-06-14 RX ADMIN — GUAIFENESIN 1200 MG: 600 TABLET, EXTENDED RELEASE ORAL at 08:06

## 2024-06-14 RX ADMIN — IPRATROPIUM BROMIDE AND ALBUTEROL SULFATE 3 ML: .5; 3 SOLUTION RESPIRATORY (INHALATION) at 04:06

## 2024-06-14 RX ADMIN — IPRATROPIUM BROMIDE AND ALBUTEROL SULFATE 3 ML: 2.5; .5 SOLUTION RESPIRATORY (INHALATION) at 01:06

## 2024-06-14 RX ADMIN — METHYLPREDNISOLONE SODIUM SUCCINATE 40 MG: 40 INJECTION, POWDER, FOR SOLUTION INTRAMUSCULAR; INTRAVENOUS at 12:06

## 2024-06-14 RX ADMIN — IPRATROPIUM BROMIDE AND ALBUTEROL SULFATE 3 ML: 2.5; .5 SOLUTION RESPIRATORY (INHALATION) at 07:06

## 2024-06-14 RX ADMIN — LEVOFLOXACIN 750 MG: 750 INJECTION, SOLUTION INTRAVENOUS at 05:06

## 2024-06-14 RX ADMIN — CHLORZOXAZONE 750 MG: 750 TABLET ORAL at 08:06

## 2024-06-14 RX ADMIN — IPRATROPIUM BROMIDE AND ALBUTEROL SULFATE 3 ML: 2.5; .5 SOLUTION RESPIRATORY (INHALATION) at 08:06

## 2024-06-14 RX ADMIN — PANTOPRAZOLE SODIUM 40 MG: 40 TABLET, DELAYED RELEASE ORAL at 08:06

## 2024-06-14 RX ADMIN — ATORVASTATIN CALCIUM 80 MG: 40 TABLET, FILM COATED ORAL at 08:06

## 2024-06-14 RX ADMIN — INSULIN ASPART 4 UNITS: 100 INJECTION, SOLUTION INTRAVENOUS; SUBCUTANEOUS at 09:06

## 2024-06-14 RX ADMIN — GABAPENTIN 600 MG: 300 CAPSULE ORAL at 08:06

## 2024-06-14 RX ADMIN — METHYLPREDNISOLONE SODIUM SUCCINATE 40 MG: 40 INJECTION, POWDER, FOR SOLUTION INTRAMUSCULAR; INTRAVENOUS at 08:06

## 2024-06-14 RX ADMIN — AMLODIPINE BESYLATE 5 MG: 5 TABLET ORAL at 08:06

## 2024-06-14 RX ADMIN — MECLIZINE 25 MG: 12.5 TABLET ORAL at 08:06

## 2024-06-14 NOTE — ASSESSMENT & PLAN NOTE
Patient's COPD is with exacerbation noted by continued dyspnea and use of accessory muscles for breathing currently.  Patient is currently on COPD Pathway. Continue scheduled inhalers Steroids, Antibiotics, and Supplemental oxygen and monitor respiratory status closely.   Continue BiPAP and admitted to step-down  Repeat ABG in 1-2 hours  Wean BiPAP as able

## 2024-06-14 NOTE — ASSESSMENT & PLAN NOTE
"Patient's FSGs are controlled on current medication regimen.  Last A1c reviewed-   Lab Results   Component Value Date    HGBA1C 7.0 (H) 06/07/2022     Most recent fingerstick glucose reviewed- No results for input(s): "POCTGLUCOSE" in the last 24 hours.  Current correctional scale  Low  Maintain anti-hyperglycemic dose as follows-   Antihyperglycemics (From admission, onward)      None          Hold Oral hypoglycemics while patient is in the hospital.  "

## 2024-06-14 NOTE — ASSESSMENT & PLAN NOTE
Chronic, controlled. Latest blood pressure and vitals reviewed-     Temp:  [98.4 °F (36.9 °C)]   Pulse:  [55-60]   Resp:  [21-30]   BP: (134-181)/(61-76)   SpO2:  [84 %-93 %] .   Home meds for hypertension were reviewed and noted below.   Hypertension Medications               amLODIPine (NORVASC) 5 MG tablet Take 5 mg by mouth once daily.    propranoloL (INDERAL LA) 80 MG 24 hr capsule Take 160 mg by mouth once daily.            While in the hospital, will manage blood pressure as follows; Continue home antihypertensive regimen    Will utilize p.r.n. blood pressure medication only if patient's blood pressure greater than 140/90 and she develops symptoms such as worsening chest pain or shortness of breath.

## 2024-06-14 NOTE — PHARMACY MED REC
"              .        Admission Medication History     The home medication history was taken by Esthela Francis.    You may go to "Admission" then "Reconcile Home Medications" tabs to review and/or act upon these items.     The home medication list has been updated by the Pharmacy department.   Please read ALL comments highlighted in yellow.   Please address this information as you see fit.    Feel free to contact us if you have any questions or require assistance.        Medications listed below were obtained from: Patient/family and Analytic software- [employer]  No current facility-administered medications on file prior to encounter.     Current Outpatient Medications on File Prior to Encounter   Medication Sig Dispense Refill    albuterol (PROVENTIL/VENTOLIN HFA) 90 mcg/actuation inhaler Inhale 1 puff into the lungs every 4 (four) hours as needed for Wheezing or Shortness of Breath.      albuterol-ipratropium (DUO-NEB) 2.5 mg-0.5 mg/3 mL nebulizer solution Take 3 mLs by nebulization every 6 (six) hours as needed for Wheezing or Shortness of Breath. Rescue 75 mL 3    amLODIPine (NORVASC) 5 MG tablet Take 5 mg by mouth once daily.      arnica 20 % Tinc Apply 1 application topically once daily.      ascorbic acid, vitamin C, (VITAMIN C) 500 MG tablet Take 500 mg by mouth once daily.      aspirin 81 MG Chew Take 81 mg by mouth once daily.      atorvastatin (LIPITOR) 80 MG tablet Take 80 mg by mouth every evening.      chlorzoxazone (PARAFON FORTE) 250 MG tablet Take 250 mg by mouth 3 (three) times daily as needed for Muscle spasms.      clopidogreL (PLAVIX) 75 mg tablet Take 1 tablet (75 mg total) by mouth once daily.      desonide (DESOWEN) 0.05 % cream Apply 1 application topically 2 (two) times daily.      estradioL (ESTRACE) 0.01 % (0.1 mg/gram) vaginal cream Place 1 g vaginally every 7 days. Monday's      ezetimibe (ZETIA) 10 mg tablet Take 10 mg by mouth once daily.      fluconazole (DIFLUCAN) 150 MG Tab Take 1 " tablet (150 mg total) by mouth every other day. 2 tablet 0    gabapentin (NEURONTIN) 600 MG tablet Take 600 mg by mouth 3 (three) times daily.      guaiFENesin (MUCINEX) 600 mg 12 hr tablet Take 1,200 mg by mouth 2 (two) times daily.      HUMIRA,CF, 40 mg/0.4 mL SyKt Inject 0.4 mLs into the skin every 7 days.      hydrocortisone 0.5 % cream Apply 1 application topically 2 (two) times daily as needed.      JANUVIA 50 mg Tab Take 50 mg by mouth once daily.      LEVOXYL 150 mcg tablet Take 150 mcg by mouth once daily.      meclizine (ANTIVERT) 25 mg tablet Take 25 mg by mouth 4 (four) times daily as needed for Dizziness or Nausea.      pantoprazole (PROTONIX) 40 MG tablet Take 1 tablet (40 mg total) by mouth 2 (two) times daily. 60 tablet 1    predniSONE (DELTASONE) 20 MG tablet Take 20 mg by mouth 2 (two) times daily.      propranoloL (INDERAL LA) 80 MG 24 hr capsule Take 160 mg by mouth once daily.      tiotropium (SPIRIVA) 18 mcg inhalation capsule Inhale 1 capsule into the lungs once daily.      metFORMIN (GLUCOPHAGE) 850 MG tablet Take 850 mg by mouth nightly. (Patient not taking: Reported on 6/14/2024)      nystatin (MYCOSTATIN) 100,000 unit/mL suspension Take 5 mLs by mouth 4 (four) times daily. (Patient not taking: Reported on 6/14/2024)      [DISCONTINUED] ciprofloxacin HCl (CIPRO) 500 MG tablet Take 1 tablet (500 mg total) by mouth every 12 (twelve) hours. 16 tablet 0    [DISCONTINUED] HYDROcodone-acetaminophen (NORCO) 5-325 mg per tablet Take 1 tablet by mouth every 6 (six) hours as needed for Pain. 25 tablet 0    [DISCONTINUED] JARDIANCE 10 mg tablet Take 10 mg by mouth once daily.      [DISCONTINUED] promethazine (PHENERGAN) 12.5 MG Tab Take 1 tablet (12.5 mg total) by mouth 4 (four) times daily. 25 tablet 0       Potential issues to be addressed PRIOR TO DISCHARGE  Patient reported not taking the following medications: (Metformin, Nystatin). These medications remain on the home medication list. Please  address accordingly.     Esthela Francis  EXT [phone]

## 2024-06-14 NOTE — HPI
64y.o. female  with  a past medical history of Coronary artery disease, Diabetes mellitus, and Hypertension., COPD came with SOB.  Patient is ex-smoker and currently on 2 L oxygen for COPD. no history of sick contact and no fever at home.  .She became gradually worsening shortness of breath with cough with past 2 days and extreme fatigue and came to the hospital.  Patient has been using  increased oxygen 5 L at home but still having shortness of breath and called EMS and she was hypoxic  at 84 with 5 L of oxygen at arrival   Denied having chest pain, no leg swelling and patient was given IV Solu-Medrol and nebulization treatments and later started on BIPAP. ABG was done but result not reported .  Medication reconciliation was not done in the ER and not able to reconcile  On examination patient is not in extreme shortness of breath, she is able to talk out of the BiPAP, lung exam with limited air entry.  Patient has leukocytosis but no fever and chest x-ray possible pneumonia and admitted

## 2024-06-14 NOTE — NURSING
Nurses Note -- 4 Eyes      6/14/2024   4:56 PM      Skin assessed during: Admit      [] No Altered Skin Integrity Present    []Prevention Measures Documented      [x] Yes- Altered Skin Integrity Present or Discovered   [x] LDA Added if Not in Epic (Describe Wound)   [x] New Altered Skin Integrity was Present on Admit and Documented in LDA   [x] Wound Image Taken    Wound Care Consulted? No    Attending Nurse:  Alia Forte RN/Staff Member:  CARLOS Alvarez

## 2024-06-14 NOTE — ED PROVIDER NOTES
Encounter Date: 2024       History     Chief Complaint   Patient presents with    Shortness of Breath     C/o sob x 2days. Hx ofCOPD     64-year-old female presented emergency department with 2 days of gradually worsening shortness of breath.  Patient does have cough.  Patient has COPD and patient usually uses 2 L of oxygen and patient has been using 5 L of oxygen and still extremely short of breath and upon arrival EMS noted that she was hypoxic at 84 with 5 L of oxygen.  Patient has diminished breath sounds as well and patient was given Solu-Medrol and breathing treatment prior to arrival.  Patient denies any chest pain however states she feels tightness in the chest consistent with her COPD exacerbation.  Patient has history of previous cardiac stent.  Denies dysuria or hematuria.  Denies any focal weakness or numbness.      Review of patient's allergies indicates:   Allergen Reactions    Pcn [penicillins] Anaphylaxis    Adhesive     Floxacillin     Keflex [cephalexin]     Sulfa (sulfonamide antibiotics)     Zithromax [azithromycin]     Zofran [ondansetron hcl (pf)]      Dizzy vomiting       Past Medical History:   Diagnosis Date    Coronary artery disease     cardiac stent    DDD (degenerative disc disease), lumbar     Diabetes mellitus     Hypertension     Thyroid disease      Past Surgical History:   Procedure Laterality Date     SECTION  08/1987    x2 1993    CORONARY STENT PLACEMENT      EXPLORATORY LAPAROTOMY      Religious     HEMORRHOID SURGERY      LAPAROSCOPIC CHOLECYSTECTOMY N/A 2022    Procedure: CHOLECYSTECTOMY, LAPAROSCOPIC;  Surgeon: Leonardo Wilson III, MD;  Location: [facility];  Service: General;  Laterality: N/A;    LUMBAR DISC SURGERY      PILONIDAL CYST DRAINAGE      TONSILLECTOMY      as a child (also adenoids)     No family history on file.  Social History     Tobacco Use    Smoking status: Former     Current packs/day:  0.00     Average packs/day: 0.5 packs/day for 40.0 years (20.0 ttl pk-yrs)     Types: Cigarettes     Start date: 1982     Quit date: 2022     Years since quittin.1    Smokeless tobacco: Never    Tobacco comments:     1 year ago   Substance Use Topics    Alcohol use: No    Drug use: No     Review of Systems   Constitutional: Negative.    HENT: Negative.     Eyes: Negative.    Respiratory:  Positive for cough, chest tightness, shortness of breath and wheezing.    Gastrointestinal: Negative.    Endocrine: Negative.    Genitourinary: Negative.    Musculoskeletal: Negative.    Skin: Negative.    Allergic/Immunologic: Negative.    Neurological: Negative.    Hematological: Negative.    Psychiatric/Behavioral: Negative.     All other systems reviewed and are negative.      Physical Exam     Initial Vitals [24 0436]   BP Pulse Resp Temp SpO2   (!) 181/76 60 (!) 22 98.4 °F (36.9 °C) (!) 84 %      MAP       --         Physical Exam    Nursing note and vitals reviewed.  Constitutional: She appears well-developed and well-nourished.   HENT:   Head: Normocephalic and atraumatic.   Nose: Nose normal.   Mouth/Throat: Oropharynx is clear and moist.   Eyes: Conjunctivae and EOM are normal. Pupils are equal, round, and reactive to light.   Neck: Neck supple. No thyromegaly present. No tracheal deviation present. No JVD present.   Accessory muscle use noted   Normal range of motion.  Cardiovascular:  Normal rate, regular rhythm, normal heart sounds and intact distal pulses.           No murmur heard.  Pulmonary/Chest: No stridor. She is in respiratory distress. She has no wheezes. She has rhonchi. She has rales.   Diminished air movement   Abdominal: Abdomen is soft. Bowel sounds are normal. She exhibits no distension. There is no abdominal tenderness.   Musculoskeletal:         General: Edema present. No tenderness. Normal range of motion.      Cervical back: Normal range of motion and neck supple.     Neurological:  She is alert and oriented to person, place, and time. GCS score is 15. GCS eye subscore is 4. GCS verbal subscore is 5. GCS motor subscore is 6.   Skin: Skin is warm. Capillary refill takes less than 2 seconds.   Psychiatric: She has a normal mood and affect. Thought content normal.         ED Course   Critical Care    Date/Time: 6/14/2024 4:48 AM    Performed by: Zakia Adkins MD  Authorized by: Zakia Adkins MD  Direct patient critical care time: 20 minutes  Ordering / reviewing critical care time: 5 minutes  Documentation critical care time: 5 minutes  Total critical care time (exclusive of procedural time) : 30 minutes        Labs Reviewed   CBC W/ AUTO DIFFERENTIAL - Abnormal; Notable for the following components:       Result Value    WBC 15.07 (*)     RBC 3.58 (*)     Hemoglobin 10.4 (*)     Hematocrit 36.9 (*)      (*)     MCHC 28.2 (*)     Gran # (ANC) 11.8 (*)     Immature Grans (Abs) 0.07 (*)     Mono # 1.4 (*)     Gran % 78.1 (*)     Lymph % 10.9 (*)     All other components within normal limits   COMPREHENSIVE METABOLIC PANEL - Abnormal; Notable for the following components:    Chloride 91 (*)     CO2 >45 (*)     Glucose 248 (*)     Albumin 3.4 (*)     ALT 9 (*)     Anion Gap 3 (*)     All other components within normal limits   B-TYPE NATRIURETIC PEPTIDE - Abnormal; Notable for the following components:     (*)     All other components within normal limits   CULTURE, BLOOD   CULTURE, BLOOD   TROPONIN I HIGH SENSITIVITY   CBC WITHOUT DIFFERENTIAL     EKG Readings: (Independently Interpreted)   Initial Reading: No STEMI. Rhythm: Normal Sinus Rhythm. Ectopy: No Ectopy. Conduction: Normal.       Imaging Results              X-Ray Chest AP Portable (Final result)  Result time 06/14/24 05:35:09      Final result by Topher Rosario MD (06/14/24 05:35:09)                   Impression:      Basilar opacity on the left may relate to mild pleural effusion with pulmonary infiltrate/airspace disease  as well as peripheral pulmonary infiltrate at the mid left hemithorax.    Suspected mild infiltrate at the right base as well.      Electronically signed by: Topher Rosario  Date:    06/14/2024  Time:    05:35               Narrative:    EXAMINATION:  XR CHEST AP PORTABLE    CLINICAL HISTORY:  CHF;    TECHNIQUE:  Single frontal view of the chest was performed.    COMPARISON:  Chest radiograph June 7, 2022    FINDINGS:  Single portable chest view is submitted.  When accounting for position and technique and depth of inspiration, the appearance of the cardiomediastinal silhouette is stable.  Aortic atherosclerotic change noted.    There is accentuated attenuation at the lung bases which in part is likely due to overlying soft tissue attenuation, however asymmetric density at the left lung base may relate to a component of small pleural effusion as well as suspected pulmonary infiltrate/airspace disease, peripheral opacity at the mid left hemithorax may relate to pulmonary infiltrate/airspace disease as well.  There may be a component of mild infiltrate at the right base, there is no pleural fluid on the right and bilaterally there is no pneumothorax.    The osseous structures demonstrate chronic change.                                    X-Rays:   Independently Interpreted Readings:   Other Readings:  Chest x-ray shows left lower lobe infiltrate versus pleural effusion    Medications   methylPREDNISolone sodium succinate injection 125 mg (125 mg Intravenous Not Given 6/14/24 4395)   levoFLOXacin 750 mg/150 mL IVPB 750 mg (750 mg Intravenous New Bag 6/14/24 0502)   sodium chloride 0.9% flush 10 mL (has no administration in time range)   naloxone 0.4 mg/mL injection 0.02 mg (has no administration in time range)   glucose chewable tablet 16 g (has no administration in time range)   glucose chewable tablet 24 g (has no administration in time range)   dextrose 50% injection 12.5 g (has no administration in time range)    dextrose 50% injection 25 g (has no administration in time range)   glucagon (human recombinant) injection 1 mg (has no administration in time range)   albuterol-ipratropium 2.5 mg-0.5 mg/3 mL nebulizer solution 3 mL (3 mLs Nebulization Given 6/14/24 0827)   aspirin tablet 325 mg (325 mg Oral Given 6/14/24 0500)     Medical Decision Making  64-year-old female with cough and congestion and shortness of breath and hypoxia.  Patient has presentation consistent with COPD exacerbation however there is a possibility this could be secondary to pneumonia causing hypoxia and likely as pleural effusion.  CHF in the differential as well.  Patient does have edema in the legs.  Patient would need further evaluation with echocardiogram in the hospital.  Patient responded to BiPAP and breathing treatments.  Started on broad-spectrum antibiotic.  Steroid given by EMS prior to arrival.  Hospital Medicine evaluating patient for admission given patient's improved symptoms.  Hypoxia improved with BiPAP as well.    Amount and/or Complexity of Data Reviewed  Labs: ordered. Decision-making details documented in ED Course.  Radiology: ordered. Decision-making details documented in ED Course.  ECG/medicine tests: ordered. Decision-making details documented in ED Course.    Risk  OTC drugs.  Prescription drug management.                                      Clinical Impression:  Final diagnoses:  [R06.02] Shortness of breath  [J44.1] COPD exacerbation (Primary)  [R09.02] Hypoxia  [J18.9] Pneumonia of left lower lobe due to infectious organism          ED Disposition Condition    Observation Serious                Zakia Adkins MD  06/14/24 5338

## 2024-06-14 NOTE — PROGRESS NOTES
No acute events overnight.  Patient stable at this time.  Currently on BiPAP.  Family at bedside.  We will wean oxygen as tolerated.  Continue current management follow up testing and wean oxygen as tolerated

## 2024-06-14 NOTE — RESPIRATORY THERAPY
06/14/24 0445   Patient Assessment/Suction   Level of Consciousness (AVPU) alert   Respiratory Effort Short of breath;Mild   Expansion/Accessory Muscles/Retractions no use of accessory muscles   Rhythm/Pattern, Respiratory shortness of breath;tachypneic   PRE-TX-O2   Device (Oxygen Therapy) BIPAP   Oxygen Concentration (%) 45   SpO2 (!) 92 %   Pulse Oximetry Type Continuous   $ Pulse Oximetry - Multiple Charge Pulse Oximetry - Multiple   Pulse (!) 56   Resp (!) 25   Aerosol Therapy   $ Aerosol Therapy Charges Aerosol Treatment;Initial Continuous   Daily Review of Necessity (SVN) completed   Respiratory Treatment Status (SVN) given;continuous   Treatment Route (SVN) in-line   Patient Position HOB elevated   Post Treatment Assessment (SVN) patient reports breathing improved   Signs of Intolerance (SVN) none   Breath Sounds Post-Respiratory Treatment   Post-treatment Heart Rate (beats/min) 56   Post-treatment Resp Rate (breaths/min) 27   Ready to Wean/Extubation Screen   FIO2<=50 (chart decimal) 0.45   Preset CPAP/BiPAP Settings   Mode Of Delivery BiPAP S/T   $ Initial CPAP/BiPAP Setup? Yes   $ Is patient using? Yes   Size of Mask Medium/Large   Sized Appropriately? Yes   Equipment Type V60   Airway Device Type medium full face mask   Humidifier not applicable   Ipap 16   EPAP (cm H2O) 8   Pressure Support (cm H2O) 8   Set Rate (Breaths/Min) 26   ITime (sec) 1   Rise Time (sec) 3   Patient CPAP/BiPAP Settings   CPAP/BIPAP ID 16   Timed Inspiration (Sec) 1   IPAP Rise Time (sec) 3   RR Total (Breaths/Min) 26   Tidal Volume (mL) 417   VE Minute Ventilation (L/min) 12 L/min   Peak Inspiratory Pressure (cm H2O) 16   TiTOT (%) 37   Total Leak (L/Min) 0   Patient Trigger - ST Mode Only (%) 42   CPAP/BiPAP Alarms   High Pressure (cm H2O) 40   Low Pressure (cm H2O) 5   High RR (breaths/min) 50   Low RR (breaths/min) 8   Apnea (Sec) 20   Education   $ Education Bronchodilator;DME Nebulizer;DME Oxygen;DME BiPAP;30 min    Tobacco Cessation Intervention   Do you use any type of tobacco product? No   Respiratory Evaluation   $ Care Plan Tech Time 15 min   $ Respiratory Evaluation Complete   Evaluation For New Orders   Pulmonary Diagnosis copd   Home Oxygen   Has Home Oxygen? Yes   Liter Flow 2   Duration continuous   Route nasal cannula   Mode continuous   Device home concentrator with portable unit   Home Aerosol, MDI, DPI, and Other Treatments/Therapies   Home Respiratory Therapy Per Patient/Review of Chart Yes   Aerosol Home Meds/Freq albuterol q6 prn   Oxygen Care Plan   Oxygen Care Plan Per Protocol   SPO2 Goal (%) 92% non-cardiac   Rationale SpO2 is <92% (Non-cardiac Pt.);Maintain Home oxygen   Bronchodilator Care Plan   Bronchodilator Care Plan Aerosol   Aerosol Meds w/ frequency Albuterol - Ipratropium (DUO-NEB) 0.5mg-3mg(2.5ml) 3ml Nebulizer Solution0.1-0.15mg/kg PRN   Rationale Maintain home respiratory medicine   Atelectasis Care Plan   Rationale No Rational Found   Airway Clearance Care Plan   Rationale No rationale found

## 2024-06-14 NOTE — ASSESSMENT & PLAN NOTE
Patient has a diagnosis of pneumonia. The cause of the pneumonia is unknown at this time. The pneumonia is stable. The patient has the following signs/symptoms of pneumonia: persistent hypoxia . The patient does have a current oxygen requirement and the patient does have a home oxygen requirement. I have reviewed the pertinent imaging. The following cultures have been collected: Blood cultures and Sputum culture The culture results are listed below.     Current antimicrobial regimen consists of the antibiotics listed below. Will monitor patient closely and Adjust treatment plan as follows      Antibiotics (From admission, onward)      Start     Stop Route Frequency Ordered    06/14/24 0445  levoFLOXacin 750 mg/150 mL IVPB 750 mg         06/14/24 1644 IV ED 1 Time 06/14/24 0437            Microbiology Results (last 7 days)       Procedure Component Value Units Date/Time    Blood culture [[id number]] Collected: 06/14/24 0454    Order Status: Sent Specimen: Blood from Peripheral, Antecubital, Right Updated: 06/14/24 0459    Blood culture [[id number]] Collected: 06/14/24 0450    Order Status: Sent Specimen: Blood from Peripheral, Hand, Left Updated: 06/14/24 0455

## 2024-06-14 NOTE — SUBJECTIVE & OBJECTIVE
Past Medical History:   Diagnosis Date    Coronary artery disease     cardiac stent    DDD (degenerative disc disease), lumbar 2000    Diabetes mellitus     Hypertension     Thyroid disease        Past Surgical History:   Procedure Laterality Date     SECTION  08/1987    x2 1993    CORONARY STENT PLACEMENT      EXPLORATORY LAPAROTOMY      Anabaptism     HEMORRHOID SURGERY      LAPAROSCOPIC CHOLECYSTECTOMY N/A 2022    Procedure: CHOLECYSTECTOMY, LAPAROSCOPIC;  Surgeon: Leonardo Wilson III, MD;  Location: [facility];  Service: General;  Laterality: N/A;    LUMBAR DISC SURGERY      PILONIDAL CYST DRAINAGE      TONSILLECTOMY      as a child (also adenoids)       Review of patient's allergies indicates:   Allergen Reactions    Pcn [penicillins] Anaphylaxis    Adhesive     Floxacillin     Keflex [cephalexin]     Sulfa (sulfonamide antibiotics)     Zithromax [azithromycin]     Zofran [ondansetron hcl (pf)]      Dizzy vomiting         No current facility-administered medications on file prior to encounter.     Current Outpatient Medications on File Prior to Encounter   Medication Sig    albuterol-ipratropium (DUO-NEB) 2.5 mg-0.5 mg/3 mL nebulizer solution Take 3 mLs by nebulization every 6 (six) hours as needed for Wheezing or Shortness of Breath. Rescue    amLODIPine (NORVASC) 5 MG tablet Take 5 mg by mouth once daily.    arnica 20 % Tinc Apply 1 application topically once daily.    ascorbic acid, vitamin C, (VITAMIN C) 500 MG tablet Take 500 mg by mouth once daily.    aspirin 81 MG Chew Take 81 mg by mouth once daily.    atorvastatin (LIPITOR) 80 MG tablet Take 80 mg by mouth every evening.    chlorzoxazone (PARAFON FORTE) 250 MG tablet Take 250 mg by mouth 3 (three) times daily as needed.    ciprofloxacin HCl (CIPRO) 500 MG tablet Take 1 tablet (500 mg total) by mouth every 12 (twelve) hours.    clopidogreL (PLAVIX) 75 mg tablet Take 1 tablet (75 mg total) by  mouth once daily.    desonide (DESOWEN) 0.05 % cream Apply 1 application topically 2 (two) times daily.    estradioL (ESTRACE) 0.01 % (0.1 mg/gram) vaginal cream Place 1 g vaginally every 7 days. Monday's    fluconazole (DIFLUCAN) 150 MG Tab Take 1 tablet (150 mg total) by mouth every other day. (Patient not taking: Reported on 2022)    gabapentin (NEURONTIN) 600 MG tablet Take 600 mg by mouth 3 (three) times daily.    HUMIRA,CF, 40 mg/0.4 mL SyKt Inject 0.4 mLs into the skin every 7 days. Monday's    HYDROcodone-acetaminophen (NORCO) 5-325 mg per tablet Take 1 tablet by mouth every 6 (six) hours as needed for Pain. (Patient not taking: Reported on 2022)    hydrocortisone 0.5 % cream Apply 1 application topically 2 (two) times daily as needed.    LEVOXYL 150 mcg tablet Take 150 mcg by mouth once daily.    metFORMIN (GLUCOPHAGE) 850 MG tablet Take 850 mg by mouth nightly.    pantoprazole (PROTONIX) 40 MG tablet Take 1 tablet (40 mg total) by mouth 2 (two) times daily.    promethazine (PHENERGAN) 12.5 MG Tab Take 1 tablet (12.5 mg total) by mouth 4 (four) times daily. (Patient not taking: Reported on 2022)    propranoloL (INDERAL LA) 80 MG 24 hr capsule Take 160 mg by mouth once daily.     Family History    None       Tobacco Use    Smoking status: Former     Current packs/day: 0.00     Average packs/day: 0.5 packs/day for 40.0 years (20.0 ttl pk-yrs)     Types: Cigarettes     Start date: 1982     Quit date: 2022     Years since quittin.1    Smokeless tobacco: Never    Tobacco comments:     1 year ago   Substance and Sexual Activity    Alcohol use: No    Drug use: No    Sexual activity: Not Currently     Review of Systems   Constitutional:  Negative for activity change and fever.   Respiratory:  Positive for cough, chest tightness and shortness of breath. Negative for wheezing.    Cardiovascular:  Negative for chest pain and leg swelling.   Gastrointestinal:  Negative for abdominal distention  and abdominal pain.   Genitourinary:  Negative for difficulty urinating.   Skin:  Negative for color change.   Neurological:  Negative for dizziness and facial asymmetry.   Psychiatric/Behavioral:  Negative for agitation and confusion.      Objective:     Vital Signs (Most Recent):  Temp: 98.4 °F (36.9 °C) (06/14/24 0436)  Pulse: (!) 59 (06/14/24 0530)  Resp: (!) 30 (06/14/24 0530)  BP: 134/61 (06/14/24 0530)  SpO2: (!) 93 % (06/14/24 0530) Vital Signs (24h Range):  Temp:  [98.4 °F (36.9 °C)] 98.4 °F (36.9 °C)  Pulse:  [55-60] 59  Resp:  [21-30] 30  SpO2:  [84 %-93 %] 93 %  BP: (134-181)/(61-76) 134/61     Weight: 97.5 kg (215 lb)  Body mass index is 35.78 kg/m².     Physical Exam  Vitals and nursing note reviewed.   HENT:      Head: Normocephalic and atraumatic.      Nose: Nose normal.      Mouth/Throat:      Mouth: Mucous membranes are moist.   Eyes:      Conjunctiva/sclera: Conjunctivae normal.   Neck:      Vascular: No JVD.   Cardiovascular:      Rate and Rhythm: Normal rate.      Heart sounds: Normal heart sounds.   Pulmonary:      Effort: Pulmonary effort is normal.      Comments: Very limited breath sounds  Abdominal:      General: Bowel sounds are normal.      Palpations: Abdomen is soft.   Skin:     General: Skin is warm.   Neurological:      Mental Status: She is alert and oriented to person, place, and time.   Psychiatric:         Mood and Affect: Mood normal.                Significant Labs: All pertinent labs within the past 24 hours have been reviewed.  CBC:   Recent Labs   Lab 06/14/24 0446   WBC 15.07*   HGB 10.4*   HCT 36.9*        CMP:   Recent Labs   Lab 06/14/24 0446      K 4.0   CL 91*   CO2 >45*   *   BUN 10   CREATININE 0.5   CALCIUM 9.2   PROT 7.2   ALBUMIN 3.4*   BILITOT 0.5   ALKPHOS 105   AST 11   ALT 9*   ANIONGAP 3*     Cardiac Markers:   Recent Labs   Lab 06/14/24 0446   *       Significant Imaging: I have reviewed all pertinent imaging results/findings  within the past 24 hours.

## 2024-06-14 NOTE — ASSESSMENT & PLAN NOTE
Home medication     Teaching Attending Addendum H&P Note      I have seen and evaluated the patient. I discussed with Armin Cedeno and Dyllan and agree with findings and plan as documented in Dr. Daly's H&P with the following additions:    Chief Complaint  Chief Complaint   Patient presents with   • Abnormal Lab        History Of Presenting Illness  59 y.o. 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  CT Chest, Abd, and Pelvis w/o contrast showed interval increase in size of pleural-based opacity in the posterior right lung apex since prior CT examination dated 01/10/2019.  This is favored to represent pleural scarring given adjacent bronchiectasis and architectural distortion. Stable appearance of spiculated nodule within the right midlung, also likely scarring given long-term stability. Mild thickening of the colon may be related to ascites and mesenteric edema or possibly infectious/inflammatory colitis. Sequela of chronic pancreatitis.  Nephrology consulted and pt started on a Bicarb drip, Insulin drip per DKA Protocol, Ceftriaxone, Azithromycin, and Prednisone.  The pt was transferred to [facility] on 5/26.  Pt refusing Heparin drip initiation overnight due to epistaxis.  Epistaxis resolved but pt refusing reinitiation of Eliquis due to h/o epistaxis on Eliquis despite knowing risk of thromboembolism without anticoagulation.  Pt denies CP, SOB, abd pain, N/V, diarrhea, and dysuria.    Current Meds  Current Facility-Administered Medications   Medication Dose Route Frequency Provider Last Rate Last Admin   •  insulin lispro (ADMELOG,HumaLOG) - Correction Dose   Subcutaneous TID WC Mayo Sandoval   3 Units at 05/26/23 1839   • insulin lispro (ADMELOG,HumaLOG) - Correction Dose   Subcutaneous Nightly Mayo Sandoval   2 Units at 05/26/23 2148   • dextrose 50 % injection 25 g  25 g Intravenous PRN Mayo Sandoval       • dextrose 50 % injection 12.5 g  12.5 g Intravenous PRN Mayo Sandoval       • glucagon (GLUCAGEN) injection 1 mg  1 mg Intramuscular PRN Mayo Sandoval       • dextrose (GLUTOSE) 40 % gel 15 g  15 g Oral PRN Mayo Sandoval       • dextrose (GLUTOSE) 40 % gel 30 g  30 g Oral PRN Mayo Sandoval       • ipratropium-albuterol (DUONEB) 0.5-2.5 (3) MG/3ML nebulizer solution 3 mL  3 mL Nebulization Q6H Resp Mayo Sandoval   3 mL at 05/27/23 0720   • sodium chloride 0.9% infusion   Intravenous Continuous Yuan Richards  mL/hr at 05/27/23 0555 New Bag at 05/27/23 0555   • heparin (porcine) 25,000 units/250 mL in dextrose 5 % infusion  1-40 Units/kg/hr (Dosing Weight) Intravenous Continuous Felicia Holder MD   Held at 05/26/23 1421   • heparin (porcine) injection 4,300 Units  80 Units/kg (Dosing Weight) Intravenous PRN Felicia Holder MD       • heparin (porcine) injection 2,100 Units  40 Units/kg (Dosing Weight) Intravenous PRN Felicia Holder MD       • carvedilol (COREG) tablet 6.25 mg  6.25 mg Oral 2 times per day Felicia Holder MD   6.25 mg at 05/26/23 1617   • amLODIPine (NORVASC) tablet 10 mg  10 mg Oral Daily Kaylee Medrano MD   10 mg at 05/26/23 2203   • pantoprazole (PROTONIX) EC tablet 40 mg  40 mg Oral QAM AC Kaylee Medrano MD   40 mg at 05/26/23 2203   • albuterol inhaler 2 puff  2 puff Inhalation Q4H Resp PRN Kaylee Medrano MD       • predniSONE (DELTASONE) tablet 40 mg  40 mg Oral Daily with breakfast Yuan Richards,    40 mg at 05/26/23 0849   • ferrous sulfate (65 mg Fe per 325 mg) tablet 325 mg   325 mg Oral Daily with breakfast Nakul Archer MD   325 mg at 05/26/23 0849   • metroNIDAZOLE (FLAGYL) premix IVPB 500 mg  500 mg Intravenous 3 times per day Felicia Holder  mL/hr at 05/27/23 0557 500 mg at 05/27/23 0557   • cefTRIAXone (ROCEPHIN) syringe 2,000 mg  2,000 mg Intravenous Daily Richards, Yuan P, DO   2,000 mg at 05/26/23 2150   • sodium chloride 0.9% infusion   Intravenous Continuous PRN Andrey Valerio       • insulin glargine (LANTUS) injection 10 Units  10 Units Subcutaneous Nightly Mayo Sandoval   10 Units at 05/26/23 2150   • [Held by provider] apixaBAN (ELIQUIS) tablet 5 mg  5 mg Oral 2 times per day Chiara Nelson MD   5 mg at 05/25/23 0640   • aspirin chewable 81 mg  81 mg Oral Daily Chiara Nelson MD   81 mg at 05/27/23 0601   • atorvastatin (LIPITOR) tablet 80 mg  80 mg Oral Daily Chiara Nelson MD   80 mg at 05/27/23 0601   • melatonin tablet 6 mg  6 mg Oral Nightly Chiara Nelson MD   6 mg at 05/26/23 2149   • sertraline (ZOLOFT) tablet 100 mg  100 mg Oral Daily Chiara Nelson MD   100 mg at 05/27/23 0601   • sodium chloride 0.9 % flush bag 25 mL  25 mL Intravenous PRN Chiara Nelson MD       • sodium chloride 0.9% infusion   Intravenous Continuous PRN Chiara Nelson MD       • sodium chloride 0.9% infusion   Intravenous Continuous PRN Chiara Nelson MD       • sodium chloride (NORMAL SALINE) 0.9 % bolus 500 mL  500 mL Intravenous PRN Chiara Nelson MD       • sodium chloride 0.9 % flush bag 25 mL  25 mL Intravenous PRN Chiara Nelson MD       • acetaminophen (TYLENOL) tablet 650 mg  650 mg Oral Q4H PRN Chiara Nelson MD   650 mg at 05/26/23 2217   • ondansetron (ZOFRAN) injection 4 mg  4 mg Intravenous BID PRN Chiara Nelson MD       • cholestyramine/aspartame (QUESTRAN LIGHT) packet 4 g  4 g Oral TID WC Chiara Nelson MD   4 g at 05/26/23 1839   • fluticasone-vilanterol (BREO ELLIPTA) 100-25 MCG/ACT inhaler 1 puff  1 puff Inhalation  Daily Resp Chiara Nelson MD   1 puff at 05/26/23 0820   • folic acid (FOLATE) tablet 1 mg  1 mg Oral Daily Chiara Nelson MD   1 mg at 05/27/23 0600   • lipase-protease-amylase 3,000-9,500-15,000 units (CREON) per capsule 3 capsule  3 capsule Oral TID WC Chiara Nelson MD   3 capsule at 05/26/23 1839   • loperamide (IMODIUM) capsule 2 mg  2 mg Oral 4x Daily PRN Chiara Nelson MD   2 mg at 05/26/23 2149   • mirtazapine (REMERON) tablet 15 mg  15 mg Oral Nightly Chiara Nelson MD   15 mg at 05/26/23 2149        I/O's    Intake/Output Summary (Last 24 hours) at 5/27/2023 0720  Last data filed at 5/26/2023 1800  Gross per 24 hour   Intake 926.32 ml   Output 150 ml   Net 776.32 ml       Last Recorded Vitals  Vitals with min/max:    Vital Last Value 24 Hour Range   Temperature 98.1 °F (36.7 °C) (05/27/23 0306) Temp  Min: 97.5 °F (36.4 °C)  Max: 99.5 °F (37.5 °C)   Pulse 80 (05/27/23 0650) Pulse  Min: 80  Max: 96   Respiratory 16 (05/27/23 0306) Resp  Min: 14  Max: 28   Non-Invasive  Blood Pressure (!) 162/77 (MD notified) (05/27/23 0650) BP  Min: 152/83  Max: 180/77   Pulse Oximetry 100 % (05/27/23 0650) SpO2  Min: 93 %  Max: 100 %   Arterial   Blood Pressure   No data recorded        Body mass index is 17.37 kg/m².    Physical Exam  GENERAL:  In no apparent distress  CHEST:  Chest with clear breath sounds bilaterally.   CARDIAC:  Regular rate and rhythm.  S1 and S2  VASCULAR:  No Edema.    ABDOMEN:  Soft, without detectable tenderness.  No sign of distention.  No   rebound or guarding, and no masses palpated.  MUSCULOSKELETAL:  Good range of motion of all major joints.    PSYCH:  No depression or anxiety.    Labs   Recent Results (from the past 48 hour(s))   Cortisol, AM    Collection Time: 05/25/23  7:54 AM   Result Value Ref Range    Cortisol, AM 31.3 (H) 5.2 - 22.5 mcg/dL   Thyroid Stimulating Hormone Reflex    Collection Time: 05/25/23  7:54 AM   Result Value Ref Range    TSH 1.963 0.350 - 5.000 mcUnits/mL    Basic Metabolic Panel    Collection Time: 05/25/23  7:54 AM   Result Value Ref Range    Fasting Status      Sodium 139 135 - 145 mmol/L    Potassium 3.8 3.4 - 5.1 mmol/L    Chloride 106 97 - 110 mmol/L    Carbon Dioxide 14 (L) 21 - 32 mmol/L    Anion Gap 23 (H) 7 - 19 mmol/L    Glucose 498 (HH) 70 - 99 mg/dL    BUN 59 (H) 6 - 20 mg/dL    Creatinine 2.75 (H) 0.51 - 0.95 mg/dL    Glomerular Filtration Rate 19 (L) >=60    BUN/Cr 21 7 - 25    Calcium 7.5 (L) 8.4 - 10.2 mg/dL   Alpha 1 Antitrypsin    Collection Time: 05/25/23  7:54 AM   Result Value Ref Range    Alpha 1 Antitrypsin 164 90 - 200 mg/dL   Haptoglobin    Collection Time: 05/25/23  7:54 AM   Result Value Ref Range    Haptoglobin 186 30 - 200 mg/dL   GLUCOSE, BEDSIDE - POINT OF CARE    Collection Time: 05/25/23  7:57 AM   Result Value Ref Range    GLUCOSE, BEDSIDE - POINT OF CARE 436 (H) 70 - 99 mg/dL   BLOOD GAS, VENOUS WITH COOXIMETRY - RESPIRATORY    Collection Time: 05/25/23  8:18 AM   Result Value Ref Range    CONDITION - RESPIRATORY RA     CARBOXYHEMOGLOBIN - RESPIRATORY 1.7 (H) <1.5 %    FIO2 - RESPIRATORY 21 %    HEMOGLOBIN - RESPIRATORY 7.2 (L) 12.0 - 15.5 g/dL    METHEMOGLOBIN - RESPIRATORY 1.2 <=1.6 %    SITE - RESPIRATORY Venous     TEMPERATURE - RESPIRATORY 37.0 degrees    BASE EXCESS / DEFICIT, VENOUS - RESPIRATORY -14 (L) -2 - 2 mmol/L    HCO3, VENOUS - RESPIRATORY 12.6 (L) 22.0 - 28.0 mmol/L    PCO2, VENOUS - RESPIRATORY 33 (L) 38 - 51 mm Hg    PH, VENOUS - RESPIRATORY 7.19 (L) 7.35 - 7.45 Units    O2 SATURATION, VENOUS - RESPIRATORY 71 60 - 80 %    PO2, VENOUS - RESPIRATORY 37 35 - 42 mm Hg    OXYHEMOGLOBIN, VENOUS - RESPIRATORY 69.0 60.0 - 80.0 %   POTASSIUM - RESPIRATORY    Collection Time: 05/25/23  8:18 AM   Result Value Ref Range    POTASSIUM - RESPIRATORY 4.1 3.4 - 5.1 mmol/L   SODIUM - RESPIRATORY    Collection Time: 05/25/23  8:18 AM   Result Value Ref Range    SODIUM - RESPIRATORY 137 135 - 145 mmol/L   CALCIUM, IONIZED -  RESPIRATORY    Collection Time: 05/25/23  8:18 AM   Result Value Ref Range    CALCIUM, IONIZED - RESPIRATORY 1.17 1.15 - 1.29 mmol/L   CHLORIDE - RESPIRATORY    Collection Time: 05/25/23  8:18 AM   Result Value Ref Range    CHLORIDE - RESPIRATORY 108 97 - 110 mmol/L   LACTIC ACID, VENOUS - RESPIRATORY    Collection Time: 05/25/23  8:18 AM   Result Value Ref Range    LACTIC ACID, VENOUS - RESPIRATORY 1.4 <2.0 mmol/L   GLUCOSE, BEDSIDE - POINT OF CARE    Collection Time: 05/25/23  9:30 AM   Result Value Ref Range    GLUCOSE, BEDSIDE - POINT OF CARE 430 (H) 70 - 99 mg/dL   GLUCOSE, BEDSIDE - POINT OF CARE    Collection Time: 05/25/23 10:21 AM   Result Value Ref Range    GLUCOSE, BEDSIDE - POINT OF CARE 393 (H) 70 - 99 mg/dL   GLUCOSE, BEDSIDE - POINT OF CARE    Collection Time: 05/25/23 11:10 AM   Result Value Ref Range    GLUCOSE, BEDSIDE - POINT OF CARE 386 (H) 70 - 99 mg/dL   GLUCOSE, BEDSIDE - POINT OF CARE    Collection Time: 05/25/23 12:04 PM   Result Value Ref Range    GLUCOSE, BEDSIDE - POINT OF CARE 322 (H) 70 - 99 mg/dL   Basic Metabolic Panel    Collection Time: 05/25/23 12:29 PM   Result Value Ref Range    Fasting Status      Sodium 140 135 - 145 mmol/L    Potassium 3.5 3.4 - 5.1 mmol/L    Chloride 110 97 - 110 mmol/L    Carbon Dioxide 16 (L) 21 - 32 mmol/L    Anion Gap 18 7 - 19 mmol/L    Glucose 344 (H) 70 - 99 mg/dL    BUN 58 (H) 6 - 20 mg/dL    Creatinine 2.62 (H) 0.51 - 0.95 mg/dL    Glomerular Filtration Rate 20 (L) >=60    BUN/Cr 22 7 - 25    Calcium 7.6 (L) 8.4 - 10.2 mg/dL   CBC with Automated Differential (performable only)    Collection Time: 05/25/23 12:29 PM   Result Value Ref Range    WBC 1.4 (L) 4.2 - 11.0 K/mcL    RBC 2.34 (L) 4.00 - 5.20 mil/mcL    HGB 6.7 (LL) 12.0 - 15.5 g/dL    HCT 20.8 (L) 36.0 - 46.5 %    MCV 88.9 78.0 - 100.0 fl    MCH 28.6 26.0 - 34.0 pg    MCHC 32.2 32.0 - 36.5 g/dL    RDW-CV 17.2 (H) 11.0 - 15.0 %    RDW-SD 55.9 (H) 39.0 - 50.0 fL     140 - 450 K/mcL    NRBC  4 (H) <=0 /100 WBC    Neutrophil, Percent 76 %    Lymphocytes, Percent 20 %    Mono, Percent 2 %    Eosinophils, Percent 0 %    Basophils, Percent 0 %    Immature Granulocytes 2 %    Absolute Neutrophils 1.0 (L) 1.8 - 7.7 K/mcL    Absolute Lymphocytes 0.3 (L) 1.0 - 4.0 K/mcL    Absolute Monocytes 0.0 (L) 0.3 - 0.9 K/mcL    Absolute Eosinophils  0.0 0.0 - 0.5 K/mcL    Absolute Basophils 0.0 0.0 - 0.3 K/mcL    Absolute Immature Granulocytes 0.0 0.0 - 0.2 K/mcL   Lactate Dehydrogenase    Collection Time: 05/25/23 12:29 PM   Result Value Ref Range    LD, Total 153 82 - 240 Units/L   Manual Differential    Collection Time: 05/25/23 12:29 PM   Result Value Ref Range    Levon Cells Few    GLUCOSE, BEDSIDE - POINT OF CARE    Collection Time: 05/25/23  1:14 PM   Result Value Ref Range    GLUCOSE, BEDSIDE - POINT OF CARE 249 (H) 70 - 99 mg/dL   BLOOD GAS, VENOUS WITH COOXIMETRY - RESPIRATORY    Collection Time: 05/25/23  1:35 PM   Result Value Ref Range    CONDITION - RESPIRATORY 6L MASK     CARBOXYHEMOGLOBIN - RESPIRATORY 1.5 (H) <1.5 %    FIO2 - RESPIRATORY 45 %    HEMOGLOBIN - RESPIRATORY 6.4 (LL) 12.0 - 15.5 g/dL    METHEMOGLOBIN - RESPIRATORY 0.0 <=1.6 %    SITE - RESPIRATORY Right Radial     TEMPERATURE - RESPIRATORY 37.0 degrees    BASE EXCESS / DEFICIT, VENOUS - RESPIRATORY -13 (L) -2 - 2 mmol/L    HCO3, VENOUS - RESPIRATORY 14.1 (L) 22.0 - 28.0 mmol/L    PCO2, VENOUS - RESPIRATORY 37 (L) 38 - 51 mm Hg    PH, VENOUS - RESPIRATORY 7.19 (L) 7.35 - 7.45 Units    O2 SATURATION, VENOUS - RESPIRATORY 68 60 - 80 %    PO2, VENOUS - RESPIRATORY 36 35 - 42 mm Hg    OXYHEMOGLOBIN, VENOUS - RESPIRATORY 66.8 60.0 - 80.0 %   POTASSIUM - RESPIRATORY    Collection Time: 05/25/23  1:35 PM   Result Value Ref Range    POTASSIUM - RESPIRATORY 3.7 3.4 - 5.1 mmol/L   SODIUM - RESPIRATORY    Collection Time: 05/25/23  1:35 PM   Result Value Ref Range    SODIUM - RESPIRATORY 140 135 - 145 mmol/L   CALCIUM, IONIZED - RESPIRATORY    Collection  Time: 05/25/23  1:35 PM   Result Value Ref Range    CALCIUM, IONIZED - RESPIRATORY 1.12 (L) 1.15 - 1.29 mmol/L   GLUCOSE - RESPIRATORY    Collection Time: 05/25/23  1:35 PM   Result Value Ref Range    GLUCOSE - RESPIRATORY 258 (H) 65 - 99 mg/dL   CHLORIDE - RESPIRATORY    Collection Time: 05/25/23  1:35 PM   Result Value Ref Range    CHLORIDE - RESPIRATORY 114 (H) 97 - 110 mmol/L   LACTIC ACID, VENOUS - RESPIRATORY    Collection Time: 05/25/23  1:35 PM   Result Value Ref Range    LACTIC ACID, VENOUS - RESPIRATORY 2.4 (HH) <2.0 mmol/L   Prepare Red Blood Cells: 1 Units    Collection Time: 05/25/23  1:42 PM   Result Value Ref Range    UNIT BLOOD TYPE O Pos     ISBT BLOOD TYPE 5100     BLOOD EXPIRATION DATE [id number]     UNIT NUMBER [id number]     DISPENSE STATUS Transfused     PRODUCT ID Red Blood Cells     PRODUCT CODE [id number]     PRODUCT DESCRIPTION RBC AS-1 LR     CROSSMATCH RESULT Compatible     ISSUE DATE/TIME [id number]    TYPE/SCREEN    Collection Time: 05/25/23  1:50 PM   Result Value Ref Range    ABO/RH(D) O Rh Positive     ANTIBODY SCREEN Negative     TYPE AND SCREEN EXPIRATION DATE 05/28/2023 23:59    GLUCOSE, BEDSIDE - POINT OF CARE    Collection Time: 05/25/23  2:08 PM   Result Value Ref Range    GLUCOSE, BEDSIDE - POINT OF CARE 202 (H) 70 - 99 mg/dL   GLUCOSE, BEDSIDE - POINT OF CARE    Collection Time: 05/25/23  3:06 PM   Result Value Ref Range    GLUCOSE, BEDSIDE - POINT OF CARE 211 (H) 70 - 99 mg/dL   Partial Thromboplastin Time    Collection Time: 05/25/23  3:20 PM   Result Value Ref Range    PTT 36 (H) 22 - 30 sec   Prothrombin Time    Collection Time: 05/25/23  3:20 PM   Result Value Ref Range    Prothrombin Time 17.4 (H) 9.7 - 11.8 sec    INR 1.7     GLUCOSE, BEDSIDE - POINT OF CARE    Collection Time: 05/25/23  4:05 PM   Result Value Ref Range    GLUCOSE, BEDSIDE - POINT OF CARE 172 (H) 70 - 99 mg/dL   Basic Metabolic Panel    Collection Time: 05/25/23  4:28 PM   Result Value Ref  Range    Fasting Status      Sodium 140 135 - 145 mmol/L    Potassium 3.7 3.4 - 5.1 mmol/L    Chloride 111 (H) 97 - 110 mmol/L    Carbon Dioxide 18 (L) 21 - 32 mmol/L    Anion Gap 15 7 - 19 mmol/L    Glucose 188 (H) 70 - 99 mg/dL    BUN 54 (H) 6 - 20 mg/dL    Creatinine 2.48 (H) 0.51 - 0.95 mg/dL    Glomerular Filtration Rate 22 (L) >=60    BUN/Cr 22 7 - 25    Calcium 7.3 (L) 8.4 - 10.2 mg/dL   Magnesium    Collection Time: 05/25/23  4:28 PM   Result Value Ref Range    Magnesium 1.6 (L) 1.7 - 2.4 mg/dL   BLOOD GAS, VENOUS WITH COOXIMETRY - RESPIRATORY    Collection Time: 05/25/23  4:32 PM   Result Value Ref Range    CONDITION - RESPIRATORY RA     CARBOXYHEMOGLOBIN - RESPIRATORY 1.6 (H) <1.5 %    FIO2 - RESPIRATORY 21 %    HEMOGLOBIN - RESPIRATORY 7.1 (L) 12.0 - 15.5 g/dL    METHEMOGLOBIN - RESPIRATORY 0.0 <=1.6 %    SITE - RESPIRATORY Venous     TEMPERATURE - RESPIRATORY 37.0 degrees    BASE EXCESS / DEFICIT, VENOUS - RESPIRATORY -13 (L) -2 - 2 mmol/L    HCO3, VENOUS - RESPIRATORY 14.1 (L) 22.0 - 28.0 mmol/L    PCO2, VENOUS - RESPIRATORY 37 (L) 38 - 51 mm Hg    PH, VENOUS - RESPIRATORY 7.19 (L) 7.35 - 7.45 Units    O2 SATURATION, VENOUS - RESPIRATORY 70 60 - 80 %    PO2, VENOUS - RESPIRATORY 37 35 - 42 mm Hg    OXYHEMOGLOBIN, VENOUS - RESPIRATORY 68.7 60.0 - 80.0 %   POTASSIUM - RESPIRATORY    Collection Time: 05/25/23  4:32 PM   Result Value Ref Range    POTASSIUM - RESPIRATORY 3.9 3.4 - 5.1 mmol/L   SODIUM - RESPIRATORY    Collection Time: 05/25/23  4:32 PM   Result Value Ref Range    SODIUM - RESPIRATORY 139 135 - 145 mmol/L   CALCIUM, IONIZED - RESPIRATORY    Collection Time: 05/25/23  4:32 PM   Result Value Ref Range    CALCIUM, IONIZED - RESPIRATORY 1.20 1.15 - 1.29 mmol/L   GLUCOSE - RESPIRATORY    Collection Time: 05/25/23  4:32 PM   Result Value Ref Range    GLUCOSE - RESPIRATORY 179 (H) 65 - 99 mg/dL   CHLORIDE - RESPIRATORY    Collection Time: 05/25/23  4:32 PM   Result Value Ref Range    CHLORIDE -  RESPIRATORY 112 (H) 97 - 110 mmol/L   LACTIC ACID, VENOUS - RESPIRATORY    Collection Time: 05/25/23  4:32 PM   Result Value Ref Range    LACTIC ACID, VENOUS - RESPIRATORY 3.0 (HH) <2.0 mmol/L   GLUCOSE, BEDSIDE - POINT OF CARE    Collection Time: 05/25/23  5:06 PM   Result Value Ref Range    GLUCOSE, BEDSIDE - POINT OF CARE 156 (H) 70 - 99 mg/dL   GLUCOSE, BEDSIDE - POINT OF CARE    Collection Time: 05/25/23  6:00 PM   Result Value Ref Range    GLUCOSE, BEDSIDE - POINT OF CARE 137 (H) 70 - 99 mg/dL   GLUCOSE, BEDSIDE - POINT OF CARE    Collection Time: 05/25/23  7:00 PM   Result Value Ref Range    GLUCOSE, BEDSIDE - POINT OF CARE 141 (H) 70 - 99 mg/dL   GLUCOSE, BEDSIDE - POINT OF CARE    Collection Time: 05/25/23  8:09 PM   Result Value Ref Range    GLUCOSE, BEDSIDE - POINT OF CARE 122 (H) 70 - 99 mg/dL   Basic Metabolic Panel    Collection Time: 05/25/23  8:51 PM   Result Value Ref Range    Fasting Status      Sodium 138 135 - 145 mmol/L    Potassium 4.2 3.4 - 5.1 mmol/L    Chloride 109 97 - 110 mmol/L    Carbon Dioxide 18 (L) 21 - 32 mmol/L    Anion Gap 15 7 - 19 mmol/L    Glucose 150 (H) 70 - 99 mg/dL    BUN 53 (H) 6 - 20 mg/dL    Creatinine 2.35 (H) 0.51 - 0.95 mg/dL    Glomerular Filtration Rate 23 (L) >=60    BUN/Cr 23 7 - 25    Calcium 7.6 (L) 8.4 - 10.2 mg/dL   Magnesium    Collection Time: 05/25/23  8:51 PM   Result Value Ref Range    Magnesium 1.5 (L) 1.7 - 2.4 mg/dL   Hemoglobin and Hematocrit    Collection Time: 05/25/23  8:51 PM   Result Value Ref Range    HGB 7.6 (L) 12.0 - 15.5 g/dL    HCT 23.8 (L) 36.0 - 46.5 %   BLOOD GAS, VENOUS WITH COOXIMETRY - RESPIRATORY    Collection Time: 05/25/23  8:58 PM   Result Value Ref Range    CARBOXYHEMOGLOBIN - RESPIRATORY 1.7 (H) <1.5 %    FIO2 - RESPIRATORY 21 %    HEMOGLOBIN - RESPIRATORY 8.2 (L) 12.0 - 15.5 g/dL    METHEMOGLOBIN - RESPIRATORY 0.6 <=1.6 %    SITE - RESPIRATORY Venous     TEMPERATURE - RESPIRATORY 37.0 degrees    BASE EXCESS / DEFICIT, VENOUS -  RESPIRATORY -12 (L) -2 - 2 mmol/L    HCO3, VENOUS - RESPIRATORY 14.6 (L) 22.0 - 28.0 mmol/L    PCO2, VENOUS - RESPIRATORY 34 (L) 38 - 51 mm Hg    PH, VENOUS - RESPIRATORY 7.24 (L) 7.35 - 7.45 Units    O2 SATURATION, VENOUS - RESPIRATORY 76 60 - 80 %    PO2, VENOUS - RESPIRATORY 39 35 - 42 mm Hg    OXYHEMOGLOBIN, VENOUS - RESPIRATORY 73.8 60.0 - 80.0 %   POTASSIUM - RESPIRATORY    Collection Time: 05/25/23  8:58 PM   Result Value Ref Range    POTASSIUM - RESPIRATORY 4.3 3.4 - 5.1 mmol/L   SODIUM - RESPIRATORY    Collection Time: 05/25/23  8:58 PM   Result Value Ref Range    SODIUM - RESPIRATORY 136 135 - 145 mmol/L   CALCIUM, IONIZED - RESPIRATORY    Collection Time: 05/25/23  8:58 PM   Result Value Ref Range    CALCIUM, IONIZED - RESPIRATORY 1.20 1.15 - 1.29 mmol/L   GLUCOSE - RESPIRATORY    Collection Time: 05/25/23  8:58 PM   Result Value Ref Range    GLUCOSE - RESPIRATORY 148 (H) 65 - 99 mg/dL   CHLORIDE - RESPIRATORY    Collection Time: 05/25/23  8:58 PM   Result Value Ref Range    CHLORIDE - RESPIRATORY 111 (H) 97 - 110 mmol/L   LACTIC ACID, VENOUS - RESPIRATORY    Collection Time: 05/25/23  8:58 PM   Result Value Ref Range    LACTIC ACID, VENOUS - RESPIRATORY 1.9 <2.0 mmol/L   GLUCOSE, BEDSIDE - POINT OF CARE    Collection Time: 05/25/23  9:08 PM   Result Value Ref Range    GLUCOSE, BEDSIDE - POINT OF CARE 153 (H) 70 - 99 mg/dL   GLUCOSE, BEDSIDE - POINT OF CARE    Collection Time: 05/25/23 10:18 PM   Result Value Ref Range    GLUCOSE, BEDSIDE - POINT OF CARE 154 (H) 70 - 99 mg/dL   GLUCOSE, BEDSIDE - POINT OF CARE    Collection Time: 05/25/23 11:01 PM   Result Value Ref Range    GLUCOSE, BEDSIDE - POINT OF CARE 170 (H) 70 - 99 mg/dL   GLUCOSE, BEDSIDE - POINT OF CARE    Collection Time: 05/26/23 12:18 AM   Result Value Ref Range    GLUCOSE, BEDSIDE - POINT OF CARE 159 (H) 70 - 99 mg/dL   BLOOD GAS, VENOUS WITH COOXIMETRY - RESPIRATORY    Collection Time: 05/26/23 12:56 AM   Result Value Ref Range     CARBOXYHEMOGLOBIN - RESPIRATORY 1.5 (H) <1.5 %    FIO2 - RESPIRATORY 21 %    HEMOGLOBIN - RESPIRATORY 7.3 (L) 12.0 - 15.5 g/dL    METHEMOGLOBIN - RESPIRATORY 0.3 <=1.6 %    SITE - RESPIRATORY Venous     TEMPERATURE - RESPIRATORY 37.0 degrees    BASE EXCESS / DEFICIT, VENOUS - RESPIRATORY -10 (L) -2 - 2 mmol/L    HCO3, VENOUS - RESPIRATORY 15.4 (L) 22.0 - 28.0 mmol/L    PCO2, VENOUS - RESPIRATORY 32 (L) 38 - 51 mm Hg    PH, VENOUS - RESPIRATORY 7.29 (L) 7.35 - 7.45 Units    O2 SATURATION, VENOUS - RESPIRATORY 86 (H) 60 - 80 %    PO2, VENOUS - RESPIRATORY 47 (H) 35 - 42 mm Hg    OXYHEMOGLOBIN, VENOUS - RESPIRATORY 84.0 (H) 60.0 - 80.0 %   POTASSIUM - RESPIRATORY    Collection Time: 05/26/23 12:56 AM   Result Value Ref Range    POTASSIUM - RESPIRATORY 4.4 3.4 - 5.1 mmol/L   SODIUM - RESPIRATORY    Collection Time: 05/26/23 12:56 AM   Result Value Ref Range    SODIUM - RESPIRATORY 136 135 - 145 mmol/L   CALCIUM, IONIZED - RESPIRATORY    Collection Time: 05/26/23 12:56 AM   Result Value Ref Range    CALCIUM, IONIZED - RESPIRATORY 1.20 1.15 - 1.29 mmol/L   GLUCOSE - RESPIRATORY    Collection Time: 05/26/23 12:56 AM   Result Value Ref Range    GLUCOSE - RESPIRATORY 178 (H) 65 - 99 mg/dL   CHLORIDE - RESPIRATORY    Collection Time: 05/26/23 12:56 AM   Result Value Ref Range    CHLORIDE - RESPIRATORY 112 (H) 97 - 110 mmol/L   LACTIC ACID, VENOUS - RESPIRATORY    Collection Time: 05/26/23 12:56 AM   Result Value Ref Range    LACTIC ACID, VENOUS - RESPIRATORY 0.7 <2.0 mmol/L   BLOOD GAS, VENOUS WITH COOXIMETRY - RESPIRATORY    Collection Time: 05/26/23  4:22 AM   Result Value Ref Range    CARBOXYHEMOGLOBIN - RESPIRATORY 1.5 (H) <1.5 %    FIO2 - RESPIRATORY 21 %    HEMOGLOBIN - RESPIRATORY 7.8 (L) 12.0 - 15.5 g/dL    METHEMOGLOBIN - RESPIRATORY 0.4 <=1.6 %    SITE - RESPIRATORY Venous     TEMPERATURE - RESPIRATORY 37.0 degrees    BASE EXCESS / DEFICIT, VENOUS - RESPIRATORY -11 (L) -2 - 2 mmol/L    HCO3, VENOUS - RESPIRATORY  14.8 (L) 22.0 - 28.0 mmol/L    PCO2, VENOUS - RESPIRATORY 30 (L) 38 - 51 mm Hg    PH, VENOUS - RESPIRATORY 7.30 (L) 7.35 - 7.45 Units    O2 SATURATION, VENOUS - RESPIRATORY 90 (H) 60 - 80 %    PO2, VENOUS - RESPIRATORY 54 (H) 35 - 42 mm Hg    OXYHEMOGLOBIN, VENOUS - RESPIRATORY 87.8 (H) 60.0 - 80.0 %   POTASSIUM - RESPIRATORY    Collection Time: 05/26/23  4:22 AM   Result Value Ref Range    POTASSIUM - RESPIRATORY 4.2 3.4 - 5.1 mmol/L   SODIUM - RESPIRATORY    Collection Time: 05/26/23  4:22 AM   Result Value Ref Range    SODIUM - RESPIRATORY 136 135 - 145 mmol/L   CALCIUM, IONIZED - RESPIRATORY    Collection Time: 05/26/23  4:22 AM   Result Value Ref Range    CALCIUM, IONIZED - RESPIRATORY 1.23 1.15 - 1.29 mmol/L   GLUCOSE - RESPIRATORY    Collection Time: 05/26/23  4:22 AM   Result Value Ref Range    GLUCOSE - RESPIRATORY 136 (H) 65 - 99 mg/dL   CHLORIDE - RESPIRATORY    Collection Time: 05/26/23  4:22 AM   Result Value Ref Range    CHLORIDE - RESPIRATORY 112 (H) 97 - 110 mmol/L   LACTIC ACID, VENOUS - RESPIRATORY    Collection Time: 05/26/23  4:22 AM   Result Value Ref Range    LACTIC ACID, VENOUS - RESPIRATORY 0.6 <2.0 mmol/L   Magnesium    Collection Time: 05/26/23  4:50 AM   Result Value Ref Range    Magnesium 2.3 1.7 - 2.4 mg/dL   Comprehensive Metabolic Panel    Collection Time: 05/26/23  4:50 AM   Result Value Ref Range    Fasting Status      Sodium 140 135 - 145 mmol/L    Potassium 4.1 3.4 - 5.1 mmol/L    Chloride 110 97 - 110 mmol/L    Carbon Dioxide 17 (L) 21 - 32 mmol/L    Anion Gap 17 7 - 19 mmol/L    Glucose 142 (H) 70 - 99 mg/dL    BUN 50 (H) 6 - 20 mg/dL    Creatinine 2.31 (H) 0.51 - 0.95 mg/dL    Glomerular Filtration Rate 23 (L) >=60    BUN/Cr 22 7 - 25    Calcium 7.7 (L) 8.4 - 10.2 mg/dL    Bilirubin, Total 0.2 0.2 - 1.0 mg/dL    GOT/AST 21 <=37 Units/L    GPT/ALT 17 <64 Units/L    Alkaline Phosphatase 154 (H) 45 - 117 Units/L    Albumin 2.6 (L) 3.6 - 5.1 g/dL    Protein, Total 5.9 (L) 6.4 - 8.2  g/dL    Globulin 3.3 2.0 - 4.0 g/dL    A/G Ratio 0.8 (L) 1.0 - 2.4   CBC with Automated Differential (performable only)    Collection Time: 05/26/23  4:50 AM   Result Value Ref Range    WBC 4.4 4.2 - 11.0 K/mcL    RBC 2.67 (L) 4.00 - 5.20 mil/mcL    HGB 7.3 (L) 12.0 - 15.5 g/dL    HCT 23.0 (L) 36.0 - 46.5 %    MCV 86.1 78.0 - 100.0 fl    MCH 27.3 26.0 - 34.0 pg    MCHC 31.7 (L) 32.0 - 36.5 g/dL    RDW-CV 19.2 (H) 11.0 - 15.0 %    RDW-SD 60.5 (H) 39.0 - 50.0 fL     140 - 450 K/mcL    NRBC 2 (H) <=0 /100 WBC    Neutrophil, Percent 51 %    Lymphocytes, Percent 18 %    Mono, Percent 30 %    Eosinophils, Percent 0 %    Basophils, Percent 0 %    Immature Granulocytes 1 %    Absolute Neutrophils 2.2 1.8 - 7.7 K/mcL    Absolute Lymphocytes 0.8 (L) 1.0 - 4.0 K/mcL    Absolute Monocytes 1.3 (H) 0.3 - 0.9 K/mcL    Absolute Eosinophils  0.0 0.0 - 0.5 K/mcL    Absolute Basophils 0.0 0.0 - 0.3 K/mcL    Absolute Immature Granulocytes 0.0 0.0 - 0.2 K/mcL   Manual Differential    Collection Time: 05/26/23  4:50 AM   Result Value Ref Range    Levon Cells Few     Ovalocytes Few    GLUCOSE, BEDSIDE - POINT OF CARE    Collection Time: 05/26/23  5:22 AM   Result Value Ref Range    GLUCOSE, BEDSIDE - POINT OF CARE 108 (H) 70 - 99 mg/dL   WBC Stool    Collection Time: 05/26/23  9:25 AM   Result Value Ref Range    Stool for WBCs Positive (A) Negative   GLUCOSE, BEDSIDE - POINT OF CARE    Collection Time: 05/26/23 12:04 PM   Result Value Ref Range    GLUCOSE, BEDSIDE - POINT OF CARE 147 (H) 70 - 99 mg/dL   Basic Metabolic Panel    Collection Time: 05/26/23  3:10 PM   Result Value Ref Range    Fasting Status      Sodium 140 135 - 145 mmol/L    Potassium 4.0 3.4 - 5.1 mmol/L    Chloride 113 (H) 97 - 110 mmol/L    Carbon Dioxide 16 (L) 21 - 32 mmol/L    Anion Gap 15 7 - 19 mmol/L    Glucose 200 (H) 70 - 99 mg/dL    BUN 43 (H) 6 - 20 mg/dL    Creatinine 2.02 (H) 0.51 - 0.95 mg/dL    Glomerular Filtration Rate 28 (L) >=60    BUN/Cr 21 7 -  25    Calcium 7.3 (L) 8.4 - 10.2 mg/dL   CBC No Differential    Collection Time: 05/26/23  3:10 PM   Result Value Ref Range    WBC 3.4 (L) 4.2 - 11.0 K/mcL    RBC 2.92 (L) 4.00 - 5.20 mil/mcL    HGB 8.1 (L) 12.0 - 15.5 g/dL    HCT 25.8 (L) 36.0 - 46.5 %    MCV 88.4 78.0 - 100.0 fl    MCH 27.7 26.0 - 34.0 pg    MCHC 31.4 (L) 32.0 - 36.5 g/dL     140 - 450 K/mcL    RDW-CV 19.8 (H) 11.0 - 15.0 %    RDW-SD 63.6 (H) 39.0 - 50.0 fL    NRBC 1 (H) <=0 /100 WBC   GLUCOSE, BEDSIDE - POINT OF CARE    Collection Time: 05/26/23  4:40 PM   Result Value Ref Range    GLUCOSE, BEDSIDE - POINT OF CARE 267 (H) 70 - 99 mg/dL   GLUCOSE, BEDSIDE - POINT OF CARE    Collection Time: 05/26/23  7:52 PM   Result Value Ref Range    GLUCOSE, BEDSIDE - POINT OF CARE 290 (H) 70 - 99 mg/dL   GLUCOSE, BEDSIDE - POINT OF CARE    Collection Time: 05/27/23  6:10 AM   Result Value Ref Range    GLUCOSE, BEDSIDE - POINT OF CARE 104 (H) 70 - 99 mg/dL       Imaging  CT CHEST ABDOMEN PELVIS WO CONTRAST   Final Result   Chest:   1.   Small left and trace right pleural effusions.   2.   Interval increase in size of pleural-based opacity in the posterior   right lung apex since prior CT examination dated 01/10/2019.  This is   favored to represent pleural scarring given adjacent bronchiectasis and   architectural distortion.  Follow-up with CT of the chest could be   performed in three months to ensure stability.   3.   Stable appearance of spiculated nodule within the right midlung, also   likely scarring given long-term stability.   4.   Additional noncalcified pulmonary nodules appear similar to the prior   examination with interval resolution of several nodules in the right lower   lobe seen in prior examination.   5.   Severe emphysematous changes.      Abdomen and pelvis:   1.   Mild thickening of the colon may be related to ascites and mesenteric   edema or possibly infectious/inflammatory colitis.  Clinical correlation   advised.   2.    Cholelithiasis.   3.   Sequela of chronic pancreatitis.   4.   Additional findings as above.         Electronically Signed by: RICK TOMAS M.D.    Signed on: 5/24/2023 8:11 PM    Workstation ID: [id number]      XR CHEST AP OR PA   Final Result   Cannot exclude pneumonia at the left lung base       Electronically Signed by: ROSALBA MOORE M.D.    Signed on: 5/24/2023 5:49 PM    Workstation ID: [id number]      US VASC UPPER EXTREMITY VENOUS DUPLEX LEFT    (Results Pending)        Assessment/Plan  59 y.o. female with PMH of hypertensive HD with CVA, DM type I with complications of nephropathy, CKD, Stage III, dyslipidemia, COPD, LUE DVT on Eliquis, chronic alcohol and cocaine use, chronic pancreatitis, and depression who was admitted to the MICU on 5/24 from [facility] for weakness secondary to AGMA with CO2= 8 secondary to DKA, KENDALL secondary to acute on chronic diarrhea from chronic pancreatic insufficiency, sepsis secondary to possible PNA, and COPD exacerbation.  The pt was transferred to [facility] on 5/26.  # AGMA secondary to DKA with h/o DM type I  - Hgb A1C in March= 13.6%.  - Closed AG.  BS better controlled.  Pt off Insulin drip.  Continue Lantus and Supplemental Insulin.   # KENDALL on CKD, Stage III secondary to acute on chronic diarrhea from chronic pancreatic insufficiency  - Renal function improving.  - Nephrology consultation appreciated.    - Continue IVF hydration, Cholestyramine TID, Creon TID, and to monitor renal function.  Avoid nephrotoxic agents.  # Sepsis secondary to possible PNA and Pseudomonas UTI  - Pt afebrile with leukopenia.  - CT Chest, Abd, and Pelvis w/o contrast showed interval increase in size of pleural-based opacity in the posterior right lung apex since prior CT examination dated 01/10/2019.  This is favored to represent pleural scarring given adjacent bronchiectasis and architectural distortion. Stable appearance of spiculated nodule within the right midlung, also  likely scarring given long-term stability. Mild thickening of the colon may be related to ascites and mesenteric edema or possibly infectious/inflammatory colitis. Sequela of chronic pancreatitis.    - Blood Cultures x2- NGTD.  - Will D/C Ceftriaxone and Flagyl and start Levaquin x5 days.  Continue routine IS.  # Acute COPD exacerbation  - Alpha-1-Antitrypsin WNL.  F/U Aspergillus Ag.  - Continue Prednisone burst x5 days, Breo Ellipta, and Duo-nebs.  # LUE DVT on Eliquis  - Venous Doppler U/S RUE with Occlusive thrombus within one of the paired left brachial veins.  - Pt refusing reinitiation of Eliquis despite known thrombus.  Pt aware of risks of further thromboembolism with refusal of anticoagulation.     # Hypertensive HD with CVA  - Continue ASA, Coreg BID, Norvasc, and Atorvastatin.  # Anemia of CKD  - H/H stable s/p 1 unit PRBC.  - Continue Ferrous Sulfate and to monitor H/H.  Transfuse if Hgb <7.  # Depression  - Continue Mirtazapine and Sertraline.  # Severe protein calorie malnutrition  - Nutrition consulted.  # Disposition  - D/C back to [facility].    GI and DVT Prophylaxis  Protonix and SCDs    Code Status  Code Status Information     Code Status    Full Resuscitation           PCP  Gray Fraser MD Sonali Jain, MD, MD  5/27/2023 7:20 AM

## 2024-06-14 NOTE — H&P
[facility] - Emergency Dept  Hospital Medicine  History & Physical    Patient Name: Karo Leonard  MRN: [id number]  Patient Class: OP- Observation  Admission Date: 2024  Attending Physician: Bryan Nevarez MD   Primary Care Provider: Navneet Hernandez FNP         Patient information was obtained from patient and ER records.     Subjective:     Principal Problem:COPD exacerbation    Chief Complaint:   Chief Complaint   Patient presents with    Shortness of Breath     C/o sob x 2days. Hx ofCOPD        HPI: 64y.o. female  with  a past medical history of Coronary artery disease, Diabetes mellitus, and Hypertension., COPD came with SOB.  Patient is ex-smoker and currently on 2 L oxygen for COPD. no history of sick contact and no fever at home.  .She became gradually worsening shortness of breath with cough with past 2 days and extreme fatigue and came to the hospital.  Patient has been using  increased oxygen 5 L at home but still having shortness of breath and called EMS and she was hypoxic  at 84 with 5 L of oxygen at arrival   Denied having chest pain, no leg swelling and patient was given IV Solu-Medrol and nebulization treatments and later started on BIPAP. ABG was done but result not reported .  Medication reconciliation was not done in the ER and not able to reconcile  On examination patient is not in extreme shortness of breath, she is able to talk out of the BiPAP, lung exam with limited air entry.  Patient has leukocytosis but no fever and chest x-ray possible pneumonia and admitted    Past Medical History:   Diagnosis Date    Coronary artery disease 2017    cardiac stent    DDD (degenerative disc disease), lumbar 2000    Diabetes mellitus     Hypertension     Thyroid disease        Past Surgical History:   Procedure Laterality Date     SECTION  08/1987    x2 1993    CORONARY STENT PLACEMENT  2017    EXPLORATORY LAPAROTOMY      Religious      HEMORRHOID SURGERY  1990    LAPAROSCOPIC CHOLECYSTECTOMY N/A 6/16/2022    Procedure: CHOLECYSTECTOMY, LAPAROSCOPIC;  Surgeon: Leonardo Wilson III, MD;  Location: [facility] OR;  Service: General;  Laterality: N/A;    LUMBAR DISC SURGERY  2000    PILONIDAL CYST DRAINAGE  1976    TONSILLECTOMY      as a child (also adenoids)       Review of patient's allergies indicates:   Allergen Reactions    Pcn [penicillins] Anaphylaxis    Adhesive     Floxacillin     Keflex [cephalexin]     Sulfa (sulfonamide antibiotics)     Zithromax [azithromycin]     Zofran [ondansetron hcl (pf)]      Dizzy vomiting         No current facility-administered medications on file prior to encounter.     Current Outpatient Medications on File Prior to Encounter   Medication Sig    albuterol-ipratropium (DUO-NEB) 2.5 mg-0.5 mg/3 mL nebulizer solution Take 3 mLs by nebulization every 6 (six) hours as needed for Wheezing or Shortness of Breath. Rescue    amLODIPine (NORVASC) 5 MG tablet Take 5 mg by mouth once daily.    arnica 20 % Tinc Apply 1 application topically once daily.    ascorbic acid, vitamin C, (VITAMIN C) 500 MG tablet Take 500 mg by mouth once daily.    aspirin 81 MG Chew Take 81 mg by mouth once daily.    atorvastatin (LIPITOR) 80 MG tablet Take 80 mg by mouth every evening.    chlorzoxazone (PARAFON FORTE) 250 MG tablet Take 250 mg by mouth 3 (three) times daily as needed.    ciprofloxacin HCl (CIPRO) 500 MG tablet Take 1 tablet (500 mg total) by mouth every 12 (twelve) hours.    clopidogreL (PLAVIX) 75 mg tablet Take 1 tablet (75 mg total) by mouth once daily.    desonide (DESOWEN) 0.05 % cream Apply 1 application topically 2 (two) times daily.    estradioL (ESTRACE) 0.01 % (0.1 mg/gram) vaginal cream Place 1 g vaginally every 7 days. Monday's    fluconazole (DIFLUCAN) 150 MG Tab Take 1 tablet (150 mg total) by mouth every other day. (Patient not taking: Reported on 6/30/2022)    gabapentin (NEURONTIN) 600 MG tablet Take 600 mg by mouth  3 (three) times daily.    HUMIRA,CF, 40 mg/0.4 mL SyKt Inject 0.4 mLs into the skin every 7 days. Monday's    HYDROcodone-acetaminophen (NORCO) 5-325 mg per tablet Take 1 tablet by mouth every 6 (six) hours as needed for Pain. (Patient not taking: Reported on 2022)    hydrocortisone 0.5 % cream Apply 1 application topically 2 (two) times daily as needed.    LEVOXYL 150 mcg tablet Take 150 mcg by mouth once daily.    metFORMIN (GLUCOPHAGE) 850 MG tablet Take 850 mg by mouth nightly.    pantoprazole (PROTONIX) 40 MG tablet Take 1 tablet (40 mg total) by mouth 2 (two) times daily.    promethazine (PHENERGAN) 12.5 MG Tab Take 1 tablet (12.5 mg total) by mouth 4 (four) times daily. (Patient not taking: Reported on 2022)    propranoloL (INDERAL LA) 80 MG 24 hr capsule Take 160 mg by mouth once daily.     Family History    None       Tobacco Use    Smoking status: Former     Current packs/day: 0.00     Average packs/day: 0.5 packs/day for 40.0 years (20.0 ttl pk-yrs)     Types: Cigarettes     Start date: 1982     Quit date: 2022     Years since quittin.1    Smokeless tobacco: Never    Tobacco comments:     1 year ago   Substance and Sexual Activity    Alcohol use: No    Drug use: No    Sexual activity: Not Currently     Review of Systems   Constitutional:  Negative for activity change and fever.   Respiratory:  Positive for cough, chest tightness and shortness of breath. Negative for wheezing.    Cardiovascular:  Negative for chest pain and leg swelling.   Gastrointestinal:  Negative for abdominal distention and abdominal pain.   Genitourinary:  Negative for difficulty urinating.   Skin:  Negative for color change.   Neurological:  Negative for dizziness and facial asymmetry.   Psychiatric/Behavioral:  Negative for agitation and confusion.      Objective:     Vital Signs (Most Recent):  Temp: 98.4 °F (36.9 °C) (24 0436)  Pulse: (!) 59 (24)  Resp: (!) 30 (24)  BP: 134/61  (06/14/24 0530)  SpO2: (!) 93 % (06/14/24 0530) Vital Signs (24h Range):  Temp:  [98.4 °F (36.9 °C)] 98.4 °F (36.9 °C)  Pulse:  [55-60] 59  Resp:  [21-30] 30  SpO2:  [84 %-93 %] 93 %  BP: (134-181)/(61-76) 134/61     Weight: 97.5 kg (215 lb)  Body mass index is 35.78 kg/m².     Physical Exam  Vitals and nursing note reviewed.   HENT:      Head: Normocephalic and atraumatic.      Nose: Nose normal.      Mouth/Throat:      Mouth: Mucous membranes are moist.   Eyes:      Conjunctiva/sclera: Conjunctivae normal.   Neck:      Vascular: No JVD.   Cardiovascular:      Rate and Rhythm: Normal rate.      Heart sounds: Normal heart sounds.   Pulmonary:      Effort: Pulmonary effort is normal.      Comments: Very limited breath sounds  Abdominal:      General: Bowel sounds are normal.      Palpations: Abdomen is soft.   Skin:     General: Skin is warm.   Neurological:      Mental Status: She is alert and oriented to person, place, and time.   Psychiatric:         Mood and Affect: Mood normal.                Significant Labs: All pertinent labs within the past 24 hours have been reviewed.  CBC:   Recent Labs   Lab 06/14/24 0446   WBC 15.07*   HGB 10.4*   HCT 36.9*        CMP:   Recent Labs   Lab 06/14/24 0446      K 4.0   CL 91*   CO2 >45*   *   BUN 10   CREATININE 0.5   CALCIUM 9.2   PROT 7.2   ALBUMIN 3.4*   BILITOT 0.5   ALKPHOS 105   AST 11   ALT 9*   ANIONGAP 3*     Cardiac Markers:   Recent Labs   Lab 06/14/24 0446   *       Significant Imaging: I have reviewed all pertinent imaging results/findings within the past 24 hours.  Assessment/Plan:     * COPD exacerbation  Patient's COPD is with exacerbation noted by continued dyspnea and use of accessory muscles for breathing currently.  Patient is currently on COPD Pathway. Continue scheduled inhalers Steroids, Antibiotics, and Supplemental oxygen and monitor respiratory status closely.   Continue BiPAP and admitted to step-down  Repeat ABG in  "1-2 hours  Wean BiPAP as able      Pneumonia  Patient has a diagnosis of pneumonia. The cause of the pneumonia is unknown at this time. The pneumonia is stable. The patient has the following signs/symptoms of pneumonia: persistent hypoxia . The patient does have a current oxygen requirement and the patient does have a home oxygen requirement. I have reviewed the pertinent imaging. The following cultures have been collected: Blood cultures and Sputum culture The culture results are listed below.     Current antimicrobial regimen consists of the antibiotics listed below. Will monitor patient closely and Adjust treatment plan as follows      Antibiotics (From admission, onward)      Start     Stop Route Frequency Ordered    06/14/24 0445  levoFLOXacin 750 mg/150 mL IVPB 750 mg         06/14/24 1644 IV ED 1 Time 06/14/24 0437            Microbiology Results (last 7 days)       Procedure Component Value Units Date/Time    Blood culture [[id number]] Collected: 06/14/24 0454    Order Status: Sent Specimen: Blood from Peripheral, Antecubital, Right Updated: 06/14/24 0459    Blood culture [[id number]] Collected: 06/14/24 0450    Order Status: Sent Specimen: Blood from Peripheral, Hand, Left Updated: 06/14/24 0458            Diabetes  Patient's FSGs are controlled on current medication regimen.  Last A1c reviewed-   Lab Results   Component Value Date    HGBA1C 7.0 (H) 06/07/2022     Most recent fingerstick glucose reviewed- No results for input(s): "POCTGLUCOSE" in the last 24 hours.  Current correctional scale  Low  Maintain anti-hyperglycemic dose as follows-   Antihyperglycemics (From admission, onward)      None          Hold Oral hypoglycemics while patient is in the hospital.    History of coronary artery stent placement    Aware, denied chest pain  Continue home medication  Reconciliation needs to be done when ready      Hypertension  Chronic, controlled. Latest blood pressure and vitals reviewed-     Temp:  [98.4 °F " (36.9 °C)]   Pulse:  [55-60]   Resp:  [21-30]   BP: (134-181)/(61-76)   SpO2:  [84 %-93 %] .   Home meds for hypertension were reviewed and noted below.   Hypertension Medications               amLODIPine (NORVASC) 5 MG tablet Take 5 mg by mouth once daily.    propranoloL (INDERAL LA) 80 MG 24 hr capsule Take 160 mg by mouth once daily.            While in the hospital, will manage blood pressure as follows; Continue home antihypertensive regimen    Will utilize p.r.n. blood pressure medication only if patient's blood pressure greater than 140/90 and she develops symptoms such as worsening chest pain or shortness of breath.    Hypothyroid    Home medication      VTE Risk Mitigation (From admission, onward)           Ordered     IP VTE LOW RISK PATIENT  Once         06/14/24 0544     Place sequential compression device  Until discontinued         06/14/24 0544                       On 06/14/2024, patient should be placed in hospital observation services under my care.             Bryan Nevarez MD  Department of Hospital Medicine  [facility] - Emergency Dept

## 2024-06-14 NOTE — ASSESSMENT & PLAN NOTE
Aware, denied chest pain  Continue home medication  Reconciliation needs to be done when ready

## 2024-06-14 NOTE — PLAN OF CARE
Met with patient and Wayne Leonard (Spouse)  [phone] (Mobile)  at bedside to complete initial assessment. Patient / family reports patient DOES NOT have a living will and NO ONE is medical POA.     Patient has home oxygen in use that is provided thru [facility].    [facility] - Emergency Dept  Initial Discharge Assessment       Primary Care Provider: Navneet Hernandez FNP    Admission Diagnosis: COPD exacerbation [J44.1]    Admission Date: 6/14/2024  Expected Discharge Date: 6/17/2024    Transition of Care Barriers: None    Payor: MEDICARE / Plan: MEDICARE PART A & B / Product Type: Government /     Extended Emergency Contact Information  Primary Emergency Contact: Wayne Leonard  Address: [address] [place] [address] [facility]  Home Phone: [phone]  Mobile Phone: [phone]  Relation: Spouse    Discharge Plan A: Home with family  Discharge Plan B: Home with family      [id number] [place] - [address]  [address]  [facility] [address]  Phone: [phone] Fax: [phone]    [facility] [place] [place] - [address]  [address]  [place] [address]  Phone: [phone] Fax: [phone]    [id number] [place] - [address]  [address]  [facility] [address]  Phone: [phone] Fax: [phone]      Initial Assessment (most recent)       Adult Discharge Assessment - 06/14/24 1248          Discharge Assessment    Assessment Type Discharge Planning Assessment     Confirmed/corrected address, phone number and insurance Yes     Confirmed Demographics Correct on Facesheet     Source of Information patient;family     Communicated CHELE with patient/caregiver No     Reason For Admission COPD exacerbation     People in Home spouse     Facility Arrived From: home     Do you expect to return to your current living situation? Yes     Do you have help at home or someone to help you manage your care  at home? Yes     Who are your caregiver(s) and their phone number(s)? Wayne Leonard (Spouse)  [phone] (Mobile)     Current cognitive status: Alert/Oriented     Walking or Climbing Stairs Difficulty yes     Walking or Climbing Stairs ambulation difficulty, assistance 1 person;ambulation difficulty, requires equipment;transferring difficulty, assistance 1 person     Dressing/Bathing Difficulty yes     Dressing/Bathing bathing difficulty, assistance 1 person;dressing difficulty, assistance 1 person;bathing difficulty, requires equipment     Equipment Currently Used at Home 3-in-1 commode;bedside commode;cane, straight;CPAP;nebulizer;oxygen;shower chair;walker, rolling;wheelchair     Readmission within 30 days? No     Patient currently being followed by outpatient case management? No     Do you currently have service(s) that help you manage your care at home? No     Do you have prescription coverage? Yes     Coverage Medicare A and B, [employer]     Who is going to help you get home at discharge? Wayne Leonard (Spouse)  [phone] (Mobile)     How do you get to doctors appointments? other (see comments)   Patient's PCP LEXII Hernandez NP does house calls.    Are you on dialysis? No     Do you take coumadin? No     Discharge Plan A Home with family     Discharge Plan B Home with family     DME Needed Upon Discharge  none     Discharge Plan discussed with: Patient;Spouse/sig other     Name(s) and Number(s) Wayne Leonard (Spouse)  [phone] (Mobile)     Transition of Care Barriers None        OTHER    Name(s) of People in Home Wayne Leonard (Spouse)  [phone] (Mobile)

## 2024-06-14 NOTE — Clinical Note
Diagnosis: COPD exacerbation [947377]  Future Attending Provider: GALO FRIAS [[id number]]  Place in Observation: [facility] [[id number]]

## 2024-06-15 LAB
ANION GAP SERPL CALC-SCNC: 3 MMOL/L (ref 8–16)
BASOPHILS # BLD AUTO: 0.01 K/UL (ref 0–0.2)
BASOPHILS NFR BLD: 0.1 % (ref 0–1.9)
BUN SERPL-MCNC: 10 MG/DL (ref 8–23)
CALCIUM SERPL-MCNC: 9.5 MG/DL (ref 8.7–10.5)
CHLORIDE SERPL-SCNC: 89 MMOL/L (ref 95–110)
CO2 SERPL-SCNC: 45 MMOL/L (ref 23–29)
CREAT SERPL-MCNC: 0.5 MG/DL (ref 0.5–1.4)
DIFFERENTIAL METHOD BLD: ABNORMAL
EOSINOPHIL # BLD AUTO: 0 K/UL (ref 0–0.5)
EOSINOPHIL NFR BLD: 0 % (ref 0–8)
ERYTHROCYTE [DISTWIDTH] IN BLOOD BY AUTOMATED COUNT: 13.5 % (ref 11.5–14.5)
EST. GFR  (NO RACE VARIABLE): >60 ML/MIN/1.73 M^2
ESTIMATED AVG GLUCOSE: 154 MG/DL (ref 68–131)
GLUCOSE SERPL-MCNC: 284 MG/DL (ref 70–110)
GLUCOSE SERPL-MCNC: 311 MG/DL (ref 70–110)
GLUCOSE SERPL-MCNC: 316 MG/DL (ref 70–110)
GLUCOSE SERPL-MCNC: 351 MG/DL (ref 70–110)
GLUCOSE SERPL-MCNC: 368 MG/DL (ref 70–110)
GLUCOSE SERPL-MCNC: 370 MG/DL (ref 70–110)
HBA1C MFR BLD: 7 % (ref 4.5–6.2)
HCT VFR BLD AUTO: 34.4 % (ref 37–48.5)
HGB BLD-MCNC: 10 G/DL (ref 12–16)
IMM GRANULOCYTES # BLD AUTO: 0.04 K/UL (ref 0–0.04)
IMM GRANULOCYTES NFR BLD AUTO: 0.4 % (ref 0–0.5)
LYMPHOCYTES # BLD AUTO: 0.7 K/UL (ref 1–4.8)
LYMPHOCYTES NFR BLD: 7.3 % (ref 18–48)
MAGNESIUM SERPL-MCNC: 1.6 MG/DL (ref 1.6–2.6)
MCH RBC QN AUTO: 28.7 PG (ref 27–31)
MCHC RBC AUTO-ENTMCNC: 29.1 G/DL (ref 32–36)
MCV RBC AUTO: 99 FL (ref 82–98)
MONOCYTES # BLD AUTO: 0.3 K/UL (ref 0.3–1)
MONOCYTES NFR BLD: 3 % (ref 4–15)
NEUTROPHILS # BLD AUTO: 8 K/UL (ref 1.8–7.7)
NEUTROPHILS NFR BLD: 89.2 % (ref 38–73)
NRBC BLD-RTO: 0 /100 WBC
PHOSPHATE SERPL-MCNC: 2.4 MG/DL (ref 2.7–4.5)
PLATELET # BLD AUTO: 185 K/UL (ref 150–450)
PMV BLD AUTO: 10.8 FL (ref 9.2–12.9)
POTASSIUM SERPL-SCNC: 4.1 MMOL/L (ref 3.5–5.1)
RBC # BLD AUTO: 3.49 M/UL (ref 4–5.4)
SODIUM SERPL-SCNC: 137 MMOL/L (ref 136–145)
WBC # BLD AUTO: 8.94 K/UL (ref 3.9–12.7)

## 2024-06-15 PROCEDURE — 99900031 HC PATIENT EDUCATION (STAT)

## 2024-06-15 PROCEDURE — 63600175 PHARM REV CODE 636 W HCPCS: Performed by: HOSPITALIST

## 2024-06-15 PROCEDURE — 80048 BASIC METABOLIC PNL TOTAL CA: CPT | Performed by: INTERNAL MEDICINE

## 2024-06-15 PROCEDURE — 25000003 PHARM REV CODE 250: Performed by: INTERNAL MEDICINE

## 2024-06-15 PROCEDURE — 36415 COLL VENOUS BLD VENIPUNCTURE: CPT | Performed by: INTERNAL MEDICINE

## 2024-06-15 PROCEDURE — 94640 AIRWAY INHALATION TREATMENT: CPT

## 2024-06-15 PROCEDURE — 25000003 PHARM REV CODE 250: Performed by: HOSPITALIST

## 2024-06-15 PROCEDURE — 83735 ASSAY OF MAGNESIUM: CPT | Performed by: INTERNAL MEDICINE

## 2024-06-15 PROCEDURE — 63600175 PHARM REV CODE 636 W HCPCS: Performed by: INTERNAL MEDICINE

## 2024-06-15 PROCEDURE — 27000221 HC OXYGEN, UP TO 24 HOURS

## 2024-06-15 PROCEDURE — 94660 CPAP INITIATION&MGMT: CPT

## 2024-06-15 PROCEDURE — 93005 ELECTROCARDIOGRAM TRACING: CPT | Performed by: INTERNAL MEDICINE

## 2024-06-15 PROCEDURE — 25000242 PHARM REV CODE 250 ALT 637 W/ HCPCS: Performed by: INTERNAL MEDICINE

## 2024-06-15 PROCEDURE — 94761 N-INVAS EAR/PLS OXIMETRY MLT: CPT

## 2024-06-15 PROCEDURE — 21000000 HC CCU ICU ROOM CHARGE

## 2024-06-15 PROCEDURE — 99900035 HC TECH TIME PER 15 MIN (STAT)

## 2024-06-15 PROCEDURE — 82962 GLUCOSE BLOOD TEST: CPT

## 2024-06-15 PROCEDURE — 84100 ASSAY OF PHOSPHORUS: CPT | Performed by: INTERNAL MEDICINE

## 2024-06-15 PROCEDURE — 85025 COMPLETE CBC W/AUTO DIFF WBC: CPT | Performed by: INTERNAL MEDICINE

## 2024-06-15 PROCEDURE — 93010 ELECTROCARDIOGRAM REPORT: CPT | Mod: ,,, | Performed by: INTERNAL MEDICINE

## 2024-06-15 RX ORDER — GLUCAGON 1 MG
1 KIT INJECTION
Status: DISCONTINUED | OUTPATIENT
Start: 2024-06-15 | End: 2024-06-20 | Stop reason: HOSPADM

## 2024-06-15 RX ORDER — INSULIN GLARGINE 100 [IU]/ML
15 INJECTION, SOLUTION SUBCUTANEOUS NIGHTLY
Status: DISCONTINUED | OUTPATIENT
Start: 2024-06-15 | End: 2024-06-17

## 2024-06-15 RX ORDER — LANOLIN ALCOHOL/MO/W.PET/CERES
800 CREAM (GRAM) TOPICAL
Status: DISCONTINUED | OUTPATIENT
Start: 2024-06-15 | End: 2024-06-20 | Stop reason: HOSPADM

## 2024-06-15 RX ORDER — SODIUM,POTASSIUM PHOSPHATES 280-250MG
2 POWDER IN PACKET (EA) ORAL
Status: DISCONTINUED | OUTPATIENT
Start: 2024-06-15 | End: 2024-06-20 | Stop reason: HOSPADM

## 2024-06-15 RX ORDER — HYDROCODONE BITARTRATE AND ACETAMINOPHEN 5; 325 MG/1; MG/1
1 TABLET ORAL EVERY 6 HOURS PRN
Status: DISCONTINUED | OUTPATIENT
Start: 2024-06-15 | End: 2024-06-20 | Stop reason: HOSPADM

## 2024-06-15 RX ORDER — ENOXAPARIN SODIUM 100 MG/ML
40 INJECTION SUBCUTANEOUS EVERY 24 HOURS
Status: DISCONTINUED | OUTPATIENT
Start: 2024-06-15 | End: 2024-06-15

## 2024-06-15 RX ORDER — OXYCODONE HYDROCHLORIDE 5 MG/1
5 TABLET ORAL EVERY 6 HOURS PRN
Status: DISCONTINUED | OUTPATIENT
Start: 2024-06-15 | End: 2024-06-15

## 2024-06-15 RX ORDER — FUROSEMIDE 10 MG/ML
40 INJECTION INTRAMUSCULAR; INTRAVENOUS EVERY 12 HOURS
Status: DISCONTINUED | OUTPATIENT
Start: 2024-06-15 | End: 2024-06-20 | Stop reason: HOSPADM

## 2024-06-15 RX ORDER — IBUPROFEN 200 MG
24 TABLET ORAL
Status: DISCONTINUED | OUTPATIENT
Start: 2024-06-15 | End: 2024-06-20 | Stop reason: HOSPADM

## 2024-06-15 RX ORDER — HYDROMORPHONE HYDROCHLORIDE 1 MG/ML
0.4 INJECTION, SOLUTION INTRAMUSCULAR; INTRAVENOUS; SUBCUTANEOUS EVERY 6 HOURS PRN
Status: DISCONTINUED | OUTPATIENT
Start: 2024-06-15 | End: 2024-06-15

## 2024-06-15 RX ORDER — NAPROXEN SODIUM 220 MG/1
81 TABLET, FILM COATED ORAL DAILY
Status: DISCONTINUED | OUTPATIENT
Start: 2024-06-15 | End: 2024-06-20 | Stop reason: HOSPADM

## 2024-06-15 RX ORDER — INSULIN ASPART 100 [IU]/ML
0-15 INJECTION, SOLUTION INTRAVENOUS; SUBCUTANEOUS
Status: DISCONTINUED | OUTPATIENT
Start: 2024-06-15 | End: 2024-06-20 | Stop reason: HOSPADM

## 2024-06-15 RX ORDER — CLOPIDOGREL BISULFATE 75 MG/1
75 TABLET ORAL DAILY
Status: DISCONTINUED | OUTPATIENT
Start: 2024-06-15 | End: 2024-06-16

## 2024-06-15 RX ORDER — IBUPROFEN 200 MG
16 TABLET ORAL
Status: DISCONTINUED | OUTPATIENT
Start: 2024-06-15 | End: 2024-06-20 | Stop reason: HOSPADM

## 2024-06-15 RX ORDER — ENOXAPARIN SODIUM 100 MG/ML
1 INJECTION SUBCUTANEOUS EVERY 12 HOURS
Status: DISCONTINUED | OUTPATIENT
Start: 2024-06-16 | End: 2024-06-16

## 2024-06-15 RX ADMIN — Medication 800 MG: at 01:06

## 2024-06-15 RX ADMIN — ENOXAPARIN SODIUM 40 MG: 40 INJECTION SUBCUTANEOUS at 05:06

## 2024-06-15 RX ADMIN — CLOPIDOGREL BISULFATE 75 MG: 75 TABLET, FILM COATED ORAL at 03:06

## 2024-06-15 RX ADMIN — POTASSIUM, SODIUM PHOSPHATES 280 MG-160 MG-250 MG ORAL POWDER PACKET 2 PACKET: POWDER IN PACKET at 08:06

## 2024-06-15 RX ADMIN — ASPIRIN 81 MG 81 MG: 81 TABLET ORAL at 03:06

## 2024-06-15 RX ADMIN — CHLORZOXAZONE 750 MG: 750 TABLET ORAL at 05:06

## 2024-06-15 RX ADMIN — DEXTROSE MONOHYDRATE 5 MG/HR: 50 INJECTION, SOLUTION INTRAVENOUS at 06:06

## 2024-06-15 RX ADMIN — METHYLPREDNISOLONE SODIUM SUCCINATE 40 MG: 40 INJECTION, POWDER, FOR SOLUTION INTRAMUSCULAR; INTRAVENOUS at 08:06

## 2024-06-15 RX ADMIN — HYDROCODONE BITARTRATE AND ACETAMINOPHEN 1 TABLET: 5; 325 TABLET ORAL at 08:06

## 2024-06-15 RX ADMIN — INSULIN ASPART 10 UNITS: 100 INJECTION, SOLUTION INTRAVENOUS; SUBCUTANEOUS at 08:06

## 2024-06-15 RX ADMIN — Medication 800 MG: at 08:06

## 2024-06-15 RX ADMIN — CHLORZOXAZONE 750 MG: 750 TABLET ORAL at 08:06

## 2024-06-15 RX ADMIN — LEVOFLOXACIN 750 MG: 750 TABLET, FILM COATED ORAL at 08:06

## 2024-06-15 RX ADMIN — INSULIN ASPART 8 UNITS: 100 INJECTION, SOLUTION INTRAVENOUS; SUBCUTANEOUS at 08:06

## 2024-06-15 RX ADMIN — METHYLPREDNISOLONE SODIUM SUCCINATE 40 MG: 40 INJECTION, POWDER, FOR SOLUTION INTRAMUSCULAR; INTRAVENOUS at 01:06

## 2024-06-15 RX ADMIN — OXYCODONE HYDROCHLORIDE 5 MG: 5 TABLET ORAL at 02:06

## 2024-06-15 RX ADMIN — AMLODIPINE BESYLATE 5 MG: 5 TABLET ORAL at 08:06

## 2024-06-15 RX ADMIN — INSULIN ASPART 15 UNITS: 100 INJECTION, SOLUTION INTRAVENOUS; SUBCUTANEOUS at 05:06

## 2024-06-15 RX ADMIN — ATORVASTATIN CALCIUM 80 MG: 40 TABLET, FILM COATED ORAL at 08:06

## 2024-06-15 RX ADMIN — GABAPENTIN 600 MG: 300 CAPSULE ORAL at 02:06

## 2024-06-15 RX ADMIN — METHYLPREDNISOLONE SODIUM SUCCINATE 40 MG: 40 INJECTION, POWDER, FOR SOLUTION INTRAMUSCULAR; INTRAVENOUS at 05:06

## 2024-06-15 RX ADMIN — PANTOPRAZOLE SODIUM 40 MG: 40 TABLET, DELAYED RELEASE ORAL at 05:06

## 2024-06-15 RX ADMIN — IPRATROPIUM BROMIDE AND ALBUTEROL SULFATE 3 ML: 2.5; .5 SOLUTION RESPIRATORY (INHALATION) at 02:06

## 2024-06-15 RX ADMIN — IPRATROPIUM BROMIDE AND ALBUTEROL SULFATE 3 ML: 2.5; .5 SOLUTION RESPIRATORY (INHALATION) at 08:06

## 2024-06-15 RX ADMIN — GUAIFENESIN 1200 MG: 600 TABLET, EXTENDED RELEASE ORAL at 08:06

## 2024-06-15 RX ADMIN — OXYCODONE HYDROCHLORIDE 5 MG: 5 TABLET ORAL at 08:06

## 2024-06-15 RX ADMIN — OXYCODONE HYDROCHLORIDE 5 MG: 5 TABLET ORAL at 12:06

## 2024-06-15 RX ADMIN — IPRATROPIUM BROMIDE AND ALBUTEROL SULFATE 3 ML: 2.5; .5 SOLUTION RESPIRATORY (INHALATION) at 07:06

## 2024-06-15 RX ADMIN — FUROSEMIDE 40 MG: 10 INJECTION, SOLUTION INTRAVENOUS at 03:06

## 2024-06-15 RX ADMIN — GABAPENTIN 600 MG: 300 CAPSULE ORAL at 08:06

## 2024-06-15 RX ADMIN — INSULIN GLARGINE 15 UNITS: 100 INJECTION, SOLUTION SUBCUTANEOUS at 08:06

## 2024-06-15 RX ADMIN — DEXTROSE MONOHYDRATE 500 MG: 50 INJECTION, SOLUTION INTRAVENOUS at 05:06

## 2024-06-15 NOTE — ASSESSMENT & PLAN NOTE
"Patient's FSGs are controlled on current medication regimen.  Last A1c reviewed-   Lab Results   Component Value Date    HGBA1C 7.0 (H) 06/14/2024     Most recent fingerstick glucose reviewed- No results for input(s): "POCTGLUCOSE" in the last 24 hours.  Current correctional scale  Low  Maintain anti-hyperglycemic dose as follows-   Antihyperglycemics (From admission, onward)      Start     Stop Route Frequency Ordered    06/15/24 2100  insulin glargine U-100 (Lantus) pen 15 Units         -- SubQ Nightly 06/15/24 1516    06/15/24 1616  insulin aspart U-100 pen 0-15 Units         -- SubQ Before meals & nightly PRN 06/15/24 1516          Hold Oral hypoglycemics while patient is in the hospital.    Patient has severe uncontrolled hyperglycemia secondary to steroids.  Add on Lantus 15 units q.h.s. plus high-dose sliding scale  "

## 2024-06-15 NOTE — SUBJECTIVE & OBJECTIVE
Interval History:   Seen in the multidisciplinary rounds, feel better, no fever or chills, no nausea vomiting diarrhea, glucose uncontrolled, bicarb still severe elevated.     Review of Systems   Constitutional:  Negative for activity change and fever.   HENT:  Negative for ear discharge, facial swelling and sore throat.    Eyes:  Negative for photophobia, pain and visual disturbance.   Respiratory:  Positive for shortness of breath. Negative for apnea and cough.    Cardiovascular:  Negative for chest pain and leg swelling.   Gastrointestinal:  Negative for abdominal pain and blood in stool.   Endocrine: Negative for cold intolerance and heat intolerance.   Musculoskeletal:  Negative for back pain and gait problem.   Skin:  Negative for pallor and rash.   Neurological:  Negative for speech difficulty and headaches.   Psychiatric/Behavioral:  Negative for confusion, hallucinations and suicidal ideas.    All other systems reviewed and are negative.    Objective:     Vital Signs (Most Recent):  Temp: 97.8 °F (36.6 °C) (06/15/24 1100)  Pulse: 69 (06/15/24 1500)  Resp: 20 (06/15/24 1500)  BP: 138/61 (06/15/24 1500)  SpO2: (!) 93 % (06/15/24 1500) Vital Signs (24h Range):  Temp:  [97.8 °F (36.6 °C)-98.2 °F (36.8 °C)] 97.8 °F (36.6 °C)  Pulse:  [62-79] 69  Resp:  [10-33] 20  SpO2:  [86 %-99 %] 93 %  BP: (136-189)/() 138/61     Weight: 97.5 kg (215 lb)  Body mass index is 35.78 kg/m².    Intake/Output Summary (Last 24 hours) at 6/15/2024 1519  Last data filed at 6/15/2024 0927  Gross per 24 hour   Intake 0 ml   Output 1250 ml   Net -1250 ml         Physical Exam  Constitutional:       Appearance: Normal appearance.   HENT:      Head: Normocephalic and atraumatic.      Nose: Nose normal.   Eyes:      Extraocular Movements: Extraocular movements intact.      Pupils: Pupils are equal, round, and reactive to light.   Cardiovascular:      Rate and Rhythm: Normal rate and regular rhythm.      Heart sounds: No murmur  heard.  Pulmonary:      Effort: Pulmonary effort is normal.      Breath sounds: Rales present.   Abdominal:      General: Abdomen is flat.      Palpations: Abdomen is soft.   Musculoskeletal:         General: Swelling present. Normal range of motion.      Cervical back: Normal range of motion and neck supple.   Skin:     General: Skin is warm and dry.   Neurological:      General: No focal deficit present.      Mental Status: She is alert and oriented to person, place, and time.   Psychiatric:         Mood and Affect: Mood normal.             Significant Labs: All pertinent labs within the past 24 hours have been reviewed.  CBC:   Recent Labs   Lab 06/14/24 0446 06/14/24  0503 06/15/24  0434   WBC 15.07*  --  8.94   HGB 10.4*  --  10.0*   HCT 36.9* 37 34.4*     --  185     CMP:   Recent Labs   Lab 06/14/24 0446 06/15/24  0434    137   K 4.0 4.1   CL 91* 89*   CO2 >45* 45*   * 311*   BUN 10 10   CREATININE 0.5 0.5   CALCIUM 9.2 9.5   PROT 7.2  --    ALBUMIN 3.4*  --    BILITOT 0.5  --    ALKPHOS 105  --    AST 11  --    ALT 9*  --    ANIONGAP 3* 3*       Significant Imaging: I have reviewed all pertinent imaging results/findings within the past 24 hours.  I have reviewed and interpreted all pertinent imaging results/findings within the past 24 hours.    X-Ray Chest AP Portable    Result Date: 6/14/2024  EXAMINATION: XR CHEST AP PORTABLE CLINICAL HISTORY: CHF; TECHNIQUE: Single frontal view of the chest was performed. COMPARISON: Chest radiograph June 7, 2022 FINDINGS: Single portable chest view is submitted.  When accounting for position and technique and depth of inspiration, the appearance of the cardiomediastinal silhouette is stable.  Aortic atherosclerotic change noted. There is accentuated attenuation at the lung bases which in part is likely due to overlying soft tissue attenuation, however asymmetric density at the left lung base may relate to a component of small pleural effusion as  well as suspected pulmonary infiltrate/airspace disease, peripheral opacity at the mid left hemithorax may relate to pulmonary infiltrate/airspace disease as well.  There may be a component of mild infiltrate at the right base, there is no pleural fluid on the right and bilaterally there is no pneumothorax. The osseous structures demonstrate chronic change.     Basilar opacity on the left may relate to mild pleural effusion with pulmonary infiltrate/airspace disease as well as peripheral pulmonary infiltrate at the mid left hemithorax. Suspected mild infiltrate at the right base as well. Electronically signed by: Topher Rosario Date:    06/14/2024 Time:    05:35  - pulls last radiology orders

## 2024-06-15 NOTE — HOSPITAL COURSE
64y.o. female  with  a past medical history of Coronary artery disease, Diabetes mellitus, and Hypertension.,  ABDI,  COPD came with SOB. Patient is ex-smoker and currently on 2 L oxygen for COPD.  ABG shows severe compensated CO2 retention.  Patient stated that she has CPAP at home however the machine has been broken for years.  Patient developed AFib with RVR 6/15, started on Cardizem drip and converted to normal sinus rhythm, started on full-dose Lovenox later converted to Eliquis as per Cardiology recommendation, also discontinue the Plavix, continue aspirin.  Patient received 5 days of Levaquin.  Patient later developed AFib with RVR again converted to normal sinus rhythm again with p.o. Cardizem.  Patient also found to have severe hyperthyroidism, patient was taking Synthroid 150 mcg once a day, so we have hold this medication and recommended repeat TSH free T4 in 2-3 weeks.  CT chest is negative for pulmonary embolism.  Now patient much improved with aggressive diuresis steroids and antibiotics, patient has been -12 L since admission.  Oxygen level down to 2 L which is her baseline.  Patient is educated extensively about using CPAP at home, she says she will get a new CPAP  machine.

## 2024-06-15 NOTE — PLAN OF CARE
Problem: COPD (Chronic Obstructive Pulmonary Disease)  Goal: Optimal Chronic Illness Coping  Outcome: Progressing  Goal: Optimal Level of Functional [place]  Outcome: Progressing  Goal: Absence of Infection Signs and Symptoms  Outcome: Progressing  Goal: Improved Oral Intake  Outcome: Progressing  Goal: Effective Oxygenation and Ventilation  Outcome: Progressing     Problem: Adult Inpatient Plan of Care  Goal: Plan of Care Review  Outcome: Progressing  Goal: Patient-Specific Goal (Individualized)  Outcome: Progressing  Goal: Absence of Hospital-Acquired Illness or Injury  Outcome: Progressing  Goal: Optimal Comfort and Wellbeing  Outcome: Progressing  Goal: Readiness for Transition of Care  Outcome: Progressing     Problem: Diabetes Comorbidity  Goal: Blood Glucose Level Within Targeted Range  Outcome: Progressing     Problem: Pneumonia  Goal: Fluid Balance  Outcome: Progressing  Goal: Resolution of Infection Signs and Symptoms  Outcome: Progressing  Goal: Effective Oxygenation and Ventilation  Outcome: Progressing     Problem: Wound  Goal: Optimal Coping  Outcome: Progressing  Goal: Optimal Functional Ability  Outcome: Progressing  Goal: Absence of Infection Signs and Symptoms  Outcome: Progressing  Goal: Improved Oral Intake  Outcome: Progressing  Goal: Optimal Pain Control and Function  Outcome: Progressing  Goal: Skin Health and Integrity  Outcome: Progressing  Goal: Optimal Wound Healing  Outcome: Progressing     Problem: Skin Injury Risk Increased  Goal: Skin Health and Integrity  Outcome: Progressing

## 2024-06-15 NOTE — ASSESSMENT & PLAN NOTE
Patient's COPD is with exacerbation noted by continued dyspnea and use of accessory muscles for breathing currently.  Patient is currently on COPD Pathway. Continue scheduled inhalers Steroids, Antibiotics, and Supplemental oxygen and monitor respiratory status closely.   Continue BiPAP and admitted to step-down    ABG shows severe acute on chronic CO2 retention, most likely secondary to obesity hypoventilation syndrome versus ABDI versus COPD.  Patient does have CPAP at home however has not used it for years.   We will ask the patient to reuse it.   Patient also looks like fluid overload, we will give 1 dose IV acetazolamide.  We will give IV Lasix 40 b.i.d.

## 2024-06-15 NOTE — ASSESSMENT & PLAN NOTE
Patient has a diagnosis of pneumonia. The cause of the pneumonia is unknown at this time. The pneumonia is stable. The patient has the following signs/symptoms of pneumonia: persistent hypoxia . The patient does have a current oxygen requirement and the patient does have a home oxygen requirement. I have reviewed the pertinent imaging. The following cultures have been collected: Blood cultures and Sputum culture The culture results are listed below.     Current antimicrobial regimen consists of the antibiotics listed below. Will monitor patient closely and Adjust treatment plan as follows      Antibiotics (From admission, onward)    Start     Stop Route Frequency Ordered    06/15/24 0900  levoFLOXacin tablet 750 mg  (MEDICATIONS - ANTIBIOTICS FOR COPD PATHWAY)         06/20/24 0859 Oral Daily 06/14/24 0822          Microbiology Results (last 7 days)     Procedure Component Value Units Date/Time    Blood culture [[id number]] Collected: 06/14/24 0450    Order Status: Completed Specimen: Blood from Peripheral, Hand, Left Updated: 06/15/24 0632     Blood Culture, Routine No Growth to date      No Growth to date    Blood culture [[id number]] Collected: 06/14/24 0454    Order Status: Completed Specimen: Blood from Peripheral, Antecubital, Right Updated: 06/15/24 0632     Blood Culture, Routine No Growth to date      No Growth to date

## 2024-06-15 NOTE — PLAN OF CARE
Problem: COPD (Chronic Obstructive Pulmonary Disease)  Goal: Optimal Chronic Illness Coping  Outcome: Progressing  Goal: Optimal Level of Functional [place]  Outcome: Progressing  Goal: Absence of Infection Signs and Symptoms  Outcome: Progressing  Goal: Improved Oral Intake  Outcome: Progressing  Goal: Effective Oxygenation and Ventilation  Outcome: Progressing     Problem: Adult Inpatient Plan of Care  Goal: Plan of Care Review  Outcome: Progressing  Goal: Patient-Specific Goal (Individualized)  Outcome: Progressing  Goal: Absence of Hospital-Acquired Illness or Injury  Outcome: Progressing  Goal: Optimal Comfort and Wellbeing  Outcome: Progressing  Goal: Readiness for Transition of Care  Outcome: Progressing     Problem: Diabetes Comorbidity  Goal: Blood Glucose Level Within Targeted Range  Outcome: Progressing     Problem: Pneumonia  Goal: Fluid Balance  Outcome: Progressing  Goal: Resolution of Infection Signs and Symptoms  Outcome: Progressing  Goal: Effective Oxygenation and Ventilation  Outcome: Progressing     Problem: Wound  Goal: Optimal Coping  Outcome: Progressing  Goal: Optimal Functional Ability  Outcome: Progressing  Goal: Absence of Infection Signs and Symptoms  Outcome: Progressing  Goal: Improved Oral Intake  Outcome: Progressing  Goal: Optimal Pain Control and Function  Outcome: Progressing  Goal: Skin Health and Integrity  Outcome: Progressing  Goal: Optimal Wound Healing  Outcome: Progressing     Problem: Skin Injury Risk Increased  Goal: Skin Health and Integrity  Outcome: Progressing

## 2024-06-15 NOTE — PROGRESS NOTES
[facility]  Progress Note    Patient Name: Karo Leonard  MRN: [id number]  Patient Class: IP- Inpatient   Admission Date: 6/14/2024  Length of Stay: 1 days  Attending Physician: Elsy Peoples MD  Primary Care Provider: Navneet Hernandez FNP        Subjective:     Principal Problem:COPD exacerbation        HPI:  64y.o. female  with  a past medical history of Coronary artery disease, Diabetes mellitus, and Hypertension., COPD came with SOB.  Patient is ex-smoker and currently on 2 L oxygen for COPD. no history of sick contact and no fever at home.  .She became gradually worsening shortness of breath with cough with past 2 days and extreme fatigue and came to the hospital.  Patient has been using  increased oxygen 5 L at home but still having shortness of breath and called EMS and she was hypoxic  at 84 with 5 L of oxygen at arrival   Denied having chest pain, no leg swelling and patient was given IV Solu-Medrol and nebulization treatments and later started on BIPAP. ABG was done but result not reported .  Medication reconciliation was not done in the ER and not able to reconcile  On examination patient is not in extreme shortness of breath, she is able to talk out of the BiPAP, lung exam with limited air entry.  Patient has leukocytosis but no fever and chest x-ray possible pneumonia and admitted    Overview/Hospital Course:  64y.o. female  with  a past medical history of Coronary artery disease, Diabetes mellitus, and Hypertension.,  ABDI,  COPD came with SOB. Patient is ex-smoker and currently on 2 L oxygen for COPD.  ABG shows severe compensated CO2 retention.  Patient stated that she has CPAP at home however the machine has been broken for years.     Interval History:   Seen in the multidisciplinary rounds, feel better, no fever or chills, no nausea vomiting diarrhea, glucose uncontrolled, bicarb still severe elevated.     Review of Systems   Constitutional:  Negative for  activity change and fever.   HENT:  Negative for ear discharge, facial swelling and sore throat.    Eyes:  Negative for photophobia, pain and visual disturbance.   Respiratory:  Positive for shortness of breath. Negative for apnea and cough.    Cardiovascular:  Negative for chest pain and leg swelling.   Gastrointestinal:  Negative for abdominal pain and blood in stool.   Endocrine: Negative for cold intolerance and heat intolerance.   Musculoskeletal:  Negative for back pain and gait problem.   Skin:  Negative for pallor and rash.   Neurological:  Negative for speech difficulty and headaches.   Psychiatric/Behavioral:  Negative for confusion, hallucinations and suicidal ideas.    All other systems reviewed and are negative.    Objective:     Vital Signs (Most Recent):  Temp: 97.8 °F (36.6 °C) (06/15/24 1100)  Pulse: 69 (06/15/24 1500)  Resp: 20 (06/15/24 1500)  BP: 138/61 (06/15/24 1500)  SpO2: (!) 93 % (06/15/24 1500) Vital Signs (24h Range):  Temp:  [97.8 °F (36.6 °C)-98.2 °F (36.8 °C)] 97.8 °F (36.6 °C)  Pulse:  [62-79] 69  Resp:  [10-33] 20  SpO2:  [86 %-99 %] 93 %  BP: (136-189)/() 138/61     Weight: 97.5 kg (215 lb)  Body mass index is 35.78 kg/m².    Intake/Output Summary (Last 24 hours) at 6/15/2024 1519  Last data filed at 6/15/2024 0927  Gross per 24 hour   Intake 0 ml   Output 1250 ml   Net -1250 ml         Physical Exam  Constitutional:       Appearance: Normal appearance.   HENT:      Head: Normocephalic and atraumatic.      Nose: Nose normal.   Eyes:      Extraocular Movements: Extraocular movements intact.      Pupils: Pupils are equal, round, and reactive to light.   Cardiovascular:      Rate and Rhythm: Normal rate and regular rhythm.      Heart sounds: No murmur heard.  Pulmonary:      Effort: Pulmonary effort is normal.      Breath sounds: Rales present.   Abdominal:      General: Abdomen is flat.      Palpations: Abdomen is soft.   Musculoskeletal:         General: Swelling present. Normal  range of motion.      Cervical back: Normal range of motion and neck supple.   Skin:     General: Skin is warm and dry.   Neurological:      General: No focal deficit present.      Mental Status: She is alert and oriented to person, place, and time.   Psychiatric:         Mood and Affect: Mood normal.             Significant Labs: All pertinent labs within the past 24 hours have been reviewed.  CBC:   Recent Labs   Lab 06/14/24  0446 06/14/24  0503 06/15/24  0434   WBC 15.07*  --  8.94   HGB 10.4*  --  10.0*   HCT 36.9* 37 34.4*     --  185     CMP:   Recent Labs   Lab 06/14/24  0446 06/15/24  0434    137   K 4.0 4.1   CL 91* 89*   CO2 >45* 45*   * 311*   BUN 10 10   CREATININE 0.5 0.5   CALCIUM 9.2 9.5   PROT 7.2  --    ALBUMIN 3.4*  --    BILITOT 0.5  --    ALKPHOS 105  --    AST 11  --    ALT 9*  --    ANIONGAP 3* 3*       Significant Imaging: I have reviewed all pertinent imaging results/findings within the past 24 hours.  I have reviewed and interpreted all pertinent imaging results/findings within the past 24 hours.    X-Ray Chest AP Portable    Result Date: 6/14/2024  EXAMINATION: XR CHEST AP PORTABLE CLINICAL HISTORY: CHF; TECHNIQUE: Single frontal view of the chest was performed. COMPARISON: Chest radiograph June 7, 2022 FINDINGS: Single portable chest view is submitted.  When accounting for position and technique and depth of inspiration, the appearance of the cardiomediastinal silhouette is stable.  Aortic atherosclerotic change noted. There is accentuated attenuation at the lung bases which in part is likely due to overlying soft tissue attenuation, however asymmetric density at the left lung base may relate to a component of small pleural effusion as well as suspected pulmonary infiltrate/airspace disease, peripheral opacity at the mid left hemithorax may relate to pulmonary infiltrate/airspace disease as well.  There may be a component of mild infiltrate at the right base, there is  "no pleural fluid on the right and bilaterally there is no pneumothorax. The osseous structures demonstrate chronic change.     Basilar opacity on the left may relate to mild pleural effusion with pulmonary infiltrate/airspace disease as well as peripheral pulmonary infiltrate at the mid left hemithorax. Suspected mild infiltrate at the right base as well. Electronically signed by: Topher Rosario Date:    06/14/2024 Time:    05:35  - pulls last radiology orders      Assessment/Plan:      * COPD exacerbation  Patient's COPD is with exacerbation noted by continued dyspnea and use of accessory muscles for breathing currently.  Patient is currently on COPD Pathway. Continue scheduled inhalers Steroids, Antibiotics, and Supplemental oxygen and monitor respiratory status closely.   Continue BiPAP and admitted to step-down    ABG shows severe acute on chronic CO2 retention, most likely secondary to obesity hypoventilation syndrome versus ABDI versus COPD.  Patient does have CPAP at home however has not used it for years.   We will ask the patient to reuse it.   Patient also looks like fluid overload, we will give 1 dose IV acetazolamide.  We will give IV Lasix 40 b.i.d.    Diabetes  Patient's FSGs are controlled on current medication regimen.  Last A1c reviewed-   Lab Results   Component Value Date    HGBA1C 7.0 (H) 06/14/2024     Most recent fingerstick glucose reviewed- No results for input(s): "POCTGLUCOSE" in the last 24 hours.  Current correctional scale  Low  Maintain anti-hyperglycemic dose as follows-   Antihyperglycemics (From admission, onward)      Start     Stop Route Frequency Ordered    06/15/24 2100  insulin glargine U-100 (Lantus) pen 15 Units         -- SubQ Nightly 06/15/24 1516    06/15/24 1616  insulin aspart U-100 pen 0-15 Units         -- SubQ Before meals & nightly PRN 06/15/24 1516          Hold Oral hypoglycemics while patient is in the hospital.    Patient has severe uncontrolled hyperglycemia " secondary to steroids.  Add on Lantus 15 units q.h.s. plus high-dose sliding scale    Pneumonia  Patient has a diagnosis of pneumonia. The cause of the pneumonia is unknown at this time. The pneumonia is stable. The patient has the following signs/symptoms of pneumonia: persistent hypoxia . The patient does have a current oxygen requirement and the patient does have a home oxygen requirement. I have reviewed the pertinent imaging. The following cultures have been collected: Blood cultures and Sputum culture The culture results are listed below.     Current antimicrobial regimen consists of the antibiotics listed below. Will monitor patient closely and Adjust treatment plan as follows      Antibiotics (From admission, onward)      Start     Stop Route Frequency Ordered    06/15/24 0900  levoFLOXacin tablet 750 mg  (MEDICATIONS - ANTIBIOTICS FOR COPD PATHWAY)         06/20/24 0859 Oral Daily 06/14/24 0822            Microbiology Results (last 7 days)       Procedure Component Value Units Date/Time    Blood culture [[id number]] Collected: 06/14/24 0450    Order Status: Completed Specimen: Blood from Peripheral, Hand, Left Updated: 06/15/24 0632     Blood Culture, Routine No Growth to date      No Growth to date    Blood culture [[id number]] Collected: 06/14/24 0454    Order Status: Completed Specimen: Blood from Peripheral, Antecubital, Right Updated: 06/15/24 0632     Blood Culture, Routine No Growth to date      No Growth to date            History of coronary artery stent placement    Aware, denied chest pain  Continue home medication  Reconciliation needs to be done when ready      Hypertension  Chronic, controlled. Latest blood pressure and vitals reviewed-     Temp:  [98.4 °F (36.9 °C)]   Pulse:  [55-60]   Resp:  [21-30]   BP: (134-181)/(61-76)   SpO2:  [84 %-93 %] .   Home meds for hypertension were reviewed and noted below.   Hypertension Medications               amLODIPine (NORVASC) 5 MG tablet Take 5 mg by  mouth once daily.    propranoloL (INDERAL LA) 80 MG 24 hr capsule Take 160 mg by mouth once daily.            While in the hospital, will manage blood pressure as follows; Continue home antihypertensive regimen    Will utilize p.r.n. blood pressure medication only if patient's blood pressure greater than 140/90 and she develops symptoms such as worsening chest pain or shortness of breath.    Hypothyroid    Home medication  Check TSH free T4      VTE Risk Mitigation (From admission, onward)           Ordered     IP VTE LOW RISK PATIENT  Once         06/14/24 0544     Place sequential compression device  Until discontinued         06/14/24 0544                    Discharge Planning   CHELE: 6/17/2024     Code Status: Full Code   Is the patient medically ready for discharge?:     Reason for patient still in hospital (select all that apply): Patient trending condition and Treatment  Discharge Plan A: Home with family                  Elsy Peoples MD  Department of Hospital Medicine   [facility]

## 2024-06-16 ENCOUNTER — CLINICAL SUPPORT (OUTPATIENT)
Dept: CARDIOLOGY | Facility: HOSPITAL | Age: 64
End: 2024-06-16
Attending: HOSPITALIST
Payer: MEDICARE

## 2024-06-16 VITALS — HEIGHT: 65 IN | BODY MASS INDEX: 35.82 KG/M2 | WEIGHT: 215 LBS

## 2024-06-16 PROBLEM — I48.91 NEW ONSET A-FIB: Status: ACTIVE | Noted: 2024-06-16

## 2024-06-16 LAB
ANION GAP SERPL CALC-SCNC: 2 MMOL/L (ref 8–16)
AORTIC ROOT ANNULUS: 3.4 CM
AORTIC VALVE CUSP SEPERATION: 1.6 CM
AV INDEX (PROSTH): 0.66
AV MEAN GRADIENT: 7 MMHG
AV PEAK GRADIENT: 12 MMHG
AV VALVE AREA BY VELOCITY RATIO: 2.09 CM²
AV VALVE AREA: 2.09 CM²
AV VELOCITY RATIO: 0.67
BASOPHILS # BLD AUTO: 0 K/UL (ref 0–0.2)
BASOPHILS NFR BLD: 0 % (ref 0–1.9)
BNP SERPL-MCNC: 237 PG/ML (ref 0–99)
BSA FOR ECHO PROCEDURE: 2.11 M2
BUN SERPL-MCNC: 13 MG/DL (ref 8–23)
CALCIUM SERPL-MCNC: 8.8 MG/DL (ref 8.7–10.5)
CHLORIDE SERPL-SCNC: 89 MMOL/L (ref 95–110)
CO2 SERPL-SCNC: 42 MMOL/L (ref 23–29)
CREAT SERPL-MCNC: 0.6 MG/DL (ref 0.5–1.4)
CV ECHO LV RWT: 0.48 CM
DIFFERENTIAL METHOD BLD: ABNORMAL
DOP CALC AO PEAK VEL: 1.71 M/S
DOP CALC AO VTI: 39.9 CM
DOP CALC LVOT AREA: 3.1 CM2
DOP CALC LVOT DIAMETER: 2 CM
DOP CALC LVOT PEAK VEL: 1.14 M/S
DOP CALC LVOT STROKE VOLUME: 83.21 CM3
DOP CALC MV VTI: 48.3 CM
DOP CALCLVOT PEAK VEL VTI: 26.5 CM
E WAVE DECELERATION TIME: 232 MSEC
E/A RATIO: 1.12
E/E' RATIO: 14.21 M/S
ECHO LV POSTERIOR WALL: 1.4 CM (ref 0.6–1.1)
EOSINOPHIL # BLD AUTO: 0 K/UL (ref 0–0.5)
EOSINOPHIL NFR BLD: 0 % (ref 0–8)
ERYTHROCYTE [DISTWIDTH] IN BLOOD BY AUTOMATED COUNT: 13.5 % (ref 11.5–14.5)
EST. GFR  (NO RACE VARIABLE): >60 ML/MIN/1.73 M^2
FRACTIONAL SHORTENING: 22 % (ref 28–44)
GLUCOSE SERPL-MCNC: 318 MG/DL (ref 70–110)
GLUCOSE SERPL-MCNC: 337 MG/DL (ref 70–110)
GLUCOSE SERPL-MCNC: 368 MG/DL (ref 70–110)
GLUCOSE SERPL-MCNC: 389 MG/DL (ref 70–110)
GLUCOSE SERPL-MCNC: 419 MG/DL (ref 70–110)
HCT VFR BLD AUTO: 32.6 % (ref 37–48.5)
HGB BLD-MCNC: 9.7 G/DL (ref 12–16)
IMM GRANULOCYTES # BLD AUTO: 0.05 K/UL (ref 0–0.04)
IMM GRANULOCYTES NFR BLD AUTO: 0.4 % (ref 0–0.5)
INTERVENTRICULAR SEPTUM: 1 CM (ref 0.6–1.1)
LEFT INTERNAL DIMENSION IN SYSTOLE: 4.5 CM (ref 2.1–4)
LEFT VENTRICLE DIASTOLIC VOLUME INDEX: 81.65 ML/M2
LEFT VENTRICLE DIASTOLIC VOLUME: 166.56 ML
LEFT VENTRICLE MASS INDEX: 146 G/M2
LEFT VENTRICLE SYSTOLIC VOLUME INDEX: 45.3 ML/M2
LEFT VENTRICLE SYSTOLIC VOLUME: 92.45 ML
LEFT VENTRICULAR INTERNAL DIMENSION IN DIASTOLE: 5.8 CM (ref 3.5–6)
LEFT VENTRICULAR MASS: 297 G
LV LATERAL E/E' RATIO: 13.5 M/S
LV SEPTAL E/E' RATIO: 15 M/S
LVOT MG: 3 MMHG
LVOT MV: 0.77 CM/S
LYMPHOCYTES # BLD AUTO: 0.7 K/UL (ref 1–4.8)
LYMPHOCYTES NFR BLD: 5.5 % (ref 18–48)
MAGNESIUM SERPL-MCNC: 1.7 MG/DL (ref 1.6–2.6)
MCH RBC QN AUTO: 28.8 PG (ref 27–31)
MCHC RBC AUTO-ENTMCNC: 29.8 G/DL (ref 32–36)
MCV RBC AUTO: 97 FL (ref 82–98)
MONOCYTES # BLD AUTO: 0.4 K/UL (ref 0.3–1)
MONOCYTES NFR BLD: 3.2 % (ref 4–15)
MV MEAN GRADIENT: 5 MMHG
MV PEAK A VEL: 1.21 M/S
MV PEAK E VEL: 1.35 M/S
MV PEAK GRADIENT: 10 MMHG
MV STENOSIS PRESSURE HALF TIME: 84 MS
MV VALVE AREA BY CONTINUITY EQUATION: 1.72 CM2
MV VALVE AREA P 1/2 METHOD: 2.62 CM2
NEUTROPHILS # BLD AUTO: 10.7 K/UL (ref 1.8–7.7)
NEUTROPHILS NFR BLD: 90.9 % (ref 38–73)
NRBC BLD-RTO: 0 /100 WBC
PHOSPHATE SERPL-MCNC: 3.1 MG/DL (ref 2.7–4.5)
PISA MRMAX VEL: 3.34 M/S
PLATELET # BLD AUTO: 182 K/UL (ref 150–450)
PMV BLD AUTO: 11 FL (ref 9.2–12.9)
POTASSIUM SERPL-SCNC: 4.4 MMOL/L (ref 3.5–5.1)
PV MV: 0.73 M/S
PV PEAK GRADIENT: 5 MMHG
PV PEAK VELOCITY: 1.07 M/S
RBC # BLD AUTO: 3.37 M/UL (ref 4–5.4)
SODIUM SERPL-SCNC: 133 MMOL/L (ref 136–145)
T4 FREE SERPL-MCNC: 1.03 NG/DL (ref 0.71–1.51)
TDI LATERAL: 0.1 M/S
TDI SEPTAL: 0.09 M/S
TDI: 0.1 M/S
TRICUSPID ANNULAR PLANE SYSTOLIC EXCURSION: 2.61 CM
TSH SERPL DL<=0.005 MIU/L-ACNC: 0.1 UIU/ML (ref 0.34–5.6)
WBC # BLD AUTO: 11.79 K/UL (ref 3.9–12.7)
Z-SCORE OF LEFT VENTRICULAR DIMENSION IN END DIASTOLE: -0.47
Z-SCORE OF LEFT VENTRICULAR DIMENSION IN END SYSTOLE: 1.49

## 2024-06-16 PROCEDURE — 84100 ASSAY OF PHOSPHORUS: CPT | Performed by: INTERNAL MEDICINE

## 2024-06-16 PROCEDURE — 94761 N-INVAS EAR/PLS OXIMETRY MLT: CPT

## 2024-06-16 PROCEDURE — 25000003 PHARM REV CODE 250: Performed by: HOSPITALIST

## 2024-06-16 PROCEDURE — 27000221 HC OXYGEN, UP TO 24 HOURS

## 2024-06-16 PROCEDURE — 97530 THERAPEUTIC ACTIVITIES: CPT

## 2024-06-16 PROCEDURE — 63600175 PHARM REV CODE 636 W HCPCS: Performed by: INTERNAL MEDICINE

## 2024-06-16 PROCEDURE — 97162 PT EVAL MOD COMPLEX 30 MIN: CPT

## 2024-06-16 PROCEDURE — 85025 COMPLETE CBC W/AUTO DIFF WBC: CPT | Performed by: INTERNAL MEDICINE

## 2024-06-16 PROCEDURE — 25000003 PHARM REV CODE 250: Performed by: INTERNAL MEDICINE

## 2024-06-16 PROCEDURE — 93010 ELECTROCARDIOGRAM REPORT: CPT | Mod: ,,, | Performed by: INTERNAL MEDICINE

## 2024-06-16 PROCEDURE — 99900031 HC PATIENT EDUCATION (STAT)

## 2024-06-16 PROCEDURE — 93005 ELECTROCARDIOGRAM TRACING: CPT | Performed by: INTERNAL MEDICINE

## 2024-06-16 PROCEDURE — 83735 ASSAY OF MAGNESIUM: CPT | Performed by: INTERNAL MEDICINE

## 2024-06-16 PROCEDURE — 80048 BASIC METABOLIC PNL TOTAL CA: CPT | Performed by: INTERNAL MEDICINE

## 2024-06-16 PROCEDURE — 84443 ASSAY THYROID STIM HORMONE: CPT | Performed by: HOSPITALIST

## 2024-06-16 PROCEDURE — 93306 TTE W/DOPPLER COMPLETE: CPT

## 2024-06-16 PROCEDURE — 84439 ASSAY OF FREE THYROXINE: CPT | Performed by: HOSPITALIST

## 2024-06-16 PROCEDURE — 25000242 PHARM REV CODE 250 ALT 637 W/ HCPCS: Performed by: INTERNAL MEDICINE

## 2024-06-16 PROCEDURE — 82962 GLUCOSE BLOOD TEST: CPT

## 2024-06-16 PROCEDURE — 21000000 HC CCU ICU ROOM CHARGE

## 2024-06-16 PROCEDURE — 94640 AIRWAY INHALATION TREATMENT: CPT

## 2024-06-16 PROCEDURE — 63600175 PHARM REV CODE 636 W HCPCS: Performed by: HOSPITALIST

## 2024-06-16 PROCEDURE — 83880 ASSAY OF NATRIURETIC PEPTIDE: CPT | Performed by: HOSPITALIST

## 2024-06-16 PROCEDURE — 93306 TTE W/DOPPLER COMPLETE: CPT | Mod: 26,,, | Performed by: INTERNAL MEDICINE

## 2024-06-16 PROCEDURE — 36415 COLL VENOUS BLD VENIPUNCTURE: CPT | Performed by: INTERNAL MEDICINE

## 2024-06-16 PROCEDURE — 99900035 HC TECH TIME PER 15 MIN (STAT)

## 2024-06-16 PROCEDURE — 94660 CPAP INITIATION&MGMT: CPT

## 2024-06-16 PROCEDURE — 25000003 PHARM REV CODE 250

## 2024-06-16 PROCEDURE — 99223 1ST HOSP IP/OBS HIGH 75: CPT | Mod: 25,,, | Performed by: INTERNAL MEDICINE

## 2024-06-16 RX ORDER — DILTIAZEM HYDROCHLORIDE 30 MG/1
30 TABLET, FILM COATED ORAL EVERY 6 HOURS
Status: DISCONTINUED | OUTPATIENT
Start: 2024-06-16 | End: 2024-06-17

## 2024-06-16 RX ORDER — PREDNISONE 20 MG/1
40 TABLET ORAL DAILY
Status: DISCONTINUED | OUTPATIENT
Start: 2024-06-17 | End: 2024-06-20 | Stop reason: HOSPADM

## 2024-06-16 RX ORDER — MAGNESIUM SULFATE HEPTAHYDRATE 40 MG/ML
2 INJECTION, SOLUTION INTRAVENOUS ONCE
Status: COMPLETED | OUTPATIENT
Start: 2024-06-16 | End: 2024-06-16

## 2024-06-16 RX ADMIN — FUROSEMIDE 40 MG: 10 INJECTION, SOLUTION INTRAVENOUS at 08:06

## 2024-06-16 RX ADMIN — CHLORZOXAZONE 750 MG: 750 TABLET ORAL at 08:06

## 2024-06-16 RX ADMIN — HYDROCODONE BITARTRATE AND ACETAMINOPHEN 1 TABLET: 5; 325 TABLET ORAL at 05:06

## 2024-06-16 RX ADMIN — DEXTROSE MONOHYDRATE 500 MG: 50 INJECTION, SOLUTION INTRAVENOUS at 10:06

## 2024-06-16 RX ADMIN — ASPIRIN 81 MG 81 MG: 81 TABLET ORAL at 08:06

## 2024-06-16 RX ADMIN — GABAPENTIN 600 MG: 300 CAPSULE ORAL at 08:06

## 2024-06-16 RX ADMIN — DILTIAZEM HYDROCHLORIDE 30 MG: 30 TABLET, FILM COATED ORAL at 08:06

## 2024-06-16 RX ADMIN — INSULIN ASPART 15 UNITS: 100 INJECTION, SOLUTION INTRAVENOUS; SUBCUTANEOUS at 12:06

## 2024-06-16 RX ADMIN — Medication 800 MG: at 10:06

## 2024-06-16 RX ADMIN — GABAPENTIN 600 MG: 300 CAPSULE ORAL at 03:06

## 2024-06-16 RX ADMIN — CLOPIDOGREL BISULFATE 75 MG: 75 TABLET, FILM COATED ORAL at 08:06

## 2024-06-16 RX ADMIN — INSULIN GLARGINE 15 UNITS: 100 INJECTION, SOLUTION SUBCUTANEOUS at 08:06

## 2024-06-16 RX ADMIN — HYDROCODONE BITARTRATE AND ACETAMINOPHEN 1 TABLET: 5; 325 TABLET ORAL at 12:06

## 2024-06-16 RX ADMIN — PANTOPRAZOLE SODIUM 40 MG: 40 TABLET, DELAYED RELEASE ORAL at 05:06

## 2024-06-16 RX ADMIN — CHLORZOXAZONE 750 MG: 750 TABLET ORAL at 05:06

## 2024-06-16 RX ADMIN — DEXTROSE MONOHYDRATE 5 MG/HR: 50 INJECTION, SOLUTION INTRAVENOUS at 05:06

## 2024-06-16 RX ADMIN — GUAIFENESIN 1200 MG: 600 TABLET, EXTENDED RELEASE ORAL at 08:06

## 2024-06-16 RX ADMIN — ENOXAPARIN SODIUM 100 MG: 100 INJECTION SUBCUTANEOUS at 08:06

## 2024-06-16 RX ADMIN — MAGNESIUM SULFATE HEPTAHYDRATE 2 G: 40 INJECTION, SOLUTION INTRAVENOUS at 03:06

## 2024-06-16 RX ADMIN — INSULIN ASPART 12 UNITS: 100 INJECTION, SOLUTION INTRAVENOUS; SUBCUTANEOUS at 05:06

## 2024-06-16 RX ADMIN — METHYLPREDNISOLONE SODIUM SUCCINATE 40 MG: 40 INJECTION, POWDER, FOR SOLUTION INTRAMUSCULAR; INTRAVENOUS at 05:06

## 2024-06-16 RX ADMIN — APIXABAN 5 MG: 5 TABLET, FILM COATED ORAL at 08:06

## 2024-06-16 RX ADMIN — ATORVASTATIN CALCIUM 80 MG: 40 TABLET, FILM COATED ORAL at 08:06

## 2024-06-16 RX ADMIN — DILTIAZEM HYDROCHLORIDE 30 MG: 30 TABLET, FILM COATED ORAL at 03:06

## 2024-06-16 RX ADMIN — INSULIN ASPART 15 UNITS: 100 INJECTION, SOLUTION INTRAVENOUS; SUBCUTANEOUS at 07:06

## 2024-06-16 RX ADMIN — LEVOTHYROXINE SODIUM 150 MCG: 0.1 TABLET ORAL at 05:06

## 2024-06-16 RX ADMIN — LEVOFLOXACIN 750 MG: 750 TABLET, FILM COATED ORAL at 08:06

## 2024-06-16 RX ADMIN — HYDROCODONE BITARTRATE AND ACETAMINOPHEN 1 TABLET: 5; 325 TABLET ORAL at 08:06

## 2024-06-16 RX ADMIN — INSULIN ASPART 8 UNITS: 100 INJECTION, SOLUTION INTRAVENOUS; SUBCUTANEOUS at 08:06

## 2024-06-16 RX ADMIN — IPRATROPIUM BROMIDE AND ALBUTEROL SULFATE 3 ML: 2.5; .5 SOLUTION RESPIRATORY (INHALATION) at 01:06

## 2024-06-16 RX ADMIN — IPRATROPIUM BROMIDE AND ALBUTEROL SULFATE 3 ML: 2.5; .5 SOLUTION RESPIRATORY (INHALATION) at 07:06

## 2024-06-16 NOTE — PT/OT/SLP EVAL
Physical Therapy Evaluation    Patient Name:  Karo Leonard   MRN:  [id number]    Recommendations:     Discharge Recommendations: Low Intensity Therapy   Discharge Equipment Recommendations: none   Barriers to discharge:  medical status    Assessment:     Karo Leonard is a 64 y.o. female admitted with a medical diagnosis of COPD exacerbation.  She presents with the following impairments/functional limitations: weakness, impaired endurance, impaired self care skills, impaired functional mobility, gait instability, decreased lower extremity function, pain, impaired cardiopulmonary response to activity Pt found HOB elevated and turned slightly left in bed and positioned with pillows and agreeable to working with PT. Pt A & O x  4 and has the following co-morbidities: CAD, DM, HTN, COPD on 2L home O2, hypothyroid.  Pt tolerated session fair and required Tino for safe mobilization during session today. Pt is limited by weakness and pain L knee due to exacerbation of previous injury. Pt would benefit from acute PT during hospitalization to increase strength, endurance and safety with mobility and would benefit from low intensity therapy prior to discharge home. .    Rehab Prognosis: Fair; patient would benefit from acute skilled PT services to address these deficits and reach maximum level of function.    Recent Surgery: * No surgery found *      Plan:     During this hospitalization, patient to be seen 5 x/week to address the identified rehab impairments via gait training, therapeutic activities, therapeutic exercises and progress toward the following goals:    Plan of Care Expires:  07/16/24    Subjective     Chief Complaint: pain L knee  Patient/Family Comments/goals: go home  Pain/Comfort:  Pain Rating 1: other (see comments) (not rated)  Location - Side 1: Left  Location 1: knee    Patients cultural, spiritual, Mu-ism conflicts given the current situation:      Living Environment:  Pt lives with    Prior to admission, patients level of function was assist of 1 for ADLs.  Equipment used at home:  .  DME owned (not currently used): none.  Upon discharge, patient will have assistance from .    Objective:     Communicated with RNAlia prior to session.  Patient found HOB elevated with telemetry, peripheral IV, blood pressure cuff, pulse ox (continuous), PureWick, bed alarm  upon PT entry to room.    General Precautions: Standard, fall  Orthopedic Precautions:    Braces:    Respiratory Status: Nasal cannula, flow 5 L/min    Exams:  Cognitive Exam:  Patient is oriented to Person, Place, Time, and Situation  RLE Strength: 3/5  LLE Strength: 4/5    Functional Mobility:  Bed Mobility:     Rolling Left:  supervision  Rolling Right: supervision  Supine to Sit: minimum assistance  Sit to Supine: minimum assistance  Transfers:     Sit to Stand:  minimum assistance with rolling walker  Bed to Chair: minimum assistance with  rolling walker  using  Step Transfer      AM-PAC 6 CLICK MOBILITY  Total Score:14       Treatment & Education:  Pt was educated on the following: call light use, importance of OOB activity and functional mobility to negate the negative effects of prolonged bed rest during this hospitalization, safe transfers/ambulation and discharge planning recommendations/options.     Patient left up in chair with all lines intact, call button in reach, chair alarm on, RN notified, and  present.    GOALS:   Multidisciplinary Problems       Physical Therapy Goals          Problem: Physical Therapy    Goal Priority Disciplines Outcome Goal Variances Interventions   Physical Therapy Goal     PT, PT/OT Progressing     Description: 1. Supine to sit with Stand-by Assistance  2. Sit to stand transfer with Stand-by Assistance  3.. Bed to chair transfer with Supervision using Rolling Walker  4. Gait  x 50 feet with Minimal Assistance using Rolling Walker.                         History:     Past  Medical History:   Diagnosis Date    Coronary artery disease     cardiac stent    DDD (degenerative disc disease), lumbar     Diabetes mellitus     Hypertension     Thyroid disease        Past Surgical History:   Procedure Laterality Date     SECTION  08/1987    x2 1993    CORONARY STENT PLACEMENT      EXPLORATORY LAPAROTOMY      Yarsani     HEMORRHOID SURGERY      LAPAROSCOPIC CHOLECYSTECTOMY N/A 2022    Procedure: CHOLECYSTECTOMY, LAPAROSCOPIC;  Surgeon: Leonardo Wilson III, MD;  Location: [facility];  Service: General;  Laterality: N/A;    LUMBAR DISC SURGERY      PILONIDAL CYST DRAINAGE      TONSILLECTOMY      as a child (also adenoids)       Time Tracking:     PT Received On: 24  PT Start Time: 1421     PT Stop Time: 1453  PT Total Time (min): 32 min     Billable Minutes: Evaluation 8 and Therapeutic Activity 2024

## 2024-06-16 NOTE — PROGRESS NOTES
[facility]  Progress Note    Patient Name: Karo Leonard  MRN: [id number]  Patient Class: IP- Inpatient   Admission Date: 6/14/2024  Length of Stay: 2 days  Attending Physician: Elsy Peoples MD  Primary Care Provider: Navneet Hernandez FNP        Subjective:     Principal Problem:COPD exacerbation        HPI:  64y.o. female  with  a past medical history of Coronary artery disease, Diabetes mellitus, and Hypertension., COPD came with SOB.  Patient is ex-smoker and currently on 2 L oxygen for COPD. no history of sick contact and no fever at home.  .She became gradually worsening shortness of breath with cough with past 2 days and extreme fatigue and came to the hospital.  Patient has been using  increased oxygen 5 L at home but still having shortness of breath and called EMS and she was hypoxic  at 84 with 5 L of oxygen at arrival   Denied having chest pain, no leg swelling and patient was given IV Solu-Medrol and nebulization treatments and later started on BIPAP. ABG was done but result not reported .  Medication reconciliation was not done in the ER and not able to reconcile  On examination patient is not in extreme shortness of breath, she is able to talk out of the BiPAP, lung exam with limited air entry.  Patient has leukocytosis but no fever and chest x-ray possible pneumonia and admitted    Overview/Hospital Course:  64y.o. female  with  a past medical history of Coronary artery disease, Diabetes mellitus, and Hypertension.,  ABDI,  COPD came with SOB. Patient is ex-smoker and currently on 2 L oxygen for COPD.  ABG shows severe compensated CO2 retention.  Patient stated that she has CPAP at home however the machine has been broken for years.  Patient developed AFib with RVR 6/15, started on Cardizem drip and converted to normal sinus rhythm, started on full-dose Lovenox later converted to Eliquis as per Cardiology recommendation, also discontinue the Plavix, continue  aspirin.     Interval History:   Seen in the multidisciplinary rounds, -4.8 L, feel much better, on 5 L nasal cannula, leg edema improved.  Developed AFib with RVR overnight and converted to normal sinus rhythm Cardizem drip.         Review of Systems   Constitutional:  Negative for activity change and fever.   HENT:  Negative for ear discharge, facial swelling and sore throat.    Eyes:  Negative for photophobia, pain and visual disturbance.   Respiratory:  Positive for shortness of breath. Negative for apnea and cough.    Cardiovascular:  Negative for chest pain and leg swelling.   Gastrointestinal:  Negative for abdominal pain and blood in stool.   Endocrine: Negative for cold intolerance and heat intolerance.   Musculoskeletal:  Negative for back pain and gait problem.   Skin:  Negative for pallor and rash.   Neurological:  Negative for speech difficulty and headaches.   Psychiatric/Behavioral:  Negative for confusion, hallucinations and suicidal ideas.    All other systems reviewed and are negative.    Objective:     Vital Signs (Most Recent):  Temp: 99.1 °F (37.3 °C) (06/16/24 1100)  Pulse: 63 (06/16/24 1100)  Resp: 20 (06/16/24 1100)  BP: 137/63 (06/16/24 1100)  SpO2: 98 % (06/16/24 1100) Vital Signs (24h Range):  Temp:  [98.2 °F (36.8 °C)-99.6 °F (37.6 °C)] 99.1 °F (37.3 °C)  Pulse:  [] 63  Resp:  [4-31] 20  SpO2:  [88 %-98 %] 98 %  BP: ()/() 137/63     Weight: 97.5 kg (215 lb)  Body mass index is 35.78 kg/m².    Intake/Output Summary (Last 24 hours) at 6/16/2024 1210  Last data filed at 6/16/2024 1152  Gross per 24 hour   Intake 410 ml   Output 5300 ml   Net -4890 ml         Physical Exam  Constitutional:       Appearance: Normal appearance.   HENT:      Head: Normocephalic and atraumatic.      Nose: Nose normal.   Eyes:      Extraocular Movements: Extraocular movements intact.      Pupils: Pupils are equal, round, and reactive to light.   Cardiovascular:      Rate and Rhythm: Normal rate  and regular rhythm.      Heart sounds: No murmur heard.  Pulmonary:      Effort: Pulmonary effort is normal.      Breath sounds: Rales present.   Abdominal:      General: Abdomen is flat.      Palpations: Abdomen is soft.   Musculoskeletal:         General: Swelling present. Normal range of motion.      Cervical back: Normal range of motion and neck supple.   Skin:     General: Skin is warm and dry.   Neurological:      General: No focal deficit present.      Mental Status: She is alert and oriented to person, place, and time.   Psychiatric:         Mood and Affect: Mood normal.             Significant Labs: All pertinent labs within the past 24 hours have been reviewed.  CBC:   Recent Labs   Lab 06/15/24  0434 06/16/24  0435   WBC 8.94 11.79   HGB 10.0* 9.7*   HCT 34.4* 32.6*    182     CMP:   Recent Labs   Lab 06/15/24  0434 06/16/24  0435    133*   K 4.1 4.4   CL 89* 89*   CO2 45* 42*   * 419*   BUN 10 13   CREATININE 0.5 0.6   CALCIUM 9.5 8.8   ANIONGAP 3* 2*       Significant Imaging: I have reviewed all pertinent imaging results/findings within the past 24 hours.  I have reviewed and interpreted all pertinent imaging results/findings within the past 24 hours.    X-Ray Chest AP Portable    Result Date: 6/14/2024  EXAMINATION: XR CHEST AP PORTABLE CLINICAL HISTORY: CHF; TECHNIQUE: Single frontal view of the chest was performed. COMPARISON: Chest radiograph June 7, 2022 FINDINGS: Single portable chest view is submitted.  When accounting for position and technique and depth of inspiration, the appearance of the cardiomediastinal silhouette is stable.  Aortic atherosclerotic change noted. There is accentuated attenuation at the lung bases which in part is likely due to overlying soft tissue attenuation, however asymmetric density at the left lung base may relate to a component of small pleural effusion as well as suspected pulmonary infiltrate/airspace disease, peripheral opacity at the mid left  hemithorax may relate to pulmonary infiltrate/airspace disease as well.  There may be a component of mild infiltrate at the right base, there is no pleural fluid on the right and bilaterally there is no pneumothorax. The osseous structures demonstrate chronic change.     Basilar opacity on the left may relate to mild pleural effusion with pulmonary infiltrate/airspace disease as well as peripheral pulmonary infiltrate at the mid left hemithorax. Suspected mild infiltrate at the right base as well. Electronically signed by: Topher Rosario Date:    06/14/2024 Time:    05:35  - pulls last radiology orders      Assessment/Plan:      * COPD exacerbation  Patient's COPD is with exacerbation noted by continued dyspnea and use of accessory muscles for breathing currently.  Patient is currently on COPD Pathway. Continue scheduled inhalers Steroids, Antibiotics, and Supplemental oxygen and monitor respiratory status closely.   Continue BiPAP and admitted to step-down    ABG shows severe acute on chronic CO2 retention, most likely secondary to obesity hypoventilation syndrome versus ABDI versus COPD.  Patient does have CPAP at home however has not used it for years.   We will ask the patient to reuse it.   Improved with IV Lasix,  we will continue IV Lasix and acetazolamide.   Discontinue IV steroids due to severe hyperglycemia.   P.o. prednisone  Out of bed to chair    New onset a-fib  Patient with Paroxysmal (<7 days) atrial fibrillation which is controlled currently with Calcium Channel Blocker. Patient is currently in sinus rhythm.[id number] Score: 4.. Anticoagulation indicated. Anticoagulation done with Eliquis .    We will start the patient on Eliquis, continue aspirin.  Discontinue Plavix    Diabetes  Patient's FSGs are controlled on current medication regimen.  Last A1c reviewed-   Lab Results   Component Value Date    HGBA1C 7.0 (H) 06/14/2024     Most recent fingerstick glucose reviewed- No results for input(s):  ""POCTGLUCOSE" in the last 24 hours.  Current correctional scale  Low  Maintain anti-hyperglycemic dose as follows-   Antihyperglycemics (From admission, onward)      Start     Stop Route Frequency Ordered    06/15/24 2100  insulin glargine U-100 (Lantus) pen 15 Units         -- SubQ Nightly 06/15/24 1516    06/15/24 1616  insulin aspart U-100 pen 0-15 Units         -- SubQ Before meals & nightly PRN 06/15/24 1516          Hold Oral hypoglycemics while patient is in the hospital.    Patient has severe uncontrolled hyperglycemia secondary to steroids.  Add on Lantus 15 units q.h.s. plus high-dose sliding scale    Pneumonia  Patient has a diagnosis of pneumonia. The cause of the pneumonia is unknown at this time. The pneumonia is stable. The patient has the following signs/symptoms of pneumonia: persistent hypoxia . The patient does have a current oxygen requirement and the patient does have a home oxygen requirement. I have reviewed the pertinent imaging. The following cultures have been collected: Blood cultures and Sputum culture The culture results are listed below.     Current antimicrobial regimen consists of the antibiotics listed below. Will monitor patient closely and Adjust treatment plan as follows      Antibiotics (From admission, onward)      Start     Stop Route Frequency Ordered    06/15/24 0900  levoFLOXacin tablet 750 mg  (MEDICATIONS - ANTIBIOTICS FOR COPD PATHWAY)         06/20/24 0859 Oral Daily 06/14/24 0822            Microbiology Results (last 7 days)       Procedure Component Value Units Date/Time    Blood culture [[id number]] Collected: 06/14/24 0450    Order Status: Completed Specimen: Blood from Peripheral, Hand, Left Updated: 06/15/24 0632     Blood Culture, Routine No Growth to date      No Growth to date    Blood culture [[id number]] Collected: 06/14/24 0454    Order Status: Completed Specimen: Blood from Peripheral, Antecubital, Right Updated: 06/15/24 0632     Blood Culture, Routine No " Growth to date      No Growth to date            History of coronary artery stent placement    Aware, denied chest pain  Continue home medication  Reconciliation needs to be done when ready      Hypertension  Chronic, controlled. Latest blood pressure and vitals reviewed-     Temp:  [98.4 °F (36.9 °C)]   Pulse:  [55-60]   Resp:  [21-30]   BP: (134-181)/(61-76)   SpO2:  [84 %-93 %] .   Home meds for hypertension were reviewed and noted below.   Hypertension Medications               amLODIPine (NORVASC) 5 MG tablet Take 5 mg by mouth once daily.    propranoloL (INDERAL LA) 80 MG 24 hr capsule Take 160 mg by mouth once daily.            While in the hospital, will manage blood pressure as follows; Continue home antihypertensive regimen    Will utilize p.r.n. blood pressure medication only if patient's blood pressure greater than 140/90 and she develops symptoms such as worsening chest pain or shortness of breath.    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  Hold Synthroid 150 mcg once a day.  Repeat TSH free T4 in 2-3 weeks, and restart low-dose.      VTE Risk Mitigation (From admission, onward)           Ordered     apixaban tablet 5 mg  2 times daily         06/16/24 1159     IP VTE LOW RISK PATIENT  Once         06/14/24 0544     Place sequential compression device  Until discontinued         06/14/24 0544                    Discharge Planning   CHELE: 6/17/2024     Code Status: Full Code   Is the patient medically ready for discharge?:     Reason for patient still in hospital (select all that apply): Patient trending condition and Treatment  Discharge Plan A: Home with family                  Elsy Peoples MD  Department of Hospital Medicine   [facility]

## 2024-06-16 NOTE — CONSULTS
[facility]  Department of Cardiology  Consult Note      PATIENT NAME: Karo Leonard  MRN: [id number]  TODAY'S DATE: 06/16/2024  ADMIT DATE: 6/14/2024                          CONSULT REQUESTED BY: Elsy Peoples MD    SUBJECTIVE     PRINCIPAL PROBLEM: COPD exacerbation      REASON FOR CONSULT:    New onset afib with RVR      HPI:    Patient is a 64 y.o. female with PMH of CAD s/p PCI, DM, HTN, Thyroid disease, and COPD who presented to the ED with progressively worseing shortness of breath and cough x 2 days.  Patient wears 2 L NC at home but had to increase to 5L.  Patient was found to be hypoxic on 5 L per EMS.  Placed on BIPAP in ED.  CXR concerning for pneumonia.  Patient admitted for Pneumonia and COPD exacerbation.  No known history of AF.  Patient went into AFRVR overnight and was started on diltiazem gtt and converted back to NSR this am on her own. Remains on diltiazem gtt during examination.  Takes aspirin and Plavix daily.      FROM H&P     Shortness of Breath       C/o sob x 2days. Hx ofCOPD         HPI: 64y.o. female  with  a past medical history of Coronary artery disease, Diabetes mellitus, and Hypertension., COPD came with SOB.  Patient is ex-smoker and currently on 2 L oxygen for COPD. no history of sick contact and no fever at home.  .She became gradually worsening shortness of breath with cough with past 2 days and extreme fatigue and came to the hospital.  Patient has been using  increased oxygen 5 L at home but still having shortness of breath and called EMS and she was hypoxic  at 84 with 5 L of oxygen at arrival   Denied having chest pain, no leg swelling and patient was given IV Solu-Medrol and nebulization treatments and later started on BIPAP. ABG was done but result not reported .  Medication reconciliation was not done in the ER and not able to reconcile  On examination patient is not in extreme shortness of breath, she is able to talk out of the BiPAP, lung exam with  limited air entry.  Patient has leukocytosis but no fever and chest x-ray possible pneumonia and admitted      Review of patient's allergies indicates:   Allergen Reactions    Pcn [penicillins] Anaphylaxis    Adhesive     Floxacillin     Keflex [cephalexin]     Sulfa (sulfonamide antibiotics)     Zithromax [azithromycin]     Zofran [ondansetron hcl (pf)]      Dizzy vomiting         Past Medical History:   Diagnosis Date    Coronary artery disease     cardiac stent    DDD (degenerative disc disease), lumbar     Diabetes mellitus     Hypertension     Thyroid disease      Past Surgical History:   Procedure Laterality Date     SECTION  08/1987    x2 1993    CORONARY STENT PLACEMENT      EXPLORATORY LAPAROTOMY      Mandaeism     HEMORRHOID SURGERY      LAPAROSCOPIC CHOLECYSTECTOMY N/A 2022    Procedure: CHOLECYSTECTOMY, LAPAROSCOPIC;  Surgeon: Leonardo Wilson III, MD;  Location: [facility];  Service: General;  Laterality: N/A;    LUMBAR DISC SURGERY      PILONIDAL CYST DRAINAGE      TONSILLECTOMY      as a child (also adenoids)     Social History     Tobacco Use    Smoking status: Former     Current packs/day: 0.00     Average packs/day: 0.5 packs/day for 40.0 years (20.0 ttl pk-yrs)     Types: Cigarettes     Start date: 1982     Quit date: 2022     Years since quittin.1    Smokeless tobacco: Never    Tobacco comments:     1 year ago   Substance Use Topics    Alcohol use: No    Drug use: No        REVIEW OF SYSTEMS    As mentioned in HPI    OBJECTIVE     VITAL SIGNS (Most Recent)  Temp: 99.1 °F (37.3 °C) (24 1100)  Pulse: 63 (24 1100)  Resp: 20 (24 1100)  BP: 137/63 (24 1100)  SpO2: 98 % (24 1100)    VENTILATION STATUS  Resp: 20 (24 1100)  SpO2: 98 % (24 1100)  Oxygen Concentration (%):  [45] 45        I & O (Last 24H):  Intake/Output Summary (Last 24 hours) at 2024 1141  Last data filed at 2024  0957  Gross per 24 hour   Intake 410 ml   Output 4500 ml   Net -4090 ml       WEIGHTS  Wt Readings from Last 3 Encounters:   06/14/24 1813 97.5 kg (215 lb)   06/14/24 0436 97.5 kg (215 lb)   06/16/24 1026 97.5 kg (215 lb)   06/16/22 1215 97 kg (213 lb 13.5 oz)       PHYSICAL EXAM    CONSTITUTIONAL: NAD  HEENT: Normocephalic. No pallor  NECK: no JVD  LUNGS: Coarse breath sounds  HEART: regular rate and rhythm, S1, S2 normal, no murmur   ABDOMEN: soft, non-tender, bowel sounds normal  EXTREMITIES: No edema  SKIN: No rash  NEURO: AAO X 3  PSYCH: normal affect      HOME MEDICATIONS:  No current facility-administered medications on file prior to encounter.     Current Outpatient Medications on File Prior to Encounter   Medication Sig Dispense Refill    albuterol (PROVENTIL/VENTOLIN HFA) 90 mcg/actuation inhaler Inhale 1 puff into the lungs every 4 (four) hours as needed for Wheezing or Shortness of Breath.      albuterol-ipratropium (DUO-NEB) 2.5 mg-0.5 mg/3 mL nebulizer solution Take 3 mLs by nebulization every 6 (six) hours as needed for Wheezing or Shortness of Breath. Rescue 75 mL 3    amLODIPine (NORVASC) 5 MG tablet Take 5 mg by mouth once daily.      arnica 20 % Tinc Apply 1 application topically once daily.      ascorbic acid, vitamin C, (VITAMIN C) 500 MG tablet Take 500 mg by mouth once daily.      aspirin 81 MG Chew Take 81 mg by mouth once daily.      atorvastatin (LIPITOR) 80 MG tablet Take 80 mg by mouth every evening.      chlorzoxazone (PARAFON FORTE) 250 MG tablet Take 750 mg by mouth 4 (four) times daily as needed for Muscle spasms.      clopidogreL (PLAVIX) 75 mg tablet Take 1 tablet (75 mg total) by mouth once daily.      desonide (DESOWEN) 0.05 % cream Apply 1 application topically 2 (two) times daily.      estradioL (ESTRACE) 0.01 % (0.1 mg/gram) vaginal cream Place 1 g vaginally every 7 days. Monday's      ezetimibe (ZETIA) 10 mg tablet Take 10 mg by mouth once daily.      fluconazole (DIFLUCAN)  150 MG Tab Take 1 tablet (150 mg total) by mouth every other day. 2 tablet 0    gabapentin (NEURONTIN) 600 MG tablet Take 600 mg by mouth 3 (three) times daily.      guaiFENesin (MUCINEX) 600 mg 12 hr tablet Take 1,200 mg by mouth 2 (two) times daily.      HUMIRA,CF, 40 mg/0.4 mL SyKt Inject 0.4 mLs into the skin every 7 days.      hydrocortisone 0.5 % cream Apply 1 application topically 2 (two) times daily as needed.      JANUVIA 50 mg Tab Take 50 mg by mouth once daily.      LEVOXYL 150 mcg tablet Take 150 mcg by mouth once daily.      meclizine (ANTIVERT) 25 mg tablet Take 25 mg by mouth 4 (four) times daily as needed for Dizziness or Nausea.      pantoprazole (PROTONIX) 40 MG tablet Take 1 tablet (40 mg total) by mouth 2 (two) times daily. 60 tablet 1    predniSONE (DELTASONE) 20 MG tablet Take 20 mg by mouth 2 (two) times daily.      propranoloL (INDERAL LA) 80 MG 24 hr capsule Take 160 mg by mouth once daily.      tiotropium (SPIRIVA) 18 mcg inhalation capsule Inhale 1 capsule into the lungs once daily.      metFORMIN (GLUCOPHAGE) 850 MG tablet Take 850 mg by mouth nightly. (Patient not taking: Reported on 6/14/2024)      nystatin (MYCOSTATIN) 100,000 unit/mL suspension Take 5 mLs by mouth 4 (four) times daily. (Patient not taking: Reported on 6/14/2024)         SCHEDULED MEDS:   albuterol-ipratropium  3 mL Nebulization Q6H WAKE    aspirin  81 mg Oral Daily    atorvastatin  80 mg Oral QHS    clopidogreL  75 mg Oral Daily    enoxparin  1 mg/kg Subcutaneous Q12H (prophylaxis, 0900/2100)    furosemide (LASIX) injection  40 mg Intravenous Q12H    gabapentin  600 mg Oral TID    guaiFENesin  1,200 mg Oral BID    insulin glargine U-100  15 Units Subcutaneous QHS    levoFLOXacin  750 mg Oral Daily    levothyroxine  150 mcg Oral Daily    pantoprazole  40 mg Oral BID    [START ON 6/17/2024] predniSONE  40 mg Oral Daily       CONTINUOUS INFUSIONS:   dilTIAZem  0-15 mg/hr Intravenous Continuous 0 mL/hr at 06/16/24 0950 0  "mg/hr at 06/16/24 0950       PRN MEDS:  Current Facility-Administered Medications:     chlorzoxazone, 750 mg, Oral, TID PRN    dextrose 50%, 12.5 g, Intravenous, PRN    dextrose 50%, 25 g, Intravenous, PRN    glucagon (human recombinant), 1 mg, Intramuscular, PRN    glucose, 16 g, Oral, PRN    glucose, 24 g, Oral, PRN    HYDROcodone-acetaminophen, 1 tablet, Oral, Q6H PRN    insulin aspart U-100, 0-15 Units, Subcutaneous, QID (AC + HS) PRN    magnesium oxide, 800 mg, Oral, PRN    magnesium oxide, 800 mg, Oral, PRN    meclizine, 25 mg, Oral, QID PRN    naloxone, 0.02 mg, Intravenous, PRN    potassium bicarbonate, 35 mEq, Oral, PRN    potassium bicarbonate, 50 mEq, Oral, PRN    potassium bicarbonate, 60 mEq, Oral, PRN    potassium, sodium phosphates, 2 packet, Oral, PRN    potassium, sodium phosphates, 2 packet, Oral, PRN    potassium, sodium phosphates, 2 packet, Oral, PRN    sodium chloride 0.9%, 10 mL, Intravenous, Q12H PRN    sodium chloride 0.9%, 3 mL, Intravenous, PRN    LABS AND DIAGNOSTICS     CBC LAST 3 DAYS  Recent Labs   Lab 06/14/24  0446 06/14/24  0503 06/15/24  0434 06/16/24  0435   WBC 15.07*  --  8.94 11.79   RBC 3.58*  --  3.49* 3.37*   HGB 10.4*  --  10.0* 9.7*   HCT 36.9* 37 34.4* 32.6*   *  --  99* 97   MCH 29.1  --  28.7 28.8   MCHC 28.2*  --  29.1* 29.8*   RDW 13.2  --  13.5 13.5     --  185 182   MPV 10.6  --  10.8 11.0   GRAN 78.1*  11.8*  --  89.2*  8.0* 90.9*  10.7*   LYMPH 10.9*  1.7  --  7.3*  0.7* 5.5*  0.7*   MONO 9.2  1.4*  --  3.0*  0.3 3.2*  0.4   BASO 0.03  --  0.01 0.00   NRBC 0  --  0 0       COAGULATION LAST 3 DAYS  No results for input(s): "LABPT", "INR", "APTT" in the last 168 hours.    CHEMISTRY LAST 3 DAYS  Recent Labs   Lab 06/14/24  0446 06/14/24  0503 06/15/24  0434 06/16/24  0435     --  137 133*   K 4.0  --  4.1 4.4   CL 91*  --  89* 89*   CO2 >45*  --  45* 42*   ANIONGAP 3*  --  3* 2*   BUN 10  --  10 13   CREATININE 0.5  --  0.5 0.6   * "  --  311* 419*   CALCIUM 9.2  --  9.5 8.8   PH  --  7.297*  --   --    MG  --   --  1.6 1.7   ALBUMIN 3.4*  --   --   --    PROT 7.2  --   --   --    ALKPHOS 105  --   --   --    ALT 9*  --   --   --    AST 11  --   --   --    BILITOT 0.5  --   --   --        CARDIAC PROFILE LAST 3 DAYS  Recent Labs   Lab 06/14/24  0446 06/16/24  0435   * 237*   TROPONINIHS 4.7  --        ENDOCRINE LAST 3 DAYS  Recent Labs   Lab 06/16/24  0435   TSH 0.096*       LAST ARTERIAL BLOOD GAS  ABG  Recent Labs   Lab 06/14/24  0503   PH 7.297*   PO2 54*   PCO2 98.3*   HCO3 48.0*   BE 22*       LAST 7 DAYS MICROBIOLOGY   Microbiology Results (last 7 days)       Procedure Component Value Units Date/Time    Blood culture [[id number]] Collected: 06/14/24 0450    Order Status: Completed Specimen: Blood from Peripheral, Hand, Left Updated: 06/16/24 0632     Blood Culture, Routine No Growth to date      No Growth to date      No Growth to date    Blood culture [[id number]] Collected: 06/14/24 0454    Order Status: Completed Specimen: Blood from Peripheral, Antecubital, Right Updated: 06/16/24 0632     Blood Culture, Routine No Growth to date      No Growth to date      No Growth to date            MOST RECENT IMAGING  Echo    Left Ventricle: The left ventricle is normal in size. Mildly increased   wall thickness. Unable to assess wall motion. There is low normal systolic   function with a visually estimated ejection fraction of 50 - 55%.    Right Ventricle: Right ventricle was not well visualized due to poor   acoustic window. Systolic function is normal.    Left Atrium: Left atrium is mildly dilated.    Mitral Valve: There is no stenosis. The mean pressure gradient across   the mitral valve is 5 mmHg at a heart rate of  bpm.      ECHOCARDIOGRAM RESULTS (last 5)  Results for orders placed during the hospital encounter of 06/14/24    Echo    Interpretation Summary    Left Ventricle: The left ventricle is normal in size. Mildly increased wall  thickness. Unable to assess wall motion. There is low normal systolic function with a visually estimated ejection fraction of 50 - 55%.    Right Ventricle: Right ventricle was not well visualized due to poor acoustic window. Systolic function is normal.    Left Atrium: Left atrium is mildly dilated.    Mitral Valve: There is no stenosis. The mean pressure gradient across the mitral valve is 5 mmHg at a heart rate of  bpm.      Results for orders placed during the hospital encounter of 06/07/22    Echo Saline Bubble? No    Interpretation Summary  · The left ventricle is mildly enlarged with concentric remodeling and normal systolic function.  · The estimated ejection fraction is 55%.  · Grade I left ventricular diastolic dysfunction.  · Normal right ventricular size with normal right ventricular systolic function.  · Moderate left atrial enlargement.  · Normal central venous pressure (3 mmHg).      CURRENT/PREVIOUS VISIT EKG  Results for orders placed or performed during the hospital encounter of 06/14/24   EKG 12-lead    Collection Time: 06/15/24  6:12 PM   Result Value Ref Range    QRS Duration 90 ms    [facility] QTC Calculation 456 ms    Narrative    Test Reason : I48.91,    Vent. Rate : 137 BPM     Atrial Rate : 084 BPM     P-R Int : 000 ms          QRS Dur : 090 ms      QT Int : 302 ms       P-R-T Axes : 000 059 -66 degrees     QTc Int : 456 ms    Atrial fibrillation with rapid ventricular response  Nonspecific ST and T wave abnormality  Abnormal ECG  When compared with ECG of 14-JUN-2024 04:38,  Atrial fibrillation has replaced Sinus rhythm  Vent. rate has increased BY  75 BPM  Non-specific change in ST segment in Inferior leads  ST now depressed in Lateral leads  Nonspecific T wave abnormality now evident in Inferior leads  Nonspecific T wave abnormality now evident in Lateral leads    Referred By: [email or web address]   SELF           Confirmed By:            ASSESSMENT/PLAN:     Active Hospital Problems    Diagnosis    *COPD  exacerbation    History of coronary artery stent placement    Pneumonia    Diabetes    Hypertension    Hypothyroid       ASSESSMENT & PLAN:     New Onset PAF RVR  COPD exacerbation  CAD s/p PCI 2017  Pneumonia  Hypertension  Thyroid disease- TSH low 0.096 on levothyroxine  Chronic Anemia  Diabetes Mellitus  Obesity      RECOMMENDATIONS:    New onset AF this admission.  Placed on cardizem gtt and converted into SR.      Recommend anticoagulation with Eliquis 5 mg BID.  CHADSVASC score 5 points.  Serial CBC.  Stop Plavix.  Continue aspirin 81 mg daily. Hx CAD s/p PCI.  ECHO this admission shows EF 50-55% and no acute valvular abnormalities.  Start PO cardizem 30 mg q 6 hours.  Hold for HR <60 and SBP <100.  Continue to check and replace potassium and magnesium. Goal for potassium is 4.0, and goal for magnesium is 2.0.    Low TSH.  On levothyroxine.  Will defer to hospital medicine for management of drug induced hyperthyroid.  Thank you for the consult.  We will continue to follow.       Dasha Lincoln NP  Department of Cardiology  Date of Service: 06/16/2024

## 2024-06-16 NOTE — PLAN OF CARE
Problem: COPD (Chronic Obstructive Pulmonary Disease)  Goal: Optimal Chronic Illness Coping  Outcome: Progressing  Goal: Optimal Level of Functional [place]  Outcome: Progressing  Goal: Absence of Infection Signs and Symptoms  Outcome: Progressing  Goal: Improved Oral Intake  Outcome: Progressing  Goal: Effective Oxygenation and Ventilation  Outcome: Progressing     Problem: Adult Inpatient Plan of Care  Goal: Plan of Care Review  Outcome: Progressing  Goal: Patient-Specific Goal (Individualized)  Outcome: Progressing  Goal: Absence of Hospital-Acquired Illness or Injury  Outcome: Progressing  Goal: Optimal Comfort and Wellbeing  Outcome: Progressing  Goal: Readiness for Transition of Care  Outcome: Progressing     Problem: Diabetes Comorbidity  Goal: Blood Glucose Level Within Targeted Range  Outcome: Progressing     Problem: Pneumonia  Goal: Fluid Balance  Outcome: Progressing  Goal: Resolution of Infection Signs and Symptoms  Outcome: Progressing  Goal: Effective Oxygenation and Ventilation  Outcome: Progressing     Problem: Wound  Goal: Optimal Coping  Outcome: Progressing  Goal: Optimal Functional Ability  Outcome: Progressing  Goal: Absence of Infection Signs and Symptoms  Outcome: Progressing  Goal: Improved Oral Intake  Outcome: Progressing  Goal: Optimal Pain Control and Function  Outcome: Progressing  Goal: Skin Health and Integrity  Outcome: Progressing  Goal: Optimal Wound Healing  Outcome: Progressing     Problem: Skin Injury Risk Increased  Goal: Skin Health and Integrity  Outcome: Progressing

## 2024-06-16 NOTE — ASSESSMENT & PLAN NOTE
Home medication  Now patient has drug induced hyperthyroidism, which likely the culprit of AFib with RVR.  Hold Synthroid 150 mcg once a day.  Repeat TSH free T4 in 2-3 weeks, and restart low-dose.

## 2024-06-16 NOTE — SUBJECTIVE & OBJECTIVE
Interval History:   Seen in the multidisciplinary rounds, -4.8 L, feel much better, on 5 L nasal cannula, leg edema improved.  Developed AFib with RVR overnight and converted to normal sinus rhythm Cardizem drip.         Review of Systems   Constitutional:  Negative for activity change and fever.   HENT:  Negative for ear discharge, facial swelling and sore throat.    Eyes:  Negative for photophobia, pain and visual disturbance.   Respiratory:  Positive for shortness of breath. Negative for apnea and cough.    Cardiovascular:  Negative for chest pain and leg swelling.   Gastrointestinal:  Negative for abdominal pain and blood in stool.   Endocrine: Negative for cold intolerance and heat intolerance.   Musculoskeletal:  Negative for back pain and gait problem.   Skin:  Negative for pallor and rash.   Neurological:  Negative for speech difficulty and headaches.   Psychiatric/Behavioral:  Negative for confusion, hallucinations and suicidal ideas.    All other systems reviewed and are negative.    Objective:     Vital Signs (Most Recent):  Temp: 99.1 °F (37.3 °C) (06/16/24 1100)  Pulse: 63 (06/16/24 1100)  Resp: 20 (06/16/24 1100)  BP: 137/63 (06/16/24 1100)  SpO2: 98 % (06/16/24 1100) Vital Signs (24h Range):  Temp:  [98.2 °F (36.8 °C)-99.6 °F (37.6 °C)] 99.1 °F (37.3 °C)  Pulse:  [] 63  Resp:  [4-31] 20  SpO2:  [88 %-98 %] 98 %  BP: ()/() 137/63     Weight: 97.5 kg (215 lb)  Body mass index is 35.78 kg/m².    Intake/Output Summary (Last 24 hours) at 6/16/2024 1210  Last data filed at 6/16/2024 1152  Gross per 24 hour   Intake 410 ml   Output 5300 ml   Net -4890 ml         Physical Exam  Constitutional:       Appearance: Normal appearance.   HENT:      Head: Normocephalic and atraumatic.      Nose: Nose normal.   Eyes:      Extraocular Movements: Extraocular movements intact.      Pupils: Pupils are equal, round, and reactive to light.   Cardiovascular:      Rate and Rhythm: Normal rate and regular  rhythm.      Heart sounds: No murmur heard.  Pulmonary:      Effort: Pulmonary effort is normal.      Breath sounds: Rales present.   Abdominal:      General: Abdomen is flat.      Palpations: Abdomen is soft.   Musculoskeletal:         General: Swelling present. Normal range of motion.      Cervical back: Normal range of motion and neck supple.   Skin:     General: Skin is warm and dry.   Neurological:      General: No focal deficit present.      Mental Status: She is alert and oriented to person, place, and time.   Psychiatric:         Mood and Affect: Mood normal.             Significant Labs: All pertinent labs within the past 24 hours have been reviewed.  CBC:   Recent Labs   Lab 06/15/24  0434 06/16/24  0435   WBC 8.94 11.79   HGB 10.0* 9.7*   HCT 34.4* 32.6*    182     CMP:   Recent Labs   Lab 06/15/24  0434 06/16/24  0435    133*   K 4.1 4.4   CL 89* 89*   CO2 45* 42*   * 419*   BUN 10 13   CREATININE 0.5 0.6   CALCIUM 9.5 8.8   ANIONGAP 3* 2*       Significant Imaging: I have reviewed all pertinent imaging results/findings within the past 24 hours.  I have reviewed and interpreted all pertinent imaging results/findings within the past 24 hours.    X-Ray Chest AP Portable    Result Date: 6/14/2024  EXAMINATION: XR CHEST AP PORTABLE CLINICAL HISTORY: CHF; TECHNIQUE: Single frontal view of the chest was performed. COMPARISON: Chest radiograph June 7, 2022 FINDINGS: Single portable chest view is submitted.  When accounting for position and technique and depth of inspiration, the appearance of the cardiomediastinal silhouette is stable.  Aortic atherosclerotic change noted. There is accentuated attenuation at the lung bases which in part is likely due to overlying soft tissue attenuation, however asymmetric density at the left lung base may relate to a component of small pleural effusion as well as suspected pulmonary infiltrate/airspace disease, peripheral opacity at the mid left hemithorax  may relate to pulmonary infiltrate/airspace disease as well.  There may be a component of mild infiltrate at the right base, there is no pleural fluid on the right and bilaterally there is no pneumothorax. The osseous structures demonstrate chronic change.     Basilar opacity on the left may relate to mild pleural effusion with pulmonary infiltrate/airspace disease as well as peripheral pulmonary infiltrate at the mid left hemithorax. Suspected mild infiltrate at the right base as well. Electronically signed by: Topher Rosario Date:    06/14/2024 Time:    05:35  - pulls last radiology orders

## 2024-06-16 NOTE — ASSESSMENT & PLAN NOTE
Patient's COPD is with exacerbation noted by continued dyspnea and use of accessory muscles for breathing currently.  Patient is currently on COPD Pathway. Continue scheduled inhalers Steroids, Antibiotics, and Supplemental oxygen and monitor respiratory status closely.   Continue BiPAP and admitted to step-down    ABG shows severe acute on chronic CO2 retention, most likely secondary to obesity hypoventilation syndrome versus ABDI versus COPD.  Patient does have CPAP at home however has not used it for years.   We will ask the patient to reuse it.   Improved with IV Lasix,  we will continue IV Lasix and acetazolamide.   Discontinue IV steroids due to severe hyperglycemia.   P.o. prednisone  Out of bed to chair

## 2024-06-16 NOTE — PLAN OF CARE
Problem: Physical Therapy  Goal: Physical Therapy Goal  Description: 1. Supine to sit with Stand-by Assistance  2. Sit to stand transfer with Stand-by Assistance  3.. Bed to chair transfer with Supervision using Rolling Walker  4. Gait  x 50 feet with Minimal Assistance using Rolling Walker.    Outcome: Progressing

## 2024-06-16 NOTE — ASSESSMENT & PLAN NOTE
Patient with Paroxysmal (<7 days) atrial fibrillation which is controlled currently with Calcium Channel Blocker. Patient is currently in sinus rhythm.[id number] Score: 4.. Anticoagulation indicated. Anticoagulation done with Eliquis .    We will start the patient on Eliquis, continue aspirin.  Discontinue Plavix

## 2024-06-17 PROBLEM — J96.12 ACUTE HYPOXIC ON CHRONIC HYPERCAPNIC RESPIRATORY FAILURE: Status: ACTIVE | Noted: 2024-06-17

## 2024-06-17 PROBLEM — J96.01 ACUTE HYPOXIC ON CHRONIC HYPERCAPNIC RESPIRATORY FAILURE: Status: ACTIVE | Noted: 2024-06-17

## 2024-06-17 LAB
ANION GAP SERPL CALC-SCNC: 2 MMOL/L (ref 8–16)
BASOPHILS # BLD AUTO: 0.01 K/UL (ref 0–0.2)
BASOPHILS NFR BLD: 0.1 % (ref 0–1.9)
BUN SERPL-MCNC: 18 MG/DL (ref 8–23)
CALCIUM SERPL-MCNC: 8.8 MG/DL (ref 8.7–10.5)
CHLORIDE SERPL-SCNC: 93 MMOL/L (ref 95–110)
CO2 SERPL-SCNC: 40 MMOL/L (ref 23–29)
CREAT SERPL-MCNC: 0.5 MG/DL (ref 0.5–1.4)
DIFFERENTIAL METHOD BLD: ABNORMAL
EOSINOPHIL # BLD AUTO: 0 K/UL (ref 0–0.5)
EOSINOPHIL NFR BLD: 0 % (ref 0–8)
ERYTHROCYTE [DISTWIDTH] IN BLOOD BY AUTOMATED COUNT: 13.3 % (ref 11.5–14.5)
EST. GFR  (NO RACE VARIABLE): >60 ML/MIN/1.73 M^2
GLUCOSE SERPL-MCNC: 216 MG/DL (ref 70–110)
GLUCOSE SERPL-MCNC: 237 MG/DL (ref 70–110)
GLUCOSE SERPL-MCNC: 297 MG/DL (ref 70–110)
GLUCOSE SERPL-MCNC: 309 MG/DL (ref 70–110)
GLUCOSE SERPL-MCNC: 380 MG/DL (ref 70–110)
HCT VFR BLD AUTO: 35.8 % (ref 37–48.5)
HGB BLD-MCNC: 10.7 G/DL (ref 12–16)
IMM GRANULOCYTES # BLD AUTO: 0.13 K/UL (ref 0–0.04)
IMM GRANULOCYTES NFR BLD AUTO: 1.1 % (ref 0–0.5)
LYMPHOCYTES # BLD AUTO: 1 K/UL (ref 1–4.8)
LYMPHOCYTES NFR BLD: 8.5 % (ref 18–48)
MAGNESIUM SERPL-MCNC: 2.1 MG/DL (ref 1.6–2.6)
MCH RBC QN AUTO: 28.6 PG (ref 27–31)
MCHC RBC AUTO-ENTMCNC: 29.9 G/DL (ref 32–36)
MCV RBC AUTO: 96 FL (ref 82–98)
MONOCYTES # BLD AUTO: 1.1 K/UL (ref 0.3–1)
MONOCYTES NFR BLD: 8.8 % (ref 4–15)
NEUTROPHILS # BLD AUTO: 9.9 K/UL (ref 1.8–7.7)
NEUTROPHILS NFR BLD: 81.5 % (ref 38–73)
NRBC BLD-RTO: 0 /100 WBC
PHOSPHATE SERPL-MCNC: 3.5 MG/DL (ref 2.7–4.5)
PLATELET # BLD AUTO: 172 K/UL (ref 150–450)
PMV BLD AUTO: 11 FL (ref 9.2–12.9)
POTASSIUM SERPL-SCNC: 3.9 MMOL/L (ref 3.5–5.1)
RBC # BLD AUTO: 3.74 M/UL (ref 4–5.4)
SODIUM SERPL-SCNC: 135 MMOL/L (ref 136–145)
WBC # BLD AUTO: 12.08 K/UL (ref 3.9–12.7)

## 2024-06-17 PROCEDURE — 25000003 PHARM REV CODE 250: Performed by: HOSPITALIST

## 2024-06-17 PROCEDURE — 93005 ELECTROCARDIOGRAM TRACING: CPT | Performed by: INTERNAL MEDICINE

## 2024-06-17 PROCEDURE — 94660 CPAP INITIATION&MGMT: CPT

## 2024-06-17 PROCEDURE — 25000003 PHARM REV CODE 250

## 2024-06-17 PROCEDURE — 99900035 HC TECH TIME PER 15 MIN (STAT)

## 2024-06-17 PROCEDURE — 82962 GLUCOSE BLOOD TEST: CPT

## 2024-06-17 PROCEDURE — 93010 ELECTROCARDIOGRAM REPORT: CPT | Mod: 76,,, | Performed by: INTERNAL MEDICINE

## 2024-06-17 PROCEDURE — 63600175 PHARM REV CODE 636 W HCPCS: Performed by: HOSPITALIST

## 2024-06-17 PROCEDURE — 25000003 PHARM REV CODE 250: Performed by: INTERNAL MEDICINE

## 2024-06-17 PROCEDURE — 80048 BASIC METABOLIC PNL TOTAL CA: CPT | Performed by: INTERNAL MEDICINE

## 2024-06-17 PROCEDURE — 25000242 PHARM REV CODE 250 ALT 637 W/ HCPCS: Performed by: INTERNAL MEDICINE

## 2024-06-17 PROCEDURE — 94640 AIRWAY INHALATION TREATMENT: CPT

## 2024-06-17 PROCEDURE — 99900031 HC PATIENT EDUCATION (STAT)

## 2024-06-17 PROCEDURE — 27000221 HC OXYGEN, UP TO 24 HOURS

## 2024-06-17 PROCEDURE — 36415 COLL VENOUS BLD VENIPUNCTURE: CPT | Performed by: INTERNAL MEDICINE

## 2024-06-17 PROCEDURE — 84100 ASSAY OF PHOSPHORUS: CPT | Performed by: INTERNAL MEDICINE

## 2024-06-17 PROCEDURE — 21000000 HC CCU ICU ROOM CHARGE

## 2024-06-17 PROCEDURE — 63700000 PHARM REV CODE 250 ALT 637 W/O HCPCS: Performed by: HOSPITALIST

## 2024-06-17 PROCEDURE — 94761 N-INVAS EAR/PLS OXIMETRY MLT: CPT

## 2024-06-17 PROCEDURE — 85025 COMPLETE CBC W/AUTO DIFF WBC: CPT | Performed by: INTERNAL MEDICINE

## 2024-06-17 PROCEDURE — 83735 ASSAY OF MAGNESIUM: CPT | Performed by: INTERNAL MEDICINE

## 2024-06-17 PROCEDURE — 27100171 HC OXYGEN HIGH FLOW UP TO 24 HOURS

## 2024-06-17 RX ORDER — INSULIN GLARGINE 100 [IU]/ML
25 INJECTION, SOLUTION SUBCUTANEOUS NIGHTLY
Status: DISCONTINUED | OUTPATIENT
Start: 2024-06-17 | End: 2024-06-18

## 2024-06-17 RX ORDER — DILTIAZEM HYDROCHLORIDE 60 MG/1
60 TABLET, FILM COATED ORAL EVERY 6 HOURS
Status: DISCONTINUED | OUTPATIENT
Start: 2024-06-17 | End: 2024-06-20 | Stop reason: HOSPADM

## 2024-06-17 RX ORDER — METOPROLOL TARTRATE 1 MG/ML
INJECTION, SOLUTION INTRAVENOUS
Status: COMPLETED
Start: 2024-06-17 | End: 2024-06-17

## 2024-06-17 RX ORDER — METOPROLOL TARTRATE 1 MG/ML
5 INJECTION, SOLUTION INTRAVENOUS EVERY 5 MIN PRN
Status: COMPLETED | OUTPATIENT
Start: 2024-06-17 | End: 2024-06-17

## 2024-06-17 RX ADMIN — FLUCONAZOLE 150 MG: 100 TABLET ORAL at 10:06

## 2024-06-17 RX ADMIN — DILTIAZEM HYDROCHLORIDE 30 MG: 30 TABLET, FILM COATED ORAL at 08:06

## 2024-06-17 RX ADMIN — INSULIN ASPART 6 UNITS: 100 INJECTION, SOLUTION INTRAVENOUS; SUBCUTANEOUS at 08:06

## 2024-06-17 RX ADMIN — IPRATROPIUM BROMIDE AND ALBUTEROL SULFATE 3 ML: 2.5; .5 SOLUTION RESPIRATORY (INHALATION) at 07:06

## 2024-06-17 RX ADMIN — INSULIN ASPART 12 UNITS: 100 INJECTION, SOLUTION INTRAVENOUS; SUBCUTANEOUS at 05:06

## 2024-06-17 RX ADMIN — INSULIN GLARGINE 25 UNITS: 100 INJECTION, SOLUTION SUBCUTANEOUS at 08:06

## 2024-06-17 RX ADMIN — APIXABAN 5 MG: 5 TABLET, FILM COATED ORAL at 08:06

## 2024-06-17 RX ADMIN — GABAPENTIN 600 MG: 300 CAPSULE ORAL at 02:06

## 2024-06-17 RX ADMIN — HYDROCODONE BITARTRATE AND ACETAMINOPHEN 1 TABLET: 5; 325 TABLET ORAL at 02:06

## 2024-06-17 RX ADMIN — FUROSEMIDE 40 MG: 10 INJECTION, SOLUTION INTRAVENOUS at 08:06

## 2024-06-17 RX ADMIN — ATORVASTATIN CALCIUM 80 MG: 40 TABLET, FILM COATED ORAL at 08:06

## 2024-06-17 RX ADMIN — METOPROLOL TARTRATE 5 MG: 1 INJECTION, SOLUTION INTRAVENOUS at 12:06

## 2024-06-17 RX ADMIN — METOPROLOL TARTRATE 5 MG: 1 INJECTION, SOLUTION INTRAVENOUS at 10:06

## 2024-06-17 RX ADMIN — GUAIFENESIN 1200 MG: 600 TABLET, EXTENDED RELEASE ORAL at 08:06

## 2024-06-17 RX ADMIN — IPRATROPIUM BROMIDE AND ALBUTEROL SULFATE 3 ML: 2.5; .5 SOLUTION RESPIRATORY (INHALATION) at 01:06

## 2024-06-17 RX ADMIN — PREDNISONE 40 MG: 20 TABLET ORAL at 08:06

## 2024-06-17 RX ADMIN — DILTIAZEM HYDROCHLORIDE 60 MG: 60 TABLET, FILM COATED ORAL at 01:06

## 2024-06-17 RX ADMIN — LEVOFLOXACIN 750 MG: 750 TABLET, FILM COATED ORAL at 08:06

## 2024-06-17 RX ADMIN — PANTOPRAZOLE SODIUM 40 MG: 40 TABLET, DELAYED RELEASE ORAL at 05:06

## 2024-06-17 RX ADMIN — HYDROCODONE BITARTRATE AND ACETAMINOPHEN 1 TABLET: 5; 325 TABLET ORAL at 05:06

## 2024-06-17 RX ADMIN — INSULIN ASPART 15 UNITS: 100 INJECTION, SOLUTION INTRAVENOUS; SUBCUTANEOUS at 12:06

## 2024-06-17 RX ADMIN — DILTIAZEM HYDROCHLORIDE 60 MG: 60 TABLET, FILM COATED ORAL at 08:06

## 2024-06-17 RX ADMIN — ASPIRIN 81 MG 81 MG: 81 TABLET ORAL at 08:06

## 2024-06-17 RX ADMIN — DEXTROSE MONOHYDRATE 500 MG: 50 INJECTION, SOLUTION INTRAVENOUS at 10:06

## 2024-06-17 RX ADMIN — CHLORZOXAZONE 750 MG: 750 TABLET ORAL at 05:06

## 2024-06-17 RX ADMIN — GABAPENTIN 600 MG: 300 CAPSULE ORAL at 08:06

## 2024-06-17 RX ADMIN — HYDROCODONE BITARTRATE AND ACETAMINOPHEN 1 TABLET: 5; 325 TABLET ORAL at 10:06

## 2024-06-17 NOTE — ASSESSMENT & PLAN NOTE
Patient with Hypercapnic and Hypoxic Respiratory failure which is Acute on chronic.  she is on home oxygen at 2.5 LPM. Supplemental oxygen was provided and noted-      .   Signs/symptoms of respiratory failure include- tachypnea, increased work of breathing, and respiratory distress. Contributing diagnoses includes - CHF, COPD, and Pneumonia Labs and images were reviewed. Patient Has recent ABG, which has been reviewed. Will treat underlying causes and adjust management of respiratory failure as follows-     Patient apparently has long history of chronic hypoxic and hypercapnic respiratory failure, most likely multifactorial, including CHF, COPD, obesity hypoventilation syndrome, untreated ABDI.   Patient is still quite hypoxic despite aggressive diuresis, steroids, nebulizer treatment.    We will get a CTA of the chest , if still no improvement in a.m. may consult pulmonologist.

## 2024-06-17 NOTE — PLAN OF CARE
6/17/24 1400 Spoke with Audrey with [facility] and they will schedule visit for next Tues.  Will need to notify of discharge    1225 Present for IDR. Discussed recommendations from PT for low intensity therapy and pt declines. States that they have had HH in the past and do not feel that she is needing this at discharge as she can resume instructions they had from therapy in the past. Discussed that HH would also provide nurse to monitor pt and OT but at this time pt and family decline. Plan to return home with outpt. CM called PCP and she is affiliated with [facility].  Message left requesting c/b to see what they need at discharge to resume care. Will continue to follow.       06/17/24 1220   Post-Acute Status   Post-Acute Authorization Home Health   Home Health Status Patient declined/refused   Hospital Resources/Appts/Education Provided Appointment suggestion unavailable   Discharge Delays None known at this time   Discharge Plan   Discharge Plan A Home with family   Discharge Plan B Home Health

## 2024-06-17 NOTE — PT/OT/SLP PROGRESS
Physical Therapy      Patient Name:  Karo Leonard   MRN:  [id number]    Patient not seen today secondary to RN hold with AM attempt due to pt in Afib RVR.   With PM attempt, pt sleeping heavily, and spouse requested withhold afternoon attempt due to pt's level of drowsiness from meds. Will follow-up 6/18/24.

## 2024-06-17 NOTE — PLAN OF CARE
Problem: COPD (Chronic Obstructive Pulmonary Disease)  Goal: Optimal Chronic Illness Coping  Outcome: Progressing  Goal: Optimal Level of Functional [place]  Outcome: Progressing  Goal: Absence of Infection Signs and Symptoms  Outcome: Progressing  Goal: Improved Oral Intake  Outcome: Progressing  Goal: Effective Oxygenation and Ventilation  Outcome: Progressing     Problem: Adult Inpatient Plan of Care  Goal: Plan of Care Review  Outcome: Progressing  Goal: Patient-Specific Goal (Individualized)  Outcome: Progressing  Goal: Absence of Hospital-Acquired Illness or Injury  Outcome: Progressing  Goal: Optimal Comfort and Wellbeing  Outcome: Progressing  Goal: Readiness for Transition of Care  Outcome: Progressing     Problem: Diabetes Comorbidity  Goal: Blood Glucose Level Within Targeted Range  Outcome: Progressing     Problem: Pneumonia  Goal: Fluid Balance  Outcome: Progressing  Goal: Resolution of Infection Signs and Symptoms  Outcome: Progressing  Goal: Effective Oxygenation and Ventilation  Outcome: Progressing     Problem: Wound  Goal: Optimal Coping  Outcome: Progressing  Goal: Optimal Functional Ability  Outcome: Progressing  Goal: Absence of Infection Signs and Symptoms  Outcome: Progressing  Goal: Improved Oral Intake  Outcome: Progressing  Goal: Optimal Pain Control and Function  Outcome: Progressing  Goal: Skin Health and Integrity  Outcome: Progressing  Goal: Optimal Wound Healing  Outcome: Progressing     Problem: Skin Injury Risk Increased  Goal: Skin Health and Integrity  Outcome: Progressing

## 2024-06-17 NOTE — SUBJECTIVE & OBJECTIVE
Interval History:    -1.5 L, feel okay, back to normal sinus rhythm, pending CTA of the chest.  Afebrile.  Still has bibasilar crackles.  Glucose uncontrolled due to prednisone.     Review of Systems   Constitutional:  Negative for activity change and fever.   HENT:  Negative for ear discharge, facial swelling and sore throat.    Eyes:  Negative for photophobia, pain and visual disturbance.   Respiratory:  Positive for shortness of breath. Negative for apnea and cough.    Cardiovascular:  Negative for chest pain and leg swelling.   Gastrointestinal:  Negative for abdominal pain and blood in stool.   Endocrine: Negative for cold intolerance and heat intolerance.   Musculoskeletal:  Negative for back pain and gait problem.   Skin:  Negative for pallor and rash.   Neurological:  Negative for speech difficulty and headaches.   Psychiatric/Behavioral:  Negative for confusion, hallucinations and suicidal ideas.    All other systems reviewed and are negative.    Objective:     Vital Signs (Most Recent):  Temp: 97.8 °F (36.6 °C) (06/17/24 0701)  Pulse: 65 (06/17/24 0756)  Resp: 18 (06/17/24 0756)  BP: (!) 127/56 (06/17/24 1056)  SpO2: 100 % (06/17/24 0756) Vital Signs (24h Range):  Temp:  [97.3 °F (36.3 °C)-98 °F (36.7 °C)] 97.8 °F (36.6 °C)  Pulse:  [54-73] 65  Resp:  [9-39] 18  SpO2:  [93 %-100 %] 100 %  BP: (111-154)/(52-79) 127/56     Weight: 97.5 kg (215 lb)  Body mass index is 35.78 kg/m².    Intake/Output Summary (Last 24 hours) at 6/17/2024 1121  Last data filed at 6/17/2024 1032  Gross per 24 hour   Intake 415.83 ml   Output 1950 ml   Net -1534.17 ml         Physical Exam  Constitutional:       Appearance: Normal appearance.   HENT:      Head: Normocephalic and atraumatic.      Nose: Nose normal.   Eyes:      Extraocular Movements: Extraocular movements intact.      Pupils: Pupils are equal, round, and reactive to light.   Cardiovascular:      Rate and Rhythm: Normal rate and regular rhythm.      Heart sounds: No  murmur heard.  Pulmonary:      Effort: Pulmonary effort is normal.      Breath sounds: Rales present.   Abdominal:      General: Abdomen is flat.      Palpations: Abdomen is soft.   Musculoskeletal:         General: Swelling present. Normal range of motion.      Cervical back: Normal range of motion and neck supple.   Skin:     General: Skin is warm and dry.   Neurological:      General: No focal deficit present.      Mental Status: She is alert and oriented to person, place, and time.   Psychiatric:         Mood and Affect: Mood normal.             Significant Labs: All pertinent labs within the past 24 hours have been reviewed.  CBC:   Recent Labs   Lab 06/16/24 0435 06/17/24  0345   WBC 11.79 12.08   HGB 9.7* 10.7*   HCT 32.6* 35.8*    172     CMP:   Recent Labs   Lab 06/16/24 0435 06/17/24  0345   * 135*   K 4.4 3.9   CL 89* 93*   CO2 42* 40*   * 237*   BUN 13 18   CREATININE 0.6 0.5   CALCIUM 8.8 8.8   ANIONGAP 2* 2*       Significant Imaging: I have reviewed all pertinent imaging results/findings within the past 24 hours.  I have reviewed and interpreted all pertinent imaging results/findings within the past 24 hours.    X-Ray Chest AP Portable    Result Date: 6/14/2024  EXAMINATION: XR CHEST AP PORTABLE CLINICAL HISTORY: CHF; TECHNIQUE: Single frontal view of the chest was performed. COMPARISON: Chest radiograph June 7, 2022 FINDINGS: Single portable chest view is submitted.  When accounting for position and technique and depth of inspiration, the appearance of the cardiomediastinal silhouette is stable.  Aortic atherosclerotic change noted. There is accentuated attenuation at the lung bases which in part is likely due to overlying soft tissue attenuation, however asymmetric density at the left lung base may relate to a component of small pleural effusion as well as suspected pulmonary infiltrate/airspace disease, peripheral opacity at the mid left hemithorax may relate to pulmonary  infiltrate/airspace disease as well.  There may be a component of mild infiltrate at the right base, there is no pleural fluid on the right and bilaterally there is no pneumothorax. The osseous structures demonstrate chronic change.     Basilar opacity on the left may relate to mild pleural effusion with pulmonary infiltrate/airspace disease as well as peripheral pulmonary infiltrate at the mid left hemithorax. Suspected mild infiltrate at the right base as well. Electronically signed by: Topher Rosario Date:    06/14/2024 Time:    05:35  - pulls last radiology orders

## 2024-06-17 NOTE — PROGRESS NOTES
[facility] Medicine  Progress Note    Patient Name: Karo Leonard  MRN: [id number]  Patient Class: IP- Inpatient   Admission Date: 6/14/2024  Length of Stay: 3 days  Attending Physician: Elsy Peoples MD  Primary Care Provider: Navneet Hernandez FNP        Subjective:     Principal Problem:COPD exacerbation        HPI:  64y.o. female  with  a past medical history of Coronary artery disease, Diabetes mellitus, and Hypertension., COPD came with SOB.  Patient is ex-smoker and currently on 2 L oxygen for COPD. no history of sick contact and no fever at home.  .She became gradually worsening shortness of breath with cough with past 2 days and extreme fatigue and came to the hospital.  Patient has been using  increased oxygen 5 L at home but still having shortness of breath and called EMS and she was hypoxic  at 84 with 5 L of oxygen at arrival   Denied having chest pain, no leg swelling and patient was given IV Solu-Medrol and nebulization treatments and later started on BIPAP. ABG was done but result not reported .  Medication reconciliation was not done in the ER and not able to reconcile  On examination patient is not in extreme shortness of breath, she is able to talk out of the BiPAP, lung exam with limited air entry.  Patient has leukocytosis but no fever and chest x-ray possible pneumonia and admitted    Overview/Hospital Course:  64y.o. female  with  a past medical history of Coronary artery disease, Diabetes mellitus, and Hypertension.,  ABDI,  COPD came with SOB. Patient is ex-smoker and currently on 2 L oxygen for COPD.  ABG shows severe compensated CO2 retention.  Patient stated that she has CPAP at home however the machine has been broken for years.  Patient developed AFib with RVR 6/15, started on Cardizem drip and converted to normal sinus rhythm, started on full-dose Lovenox later converted to Eliquis as per Cardiology recommendation, also discontinue the Plavix, continue  aspirin.     Interval History:   Seen in the multidisciplinary rounds,  feel okay, -1.5 L, still on 5 L nasal cannula, go hypoxic quickly after decreased oxygen, complaining of left knee pain.  No fever or chills.  Patient stated that she has not quite active for past 18 years.  She went back to [facility] with RVR earlier today, improved with IV metoprolol.       Review of Systems   Constitutional:  Negative for activity change and fever.   HENT:  Negative for ear discharge, facial swelling and sore throat.    Eyes:  Negative for photophobia, pain and visual disturbance.   Respiratory:  Positive for shortness of breath. Negative for apnea and cough.    Cardiovascular:  Negative for chest pain and leg swelling.   Gastrointestinal:  Negative for abdominal pain and blood in stool.   Endocrine: Negative for cold intolerance and heat intolerance.   Musculoskeletal:  Negative for back pain and gait problem.   Skin:  Negative for pallor and rash.   Neurological:  Negative for speech difficulty and headaches.   Psychiatric/Behavioral:  Negative for confusion, hallucinations and suicidal ideas.    All other systems reviewed and are negative.    Objective:     Vital Signs (Most Recent):  Temp: 97.8 °F (36.6 °C) (06/17/24 0701)  Pulse: 65 (06/17/24 0756)  Resp: 18 (06/17/24 0756)  BP: (!) 127/56 (06/17/24 1056)  SpO2: 100 % (06/17/24 0756) Vital Signs (24h Range):  Temp:  [97.3 °F (36.3 °C)-98 °F (36.7 °C)] 97.8 °F (36.6 °C)  Pulse:  [54-73] 65  Resp:  [9-39] 18  SpO2:  [93 %-100 %] 100 %  BP: (111-154)/(52-79) 127/56     Weight: 97.5 kg (215 lb)  Body mass index is 35.78 kg/m².    Intake/Output Summary (Last 24 hours) at 6/17/2024 1121  Last data filed at 6/17/2024 1032  Gross per 24 hour   Intake 415.83 ml   Output 1950 ml   Net -1534.17 ml         Physical Exam  Constitutional:       Appearance: Normal appearance.   HENT:      Head: Normocephalic and atraumatic.      Nose: Nose normal.   Eyes:      Extraocular Movements:  Extraocular movements intact.      Pupils: Pupils are equal, round, and reactive to light.   Cardiovascular:      Rate and Rhythm: Normal rate and regular rhythm.      Heart sounds: No murmur heard.  Pulmonary:      Effort: Pulmonary effort is normal.      Breath sounds: Rales present.   Abdominal:      General: Abdomen is flat.      Palpations: Abdomen is soft.   Musculoskeletal:         General: Swelling present. Normal range of motion.      Cervical back: Normal range of motion and neck supple.   Skin:     General: Skin is warm and dry.   Neurological:      General: No focal deficit present.      Mental Status: She is alert and oriented to person, place, and time.   Psychiatric:         Mood and Affect: Mood normal.             Significant Labs: All pertinent labs within the past 24 hours have been reviewed.  CBC:   Recent Labs   Lab 06/16/24 0435 06/17/24  0345   WBC 11.79 12.08   HGB 9.7* 10.7*   HCT 32.6* 35.8*    172     CMP:   Recent Labs   Lab 06/16/24 0435 06/17/24  0345   * 135*   K 4.4 3.9   CL 89* 93*   CO2 42* 40*   * 237*   BUN 13 18   CREATININE 0.6 0.5   CALCIUM 8.8 8.8   ANIONGAP 2* 2*       Significant Imaging: I have reviewed all pertinent imaging results/findings within the past 24 hours.  I have reviewed and interpreted all pertinent imaging results/findings within the past 24 hours.    X-Ray Chest AP Portable    Result Date: 6/14/2024  EXAMINATION: XR CHEST AP PORTABLE CLINICAL HISTORY: CHF; TECHNIQUE: Single frontal view of the chest was performed. COMPARISON: Chest radiograph June 7, 2022 FINDINGS: Single portable chest view is submitted.  When accounting for position and technique and depth of inspiration, the appearance of the cardiomediastinal silhouette is stable.  Aortic atherosclerotic change noted. There is accentuated attenuation at the lung bases which in part is likely due to overlying soft tissue attenuation, however asymmetric density at the left lung base  may relate to a component of small pleural effusion as well as suspected pulmonary infiltrate/airspace disease, peripheral opacity at the mid left hemithorax may relate to pulmonary infiltrate/airspace disease as well.  There may be a component of mild infiltrate at the right base, there is no pleural fluid on the right and bilaterally there is no pneumothorax. The osseous structures demonstrate chronic change.     Basilar opacity on the left may relate to mild pleural effusion with pulmonary infiltrate/airspace disease as well as peripheral pulmonary infiltrate at the mid left hemithorax. Suspected mild infiltrate at the right base as well. Electronically signed by: Topher Rosario Date:    06/14/2024 Time:    05:35  - pulls last radiology orders      Assessment/Plan:      * COPD exacerbation  Patient's COPD is with exacerbation noted by continued dyspnea and use of accessory muscles for breathing currently.  Patient is currently on COPD Pathway. Continue scheduled inhalers Steroids, Antibiotics, and Supplemental oxygen and monitor respiratory status closely.   Continue BiPAP and admitted to step-down    ABG shows severe acute on chronic CO2 retention, most likely secondary to obesity hypoventilation syndrome versus ABDI versus COPD.  Patient does have CPAP at home however has not used it for years.   We will ask the patient to reuse it.   Improved with IV Lasix,  we will continue IV Lasix and acetazolamide.   Discontinue IV steroids due to severe hyperglycemia.   P.o. prednisone  Out of bed to chair    Acute hypoxic on chronic hypercapnic respiratory failure  Patient with Hypercapnic and Hypoxic Respiratory failure which is Acute on chronic.  she is on home oxygen at 2.5 LPM. Supplemental oxygen was provided and noted-      .   Signs/symptoms of respiratory failure include- tachypnea, increased work of breathing, and respiratory distress. Contributing diagnoses includes - CHF, COPD, and Pneumonia Labs and images  "were reviewed. Patient Has recent ABG, which has been reviewed. Will treat underlying causes and adjust management of respiratory failure as follows-     Patient apparently has long history of chronic hypoxic and hypercapnic respiratory failure, most likely multifactorial, including CHF, COPD, obesity hypoventilation syndrome, untreated ABDI.   Patient is still quite hypoxic despite aggressive diuresis, steroids, nebulizer treatment.    We will get a CTA of the chest     New onset a-fib  Patient with Paroxysmal (<7 days) atrial fibrillation which is controlled currently with Calcium Channel Blocker. Patient is currently in sinus rhythm.[id number] Score: 4.. Anticoagulation indicated. Anticoagulation done with Eliquis .    We will start the patient on Eliquis, continue aspirin.  Discontinue Plavix    Diabetes  Patient's FSGs are controlled on current medication regimen.  Last A1c reviewed-   Lab Results   Component Value Date    HGBA1C 7.0 (H) 06/14/2024     Most recent fingerstick glucose reviewed- No results for input(s): "POCTGLUCOSE" in the last 24 hours.  Current correctional scale  Low  Maintain anti-hyperglycemic dose as follows-   Antihyperglycemics (From admission, onward)      Start     Stop Route Frequency Ordered    06/15/24 2100  insulin glargine U-100 (Lantus) pen 15 Units         -- SubQ Nightly 06/15/24 1516    06/15/24 1616  insulin aspart U-100 pen 0-15 Units         -- SubQ Before meals & nightly PRN 06/15/24 1516          Hold Oral hypoglycemics while patient is in the hospital.    Patient has severe uncontrolled hyperglycemia secondary to steroids.  Add on Lantus 15 units q.h.s. plus high-dose sliding scale    Pneumonia  Patient has a diagnosis of pneumonia. The cause of the pneumonia is unknown at this time. The pneumonia is stable. The patient has the following signs/symptoms of pneumonia: persistent hypoxia . The patient does have a current oxygen requirement and the patient does have a home " oxygen requirement. I have reviewed the pertinent imaging. The following cultures have been collected: Blood cultures and Sputum culture The culture results are listed below.     Current antimicrobial regimen consists of the antibiotics listed below. Will monitor patient closely and Adjust treatment plan as follows      Antibiotics (From admission, onward)      Start     Stop Route Frequency Ordered    06/15/24 0900  levoFLOXacin tablet 750 mg  (MEDICATIONS - ANTIBIOTICS FOR COPD PATHWAY)         06/20/24 0859 Oral Daily 06/14/24 0822            Microbiology Results (last 7 days)       Procedure Component Value Units Date/Time    Blood culture [[id number]] Collected: 06/14/24 0450    Order Status: Completed Specimen: Blood from Peripheral, Hand, Left Updated: 06/15/24 0632     Blood Culture, Routine No Growth to date      No Growth to date    Blood culture [[id number]] Collected: 06/14/24 0454    Order Status: Completed Specimen: Blood from Peripheral, Antecubital, Right Updated: 06/15/24 0632     Blood Culture, Routine No Growth to date      No Growth to date            History of coronary artery stent placement    Aware, denied chest pain  Continue home medication  Reconciliation needs to be done when ready      Hypertension  Chronic, controlled. Latest blood pressure and vitals reviewed-     Temp:  [98.4 °F (36.9 °C)]   Pulse:  [55-60]   Resp:  [21-30]   BP: (134-181)/(61-76)   SpO2:  [84 %-93 %] .   Home meds for hypertension were reviewed and noted below.   Hypertension Medications               amLODIPine (NORVASC) 5 MG tablet Take 5 mg by mouth once daily.    propranoloL (INDERAL LA) 80 MG 24 hr capsule Take 160 mg by mouth once daily.            While in the hospital, will manage blood pressure as follows; Continue home antihypertensive regimen    Will utilize p.r.n. blood pressure medication only if patient's blood pressure greater than 140/90 and she develops symptoms such as worsening chest pain or  shortness of breath.    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  Hold Synthroid 150 mcg once a day.  Repeat TSH free T4 in 2-3 weeks, and restart low-dose.      VTE Risk Mitigation (From admission, onward)           Ordered     apixaban tablet 5 mg  2 times daily         06/16/24 1159     IP VTE LOW RISK PATIENT  Once         06/14/24 0544     Place sequential compression device  Until discontinued         06/14/24 0544                    Discharge Planning   CHELE: 6/19/2024     Code Status: Full Code   Is the patient medically ready for discharge?:     Reason for patient still in hospital (select all that apply): Patient trending condition and Treatment  Discharge Plan A: Home with family                  Elsy Peoples MD  Department of Hospital Medicine   [facility]

## 2024-06-17 NOTE — PROGRESS NOTES
[facility]  Progress Note    Patient Name: Karo Leonard  MRN: [id number]  Patient Class: IP- Inpatient   Admission Date: 6/14/2024  Length of Stay: 3 days  Attending Physician: Elsy Peoples MD  Primary Care Provider: Navneet Hernandez FNP        Subjective:     Principal Problem:COPD exacerbation        HPI:  64y.o. female  with  a past medical history of Coronary artery disease, Diabetes mellitus, and Hypertension., COPD came with SOB.  Patient is ex-smoker and currently on 2 L oxygen for COPD. no history of sick contact and no fever at home.  .She became gradually worsening shortness of breath with cough with past 2 days and extreme fatigue and came to the hospital.  Patient has been using  increased oxygen 5 L at home but still having shortness of breath and called EMS and she was hypoxic  at 84 with 5 L of oxygen at arrival   Denied having chest pain, no leg swelling and patient was given IV Solu-Medrol and nebulization treatments and later started on BIPAP. ABG was done but result not reported .  Medication reconciliation was not done in the ER and not able to reconcile  On examination patient is not in extreme shortness of breath, she is able to talk out of the BiPAP, lung exam with limited air entry.  Patient has leukocytosis but no fever and chest x-ray possible pneumonia and admitted    Overview/Hospital Course:  64y.o. female  with  a past medical history of Coronary artery disease, Diabetes mellitus, and Hypertension.,  ABDI,  COPD came with SOB. Patient is ex-smoker and currently on 2 L oxygen for COPD.  ABG shows severe compensated CO2 retention.  Patient stated that she has CPAP at home however the machine has been broken for years.  Patient developed AFib with RVR 6/15, started on Cardizem drip and converted to normal sinus rhythm, started on full-dose Lovenox later converted to Eliquis as per Cardiology recommendation, also discontinue the Plavix, continue  aspirin.     No new subjective & objective note has been filed under this hospital service since the last note was generated.      Assessment/Plan:      * COPD exacerbation  Patient's COPD is with exacerbation noted by continued dyspnea and use of accessory muscles for breathing currently.  Patient is currently on COPD Pathway. Continue scheduled inhalers Steroids, Antibiotics, and Supplemental oxygen and monitor respiratory status closely.   Continue BiPAP and admitted to step-down    ABG shows severe acute on chronic CO2 retention, most likely secondary to obesity hypoventilation syndrome versus ABDI versus COPD.  Patient does have CPAP at home however has not used it for years.   We will ask the patient to reuse it.   Improved with IV Lasix,  we will continue IV Lasix and acetazolamide.   Discontinue IV steroids due to severe hyperglycemia.   P.o. prednisone  Out of bed to chair    Acute hypoxic on chronic hypercapnic respiratory failure  Patient with Hypercapnic and Hypoxic Respiratory failure which is Acute on chronic.  she is on home oxygen at 2.5 LPM. Supplemental oxygen was provided and noted-      .   Signs/symptoms of respiratory failure include- tachypnea, increased work of breathing, and respiratory distress. Contributing diagnoses includes - CHF, COPD, and Pneumonia Labs and images were reviewed. Patient Has recent ABG, which has been reviewed. Will treat underlying causes and adjust management of respiratory failure as follows-     Patient apparently has long history of chronic hypoxic and hypercapnic respiratory failure, most likely multifactorial, including CHF, COPD, obesity hypoventilation syndrome, untreated ABDI.   Patient is still quite hypoxic despite aggressive diuresis, steroids, nebulizer treatment.    We will get a CTA of the chest     New onset a-fib  Patient with Paroxysmal (<7 days) atrial fibrillation which is controlled currently with Calcium Channel Blocker. Patient is currently in  "sinus rhythm.[id number] Score: 4.. Anticoagulation indicated. Anticoagulation done with Eliquis .    We will start the patient on Eliquis, continue aspirin.  Discontinue Plavix    Diabetes  Patient's FSGs are controlled on current medication regimen.  Last A1c reviewed-   Lab Results   Component Value Date    HGBA1C 7.0 (H) 06/14/2024     Most recent fingerstick glucose reviewed- No results for input(s): "POCTGLUCOSE" in the last 24 hours.  Current correctional scale  Low  Maintain anti-hyperglycemic dose as follows-   Antihyperglycemics (From admission, onward)      Start     Stop Route Frequency Ordered    06/15/24 2100  insulin glargine U-100 (Lantus) pen 15 Units         -- SubQ Nightly 06/15/24 1516    06/15/24 1616  insulin aspart U-100 pen 0-15 Units         -- SubQ Before meals & nightly PRN 06/15/24 1516          Hold Oral hypoglycemics while patient is in the hospital.    Patient has severe uncontrolled hyperglycemia secondary to steroids.  Add on Lantus 15 units q.h.s. plus high-dose sliding scale    Pneumonia  Patient has a diagnosis of pneumonia. The cause of the pneumonia is unknown at this time. The pneumonia is stable. The patient has the following signs/symptoms of pneumonia: persistent hypoxia . The patient does have a current oxygen requirement and the patient does have a home oxygen requirement. I have reviewed the pertinent imaging. The following cultures have been collected: Blood cultures and Sputum culture The culture results are listed below.     Current antimicrobial regimen consists of the antibiotics listed below. Will monitor patient closely and Adjust treatment plan as follows      Antibiotics (From admission, onward)      Start     Stop Route Frequency Ordered    06/15/24 0900  levoFLOXacin tablet 750 mg  (MEDICATIONS - ANTIBIOTICS FOR COPD PATHWAY)         06/20/24 0859 Oral Daily 06/14/24 0822            Microbiology Results (last 7 days)       Procedure Component Value Units " Date/Time    Blood culture [[id number]] Collected: 06/14/24 0450    Order Status: Completed Specimen: Blood from Peripheral, Hand, Left Updated: 06/15/24 0632     Blood Culture, Routine No Growth to date      No Growth to date    Blood culture [[id number]] Collected: 06/14/24 0454    Order Status: Completed Specimen: Blood from Peripheral, Antecubital, Right Updated: 06/15/24 0632     Blood Culture, Routine No Growth to date      No Growth to date            History of coronary artery stent placement    Aware, denied chest pain  Continue home medication  Reconciliation needs to be done when ready      Hypertension  Chronic, controlled. Latest blood pressure and vitals reviewed-     Temp:  [98.4 °F (36.9 °C)]   Pulse:  [55-60]   Resp:  [21-30]   BP: (134-181)/(61-76)   SpO2:  [84 %-93 %] .   Home meds for hypertension were reviewed and noted below.   Hypertension Medications               amLODIPine (NORVASC) 5 MG tablet Take 5 mg by mouth once daily.    propranoloL (INDERAL LA) 80 MG 24 hr capsule Take 160 mg by mouth once daily.            While in the hospital, will manage blood pressure as follows; Continue home antihypertensive regimen    Will utilize p.r.n. blood pressure medication only if patient's blood pressure greater than 140/90 and she develops symptoms such as worsening chest pain or shortness of breath.    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  Hold Synthroid 150 mcg once a day.  Repeat TSH free T4 in 2-3 weeks, and restart low-dose.      VTE Risk Mitigation (From admission, onward)           Ordered     apixaban tablet 5 mg  2 times daily         06/16/24 1159     IP VTE LOW RISK PATIENT  Once         06/14/24 0544     Place sequential compression device  Until discontinued         06/14/24 0544                    Discharge Planning   CHELE: 6/19/2024     Code Status: Full Code   Is the patient medically ready for  discharge?:     Reason for patient still in hospital (select all that apply): Patient trending condition and Treatment  Discharge Plan A: Home with family        Back to AFib with RVR again, we will increase Cardizem to 60 mg q.6h, continue Eliquis.           Elsy Peoples MD  Department of Hospital Medicine   [facility]

## 2024-06-17 NOTE — PLAN OF CARE
Problem: COPD (Chronic Obstructive Pulmonary Disease)  Goal: Optimal Chronic Illness Coping  Outcome: Progressing  Goal: Optimal Level of Functional [place]  Outcome: Progressing  Goal: Absence of Infection Signs and Symptoms  Outcome: Progressing  Goal: Improved Oral Intake  Outcome: Progressing  Goal: Effective Oxygenation and Ventilation  Outcome: Progressing     Problem: Adult Inpatient Plan of Care  Goal: Plan of Care Review  Outcome: Progressing  Goal: Patient-Specific Goal (Individualized)  Outcome: Progressing  Goal: Absence of Hospital-Acquired Illness or Injury  Outcome: Progressing  Goal: Optimal Comfort and Wellbeing  Outcome: Progressing  Goal: Readiness for Transition of Care  Outcome: Progressing     Problem: Diabetes Comorbidity  Goal: Blood Glucose Level Within Targeted Range  Outcome: Progressing     Problem: Pneumonia  Goal: Fluid Balance  Outcome: Progressing  Goal: Resolution of Infection Signs and Symptoms  Outcome: Progressing  Goal: Effective Oxygenation and Ventilation  Outcome: Progressing     Problem: Wound  Goal: Optimal Coping  Outcome: Progressing  Goal: Optimal Functional Ability  Outcome: Progressing  Goal: Absence of Infection Signs and Symptoms  Outcome: Progressing  Goal: Improved Oral Intake  Outcome: Progressing  Goal: Optimal Pain Control and Function  Outcome: Progressing  Goal: Skin Health and Integrity  Outcome: Progressing  Goal: Optimal Wound Healing  Outcome: Progressing     Problem: Skin Injury Risk Increased  Goal: Skin Health and Integrity  Outcome: Progressing      No

## 2024-06-18 LAB
ANION GAP SERPL CALC-SCNC: 2 MMOL/L (ref 8–16)
ANION GAP SERPL CALC-SCNC: 6 MMOL/L (ref 8–16)
BUN SERPL-MCNC: 21 MG/DL (ref 8–23)
BUN SERPL-MCNC: 23 MG/DL (ref 8–23)
CALCIUM SERPL-MCNC: 8.3 MG/DL (ref 8.7–10.5)
CALCIUM SERPL-MCNC: 9.3 MG/DL (ref 8.7–10.5)
CHLORIDE SERPL-SCNC: 91 MMOL/L (ref 95–110)
CHLORIDE SERPL-SCNC: 93 MMOL/L (ref 95–110)
CO2 SERPL-SCNC: 36 MMOL/L (ref 23–29)
CO2 SERPL-SCNC: 40 MMOL/L (ref 23–29)
CREAT SERPL-MCNC: 0.6 MG/DL (ref 0.5–1.4)
CREAT SERPL-MCNC: 0.7 MG/DL (ref 0.5–1.4)
EST. GFR  (NO RACE VARIABLE): >60 ML/MIN/1.73 M^2
EST. GFR  (NO RACE VARIABLE): >60 ML/MIN/1.73 M^2
GLUCOSE SERPL-MCNC: 170 MG/DL (ref 70–110)
GLUCOSE SERPL-MCNC: 213 MG/DL (ref 70–110)
GLUCOSE SERPL-MCNC: 294 MG/DL (ref 70–110)
GLUCOSE SERPL-MCNC: 314 MG/DL (ref 70–110)
GLUCOSE SERPL-MCNC: 348 MG/DL (ref 70–110)
GLUCOSE SERPL-MCNC: 395 MG/DL (ref 70–110)
MAGNESIUM SERPL-MCNC: 1.8 MG/DL (ref 1.6–2.6)
MAGNESIUM SERPL-MCNC: 1.9 MG/DL (ref 1.6–2.6)
PHOSPHATE SERPL-MCNC: 3.1 MG/DL (ref 2.7–4.5)
POTASSIUM SERPL-SCNC: 3.5 MMOL/L (ref 3.5–5.1)
POTASSIUM SERPL-SCNC: 3.8 MMOL/L (ref 3.5–5.1)
POTASSIUM SERPL-SCNC: 4 MMOL/L (ref 3.5–5.1)
POTASSIUM SERPL-SCNC: 4.4 MMOL/L (ref 3.5–5.1)
SODIUM SERPL-SCNC: 133 MMOL/L (ref 136–145)
SODIUM SERPL-SCNC: 135 MMOL/L (ref 136–145)
TROPONIN I SERPL HS-MCNC: 34.3 PG/ML (ref 0–14.9)

## 2024-06-18 PROCEDURE — 82962 GLUCOSE BLOOD TEST: CPT

## 2024-06-18 PROCEDURE — 83735 ASSAY OF MAGNESIUM: CPT | Performed by: INTERNAL MEDICINE

## 2024-06-18 PROCEDURE — 84100 ASSAY OF PHOSPHORUS: CPT | Performed by: INTERNAL MEDICINE

## 2024-06-18 PROCEDURE — 25000003 PHARM REV CODE 250: Performed by: INTERNAL MEDICINE

## 2024-06-18 PROCEDURE — 94761 N-INVAS EAR/PLS OXIMETRY MLT: CPT

## 2024-06-18 PROCEDURE — 97530 THERAPEUTIC ACTIVITIES: CPT | Mod: CQ

## 2024-06-18 PROCEDURE — 25000003 PHARM REV CODE 250: Performed by: HOSPITALIST

## 2024-06-18 PROCEDURE — 27100171 HC OXYGEN HIGH FLOW UP TO 24 HOURS

## 2024-06-18 PROCEDURE — 94640 AIRWAY INHALATION TREATMENT: CPT

## 2024-06-18 PROCEDURE — 83735 ASSAY OF MAGNESIUM: CPT | Mod: 91 | Performed by: INTERNAL MEDICINE

## 2024-06-18 PROCEDURE — 99900035 HC TECH TIME PER 15 MIN (STAT)

## 2024-06-18 PROCEDURE — 93010 ELECTROCARDIOGRAM REPORT: CPT | Mod: ,,, | Performed by: INTERNAL MEDICINE

## 2024-06-18 PROCEDURE — 36415 COLL VENOUS BLD VENIPUNCTURE: CPT | Performed by: INTERNAL MEDICINE

## 2024-06-18 PROCEDURE — 80048 BASIC METABOLIC PNL TOTAL CA: CPT | Mod: 91 | Performed by: INTERNAL MEDICINE

## 2024-06-18 PROCEDURE — 94660 CPAP INITIATION&MGMT: CPT

## 2024-06-18 PROCEDURE — 99900031 HC PATIENT EDUCATION (STAT)

## 2024-06-18 PROCEDURE — 80048 BASIC METABOLIC PNL TOTAL CA: CPT | Performed by: INTERNAL MEDICINE

## 2024-06-18 PROCEDURE — 36415 COLL VENOUS BLD VENIPUNCTURE: CPT | Performed by: HOSPITALIST

## 2024-06-18 PROCEDURE — 63600175 PHARM REV CODE 636 W HCPCS: Performed by: HOSPITALIST

## 2024-06-18 PROCEDURE — 84484 ASSAY OF TROPONIN QUANT: CPT | Performed by: INTERNAL MEDICINE

## 2024-06-18 PROCEDURE — 97116 GAIT TRAINING THERAPY: CPT | Mod: CQ

## 2024-06-18 PROCEDURE — 93005 ELECTROCARDIOGRAM TRACING: CPT | Performed by: INTERNAL MEDICINE

## 2024-06-18 PROCEDURE — 25000242 PHARM REV CODE 250 ALT 637 W/ HCPCS: Performed by: INTERNAL MEDICINE

## 2024-06-18 PROCEDURE — 25500020 PHARM REV CODE 255: Performed by: HOSPITALIST

## 2024-06-18 PROCEDURE — 84132 ASSAY OF SERUM POTASSIUM: CPT | Mod: 91 | Performed by: HOSPITALIST

## 2024-06-18 PROCEDURE — 21000000 HC CCU ICU ROOM CHARGE

## 2024-06-18 RX ORDER — MAGNESIUM SULFATE 1 G/100ML
1 INJECTION INTRAVENOUS ONCE
Status: COMPLETED | OUTPATIENT
Start: 2024-06-19 | End: 2024-06-19

## 2024-06-18 RX ORDER — INSULIN GLARGINE 100 [IU]/ML
30 INJECTION, SOLUTION SUBCUTANEOUS NIGHTLY
Status: DISCONTINUED | OUTPATIENT
Start: 2024-06-18 | End: 2024-06-20 | Stop reason: HOSPADM

## 2024-06-18 RX ORDER — ACETAZOLAMIDE 250 MG/1
250 TABLET ORAL 2 TIMES DAILY
Status: DISCONTINUED | OUTPATIENT
Start: 2024-06-18 | End: 2024-06-20 | Stop reason: HOSPADM

## 2024-06-18 RX ADMIN — IPRATROPIUM BROMIDE AND ALBUTEROL SULFATE 3 ML: 2.5; .5 SOLUTION RESPIRATORY (INHALATION) at 07:06

## 2024-06-18 RX ADMIN — GUAIFENESIN 1200 MG: 600 TABLET, EXTENDED RELEASE ORAL at 08:06

## 2024-06-18 RX ADMIN — ASPIRIN 81 MG 81 MG: 81 TABLET ORAL at 08:06

## 2024-06-18 RX ADMIN — IPRATROPIUM BROMIDE AND ALBUTEROL SULFATE 3 ML: 2.5; .5 SOLUTION RESPIRATORY (INHALATION) at 08:06

## 2024-06-18 RX ADMIN — INSULIN GLARGINE 30 UNITS: 100 INJECTION, SOLUTION SUBCUTANEOUS at 09:06

## 2024-06-18 RX ADMIN — POTASSIUM BICARBONATE 50 MEQ: 977.5 TABLET, EFFERVESCENT ORAL at 12:06

## 2024-06-18 RX ADMIN — HYDROCODONE BITARTRATE AND ACETAMINOPHEN 1 TABLET: 5; 325 TABLET ORAL at 11:06

## 2024-06-18 RX ADMIN — APIXABAN 5 MG: 5 TABLET, FILM COATED ORAL at 09:06

## 2024-06-18 RX ADMIN — GUAIFENESIN 1200 MG: 600 TABLET, EXTENDED RELEASE ORAL at 09:06

## 2024-06-18 RX ADMIN — ACETAZOLAMIDE 250 MG: 250 TABLET ORAL at 09:06

## 2024-06-18 RX ADMIN — GABAPENTIN 600 MG: 300 CAPSULE ORAL at 09:06

## 2024-06-18 RX ADMIN — PANTOPRAZOLE SODIUM 40 MG: 40 TABLET, DELAYED RELEASE ORAL at 05:06

## 2024-06-18 RX ADMIN — FUROSEMIDE 40 MG: 10 INJECTION, SOLUTION INTRAVENOUS at 09:06

## 2024-06-18 RX ADMIN — POTASSIUM BICARBONATE 50 MEQ: 977.5 TABLET, EFFERVESCENT ORAL at 05:06

## 2024-06-18 RX ADMIN — INSULIN ASPART 8 UNITS: 100 INJECTION, SOLUTION INTRAVENOUS; SUBCUTANEOUS at 09:06

## 2024-06-18 RX ADMIN — ATORVASTATIN CALCIUM 80 MG: 40 TABLET, FILM COATED ORAL at 09:06

## 2024-06-18 RX ADMIN — PREDNISONE 40 MG: 20 TABLET ORAL at 08:06

## 2024-06-18 RX ADMIN — DEXTROSE MONOHYDRATE 5 MG/HR: 50 INJECTION, SOLUTION INTRAVENOUS at 10:06

## 2024-06-18 RX ADMIN — FUROSEMIDE 40 MG: 10 INJECTION, SOLUTION INTRAVENOUS at 08:06

## 2024-06-18 RX ADMIN — DILTIAZEM HYDROCHLORIDE 60 MG: 60 TABLET, FILM COATED ORAL at 01:06

## 2024-06-18 RX ADMIN — HYDROCODONE BITARTRATE AND ACETAMINOPHEN 1 TABLET: 5; 325 TABLET ORAL at 05:06

## 2024-06-18 RX ADMIN — INSULIN ASPART 12 UNITS: 100 INJECTION, SOLUTION INTRAVENOUS; SUBCUTANEOUS at 12:06

## 2024-06-18 RX ADMIN — IPRATROPIUM BROMIDE AND ALBUTEROL SULFATE 3 ML: 2.5; .5 SOLUTION RESPIRATORY (INHALATION) at 01:06

## 2024-06-18 RX ADMIN — DILTIAZEM HYDROCHLORIDE 60 MG: 60 TABLET, FILM COATED ORAL at 08:06

## 2024-06-18 RX ADMIN — INSULIN ASPART 3 UNITS: 100 INJECTION, SOLUTION INTRAVENOUS; SUBCUTANEOUS at 08:06

## 2024-06-18 RX ADMIN — INSULIN ASPART 15 UNITS: 100 INJECTION, SOLUTION INTRAVENOUS; SUBCUTANEOUS at 05:06

## 2024-06-18 RX ADMIN — HYDROCODONE BITARTRATE AND ACETAMINOPHEN 1 TABLET: 5; 325 TABLET ORAL at 10:06

## 2024-06-18 RX ADMIN — LEVOFLOXACIN 750 MG: 750 TABLET, FILM COATED ORAL at 08:06

## 2024-06-18 RX ADMIN — GABAPENTIN 600 MG: 300 CAPSULE ORAL at 08:06

## 2024-06-18 RX ADMIN — APIXABAN 5 MG: 5 TABLET, FILM COATED ORAL at 08:06

## 2024-06-18 RX ADMIN — Medication 800 MG: at 11:06

## 2024-06-18 RX ADMIN — IOHEXOL 100 ML: 350 INJECTION, SOLUTION INTRAVENOUS at 02:06

## 2024-06-18 RX ADMIN — GABAPENTIN 600 MG: 300 CAPSULE ORAL at 03:06

## 2024-06-18 NOTE — PLAN OF CARE
Problem: COPD (Chronic Obstructive Pulmonary Disease)  Goal: Optimal Chronic Illness Coping  Outcome: Progressing  Goal: Optimal Level of Functional [place]  Outcome: Progressing  Goal: Absence of Infection Signs and Symptoms  Outcome: Progressing  Goal: Improved Oral Intake  Outcome: Progressing  Goal: Effective Oxygenation and Ventilation  Outcome: Progressing     Problem: Adult Inpatient Plan of Care  Goal: Plan of Care Review  Outcome: Progressing  Goal: Patient-Specific Goal (Individualized)  Outcome: Progressing  Goal: Absence of Hospital-Acquired Illness or Injury  Outcome: Progressing  Goal: Optimal Comfort and Wellbeing  Outcome: Progressing  Goal: Readiness for Transition of Care  Outcome: Progressing     Problem: Diabetes Comorbidity  Goal: Blood Glucose Level Within Targeted Range  Outcome: Progressing     Problem: Pneumonia  Goal: Fluid Balance  Outcome: Progressing  Goal: Resolution of Infection Signs and Symptoms  Outcome: Progressing  Goal: Effective Oxygenation and Ventilation  Outcome: Progressing     Problem: Wound  Goal: Optimal Coping  Outcome: Progressing  Goal: Optimal Functional Ability  Outcome: Progressing  Goal: Absence of Infection Signs and Symptoms  Outcome: Progressing  Goal: Improved Oral Intake  Outcome: Progressing  Goal: Optimal Pain Control and Function  Outcome: Progressing  Goal: Skin Health and Integrity  Outcome: Progressing  Goal: Optimal Wound Healing  Outcome: Progressing     Problem: Skin Injury Risk Increased  Goal: Skin Health and Integrity  Outcome: Progressing

## 2024-06-18 NOTE — ASSESSMENT & PLAN NOTE
Patient has a diagnosis of pneumonia. The cause of the pneumonia is unknown at this time. The pneumonia is stable. The patient has the following signs/symptoms of pneumonia: persistent hypoxia . The patient does have a current oxygen requirement and the patient does have a home oxygen requirement. I have reviewed the pertinent imaging. The following cultures have been collected: Blood cultures and Sputum culture The culture results are listed below.     Current antimicrobial regimen consists of the antibiotics listed below. Will monitor patient closely and Adjust treatment plan as follows      Antibiotics (From admission, onward)      None            Microbiology Results (last 7 days)       Procedure Component Value Units Date/Time    Blood culture [[id number]] Collected: 06/14/24 0454    Order Status: Completed Specimen: Blood from Peripheral, Antecubital, Right Updated: 06/18/24 0632     Blood Culture, Routine No Growth to date      No Growth to date      No Growth to date      No Growth to date      No Growth to date    Blood culture [[id number]] Collected: 06/14/24 0450    Order Status: Completed Specimen: Blood from Peripheral, Hand, Left Updated: 06/18/24 0632     Blood Culture, Routine No Growth to date      No Growth to date      No Growth to date      No Growth to date      No Growth to date          Status post Levaquin for 5 days, discontinue it.

## 2024-06-18 NOTE — NURSING
Transport came to bring pt to CT. Pt refused stating she wants her pain medicine to kick in before her going down to CT. Educated pt on need for CT and that provider wants test done as soon as possible. Transport will return to take pt to CT in an hour. Pt also refusing any medication for constipation despite not having BM for 4 days. Educated pt on need for medication with her taking pain medicine. Pt refused stating that she will take something tomorrow.

## 2024-06-18 NOTE — PROGRESS NOTES
[facility] Medicine  Progress Note    Patient Name: Karo eLonard  MRN: [id number]  Patient Class: IP- Inpatient   Admission Date: 6/14/2024  Length of Stay: 4 days  Attending Physician: Elsy Peoples MD  Primary Care Provider: Navneet Hernandez FNP        Subjective:     Principal Problem:COPD exacerbation        HPI:  64y.o. female  with  a past medical history of Coronary artery disease, Diabetes mellitus, and Hypertension., COPD came with SOB.  Patient is ex-smoker and currently on 2 L oxygen for COPD. no history of sick contact and no fever at home.  .She became gradually worsening shortness of breath with cough with past 2 days and extreme fatigue and came to the hospital.  Patient has been using  increased oxygen 5 L at home but still having shortness of breath and called EMS and she was hypoxic  at 84 with 5 L of oxygen at arrival   Denied having chest pain, no leg swelling and patient was given IV Solu-Medrol and nebulization treatments and later started on BIPAP. ABG was done but result not reported .  Medication reconciliation was not done in the ER and not able to reconcile  On examination patient is not in extreme shortness of breath, she is able to talk out of the BiPAP, lung exam with limited air entry.  Patient has leukocytosis but no fever and chest x-ray possible pneumonia and admitted    Overview/Hospital Course:  64y.o. female  with  a past medical history of Coronary artery disease, Diabetes mellitus, and Hypertension.,  ABDI,  COPD came with SOB. Patient is ex-smoker and currently on 2 L oxygen for COPD.  ABG shows severe compensated CO2 retention.  Patient stated that she has CPAP at home however the machine has been broken for years.  Patient developed AFib with RVR 6/15, started on Cardizem drip and converted to normal sinus rhythm, started on full-dose Lovenox later converted to Eliquis as per Cardiology recommendation, also discontinue the Plavix, continue  aspirin.     Interval History:    -1.5 L, feel okay, back to normal sinus rhythm, pending CTA of the chest.  Afebrile.  Still has bibasilar crackles.  Glucose uncontrolled due to prednisone.     Review of Systems   Constitutional:  Negative for activity change and fever.   HENT:  Negative for ear discharge, facial swelling and sore throat.    Eyes:  Negative for photophobia, pain and visual disturbance.   Respiratory:  Positive for shortness of breath. Negative for apnea and cough.    Cardiovascular:  Negative for chest pain and leg swelling.   Gastrointestinal:  Negative for abdominal pain and blood in stool.   Endocrine: Negative for cold intolerance and heat intolerance.   Musculoskeletal:  Negative for back pain and gait problem.   Skin:  Negative for pallor and rash.   Neurological:  Negative for speech difficulty and headaches.   Psychiatric/Behavioral:  Negative for confusion, hallucinations and suicidal ideas.    All other systems reviewed and are negative.    Objective:     Vital Signs (Most Recent):  Temp: 97.8 °F (36.6 °C) (06/17/24 0701)  Pulse: 65 (06/17/24 0756)  Resp: 18 (06/17/24 0756)  BP: (!) 127/56 (06/17/24 1056)  SpO2: 100 % (06/17/24 0756) Vital Signs (24h Range):  Temp:  [97.3 °F (36.3 °C)-98 °F (36.7 °C)] 97.8 °F (36.6 °C)  Pulse:  [54-73] 65  Resp:  [9-39] 18  SpO2:  [93 %-100 %] 100 %  BP: (111-154)/(52-79) 127/56     Weight: 97.5 kg (215 lb)  Body mass index is 35.78 kg/m².    Intake/Output Summary (Last 24 hours) at 6/17/2024 1121  Last data filed at 6/17/2024 1032  Gross per 24 hour   Intake 415.83 ml   Output 1950 ml   Net -1534.17 ml         Physical Exam  Constitutional:       Appearance: Normal appearance.   HENT:      Head: Normocephalic and atraumatic.      Nose: Nose normal.   Eyes:      Extraocular Movements: Extraocular movements intact.      Pupils: Pupils are equal, round, and reactive to light.   Cardiovascular:      Rate and Rhythm: Normal rate and regular rhythm.      Heart  sounds: No murmur heard.  Pulmonary:      Effort: Pulmonary effort is normal.      Breath sounds: Rales present.   Abdominal:      General: Abdomen is flat.      Palpations: Abdomen is soft.   Musculoskeletal:         General: Swelling present. Normal range of motion.      Cervical back: Normal range of motion and neck supple.   Skin:     General: Skin is warm and dry.   Neurological:      General: No focal deficit present.      Mental Status: She is alert and oriented to person, place, and time.   Psychiatric:         Mood and Affect: Mood normal.             Significant Labs: All pertinent labs within the past 24 hours have been reviewed.  CBC:   Recent Labs   Lab 06/16/24 0435 06/17/24  0345   WBC 11.79 12.08   HGB 9.7* 10.7*   HCT 32.6* 35.8*    172     CMP:   Recent Labs   Lab 06/16/24 0435 06/17/24  0345   * 135*   K 4.4 3.9   CL 89* 93*   CO2 42* 40*   * 237*   BUN 13 18   CREATININE 0.6 0.5   CALCIUM 8.8 8.8   ANIONGAP 2* 2*       Significant Imaging: I have reviewed all pertinent imaging results/findings within the past 24 hours.  I have reviewed and interpreted all pertinent imaging results/findings within the past 24 hours.    X-Ray Chest AP Portable    Result Date: 6/14/2024  EXAMINATION: XR CHEST AP PORTABLE CLINICAL HISTORY: CHF; TECHNIQUE: Single frontal view of the chest was performed. COMPARISON: Chest radiograph June 7, 2022 FINDINGS: Single portable chest view is submitted.  When accounting for position and technique and depth of inspiration, the appearance of the cardiomediastinal silhouette is stable.  Aortic atherosclerotic change noted. There is accentuated attenuation at the lung bases which in part is likely due to overlying soft tissue attenuation, however asymmetric density at the left lung base may relate to a component of small pleural effusion as well as suspected pulmonary infiltrate/airspace disease, peripheral opacity at the mid left hemithorax may relate to  pulmonary infiltrate/airspace disease as well.  There may be a component of mild infiltrate at the right base, there is no pleural fluid on the right and bilaterally there is no pneumothorax. The osseous structures demonstrate chronic change.     Basilar opacity on the left may relate to mild pleural effusion with pulmonary infiltrate/airspace disease as well as peripheral pulmonary infiltrate at the mid left hemithorax. Suspected mild infiltrate at the right base as well. Electronically signed by: Topher Rosario Date:    06/14/2024 Time:    05:35  - pulls last radiology orders      Assessment/Plan:      * COPD exacerbation  Patient's COPD is with exacerbation noted by continued dyspnea and use of accessory muscles for breathing currently.  Patient is currently on COPD Pathway. Continue scheduled inhalers Steroids, Antibiotics, and Supplemental oxygen and monitor respiratory status closely.   Continue BiPAP and admitted to step-down    ABG shows severe acute on chronic CO2 retention, most likely secondary to obesity hypoventilation syndrome versus ABDI versus COPD.  Patient does have CPAP at home however has not used it for years.   We will ask the patient to reuse it.   Improved with IV Lasix,  we will continue IV Lasix and acetazolamide.   Discontinue IV steroids due to severe hyperglycemia.   P.o. prednisone  Out of bed to chair    Acute hypoxic on chronic hypercapnic respiratory failure  Patient with Hypercapnic and Hypoxic Respiratory failure which is Acute on chronic.  she is on home oxygen at 2.5 LPM. Supplemental oxygen was provided and noted-      .   Signs/symptoms of respiratory failure include- tachypnea, increased work of breathing, and respiratory distress. Contributing diagnoses includes - CHF, COPD, and Pneumonia Labs and images were reviewed. Patient Has recent ABG, which has been reviewed. Will treat underlying causes and adjust management of respiratory failure as follows-     Patient apparently  "has long history of chronic hypoxic and hypercapnic respiratory failure, most likely multifactorial, including CHF, COPD, obesity hypoventilation syndrome, untreated ABDI.   Patient is still quite hypoxic despite aggressive diuresis, steroids, nebulizer treatment.    We will get a CTA of the chest , if still no improvement in a.m. may consult pulmonologist.     New onset a-fib  Patient with Paroxysmal (<7 days) atrial fibrillation which is controlled currently with Calcium Channel Blocker. Patient is currently in sinus rhythm.[id number] Score: 4.. Anticoagulation indicated. Anticoagulation done with Eliquis .    We will start the patient on Eliquis, continue aspirin.  Discontinue Plavix    Diabetes  Patient's FSGs are controlled on current medication regimen.  Last A1c reviewed-   Lab Results   Component Value Date    HGBA1C 7.0 (H) 06/14/2024     Most recent fingerstick glucose reviewed- No results for input(s): "POCTGLUCOSE" in the last 24 hours.  Current correctional scale  Low  Maintain anti-hyperglycemic dose as follows-   Antihyperglycemics (From admission, onward)      Start     Stop Route Frequency Ordered    06/18/24 2100  insulin glargine U-100 (Lantus) pen 30 Units         -- SubQ Nightly 06/18/24 0732    06/15/24 1616  insulin aspart U-100 pen 0-15 Units         -- SubQ Before meals & nightly PRN 06/15/24 1516          Hold Oral hypoglycemics while patient is in the hospital.    Patient has severe uncontrolled hyperglycemia secondary to steroids.  Add on Lantus 15 units q.h.s. plus high-dose sliding scale    Pneumonia  Patient has a diagnosis of pneumonia. The cause of the pneumonia is unknown at this time. The pneumonia is stable. The patient has the following signs/symptoms of pneumonia: persistent hypoxia . The patient does have a current oxygen requirement and the patient does have a home oxygen requirement. I have reviewed the pertinent imaging. The following cultures have been collected: Blood " cultures and Sputum culture The culture results are listed below.     Current antimicrobial regimen consists of the antibiotics listed below. Will monitor patient closely and Adjust treatment plan as follows      Antibiotics (From admission, onward)      None            Microbiology Results (last 7 days)       Procedure Component Value Units Date/Time    Blood culture [[id number]] Collected: 06/14/24 0454    Order Status: Completed Specimen: Blood from Peripheral, Antecubital, Right Updated: 06/18/24 0632     Blood Culture, Routine No Growth to date      No Growth to date      No Growth to date      No Growth to date      No Growth to date    Blood culture [[id number]] Collected: 06/14/24 0450    Order Status: Completed Specimen: Blood from Peripheral, Hand, Left Updated: 06/18/24 0632     Blood Culture, Routine No Growth to date      No Growth to date      No Growth to date      No Growth to date      No Growth to date          Status post Levaquin for 5 days, discontinue it.     History of coronary artery stent placement    Aware, denied chest pain  Continue home medication  Reconciliation needs to be done when ready      Hypertension  Chronic, controlled. Latest blood pressure and vitals reviewed-     Temp:  [98.4 °F (36.9 °C)]   Pulse:  [55-60]   Resp:  [21-30]   BP: (134-181)/(61-76)   SpO2:  [84 %-93 %] .   Home meds for hypertension were reviewed and noted below.   Hypertension Medications               amLODIPine (NORVASC) 5 MG tablet Take 5 mg by mouth once daily.    propranoloL (INDERAL LA) 80 MG 24 hr capsule Take 160 mg by mouth once daily.            While in the hospital, will manage blood pressure as follows; Continue home antihypertensive regimen    Will utilize p.r.n. blood pressure medication only if patient's blood pressure greater than 140/90 and she develops symptoms such as worsening chest pain or shortness of breath.    Hypothyroid    Home medication  Now patient has drug induced  hyperthyroidism, which likely the culprit of AFib with RVR.  Hold Synthroid 150 mcg once a day.  Repeat TSH free T4 in 2-3 weeks, and restart low-dose.      VTE Risk Mitigation (From admission, onward)           Ordered     apixaban tablet 5 mg  2 times daily         06/16/24 1159     IP VTE LOW RISK PATIENT  Once         06/14/24 0544     Place sequential compression device  Until discontinued         06/14/24 0544                    Discharge Planning   CHELE: 6/20/2024     Code Status: Full Code   Is the patient medically ready for discharge?:     Reason for patient still in hospital (select all that apply): Patient trending condition and Treatment  Discharge Plan A: Home with family   Discharge Delays: None known at this time              Elsy Peoples MD  Department of Hospital Medicine   [facility]

## 2024-06-18 NOTE — PT/OT/SLP PROGRESS
Physical Therapy Treatment    Patient Name:  Karo Leonard   MRN:  [id number]    Recommendations:     Discharge Recommendations: Low Intensity Therapy  Discharge Equipment Recommendations: none  Barriers to discharge:  Medical status    Assessment:     Karo Leonard is a 64 y.o. female admitted with a medical diagnosis of COPD exacerbation.  She presents with the following impairments/functional limitations: weakness, impaired endurance, impaired self care skills, impaired functional mobility, gait instability, pain, impaired cardiopulmonary response to activity. Pt sitting in bed side chair with c/o increased LBP. Pt completed two gait trials ambulating 60 feet x2 min a with rw, requiring cues for safe distancing from AD. Pt requested to return to bed requiring min a from sit to supine and rolling to R side.     Rehab Prognosis: Fair; patient would benefit from acute skilled PT services to address these deficits and reach maximum level of function.    Recent Surgery: * No surgery found *      Plan:     During this hospitalization, patient to be seen 5 x/week to address the identified rehab impairments via gait training, therapeutic activities, therapeutic exercises and progress toward the following goals:    Plan of Care Expires:  07/16/24    Subjective     Chief Complaint: LBP  Patient/Family Comments/goals: Pt agreeable to PT  Pain/Comfort:  Pain Rating 1: 8/10  Location - Orientation 1: lower  Location 1: back  Pain Addressed 1: Nurse notified, Cessation of Activity  Pain Rating Post-Intervention 1: 8/10      Objective:     Communicated with RN prior to session.  Patient found up in chair with chair check, PureWick, peripheral IV, telemetry upon PT entry to room.     General Precautions: Standard, fall  Orthopedic Precautions:    Braces:    Respiratory Status: Room air     Functional Mobility:  Bed Mobility:     Rolling Left:  minimum assistance  Sit to Supine: minimum assistance  Transfers:     Sit to  Stand:  minimum assistance with rolling walker  Gait: 60' x 2 min a rw      AM-PAC 6 CLICK MOBILITY  Turning over in bed (including adjusting bedclothes, sheets and blankets)?: 3  Sitting down on and standing up from a chair with arms (e.g., wheelchair, bedside commode, etc.): 3  Moving from lying on back to sitting on the side of the bed?: 3  Moving to and from a bed to a chair (including a wheelchair)?: 3  Need to walk in hospital room?: 2  Climbing 3-5 steps with a railing?: 1  Basic Mobility Total Score: 15       Treatment & Education:  Pt was educated on the following: call light use, importance of OOB activity and functional mobility to negate the negative effects of prolonged bed rest during this hospitalization, safe transfers/ambulation and discharge planning recommendations/options.     Patient left HOB elevated with all lines intact, call button in reach, and bed alarm on..    GOALS:   Multidisciplinary Problems       Physical Therapy Goals          Problem: Physical Therapy    Goal Priority Disciplines Outcome Goal Variances Interventions   Physical Therapy Goal     PT, PT/OT Progressing     Description: 1. Supine to sit with Stand-by Assistance  2. Sit to stand transfer with Stand-by Assistance  3.. Bed to chair transfer with Supervision using Rolling Walker  4. Gait  x 50 feet with Minimal Assistance using Rolling Walker.                         Time Tracking:     PT Received On: 06/18/24  PT Start Time: 1055     PT Stop Time: 1118  PT Total Time (min): 23 min     Billable Minutes: Gait Training 15 and Therapeutic Activity 8    Treatment Type: Treatment  PT/PTA: PTA     Number of PTA visits since last PT visit: 1 06/18/2024

## 2024-06-18 NOTE — ASSESSMENT & PLAN NOTE
"Patient's FSGs are controlled on current medication regimen.  Last A1c reviewed-   Lab Results   Component Value Date    HGBA1C 7.0 (H) 06/14/2024     Most recent fingerstick glucose reviewed- No results for input(s): "POCTGLUCOSE" in the last 24 hours.  Current correctional scale  Low  Maintain anti-hyperglycemic dose as follows-   Antihyperglycemics (From admission, onward)    Start     Stop Route Frequency Ordered    06/18/24 2100  insulin glargine U-100 (Lantus) pen 30 Units         -- SubQ Nightly 06/18/24 0732    06/15/24 1616  insulin aspart U-100 pen 0-15 Units         -- SubQ Before meals & nightly PRN 06/15/24 1516        Hold Oral hypoglycemics while patient is in the hospital.    Patient has severe uncontrolled hyperglycemia secondary to steroids.  Add on Lantus 15 units q.h.s. plus high-dose sliding scale  "

## 2024-06-19 LAB
ANION GAP SERPL CALC-SCNC: 4 MMOL/L (ref 8–16)
BACTERIA BLD CULT: NORMAL
BACTERIA BLD CULT: NORMAL
BUN SERPL-MCNC: 20 MG/DL (ref 8–23)
CALCIUM SERPL-MCNC: 8.7 MG/DL (ref 8.7–10.5)
CHLORIDE SERPL-SCNC: 94 MMOL/L (ref 95–110)
CO2 SERPL-SCNC: 37 MMOL/L (ref 23–29)
CREAT SERPL-MCNC: 0.6 MG/DL (ref 0.5–1.4)
EST. GFR  (NO RACE VARIABLE): >60 ML/MIN/1.73 M^2
GLUCOSE SERPL-MCNC: 246 MG/DL (ref 70–110)
GLUCOSE SERPL-MCNC: 248 MG/DL (ref 70–110)
GLUCOSE SERPL-MCNC: 347 MG/DL (ref 70–110)
GLUCOSE SERPL-MCNC: 392 MG/DL (ref 70–110)
GLUCOSE SERPL-MCNC: 426 MG/DL (ref 70–110)
MAGNESIUM SERPL-MCNC: 2.1 MG/DL (ref 1.6–2.6)
PHOSPHATE SERPL-MCNC: 2.9 MG/DL (ref 2.7–4.5)
POTASSIUM SERPL-SCNC: 3.9 MMOL/L (ref 3.5–5.1)
SODIUM SERPL-SCNC: 135 MMOL/L (ref 136–145)

## 2024-06-19 PROCEDURE — 99900031 HC PATIENT EDUCATION (STAT)

## 2024-06-19 PROCEDURE — 63600175 PHARM REV CODE 636 W HCPCS: Performed by: INTERNAL MEDICINE

## 2024-06-19 PROCEDURE — 25000003 PHARM REV CODE 250: Performed by: INTERNAL MEDICINE

## 2024-06-19 PROCEDURE — 25000003 PHARM REV CODE 250: Performed by: HOSPITALIST

## 2024-06-19 PROCEDURE — 63600175 PHARM REV CODE 636 W HCPCS: Performed by: HOSPITALIST

## 2024-06-19 PROCEDURE — 83735 ASSAY OF MAGNESIUM: CPT | Performed by: INTERNAL MEDICINE

## 2024-06-19 PROCEDURE — 36415 COLL VENOUS BLD VENIPUNCTURE: CPT | Performed by: INTERNAL MEDICINE

## 2024-06-19 PROCEDURE — 84100 ASSAY OF PHOSPHORUS: CPT | Performed by: INTERNAL MEDICINE

## 2024-06-19 PROCEDURE — 21000000 HC CCU ICU ROOM CHARGE

## 2024-06-19 PROCEDURE — 94761 N-INVAS EAR/PLS OXIMETRY MLT: CPT

## 2024-06-19 PROCEDURE — 94640 AIRWAY INHALATION TREATMENT: CPT

## 2024-06-19 PROCEDURE — 80048 BASIC METABOLIC PNL TOTAL CA: CPT | Performed by: INTERNAL MEDICINE

## 2024-06-19 PROCEDURE — 97110 THERAPEUTIC EXERCISES: CPT | Mod: CQ

## 2024-06-19 PROCEDURE — 82962 GLUCOSE BLOOD TEST: CPT

## 2024-06-19 PROCEDURE — 94660 CPAP INITIATION&MGMT: CPT

## 2024-06-19 PROCEDURE — 25000242 PHARM REV CODE 250 ALT 637 W/ HCPCS: Performed by: INTERNAL MEDICINE

## 2024-06-19 PROCEDURE — 99900035 HC TECH TIME PER 15 MIN (STAT)

## 2024-06-19 PROCEDURE — 27100171 HC OXYGEN HIGH FLOW UP TO 24 HOURS

## 2024-06-19 RX ORDER — INSULIN GLARGINE 100 [IU]/ML
15 INJECTION, SOLUTION SUBCUTANEOUS DAILY
Status: DISCONTINUED | OUTPATIENT
Start: 2024-06-19 | End: 2024-06-20 | Stop reason: HOSPADM

## 2024-06-19 RX ORDER — MECLIZINE HCL 12.5 MG 12.5 MG/1
25 TABLET ORAL 3 TIMES DAILY PRN
Status: CANCELLED | OUTPATIENT
Start: 2024-06-19

## 2024-06-19 RX ORDER — METOPROLOL SUCCINATE 25 MG/1
25 TABLET, EXTENDED RELEASE ORAL 2 TIMES DAILY
Status: DISCONTINUED | OUTPATIENT
Start: 2024-06-19 | End: 2024-06-20 | Stop reason: HOSPADM

## 2024-06-19 RX ADMIN — ATORVASTATIN CALCIUM 80 MG: 40 TABLET, FILM COATED ORAL at 08:06

## 2024-06-19 RX ADMIN — IPRATROPIUM BROMIDE AND ALBUTEROL SULFATE 3 ML: 2.5; .5 SOLUTION RESPIRATORY (INHALATION) at 07:06

## 2024-06-19 RX ADMIN — PANTOPRAZOLE SODIUM 40 MG: 40 TABLET, DELAYED RELEASE ORAL at 05:06

## 2024-06-19 RX ADMIN — APIXABAN 5 MG: 5 TABLET, FILM COATED ORAL at 08:06

## 2024-06-19 RX ADMIN — GUAIFENESIN 1200 MG: 600 TABLET, EXTENDED RELEASE ORAL at 08:06

## 2024-06-19 RX ADMIN — INSULIN ASPART 15 UNITS: 100 INJECTION, SOLUTION INTRAVENOUS; SUBCUTANEOUS at 05:06

## 2024-06-19 RX ADMIN — METOPROLOL SUCCINATE 25 MG: 25 TABLET, FILM COATED, EXTENDED RELEASE ORAL at 08:06

## 2024-06-19 RX ADMIN — ASPIRIN 81 MG 81 MG: 81 TABLET ORAL at 08:06

## 2024-06-19 RX ADMIN — DEXTROSE MONOHYDRATE 5 MG/HR: 50 INJECTION, SOLUTION INTRAVENOUS at 05:06

## 2024-06-19 RX ADMIN — GABAPENTIN 600 MG: 300 CAPSULE ORAL at 02:06

## 2024-06-19 RX ADMIN — HYDROCODONE BITARTRATE AND ACETAMINOPHEN 1 TABLET: 5; 325 TABLET ORAL at 08:06

## 2024-06-19 RX ADMIN — INSULIN GLARGINE 15 UNITS: 100 INJECTION, SOLUTION SUBCUTANEOUS at 08:06

## 2024-06-19 RX ADMIN — ACETAZOLAMIDE 250 MG: 250 TABLET ORAL at 08:06

## 2024-06-19 RX ADMIN — DILTIAZEM HYDROCHLORIDE 60 MG: 60 TABLET, FILM COATED ORAL at 02:06

## 2024-06-19 RX ADMIN — INSULIN ASPART 15 UNITS: 100 INJECTION, SOLUTION INTRAVENOUS; SUBCUTANEOUS at 11:06

## 2024-06-19 RX ADMIN — DILTIAZEM HYDROCHLORIDE 60 MG: 60 TABLET, FILM COATED ORAL at 08:06

## 2024-06-19 RX ADMIN — INSULIN ASPART 6 UNITS: 100 INJECTION, SOLUTION INTRAVENOUS; SUBCUTANEOUS at 08:06

## 2024-06-19 RX ADMIN — IPRATROPIUM BROMIDE AND ALBUTEROL SULFATE 3 ML: 2.5; .5 SOLUTION RESPIRATORY (INHALATION) at 02:06

## 2024-06-19 RX ADMIN — GABAPENTIN 600 MG: 300 CAPSULE ORAL at 08:06

## 2024-06-19 RX ADMIN — INSULIN ASPART 8 UNITS: 100 INJECTION, SOLUTION INTRAVENOUS; SUBCUTANEOUS at 08:06

## 2024-06-19 RX ADMIN — HYDROCODONE BITARTRATE AND ACETAMINOPHEN 1 TABLET: 5; 325 TABLET ORAL at 04:06

## 2024-06-19 RX ADMIN — IPRATROPIUM BROMIDE AND ALBUTEROL SULFATE 3 ML: 2.5; .5 SOLUTION RESPIRATORY (INHALATION) at 08:06

## 2024-06-19 RX ADMIN — FUROSEMIDE 40 MG: 10 INJECTION, SOLUTION INTRAVENOUS at 08:06

## 2024-06-19 RX ADMIN — INSULIN GLARGINE 30 UNITS: 100 INJECTION, SOLUTION SUBCUTANEOUS at 08:06

## 2024-06-19 RX ADMIN — HYDROCODONE BITARTRATE AND ACETAMINOPHEN 1 TABLET: 5; 325 TABLET ORAL at 10:06

## 2024-06-19 RX ADMIN — METOPROLOL SUCCINATE 25 MG: 25 TABLET, FILM COATED, EXTENDED RELEASE ORAL at 10:06

## 2024-06-19 RX ADMIN — DILTIAZEM HYDROCHLORIDE 60 MG: 60 TABLET, FILM COATED ORAL at 01:06

## 2024-06-19 RX ADMIN — MAGNESIUM SULFATE HEPTAHYDRATE 1 G: 1 INJECTION, SOLUTION INTRAVENOUS at 12:06

## 2024-06-19 RX ADMIN — MECLIZINE 25 MG: 12.5 TABLET ORAL at 02:06

## 2024-06-19 RX ADMIN — PREDNISONE 40 MG: 20 TABLET ORAL at 08:06

## 2024-06-19 NOTE — SUBJECTIVE & OBJECTIVE
Interval History:   Seen in the multidisciplinary rounds, on 3 L nasal cannula, oxygenation improved, -2.5 L, CTA of the chest reviewed ruled out pulmonary embolism.  Back to AFib with RVR overnight converted to normal sinus rhythm with Cardizem drip.     Review of Systems   Constitutional:  Negative for activity change and fever.   HENT:  Negative for ear discharge, facial swelling and sore throat.    Eyes:  Negative for photophobia, pain and visual disturbance.   Respiratory:  Positive for shortness of breath. Negative for apnea and cough.    Cardiovascular:  Negative for chest pain and leg swelling.   Gastrointestinal:  Negative for abdominal pain and blood in stool.   Endocrine: Negative for cold intolerance and heat intolerance.   Musculoskeletal:  Negative for back pain and gait problem.   Skin:  Negative for pallor and rash.   Neurological:  Negative for speech difficulty and headaches.   Psychiatric/Behavioral:  Negative for confusion, hallucinations and suicidal ideas.    All other systems reviewed and are negative.    Objective:     Vital Signs (Most Recent):  Temp: 97.8 °F (36.6 °C) (06/19/24 0715)  Pulse: 89 (06/19/24 1016)  Resp: 12 (06/19/24 1000)  BP: 139/61 (06/19/24 1016)  SpO2: 98 % (06/19/24 1000) Vital Signs (24h Range):  Temp:  [97.8 °F (36.6 °C)-98.9 °F (37.2 °C)] 97.8 °F (36.6 °C)  Pulse:  [] 89  Resp:  [10-24] 12  SpO2:  [94 %-100 %] 98 %  BP: (102-150)/() 139/61     Weight: 94.9 kg (209 lb 4.8 oz)  Body mass index is 34.83 kg/m².    Intake/Output Summary (Last 24 hours) at 6/19/2024 1130  Last data filed at 6/19/2024 0929  Gross per 24 hour   Intake 1157.33 ml   Output 3650 ml   Net -2492.67 ml         Physical Exam  Constitutional:       Appearance: Normal appearance.   HENT:      Head: Normocephalic and atraumatic.      Nose: Nose normal.   Eyes:      Extraocular Movements: Extraocular movements intact.      Pupils: Pupils are equal, round, and reactive to light.  "  Cardiovascular:      Rate and Rhythm: Normal rate and regular rhythm.      Heart sounds: No murmur heard.  Pulmonary:      Effort: Pulmonary effort is normal.      Breath sounds: Rales present.   Abdominal:      General: Abdomen is flat.      Palpations: Abdomen is soft.   Musculoskeletal:         General: Swelling present. Normal range of motion.      Cervical back: Normal range of motion and neck supple.   Skin:     General: Skin is warm and dry.   Neurological:      General: No focal deficit present.      Mental Status: She is alert and oriented to person, place, and time.   Psychiatric:         Mood and Affect: Mood normal.             Significant Labs: All pertinent labs within the past 24 hours have been reviewed.  CBC:   No results for input(s): "WBC", "HGB", "HCT", "PLT" in the last 48 hours.    CMP:   Recent Labs   Lab 06/18/24  0436 06/18/24  0947 06/18/24  1734 06/18/24  2257 06/19/24  0457   *  --   --  133* 135*   K 3.5   < > 4.4 4.0 3.9   CL 93*  --   --  91* 94*   CO2 40*  --   --  36* 37*   *  --   --  294* 246*   BUN 23  --   --  21 20   CREATININE 0.6  --   --  0.7 0.6   CALCIUM 8.3*  --   --  9.3 8.7   ANIONGAP 2*  --   --  6* 4*    < > = values in this interval not displayed.       Significant Imaging: I have reviewed all pertinent imaging results/findings within the past 24 hours.  I have reviewed and interpreted all pertinent imaging results/findings within the past 24 hours.    X-Ray Chest AP Portable    Result Date: 6/14/2024  EXAMINATION: XR CHEST AP PORTABLE CLINICAL HISTORY: CHF; TECHNIQUE: Single frontal view of the chest was performed. COMPARISON: Chest radiograph June 7, 2022 FINDINGS: Single portable chest view is submitted.  When accounting for position and technique and depth of inspiration, the appearance of the cardiomediastinal silhouette is stable.  Aortic atherosclerotic change noted. There is accentuated attenuation at the lung bases which in part is likely due " to overlying soft tissue attenuation, however asymmetric density at the left lung base may relate to a component of small pleural effusion as well as suspected pulmonary infiltrate/airspace disease, peripheral opacity at the mid left hemithorax may relate to pulmonary infiltrate/airspace disease as well.  There may be a component of mild infiltrate at the right base, there is no pleural fluid on the right and bilaterally there is no pneumothorax. The osseous structures demonstrate chronic change.     Basilar opacity on the left may relate to mild pleural effusion with pulmonary infiltrate/airspace disease as well as peripheral pulmonary infiltrate at the mid left hemithorax. Suspected mild infiltrate at the right base as well. Electronically signed by: Topher Rosario Date:    06/14/2024 Time:    05:35  - pulls last radiology orders

## 2024-06-19 NOTE — PLAN OF CARE
1536 Received c/b from Nell who reports that she had her machine exchanged 10/26/22. She is not eligible for any new machine for 5 years from original  in December of 2020. Informed pt spouse via phone.    4286 Patient reports that she has a home CPAP but does not use it because she read about the [employer] recalls on CPAP machines.  Unsure of where it was from and CM met with spouse this afternoon and he reports it was from [employer] and pt received it in 2021. CM called [employer] and spoke with rep Camejo who is looking into this and will call CM back       06/19/24 9145   Post-Acute Status   Post-Acute Authorization [facility]

## 2024-06-19 NOTE — NURSING
2000- Pt went into afib, called md and orders given and completed.  .  2245-  contacted md and pt cont to be in afib rvr, orders given for diltiazem gtt.  Orders carried out.  Cont to monitor.  Awaiting lab orders

## 2024-06-19 NOTE — PROGRESS NOTES
[facility]  Progress Note    Patient Name: Karo Leonard  MRN: [id number]  Patient Class: IP- Inpatient   Admission Date: 6/14/2024  Length of Stay: 5 days  Attending Physician: Elsy Peoples MD  Primary Care Provider: Navneet Hernandez FNP        Subjective:     Principal Problem:COPD exacerbation        HPI:  64y.o. female  with  a past medical history of Coronary artery disease, Diabetes mellitus, and Hypertension., COPD came with SOB.  Patient is ex-smoker and currently on 2 L oxygen for COPD. no history of sick contact and no fever at home.  .She became gradually worsening shortness of breath with cough with past 2 days and extreme fatigue and came to the hospital.  Patient has been using  increased oxygen 5 L at home but still having shortness of breath and called EMS and she was hypoxic  at 84 with 5 L of oxygen at arrival   Denied having chest pain, no leg swelling and patient was given IV Solu-Medrol and nebulization treatments and later started on BIPAP. ABG was done but result not reported .  Medication reconciliation was not done in the ER and not able to reconcile  On examination patient is not in extreme shortness of breath, she is able to talk out of the BiPAP, lung exam with limited air entry.  Patient has leukocytosis but no fever and chest x-ray possible pneumonia and admitted    Overview/Hospital Course:  64y.o. female  with  a past medical history of Coronary artery disease, Diabetes mellitus, and Hypertension.,  ABDI,  COPD came with SOB. Patient is ex-smoker and currently on 2 L oxygen for COPD.  ABG shows severe compensated CO2 retention.  Patient stated that she has CPAP at home however the machine has been broken for years.  Patient developed AFib with RVR 6/15, started on Cardizem drip and converted to normal sinus rhythm, started on full-dose Lovenox later converted to Eliquis as per Cardiology recommendation, also discontinue the Plavix, continue  aspirin.     Interval History:   Seen in the multidisciplinary rounds, on 3 L nasal cannula, oxygenation improved, -2.5 L, CTA of the chest reviewed ruled out pulmonary embolism.  Back to AFib with RVR overnight converted to normal sinus rhythm with Cardizem drip.     Review of Systems   Constitutional:  Negative for activity change and fever.   HENT:  Negative for ear discharge, facial swelling and sore throat.    Eyes:  Negative for photophobia, pain and visual disturbance.   Respiratory:  Positive for shortness of breath. Negative for apnea and cough.    Cardiovascular:  Negative for chest pain and leg swelling.   Gastrointestinal:  Negative for abdominal pain and blood in stool.   Endocrine: Negative for cold intolerance and heat intolerance.   Musculoskeletal:  Negative for back pain and gait problem.   Skin:  Negative for pallor and rash.   Neurological:  Negative for speech difficulty and headaches.   Psychiatric/Behavioral:  Negative for confusion, hallucinations and suicidal ideas.    All other systems reviewed and are negative.    Objective:     Vital Signs (Most Recent):  Temp: 97.8 °F (36.6 °C) (06/19/24 0715)  Pulse: 89 (06/19/24 1016)  Resp: 12 (06/19/24 1000)  BP: 139/61 (06/19/24 1016)  SpO2: 98 % (06/19/24 1000) Vital Signs (24h Range):  Temp:  [97.8 °F (36.6 °C)-98.9 °F (37.2 °C)] 97.8 °F (36.6 °C)  Pulse:  [] 89  Resp:  [10-24] 12  SpO2:  [94 %-100 %] 98 %  BP: (102-150)/() 139/61     Weight: 94.9 kg (209 lb 4.8 oz)  Body mass index is 34.83 kg/m².    Intake/Output Summary (Last 24 hours) at 6/19/2024 1130  Last data filed at 6/19/2024 0929  Gross per 24 hour   Intake 1157.33 ml   Output 3650 ml   Net -2492.67 ml         Physical Exam  Constitutional:       Appearance: Normal appearance.   HENT:      Head: Normocephalic and atraumatic.      Nose: Nose normal.   Eyes:      Extraocular Movements: Extraocular movements intact.      Pupils: Pupils are equal, round, and reactive to light.  "  Cardiovascular:      Rate and Rhythm: Normal rate and regular rhythm.      Heart sounds: No murmur heard.  Pulmonary:      Effort: Pulmonary effort is normal.      Breath sounds: Rales present.   Abdominal:      General: Abdomen is flat.      Palpations: Abdomen is soft.   Musculoskeletal:         General: Swelling present. Normal range of motion.      Cervical back: Normal range of motion and neck supple.   Skin:     General: Skin is warm and dry.   Neurological:      General: No focal deficit present.      Mental Status: She is alert and oriented to person, place, and time.   Psychiatric:         Mood and Affect: Mood normal.             Significant Labs: All pertinent labs within the past 24 hours have been reviewed.  CBC:   No results for input(s): "WBC", "HGB", "HCT", "PLT" in the last 48 hours.    CMP:   Recent Labs   Lab 06/18/24  0436 06/18/24  0947 06/18/24  1734 06/18/24  2257 06/19/24  0457   *  --   --  133* 135*   K 3.5   < > 4.4 4.0 3.9   CL 93*  --   --  91* 94*   CO2 40*  --   --  36* 37*   *  --   --  294* 246*   BUN 23  --   --  21 20   CREATININE 0.6  --   --  0.7 0.6   CALCIUM 8.3*  --   --  9.3 8.7   ANIONGAP 2*  --   --  6* 4*    < > = values in this interval not displayed.       Significant Imaging: I have reviewed all pertinent imaging results/findings within the past 24 hours.  I have reviewed and interpreted all pertinent imaging results/findings within the past 24 hours.    X-Ray Chest AP Portable    Result Date: 6/14/2024  EXAMINATION: XR CHEST AP PORTABLE CLINICAL HISTORY: CHF; TECHNIQUE: Single frontal view of the chest was performed. COMPARISON: Chest radiograph June 7, 2022 FINDINGS: Single portable chest view is submitted.  When accounting for position and technique and depth of inspiration, the appearance of the cardiomediastinal silhouette is stable.  Aortic atherosclerotic change noted. There is accentuated attenuation at the lung bases which in part is likely due " to overlying soft tissue attenuation, however asymmetric density at the left lung base may relate to a component of small pleural effusion as well as suspected pulmonary infiltrate/airspace disease, peripheral opacity at the mid left hemithorax may relate to pulmonary infiltrate/airspace disease as well.  There may be a component of mild infiltrate at the right base, there is no pleural fluid on the right and bilaterally there is no pneumothorax. The osseous structures demonstrate chronic change.     Basilar opacity on the left may relate to mild pleural effusion with pulmonary infiltrate/airspace disease as well as peripheral pulmonary infiltrate at the mid left hemithorax. Suspected mild infiltrate at the right base as well. Electronically signed by: Topher Rosario Date:    06/14/2024 Time:    05:35  - pulls last radiology orders      Assessment/Plan:      * COPD exacerbation  Patient's COPD is with exacerbation noted by continued dyspnea and use of accessory muscles for breathing currently.  Patient is currently on COPD Pathway. Continue scheduled inhalers Steroids, Antibiotics, and Supplemental oxygen and monitor respiratory status closely.   Continue BiPAP and admitted to step-down    ABG shows severe acute on chronic CO2 retention, most likely secondary to obesity hypoventilation syndrome versus ABDI versus COPD.  Patient does have CPAP at home however has not used it for years.   We will ask the patient to reuse it.   Improved with IV Lasix,  we will continue IV Lasix and acetazolamide.   Discontinue IV steroids due to severe hyperglycemia.   P.o. prednisone  Out of bed to chair    Acute hypoxic on chronic hypercapnic respiratory failure  Patient with Hypercapnic and Hypoxic Respiratory failure which is Acute on chronic.  she is on home oxygen at 2.5 LPM. Supplemental oxygen was provided and noted-      .   Signs/symptoms of respiratory failure include- tachypnea, increased work of breathing, and respiratory  "distress. Contributing diagnoses includes - CHF, COPD, and Pneumonia Labs and images were reviewed. Patient Has recent ABG, which has been reviewed. Will treat underlying causes and adjust management of respiratory failure as follows-     Patient apparently has long history of chronic hypoxic and hypercapnic respiratory failure, most likely multifactorial, including CHF, COPD, obesity hypoventilation syndrome, untreated ABDI.   Patient is still quite hypoxic despite aggressive diuresis, steroids, nebulizer treatment.    We will get a CTA of the chest , if still no improvement in a.m. may consult pulmonologist.     New onset a-fib  Patient with Paroxysmal (<7 days) atrial fibrillation which is controlled currently with Calcium Channel Blocker. Patient is currently in sinus rhythm.[id number] Score: 4.. Anticoagulation indicated. Anticoagulation done with Eliquis .    We will start the patient on Eliquis, continue aspirin.  Discontinue Plavix  Continue Cardizem 60 mg q.6  Add on metoprolol 25 mg b.i.d.  Wean off Cardizem drip    Diabetes  Patient's FSGs are controlled on current medication regimen.  Last A1c reviewed-   Lab Results   Component Value Date    HGBA1C 7.0 (H) 06/14/2024     Most recent fingerstick glucose reviewed- No results for input(s): "POCTGLUCOSE" in the last 24 hours.  Current correctional scale  Low  Maintain anti-hyperglycemic dose as follows-   Antihyperglycemics (From admission, onward)      Start     Stop Route Frequency Ordered    06/18/24 2100  insulin glargine U-100 (Lantus) pen 30 Units         -- SubQ Nightly 06/18/24 0732    06/15/24 1616  insulin aspart U-100 pen 0-15 Units         -- SubQ Before meals & nightly PRN 06/15/24 1516          Hold Oral hypoglycemics while patient is in the hospital.    Patient has severe uncontrolled hyperglycemia secondary to steroids.  Add on Lantus 15 units q.h.s. plus high-dose sliding scale    Pneumonia  Patient has a diagnosis of pneumonia. The cause of " the pneumonia is unknown at this time. The pneumonia is stable. The patient has the following signs/symptoms of pneumonia: persistent hypoxia . The patient does have a current oxygen requirement and the patient does have a home oxygen requirement. I have reviewed the pertinent imaging. The following cultures have been collected: Blood cultures and Sputum culture The culture results are listed below.     Current antimicrobial regimen consists of the antibiotics listed below. Will monitor patient closely and Adjust treatment plan as follows      Antibiotics (From admission, onward)      None            Microbiology Results (last 7 days)       Procedure Component Value Units Date/Time    Blood culture [[id number]] Collected: 06/14/24 0454    Order Status: Completed Specimen: Blood from Peripheral, Antecubital, Right Updated: 06/18/24 0632     Blood Culture, Routine No Growth to date      No Growth to date      No Growth to date      No Growth to date      No Growth to date    Blood culture [[id number]] Collected: 06/14/24 0450    Order Status: Completed Specimen: Blood from Peripheral, Hand, Left Updated: 06/18/24 0632     Blood Culture, Routine No Growth to date      No Growth to date      No Growth to date      No Growth to date      No Growth to date          Status post Levaquin for 5 days, discontinue it.     History of coronary artery stent placement    Aware, denied chest pain  Continue home medication  Reconciliation needs to be done when ready      Hypertension  Chronic, controlled. Latest blood pressure and vitals reviewed-     Temp:  [98.4 °F (36.9 °C)]   Pulse:  [55-60]   Resp:  [21-30]   BP: (134-181)/(61-76)   SpO2:  [84 %-93 %] .   Home meds for hypertension were reviewed and noted below.   Hypertension Medications               amLODIPine (NORVASC) 5 MG tablet Take 5 mg by mouth once daily.    propranoloL (INDERAL LA) 80 MG 24 hr capsule Take 160 mg by mouth once daily.            While in the hospital,  will manage blood pressure as follows; Continue home antihypertensive regimen    Will utilize p.r.n. blood pressure medication only if patient's blood pressure greater than 140/90 and she develops symptoms such as worsening chest pain or shortness of breath.    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  Hold Synthroid 150 mcg once a day.  Repeat TSH free T4 in 2-3 weeks, and restart low-dose.      VTE Risk Mitigation (From admission, onward)           Ordered     apixaban tablet 5 mg  2 times daily         06/16/24 1159     IP VTE LOW RISK PATIENT  Once         06/14/24 0544     Place sequential compression device  Until discontinued         06/14/24 0544                    Discharge Planning   CHELE: 6/21/2024     Code Status: Full Code   Is the patient medically ready for discharge?:     Reason for patient still in hospital (select all that apply): Patient trending condition and Treatment  Discharge Plan A: Home with family   Discharge Delays: None known at this time              Elsy Peoples MD  Department of Hospital Medicine   [facility]

## 2024-06-19 NOTE — PT/OT/SLP PROGRESS
Physical Therapy Treatment    Patient Name:  Karo Leonard   MRN:  [id number]    Recommendations:     Discharge Recommendations: Low Intensity Therapy  Discharge Equipment Recommendations: none  Barriers to discharge:  weakness, impaired endurance, impaired self care skills, impaired functional mobility,    Assessment:     Karo Leonard is a 64 y.o. female admitted with a medical diagnosis of COPD exacerbation.  She presents with the following impairments/functional limitations: weakness, impaired endurance, impaired self care skills, impaired functional mobility, gait instability, pain, impaired cardiopulmonary response to activity.    Pt found sitting up in bedside chair, agreeable to skilled physical therapy session today. Pt reports vertigo spells since sitting up in chair. RN notified and requested to keep pt sitting in chair for session today.     Pt performed sitting therapeutic exercises today including ankle pumps, LAQ's, marches, hip AB/ADD, and glute sets x15 reps each without any complaints.     She was left sitting up in bedside chair with chair alarm on, call light within reach, all needs met, and RN notified.     Rehab Prognosis: Fair; patient would benefit from acute skilled PT services to address these deficits and reach maximum level of function.    Recent Surgery: * No surgery found *      Plan:     During this hospitalization, patient to be seen 5 x/week to address the identified rehab impairments via gait training, therapeutic activities, therapeutic exercises and progress toward the following goals:    Plan of Care Expires:  07/16/24    Subjective     Chief Complaint: vertigo  Patient/Family Comments/goals: to get better and go home  Pain/Comfort:         Objective:     Communicated with RN prior to session.  Patient found up in chair with chair check, PureWick, peripheral IV, telemetry upon PT entry to room.     General Precautions: Standard, fall  Orthopedic Precautions:    Braces:     Respiratory Status: Nasal cannula, flow 3 L/min     Functional Mobility:  No functional mobility performed today.       AM-PAC 6 CLICK MOBILITY          Treatment & Education:  Pt educated on importance of time OOB, importance of intermittent mobility, safe techniques for transfers/ambulation, discharge recommendations/options, and use of call light for assistance and fall prevention.      Patient left up in chair with all lines intact, call button in reach, chair alarm on, and RN notified..    GOALS:   Multidisciplinary Problems       Physical Therapy Goals          Problem: Physical Therapy    Goal Priority Disciplines Outcome Goal Variances Interventions   Physical Therapy Goal     PT, PT/OT Progressing     Description: 1. Supine to sit with Stand-by Assistance  2. Sit to stand transfer with Stand-by Assistance  3.. Bed to chair transfer with Supervision using Rolling Walker  4. Gait  x 50 feet with Minimal Assistance using Rolling Walker.                         Time Tracking:     PT Received On: 06/19/24  PT Start Time: 1355     PT Stop Time: 1409  PT Total Time (min): 14 min     Billable Minutes: Therapeutic Exercise 14    Treatment Type: Treatment  PT/PTA: PTA     Number of PTA visits since last PT visit: 2     06/19/2024

## 2024-06-19 NOTE — ASSESSMENT & PLAN NOTE
Patient with Paroxysmal (<7 days) atrial fibrillation which is controlled currently with Calcium Channel Blocker. Patient is currently in sinus rhythm.[id number] Score: 4.. Anticoagulation indicated. Anticoagulation done with Eliquis .    We will start the patient on Eliquis, continue aspirin.  Discontinue Plavix  Continue Cardizem 60 mg q.6  Add on metoprolol 25 mg b.i.d.  Wean off Cardizem drip

## 2024-06-20 ENCOUNTER — TELEPHONE (OUTPATIENT)
Dept: CARDIOLOGY | Facility: CLINIC | Age: 64
End: 2024-06-20

## 2024-06-20 VITALS
HEART RATE: 64 BPM | SYSTOLIC BLOOD PRESSURE: 139 MMHG | TEMPERATURE: 98 F | RESPIRATION RATE: 17 BRPM | OXYGEN SATURATION: 100 % | HEIGHT: 65 IN | WEIGHT: 210.75 LBS | BODY MASS INDEX: 35.11 KG/M2 | DIASTOLIC BLOOD PRESSURE: 63 MMHG

## 2024-06-20 PROBLEM — E66.01 SEVERE OBESITY (BMI 35.0-39.9) WITH COMORBIDITY: Status: ACTIVE | Noted: 2024-06-20

## 2024-06-20 LAB
ANION GAP SERPL CALC-SCNC: 4 MMOL/L (ref 8–16)
BUN SERPL-MCNC: 26 MG/DL (ref 8–23)
CALCIUM SERPL-MCNC: 8.6 MG/DL (ref 8.7–10.5)
CHLORIDE SERPL-SCNC: 93 MMOL/L (ref 95–110)
CO2 SERPL-SCNC: 36 MMOL/L (ref 23–29)
CREAT SERPL-MCNC: 0.6 MG/DL (ref 0.5–1.4)
EST. GFR  (NO RACE VARIABLE): >60 ML/MIN/1.73 M^2
GLUCOSE SERPL-MCNC: 237 MG/DL (ref 70–110)
GLUCOSE SERPL-MCNC: 296 MG/DL (ref 70–110)
MAGNESIUM SERPL-MCNC: 2 MG/DL (ref 1.6–2.6)
PHOSPHATE SERPL-MCNC: 4 MG/DL (ref 2.7–4.5)
POTASSIUM SERPL-SCNC: 3.9 MMOL/L (ref 3.5–5.1)
SODIUM SERPL-SCNC: 133 MMOL/L (ref 136–145)

## 2024-06-20 PROCEDURE — 27100171 HC OXYGEN HIGH FLOW UP TO 24 HOURS

## 2024-06-20 PROCEDURE — 80048 BASIC METABOLIC PNL TOTAL CA: CPT | Performed by: INTERNAL MEDICINE

## 2024-06-20 PROCEDURE — 83735 ASSAY OF MAGNESIUM: CPT | Performed by: INTERNAL MEDICINE

## 2024-06-20 PROCEDURE — 63700000 PHARM REV CODE 250 ALT 637 W/O HCPCS: Performed by: HOSPITALIST

## 2024-06-20 PROCEDURE — 94660 CPAP INITIATION&MGMT: CPT

## 2024-06-20 PROCEDURE — 25000003 PHARM REV CODE 250: Performed by: HOSPITALIST

## 2024-06-20 PROCEDURE — 84100 ASSAY OF PHOSPHORUS: CPT | Performed by: INTERNAL MEDICINE

## 2024-06-20 PROCEDURE — 63600175 PHARM REV CODE 636 W HCPCS: Performed by: HOSPITALIST

## 2024-06-20 PROCEDURE — 94761 N-INVAS EAR/PLS OXIMETRY MLT: CPT

## 2024-06-20 PROCEDURE — 99900035 HC TECH TIME PER 15 MIN (STAT)

## 2024-06-20 PROCEDURE — 25000242 PHARM REV CODE 250 ALT 637 W/ HCPCS: Performed by: INTERNAL MEDICINE

## 2024-06-20 PROCEDURE — 99900031 HC PATIENT EDUCATION (STAT)

## 2024-06-20 PROCEDURE — 97110 THERAPEUTIC EXERCISES: CPT | Mod: CQ

## 2024-06-20 PROCEDURE — 36415 COLL VENOUS BLD VENIPUNCTURE: CPT | Performed by: INTERNAL MEDICINE

## 2024-06-20 PROCEDURE — 94640 AIRWAY INHALATION TREATMENT: CPT

## 2024-06-20 RX ORDER — BISACODYL 10 MG/1
10 SUPPOSITORY RECTAL ONCE
Status: DISCONTINUED | OUTPATIENT
Start: 2024-06-20 | End: 2024-06-20 | Stop reason: HOSPADM

## 2024-06-20 RX ORDER — FUROSEMIDE 40 MG/1
40 TABLET ORAL DAILY
Qty: 90 TABLET | Refills: 0 | Status: SHIPPED | OUTPATIENT
Start: 2024-06-20 | End: 2024-09-18

## 2024-06-20 RX ORDER — ACETAZOLAMIDE 250 MG/1
250 TABLET ORAL 2 TIMES DAILY
Qty: 60 TABLET | Refills: 0 | Status: SHIPPED | OUTPATIENT
Start: 2024-06-20 | End: 2024-07-20

## 2024-06-20 RX ORDER — LACTULOSE 10 G/15ML
20 SOLUTION ORAL ONCE
Status: COMPLETED | OUTPATIENT
Start: 2024-06-20 | End: 2024-06-20

## 2024-06-20 RX ORDER — DILTIAZEM HYDROCHLORIDE 240 MG/1
240 CAPSULE, COATED, EXTENDED RELEASE ORAL DAILY
Qty: 90 CAPSULE | Refills: 0 | Status: SHIPPED | OUTPATIENT
Start: 2024-06-20 | End: 2024-09-18

## 2024-06-20 RX ORDER — POTASSIUM CHLORIDE 750 MG/1
10 TABLET, EXTENDED RELEASE ORAL DAILY
Qty: 90 TABLET | Refills: 0 | Status: SHIPPED | OUTPATIENT
Start: 2024-06-20 | End: 2024-09-18

## 2024-06-20 RX ORDER — METHYLPREDNISOLONE 4 MG/1
TABLET ORAL
Qty: 21 EACH | Refills: 0 | Status: SHIPPED | OUTPATIENT
Start: 2024-06-20 | End: 2024-07-11

## 2024-06-20 RX ADMIN — DILTIAZEM HYDROCHLORIDE 60 MG: 60 TABLET, FILM COATED ORAL at 08:06

## 2024-06-20 RX ADMIN — IPRATROPIUM BROMIDE AND ALBUTEROL SULFATE 3 ML: 2.5; .5 SOLUTION RESPIRATORY (INHALATION) at 07:06

## 2024-06-20 RX ADMIN — ACETAZOLAMIDE 250 MG: 250 TABLET ORAL at 08:06

## 2024-06-20 RX ADMIN — ASPIRIN 81 MG 81 MG: 81 TABLET ORAL at 08:06

## 2024-06-20 RX ADMIN — INSULIN ASPART 6 UNITS: 100 INJECTION, SOLUTION INTRAVENOUS; SUBCUTANEOUS at 08:06

## 2024-06-20 RX ADMIN — GUAIFENESIN 1200 MG: 600 TABLET, EXTENDED RELEASE ORAL at 08:06

## 2024-06-20 RX ADMIN — HYDROCODONE BITARTRATE AND ACETAMINOPHEN 1 TABLET: 5; 325 TABLET ORAL at 10:06

## 2024-06-20 RX ADMIN — INSULIN GLARGINE 15 UNITS: 100 INJECTION, SOLUTION SUBCUTANEOUS at 08:06

## 2024-06-20 RX ADMIN — FLUCONAZOLE 150 MG: 100 TABLET ORAL at 01:06

## 2024-06-20 RX ADMIN — PREDNISONE 40 MG: 20 TABLET ORAL at 08:06

## 2024-06-20 RX ADMIN — GABAPENTIN 600 MG: 300 CAPSULE ORAL at 08:06

## 2024-06-20 RX ADMIN — FUROSEMIDE 40 MG: 10 INJECTION, SOLUTION INTRAVENOUS at 08:06

## 2024-06-20 RX ADMIN — PANTOPRAZOLE SODIUM 40 MG: 40 TABLET, DELAYED RELEASE ORAL at 05:06

## 2024-06-20 RX ADMIN — APIXABAN 5 MG: 5 TABLET, FILM COATED ORAL at 08:06

## 2024-06-20 RX ADMIN — DILTIAZEM HYDROCHLORIDE 60 MG: 60 TABLET, FILM COATED ORAL at 01:06

## 2024-06-20 RX ADMIN — LACTULOSE 20 G: 20 SOLUTION ORAL at 10:06

## 2024-06-20 NOTE — ASSESSMENT & PLAN NOTE
Patient with Hypercapnic and Hypoxic Respiratory failure which is Acute on chronic.  she is on home oxygen at 2.5 LPM. Supplemental oxygen was provided and noted- Oxygen Concentration (%):  [40] 40    .   Signs/symptoms of respiratory failure include- tachypnea, increased work of breathing, and respiratory distress. Contributing diagnoses includes - CHF, COPD, and Pneumonia Labs and images were reviewed. Patient Has recent ABG, which has been reviewed. Will treat underlying causes and adjust management of respiratory failure as follows-     Patient apparently has long history of chronic hypoxic and hypercapnic respiratory failure, most likely multifactorial, including CHF, COPD, obesity hypoventilation syndrome, untreated ABDI.   Patient is still quite hypoxic despite aggressive diuresis, steroids, nebulizer treatment.    We will get a CTA of the chest , if still no improvement in a.m. may consult pulmonologist.

## 2024-06-20 NOTE — ASSESSMENT & PLAN NOTE
Chronic, controlled. Latest blood pressure and vitals reviewed-     Temp:  [97.8 °F (36.6 °C)-98 °F (36.7 °C)]   Pulse:  [47-85]   Resp:  [12-27]   BP: (122-162)/(56-81)   SpO2:  [96 %-100 %] .   Home meds for hypertension were reviewed and noted below.   Hypertension Medications               amLODIPine (NORVASC) 5 MG tablet Take 5 mg by mouth once daily.    propranoloL (INDERAL LA) 80 MG 24 hr capsule Take 160 mg by mouth once daily.            While in the hospital, will manage blood pressure as follows; Continue home antihypertensive regimen    Will utilize p.r.n. blood pressure medication only if patient's blood pressure greater than 140/90 and she develops symptoms such as worsening chest pain or shortness of breath.

## 2024-06-20 NOTE — NURSING
Pt brought down via wc with all belongings to family transport with home O2 @ 2LNC. Safety maintained.

## 2024-06-20 NOTE — PT/OT/SLP PROGRESS
Physical Therapy Treatment    Patient Name:  Karo Leonard   MRN:  [id number]    Recommendations:     Discharge Recommendations: Low Intensity Therapy  Discharge Equipment Recommendations: none  Barriers to discharge: None    Assessment:     Karo Leonard is a 64 y.o. female admitted with a medical diagnosis of COPD exacerbation.  She presents with the following impairments/functional limitations: weakness, impaired endurance, impaired functional mobility, pain, impaired cardiopulmonary response to activity.    1st attempt declined due to patient on BSC.    2nd attempt pt declined all activity beyond bed exercises performed in L sidelying (refused roll into supine due to fear of back pain provocation). Participated well with exercise training.     Rehab Prognosis: Good; patient would benefit from acute skilled PT services to address these deficits and reach maximum level of function.    Recent Surgery: * No surgery found *      Plan:     During this hospitalization, patient to be seen 5 x/week to address the identified rehab impairments via gait training, therapeutic activities, therapeutic exercises and progress toward the following goals:    Plan of Care Expires:  07/16/24    Subjective     Chief Complaint: I don't want to move again. My back hurts, and I don't want to shift my purewick out of place   Patient/Family Comments/goals: do bed exercises  Pain/Comfort:  Pain Rating 1: other (see comments) (unrated)  Location 1: back  Pain Addressed 1: Cessation of Activity      Objective:     Communicated with nurse prior to session.  Patient found left sidelying with bed alarm, PureWick, peripheral IV, telemetry, pulse ox (continuous) upon PT entry to room.     General Precautions: Standard, fall  Orthopedic Precautions: N/A  Braces: N/A  Respiratory Status: Nasal cannula, flow 3 L/min     Functional Mobility:  Bed Mobility:         AM-PAC 6 CLICK MOBILITY          Treatment & Education:  -Pt educ: benefits of  participation with PT, proper form and frequency of bed exercises, call light, fall prevention  -All exercises performed in L sidelying at pt request: sidelying clams, adductor pillow squeeze isometrics, AP, abdominal isometric contractions, glut sets. Therapist provided VC/TC as needed for proper form, pace, and breathing pattern    Patient left left sidelying with all lines intact, call button in reach, bed alarm on, nurse notified, and spouse present..    GOALS:   Multidisciplinary Problems       Physical Therapy Goals          Problem: Physical Therapy    Goal Priority Disciplines Outcome Goal Variances Interventions   Physical Therapy Goal     PT, PT/OT Progressing     Description: 1. Supine to sit with Stand-by Assistance  2. Sit to stand transfer with Stand-by Assistance  3.. Bed to chair transfer with Supervision using Rolling Walker  4. Gait  x 200 feet with Stand by Assistance using Rolling Walker.                         Time Tracking:     PT Received On: 06/20/24  PT Start Time: 1106     PT Stop Time: 1121  PT Total Time (min): 15 min     Billable Minutes: Therapeutic Exercise 15    Treatment Type: Treatment  PT/PTA: PTA     Number of PTA visits since last PT visit: 3     06/20/2024

## 2024-06-20 NOTE — ASSESSMENT & PLAN NOTE
"Patient's FSGs are controlled on current medication regimen.  Last A1c reviewed-   Lab Results   Component Value Date    HGBA1C 7.0 (H) 06/14/2024     Most recent fingerstick glucose reviewed- No results for input(s): "POCTGLUCOSE" in the last 24 hours.  Current correctional scale  Low  Maintain anti-hyperglycemic dose as follows-   Antihyperglycemics (From admission, onward)    Start     Stop Route Frequency Ordered    06/19/24 0830  insulin glargine U-100 (Lantus) pen 15 Units         -- SubQ Daily 06/19/24 0827    06/18/24 2100  insulin glargine U-100 (Lantus) pen 30 Units         -- SubQ Nightly 06/18/24 0732    06/15/24 1616  insulin aspart U-100 pen 0-15 Units         -- SubQ Before meals & nightly PRN 06/15/24 1516        Hold Oral hypoglycemics while patient is in the hospital.    Patient has severe uncontrolled hyperglycemia secondary to steroids.  Add on Lantus 15 units q.h.s. plus high-dose sliding scale  "

## 2024-06-20 NOTE — PLAN OF CARE
CM present during IDR and pt reports spouse is coming to pick her up and will be bringing O2 tank. TCC with pt set with [facility] NP and pt to be seen Tuesday.  In basket message sent to Dr Lantigua's office requesting f/u appointment be called to pt. Met with pt who had spouse present and discussed aftercare and again offered HH with pt and spouse again declining.  Discharge orders and chart reviewed with no further post-acute discharge needs identified at this time.  At this time, patient is cleared for discharge from Case Management standpoint.   Eliquis coupon given to pt spouse        06/20/24 1132   Final Note   Assessment Type Final Discharge Note   Anticipated Discharge Disposition Home   What phone number can be called within the next 1-3 days to see how you are doing after discharge? [phone]   Hospital Resources/Appts/Education Provided Appointments scheduled and added to AVS   Post-Acute Status   Discharge Delays None known at this time

## 2024-06-20 NOTE — RESPIRATORY THERAPY
06/19/24 2007   Patient Assessment/Suction   Level of Consciousness (AVPU) alert   Respiratory Effort Normal;Unlabored   Expansion/Accessory Muscles/Retractions no use of accessory muscles   All Lung Fields Breath Sounds Anterior:;diminished   Rhythm/Pattern, Respiratory unlabored;pattern regular;depth regular;no shortness of breath reported   Skin Integrity   $ Wound Care Tech Time 15 min   Area Observed Right;Left;Behind ear;Cheek;Upper lip;Nares   Skin Appearance without discoloration   PRE-TX-O2   Device (Oxygen Therapy) nasal cannula with humidification   Flow (L/min) (Oxygen Therapy) 3.5   SpO2 98 %   Pulse Oximetry Type Continuous   $ Pulse Oximetry - Multiple Charge Pulse Oximetry - Multiple   Pulse 65   Resp 14   Aerosol Therapy   $ Aerosol Therapy Charges Aerosol Treatment   Daily Review of Necessity (SVN) completed   Respiratory Treatment Status (SVN) given   Treatment Route (SVN) mask;oxygen   Patient Position HOB elevated   Post Treatment Assessment (SVN) breath sounds improved   Signs of Intolerance (SVN) none   Breath Sounds Post-Respiratory Treatment   Post-treatment Heart Rate (beats/min) 65   Post-treatment Resp Rate (breaths/min) 14   Preset CPAP/BiPAP Settings   Mode Of Delivery Standby  (wants to go on after night meds, will call)   Equipment Type V60   Education   $ Education Bronchodilator;DME Nebulizer;DME Oxygen;DME BiPAP;15 min   Respiratory Evaluation   $ Care Plan Tech Time 15 min

## 2024-06-20 NOTE — TELEPHONE ENCOUNTER
----- Message from Paloma Garcia RN sent at 6/20/2024 11:16 AM CDT -----  Regarding: Hospital follow up  Please call patient with hospital follow up with Dr Maki within a week. Discharged 6/20/24

## 2024-06-20 NOTE — ASSESSMENT & PLAN NOTE
Patient with Paroxysmal (<7 days) atrial fibrillation which is controlled currently with Calcium Channel Blocker. Patient is currently in sinus rhythm.[id number] Score: 4.. Anticoagulation indicated. Anticoagulation done with Eliquis .    We will start the patient on Eliquis, continue aspirin.  Discontinue Plavix  Continue Cardizem 60 mg q.6  Add on metoprolol 25 mg b.i.d.  Wean off Cardizem drip

## 2024-06-20 NOTE — PLAN OF CARE
Problem: COPD (Chronic Obstructive Pulmonary Disease)  Goal: Optimal Chronic Illness Coping  Outcome: Met  Goal: Optimal Level of Functional [place]  Outcome: Met  Goal: Absence of Infection Signs and Symptoms  Outcome: Met  Goal: Improved Oral Intake  Outcome: Met  Goal: Effective Oxygenation and Ventilation  Outcome: Met     Problem: Adult Inpatient Plan of Care  Goal: Plan of Care Review  Outcome: Met  Goal: Patient-Specific Goal (Individualized)  Outcome: Met  Goal: Absence of Hospital-Acquired Illness or Injury  Outcome: Met  Goal: Optimal Comfort and Wellbeing  Outcome: Met  Goal: Readiness for Transition of Care  Outcome: Met     Problem: Diabetes Comorbidity  Goal: Blood Glucose Level Within Targeted Range  Outcome: Met     Problem: Pneumonia  Goal: Fluid Balance  Outcome: Met  Goal: Resolution of Infection Signs and Symptoms  Outcome: Met  Goal: Effective Oxygenation and Ventilation  Outcome: Met     Problem: Wound  Goal: Optimal Coping  Outcome: Met  Goal: Optimal Functional Ability  Outcome: Met  Goal: Absence of Infection Signs and Symptoms  Outcome: Met  Goal: Improved Oral Intake  Outcome: Met  Goal: Optimal Pain Control and Function  Outcome: Met  Goal: Skin Health and Integrity  Outcome: Met  Goal: Optimal Wound Healing  Outcome: Met     Problem: Skin Injury Risk Increased  Goal: Skin Health and Integrity  Outcome: Met

## 2024-06-20 NOTE — DISCHARGE SUMMARY
[facility]  Discharge Summary      Patient Name: Karo Leonard  MRN: [id number]  SERGO: [id number]  Patient Class: IP- Inpatient  Admission Date: 6/14/2024  Hospital Length of Stay: 6 days  Discharge Date and Time: 6/20/2024  2:46 PM  Attending Physician: No att. providers found   Discharging Provider: Elsy Peoples MD  Primary Care Provider: Navneet Hernandez FNP    Primary Care Team: Networked reference to record PCT     HPI:   64y.o. female  with  a past medical history of Coronary artery disease, Diabetes mellitus, and Hypertension., COPD came with SOB.  Patient is ex-smoker and currently on 2 L oxygen for COPD. no history of sick contact and no fever at home.  .She became gradually worsening shortness of breath with cough with past 2 days and extreme fatigue and came to the hospital.  Patient has been using  increased oxygen 5 L at home but still having shortness of breath and called EMS and she was hypoxic  at 84 with 5 L of oxygen at arrival   Denied having chest pain, no leg swelling and patient was given IV Solu-Medrol and nebulization treatments and later started on BIPAP. ABG was done but result not reported .  Medication reconciliation was not done in the ER and not able to reconcile  On examination patient is not in extreme shortness of breath, she is able to talk out of the BiPAP, lung exam with limited air entry.  Patient has leukocytosis but no fever and chest x-ray possible pneumonia and admitted    * No surgery found *      Hospital Course:   64y.o. female  with  a past medical history of Coronary artery disease, Diabetes mellitus, and Hypertension.,  ABDI,  COPD came with SOB. Patient is ex-smoker and currently on 2 L oxygen for COPD.  ABG shows severe compensated CO2 retention.  Patient stated that she has CPAP at home however the machine has been broken for years.  Patient developed AFib with RVR 6/15, started on Cardizem drip and converted to normal sinus  rhythm, started on full-dose Lovenox later converted to Eliquis as per Cardiology recommendation, also discontinue the Plavix, continue aspirin.  Patient received 5 days of Levaquin.  Patient later developed AFib with RVR again converted to normal sinus rhythm again with p.o. Cardizem.  Patient also found to have severe hyperthyroidism, patient was taking Synthroid 150 mcg once a day, so we have hold this medication and recommended repeat TSH free T4 in 2-3 weeks.  CT chest is negative for pulmonary embolism.  Now patient much improved with aggressive diuresis steroids and antibiotics, patient has been -12 L since admission.  Oxygen level down to 2 L which is her baseline.  Patient is educated extensively about using CPAP at home, she says she will get a new CPAP  machine.      Goals of Care Treatment Preferences:  Code Status: Full Code      Consults:   Consults (From admission, onward)          Status Ordering Provider     Inpatient consult to Cardiology  Once        Provider:  Ryne Maki MD    Completed СЕРГЕЙ JUNG            Pulmonary  * COPD exacerbation  Patient's COPD is with exacerbation noted by continued dyspnea and use of accessory muscles for breathing currently.  Patient is currently on COPD Pathway. Continue scheduled inhalers Steroids, Antibiotics, and Supplemental oxygen and monitor respiratory status closely.   Continue BiPAP and admitted to step-down    ABG shows severe acute on chronic CO2 retention, most likely secondary to obesity hypoventilation syndrome versus ABDI versus COPD.  Patient does have CPAP at home however has not used it for years.   We will ask the patient to reuse it.   Improved with IV Lasix,  we will continue IV Lasix and acetazolamide.   Discontinue IV steroids due to severe hyperglycemia.   P.o. prednisone  Out of bed to chair    Pneumonia  Patient has a diagnosis of pneumonia. The cause of the pneumonia is unknown at this time. The pneumonia is stable. The patient has  the following signs/symptoms of pneumonia: persistent hypoxia . The patient does have a current oxygen requirement and the patient does have a home oxygen requirement. I have reviewed the pertinent imaging. The following cultures have been collected: Blood cultures and Sputum culture The culture results are listed below.     Current antimicrobial regimen consists of the antibiotics listed below. Will monitor patient closely and Adjust treatment plan as follows      Antibiotics (From admission, onward)      None            Microbiology Results (last 7 days)       Procedure Component Value Units Date/Time    Blood culture [[id number]] Collected: 06/14/24 0450    Order Status: Completed Specimen: Blood from Peripheral, Hand, Left Updated: 06/19/24 0632     Blood Culture, Routine No growth after 5 days.    Blood culture [[id number]] Collected: 06/14/24 0454    Order Status: Completed Specimen: Blood from Peripheral, Antecubital, Right Updated: 06/19/24 0632     Blood Culture, Routine No growth after 5 days.          Status post Levaquin for 5 days, discontinue it.     Cardiac/Vascular  New onset a-fib  Patient with Paroxysmal (<7 days) atrial fibrillation which is controlled currently with Calcium Channel Blocker. Patient is currently in sinus rhythm.[id number] Score: 4.. Anticoagulation indicated. Anticoagulation done with Eliquis .    We will start the patient on Eliquis, continue aspirin.  Discontinue Plavix  Continue Cardizem 60 mg q.6  Add on metoprolol 25 mg b.i.d.  Wean off Cardizem drip    History of coronary artery stent placement    Aware, denied chest pain  Continue home medication  Reconciliation needs to be done when ready      Hypertension  Chronic, controlled. Latest blood pressure and vitals reviewed-     Temp:  [97.8 °F (36.6 °C)-98 °F (36.7 °C)]   Pulse:  [47-85]   Resp:  [12-27]   BP: (122-162)/(56-81)   SpO2:  [96 %-100 %] .   Home meds for hypertension were reviewed and noted below.   Hypertension  "Medications               amLODIPine (NORVASC) 5 MG tablet Take 5 mg by mouth once daily.    propranoloL (INDERAL LA) 80 MG 24 hr capsule Take 160 mg by mouth once daily.            While in the hospital, will manage blood pressure as follows; Continue home antihypertensive regimen    Will utilize p.r.n. blood pressure medication only if patient's blood pressure greater than 140/90 and she develops symptoms such as worsening chest pain or shortness of breath.    Endocrine  Diabetes  Patient's FSGs are controlled on current medication regimen.  Last A1c reviewed-   Lab Results   Component Value Date    HGBA1C 7.0 (H) 06/14/2024     Most recent fingerstick glucose reviewed- No results for input(s): "POCTGLUCOSE" in the last 24 hours.  Current correctional scale  Low  Maintain anti-hyperglycemic dose as follows-   Antihyperglycemics (From admission, onward)      Start     Stop Route Frequency Ordered    06/19/24 0830  insulin glargine U-100 (Lantus) pen 15 Units         -- SubQ Daily 06/19/24 0827    06/18/24 2100  insulin glargine U-100 (Lantus) pen 30 Units         -- SubQ Nightly 06/18/24 0732    06/15/24 1616  insulin aspart U-100 pen 0-15 Units         -- SubQ Before meals & nightly PRN 06/15/24 1516          Hold Oral hypoglycemics while patient is in the hospital.    Patient has severe uncontrolled hyperglycemia secondary to steroids.  Add on Lantus 15 units q.h.s. plus high-dose sliding scale    Hypothyroid    Home medication  Now patient has drug induced hyperthyroidism, which likely the culprit of AFib with RVR.  Hold Synthroid 150 mcg once a day.  Repeat TSH free T4 in 2-3 weeks, and restart low-dose.    Other  Acute hypoxic on chronic hypercapnic respiratory failure  Patient with Hypercapnic and Hypoxic Respiratory failure which is Acute on chronic.  she is on home oxygen at 2.5 LPM. Supplemental oxygen was provided and noted- Oxygen Concentration (%):  [40] 40    .   Signs/symptoms of respiratory failure " include- tachypnea, increased work of breathing, and respiratory distress. Contributing diagnoses includes - CHF, COPD, and Pneumonia Labs and images were reviewed. Patient Has recent ABG, which has been reviewed. Will treat underlying causes and adjust management of respiratory failure as follows-     Patient apparently has long history of chronic hypoxic and hypercapnic respiratory failure, most likely multifactorial, including CHF, COPD, obesity hypoventilation syndrome, untreated ABDI.   Patient is still quite hypoxic despite aggressive diuresis, steroids, nebulizer treatment.    We will get a CTA of the chest , if still no improvement in a.m. may consult pulmonologist.       Final Active Diagnoses:    Diagnosis Date Noted POA    PRINCIPAL PROBLEM:  COPD exacerbation [J44.1] 06/14/2024 Yes    Acute hypoxic on chronic hypercapnic respiratory failure [J96.01, J96.12] 06/17/2024 Yes    New onset a-fib [I48.91] 06/16/2024 No    History of coronary artery stent placement [Z95.5] 06/14/2024 Not Applicable    Pneumonia [J18.9] 06/14/2024 Yes    Diabetes [E11.9] 06/14/2024 Yes    Hypertension [I10] 08/13/2013 Yes    Hypothyroid [E03.9] 08/13/2013 Yes      Problems Resolved During this Admission:       Discharged Condition: stable    Disposition: Home or Self Care    Follow Up:   Follow-up Information       Navneet Hernandez FNP. Schedule an appointment as soon as possible for a visit in 1 week(s).    Specialty: Family Medicine  Why: Will see you next Tuesday, 6/25/24  Contact information:  [address] [facility] [address] [phone]               Ryne Maki MD. Schedule an appointment as soon as possible for a visit in 1 week(s).    Specialties: Interventional Cardiology, Cardiology  Contact information:  [address] [facility] [address]  [facility] [address] [phone]                           Patient Instructions:   No discharge procedures on file.    Significant Diagnostic Studies: Labs: CMP  "  Recent Labs   Lab 06/18/24  2257 06/19/24  0457 06/20/24  0400   * 135* 133*   K 4.0 3.9 3.9   CL 91* 94* 93*   CO2 36* 37* 36*   * 246* 296*   BUN 21 20 26*   CREATININE 0.7 0.6 0.6   CALCIUM 9.3 8.7 8.6*   ANIONGAP 6* 4* 4*    and CBC No results for input(s): "WBC", "HGB", "HCT", "PLT" in the last 48 hours.    Pending Diagnostic Studies:       None           Medications:  Reconciled Home Medications:      Medication List        START taking these medications      acetaZOLAMIDE 250 MG tablet  Commonly known as: DIAMOX  Take 1 tablet (250 mg total) by mouth 2 (two) times daily.     apixaban 5 mg Tab  Commonly known as: ELIQUIS  Take 1 tablet (5 mg total) by mouth 2 (two) times daily.     diltiaZEM 240 MG 24 hr capsule  Commonly known as: CARDIZEM CD  Take 1 capsule (240 mg total) by mouth once daily.     furosemide 40 MG tablet  Commonly known as: LASIX  Take 1 tablet (40 mg total) by mouth once daily.     metFORMIN 850 MG tablet  Commonly known as: GLUCOPHAGE  Take 850 mg by mouth nightly.     methylPREDNISolone 4 mg tablet  Commonly known as: MEDROL DOSEPACK  use as directed     potassium chloride SA 10 MEQ tablet  Commonly known as: K-DUR,KLOR-CON M  Take 1 tablet (10 mEq total) by mouth once daily.            CONTINUE taking these medications      albuterol 90 mcg/actuation inhaler  Commonly known as: PROVENTIL/VENTOLIN HFA  Inhale 1 puff into the lungs every 4 (four) hours as needed for Wheezing or Shortness of Breath.     albuterol-ipratropium 2.5 mg-0.5 mg/3 mL nebulizer solution  Commonly known as: DUO-NEB  Take 3 mLs by nebulization every 6 (six) hours as needed for Wheezing or Shortness of Breath. Rescue     arnica 20 % Tinc  Apply 1 application topically once daily.     ascorbic acid (vitamin C) 500 MG tablet  Commonly known as: VITAMIN C  Take 500 mg by mouth once daily.     aspirin 81 MG Chew  Take 81 mg by mouth once daily.     atorvastatin 80 MG tablet  Commonly known as: LIPITOR  Take " 80 mg by mouth every evening.     chlorzoxazone 250 MG tablet  Commonly known as: PARAFON FORTE  Take 750 mg by mouth 4 (four) times daily as needed for Muscle spasms.     desonide 0.05 % cream  Commonly known as: DESOWEN  Apply 1 application topically 2 (two) times daily.     estradioL 0.01 % (0.1 mg/gram) vaginal cream  Commonly known as: ESTRACE  Place 1 g vaginally every 7 days. Monday's     ezetimibe 10 mg tablet  Commonly known as: ZETIA  Take 10 mg by mouth once daily.     gabapentin 600 MG tablet  Commonly known as: NEURONTIN  Take 600 mg by mouth 3 (three) times daily.     guaiFENesin 600 mg 12 hr tablet  Commonly known as: MUCINEX  Take 1,200 mg by mouth 2 (two) times daily.     hydrocortisone 0.5 % cream  Apply 1 application topically 2 (two) times daily as needed.     JANUVIA 50 mg Tab  Generic drug: SITagliptin phosphate  Take 50 mg by mouth once daily.     meclizine 25 mg tablet  Commonly known as: ANTIVERT  Take 25 mg by mouth 4 (four) times daily as needed for Dizziness or Nausea.     pantoprazole 40 MG tablet  Commonly known as: PROTONIX  Take 1 tablet (40 mg total) by mouth 2 (two) times daily.     propranoloL 80 MG 24 hr capsule  Commonly known as: INDERAL LA  Take 160 mg by mouth once daily.     tiotropium 18 mcg inhalation capsule  Commonly known as: SPIRIVA  Inhale 1 capsule into the lungs once daily.            STOP taking these medications      amLODIPine 5 MG tablet  Commonly known as: NORVASC     clopidogreL 75 mg tablet  Commonly known as: PLAVIX     fluconazole 150 MG Tab  Commonly known as: DIFLUCAN     HUMIRA(CF) 40 mg/0.4 mL Sykt  Generic drug: adalimumab     LevoxyL 150 mcg tablet  Generic drug: levothyroxine     nystatin 100,000 unit/mL suspension  Commonly known as: MYCOSTATIN     predniSONE 20 MG tablet  Commonly known as: DELTASONE            CTA Chest Non-Coronary (PE Studies)    Result Date: 6/18/2024  CMS MANDATED QUALITY DATA - CT RADIATION - 436 All CT scans at this facility  utilize dose modulation, iterative reconstruction, and/or weight based dosing when appropriate to reduce radiation dose to as low as reasonably achievable. CLINICAL HISTORY: ([id number])63 y/o  (1960) F Pulmonary embolism (PE) suspected, unknown D-dimer; TECHNIQUE: (RADHA#[id number], exam time 6/18/2024 14:56) CTA CHEST NON CORONARY (PE STUDIES) TFG081 Axial CT examination of the chest with attention to the pulmonary arteries was performed using contiguous axial images from the diaphragms to the lung apices following the intravenous administration of 100 cc Omnipaque 350 non-ionic contrast material. Images were reviewed using lung, mediastinal, and bone windows. The study was performed with thin sections with subsequent 3-D reformats/MIP/reconstructions in multiple planes. COMPARISON: None. FINDINGS: CTA PE evaluation: This is a high quality study for the evaluation of pulmonary embolism . The pulmonary arteries are normal in appearance without pulmonary emboli noted up to the segmental level, noting the limitations of CT technique for identifying small or isolated subsegmental emboli. CT Chest: Visualized neck: Normal Lungs: Patchy alveolar opacities within the left upper lobe and lingula with atelectasis in the left lung base. The right upper and middle lobes are clear. There is atelectasis in the right lung base Airway: The trachea and central bronchial tree appear normal. Pleura: There is no pleural effusion. There is no pneumothorax. Cardiovascular: There is moderate cardiomegaly. There are no findings of right heart failure. The pulmonary artery is normal in size. There is a trace pericardial effusion  likely physiologic. The aorta and great vessels are normal in course and caliber  noting atherosclerotic calcifications in the aorta and coronary arteries. Mediastinum: There is no supraclavicular  axillary  mediastinal  or hilar lymphadenopathy. Soft tissues: The peripheral soft tissues appear normal.  Musculoskeletal: There are moderate degenerative changes of the thoracic spine.  There are no suspicious osseous lesions. Esophagus: The esophagus appears grossly normal. Upper Abdomen: Prior cholecystectomy     1. No evidence of pulmonary embolism to the segmental arterial level. If there is continued clinical concern, consider further evaluation with lower extremity doppler. 2. Patchy nodular alveolar opacities in the left upper lobe and lingula suggestive of pneumonia.  There is atelectasis in the lung bases. Moderate cardiomegaly Prior cholecystectomy Electronically signed by: Alyssa Antonio Date:    06/18/2024 Time:    15:01    X-Ray Knee Complete 4 or more Views Left    Result Date: 6/17/2024  EXAMINATION: XR KNEE COMP 4 OR MORE VIEWS LEFT CLINICAL HISTORY: Left knee pain post fall FINDINGS: Four views of the left knee show no acute fracture, dislocation or destructive osseous lesion.  The joint spaces are fairly well preserved, with no significant osteophyte formation or periarticular erosions.  The bones are diffusely osteopenic.     Negative for acute fracture or dislocation. Electronically signed by: Christiano Tirado Date:    06/17/2024 Time:    10:38    Echo    Result Date: 6/16/2024    Left Ventricle: The left ventricle is normal in size. Mildly increased wall thickness. Unable to assess wall motion. There is low normal systolic function with a visually estimated ejection fraction of 50 - 55%.   Right Ventricle: Right ventricle was not well visualized due to poor acoustic window. Systolic function is normal.   Left Atrium: Left atrium is mildly dilated.   Mitral Valve: There is no stenosis. The mean pressure gradient across the mitral valve is 5 mmHg at a heart rate of  bpm.     X-Ray Chest AP Portable    Result Date: 6/14/2024  EXAMINATION: XR CHEST AP PORTABLE CLINICAL HISTORY: CHF; TECHNIQUE: Single frontal view of the chest was performed. COMPARISON: Chest radiograph June 7, 2022 FINDINGS: Single  portable chest view is submitted.  When accounting for position and technique and depth of inspiration, the appearance of the cardiomediastinal silhouette is stable.  Aortic atherosclerotic change noted. There is accentuated attenuation at the lung bases which in part is likely due to overlying soft tissue attenuation, however asymmetric density at the left lung base may relate to a component of small pleural effusion as well as suspected pulmonary infiltrate/airspace disease, peripheral opacity at the mid left hemithorax may relate to pulmonary infiltrate/airspace disease as well.  There may be a component of mild infiltrate at the right base, there is no pleural fluid on the right and bilaterally there is no pneumothorax. The osseous structures demonstrate chronic change.     Basilar opacity on the left may relate to mild pleural effusion with pulmonary infiltrate/airspace disease as well as peripheral pulmonary infiltrate at the mid left hemithorax. Suspected mild infiltrate at the right base as well. Electronically signed by: Topher Rosario Date:    06/14/2024 Time:    05:35  - pulls last radiology orders    Indwelling Lines/Drains at time of discharge:   Lines/Drains/Airways       None                   Time spent on the discharge of patient: 35 minutes         Elsy Peoples MD  Department of Hospital Medicine  [facility]

## 2024-06-20 NOTE — ASSESSMENT & PLAN NOTE
Patient has a diagnosis of pneumonia. The cause of the pneumonia is unknown at this time. The pneumonia is stable. The patient has the following signs/symptoms of pneumonia: persistent hypoxia . The patient does have a current oxygen requirement and the patient does have a home oxygen requirement. I have reviewed the pertinent imaging. The following cultures have been collected: Blood cultures and Sputum culture The culture results are listed below.     Current antimicrobial regimen consists of the antibiotics listed below. Will monitor patient closely and Adjust treatment plan as follows      Antibiotics (From admission, onward)    None          Microbiology Results (last 7 days)     Procedure Component Value Units Date/Time    Blood culture [[id number]] Collected: 06/14/24 0450    Order Status: Completed Specimen: Blood from Peripheral, Hand, Left Updated: 06/19/24 0632     Blood Culture, Routine No growth after 5 days.    Blood culture [[id number]] Collected: 06/14/24 0454    Order Status: Completed Specimen: Blood from Peripheral, Antecubital, Right Updated: 06/19/24 0632     Blood Culture, Routine No growth after 5 days.        Status post Levaquin for 5 days, discontinue it.

## 2024-06-20 NOTE — NURSING
IV dc'd. Tolerated well. DC instructions, follow up apps, and meds reviewed with pt and . Pt had some questions and would like to speak with hospitalist before dc home. MD Peoples notified.

## 2024-06-20 NOTE — CARE UPDATE
06/14/24 2026   Patient Assessment/Suction   Level of Consciousness (AVPU) alert   Respiratory Effort Unlabored   Expansion/Accessory Muscles/Retractions no use of accessory muscles   All Lung Fields Breath Sounds diminished   Rhythm/Pattern, Respiratory no shortness of breath reported   Cough Frequency infrequent   Cough Type no productive sputum   PRE-TX-O2   Device (Oxygen Therapy) nasal cannula with humidification   $ Is the patient on Low Flow Oxygen? Yes   Flow (L/min) (Oxygen Therapy) 5   SpO2 96 %   Pulse Oximetry Type Continuous   $ Pulse Oximetry - Multiple Charge Pulse Oximetry - Multiple   Pulse 77   Resp 15   Positioning   Head of Bed (HOB) Positioning HOB elevated;HOB at 30 degrees   Aerosol Therapy   $ Aerosol Therapy Charges Aerosol Treatment   Daily Review of Necessity (SVN) completed   Respiratory Treatment Status (SVN) given   Treatment Route (SVN) mask   Patient Position semi-Brand's   Post Treatment Assessment (SVN) breath sounds improved   Signs of Intolerance (SVN) none   Breath Sounds Post-Respiratory Treatment   Throughout All Fields Post-Treatment All Fields   Throughout All Fields Post-Treatment aeration increased   Post-treatment Heart Rate (beats/min) 66   Post-treatment Resp Rate (breaths/min) 18   Education   $ Education Bronchodilator;15 min       
   06/15/24 0728   Patient Assessment/Suction   Level of Consciousness (AVPU) alert   Respiratory Effort Unlabored   Expansion/Accessory Muscles/Retractions no use of accessory muscles   All Lung Fields Breath Sounds diminished   Rhythm/Pattern, Respiratory depth regular;pattern regular   Cough Frequency no cough   Skin Integrity   $ Wound Care Tech Time 15 min   Area Observed Bridge of nose   Skin Appearance without discoloration   PRE-TX-O2   Device (Oxygen Therapy) Oxymask   $ Is the patient on Low Flow Oxygen? Yes   Flow (L/min) (Oxygen Therapy) 4   SpO2 99 %   Pulse Oximetry Type Continuous   $ Pulse Oximetry - Multiple Charge Pulse Oximetry - Multiple   Pulse 71   Resp 16   Aerosol Therapy   $ Aerosol Therapy Charges Aerosol Treatment   Daily Review of Necessity (SVN) completed   Respiratory Treatment Status (SVN) given   Treatment Route (SVN) mask   Patient Position semi-Brand's   Post Treatment Assessment (SVN) increased aeration   Signs of Intolerance (SVN) none   Breath Sounds Post-Respiratory Treatment   Throughout All Fields Post-Treatment aeration increased   Post-treatment Heart Rate (beats/min) 66   Post-treatment Resp Rate (breaths/min) 15   Preset CPAP/BiPAP Settings   $ CPAP/BiPAP Daily Charge BiPAP/CPAP Daily   Education   $ Education Bronchodilator;15 min   Respiratory Evaluation   $ Care Plan Tech Time 15 min       
   06/15/24 2031   Patient Assessment/Suction   Level of Consciousness (AVPU) alert   Respiratory Effort Unlabored   Expansion/Accessory Muscles/Retractions no use of accessory muscles   All Lung Fields Breath Sounds diminished   Rhythm/Pattern, Respiratory no shortness of breath reported   Cough Frequency infrequent   Cough Type no productive sputum   PRE-TX-O2   Device (Oxygen Therapy) nasal cannula with humidification   $ Is the patient on Low Flow Oxygen? Yes   Flow (L/min) (Oxygen Therapy) 5   SpO2 96 %   Pulse Oximetry Type Continuous   $ Pulse Oximetry - Multiple Charge Pulse Oximetry - Multiple   Pulse (!) 133   Resp 15   BP (!) 108/57   Positioning   Head of Bed (HOB) Positioning HOB elevated;HOB at 30 degrees   Aerosol Therapy   $ Aerosol Therapy Charges Aerosol Treatment   Daily Review of Necessity (SVN) completed   Respiratory Treatment Status (SVN) given   Treatment Route (SVN) mask   Patient Position semi-Brand's   Post Treatment Assessment (SVN) breath sounds improved   Signs of Intolerance (SVN) none   Education   $ Education Bronchodilator;15 min       
   06/16/24 0738   Patient Assessment/Suction   Level of Consciousness (AVPU) alert   Respiratory Effort Unlabored   Expansion/Accessory Muscles/Retractions no use of accessory muscles   All Lung Fields Breath Sounds diminished   Rhythm/Pattern, Respiratory depth regular;pattern regular   Cough Frequency no cough   Skin Integrity   $ Wound Care Tech Time 15 min   Area Observed Bridge of nose   Skin Appearance without discoloration   PRE-TX-O2   Device (Oxygen Therapy) nasal cannula with humidification   $ Is the patient on Low Flow Oxygen? Yes   Oxygen Concentration (%) 45   SpO2 (!) 91 %   Pulse Oximetry Type Continuous   $ Pulse Oximetry - Multiple Charge Pulse Oximetry - Multiple   Pulse 61   Resp 16   Aerosol Therapy   $ Aerosol Therapy Charges Aerosol Treatment   Daily Review of Necessity (SVN) completed   Respiratory Treatment Status (SVN) given   Treatment Route (SVN) mask   Patient Position semi-Brand's   Post Treatment Assessment (SVN) increased aeration   Signs of Intolerance (SVN) none   Breath Sounds Post-Respiratory Treatment   Throughout All Fields Post-Treatment aeration increased   Post-treatment Heart Rate (beats/min) 57   Post-treatment Resp Rate (breaths/min) 15   Ready to Wean/Extubation Screen   FIO2<=50 (chart decimal) 0.45   Preset CPAP/BiPAP Settings   $ CPAP/BiPAP Daily Charge BiPAP/CPAP Daily   Education   $ Education Bronchodilator;15 min       
   06/16/24 1930   Patient Assessment/Suction   Level of Consciousness (AVPU) alert   Respiratory Effort Unlabored   Expansion/Accessory Muscles/Retractions no use of accessory muscles   All Lung Fields Breath Sounds diminished;clear   Rhythm/Pattern, Respiratory no shortness of breath reported   Cough Frequency infrequent   Cough Type no productive sputum   PRE-TX-O2   Device (Oxygen Therapy) nasal cannula with humidification   $ Is the patient on Low Flow Oxygen? Yes   Flow (L/min) (Oxygen Therapy) 5   SpO2 99 %   Pulse Oximetry Type Continuous   $ Pulse Oximetry - Multiple Charge Pulse Oximetry - Multiple   Pulse 70   Resp 15   Positioning   Head of Bed (HOB) Positioning HOB elevated;HOB at 30 degrees   Aerosol Therapy   $ Aerosol Therapy Charges Aerosol Treatment   Daily Review of Necessity (SVN) completed   Respiratory Treatment Status (SVN) given   Treatment Route (SVN) mask   Patient Position semi-Brand's   Post Treatment Assessment (SVN) breath sounds improved   Signs of Intolerance (SVN) none   Education   $ Education BiPAP;15 min       
   06/17/24 0756   Patient Assessment/Suction   Level of Consciousness (AVPU) alert   Respiratory Effort Normal;Unlabored   Expansion/Accessory Muscles/Retractions no retractions;no use of accessory muscles   All Lung Fields Breath Sounds diminished;clear   Rhythm/Pattern, Respiratory no shortness of breath reported;pattern regular;unlabored   Cough Frequency infrequent   Cough Type nonproductive;no productive sputum   Skin Integrity   $ Wound Care Tech Time 15 min   Area Observed Left;Right;Behind ear;Cheek;Upper lip;Nares   Skin Appearance without discoloration   PRE-TX-O2   Device (Oxygen Therapy) nasal cannula with humidification   $ Is the patient on Low Flow Oxygen? Yes   Flow (L/min) (Oxygen Therapy) 3   SpO2 100 %   Pulse Oximetry Type Continuous   $ Pulse Oximetry - Multiple Charge Pulse Oximetry - Multiple   Pulse 65   Resp 18   /63   Aerosol Therapy   $ Aerosol Therapy Charges Aerosol Treatment   Daily Review of Necessity (SVN) completed   Respiratory Treatment Status (SVN) given   Treatment Route (SVN) mask   Patient Position semi-Brand's;side-lying, left   Post Treatment Assessment (SVN) breath sounds unchanged   Signs of Intolerance (SVN) none   Breath Sounds Post-Respiratory Treatment   Throughout All Fields Post-Treatment All Fields   Throughout All Fields Post-Treatment no change   Post-treatment Heart Rate (beats/min) 67   Post-treatment Resp Rate (breaths/min) 18   Preset CPAP/BiPAP Settings   Mode Of Delivery BiPAP S/T;Standby   $ CPAP/BiPAP Daily Charge BiPAP/CPAP Daily   Education   $ Education Bronchodilator;BiPAP;15 min       
   06/17/24 1942   Patient Assessment/Suction   Level of Consciousness (AVPU) alert   Respiratory Effort Normal   Expansion/Accessory Muscles/Retractions no use of accessory muscles   All Lung Fields Breath Sounds clear;diminished   Rhythm/Pattern, Respiratory unlabored   Cough Frequency no cough   Skin Integrity   $ Wound Care Tech Time 15 min   Area Observed Left;Right;Bridge of nose;Nares   Skin Appearance without discoloration   PRE-TX-O2   Device (Oxygen Therapy) high flow nasal cannula   $ Is the patient on High Flow Oxygen? Yes   Flow (L/min) (Oxygen Therapy) 4   SpO2 (!) 93 %   Pulse Oximetry Type Continuous   $ Pulse Oximetry - Multiple Charge Pulse Oximetry - Multiple   Pulse 63   Resp 19   Temp 97.8 °F (36.6 °C)   BP (!) 124/58   Aerosol Therapy   $ Aerosol Therapy Charges Aerosol Treatment   Daily Review of Necessity (SVN) completed   Respiratory Treatment Status (SVN) given   Treatment Route (SVN) mask   Patient Position semi-Brand's   Post Treatment Assessment (SVN) breath sounds unchanged;vital signs unchanged   Signs of Intolerance (SVN) none   Preset CPAP/BiPAP Settings   Mode Of Delivery BiPAP;Standby   Education   $ Education Bronchodilator;BiPAP;15 min   Respiratory Evaluation   $ Care Plan Tech Time 15 min       
   06/19/24 0737   Patient Assessment/Suction   Level of Consciousness (AVPU) alert   Respiratory Effort Normal;Unlabored   Expansion/Accessory Muscles/Retractions expansion symmetric;no retractions;no use of accessory muscles   All Lung Fields Breath Sounds Anterior:;Lateral:;diminished   Rhythm/Pattern, Respiratory pattern regular;unlabored   Skin Integrity   $ Wound Care Tech Time 15 min   Area Observed Bridge of nose   Skin Appearance without discoloration   PRE-TX-O2   Device (Oxygen Therapy) CPAP   $ Is the patient on High Flow Oxygen? Yes   Oxygen Concentration (%) 40   SpO2 100 %   Pulse Oximetry Type Continuous   $ Pulse Oximetry - Multiple Charge Pulse Oximetry - Multiple   Pulse 73   Resp 16   Aerosol Therapy   $ Aerosol Therapy Charges Aerosol Treatment   Daily Review of Necessity (SVN) completed   Respiratory Treatment Status (SVN) given   Treatment Route (SVN) oxygen;in-line   Patient Position HOB elevated   Post Treatment Assessment (SVN) breath sounds improved   Signs of Intolerance (SVN) none   Ready to Wean/Extubation Screen   FIO2<=50 (chart decimal) 0.4   Preset CPAP/BiPAP Settings   Mode Of Delivery CPAP   $ CPAP/BiPAP Daily Charge BiPAP/CPAP Daily   $ Is patient using? Yes   Size of Mask Small/Medium   Sized Appropriately? Yes   Equipment Type V60   Airway Device Type medium full face mask   CPAP (cm H2O) 10   ITime (sec) 1   Rise Time (sec) 3   Patient CPAP/BiPAP Settings   CPAP/BIPAP ID 16   Timed Inspiration (Sec) 1   IPAP Rise Time (sec) 3   RR Total (Breaths/Min) 16   Tidal Volume (mL) 411   VE Minute Ventilation (L/min) 7.1 L/min   Peak Inspiratory Pressure (cm H2O) 10   TiTOT (%) 24   Total Leak (L/Min) 10   Patient Trigger - ST Mode Only (%) 100   CPAP/BiPAP Alarms   High Pressure (cm H2O) 40   Low Pressure (cm H2O) 5   High RR (breaths/min) 50   Low RR (breaths/min) 8   Apnea (Sec) 20   Education   $ Education Bronchodilator;15 min       
   06/1958   Patient Assessment/Suction   Level of Consciousness (AVPU) alert   Respiratory Effort Normal   Expansion/Accessory Muscles/Retractions no use of accessory muscles   All Lung Fields Breath Sounds diminished   Rhythm/Pattern, Respiratory unlabored   Cough Frequency no cough   Skin Integrity   $ Wound Care Tech Time 15 min   Area Observed Bridge of nose   Skin Appearance without discoloration   PRE-TX-O2   Device (Oxygen Therapy) high flow nasal cannula   $ Is the patient on High Flow Oxygen? Yes   Flow (L/min) (Oxygen Therapy) 3   SpO2 97 %   Pulse Oximetry Type Continuous   $ Pulse Oximetry - Multiple Charge Pulse Oximetry - Multiple   Pulse (!) 125   Resp 19   Aerosol Therapy   $ Aerosol Therapy Charges Aerosol Treatment   Daily Review of Necessity (SVN) completed   Respiratory Treatment Status (SVN) given   Treatment Route (SVN) mask   Patient Position HOB elevated   Post Treatment Assessment (SVN) breath sounds unchanged;vital signs unchanged   Signs of Intolerance (SVN) none   Preset CPAP/BiPAP Settings   Mode Of Delivery CPAP;Standby   Education   $ Education 15 min;Bronchodilator   Respiratory Evaluation   $ Care Plan Tech Time 15 min       
   06/20/24 0713   Patient Assessment/Suction   Level of Consciousness (AVPU) alert   Respiratory Effort Normal;Unlabored   Expansion/Accessory Muscles/Retractions expansion symmetric;no retractions;no use of accessory muscles   All Lung Fields Breath Sounds Anterior:;Lateral:;diminished   Rhythm/Pattern, Respiratory pattern regular;unlabored   Skin Integrity   $ Wound Care Tech Time 15 min   Area Observed Left;Right;Bridge of nose   Skin Appearance without discoloration   PRE-TX-O2   Device (Oxygen Therapy) CPAP   $ Is the patient on High Flow Oxygen? Yes   Oxygen Concentration (%) 40   SpO2 100 %   Pulse Oximetry Type Continuous   $ Pulse Oximetry - Multiple Charge Pulse Oximetry - Multiple   Pulse (!) 51   Resp 14   Aerosol Therapy   $ Aerosol Therapy Charges Aerosol Treatment   Daily Review of Necessity (SVN) completed   Respiratory Treatment Status (SVN) given   Treatment Route (SVN) oxygen;in-line   Patient Position HOB elevated   Post Treatment Assessment (SVN) breath sounds improved   Signs of Intolerance (SVN) none   Ready to Wean/Extubation Screen   FIO2<=50 (chart decimal) 0.4   Preset CPAP/BiPAP Settings   Mode Of Delivery CPAP   $ CPAP/BiPAP Daily Charge BiPAP/CPAP Daily   $ Is patient using? Yes   Size of Mask Small/Medium   Sized Appropriately? Yes   Equipment Type V60   Airway Device Type medium full face mask   CPAP (cm H2O) 10   ITime (sec) 1   Rise Time (sec) 3   Patient CPAP/BiPAP Settings   CPAP/BIPAP ID 16   Timed Inspiration (Sec) 1   IPAP Rise Time (sec) 3   RR Total (Breaths/Min) 14   Tidal Volume (mL) 723   VE Minute Ventilation (L/min) 11.4 L/min   Peak Inspiratory Pressure (cm H2O) 10   TiTOT (%) 25   Total Leak (L/Min) 40   Patient Trigger - ST Mode Only (%) 100   CPAP/BiPAP Alarms   High Pressure (cm H2O) 40   Low Pressure (cm H2O) 5   High RR (breaths/min) 50   Low RR (breaths/min) 8   Apnea (Sec) 20   Education   $ Education Bronchodilator;15 min       
How Severe Is Your Rash?: mild
Is This A New Presentation, Or A Follow-Up?: Rash

## 2024-06-21 ENCOUNTER — TELEPHONE (OUTPATIENT)
Dept: CARDIOLOGY | Facility: CLINIC | Age: 64
End: 2024-06-21

## 2024-06-21 ENCOUNTER — NURSE TRIAGE (OUTPATIENT)
Dept: ADMINISTRATIVE | Facility: CLINIC | Age: 64
End: 2024-06-21

## 2024-06-21 NOTE — TELEPHONE ENCOUNTER
----- Message from Darrick Tong sent at 6/21/2024  2:11 PM CDT -----  Type:  Sooner Apoointment Request    Caller is requesting a sooner appointment.  Caller declined first available appointment listed below.  Caller will not accept being placed on the waitlist and is requesting a message be sent to doctor.  Name of Caller:PT  When is the first available appointment?7/11  Symptoms:HOSP F/U- AFIB  Would the patient rather a call back or a response via [facility]? CALL  Best Call Back Number: [phone]  Additional Information: Pt states she would like to get in as early next week in the morning if possible. Thank you

## 2024-06-21 NOTE — TELEPHONE ENCOUNTER
[facility] RN Patient calling with question with medication list.   Humira she takes for skin condition.   When she went to ED was told to stop humira on discharge.  Care advise  callback by PCP Today.  Denies sob now.  For any symptoms  callback [facility] RN,  Just got dgx with Afib,  denies sob since being in the hospital.    Reason for Disposition   Caller has NON-URGENT medicine question about med that PCP or specialist prescribed and triager unable to answer question    Additional Information   Negative: Intentional drug overdose and suicidal thoughts or ideas   Negative: Drug overdose and triager unable to answer question   Negative: Caller requesting a renewal or refill of a medicine patient is currently taking   Negative: Caller requesting information unrelated to medicine   Negative: Caller requesting information about COVID-19 Vaccine   Negative: Caller requesting information about Emergency Contraception   Negative: Caller requesting information about Combined Birth Control Pills   Negative: Caller requesting information about Progestin Birth Control Pills   Negative: Caller requesting information about Post-Op pain or medicines   Negative: Caller requesting a prescription antibiotic (such as penicillin) for Strep throat and has a positive culture result   Negative: Caller requesting a prescription anti-viral med (such as Tamiflu) and has influenza (flu) symptoms   Negative: Immunization reaction suspected   Negative: Rash while taking a medicine or within 3 days of stopping it   Negative: Asthma and having symptoms of asthma (cough, wheezing, etc.)   Negative: Symptom of illness (e.g., headache, abdominal pain, earache, vomiting) that are more than mild   Negative: Breastfeeding questions about mother's medicines and diet   Negative: MORE THAN A DOUBLE DOSE of a prescription or over-the-counter (OTC) drug   Negative: DOUBLE DOSE (an extra dose or lesser amount) of prescription drug and any symptoms (e.g., dizziness,  nausea, pain, sleepiness)   Negative: DOUBLE DOSE (an extra dose or lesser amount) of over-the-counter (OTC) drug and any symptoms (e.g., dizziness, nausea, pain, sleepiness)   Negative: Took another person's prescription drug   Negative: DOUBLE DOSE (an extra dose or lesser amount) of prescription drug and NO symptoms  (Exception: A double dose of antibiotics.)   Negative: Diabetes drug error or overdose (e.g., took wrong type of insulin or took extra dose)   Negative: Caller has medication question about med NOT prescribed by PCP and triager unable to answer question (e.g., compatibility with other med, storage)   Negative: Prescription not at pharmacy and was prescribed by PCP recently  (Exception: triager has access to EMR and prescription is recorded there. Go to Home Care and confirm for pharmacy.)   Negative: Pharmacy calling with prescription question and triager unable to answer question   Negative: Caller has URGENT medicine question about med that PCP or specialist prescribed and triager unable to answer question    Protocols used: Medication Question Call-A-[place]

## 2024-06-22 LAB
OHS QRS DURATION: 84 MS
OHS QRS DURATION: 86 MS
OHS QRS DURATION: 88 MS
OHS QRS DURATION: 88 MS
OHS QRS DURATION: 90 MS
OHS QRS DURATION: 96 MS
OHS QTC CALCULATION: 399 MS
OHS QTC CALCULATION: 403 MS
OHS QTC CALCULATION: 428 MS
OHS QTC CALCULATION: 436 MS
OHS QTC CALCULATION: 445 MS
OHS QTC CALCULATION: 456 MS

## 2024-06-25 ENCOUNTER — PATIENT OUTREACH (OUTPATIENT)
Dept: ADMINISTRATIVE | Facility: CLINIC | Age: 64
End: 2024-06-25

## 2024-06-25 NOTE — PROGRESS NOTES
C3 nurse attempted to contact Karo Leonard for a TCC post hospital discharge follow up call. No answer.   Per AVS the pt. Has a scheduled HOSPFU with [facility] PCP Navneet Hernandez on 06/25/24.  The pt. does not have an [facility] PCP.      Per AVS the pt. Has a scheduled HOSPFU with [facility] PCP Navneet Hernandez on 06/25/24.

## 2024-06-26 NOTE — PROGRESS NOTES
C3 nurse spoke with Karo Leonard for a TCC post hospital discharge follow up call. Per AVS and per pts. Statement.  She had a scheduled HOSPFU with [facility] PCP Navneet Hernandez on 06/25/24.

## 2024-06-26 NOTE — PROGRESS NOTES
2nd attempt for C3 nurse attempted to contact Karo Leonard's  [place] for a TCC post hospital discharge follow up call. The patient is unable to conduct the call @ this time. The patient requested a callback.

## 2024-07-09 ENCOUNTER — TELEPHONE (OUTPATIENT)
Dept: PULMONOLOGY | Facility: CLINIC | Age: 64
End: 2024-07-09
Payer: MEDICARE

## 2024-07-09 ENCOUNTER — OFFICE VISIT (OUTPATIENT)
Dept: CARDIOLOGY | Facility: CLINIC | Age: 64
End: 2024-07-09
Payer: MEDICARE

## 2024-07-09 DIAGNOSIS — I48.0 PAROXYSMAL ATRIAL FIBRILLATION: Primary | ICD-10-CM

## 2024-07-09 DIAGNOSIS — E66.01 SEVERE OBESITY (BMI 35.0-39.9) WITH COMORBIDITY: ICD-10-CM

## 2024-07-09 DIAGNOSIS — L73.2 HIDRADENITIS SUPPURATIVA: ICD-10-CM

## 2024-07-09 DIAGNOSIS — D64.9 ANEMIA, UNSPECIFIED TYPE: ICD-10-CM

## 2024-07-09 DIAGNOSIS — J44.9 COPD MIXED TYPE: ICD-10-CM

## 2024-07-09 DIAGNOSIS — R93.3 ABNORMAL FINDINGS ON DIAGNOSTIC IMAGING OF OTHER PARTS OF DIGESTIVE TRACT: ICD-10-CM

## 2024-07-09 DIAGNOSIS — E11.9 DIABETES MELLITUS WITHOUT COMPLICATION: ICD-10-CM

## 2024-07-09 DIAGNOSIS — G47.30 SLEEP APNEA, UNSPECIFIED TYPE: ICD-10-CM

## 2024-07-09 DIAGNOSIS — G47.34 OXYGEN DESATURATION DURING SLEEP: ICD-10-CM

## 2024-07-09 DIAGNOSIS — I50.9 CONGESTIVE HEART FAILURE, UNSPECIFIED HF CHRONICITY, UNSPECIFIED HEART FAILURE TYPE: ICD-10-CM

## 2024-07-09 PROCEDURE — 99214 OFFICE O/P EST MOD 30 MIN: CPT | Mod: PBBFAC,PN,25 | Performed by: GENERAL PRACTICE

## 2024-07-09 PROCEDURE — 99999 PR PBB SHADOW E&M-EST. PATIENT-LVL IV: CPT | Mod: PBBFAC,,, | Performed by: GENERAL PRACTICE

## 2024-07-09 PROCEDURE — 99214 OFFICE O/P EST MOD 30 MIN: CPT | Mod: S$PBB,,, | Performed by: GENERAL PRACTICE

## 2024-07-09 PROCEDURE — 93005 ELECTROCARDIOGRAM TRACING: CPT | Mod: PBBFAC,PN | Performed by: GENERAL PRACTICE

## 2024-07-09 RX ORDER — FUROSEMIDE 20 MG/1
20 TABLET ORAL DAILY
Qty: 90 TABLET | Refills: 0 | Status: SHIPPED | OUTPATIENT
Start: 2024-07-09 | End: 2024-10-07

## 2024-07-09 NOTE — TELEPHONE ENCOUNTER
Ilda monreal from Dr Bruner's office requesting if we can work with getting pt seen earlier in the day since her car doesn't have ac     lmom that I could get her seen by an NP earlier in the day, if she was ok with this to please call me back

## 2024-07-09 NOTE — PROGRESS NOTES
Subjective:    Patient ID:  Karo Leonard is a 64 y.o. female who presents for follow-up of   Chief Complaint   Patient presents with    Hospital Follow Up       HPI:  SHE COMES IN TODAY FOR FOLLOW-UP AFTER HOSPITALIZATION PNEUMONIA she atrial fibrillation in the hospital which converted with Cardizem she was placed on Eliquis.  And she is given some diuretics for heart failure.  She had a sleep apnea but the CPAP was not working is now wearing his CPAP he has had no more problems with irregular heartbeat or fast heartbeats.  He has had no problem with edema a does not feel like she needs so much diuretic per have a low TSH and her thyroid is being held her glucose is been elevated in the 300s.  Tolerate Jardiance in the past secondary to yeast infection she had a stress test in 2020 2-for ischemia she has a history of a heart attack in stent.  She is on Eliquis and a baby aspirin.  She is on Inderal LA and Cardizem .  He has a little swelling in her lower extremities she is sitting in her wheelchair most of the day.  EKG today reveals normal sinus rhythm nonspecific T-wave abnormality    6/14/24  ld F with a PMHC of DM and essential HTn who is on home O2 for an unkwon reason came tot he Ed because Sunday she got out of her house for the first time since [place] to meet her new [employer] and was then SOB having to use her O2 24/7 the days following. She normally is not dependent on her home O2. She is currently requiring 3L oxymask. She does have a hx of PCI to the L circ in 2018, but states she was asymptomatic prior to PCI. Her BNP and trop are both negative. Echo with preserved EF and grade I diastolic dysfunction. CXR clear.       Per H and P:  62-year-old  female presents to emergency room with exertional dyspnea     The patient states about 3 days ago she had onset of shortness of breath with exertion.  The shortness of breath also occurred with rest over the past 24 hours.  This concerned her  so she came to the emergency room for further evaluation     The patient states she had exertional dyspnea and chest discomfort in 2017 and this is similar to the symptom she had right before she had her cardiac stent placed     The patient also complaints occasional mid sternum chest pain with radiation to her back this no associated nausea vomiting or diaphoresis with the chest discomfort.  She denied any hematemesis hemoptysis black or bloody stools lightheadedness dizziness or syncope she describes her symptoms as moderate to severe severity with no exacerbating or alleviating factors     Past medical history significant for coronary artery disease status post cardiac stent in 2017 to her circumflex.  She did have some other areas of vessel disease in her heart at that time see below, obesity, hypertension hyperlipidemia type 2 diabetes     In the emergency room the patient was found have leukocytosis and was given doxycycline.  In the emergency room her procalcitonin level came back within normal limits and her repeat CBC wbc's were within normal limits so the doxycycline was held     Her 1st set of cardiac enzymes within normal limits her H&H were within normal limits     She will be admitted for cardiac workup.  A CT of the chest was held secondary to the limited availability   Review of patient's allergies indicates:   Allergen Reactions    Pcn [penicillins] Anaphylaxis    Adhesive     Floxacillin     Keflex [cephalexin]     Sulfa (sulfonamide antibiotics)     Zithromax [azithromycin]     Zofran [ondansetron hcl (pf)]      Dizzy vomiting         Past Medical History:   Diagnosis Date    Coronary artery disease 2017    cardiac stent    DDD (degenerative disc disease), lumbar     Diabetes mellitus     Hypertension     Thyroid disease      Past Surgical History:   Procedure Laterality Date     SECTION  08/1987    x2 1993    CORONARY STENT PLACEMENT  2017    EXPLORATORY  LAPAROTOMY      [place]     HEMORRHOID SURGERY      LAPAROSCOPIC CHOLECYSTECTOMY N/A 2022    Procedure: CHOLECYSTECTOMY, LAPAROSCOPIC;  Surgeon: Leonardo Wilson III, MD;  Location: [facility];  Service: General;  Laterality: N/A;    LUMBAR DISC SURGERY      PILONIDAL CYST DRAINAGE      TONSILLECTOMY      as a child (also adenoids)     Social History     Tobacco Use    Smoking status: Former     Current packs/day: 0.00     Average packs/day: 0.5 packs/day for 40.0 years (20.0 ttl pk-yrs)     Types: Cigarettes     Start date: 1982     Quit date: 2022     Years since quittin.1    Smokeless tobacco: Never    Tobacco comments:     1 year ago   Substance Use Topics    Alcohol use: No    Drug use: No     No family history on file.     Review of Systems:   Constitution: Negative for diaphoresis and fever.   HEENT: Negative for nosebleeds.    Cardiovascular: Negative for chest pain       No dyspnea on exertion       No leg swelling        No palpitations  Respiratory: Negative for shortness of breath and wheezing.    Hematologic/Lymphatic: Negative for bleeding problem. Does not bruise/bleed easily.   Skin: Negative for color change and rash.   Musculoskeletal: Negative for falls and myalgias.   Gastrointestinal: Negative for hematemesis and hematochezia.   Genitourinary: Negative for hematuria.   Neurological: Negative for dizziness and light-headedness.   Psychiatric/Behavioral: Negative for altered mental status and memory loss.          Objective:        There were no vitals filed for this visit.    Lab Results   Component Value Date    WBC 12.08 2024    HGB 10.7 (L) 2024    HCT 35.8 (L) 2024     2024    ALT 9 (L) 2024    AST 11 2024     (L) 2024    K 3.9 2024    CL 93 (L) 2024    CREATININE 0.6 2024    BUN 26 (H) 2024    CO2 36 (H) 2024    TSH 0.096 (L) 2024    HGBA1C 7.0 (H) 2024         ECHOCARDIOGRAM RESULTS  Results for orders placed during the hospital encounter of 06/14/24    Echo    Interpretation Summary    Left Ventricle: The left ventricle is normal in size. Mildly increased wall thickness. Unable to assess wall motion. There is low normal systolic function with a visually estimated ejection fraction of 50 - 55%.    Right Ventricle: Right ventricle was not well visualized due to poor acoustic window. Systolic function is normal.    Left Atrium: Left atrium is mildly dilated.    Mitral Valve: There is no stenosis. The mean pressure gradient across the mitral valve is 5 mmHg at a heart rate of  bpm.        CURRENT/PREVIOUS VISIT EKG  Results for orders placed or performed during the hospital encounter of 06/14/24   EKG 12-lead    Collection Time: 06/18/24  8:20 PM   Result Value Ref Range    QRS Duration 86 ms    [facility] QTC Calculation 445 ms    Narrative    Test Reason : I48.91,    Vent. Rate : 102 BPM     Atrial Rate : 100 BPM     P-R Int : 000 ms          QRS Dur : 086 ms      QT Int : 342 ms       P-R-T Axes : 000 019 027 degrees     QTc Int : 445 ms    Atrial fibrillation with rapid ventricular response  Abnormal ECG  When compared with ECG of 17-JUN-2024 13:22,  Atrial fibrillation has replaced Sinus rhythm  Confirmed by Silvio Baca MD ([id number]) on 6/22/2024 4:10:23 PM    Referred By: [email or web address]   SELF           Confirmed By:Silvio Baca MD     No valid procedures specified.   Results for orders placed during the hospital encounter of 06/07/22    Nuclear Stress Test    Interpretation Summary    The nuclear portion of this study will be reported separately.    The EKG portion of this study is negative for ischemia.    The patient reported no chest pain during the stress test.    There were no arrhythmias during stress.      Physical Exam:  CONSTITUTIONAL: No fever, no chills  HEENT: Normocephalic, atraumatic,pupils reactive to light                 NECK:  No JVD no carotid  bruit  CVS: S1S2+, RRR, no murmurs,   LUNGS: Clear  ABDOMEN: Soft, NT, BS+  EXTREMITIES: No cyanosis, edema  : No shah catheter  NEURO: AAO X 3  PSY: Normal affect      Medication List with Changes/Refills   Current Medications    ALBUTEROL (PROVENTIL/VENTOLIN HFA) 90 MCG/ACTUATION INHALER    Inhale 1 puff into the lungs every 4 (four) hours as needed for Wheezing or Shortness of Breath.    ALBUTEROL-IPRATROPIUM (DUO-NEB) 2.5 MG-0.5 MG/3 ML NEBULIZER SOLUTION    Take 3 mLs by nebulization every 6 (six) hours as needed for Wheezing or Shortness of Breath. Rescue    APIXABAN (ELIQUIS) 5 MG TAB    Take 1 tablet (5 mg total) by mouth 2 (two) times daily.    ARNICA 20 % TINC    Apply 1 application topically once daily.    ASCORBIC ACID, VITAMIN C, (VITAMIN C) 500 MG TABLET    Take 500 mg by mouth once daily.    ASPIRIN 81 MG CHEW    Take 81 mg by mouth once daily.    ATORVASTATIN (LIPITOR) 80 MG TABLET    Take 80 mg by mouth every evening.    CHLORZOXAZONE (PARAFON FORTE) 250 MG TABLET    Take 750 mg by mouth 4 (four) times daily as needed for Muscle spasms.    DESONIDE (DESOWEN) 0.05 % CREAM    Apply 1 application topically 2 (two) times daily.    DILTIAZEM (CARDIZEM CD) 240 MG 24 HR CAPSULE    Take 1 capsule (240 mg total) by mouth once daily.    ESTRADIOL (ESTRACE) 0.01 % (0.1 MG/GRAM) VAGINAL CREAM    Place 1 g vaginally every 7 days. Monday's    EZETIMIBE (ZETIA) 10 MG TABLET    Take 10 mg by mouth once daily.    GABAPENTIN (NEURONTIN) 600 MG TABLET    Take 600 mg by mouth 3 (three) times daily.    GUAIFENESIN (MUCINEX) 600 MG 12 HR TABLET    Take 1,200 mg by mouth 2 (two) times daily.    HYDROCORTISONE 0.5 % CREAM    Apply 1 application topically 2 (two) times daily as needed.    JANUVIA 50 MG TAB    Take 50 mg by mouth once daily.    MECLIZINE (ANTIVERT) 25 MG TABLET    Take 25 mg by mouth 4 (four) times daily as needed for Dizziness or Nausea.    PANTOPRAZOLE (PROTONIX) 40 MG TABLET    Take 1 tablet (40 mg  total) by mouth 2 (two) times daily.    POTASSIUM CHLORIDE SA (K-DUR,KLOR-CON M) 10 MEQ TABLET    Take 1 tablet (10 mEq total) by mouth once daily.    PROPRANOLOL (INDERAL LA) 80 MG 24 HR CAPSULE    Take 160 mg by mouth once daily.    TIOTROPIUM (SPIRIVA) 18 MCG INHALATION CAPSULE    Inhale 1 capsule into the lungs once daily.   Changed and/or Refilled Medications    Modified Medication Previous Medication    FUROSEMIDE (LASIX) 20 MG TABLET furosemide (LASIX) 40 MG tablet       Take 1 tablet (20 mg total) by mouth once daily.    Take 1 tablet (40 mg total) by mouth once daily.   Discontinued Medications    ACETAZOLAMIDE (DIAMOX) 250 MG TABLET    Take 1 tablet (250 mg total) by mouth 2 (two) times daily.    METFORMIN (GLUCOPHAGE) 850 MG TABLET    Take 850 mg by mouth nightly.    METHYLPREDNISOLONE (MEDROL DOSEPACK) 4 MG TABLET    use as directed             Assessment:       1. Paroxysmal atrial fibrillation    2. Severe obesity (BMI 35.0-39.9) with comorbidity    3. Congestive heart failure, unspecified HF chronicity, unspecified heart failure type    4. Diabetes mellitus without complication    5. COPD mixed type    6. Oxygen desaturation during sleep    7. Sleep apnea, unspecified type    8. Hidradenitis suppurativa    9. Anemia, unspecified type    10. Abnormal findings on diagnostic imaging of other parts of digestive tract         Plan:     Problem List Items Addressed This Visit          Derm    Hidradenitis suppurativa       Endocrine    Severe obesity (BMI 35.0-39.9) with comorbidity     Other Visit Diagnoses       Paroxysmal atrial fibrillation    -  Primary    Relevant Orders    IN OFFICE EKG 12-LEAD (to [place])    Ambulatory referral/consult to Pulmonology    TSH    T4, Free    CBC Without Differential    Comprehensive Metabolic Panel    Lipid Panel    Magnesium    Congestive heart failure, unspecified HF chronicity, unspecified heart failure type        Diabetes mellitus without complication        COPD  mixed type        Oxygen desaturation during sleep        Sleep apnea, unspecified type        Relevant Orders    Ambulatory referral/consult to Pulmonology    TSH    T4, Free    CBC Without Differential    Comprehensive Metabolic Panel    Lipid Panel    Magnesium    Anemia, unspecified type        Abnormal findings on diagnostic imaging of other parts of digestive tract        Relevant Orders    Lipid Panel          Paroxysmal atrial fibrillation she is currently in sinus rhythm.  Will continue the Inderal LA and the Cardizem CD plus the Eliquis.  She has not a candidate for EP referral or ablation as she has only had 1 episode and is currently maintaining sinus rhythm    Heart failure preserved ejection fraction her EF is 50 55% and currently no sign of heart failure will decrease the Lasix to 20 mg a day and the Diamox can be dropped off.  She probably does not need Diamox because of her COPD    COPD oxygen dependent recommend that she have a pulmonologist on her case    Sleep apnea continue to wear her CPAP she may benefit from pulmonary for sleep specialist in addition.    History of myocardial infarction no sign of active problem continue the aspirin and the statin.  There is no cholesterol level in the chart so will recheck the cholesterol    Diabetes poorly controlled she will be conferring with her nurse practitioner.    HYPERTHYROID RECHECK LEVELS SINCE SYNTHROID HELD.  Follow up in about 3 months (around 10/9/2024).    The patients questions were answered, they verbalized understanding, and agreed with the treatment plan.     NATHANIEL CHEN MD  [facility]

## 2024-07-11 ENCOUNTER — TELEPHONE (OUTPATIENT)
Dept: PULMONOLOGY | Facility: CLINIC | Age: 64
End: 2024-07-11
Payer: MEDICARE

## 2024-07-11 NOTE — TELEPHONE ENCOUNTER
----- Message from Beatriz Dumont sent at 7/11/2024 12:42 PM CDT -----  Contact: pt [phone]  Type:  Patient Returning Call    Who Called:  Pt   Who Left Message for Patient:  Razia   Does the patient know what this is regarding?:  Sooner appt   Best Call Back Number:  [phone]    Additional Information:  Pt stated she has been trying to call back but was having a hard time getting through due to phone outage issues

## 2024-07-13 LAB
OHS QRS DURATION: 84 MS
OHS QTC CALCULATION: 420 MS

## 2024-08-22 ENCOUNTER — TELEPHONE (OUTPATIENT)
Dept: PULMONOLOGY | Facility: CLINIC | Age: 64
End: 2024-08-22
Payer: MEDICARE

## 2024-08-22 NOTE — TELEPHONE ENCOUNTER
Placed a call to the pt. Pt had a fall 2 days ago. Pt will call the clinic when she is ready for her follow up appointment.

## 2024-08-22 NOTE — TELEPHONE ENCOUNTER
----- Message from Ruchi Queen sent at 8/22/2024  2:43 PM CDT -----  Pt cancelled appt for tomorrow   pt fell

## 2024-08-25 ENCOUNTER — HOSPITAL ENCOUNTER (INPATIENT)
Facility: HOSPITAL | Age: 64
LOS: 23 days | Discharge: NURSING, REHAB OR LONG-TERM CARE | DRG: 207 | End: 2024-09-17
Attending: STUDENT IN AN ORGANIZED HEALTH CARE EDUCATION/TRAINING PROGRAM | Admitting: HOSPITALIST
Payer: MEDICARE

## 2024-08-25 DIAGNOSIS — J96.01 ACUTE RESPIRATORY FAILURE WITH HYPOXIA AND HYPERCAPNIA: Primary | ICD-10-CM

## 2024-08-25 DIAGNOSIS — I48.91 A-FIB: ICD-10-CM

## 2024-08-25 DIAGNOSIS — J44.1 COPD WITH ACUTE EXACERBATION: ICD-10-CM

## 2024-08-25 DIAGNOSIS — R06.02 SHORTNESS OF BREATH: ICD-10-CM

## 2024-08-25 DIAGNOSIS — R07.9 CHEST PAIN: ICD-10-CM

## 2024-08-25 DIAGNOSIS — J96.01 ACUTE HYPOXIC RESPIRATORY FAILURE: ICD-10-CM
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
## 2024-08-25 DIAGNOSIS — J96.01 ACUTE HYPOXIC ON CHRONIC HYPERCAPNIC RESPIRATORY FAILURE: ICD-10-CM

## 2024-08-25 DIAGNOSIS — J96.12 ACUTE HYPOXIC ON CHRONIC HYPERCAPNIC RESPIRATORY FAILURE: ICD-10-CM

## 2024-08-25 DIAGNOSIS — K59.81 OGILVIE'S SYNDROME: ICD-10-CM

## 2024-08-25 DIAGNOSIS — J96.02 ACUTE RESPIRATORY FAILURE WITH HYPOXIA AND HYPERCAPNIA: Primary | ICD-10-CM

## 2024-08-25 PROBLEM — G93.41 ACUTE METABOLIC ENCEPHALOPATHY: Status: ACTIVE | Noted: 2024-08-25

## 2024-08-25 LAB
ALBUMIN SERPL BCP-MCNC: 3.5 G/DL (ref 3.5–5.2)
ALLENS TEST: ABNORMAL
ALP SERPL-CCNC: 85 U/L (ref 55–135)
ALT SERPL W/O P-5'-P-CCNC: 12 U/L (ref 10–44)
ANION GAP SERPL CALC-SCNC: 10 MMOL/L (ref 8–16)
AST SERPL-CCNC: 37 U/L (ref 10–40)
BASOPHILS # BLD AUTO: 0.01 K/UL (ref 0–0.2)
BASOPHILS NFR BLD: 0.1 % (ref 0–1.9)
BILIRUB SERPL-MCNC: 0.6 MG/DL (ref 0.1–1)
BILIRUB UR QL STRIP: NEGATIVE
BNP SERPL-MCNC: 68 PG/ML (ref 0–99)
BUN SERPL-MCNC: 11 MG/DL (ref 8–23)
CALCIUM SERPL-MCNC: 8.3 MG/DL (ref 8.7–10.5)
CHLORIDE SERPL-SCNC: 72 MMOL/L (ref 95–110)
CK SERPL-CCNC: 1048 U/L (ref 20–180)
CLARITY UR: CLEAR
CO2 SERPL-SCNC: >45 MMOL/L (ref 23–29)
COLOR UR: YELLOW
CREAT SERPL-MCNC: 0.8 MG/DL (ref 0.5–1.4)
DELSYS: ABNORMAL
DIFFERENTIAL METHOD BLD: ABNORMAL
EOSINOPHIL # BLD AUTO: 0 K/UL (ref 0–0.5)
EOSINOPHIL NFR BLD: 0.4 % (ref 0–8)
ERYTHROCYTE [DISTWIDTH] IN BLOOD BY AUTOMATED COUNT: 15.7 % (ref 11.5–14.5)
ERYTHROCYTE [SEDIMENTATION RATE] IN BLOOD BY WESTERGREN METHOD: 10 MM/H
ERYTHROCYTE [SEDIMENTATION RATE] IN BLOOD BY WESTERGREN METHOD: 14 MM/H
ERYTHROCYTE [SEDIMENTATION RATE] IN BLOOD BY WESTERGREN METHOD: 18 MM/H
EST. GFR  (NO RACE VARIABLE): >60 ML/MIN/1.73 M^2
FIO2: 35
FIO2: 50
FIO2: 50
FLOW: 15
GLUCOSE SERPL-MCNC: 201 MG/DL (ref 70–110)
GLUCOSE SERPL-MCNC: 203 MG/DL (ref 70–110)
GLUCOSE SERPL-MCNC: 206 MG/DL (ref 70–110)
GLUCOSE SERPL-MCNC: 218 MG/DL (ref 70–110)
GLUCOSE SERPL-MCNC: 222 MG/DL (ref 70–110)
GLUCOSE SERPL-MCNC: 226 MG/DL (ref 70–110)
GLUCOSE SERPL-MCNC: 229 MG/DL (ref 70–110)
GLUCOSE SERPL-MCNC: 235 MG/DL (ref 70–110)
GLUCOSE UR QL STRIP: NEGATIVE
HCO3 UR-SCNC: 51.5 MMOL/L (ref 24–28)
HCO3 UR-SCNC: 52.4 MMOL/L (ref 24–28)
HCO3 UR-SCNC: 54.1 MMOL/L (ref 24–28)
HCO3 UR-SCNC: 54.6 MMOL/L (ref 24–28)
HCT VFR BLD AUTO: 30.7 % (ref 37–48.5)
HCT VFR BLD CALC: 31 %PCV (ref 36–54)
HCT VFR BLD CALC: 34 %PCV (ref 36–54)
HCT VFR BLD CALC: 34 %PCV (ref 36–54)
HCT VFR BLD CALC: 35 %PCV (ref 36–54)
HGB BLD-MCNC: 9.4 G/DL (ref 12–16)
HGB UR QL STRIP: NEGATIVE
IMM GRANULOCYTES # BLD AUTO: 0.07 K/UL (ref 0–0.04)
IMM GRANULOCYTES NFR BLD AUTO: 0.8 % (ref 0–0.5)
INFLUENZA A, MOLECULAR: NEGATIVE
INFLUENZA B, MOLECULAR: NEGATIVE
KETONES UR QL STRIP: NEGATIVE
LEUKOCYTE ESTERASE UR QL STRIP: NEGATIVE
LYMPHOCYTES # BLD AUTO: 1.3 K/UL (ref 1–4.8)
LYMPHOCYTES NFR BLD: 14.4 % (ref 18–48)
MAGNESIUM SERPL-MCNC: 1.4 MG/DL (ref 1.6–2.6)
MCH RBC QN AUTO: 30.1 PG (ref 27–31)
MCHC RBC AUTO-ENTMCNC: 30.6 G/DL (ref 32–36)
MCV RBC AUTO: 98 FL (ref 82–98)
MODE: ABNORMAL
MONOCYTES # BLD AUTO: 0.8 K/UL (ref 0.3–1)
MONOCYTES NFR BLD: 8.6 % (ref 4–15)
NEUTROPHILS # BLD AUTO: 6.8 K/UL (ref 1.8–7.7)
NEUTROPHILS NFR BLD: 75.7 % (ref 38–73)
NITRITE UR QL STRIP: NEGATIVE
NRBC BLD-RTO: 0 /100 WBC
OHS QRS DURATION: 104 MS
OHS QTC CALCULATION: 399 MS
PCO2 BLDA: 113.4 MMHG (ref 35–45)
PCO2 BLDA: 59.7 MMHG (ref 35–45)
PCO2 BLDA: 65.6 MMHG (ref 35–45)
PCO2 BLDA: 90.9 MMHG (ref 35–45)
PEEP: 5
PH SMN: 7.29 [PH] (ref 7.35–7.45)
PH SMN: 7.37 [PH] (ref 7.35–7.45)
PH SMN: 7.53 [PH] (ref 7.35–7.45)
PH SMN: 7.54 [PH] (ref 7.35–7.45)
PH UR STRIP: 6 [PH] (ref 5–8)
PIP: 39
PLATELET # BLD AUTO: 258 K/UL (ref 150–450)
PMV BLD AUTO: 9.9 FL (ref 9.2–12.9)
PO2 BLDA: 112 MMHG (ref 80–100)
PO2 BLDA: 61 MMHG (ref 80–100)
PO2 BLDA: 64 MMHG (ref 80–100)
PO2 BLDA: 66 MMHG (ref 80–100)
POC BE: 27 MMOL/L
POC BE: 28 MMOL/L
POC BE: 29 MMOL/L
POC BE: >30 MMOL/L
POC IONIZED CALCIUM: 0.99 MMOL/L (ref 1.06–1.42)
POC IONIZED CALCIUM: 1 MMOL/L (ref 1.06–1.42)
POC IONIZED CALCIUM: 1.05 MMOL/L (ref 1.06–1.42)
POC IONIZED CALCIUM: 1.1 MMOL/L (ref 1.06–1.42)
POC SATURATED O2: 89 % (ref 95–100)
POC SATURATED O2: 93 % (ref 95–100)
POC SATURATED O2: 94 % (ref 95–100)
POC SATURATED O2: 97 % (ref 95–100)
POC TCO2: >50 MMOL/L (ref 23–27)
POTASSIUM BLD-SCNC: 3.2 MMOL/L (ref 3.5–5.1)
POTASSIUM BLD-SCNC: 3.4 MMOL/L (ref 3.5–5.1)
POTASSIUM BLD-SCNC: 3.4 MMOL/L (ref 3.5–5.1)
POTASSIUM BLD-SCNC: 3.5 MMOL/L (ref 3.5–5.1)
POTASSIUM SERPL-SCNC: 3.5 MMOL/L (ref 3.5–5.1)
PROT SERPL-MCNC: 6.7 G/DL (ref 6–8.4)
PROT UR QL STRIP: ABNORMAL
RBC # BLD AUTO: 3.12 M/UL (ref 4–5.4)
SAMPLE: ABNORMAL
SARS-COV-2 RDRP RESP QL NAA+PROBE: NEGATIVE
SITE: ABNORMAL
SODIUM BLD-SCNC: 121 MMOL/L (ref 136–145)
SODIUM BLD-SCNC: 121 MMOL/L (ref 136–145)
SODIUM BLD-SCNC: 124 MMOL/L (ref 136–145)
SODIUM BLD-SCNC: 125 MMOL/L (ref 136–145)
SODIUM SERPL-SCNC: 127 MMOL/L (ref 136–145)
SP GR UR STRIP: 1.02 (ref 1–1.03)
SP02: 96
SPECIMEN SOURCE: NORMAL
T4 FREE SERPL-MCNC: 0.29 NG/DL (ref 0.71–1.51)
TROPONIN I SERPL HS-MCNC: 5.3 PG/ML (ref 0–14.9)
TROPONIN I SERPL HS-MCNC: 7.8 PG/ML (ref 0–14.9)
TSH SERPL DL<=0.005 MIU/L-ACNC: 41.03 UIU/ML (ref 0.34–5.6)
URN SPEC COLLECT METH UR: ABNORMAL
UROBILINOGEN UR STRIP-ACNC: ABNORMAL EU/DL
VT: 400
VT: 450
VT: 500
WBC # BLD AUTO: 8.96 K/UL (ref 3.9–12.7)

## 2024-08-25 PROCEDURE — 99291 CRITICAL CARE FIRST HOUR: CPT | Mod: ,,, | Performed by: INTERNAL MEDICINE

## 2024-08-25 PROCEDURE — 94002 VENT MGMT INPAT INIT DAY: CPT

## 2024-08-25 PROCEDURE — 99900035 HC TECH TIME PER 15 MIN (STAT)

## 2024-08-25 PROCEDURE — 36415 COLL VENOUS BLD VENIPUNCTURE: CPT | Performed by: INTERNAL MEDICINE

## 2024-08-25 PROCEDURE — 84443 ASSAY THYROID STIM HORMONE: CPT | Performed by: INTERNAL MEDICINE

## 2024-08-25 PROCEDURE — 25000242 PHARM REV CODE 250 ALT 637 W/ HCPCS: Performed by: STUDENT IN AN ORGANIZED HEALTH CARE EDUCATION/TRAINING PROGRAM

## 2024-08-25 PROCEDURE — 36600 WITHDRAWAL OF ARTERIAL BLOOD: CPT

## 2024-08-25 PROCEDURE — 85025 COMPLETE CBC W/AUTO DIFF WBC: CPT | Performed by: STUDENT IN AN ORGANIZED HEALTH CARE EDUCATION/TRAINING PROGRAM

## 2024-08-25 PROCEDURE — 93005 ELECTROCARDIOGRAM TRACING: CPT | Performed by: INTERNAL MEDICINE

## 2024-08-25 PROCEDURE — 80053 COMPREHEN METABOLIC PANEL: CPT | Performed by: STUDENT IN AN ORGANIZED HEALTH CARE EDUCATION/TRAINING PROGRAM

## 2024-08-25 PROCEDURE — 82550 ASSAY OF CK (CPK): CPT | Performed by: STUDENT IN AN ORGANIZED HEALTH CARE EDUCATION/TRAINING PROGRAM

## 2024-08-25 PROCEDURE — 63600175 PHARM REV CODE 636 W HCPCS

## 2024-08-25 PROCEDURE — 25000003 PHARM REV CODE 250: Performed by: STUDENT IN AN ORGANIZED HEALTH CARE EDUCATION/TRAINING PROGRAM

## 2024-08-25 PROCEDURE — 84439 ASSAY OF FREE THYROXINE: CPT | Performed by: INTERNAL MEDICINE

## 2024-08-25 PROCEDURE — 94799 UNLISTED PULMONARY SVC/PX: CPT

## 2024-08-25 PROCEDURE — 87070 CULTURE OTHR SPECIMN AEROBIC: CPT | Performed by: STUDENT IN AN ORGANIZED HEALTH CARE EDUCATION/TRAINING PROGRAM

## 2024-08-25 PROCEDURE — 63600175 PHARM REV CODE 636 W HCPCS: Performed by: STUDENT IN AN ORGANIZED HEALTH CARE EDUCATION/TRAINING PROGRAM

## 2024-08-25 PROCEDURE — 94640 AIRWAY INHALATION TREATMENT: CPT

## 2024-08-25 PROCEDURE — 25000242 PHARM REV CODE 250 ALT 637 W/ HCPCS: Performed by: HOSPITALIST

## 2024-08-25 PROCEDURE — 63600175 PHARM REV CODE 636 W HCPCS: Performed by: INTERNAL MEDICINE

## 2024-08-25 PROCEDURE — 31500 INSERT EMERGENCY AIRWAY: CPT

## 2024-08-25 PROCEDURE — 99291 CRITICAL CARE FIRST HOUR: CPT

## 2024-08-25 PROCEDURE — 94761 N-INVAS EAR/PLS OXIMETRY MLT: CPT

## 2024-08-25 PROCEDURE — 87205 SMEAR GRAM STAIN: CPT | Performed by: STUDENT IN AN ORGANIZED HEALTH CARE EDUCATION/TRAINING PROGRAM

## 2024-08-25 PROCEDURE — U0002 COVID-19 LAB TEST NON-CDC: HCPCS | Performed by: STUDENT IN AN ORGANIZED HEALTH CARE EDUCATION/TRAINING PROGRAM

## 2024-08-25 PROCEDURE — 96361 HYDRATE IV INFUSION ADD-ON: CPT

## 2024-08-25 PROCEDURE — 99900031 HC PATIENT EDUCATION (STAT)

## 2024-08-25 PROCEDURE — 83880 ASSAY OF NATRIURETIC PEPTIDE: CPT | Performed by: STUDENT IN AN ORGANIZED HEALTH CARE EDUCATION/TRAINING PROGRAM

## 2024-08-25 PROCEDURE — 81003 URINALYSIS AUTO W/O SCOPE: CPT | Performed by: STUDENT IN AN ORGANIZED HEALTH CARE EDUCATION/TRAINING PROGRAM

## 2024-08-25 PROCEDURE — 5A1955Z RESPIRATORY VENTILATION, GREATER THAN 96 CONSECUTIVE HOURS: ICD-10-PCS | Performed by: STUDENT IN AN ORGANIZED HEALTH CARE EDUCATION/TRAINING PROGRAM

## 2024-08-25 PROCEDURE — 82962 GLUCOSE BLOOD TEST: CPT

## 2024-08-25 PROCEDURE — 87502 INFLUENZA DNA AMP PROBE: CPT | Performed by: STUDENT IN AN ORGANIZED HEALTH CARE EDUCATION/TRAINING PROGRAM

## 2024-08-25 PROCEDURE — 94660 CPAP INITIATION&MGMT: CPT | Mod: XB

## 2024-08-25 PROCEDURE — 84132 ASSAY OF SERUM POTASSIUM: CPT

## 2024-08-25 PROCEDURE — 25000003 PHARM REV CODE 250

## 2024-08-25 PROCEDURE — 96374 THER/PROPH/DIAG INJ IV PUSH: CPT

## 2024-08-25 PROCEDURE — 99900026 HC AIRWAY MAINTENANCE (STAT)

## 2024-08-25 PROCEDURE — 82330 ASSAY OF CALCIUM: CPT

## 2024-08-25 PROCEDURE — 25000003 PHARM REV CODE 250: Performed by: INTERNAL MEDICINE

## 2024-08-25 PROCEDURE — 27100171 HC OXYGEN HIGH FLOW UP TO 24 HOURS

## 2024-08-25 PROCEDURE — 93010 ELECTROCARDIOGRAM REPORT: CPT | Mod: ,,, | Performed by: INTERNAL MEDICINE

## 2024-08-25 PROCEDURE — 84484 ASSAY OF TROPONIN QUANT: CPT | Mod: 91 | Performed by: STUDENT IN AN ORGANIZED HEALTH CARE EDUCATION/TRAINING PROGRAM

## 2024-08-25 PROCEDURE — 51702 INSERT TEMP BLADDER CATH: CPT

## 2024-08-25 PROCEDURE — 85014 HEMATOCRIT: CPT

## 2024-08-25 PROCEDURE — 83735 ASSAY OF MAGNESIUM: CPT | Performed by: STUDENT IN AN ORGANIZED HEALTH CARE EDUCATION/TRAINING PROGRAM

## 2024-08-25 PROCEDURE — 96375 TX/PRO/DX INJ NEW DRUG ADDON: CPT

## 2024-08-25 PROCEDURE — 20000000 HC ICU ROOM

## 2024-08-25 PROCEDURE — 63600175 PHARM REV CODE 636 W HCPCS: Performed by: HOSPITALIST

## 2024-08-25 PROCEDURE — 84295 ASSAY OF SERUM SODIUM: CPT

## 2024-08-25 PROCEDURE — 0BH17EZ INSERTION OF ENDOTRACHEAL AIRWAY INTO TRACHEA, VIA NATURAL OR ARTIFICIAL OPENING: ICD-10-PCS | Performed by: STUDENT IN AN ORGANIZED HEALTH CARE EDUCATION/TRAINING PROGRAM

## 2024-08-25 PROCEDURE — 82803 BLOOD GASES ANY COMBINATION: CPT

## 2024-08-25 PROCEDURE — 25000003 PHARM REV CODE 250: Performed by: HOSPITALIST

## 2024-08-25 RX ORDER — FENTANYL CITRATE 50 UG/ML
INJECTION, SOLUTION INTRAMUSCULAR; INTRAVENOUS
Status: COMPLETED
Start: 2024-08-25 | End: 2024-08-25

## 2024-08-25 RX ORDER — CHLORZOXAZONE 750 MG/1
1 TABLET ORAL EVERY 6 HOURS
COMMUNITY
Start: 2024-07-13

## 2024-08-25 RX ORDER — IPRATROPIUM BROMIDE AND ALBUTEROL SULFATE 2.5; .5 MG/3ML; MG/3ML
3 SOLUTION RESPIRATORY (INHALATION) EVERY 6 HOURS
Status: DISCONTINUED | OUTPATIENT
Start: 2024-08-25 | End: 2024-09-16

## 2024-08-25 RX ORDER — CALCIUM GLUCONATE 20 MG/ML
1 INJECTION, SOLUTION INTRAVENOUS
Status: DISCONTINUED | OUTPATIENT
Start: 2024-08-25 | End: 2024-09-17 | Stop reason: HOSPADM

## 2024-08-25 RX ORDER — SUCCINYLCHOLINE CHLORIDE 20 MG/ML INJECTION SOLUTION
1.5 SOLUTION
Status: COMPLETED | OUTPATIENT
Start: 2024-08-25 | End: 2024-08-25

## 2024-08-25 RX ORDER — CALCIUM GLUCONATE 20 MG/ML
2 INJECTION, SOLUTION INTRAVENOUS
Status: DISCONTINUED | OUTPATIENT
Start: 2024-08-25 | End: 2024-09-17 | Stop reason: HOSPADM

## 2024-08-25 RX ORDER — IBUPROFEN 200 MG
16 TABLET ORAL
Status: DISCONTINUED | OUTPATIENT
Start: 2024-08-25 | End: 2024-08-27

## 2024-08-25 RX ORDER — NOREPINEPHRINE BITARTRATE/D5W 4MG/250ML
0-3 PLASTIC BAG, INJECTION (ML) INTRAVENOUS CONTINUOUS
Status: DISCONTINUED | OUTPATIENT
Start: 2024-08-25 | End: 2024-08-30

## 2024-08-25 RX ORDER — GLUCAGON 1 MG
1 KIT INJECTION
Status: DISCONTINUED | OUTPATIENT
Start: 2024-08-25 | End: 2024-08-27

## 2024-08-25 RX ORDER — ETOMIDATE 2 MG/ML
30 INJECTION INTRAVENOUS
Status: COMPLETED | OUTPATIENT
Start: 2024-08-25 | End: 2024-08-25

## 2024-08-25 RX ORDER — FENTANYL CITRATE 50 UG/ML
75 INJECTION, SOLUTION INTRAMUSCULAR; INTRAVENOUS
Status: COMPLETED | OUTPATIENT
Start: 2024-08-25 | End: 2024-08-25

## 2024-08-25 RX ORDER — FAMOTIDINE 10 MG/ML
20 INJECTION INTRAVENOUS EVERY 12 HOURS
Status: DISCONTINUED | OUTPATIENT
Start: 2024-08-25 | End: 2024-09-17 | Stop reason: HOSPADM

## 2024-08-25 RX ORDER — FLUTICASONE FUROATE, UMECLIDINIUM BROMIDE AND VILANTEROL TRIFENATATE 200; 62.5; 25 UG/1; UG/1; UG/1
1 POWDER RESPIRATORY (INHALATION) DAILY
COMMUNITY
Start: 2024-08-23

## 2024-08-25 RX ORDER — LEVOFLOXACIN 5 MG/ML
750 INJECTION, SOLUTION INTRAVENOUS
Status: COMPLETED | OUTPATIENT
Start: 2024-08-25 | End: 2024-08-25

## 2024-08-25 RX ORDER — MUPIROCIN 20 MG/G
OINTMENT TOPICAL 2 TIMES DAILY
Status: COMPLETED | OUTPATIENT
Start: 2024-08-25 | End: 2024-08-29

## 2024-08-25 RX ORDER — MAGNESIUM SULFATE HEPTAHYDRATE 40 MG/ML
2 INJECTION, SOLUTION INTRAVENOUS
Status: DISCONTINUED | OUTPATIENT
Start: 2024-08-25 | End: 2024-09-17 | Stop reason: HOSPADM

## 2024-08-25 RX ORDER — SODIUM CHLORIDE 9 MG/ML
INJECTION, SOLUTION INTRAVENOUS CONTINUOUS
Status: DISCONTINUED | OUTPATIENT
Start: 2024-08-25 | End: 2024-08-30

## 2024-08-25 RX ORDER — MIDAZOLAM HYDROCHLORIDE 2 MG/2ML
2.5 INJECTION, SOLUTION INTRAMUSCULAR; INTRAVENOUS ONCE
Status: COMPLETED | OUTPATIENT
Start: 2024-08-26 | End: 2024-08-26

## 2024-08-25 RX ORDER — ENOXAPARIN SODIUM 100 MG/ML
40 INJECTION SUBCUTANEOUS EVERY 12 HOURS
Status: DISCONTINUED | OUTPATIENT
Start: 2024-08-25 | End: 2024-09-15

## 2024-08-25 RX ORDER — MAGNESIUM SULFATE HEPTAHYDRATE 40 MG/ML
4 INJECTION, SOLUTION INTRAVENOUS
Status: DISCONTINUED | OUTPATIENT
Start: 2024-08-25 | End: 2024-09-17 | Stop reason: HOSPADM

## 2024-08-25 RX ORDER — IBUPROFEN 200 MG
24 TABLET ORAL
Status: DISCONTINUED | OUTPATIENT
Start: 2024-08-25 | End: 2024-08-27

## 2024-08-25 RX ORDER — POTASSIUM CHLORIDE 29.8 MG/ML
40 INJECTION INTRAVENOUS
Status: DISCONTINUED | OUTPATIENT
Start: 2024-08-25 | End: 2024-09-17 | Stop reason: HOSPADM

## 2024-08-25 RX ORDER — POTASSIUM CHLORIDE 29.8 MG/ML
80 INJECTION INTRAVENOUS
Status: DISCONTINUED | OUTPATIENT
Start: 2024-08-25 | End: 2024-09-17 | Stop reason: HOSPADM

## 2024-08-25 RX ORDER — NALOXONE HCL 0.4 MG/ML
0.4 VIAL (ML) INJECTION
Status: COMPLETED | OUTPATIENT
Start: 2024-08-25 | End: 2024-08-25

## 2024-08-25 RX ORDER — METHYLPREDNISOLONE SOD SUCC 125 MG
80 VIAL (EA) INJECTION EVERY 8 HOURS
Status: DISCONTINUED | OUTPATIENT
Start: 2024-08-25 | End: 2024-08-27

## 2024-08-25 RX ORDER — METHYLPREDNISOLONE SOD SUCC 125 MG
125 VIAL (EA) INJECTION
Status: COMPLETED | OUTPATIENT
Start: 2024-08-25 | End: 2024-08-25

## 2024-08-25 RX ORDER — CALCIUM GLUCONATE 20 MG/ML
3 INJECTION, SOLUTION INTRAVENOUS
Status: DISCONTINUED | OUTPATIENT
Start: 2024-08-25 | End: 2024-09-17 | Stop reason: HOSPADM

## 2024-08-25 RX ORDER — IPRATROPIUM BROMIDE AND ALBUTEROL SULFATE 2.5; .5 MG/3ML; MG/3ML
3 SOLUTION RESPIRATORY (INHALATION)
Status: COMPLETED | OUTPATIENT
Start: 2024-08-25 | End: 2024-08-25

## 2024-08-25 RX ORDER — INSULIN ASPART 100 [IU]/ML
0-5 INJECTION, SOLUTION INTRAVENOUS; SUBCUTANEOUS
Status: DISCONTINUED | OUTPATIENT
Start: 2024-08-25 | End: 2024-08-27

## 2024-08-25 RX ORDER — ATROPINE SULFATE 0.1 MG/ML
INJECTION INTRAVENOUS
Status: COMPLETED
Start: 2024-08-25 | End: 2024-08-25

## 2024-08-25 RX ORDER — NOREPINEPHRINE BITARTRATE/D5W 4MG/250ML
PLASTIC BAG, INJECTION (ML) INTRAVENOUS
Status: COMPLETED
Start: 2024-08-25 | End: 2024-08-25

## 2024-08-25 RX ORDER — PROMETHAZINE HYDROCHLORIDE 25 MG/1
25 TABLET ORAL EVERY 6 HOURS PRN
COMMUNITY
Start: 2024-08-20

## 2024-08-25 RX ORDER — POTASSIUM CHLORIDE 14.9 MG/ML
60 INJECTION INTRAVENOUS
Status: DISCONTINUED | OUTPATIENT
Start: 2024-08-25 | End: 2024-09-17 | Stop reason: HOSPADM

## 2024-08-25 RX ORDER — ATROPINE SULFATE 0.1 MG/ML
1 INJECTION INTRAVENOUS ONCE
Status: COMPLETED | OUTPATIENT
Start: 2024-08-25 | End: 2024-08-25

## 2024-08-25 RX ORDER — SODIUM CHLORIDE 0.9 % (FLUSH) 0.9 %
10 SYRINGE (ML) INJECTION
Status: DISCONTINUED | OUTPATIENT
Start: 2024-08-25 | End: 2024-09-17 | Stop reason: HOSPADM

## 2024-08-25 RX ORDER — FENTANYL CITRATE-0.9 % NACL/PF 10 MCG/ML
0-250 PLASTIC BAG, INJECTION (ML) INTRAVENOUS CONTINUOUS
Status: DISCONTINUED | OUTPATIENT
Start: 2024-08-25 | End: 2024-09-01

## 2024-08-25 RX ORDER — LEVOFLOXACIN 5 MG/ML
750 INJECTION, SOLUTION INTRAVENOUS
Status: DISCONTINUED | OUTPATIENT
Start: 2024-08-26 | End: 2024-08-31

## 2024-08-25 RX ADMIN — IPRATROPIUM BROMIDE AND ALBUTEROL SULFATE 3 ML: 2.5; .5 SOLUTION RESPIRATORY (INHALATION) at 01:08

## 2024-08-25 RX ADMIN — ATROPINE SULFATE 1 MG: 0.1 INJECTION INTRAVENOUS at 04:08

## 2024-08-25 RX ADMIN — Medication 0.05 MCG/KG/MIN: at 04:08

## 2024-08-25 RX ADMIN — SODIUM CHLORIDE, POTASSIUM CHLORIDE, SODIUM LACTATE AND CALCIUM CHLORIDE 250 ML: 600; 310; 30; 20 INJECTION, SOLUTION INTRAVENOUS at 03:08

## 2024-08-25 RX ADMIN — METHYLPREDNISOLONE SODIUM SUCCINATE 80 MG: 125 INJECTION, POWDER, FOR SOLUTION INTRAMUSCULAR; INTRAVENOUS at 09:08

## 2024-08-25 RX ADMIN — Medication 25 MCG/HR: at 05:08

## 2024-08-25 RX ADMIN — CALCIUM GLUCONATE 2 G: 20 INJECTION, SOLUTION INTRAVENOUS at 12:08

## 2024-08-25 RX ADMIN — FENTANYL CITRATE 75 MCG: 50 INJECTION INTRAMUSCULAR; INTRAVENOUS at 05:08

## 2024-08-25 RX ADMIN — NOREPINEPHRINE BITARTRATE 0.05 MCG/KG/MIN: 4 INJECTION, SOLUTION INTRAVENOUS at 04:08

## 2024-08-25 RX ADMIN — FENTANYL CITRATE 75 MCG: 50 INJECTION INTRAMUSCULAR; INTRAVENOUS at 04:08

## 2024-08-25 RX ADMIN — METHYLPREDNISOLONE SODIUM SUCCINATE 125 MG: 125 INJECTION, POWDER, FOR SOLUTION INTRAMUSCULAR; INTRAVENOUS at 03:08

## 2024-08-25 RX ADMIN — POTASSIUM BICARBONATE 35 MEQ: 391 TABLET, EFFERVESCENT ORAL at 12:08

## 2024-08-25 RX ADMIN — MAGNESIUM SULFATE HEPTAHYDRATE 4 G: 40 INJECTION, SOLUTION INTRAVENOUS at 10:08

## 2024-08-25 RX ADMIN — IPRATROPIUM BROMIDE AND ALBUTEROL SULFATE 3 ML: 2.5; .5 SOLUTION RESPIRATORY (INHALATION) at 03:08

## 2024-08-25 RX ADMIN — Medication 190 MG: at 04:08

## 2024-08-25 RX ADMIN — INSULIN ASPART 1 UNITS: 100 INJECTION, SOLUTION INTRAVENOUS; SUBCUTANEOUS at 09:08

## 2024-08-25 RX ADMIN — NALOXONE HYDROCHLORIDE 0.4 MG: 0.4 INJECTION, SOLUTION INTRAMUSCULAR; INTRAVENOUS; SUBCUTANEOUS at 04:08

## 2024-08-25 RX ADMIN — FENTANYL CITRATE 75 MCG: 50 INJECTION, SOLUTION INTRAMUSCULAR; INTRAVENOUS at 04:08

## 2024-08-25 RX ADMIN — FAMOTIDINE 20 MG: 10 INJECTION INTRAVENOUS at 08:08

## 2024-08-25 RX ADMIN — SODIUM CHLORIDE: 9 INJECTION, SOLUTION INTRAVENOUS at 03:08

## 2024-08-25 RX ADMIN — FENTANYL CITRATE 75 MCG: 50 INJECTION, SOLUTION INTRAMUSCULAR; INTRAVENOUS at 05:08

## 2024-08-25 RX ADMIN — MUPIROCIN 1 G: 20 OINTMENT TOPICAL at 10:08

## 2024-08-25 RX ADMIN — ENOXAPARIN SODIUM 40 MG: 40 INJECTION SUBCUTANEOUS at 10:08

## 2024-08-25 RX ADMIN — ETOMIDATE 30 MG: 2 INJECTION INTRAVENOUS at 04:08

## 2024-08-25 RX ADMIN — MUPIROCIN 1 G: 20 OINTMENT TOPICAL at 08:08

## 2024-08-25 RX ADMIN — LEVOFLOXACIN 750 MG: 5 INJECTION, SOLUTION INTRAVENOUS at 03:08

## 2024-08-25 RX ADMIN — POTASSIUM BICARBONATE 35 MEQ: 391 TABLET, EFFERVESCENT ORAL at 10:08

## 2024-08-25 RX ADMIN — FAMOTIDINE 20 MG: 10 INJECTION INTRAVENOUS at 10:08

## 2024-08-25 RX ADMIN — IPRATROPIUM BROMIDE AND ALBUTEROL SULFATE 3 ML: 2.5; .5 SOLUTION RESPIRATORY (INHALATION) at 07:08

## 2024-08-25 RX ADMIN — ENOXAPARIN SODIUM 40 MG: 40 INJECTION SUBCUTANEOUS at 08:08

## 2024-08-25 RX ADMIN — METHYLPREDNISOLONE SODIUM SUCCINATE 80 MG: 125 INJECTION, POWDER, FOR SOLUTION INTRAMUSCULAR; INTRAVENOUS at 03:08

## 2024-08-25 NOTE — ASSESSMENT & PLAN NOTE
Per Dr. Funes, patient w/ significant resp distress on presentation, then tired out and required intubation support. Per RN, patient had mimed removing ET tube, but she is currently in no state from a resp failure standpoint to safely do so, so will cont sedation w/ fentanyl to enable vent support.

## 2024-08-25 NOTE — ASSESSMENT & PLAN NOTE
L side, D#1 Abx, cont w/ levofloxacin for likely strep. Suspect she does NOT have sepsis, as BP fell s/p positive pressure application and sedation. Getting chest CT to further eval and ensure no loculated effusion.

## 2024-08-25 NOTE — ASSESSMENT & PLAN NOTE
Per Dr. Funes, patient had wheezing on arrival, and her serum bicarb demonstrates significant metabolic compensation for pCO2 retention. Cont steroids, duonebs, suspect PNA is trigger so provide Abx for likely strep.

## 2024-08-25 NOTE — CARE UPDATE
08/25/24 0855   Patient Assessment/Suction   Level of Consciousness (AVPU) responds to pain   Respiratory Effort Normal;Unlabored   Expansion/Accessory Muscles/Retractions no use of accessory muscles   All Lung Fields Breath Sounds coarse   Rhythm/Pattern, Respiratory assisted mechanically   PRE-TX-O2   Device (Oxygen Therapy) ventilator   Oxygen Concentration (%) 50   Vent Select   Charged w/in last 24h YES   Preset Conventional Ventilator Settings   Vent ID [id number]   Vent Type    Ventilation Type VC   Vent Mode A/C   Humidity HME   Set Rate 10 BPM   Vt Set 400 mL   PEEP/CPAP 5 cmH20   Peak Flow 60 L/min   Peak End Inspiratory Pressure 25 cmH20   I-Trigger Type  V-TRIG   Trigger Sensitivity Flow/I-Trigger 3 L/min   Patient Ventilator Parameters   Resp Rate Total 10 br/min   Peak Airway Pressure 30 cmH20   Mean Airway Pressure 8.1 cmH20   Plateau Pressure 0 cmH20   Exhaled Vt 401 mL   Total Ve 3.96 L/m   I:E Ratio Measured 1:7.3   Auto PEEP 0 cmH20   Tubing ID (mm) 7.5 mm   Tube Type ET   Conventional Ventilator Alarms   Alarms On Y   Ve High Alarm 20 L/min   Ve Low Alarm 2 L/min   Vt High Alarm 1000 mL   Vt Low Alarm 200 mL   Resp Rate High Alarm 40 br/min   Apnea Rate 10   Apnea Volume (mL) 0 mL   Apnea Oxygen Concentration  100   Apnea Flow Rate (L/min) 60   T Apnea 20 sec(s)   Ready to Wean/Extubation Screen   FIO2<=50 (chart decimal) 0.5   MV<16L (chart vol.) 3.96   PEEP <=8 (chart #) 5   Vital Capacity   Vital Capacity (mL) 0   Education   $ Education Ventilator Oxygen;15 min   Transport Patient   $ Transport Tech Time Charge 15 min   Oxygen Method Transport Vent   Transport to ct, icu   Toleration Good   ETT Secure Yes   Ambu and mask at bedside Yes

## 2024-08-25 NOTE — H&P
[facility] - Emergency Dept  Hospital Medicine  History & Physical    Patient Name: Karo Leonard  MRN: [id number]  Patient Class: IP- Inpatient  Admission Date: 2024  Attending Physician: Yosi Robertson MD   Primary Care Provider: Navneet Hernandez FNP         Patient information was obtained from ER records.     Subjective:     Principal Problem:Acute hypoxic on chronic hypercapnic respiratory failure    Chief Complaint:   Chief Complaint   Patient presents with    Low oxygen      Patient arrived via [place] [facility] transports unit 31. Per medic, patient is oxygen dependent and was found down by  lying on the oxygen tubing with low oxygen saturation. On EMS arrival patient's oxygen was in the 80s. Patient is drowsy but easily arousal. Patient received Narcan 4 mg intranasal and a DUONEB.          HPI: 64F p/w fall in the context of shortness of breath. EMS reportedly gave narcan w/o appreciable response, then she was given duoneb en route to [facility] ED. Upon arrival, she was tachypneic, had respy sounding phonation, but she denied chest pain, fever, or chills. She was placed on BiPAP, and initially demonstrated improvement. However, she became less responsive and appeared to tire out. She was given additional narcan w/o appreciable response, so she was intubated. She had some hypotension after receiving sedation, so she was placed on low dose levophed. Pickens was placed, lasix was given, as was Abx. Large bruise noted on LLE, so x-ray ordered.     Past Medical History:   Diagnosis Date    Coronary artery disease     cardiac stent    DDD (degenerative disc disease), lumbar     Diabetes mellitus     Hypertension     Thyroid disease        Past Surgical History:   Procedure Laterality Date     SECTION  08/1987    x2 1993    CORONARY STENT PLACEMENT      EXPLORATORY LAPAROTOMY      [place]     HEMORRHOID SURGERY      LAPAROSCOPIC  CHOLECYSTECTOMY N/A 6/16/2022    Procedure: CHOLECYSTECTOMY, LAPAROSCOPIC;  Surgeon: Leonardo Wilson III, MD;  Location: [facility] OR;  Service: General;  Laterality: N/A;    LUMBAR DISC SURGERY  2000    PILONIDAL CYST DRAINAGE  1976    TONSILLECTOMY      as a child (also adenoids)       Review of patient's allergies indicates:   Allergen Reactions    Pcn [penicillins] Anaphylaxis    Adhesive     Floxacillin     Keflex [cephalexin]     Sulfa (sulfonamide antibiotics)     Zithromax [azithromycin]     Zofran [ondansetron hcl (pf)]      Dizzy vomiting         No current facility-administered medications on file prior to encounter.     Current Outpatient Medications on File Prior to Encounter   Medication Sig    albuterol (PROVENTIL/VENTOLIN HFA) 90 mcg/actuation inhaler Inhale 1 puff into the lungs every 4 (four) hours as needed for Wheezing or Shortness of Breath.    albuterol-ipratropium (DUO-NEB) 2.5 mg-0.5 mg/3 mL nebulizer solution Take 3 mLs by nebulization every 6 (six) hours as needed for Wheezing or Shortness of Breath. Rescue    apixaban (ELIQUIS) 5 mg Tab Take 1 tablet (5 mg total) by mouth 2 (two) times daily.    arnica 20 % Tinc Apply 1 application topically once daily.    ascorbic acid, vitamin C, (VITAMIN C) 500 MG tablet Take 500 mg by mouth once daily.    aspirin 81 MG Chew Take 81 mg by mouth once daily.    atorvastatin (LIPITOR) 80 MG tablet Take 80 mg by mouth every evening.    chlorzoxazone (PARAFON FORTE) 250 MG tablet Take 750 mg by mouth 4 (four) times daily as needed for Muscle spasms.    desonide (DESOWEN) 0.05 % cream Apply 1 application topically 2 (two) times daily.    diltiaZEM (CARDIZEM CD) 240 MG 24 hr capsule Take 1 capsule (240 mg total) by mouth once daily.    estradioL (ESTRACE) 0.01 % (0.1 mg/gram) vaginal cream Place 1 g vaginally every 7 days. Monday's    ezetimibe (ZETIA) 10 mg tablet Take 10 mg by mouth once daily. (Patient not taking: Reported on 6/26/2024)    furosemide (LASIX)  20 MG tablet Take 1 tablet (20 mg total) by mouth once daily.    gabapentin (NEURONTIN) 600 MG tablet Take 600 mg by mouth 3 (three) times daily.    guaiFENesin (MUCINEX) 600 mg 12 hr tablet Take 1,200 mg by mouth 2 (two) times daily. (Patient not taking: Reported on 7/9/2024)    hydrocortisone 0.5 % cream Apply 1 application topically 2 (two) times daily as needed.    JANUVIA 50 mg Tab Take 50 mg by mouth once daily.    meclizine (ANTIVERT) 25 mg tablet Take 25 mg by mouth 4 (four) times daily as needed for Dizziness or Nausea. (Patient not taking: Reported on 7/9/2024)    pantoprazole (PROTONIX) 40 MG tablet Take 1 tablet (40 mg total) by mouth 2 (two) times daily.    potassium chloride SA (K-DUR,KLOR-CON M) 10 MEQ tablet Take 1 tablet (10 mEq total) by mouth once daily.    propranoloL (INDERAL LA) 80 MG 24 hr capsule Take 160 mg by mouth once daily.    tiotropium (SPIRIVA) 18 mcg inhalation capsule Inhale 1 capsule into the lungs once daily.     Family History    None       Tobacco Use    Smoking status: Not on file    Smokeless tobacco: Not on file   Substance and Sexual Activity    Alcohol use: No    Drug use: No    Sexual activity: Not Currently     Review of Systems   Constitutional:  Negative for chills and fever.   Respiratory:  Positive for apnea and shortness of breath.    Cardiovascular:  Positive for leg swelling.   Neurological:  Positive for speech difficulty.     Objective:     Vital Signs (Most Recent):  Temp: 97.4 °F (36.3 °C) (08/25/24 0208)  Pulse: 61 (08/25/24 0434)  Resp: 18 (08/25/24 0501)  BP: (!) 142/60 (08/25/24 0434)  SpO2: 100 % (08/25/24 0434) Vital Signs (24h Range):  Temp:  [97.4 °F (36.3 °C)] 97.4 °F (36.3 °C)  Pulse:  [49-61] 61  Resp:  [9-25] 18  SpO2:  [85 %-100 %] 100 %  BP: (109-144)/(55-61) 142/60     Weight: 127 kg (280 lb)  Body mass index is 46.59 kg/m².     Physical Exam  Vitals and nursing note reviewed.   Constitutional:       General: She is in acute distress.       Appearance: She is obese. She is ill-appearing.   Eyes:      Pupils: Pupils are equal, round, and reactive to light.      Comments: Not tracking   Cardiovascular:      Rate and Rhythm: Normal rate and regular rhythm.      Heart sounds: No murmur heard.  Pulmonary:      Breath sounds: Rhonchi (inspiratory bilat anterior segs) present. No wheezing.   Abdominal:      General: Bowel sounds are normal. There is distension (morbid).      Palpations: Abdomen is soft.      Tenderness: There is no abdominal tenderness.   Musculoskeletal:         General: Swelling (L knee) present.      Right lower leg: Edema (2+) present.      Left lower leg: Edema (2+) present.   Skin:     Findings: Bruising (L knee) present.   Neurological:      Comments: No purposeful movement w/ tactile stimuliation              CRANIAL NERVES     CN III, IV, VI   Pupils are equal, round, and reactive to light.       Significant Labs: All pertinent labs within the past 24 hours have been reviewed.  BMP:   Recent Labs   Lab 08/25/24  0233   *   *   K 3.5   CL 72*   CO2 >45*   BUN 11   CREATININE 0.8   CALCIUM 8.3*   MG 1.4*     CBC:   Recent Labs   Lab 08/25/24  0233   WBC 8.96   HGB 9.4*   HCT 30.7*          Significant Imaging: I have reviewed all pertinent imaging results/findings within the past 24 hours.  CXR: I have reviewed all pertinent results/findings within the past 24 hours and my personal findings are:  bilat infiltrates w/ opacification on L  Assessment/Plan:   64F p/w resp failure in the context of morbid obesity and chronic hypoxic resp failure from COPD    * Acute hypoxic on chronic hypercapnic respiratory failure  Per Dr. Funes, patient w/ significant resp distress on presentation, then tired out and required intubation support. Per RN, patient had mimed removing ET tube, but she is currently in no state from a resp failure standpoint to safely do so, so will cont sedation w/ fentanyl to enable vent  support.    Fall  Given ecchymosis on L knee, get x-ray to r/o fxr.    Acute metabolic encephalopathy  Hypercapnic narcosis w/ pCO2 > 100 on POC testing. Getting HCT to ensure no additional abnl.      Pneumonia  L side, D#1 Abx, cont w/ levofloxacin for likely strep. Suspect she does NOT have sepsis, as BP fell s/p positive pressure application and sedation. Getting chest CT to further eval and ensure no loculated effusion.    COPD exacerbation  Per Dr. Funes, patient had wheezing on arrival, and her serum bicarb demonstrates significant metabolic compensation for pCO2 retention. Cont steroids, duonebs, suspect PNA is trigger so provide Abx for likely strep.      VTE Risk Mitigation (From admission, onward)           Ordered     enoxaparin injection 40 mg  Every 12 hours         08/25/24 0627     IP VTE HIGH RISK PATIENT  Once         08/25/24 0627     Place sequential compression device  Until discontinued         08/25/24 0627                       50 minutes of critical care time spent this AM, formulating above plan, discussing patient's ED course w/ Dr. Funes, and coordinating care w/ RN. Patient remains critically ill w/ high-risk for mortality from resp failure.       UYEN HSU MD  Department of Hospital Medicine  [facility] - Emergency Dept

## 2024-08-25 NOTE — SUBJECTIVE & OBJECTIVE
Past Medical History:   Diagnosis Date    Coronary artery disease     cardiac stent    DDD (degenerative disc disease), lumbar 2000    Diabetes mellitus     Hypertension     Thyroid disease        Past Surgical History:   Procedure Laterality Date     SECTION  08/1987    x2 1993    CORONARY STENT PLACEMENT      EXPLORATORY LAPAROTOMY      [place]     HEMORRHOID SURGERY      LAPAROSCOPIC CHOLECYSTECTOMY N/A 2022    Procedure: CHOLECYSTECTOMY, LAPAROSCOPIC;  Surgeon: Leonardo Wilson III, MD;  Location: [facility];  Service: General;  Laterality: N/A;    LUMBAR DISC SURGERY      PILONIDAL CYST DRAINAGE      TONSILLECTOMY      as a child (also adenoids)       Review of patient's allergies indicates:   Allergen Reactions    Pcn [penicillins] Anaphylaxis    Adhesive     Floxacillin     Keflex [cephalexin]     Sulfa (sulfonamide antibiotics)     Zithromax [azithromycin]     Zofran [ondansetron hcl (pf)]      Dizzy vomiting         No current facility-administered medications on file prior to encounter.     Current Outpatient Medications on File Prior to Encounter   Medication Sig    albuterol (PROVENTIL/VENTOLIN HFA) 90 mcg/actuation inhaler Inhale 1 puff into the lungs every 4 (four) hours as needed for Wheezing or Shortness of Breath.    albuterol-ipratropium (DUO-NEB) 2.5 mg-0.5 mg/3 mL nebulizer solution Take 3 mLs by nebulization every 6 (six) hours as needed for Wheezing or Shortness of Breath. Rescue    apixaban (ELIQUIS) 5 mg Tab Take 1 tablet (5 mg total) by mouth 2 (two) times daily.    arnica 20 % Tinc Apply 1 application topically once daily.    ascorbic acid, vitamin C, (VITAMIN C) 500 MG tablet Take 500 mg by mouth once daily.    aspirin 81 MG Chew Take 81 mg by mouth once daily.    atorvastatin (LIPITOR) 80 MG tablet Take 80 mg by mouth every evening.    chlorzoxazone (PARAFON FORTE) 250 MG tablet Take 750 mg by mouth 4 (four) times daily as  needed for Muscle spasms.    desonide (DESOWEN) 0.05 % cream Apply 1 application topically 2 (two) times daily.    diltiaZEM (CARDIZEM CD) 240 MG 24 hr capsule Take 1 capsule (240 mg total) by mouth once daily.    estradioL (ESTRACE) 0.01 % (0.1 mg/gram) vaginal cream Place 1 g vaginally every 7 days. Monday's    ezetimibe (ZETIA) 10 mg tablet Take 10 mg by mouth once daily. (Patient not taking: Reported on 6/26/2024)    furosemide (LASIX) 20 MG tablet Take 1 tablet (20 mg total) by mouth once daily.    gabapentin (NEURONTIN) 600 MG tablet Take 600 mg by mouth 3 (three) times daily.    guaiFENesin (MUCINEX) 600 mg 12 hr tablet Take 1,200 mg by mouth 2 (two) times daily. (Patient not taking: Reported on 7/9/2024)    hydrocortisone 0.5 % cream Apply 1 application topically 2 (two) times daily as needed.    JANUVIA 50 mg Tab Take 50 mg by mouth once daily.    meclizine (ANTIVERT) 25 mg tablet Take 25 mg by mouth 4 (four) times daily as needed for Dizziness or Nausea. (Patient not taking: Reported on 7/9/2024)    pantoprazole (PROTONIX) 40 MG tablet Take 1 tablet (40 mg total) by mouth 2 (two) times daily.    potassium chloride SA (K-DUR,KLOR-CON M) 10 MEQ tablet Take 1 tablet (10 mEq total) by mouth once daily.    propranoloL (INDERAL LA) 80 MG 24 hr capsule Take 160 mg by mouth once daily.    tiotropium (SPIRIVA) 18 mcg inhalation capsule Inhale 1 capsule into the lungs once daily.     Family History    None       Tobacco Use    Smoking status: Not on file    Smokeless tobacco: Not on file   Substance and Sexual Activity    Alcohol use: No    Drug use: No    Sexual activity: Not Currently     Review of Systems   Constitutional:  Negative for chills and fever.   Respiratory:  Positive for apnea and shortness of breath.    Cardiovascular:  Positive for leg swelling.   Neurological:  Positive for speech difficulty.     Objective:     Vital Signs (Most Recent):  Temp: 97.4 °F (36.3 °C) (08/25/24 0208)  Pulse: 61 (08/25/24  0434)  Resp: 18 (08/25/24 0501)  BP: (!) 142/60 (08/25/24 0434)  SpO2: 100 % (08/25/24 0434) Vital Signs (24h Range):  Temp:  [97.4 °F (36.3 °C)] 97.4 °F (36.3 °C)  Pulse:  [49-61] 61  Resp:  [9-25] 18  SpO2:  [85 %-100 %] 100 %  BP: (109-144)/(55-61) 142/60     Weight: 127 kg (280 lb)  Body mass index is 46.59 kg/m².     Physical Exam  Vitals and nursing note reviewed.   Constitutional:       General: She is in acute distress.      Appearance: She is obese. She is ill-appearing.   Eyes:      Pupils: Pupils are equal, round, and reactive to light.      Comments: Not tracking   Cardiovascular:      Rate and Rhythm: Normal rate and regular rhythm.      Heart sounds: No murmur heard.  Pulmonary:      Breath sounds: Rhonchi (inspiratory bilat anterior segs) present. No wheezing.   Abdominal:      General: Bowel sounds are normal. There is distension (morbid).      Palpations: Abdomen is soft.      Tenderness: There is no abdominal tenderness.   Musculoskeletal:         General: Swelling (L knee) present.      Right lower leg: Edema (2+) present.      Left lower leg: Edema (2+) present.   Skin:     Findings: Bruising (L knee) present.   Neurological:      Comments: No purposeful movement w/ tactile stimuliation              CRANIAL NERVES     CN III, IV, VI   Pupils are equal, round, and reactive to light.       Significant Labs: All pertinent labs within the past 24 hours have been reviewed.  BMP:   Recent Labs   Lab 08/25/24  0233   *   *   K 3.5   CL 72*   CO2 >45*   BUN 11   CREATININE 0.8   CALCIUM 8.3*   MG 1.4*     CBC:   Recent Labs   Lab 08/25/24  0233   WBC 8.96   HGB 9.4*   HCT 30.7*          Significant Imaging: I have reviewed all pertinent imaging results/findings within the past 24 hours.  CXR: I have reviewed all pertinent results/findings within the past 24 hours and my personal findings are:  bilat infiltrates w/ opacification on L

## 2024-08-25 NOTE — PLAN OF CARE
Calcium, Mag, Potassium replaced. NS 100ml/hr and 100mcg/hr Fentanyl. No BM. UOP - 1200mls.     Problem: Adult Inpatient Plan of Care  Goal: Plan of Care Review  Outcome: Progressing  Goal: Patient-Specific Goal (Individualized)  Outcome: Progressing  Goal: Absence of Hospital-Acquired Illness or Injury  Outcome: Progressing  Goal: Optimal Comfort and Wellbeing  Outcome: Progressing  Goal: Readiness for Transition of Care  Outcome: Progressing     Problem: Bariatric Environmental Safety  Goal: Safety Maintained with Care  Outcome: Progressing     Problem: Skin Injury Risk Increased  Goal: Skin Health and Integrity  Outcome: Progressing     Problem: Diabetes Comorbidity  Goal: Blood Glucose Level Within Targeted Range  Outcome: Progressing     Problem: Pneumonia  Goal: Fluid Balance  Outcome: Progressing  Goal: Resolution of Infection Signs and Symptoms  Outcome: Progressing  Goal: Effective Oxygenation and Ventilation  Outcome: Progressing     Problem: Infection  Goal: Absence of Infection Signs and Symptoms  Outcome: Progressing     Problem: Fall Injury Risk  Goal: Absence of Fall and Fall-Related Injury  Outcome: Progressing     Problem: Restraint, Nonviolent  Goal: Absence of Harm or Injury  Outcome: Progressing

## 2024-08-25 NOTE — HPI
64F p/w fall in the context of shortness of breath. EMS reportedly gave narcan w/o appreciable response, then she was given duoneb en route to [facility] ED. Upon arrival, she was tachypneic, had respy sounding phonation, but she denied chest pain, fever, or chills. She was placed on BiPAP, and initially demonstrated improvement. However, she became less responsive and appeared to tire out. She was given additional narcan w/o appreciable response, so she was intubated. She had some hypotension after receiving sedation, so she was placed on low dose levophed. Pickens was placed, lasix was given, as was Abx. Large bruise noted on LLE, so x-ray ordered.

## 2024-08-25 NOTE — CARE UPDATE
08/25/24 0717   Patient Assessment/Suction   Level of Consciousness (AVPU) responds to pain   Respiratory Effort Normal;Unlabored   Expansion/Accessory Muscles/Retractions no use of accessory muscles   All Lung Fields Breath Sounds coarse   Rhythm/Pattern, Respiratory assisted mechanically   Cough Frequency with stimulation   Cough Type assisted   Suction Method tracheal   $ Suction Charges Inline Suction Procedure Stat Charge   Secretions Amount moderate   Secretions Color yellow   Secretions Characteristics thick   PRE-TX-O2   Device (Oxygen Therapy) ventilator   Oxygen Concentration (%) 50   SpO2 100 %   Pulse Oximetry Type Continuous   Pulse (!) 54   Resp 14   Aerosol Therapy   $ Aerosol Therapy Charges Aerosol Treatment   Daily Review of Necessity (SVN) completed   Respiratory Treatment Status (SVN) given   Treatment Route (SVN) in-line   Patient Position HOB elevated   Post Treatment Assessment (SVN) breath sounds unchanged   Signs of Intolerance (SVN) none   Breath Sounds Post-Respiratory Treatment   Throughout All Fields Post-Treatment All Fields   Throughout All Fields Post-Treatment no change   Post-treatment Heart Rate (beats/min) 55   Post-treatment Resp Rate (breaths/min) 14        Airway - Non-Surgical 08/25/24 0427 Endotracheal Tube   Placement Date/Time: 08/25/24 0427   Present Prior to Hospital Arrival?: No  Method of Intubation: Glidescope  Inserted by: MD  Staff/Resident Name(s): Dr. Funes  Airway Device: Endotracheal Tube  Mask Ventilation: Easy  Airway Device Size: 7.5  St...   Secured at 24 cm   Measured At Gum line   Secured Location Center   Secured by Commercial tube camargo   Position of ETT in xRay In good position   Bite Block secure and patent   Site Condition Cool;Dry   Status Intact;Secured;Patent   Site Assessment Clean;Dry   Cuff Pressure   (mlt)   General Safety Checklist   Safety Promotion/Fall Prevention side rails raised   Airway Safety   Is Ambu Bag and Mask with Patient?  Yes, Adult Ambu Bag and Mask   Suction set is at the bedside? Yes   Vent Select   Conventional Vent Y   $ Ventilator Subsequent 1   Charged w/in last 24h YES   Preset Conventional Ventilator Settings   Vent ID [id number]   Vent Type    Ventilation Type VC   Vent Mode A/C   Humidity HME   Set Rate 14 BPM   Vt Set 450 mL   PEEP/CPAP 5.1 cmH20   Waveform RAMP   Peak Flow 60 L/min   Trigger Sensitivity Flow/I-Trigger 3 L/min   Patient Ventilator Parameters   Resp Rate Total 14 br/min   Peak Airway Pressure 40 cmH20   Mean Airway Pressure 11 cmH20   Plateau Pressure 30 cmH20   Exhaled Vt 470 mL   Total Ve 6.59 L/m   I:E Ratio Measured 1:4.2   Tubing ID (mm) 7.5 mm   Tube Type ET   Conventional Ventilator Alarms   Alarms On Y   Ve High Alarm 25 L/min   Ve Low Alarm 2.5 L/min   Vt High Alarm 1000 mL   Vt Low Alarm 250 mL   Resp Rate High Alarm 40 br/min   Apnea Rate 24   Apnea Volume (mL) 1 mL   Apnea Oxygen Concentration  100   Apnea Flow Rate (L/min) 60   T Apnea 20 sec(s)   Ready to Wean/Extubation Screen   FIO2<=50 (chart decimal) 0.5   MV<16L (chart vol.) 6.59   PEEP <=8 (chart #) 5.1   Ready to Wean Parameters   F/VT Ratio<105 (RSBI) (!) 29.79   Education   $ Education Bronchodilator;Ventilator Oxygen;45 min   Labs   $ Was an ABG obtained?   (reinaldo garciax2)   $ Labs Tech Time 45 min

## 2024-08-25 NOTE — CONSULTS
Pulmonary/Critical Care Consult      Patient name: Karo Leonard  MRN: [id number]  Date: 2024    Admit Date: 2024  Consult Requested By: Yosi Robertson MD    Reason for Consult: Respiratory failure, COPD    HPI:    2024 - 65 yo ASCVD, DM, HTN. COPD(cigarette use) had increased SOB at home and a fall.  EMS was called and brought to ER.  Initially tried on BiPAP without response and was subsequently intubated and placed on ventilation.  Initial ABG showed over ventilation and we have adjusted and ABG are getting better.  She is sedated and not able to provide any history.  D/W  who says that she was supposed to see a pulmonologist but couldn't make appointment, she has been a chronic smoker.  She had increase SOB for a few days.  She did have a fall at home but he reports that she was not down for long.  He does report that she has been having recurring falls at home.      Review of Systems    Review of Systems   Unable to perform ROS: Intubated       Past Medical History    Past Medical History:   Diagnosis Date    Coronary artery disease 2017    cardiac stent    DDD (degenerative disc disease), lumbar 2000    Diabetes mellitus     Hypertension     Thyroid disease        Past Surgical History    Past Surgical History:   Procedure Laterality Date     SECTION  08/1987    x2 1993    CORONARY STENT PLACEMENT      EXPLORATORY LAPAROTOMY      [place]     HEMORRHOID SURGERY      LAPAROSCOPIC CHOLECYSTECTOMY N/A 2022    Procedure: CHOLECYSTECTOMY, LAPAROSCOPIC;  Surgeon: Leonardo Wilson III, MD;  Location: [facility];  Service: General;  Laterality: N/A;    LUMBAR DISC SURGERY      PILONIDAL CYST DRAINAGE      TONSILLECTOMY      as a child (also adenoids)       Medications (scheduled):      albuterol-ipratropium  3 mL Nebulization Q6H    enoxparin  40 mg Subcutaneous Q12H    famotidine (PF)  20 mg Intravenous Q12H    [START ON 2024]  levoFLOXacin  750 mg Intravenous Q24H    methylPREDNISolone injection (PEDS and ADULTS)  80 mg Intravenous Q8H    mupirocin   Nasal BID       Medications (infusions):      fentanyl  0-250 mcg/hr Intravenous Continuous 10 mL/hr at 08/25/24 1113 100 mcg/hr at 08/25/24 1113    NORepinephrine bitartrate-D5W  0-3 mcg/kg/min Intravenous Continuous 23.8 mL/hr at 08/25/24 0452 0.05 mcg/kg/min at 08/25/24 0452       Medications (prn):       Current Facility-Administered Medications:     calcium gluconate IVPB, 1 g, Intravenous, PRN    calcium gluconate IVPB, 2 g, Intravenous, PRN    calcium gluconate IVPB, 3 g, Intravenous, PRN    magnesium sulfate IVPB, 2 g, Intravenous, PRN    magnesium sulfate IVPB, 4 g, Intravenous, PRN    potassium bicarbonate, 35 mEq, Oral, PRN    potassium bicarbonate, 50 mEq, Oral, PRN    potassium bicarbonate, 60 mEq, Oral, PRN    potassium chloride in water, 40 mEq, Intravenous, PRN **AND** potassium chloride in water, 60 mEq, Intravenous, PRN **AND** potassium chloride in water, 80 mEq, Intravenous, PRN    sodium chloride 0.9%, 10 mL, Intravenous, PRN    sodium phosphate 15 mmol in D5W 250 mL IVPB, 15 mmol, Intravenous, PRN    sodium phosphate 20.01 mmol in D5W 250 mL IVPB, 20.01 mmol, Intravenous, PRN    sodium phosphate 30 mmol in D5W 250 mL IVPB, 30 mmol, Intravenous, PRN    Family History: No family history on file.    Social History: Tobacco:   Social History     Tobacco Use   Smoking Status Not on file   Smokeless Tobacco Not on file                                EtOH:   Social History     Substance and Sexual Activity   Alcohol Use No                                Drugs:   Social History     Substance and Sexual Activity   Drug Use No       Physical Exam    Vital signs:  Temp:  [97.4 °F (36.3 °C)-98 °F (36.7 °C)]   Pulse:  [48-61]   Resp:  [9-25]   BP: ()/(50-71)   SpO2:  [85 %-100 %]     Intake/Output:   Intake/Output Summary (Last 24 hours) at 8/25/2024 5146  Last data filed at  "8/25/2024 1113  Gross per 24 hour   Intake 740.42 ml   Output --   Net 740.42 ml        BMI: Estimated body mass index is 46.59 kg/m² as calculated from the following:    Height as of this encounter: 5' 5" (1.651 m).    Weight as of this encounter: 127 kg (280 lb).    Physical Exam  Vitals and nursing note reviewed.   Constitutional:       General: She is not in acute distress.     Appearance: She is ill-appearing. She is not toxic-appearing or diaphoretic.      Comments: Intubated  Sedated    HENT:      Head: Normocephalic and atraumatic.      Right Ear: External ear normal.      Left Ear: External ear normal.      Nose: Nose normal. No congestion or rhinorrhea.      Mouth/Throat:      Mouth: Mucous membranes are moist.      Pharynx: Oropharynx is clear. No oropharyngeal exudate or posterior oropharyngeal erythema.      Comments: + ETT  Eyes:      General: No scleral icterus.        Right eye: No discharge.         Left eye: No discharge.      Extraocular Movements: Extraocular movements intact.      Conjunctiva/sclera: Conjunctivae normal.      Pupils: Pupils are equal, round, and reactive to light.   Neck:      Vascular: No carotid bruit.   Cardiovascular:      Rate and Rhythm: Regular rhythm. Tachycardia present.      Pulses: Normal pulses.      Heart sounds: No murmur heard.     No friction rub. No gallop.   Pulmonary:      Effort: Pulmonary effort is normal. No respiratory distress.      Breath sounds: No stridor. Wheezing present. No rhonchi or rales.   Chest:      Chest wall: No tenderness.   Abdominal:      General: There is no distension.      Tenderness: There is no abdominal tenderness. There is no guarding.      Comments: Hypoactive BS   Musculoskeletal:         General: No swelling. Normal range of motion.      Cervical back: Normal range of motion and neck supple. No rigidity or tenderness.      Right lower leg: Edema present.      Left lower leg: Edema present.   Lymphadenopathy:      Cervical: No " "cervical adenopathy.   Skin:     General: Skin is warm and dry.      Capillary Refill: Capillary refill takes less than 2 seconds.      Coloration: Skin is not jaundiced.      Findings: No bruising.      Comments: + bruise LLE   Neurological:      General: No focal deficit present.      Cranial Nerves: No cranial nerve deficit.      Sensory: No sensory deficit.      Motor: No weakness.   Psychiatric:      Comments: Not able to assess         Laboratory    Recent Labs   Lab 08/25/24  0233 08/25/24  0326 08/25/24  0551   WBC 8.96  --   --    RBC 3.12*  --   --    HGB 9.4*  --   --    HCT 30.7*   < > 34*     --   --    MCV 98  --   --    MCH 30.1  --   --    MCHC 30.6*  --   --     < > = values in this interval not displayed.       Recent Labs   Lab 08/25/24 0233   CALCIUM 8.3*   ALBUMIN 3.5   PROT 6.7   *   K 3.5   CO2 >45*   CL 72*   BUN 11   CREATININE 0.8   ALKPHOS 85   ALT 12   AST 37   BILITOT 0.6       No results for input(s): "PT", "INR", "APTT" in the last 24 hours.    Recent Labs   Lab 08/25/24  0233   CPK 1048*       Additional labs: reviewed    Microbiology:       Microbiology Results (last 7 days)       Procedure Component Value Units Date/Time    Culture, Respiratory with Gram Stain [[id number]] Collected: 08/25/24 0556    Order Status: Completed Specimen: Sputum from Tracheal Aspirate Updated: 08/25/24 1023     Gram Stain (Respiratory) <10 epithelial cells per low power field.     Gram Stain (Respiratory) Few WBC's     Gram Stain (Respiratory) Few Gram positive cocci            Radiology    CT Chest Without Contrast  Narrative: EXAMINATION:  CT CHEST WITHOUT CONTRAST    CLINICAL HISTORY:  Aspiration;.    TECHNIQUE:  Axial imaging through the chest was performed from the thoracic inlet to the adrenal glands. No IV contrast was administered.    COMPARISON:  Same date chest radiograph; CTA chest June 2024    FINDINGS:  The heart is mildly enlarged.  There is a small to moderate-sized " pericardial effusion, increased compared to prior study.  Multifocal coronary artery atherosclerotic calcification is noted.  There is ectasia of the ascending aorta at up to 3.9 cm in transverse diameter, stable.  There is no mediastinal mass or pathologic lymphadenopathy.  Endotracheal tube is present with tip at the level similar to the aortic knob.  Nasogastric tube extends to the stomach.    Images at lung windows demonstrate alveolar infiltrates in the apicoposterior segment of the left upper lobe and within dependent portions of the left lower lobe compatible with pneumonia.  There is mild atelectasis or infiltrate at the posterior right lung base, with minimal atelectasis or infiltrate in the posterior segment of the right upper lobe and in the right middle lobe.  Small bilateral pleural effusions are noted.    Images of the upper abdomen demonstrate no acute findings.  No acute osseous abnormalities are identified.  Multilevel degenerative disc disease is noted in the thoracic spine.  Impression: 1. Alveolar infiltrates in the left upper and lower lobes compatible with pneumonia.  Scattered small foci of atelectasis or infiltrates on the right.  Small bilateral pleural effusions.  2. Stable cardiomegaly.  Small to moderate-sized pericardial effusion is increased compared to June 2024.  3. Support devices in place as above.  Additional stable findings as noted.    Electronically signed by: Malik Diez  Date:    08/25/2024  Time:    08:57  CT Head Without Contrast  Narrative: EXAMINATION:  CT HEAD WITHOUT CONTRAST    CLINICAL HISTORY:  Head trauma, minor (Age >= 65y);.    TECHNIQUE:  Thin section axial images were obtained.  No contrast was administered.    COMPARISON:  None.    FINDINGS:  There is no evidence of intracranial mass, hemorrhage, or midline shift.  The ventricles and sulci are within normal limits.  There are no pathologic extra-axial fluid collections.    There is no evidence of ischemic  change or edema.  Cerebellum and brainstem are unremarkable.    The calvarium is intact.  Endotracheal tube is noted.  Impression: 1. Normal CT appearance of the brain and calvarium.    Electronically signed by: Malik Diez  Date:    08/25/2024  Time:    08:50  X-Ray Tibia Fibula 2 View Left  Narrative: EXAMINATION:  XR TIBIA FIBULA 2 VIEW LEFT    CLINICAL HISTORY:  .  Unspecified fall, initial encounter    COMPARISON:  None.    FINDINGS:  AP and lateral views are negative for fracture or osseous destructive lesion. There is no periosteal reaction.  No focal soft tissue abnormality is identified.  Impression: No acute radiographic abnormalities.    Electronically signed by: Malik Diez  Date:    08/25/2024  Time:    07:14  X-Ray Chest AP Portable  Narrative: EXAMINATION:  XR CHEST AP PORTABLE    CLINICAL HISTORY:  et tube placement;    TECHNIQUE:  Single frontal view of the chest was performed.    COMPARISON:  08/25/2024 at 02:44 a.m..    FINDINGS:  Interval intubation with endotracheal tube tip appearing to project at the level of the clavicular heads.  Enteric tube courses below the diaphragm, extending beyond the field of view.  No interval detrimental change in lung aeration or evidence of new or enlarging pneumothorax relative to examination referenced above.  Impression: Please see above.    Electronically signed by: Arelis Humphreys MD  Date:    08/25/2024  Time:    05:36  X-Ray Chest AP Portable  Narrative: EXAMINATION:  XR CHEST AP PORTABLE    CLINICAL HISTORY:  Chest Pain;    TECHNIQUE:  Single frontal view of the chest was performed.    COMPARISON:  06/14/2024    FINDINGS:  Image quality is degraded by suboptimal patient positioning and limited field of view.  There is increased opacity within the left mid lower lung zone concerning for possible underlying consolidative change/infectious process.  The aerated portion of the left upper lung zone and the right lung demonstrates diffuse increased  interstitial prominence.  No definite pneumothorax noting presumed prominent skin fold projects over the right hemithorax on initial acquired image of 02:44.  The cardiomediastinal silhouette appears unchanged noting obscuration of the left heart border due to aforementioned pleuroparenchymal findings.  There is atherosclerosis of the thoracic aorta.  Osseous structures are intact.  Impression: Please see above.    Electronically signed by: Arelis Humphreys MD  Date:    08/25/2024  Time:    04:01        Additional Studies    reviewed    Ventilator Information    Vent Mode: A/C  Oxygen Concentration (%):  [40-50] 50  Resp Rate Total:  [10 br/min-24 br/min] 10 br/min  Vt Set:  [400 mL-450 mL] 400 mL  PEEP/CPAP:  [4.9 cmH20-5.2 cmH20] 5 cmH20  Mean Airway Pressure:  [7.3 yqZ06-42 cmH20] 7.7 cmH20             Recent Labs     08/25/24  0551   PH 7.544*   PCO2 59.7*   PO2 61*   HCO3 51.5*   POCSATURATED 93   BE 29*         Impression    Active Hospital Problems    Diagnosis  POA    *Acute hypoxic on chronic hypercapnic respiratory failure [J96.01, J96.12]  Yes    Acute metabolic encephalopathy [G93.41]  Yes    Fall [W19.XXXA]  Unknown    COPD exacerbation [J44.1]  Yes    Pneumonia [J18.9]  Yes      Resolved Hospital Problems   No resolved problems to display.       Plan    Ventilator and adjust as needed, baseline paCO2 should be in 80-90 range  Respiratory treatments, steroids, antibiotics  Cultures and follow  Follow NA  Pressors if needed  Sedation as needed  Watch H/H - no active bleeding reported  Follow CPK - has mild rhabdomyolysis  Replace electrolytes    Thank you for this consult.  I will follow with you while the patient is hospitalized.      Critical Care Time    I have spent > 35 minutes providing critical care services for this pt for the above diagnoses.  These services have included pt evaluation, pt exam, ventilator assessment, discussions with staff, chart review, data review, note preparation and order  entry.  Medications have been reviewed and adjusted as needed.  The patient has life threatening illness with a high risk of decompensation and/or death.      Sonido Zamora MD  [facility] Pulmonary/Critical Care

## 2024-08-26 LAB
ADENOVIRUS: NOT DETECTED
ALLENS TEST: ABNORMAL
ANION GAP SERPL CALC-SCNC: 6 MMOL/L (ref 8–16)
BASOPHILS # BLD AUTO: 0 K/UL (ref 0–0.2)
BASOPHILS NFR BLD: 0 % (ref 0–1.9)
BORDETELLA PARAPERTUSSIS (IS1001): NOT DETECTED
BORDETELLA PERTUSSIS (PTXP): NOT DETECTED
BUN SERPL-MCNC: 9 MG/DL (ref 8–23)
CALCIUM SERPL-MCNC: 7.7 MG/DL (ref 8.7–10.5)
CHLAMYDIA PNEUMONIAE: NOT DETECTED
CHLORIDE SERPL-SCNC: 77 MMOL/L (ref 95–110)
CK SERPL-CCNC: 409 U/L (ref 20–180)
CO2 SERPL-SCNC: >45 MMOL/L (ref 23–29)
CORONAVIRUS 229E, COMMON COLD VIRUS: NOT DETECTED
CORONAVIRUS HKU1, COMMON COLD VIRUS: NOT DETECTED
CORONAVIRUS NL63, COMMON COLD VIRUS: NOT DETECTED
CORONAVIRUS OC43, COMMON COLD VIRUS: NOT DETECTED
CREAT SERPL-MCNC: 0.6 MG/DL (ref 0.5–1.4)
DELSYS: ABNORMAL
DIFFERENTIAL METHOD BLD: ABNORMAL
EOSINOPHIL # BLD AUTO: 0 K/UL (ref 0–0.5)
EOSINOPHIL NFR BLD: 0 % (ref 0–8)
ERYTHROCYTE [DISTWIDTH] IN BLOOD BY AUTOMATED COUNT: 15.8 % (ref 11.5–14.5)
ERYTHROCYTE [SEDIMENTATION RATE] IN BLOOD BY WESTERGREN METHOD: 10 MM/H
EST. GFR  (NO RACE VARIABLE): >60 ML/MIN/1.73 M^2
ESTIMATED AVG GLUCOSE: 226 MG/DL (ref 68–131)
FERRITIN SERPL-MCNC: 15.3 NG/ML (ref 20–300)
FIO2: 65
FLUBV RNA NPH QL NAA+NON-PROBE: NOT DETECTED
FOLATE SERPL-MCNC: 6.6 NG/ML (ref 4–24)
GLUCOSE SERPL-MCNC: 219 MG/DL (ref 70–110)
GLUCOSE SERPL-MCNC: 227 MG/DL (ref 70–110)
GLUCOSE SERPL-MCNC: 228 MG/DL (ref 70–110)
GLUCOSE SERPL-MCNC: 255 MG/DL (ref 70–110)
GLUCOSE SERPL-MCNC: 275 MG/DL (ref 70–110)
GLUCOSE SERPL-MCNC: 281 MG/DL (ref 70–110)
HBA1C MFR BLD: 9.5 % (ref 4.5–6.2)
HCO3 UR-SCNC: 51.1 MMOL/L (ref 24–28)
HCT VFR BLD AUTO: 26.6 % (ref 37–48.5)
HCT VFR BLD CALC: 35 %PCV (ref 36–54)
HGB BLD-MCNC: 7.9 G/DL (ref 12–16)
HPIV1 RNA NPH QL NAA+NON-PROBE: NOT DETECTED
HPIV2 RNA NPH QL NAA+NON-PROBE: NOT DETECTED
HPIV3 RNA NPH QL NAA+NON-PROBE: NOT DETECTED
HPIV4 RNA NPH QL NAA+NON-PROBE: NOT DETECTED
HUMAN METAPNEUMOVIRUS: NOT DETECTED
IMM GRANULOCYTES # BLD AUTO: 0.05 K/UL (ref 0–0.04)
IMM GRANULOCYTES NFR BLD AUTO: 0.7 % (ref 0–0.5)
INFLUENZA A (SUBTYPES H1,H1-2009,H3): NOT DETECTED
IRON SERPL-MCNC: 31 UG/DL (ref 30–160)
LYMPHOCYTES # BLD AUTO: 0.9 K/UL (ref 1–4.8)
LYMPHOCYTES NFR BLD: 11.1 % (ref 18–48)
MAGNESIUM SERPL-MCNC: 2 MG/DL (ref 1.6–2.6)
MCH RBC QN AUTO: 29.5 PG (ref 27–31)
MCHC RBC AUTO-ENTMCNC: 29.7 G/DL (ref 32–36)
MCV RBC AUTO: 99 FL (ref 82–98)
MIN VOL: 4.31
MODE: ABNORMAL
MONOCYTES # BLD AUTO: 0.2 K/UL (ref 0.3–1)
MONOCYTES NFR BLD: 2.9 % (ref 4–15)
MRSA SCREEN BY PCR: NEGATIVE
MYCOPLASMA PNEUMONIAE: NOT DETECTED
NEUTROPHILS # BLD AUTO: 6.5 K/UL (ref 1.8–7.7)
NEUTROPHILS NFR BLD: 85.3 % (ref 38–73)
NRBC BLD-RTO: 0 /100 WBC
PCO2 BLDA: 110.6 MMHG (ref 35–45)
PEEP: 5
PH SMN: 7.27 [PH] (ref 7.35–7.45)
PHOSPHATE SERPL-MCNC: 3.7 MG/DL (ref 2.7–4.5)
PIP: 40
PLATELET # BLD AUTO: 220 K/UL (ref 150–450)
PMV BLD AUTO: 10.2 FL (ref 9.2–12.9)
PO2 BLDA: 88 MMHG (ref 80–100)
POC BE: 24 MMOL/L
POC IONIZED CALCIUM: 1.09 MMOL/L (ref 1.06–1.42)
POC SATURATED O2: 94 % (ref 95–100)
POC TCO2: >50 MMOL/L (ref 23–27)
POTASSIUM BLD-SCNC: 4.2 MMOL/L (ref 3.5–5.1)
POTASSIUM SERPL-SCNC: 4 MMOL/L (ref 3.5–5.1)
PROCALCITONIN SERPL IA-MCNC: <0.05 NG/ML (ref 0–0.5)
RBC # BLD AUTO: 2.68 M/UL (ref 4–5.4)
RESPIRATORY INFECTION PANEL SOURCE: NORMAL
RSV RNA NPH QL NAA+NON-PROBE: NOT DETECTED
RV+EV RNA NPH QL NAA+NON-PROBE: NOT DETECTED
SAMPLE: ABNORMAL
SARS-COV-2 RNA RESP QL NAA+PROBE: NOT DETECTED
SATURATED IRON: 8 % (ref 20–50)
SITE: ABNORMAL
SODIUM BLD-SCNC: 125 MMOL/L (ref 136–145)
SODIUM SERPL-SCNC: 128 MMOL/L (ref 136–145)
SP02: 95
T4 FREE SERPL-MCNC: 0.27 NG/DL (ref 0.71–1.51)
TOTAL IRON BINDING CAPACITY: 375 UG/DL (ref 250–450)
TRANSFERRIN SERPL-MCNC: 268 MG/DL (ref 200–375)
TSH SERPL DL<=0.005 MIU/L-ACNC: 27.76 UIU/ML (ref 0.34–5.6)
VIT B12 SERPL-MCNC: 189 PG/ML (ref 210–950)
VT: 400
WBC # BLD AUTO: 7.65 K/UL (ref 3.9–12.7)

## 2024-08-26 PROCEDURE — 84100 ASSAY OF PHOSPHORUS: CPT | Performed by: HOSPITALIST

## 2024-08-26 PROCEDURE — 85025 COMPLETE CBC W/AUTO DIFF WBC: CPT | Performed by: HOSPITALIST

## 2024-08-26 PROCEDURE — 87633 RESP VIRUS 12-25 TARGETS: CPT | Performed by: FAMILY MEDICINE

## 2024-08-26 PROCEDURE — 99900026 HC AIRWAY MAINTENANCE (STAT)

## 2024-08-26 PROCEDURE — 25000003 PHARM REV CODE 250: Mod: JZ,JG | Performed by: HOSPITALIST

## 2024-08-26 PROCEDURE — 94640 AIRWAY INHALATION TREATMENT: CPT

## 2024-08-26 PROCEDURE — 99291 CRITICAL CARE FIRST HOUR: CPT | Mod: ,,, | Performed by: INTERNAL MEDICINE

## 2024-08-26 PROCEDURE — 63600175 PHARM REV CODE 636 W HCPCS: Performed by: HOSPITALIST

## 2024-08-26 PROCEDURE — 25000003 PHARM REV CODE 250: Performed by: STUDENT IN AN ORGANIZED HEALTH CARE EDUCATION/TRAINING PROGRAM

## 2024-08-26 PROCEDURE — 36415 COLL VENOUS BLD VENIPUNCTURE: CPT | Performed by: HOSPITALIST

## 2024-08-26 PROCEDURE — 63600175 PHARM REV CODE 636 W HCPCS: Performed by: INTERNAL MEDICINE

## 2024-08-26 PROCEDURE — 83036 HEMOGLOBIN GLYCOSYLATED A1C: CPT | Performed by: HOSPITALIST

## 2024-08-26 PROCEDURE — 20000000 HC ICU ROOM

## 2024-08-26 PROCEDURE — 25000242 PHARM REV CODE 250 ALT 637 W/ HCPCS: Performed by: HOSPITALIST

## 2024-08-26 PROCEDURE — 87798 DETECT AGENT NOS DNA AMP: CPT | Mod: 59 | Performed by: FAMILY MEDICINE

## 2024-08-26 PROCEDURE — 82550 ASSAY OF CK (CPK): CPT | Performed by: INTERNAL MEDICINE

## 2024-08-26 PROCEDURE — 82607 VITAMIN B-12: CPT | Performed by: HOSPITALIST

## 2024-08-26 PROCEDURE — 84443 ASSAY THYROID STIM HORMONE: CPT | Performed by: HOSPITALIST

## 2024-08-26 PROCEDURE — 63600175 PHARM REV CODE 636 W HCPCS: Performed by: FAMILY MEDICINE

## 2024-08-26 PROCEDURE — 82746 ASSAY OF FOLIC ACID SERUM: CPT | Performed by: HOSPITALIST

## 2024-08-26 PROCEDURE — 87641 MR-STAPH DNA AMP PROBE: CPT | Performed by: FAMILY MEDICINE

## 2024-08-26 PROCEDURE — 99900031 HC PATIENT EDUCATION (STAT)

## 2024-08-26 PROCEDURE — 80048 BASIC METABOLIC PNL TOTAL CA: CPT | Performed by: HOSPITALIST

## 2024-08-26 PROCEDURE — 82962 GLUCOSE BLOOD TEST: CPT

## 2024-08-26 PROCEDURE — 84439 ASSAY OF FREE THYROXINE: CPT | Performed by: HOSPITALIST

## 2024-08-26 PROCEDURE — 25000003 PHARM REV CODE 250: Performed by: INTERNAL MEDICINE

## 2024-08-26 PROCEDURE — 99900035 HC TECH TIME PER 15 MIN (STAT)

## 2024-08-26 PROCEDURE — 94003 VENT MGMT INPAT SUBQ DAY: CPT

## 2024-08-26 PROCEDURE — 82728 ASSAY OF FERRITIN: CPT | Performed by: HOSPITALIST

## 2024-08-26 PROCEDURE — 83540 ASSAY OF IRON: CPT | Performed by: HOSPITALIST

## 2024-08-26 PROCEDURE — 84145 PROCALCITONIN (PCT): CPT | Performed by: HOSPITALIST

## 2024-08-26 PROCEDURE — 94761 N-INVAS EAR/PLS OXIMETRY MLT: CPT

## 2024-08-26 PROCEDURE — 27100171 HC OXYGEN HIGH FLOW UP TO 24 HOURS

## 2024-08-26 PROCEDURE — 25000003 PHARM REV CODE 250: Performed by: FAMILY MEDICINE

## 2024-08-26 PROCEDURE — 83735 ASSAY OF MAGNESIUM: CPT | Performed by: HOSPITALIST

## 2024-08-26 RX ORDER — LEVOTHYROXINE SODIUM 25 UG/1
50 TABLET ORAL
Status: DISCONTINUED | OUTPATIENT
Start: 2024-08-27 | End: 2024-09-04

## 2024-08-26 RX ORDER — PROPOFOL 10 MG/ML
0-50 INJECTION, EMULSION INTRAVENOUS CONTINUOUS
Status: DISCONTINUED | OUTPATIENT
Start: 2024-08-26 | End: 2024-09-01

## 2024-08-26 RX ORDER — PROPOFOL 10 MG/ML
0-50 INJECTION, EMULSION INTRAVENOUS CONTINUOUS
Status: DISCONTINUED | OUTPATIENT
Start: 2024-08-26 | End: 2024-08-27

## 2024-08-26 RX ADMIN — VANCOMYCIN HYDROCHLORIDE 2000 MG: 500 INJECTION, POWDER, LYOPHILIZED, FOR SOLUTION INTRAVENOUS at 09:08

## 2024-08-26 RX ADMIN — PROPOFOL 30 MCG/KG/MIN: 10 INJECTION, EMULSION INTRAVENOUS at 07:08

## 2024-08-26 RX ADMIN — MIDAZOLAM HYDROCHLORIDE 2.5 MG: 1 INJECTION, SOLUTION INTRAMUSCULAR; INTRAVENOUS at 01:08

## 2024-08-26 RX ADMIN — PROPOFOL 10 MCG/KG/MIN: 10 INJECTION, EMULSION INTRAVENOUS at 07:08

## 2024-08-26 RX ADMIN — VANCOMYCIN HYDROCHLORIDE 2000 MG: 500 INJECTION, POWDER, LYOPHILIZED, FOR SOLUTION INTRAVENOUS at 08:08

## 2024-08-26 RX ADMIN — FAMOTIDINE 20 MG: 10 INJECTION INTRAVENOUS at 09:08

## 2024-08-26 RX ADMIN — ENOXAPARIN SODIUM 40 MG: 40 INJECTION SUBCUTANEOUS at 08:08

## 2024-08-26 RX ADMIN — METHYLPREDNISOLONE SODIUM SUCCINATE 80 MG: 125 INJECTION, POWDER, FOR SOLUTION INTRAMUSCULAR; INTRAVENOUS at 06:08

## 2024-08-26 RX ADMIN — ENOXAPARIN SODIUM 40 MG: 40 INJECTION SUBCUTANEOUS at 09:08

## 2024-08-26 RX ADMIN — IPRATROPIUM BROMIDE AND ALBUTEROL SULFATE 3 ML: 2.5; .5 SOLUTION RESPIRATORY (INHALATION) at 01:08

## 2024-08-26 RX ADMIN — IPRATROPIUM BROMIDE AND ALBUTEROL SULFATE 3 ML: 2.5; .5 SOLUTION RESPIRATORY (INHALATION) at 07:08

## 2024-08-26 RX ADMIN — INSULIN ASPART 3 UNITS: 100 INJECTION, SOLUTION INTRAVENOUS; SUBCUTANEOUS at 09:08

## 2024-08-26 RX ADMIN — METHYLPREDNISOLONE SODIUM SUCCINATE 80 MG: 125 INJECTION, POWDER, FOR SOLUTION INTRAMUSCULAR; INTRAVENOUS at 09:08

## 2024-08-26 RX ADMIN — INSULIN ASPART 2 UNITS: 100 INJECTION, SOLUTION INTRAVENOUS; SUBCUTANEOUS at 08:08

## 2024-08-26 RX ADMIN — FAMOTIDINE 20 MG: 10 INJECTION INTRAVENOUS at 08:08

## 2024-08-26 RX ADMIN — INSULIN ASPART 3 UNITS: 100 INJECTION, SOLUTION INTRAVENOUS; SUBCUTANEOUS at 11:08

## 2024-08-26 RX ADMIN — SODIUM CHLORIDE: 9 INJECTION, SOLUTION INTRAVENOUS at 01:08

## 2024-08-26 RX ADMIN — PROPOFOL 20 MCG/KG/MIN: 10 INJECTION, EMULSION INTRAVENOUS at 01:08

## 2024-08-26 RX ADMIN — MUPIROCIN 1 G: 20 OINTMENT TOPICAL at 08:08

## 2024-08-26 RX ADMIN — LEVOFLOXACIN 750 MG: 5 INJECTION, SOLUTION INTRAVENOUS at 01:08

## 2024-08-26 RX ADMIN — CALCIUM GLUCONATE 1 G: 20 INJECTION, SOLUTION INTRAVENOUS at 08:08

## 2024-08-26 RX ADMIN — Medication 150 MCG/HR: at 01:08

## 2024-08-26 RX ADMIN — PROPOFOL 35 MCG/KG/MIN: 10 INJECTION, EMULSION INTRAVENOUS at 11:08

## 2024-08-26 RX ADMIN — MUPIROCIN 1 G: 20 OINTMENT TOPICAL at 09:08

## 2024-08-26 RX ADMIN — METHYLPREDNISOLONE SODIUM SUCCINATE 80 MG: 125 INJECTION, POWDER, FOR SOLUTION INTRAMUSCULAR; INTRAVENOUS at 01:08

## 2024-08-26 RX ADMIN — INSULIN ASPART 3 UNITS: 100 INJECTION, SOLUTION INTRAVENOUS; SUBCUTANEOUS at 04:08

## 2024-08-26 NOTE — CARE UPDATE
08/26/24 1012   PRE-TX-O2   Oxygen Concentration (%) 65   SpO2 97 %   Pulse (!) 55   Resp 16   Equipment Change   $ RT Equipment Ventilator   Vent Select   Conventional Vent Y   Charged w/in last 24h YES   Preset Conventional Ventilator Settings   Vent ID [id number]   Vent Type    Ventilation Type VC   Vent Mode A/C   Humidity HME   Set Rate 14 BPM   Vt Set 400 mL   PEEP/CPAP 5 cmH20   Peak Flow 65 L/min   Peak End Inspiratory Pressure 22 cmH20   I-Trigger Type  V-TRIG   Trigger Sensitivity Flow/I-Trigger 3 L/min   Patient Ventilator Parameters   Resp Rate Total 14 br/min   Peak Airway Pressure 31 cmH20   Mean Airway Pressure 8.8 cmH20   Plateau Pressure 0 cmH20   Exhaled Vt 404 mL   Total Ve 5.62 L/m   I:E Ratio Measured 1:5.3   Auto PEEP 0 cmH20   Tubing ID (mm) 7.5 mm   Tube Type ET   Conventional Ventilator Alarms   Alarms On Y   Ve High Alarm 25 L/min   Ve Low Alarm 3 L/min   Vt High Alarm 1200 mL   Vt Low Alarm 200 mL   Resp Rate High Alarm 40 br/min   Apnea Rate 10   Apnea Volume (mL) 0 mL   Apnea Oxygen Concentration  100   Apnea Flow Rate (L/min) 64   T Apnea 20 sec(s)   Ready to Wean/Extubation Screen   FIO2<=50 (chart decimal) (!) 0.65   MV<16L (chart vol.) 5.62   PEEP <=8 (chart #) 5   Ready to Wean Parameters   F/VT Ratio<105 (RSBI) (!) 39.6     Ventilator swapped out from  #8 to  #9

## 2024-08-26 NOTE — PROGRESS NOTES
Pulmonary/Critical Care  Progress Note      Patient name: Karo Leonard  MRN: [id number]  Date: 2024    Admit Date: 2024  Consult Requested By: Olga Lidia Pedroza DO    Reason for Consult: Respiratory failure, COPD    HPI:    2024 - 63 yo ASCVD, DM, HTN. COPD(cigarette use) had increased SOB at home and a fall.  EMS was called and brought to ER.  Initially tried on BiPAP without response and was subsequently intubated and placed on ventilation.  Initial ABG showed over ventilation and we have adjusted and ABG are getting better.  She is sedated and not able to provide any history.  D/W  who says that she was supposed to see a pulmonologist but couldn't make appointment, she has been a chronic smoker.  She had increase SOB for a few days.  She did have a fall at home but he reports that she was not down for long.  He does report that she has been having recurring falls at home.      2024 - Stable overnight, O2 needs still too high to do SBT.  She is responsive and gets agitated on current sedation and we are adjusting.  No other new issues reported.  Hgb has decreased (no active bleeding reported), NA remains low, CPK is better, TFTF noted and c/w hypothyroid (synthroid started).    Review of Systems    Review of Systems   Unable to perform ROS: Intubated       Past Medical History    Past Medical History:   Diagnosis Date    Coronary artery disease     cardiac stent    DDD (degenerative disc disease), lumbar 2000    Diabetes mellitus     Hypertension     Thyroid disease        Past Surgical History    Past Surgical History:   Procedure Laterality Date     SECTION  08/1987    x2 1993    CORONARY STENT PLACEMENT      EXPLORATORY LAPAROTOMY  1982    [facility]     HEMORRHOID SURGERY      LAPAROSCOPIC CHOLECYSTECTOMY N/A 2022    Procedure: CHOLECYSTECTOMY, LAPAROSCOPIC;  Surgeon: Leonardo Wilson III, MD;  Location: [facility] OR;  Service: General;   Laterality: N/A;    LUMBAR DISC SURGERY  2000    PILONIDAL CYST DRAINAGE  1976    TONSILLECTOMY      as a child (also adenoids)       Medications (scheduled):      albuterol-ipratropium  3 mL Nebulization Q6H    enoxparin  40 mg Subcutaneous Q12H    famotidine (PF)  20 mg Intravenous Q12H    levoFLOXacin  750 mg Intravenous Q24H    [START ON 8/27/2024] levothyroxine  50 mcg Oral Before breakfast    methylPREDNISolone injection (PEDS and ADULTS)  80 mg Intravenous Q8H    mupirocin   Nasal BID    vancomycin (VANCOCIN) IV (PEDS and ADULTS)  2,000 mg Intravenous Once       Medications (infusions):      0.9% NaCl   Intravenous Continuous 100 mL/hr at 08/26/24 0152 New Bag at 08/26/24 0152    fentanyl  0-250 mcg/hr Intravenous Continuous   Stopped at 08/26/24 0721    NORepinephrine bitartrate-D5W  0-3 mcg/kg/min Intravenous Continuous 23.8 mL/hr at 08/25/24 0452 0.05 mcg/kg/min at 08/25/24 0452    propofoL  0-50 mcg/kg/min Intravenous Continuous 15.2 mL/hr at 08/26/24 0721 20 mcg/kg/min at 08/26/24 0721    propofoL  0-50 mcg/kg/min Intravenous Continuous           Medications (prn):       Current Facility-Administered Medications:     calcium gluconate IVPB, 1 g, Intravenous, PRN    calcium gluconate IVPB, 2 g, Intravenous, PRN    calcium gluconate IVPB, 3 g, Intravenous, PRN    dextrose 50%, 12.5 g, Intravenous, PRN    dextrose 50%, 25 g, Intravenous, PRN    glucagon (human recombinant), 1 mg, Intramuscular, PRN    glucose, 16 g, Oral, PRN    glucose, 24 g, Oral, PRN    insulin aspart U-100, 0-5 Units, Subcutaneous, QID (AC + HS) PRN    magnesium sulfate IVPB, 2 g, Intravenous, PRN    magnesium sulfate IVPB, 4 g, Intravenous, PRN    potassium bicarbonate, 35 mEq, Oral, PRN    potassium bicarbonate, 50 mEq, Oral, PRN    potassium bicarbonate, 60 mEq, Oral, PRN    potassium chloride in water, 40 mEq, Intravenous, PRN **AND** potassium chloride in water, 60 mEq, Intravenous, PRN **AND** potassium chloride in water, 80 mEq,  "Intravenous, PRN    sodium chloride 0.9%, 10 mL, Intravenous, PRN    sodium phosphate 15 mmol in D5W 250 mL IVPB, 15 mmol, Intravenous, PRN    sodium phosphate 20.01 mmol in D5W 250 mL IVPB, 20.01 mmol, Intravenous, PRN    sodium phosphate 30 mmol in D5W 250 mL IVPB, 30 mmol, Intravenous, PRN    Pharmacy to dose Vancomycin consult, , , Once **AND** vancomycin - pharmacy to dose, , Intravenous, pharmacy to manage frequency    Family History: No family history on file.    Social History: Tobacco:   Social History     Tobacco Use   Smoking Status Not on file   Smokeless Tobacco Not on file                                EtOH:   Social History     Substance and Sexual Activity   Alcohol Use No                                Drugs:   Social History     Substance and Sexual Activity   Drug Use No       Physical Exam    Vital signs:  Temp:  [97.2 °F (36.2 °C)-98 °F (36.7 °C)]   Pulse:  [53-72]   Resp:  [10-54]   BP: ()/(48-78)   SpO2:  [85 %-96 %]     Intake/Output:   Intake/Output Summary (Last 24 hours) at 8/26/2024 0842  Last data filed at 8/26/2024 0721  Gross per 24 hour   Intake 2401.75 ml   Output 927 ml   Net 1474.75 ml        BMI: Estimated body mass index is 46.59 kg/m² as calculated from the following:    Height as of this encounter: 5' 5" (1.651 m).    Weight as of this encounter: 127 kg (280 lb).    Physical Exam  Vitals and nursing note reviewed.   Constitutional:       General: She is not in acute distress.     Appearance: She is ill-appearing. She is not toxic-appearing or diaphoretic.      Comments: Intubated  Sedated    HENT:      Head: Normocephalic and atraumatic.      Right Ear: External ear normal.      Left Ear: External ear normal.      Nose: Nose normal. No congestion or rhinorrhea.      Mouth/Throat:      Mouth: Mucous membranes are moist.      Pharynx: Oropharynx is clear. No oropharyngeal exudate or posterior oropharyngeal erythema.      Comments: + ETT  Eyes:      General: No scleral " "icterus.        Right eye: No discharge.         Left eye: No discharge.      Extraocular Movements: Extraocular movements intact.      Conjunctiva/sclera: Conjunctivae normal.      Pupils: Pupils are equal, round, and reactive to light.   Neck:      Vascular: No carotid bruit.   Cardiovascular:      Rate and Rhythm: Regular rhythm. Tachycardia present.      Pulses: Normal pulses.      Heart sounds: No murmur heard.     No friction rub. No gallop.   Pulmonary:      Effort: Pulmonary effort is normal. No respiratory distress.      Breath sounds: No stridor. Wheezing present. No rhonchi or rales.   Chest:      Chest wall: No tenderness.   Abdominal:      General: There is no distension.      Tenderness: There is no abdominal tenderness. There is no guarding.      Comments: Hypoactive BS   Musculoskeletal:         General: No swelling. Normal range of motion.      Cervical back: Normal range of motion and neck supple. No rigidity or tenderness.      Right lower leg: Edema present.      Left lower leg: Edema present.   Lymphadenopathy:      Cervical: No cervical adenopathy.   Skin:     General: Skin is warm and dry.      Capillary Refill: Capillary refill takes less than 2 seconds.      Coloration: Skin is not jaundiced.      Findings: No bruising.      Comments: + bruise LLE   Neurological:      General: No focal deficit present.      Cranial Nerves: No cranial nerve deficit.      Sensory: No sensory deficit.      Motor: No weakness.   Psychiatric:      Comments: Not able to assess         Laboratory    Recent Labs   Lab 08/26/24 0246 08/26/24  0605   WBC 7.65  --    RBC 2.68*  --    HGB 7.9*  --    HCT 26.6* 35*     --    MCV 99*  --    MCH 29.5  --    MCHC 29.7*  --        Recent Labs   Lab 08/26/24 0246   CALCIUM 7.7*   *   K 4.0   CO2 >45*   CL 77*   BUN 9   CREATININE 0.6       No results for input(s): "PT", "INR", "APTT" in the last 24 hours.    Recent Labs   Lab 08/26/24 0246   * "       Additional labs: reviewed    Microbiology:       Microbiology Results (last 7 days)       Procedure Component Value Units Date/Time    Respiratory Infection Panel (PCR), Nasopharyngeal [[id number]]     Order Status: No result Specimen: Nasopharyngeal Swab     MRSA Screen by PCR [[id number]]     Order Status: No result Specimen: Nasal Swab     Culture, Respiratory with Gram Stain [[id number]] Collected: 08/25/24 0556    Order Status: Completed Specimen: Sputum from Tracheal Aspirate Updated: 08/26/24 0728     Respiratory Culture Normal respiratory denise     Gram Stain (Respiratory) <10 epithelial cells per low power field.     Gram Stain (Respiratory) Few WBC's     Gram Stain (Respiratory) Few Gram positive cocci    Culture, Respiratory with Gram Stain [[id number]]     Order Status: No result Specimen: Respiratory             Radiology    X-Ray Chest AP Portable  Narrative: EXAMINATION:  XR CHEST AP PORTABLE    CLINICAL HISTORY:  resp fail;    COMPARISON:  08/25/2024    FINDINGS:  The cardiomediastinal silhouette and central pulmonary vasculature are stable.    An endotracheal tube remains in place with its tip positioned above the level of the laura.  Nasogastric tube extends into the left upper quadrant the abdomen.    There are mild persistent airspace opacities or volume loss within the right mid/lower lung zone.  Patchy pulmonary opacities persist in the left mid to lower lung zone obscuring the left hemidiaphragm.  A left-sided pleural effusion is not excluded.  Monitoring electrodes overlie the chest.  Impression: No significant interval change.    Electronically signed by: Neal Killian  Date:    08/26/2024  Time:    07:01        Additional Studies    reviewed    Ventilator Information    Vent Mode: A/C  Oxygen Concentration (%):  [50-65] 65  Resp Rate Total:  [8.1 br/min-18 br/min] 17 br/min  Vt Set:  [400 mL] 400 mL  PEEP/CPAP:  [5 cmH20] 5 cmH20  Mean Airway Pressure:  [7.3 cmH20-8.8 cmH20] 8.6  cmH20             Recent Labs     08/26/24  0605   PH 7.273*   PCO2 110.6*   PO2 88   HCO3 51.1*   POCSATURATED 94   BE 24*         Impression    Active Hospital Problems    Diagnosis  POA    *Acute hypoxic on chronic hypercapnic respiratory failure [J96.01, J96.12]  Yes    Acute metabolic encephalopathy [G93.41]  Yes    Fall [W19.XXXA]  Unknown    COPD exacerbation [J44.1]  Yes    Pneumonia [J18.9]  Yes    Hypothyroid [E03.9]  Yes      Resolved Hospital Problems   No resolved problems to display.       Plan    Ventilator and adjust as needed, baseline paCO2 should be about 90  Respiratory treatments, steroids, antibiotics  Cultures and follow  Not ready to wean  Follow NA  Pressors if needed  Sedation as needed, being adjusted  Watch H/H - no active bleeding reported  Follow CPK - has mild rhabdomyolysis  Replace electrolytes  Synthroid and follow    Thank you for this consult.  I will follow with you while the patient is hospitalized.      Critical Care Time    I have spent > 35 minutes providing critical care services for this pt for the above diagnoses.  These services have included pt evaluation, pt exam, ventilator assessment, SBT assessment, discussions with staff, chart review, data review, note preparation and .  Medications have been reviewed and adjusted as needed.  The patient has life threatening illness with a high risk of decompensation and/or death.      Sonido Zamora MD  [facility] Pulmonary/Critical Care

## 2024-08-26 NOTE — RESPIRATORY THERAPY
08/25/24 1948   Patient Assessment/Suction   Level of Consciousness (AVPU) responds to voice   Respiratory Effort Normal;Unlabored   Expansion/Accessory Muscles/Retractions no retractions;no use of accessory muscles   All Lung Fields Breath Sounds diminished;clear   Rhythm/Pattern, Respiratory assisted mechanically   Cough Frequency with stimulation   Cough Type assisted   Suction Method tracheal   Suction Pressure (mmHg) -120 mmHg   $ Suction Charges Inline Suction Procedure Stat Charge   Secretions Amount scant   Skin Integrity   $ Wound Care Tech Time 15 min   Area Observed Upper lip;Lower lip;Corner lip   Skin Appearance without discoloration   PRE-TX-O2   Device (Oxygen Therapy) ventilator   SpO2 95 %   Pulse Oximetry Type Continuous   $ Pulse Oximetry - Multiple Charge Pulse Oximetry - Multiple   Pulse (!) 54   Resp 10   Aerosol Therapy   $ Aerosol Therapy Charges Aerosol Treatment   Daily Review of Necessity (SVN) completed   Respiratory Treatment Status (SVN) given   Treatment Route (SVN) in-line;ventilator   Patient Position HOB elevated   Post Treatment Assessment (SVN) breath sounds unchanged   Signs of Intolerance (SVN) none        Airway - Non-Surgical 08/25/24 0427 Endotracheal Tube   Placement Date/Time: 08/25/24 0427   Present Prior to Hospital Arrival?: No  Method of Intubation: Glidescope  Inserted by: MD  Staff/Resident Name(s): Dr. Funes  Airway Device: Endotracheal Tube  Mask Ventilation: Easy  Airway Device Size: 7.5  St...   Secured at 24 cm   Measured At Lips   Secured by Commercial tube camargo   Status Intact;Secured;Patent   Airway Safety   Is Ambu Bag and Mask with Patient? Yes, Adult Ambu Bag and Mask   Suction set is at the bedside? Yes   Respiratory Interventions   Airway/Ventilation Management airway patency maintained   Vent Select   Conventional Vent Y   Charged w/in last 24h YES   Preset Conventional Ventilator Settings   Vent ID [id number]   Vent Type    Ventilation Type VC    Vent Mode A/C   Humidity HME   Set Rate 10 BPM   Vt Set 400 mL   PEEP/CPAP 5 cmH20   Waveform RAMP   Trigger Sensitivity Flow/I-Trigger 3 L/min   Patient Ventilator Parameters   Resp Rate Total 10 br/min   Peak Airway Pressure 24 cmH20   Mean Airway Pressure 7.5 cmH20   Exhaled Vt 429 mL   Total Ve 4.5 L/m   Tubing ID (mm) 7.5 mm   Tube Type ET   Conventional Ventilator Alarms   Alarms On Y   Ready to Wean/Extubation Screen   MV<16L (chart vol.) 4.5   PEEP <=8 (chart #) 5   Ready to Wean Parameters   F/VT Ratio<105 (RSBI) (!) 23.31   Education   $ Education Bronchodilator;Suction;Ventilator Oxygen;15 min

## 2024-08-26 NOTE — CONSULTS
Vital AF @ 50 mL/hr + ProtGold BID.(35 gm protein).provides 1480 kcal, 124 gm protein 973 mL free water.

## 2024-08-26 NOTE — PROGRESS NOTES
[facility] Medicine  Progress Note    Patient Name: Karo Leonard  MRN: [id number]  Patient Class: IP- Inpatient   Admission Date: 8/25/2024  Length of Stay: 1 days  Attending Physician: Olga Lidia Pedroza DO  Primary Care Provider: Navneet Hernandez FNP        Subjective:     Principal Problem:Acute hypoxic on chronic hypercapnic respiratory failure        HPI:  64F p/w fall in the context of shortness of breath. EMS reportedly gave narcan w/o appreciable response, then she was given duoneb en route to [facility] ED. Upon arrival, she was tachypneic, had respy sounding phonation, but she denied chest pain, fever, or chills. She was placed on BiPAP, and initially demonstrated improvement. However, she became less responsive and appeared to tire out. She was given additional narcan w/o appreciable response, so she was intubated. She had some hypotension after receiving sedation, so she was placed on low dose levophed. Pickens was placed, lasix was given, as was Abx. Large bruise noted on LLE, so x-ray ordered.     Overview/Hospital Course:  No notes on file    Interval History: Patient is intubated    Review of Systems   All other systems reviewed and are negative.    Objective:     Vital Signs (Most Recent):  Temp: 97.7 °F (36.5 °C) (08/26/24 1600)  Pulse: (!) 53 (08/26/24 1710)  Resp: 14 (08/26/24 1710)  BP: (!) 101/53 (08/26/24 1700)  SpO2: 95 % (08/26/24 1710) Vital Signs (24h Range):  Temp:  [97.2 °F (36.2 °C)-98.5 °F (36.9 °C)] 97.7 °F (36.5 °C)  Pulse:  [53-72] 53  Resp:  [10-54] 14  SpO2:  [85 %-100 %] 95 %  BP: ()/(8-79) 101/53     Weight: 127 kg (280 lb)  Body mass index is 46.59 kg/m².    Intake/Output Summary (Last 24 hours) at 8/26/2024 1717  Last data filed at 8/26/2024 1700  Gross per 24 hour   Intake 1661.33 ml   Output 887 ml   Net 774.33 ml         Physical Exam  Constitutional:       Appearance: She is ill-appearing.   Cardiovascular:      Rate and Rhythm: Normal rate.  "  Pulmonary:      Effort: Pulmonary effort is normal.   Abdominal:      General: Abdomen is flat.             Significant Labs: All pertinent labs within the past 24 hours have been reviewed.    Significant Imaging: I have reviewed all pertinent imaging results/findings within the past 24 hours.    Assessment/Plan:      * Acute hypoxic on chronic hypercapnic respiratory failure  Patient is currently intubated  Pulmonology is following patient    Pneumonia  MRSA neg, procal neg, respiratory panel  Continue with broad spectrum antibotics for now    Fall  Given ecchymosis on L knee, get x-ray to r/o fxr.    Acute metabolic encephalopathy  Hypercapnic narcosis w/ pCO2 > 100 on POC testing. Getting HCT to ensure no additional abnl.      COPD exacerbation  Per Dr. Funes, patient had wheezing on arrival, and her serum bicarb demonstrates significant metabolic compensation for pCO2 retention. Cont steroids, duonebs, suspect PNA is trigger so provide Abx for likely strep.      VTE Risk Mitigation (From admission, onward)           Ordered     enoxaparin injection 40 mg  Every 12 hours        Note to Pharmacy: Ht: 5' 5" (1.651 m)  Wt: 127 kg (280 lb)  Estimated Creatinine Clearance: 92.8 mL/min (based on SCr of 0.8 mg/dL).  Body mass index is 46.59 kg/m².    08/25/24 0627     IP VTE HIGH RISK PATIENT  Once         08/25/24 0627     Place sequential compression device  Until discontinued         08/25/24 0627                    Discharge Planning   CHELE:      Code Status: Full Code   Is the patient medically ready for discharge?:     Reason for patient still in hospital (select all that apply): Patient trending condition  Discharge Plan A: Home Health            Critical care time spent on the evaluation and treatment of severe organ dysfunction, review of pertinent labs and imaging studies, discussions with consulting providers and discussions with patient/family: 32 minutes.      Olga Lidia Pedroza DO  Department of Hospital " Medicine   [facility]

## 2024-08-26 NOTE — PLAN OF CARE
[facility]  Initial Discharge Assessment    CM met with pt spouse who was present in pt room. Pt is intubated and sedated so spouse provided information. Pt lives with spouse in a SSH with a ramp going into home. Spouse assists with all ADLs.  DME is a wc, rw, bsc, sc, cpap, glucometer, and O2-both portable tanks and concentrator from [facility].  Normally on 2.5L Prior to admission, pt had been able to pivot onto BSC without assistance at times. Spouse drives pt to and from appointments.  Pt with recent falls and increased SOB. No current HH. Discussed with spouse that pt may require some rehab/additional services prior to return home. Will continue to follow.    Primary Care Provider: Navneet Hernandez FNP    Admission Diagnosis: COPD with acute exacerbation [J44.1]    Admission Date: 8/25/2024  Expected Discharge Date:     Transition of Care Barriers: None    Payor: MEDICARE / Plan: MEDICARE PART A & B / Product Type: Government /     Extended Emergency Contact Information  Primary Emergency Contact: Wayne Leonard  Address: [address] [place] [address]  Home Phone: [phone]  Work Phone: [phone]  Mobile Phone: [phone]  Relation: Spouse  Preferred language: English   needed? No    Discharge Plan A: Home Health  Discharge Plan B: Skilled Nursing Facility      [id number] - [place], [place] - [address]  [address]  [facility] [address]  Phone: [phone] Fax: [phone]    [facility] [place] [place] - [address]  [address]  [place] [address]  Phone: [phone] Fax: [phone]    [id number] - [place] - [address]  [address] [facility] [address]  Phone: [phone] Fax: [phone]    [facility] - [place] - [address] AT [facility]  [address]  [place] [address]  Phone:  [phone] Fax: [phone]      Initial Assessment (most recent)       Adult Discharge Assessment - 08/26/24 1308          Discharge Assessment    Assessment Type Discharge Planning Assessment     Confirmed/corrected address, phone number and insurance Yes     Confirmed Demographics Correct on Facesheet     Source of Information family     If unable to respond/provide information was family/caregiver contacted? Yes     Contact Name/Number Wayne Leonard (Spouse)  [phone]   (spouse preent in room)     When was your last doctors appointment? --   1-2 months ago and spoke Friday via phone    Communicated CHELE with patient/caregiver Date not available/Unable to determine     Reason For Admission SOB, fall     People in Home spouse     Facility Arrived From: Home     Do you expect to return to your current living situation? Yes     Do you have help at home or someone to help you manage your care at home? Yes     Who are your caregiver(s) and their phone number(s)? Wayne Leonard (Spouse)  [phone]     Prior to hospitilization cognitive status: Unable to Assess     Current cognitive status: Coma/Sedated/Intubated     Walking or Climbing Stairs Difficulty yes     Walking or Climbing Stairs transferring difficulty, assistance 1 person     Mobility Management WC     Dressing/Bathing Difficulty yes     Dressing/Bathing bathing difficulty, assistance 1 person     Dressing/Bathing Management SC, spouse assists     Home Accessibility not wheelchair accessible     Home Layout Other (see comments)   H with wc ramp going into home    Equipment Currently Used at Home none     Readmission within 30 days? No     Patient currently being followed by outpatient case management? No     Do you currently have service(s) that help you manage your care at home? No     Do you take prescription medications? Yes     Do you have prescription coverage? Yes     Coverage [employer]     Do you have any problems affording any of your  prescribed medications? No     Is the patient taking medications as prescribed? yes     Who is going to help you get home at discharge? Wayne Leonard (Spouse)  [phone]     How do you get to doctors appointments? family or friend will provide     Are you on dialysis? No     Do you take coumadin? No     Discharge Plan A Home Health     Discharge Plan B Skilled Nursing Facility     DME Needed Upon Discharge  none     Discharge Plan discussed with: Spouse/sig other     Name(s) and Number(s) Wayne Leonard (Spouse)  [phone]     Transition of Care Barriers None        Physical Activity    On average, how many days per week do you engage in moderate to strenuous exercise (like a brisk walk)? 0 days     On average, how many minutes do you engage in exercise at this level? 0 min        Financial Resource Strain    How hard is it for you to pay for the very basics like food, housing, medical care, and heating? Not hard at all        Housing Stability    In the last 12 months, was there a time when you were not able to pay the mortgage or rent on time? No     At any time in the past 12 months, were you homeless or living in a shelter (including now)? No        Transportation Needs    Has the lack of transportation kept you from medical appointments, meetings, work or from getting things needed for daily living? No        Food Insecurity    Within the past 12 months, you worried that your food would run out before you got the money to buy more. Never true     Within the past 12 months, the food you bought just didn't last and you didn't have money to get more. Never true        Stress    Do you feel stress - tense, restless, nervous, or anxious, or unable to sleep at night because your mind is troubled all the time - these days? Patient unable to answer        Social Isolation    How often do you feel lonely or isolated from those around you?  Patient unable to answer        Alcohol Use    Q1: How often do you have  a drink containing alcohol? Monthly or less     Q2: How many drinks containing alcohol do you have on a typical day when you are drinking? Patient does not drink     Q3: How often do you have six or more drinks on one occasion? Never        Utilities    In the past 12 months has the electric, gas, oil, or water company threatened to shut off services in your home? No        Health Literacy    How often do you need to have someone help you when you read instructions, pamphlets, or other written material from your doctor or pharmacy? Never        OTHER    Name(s) of People in Home Wayne Leonard (Spouse)  [phone]

## 2024-08-26 NOTE — PLAN OF CARE
Problem: Enteral Nutrition  Goal: Feeding Tolerance  Intervention: Prevent and Manage Feeding Intolerance  Flowsheets (Taken 8/26/2024 0410)  Nutrition Support Management: tube feeding initiated

## 2024-08-26 NOTE — CONSULTS
[facility]  Adult Nutrition   Consult Note (Nutrition Support Management)    SUMMARY     Recommendations  Recommendation/Intervention: 1. Consult for TF recs. Propofol @ 11.4 mL/hr  current rate (301 kcal / 24 hours).  Vital AF @ 50 mL/hr + ProtGold BID.(35 gm protein).provides 1480 kcal, 124 gm protein 973 mL free water With propofol at current rate provides 100% EEN and 100% EPN. 2. Suggest FWF 30 mL every 4 hours (180 mL). 3. Advance diet as tolerated to Cardiac.  Goals: 1. Patient to recieve >/= 75% EEN / EPN by follow up.  Nutrition Goal Status: new  Communication of RD Recs: reviewed with physician    Nutrition Diagnosis PES Statement: Inadequate (suboptimal) protein-energy intake related to NPO status as evidenced by patient intubated, sedated with lipid sedation (propofol) providing 301 kcal/ 24 hours.     Dietitian Rounds Brief  Consult for TF recommendations to MD and RN via secure chat. RD to follow for intake, tolerance, and status change PRN.    Nutrition Related Social Determinants of Health: SDOH: Unable to assess at this time.     Malnutrition Assessment                 Orbital Region (Subcutaneous Fat Loss): well nourished  Upper Arm Region (Subcutaneous Fat Loss): well nourished   May Region (Muscle Loss): well nourished  Clavicle Bone Region (Muscle Loss): well nourished  Clavicle and Acromion Bone Region (Muscle Loss): well nourished                 Diet order:   Current Diet Order: NPO                 Evaluation of Received Nutrient/Fluid Intake  Other Calories (kcal): 301 (propofol @ 11.4 mL/hr /24 hrs)  Energy Calories Required: not meeting needs  Protein Required: not meeting needs  Fluid Required: meeting needs  Tolerance:  (intubated; sedated.)     % Intake of Estimated Energy Needs: 0 - 25 %  % Meal Intake: NPO      Intake/Output Summary (Last 24 hours) at 8/26/2024 1318  Last data filed at 8/26/2024 1300  Gross per 24 hour   Intake 1661.33 ml   Output 572 ml   Net  "1089.33 ml        Anthropometrics  Temp: 97.9 °F (36.6 °C)  Height Method: Stated  Height: 5' 5" (165.1 cm)  Height (inches): 65 in  Weight Method: Stated  Weight: 127 kg (280 lb)  Weight (lb): 280 lb  Ideal Body Weight (IBW), Female: 125 lb  % Ideal Body Weight, Female (lb): 224 %  BMI (Calculated): 46.6  BMI Grade: greater than 40 - morbid obesity       Estimated/Assessed Needs  Weight Used For Calorie Calculations: 127 kg (279 lb 15.8 oz)  Energy Calorie Requirements (kcal): 1397--1778 kcal/kg  Energy Need Method: Kcal/kg  Protein Requirements: 114-143 gm/day (2.0-2.5 gm/kg IBW  Weight Used For Protein Calculations: 57 kg (125 lb 10.6 oz) (IBW)  Fluid Requirements (mL): 174-222 gm/day (11-15 cho servings / day)  Estimated Fluid Requirement Method: RDA Method  RDA Method (mL): 1397       Reason for Assessment  Reason For Assessment: consult (TF evaluation/recommendations)  Diagnosis: pulmonary disease  Relevant Medical History: ASCVD, DM 2, HTN. COPD(cigarette use)  Interdisciplinary Rounds: did not attend  General Information Comments: Per pulmonary consult: patient with home O2, history of COPD had increased SOB at home and fell.  Nutrition Discharge Planning: Pending    Nutrition/Diet History  Spiritual, Cultural Beliefs, Hoahaoism Practices, Values that Affect Care: no  Food Allergies: NKFA  Factors Affecting Nutritional Intake: NPO, on mechanical ventilation    Nutrition Risk Screen  Nutrition Risk Screen: no indicators present             Weight History:  Wt Readings from Last 10 Encounters:   08/25/24 127 kg (280 lb)   06/20/24 95.6 kg (210 lb 12.2 oz)   06/16/24 97.5 kg (215 lb)   06/16/22 97 kg (213 lb 13.5 oz)   06/14/22 97 kg (213 lb 13.5 oz)   06/14/22 101.5 kg (223 lb 12.3 oz)   06/12/22 101.5 kg (223 lb 12.3 oz)   06/08/22 104.3 kg (229 lb 15 oz)   07/21/20 108.8 kg (239 lb 13.8 oz)   08/13/13 122.5 kg (270 lb)        Lab/Procedures/Meds: Pertinent Labs/Meds Reviewed    Medications:Pertinent " Medications Reviewed  Scheduled Meds:   albuterol-ipratropium  3 mL Nebulization Q6H    enoxparin  40 mg Subcutaneous Q12H    famotidine (PF)  20 mg Intravenous Q12H    levoFLOXacin  750 mg Intravenous Q24H    [START ON 8/27/2024] levothyroxine  50 mcg Oral Before breakfast    methylPREDNISolone injection (PEDS and ADULTS)  80 mg Intravenous Q8H    mupirocin   Nasal BID    vancomycin (VANCOCIN) IV (PEDS and ADULTS)  2,000 mg Intravenous Q12H     Continuous Infusions:   0.9% NaCl   Intravenous Continuous 100 mL/hr at 08/26/24 0152 New Bag at 08/26/24 0152    fentanyl  0-250 mcg/hr Intravenous Continuous   Stopped at 08/26/24 0721    NORepinephrine bitartrate-D5W  0-3 mcg/kg/min Intravenous Continuous 23.8 mL/hr at 08/25/24 0452 0.05 mcg/kg/min at 08/25/24 0452    propofoL  0-50 mcg/kg/min Intravenous Continuous 11.4 mL/hr at 08/26/24 0905 15 mcg/kg/min at 08/26/24 0905    propofoL  0-50 mcg/kg/min Intravenous Continuous         PRN Meds:.  Current Facility-Administered Medications:     calcium gluconate IVPB, 1 g, Intravenous, PRN    calcium gluconate IVPB, 2 g, Intravenous, PRN    calcium gluconate IVPB, 3 g, Intravenous, PRN    dextrose 50%, 12.5 g, Intravenous, PRN    dextrose 50%, 25 g, Intravenous, PRN    glucagon (human recombinant), 1 mg, Intramuscular, PRN    glucose, 16 g, Oral, PRN    glucose, 24 g, Oral, PRN    insulin aspart U-100, 0-5 Units, Subcutaneous, QID (AC + HS) PRN    magnesium sulfate IVPB, 2 g, Intravenous, PRN    magnesium sulfate IVPB, 4 g, Intravenous, PRN    potassium bicarbonate, 35 mEq, Oral, PRN    potassium bicarbonate, 50 mEq, Oral, PRN    potassium bicarbonate, 60 mEq, Oral, PRN    potassium chloride in water, 40 mEq, Intravenous, PRN **AND** potassium chloride in water, 60 mEq, Intravenous, PRN **AND** potassium chloride in water, 80 mEq, Intravenous, PRN    sodium chloride 0.9%, 10 mL, Intravenous, PRN    sodium phosphate 15 mmol in D5W 250 mL IVPB, 15 mmol, Intravenous, PRN     "sodium phosphate 20.01 mmol in D5W 250 mL IVPB, 20.01 mmol, Intravenous, PRN    sodium phosphate 30 mmol in D5W 250 mL IVPB, 30 mmol, Intravenous, PRN    Pharmacy to dose Vancomycin consult, , , Once **AND** vancomycin - pharmacy to dose, , Intravenous, pharmacy to manage frequency    Labs: Pertinent Labs Reviewed  Clinical Chemistry:  Recent Labs   Lab 08/25/24 0233 08/26/24  0246   * 128*   K 3.5 4.0   CL 72* 77*   CO2 >45* >45*   * 228*   BUN 11 9   CREATININE 0.8 0.6   CALCIUM 8.3* 7.7*   PROT 6.7  --    ALBUMIN 3.5  --    BILITOT 0.6  --    ALKPHOS 85  --    AST 37  --    ALT 12  --    ANIONGAP 10 6*   MG 1.4* 2.0   PHOS  --  3.7     CBC:   Recent Labs   Lab 08/26/24  0246 08/26/24  0605   WBC 7.65  --    RBC 2.68*  --    HGB 7.9*  --    HCT 26.6* 35*     --    MCV 99*  --    MCH 29.5  --    MCHC 29.7*  --      Lipid Panel:  No results for input(s): "CHOL", "HDL", "LDLCALC", "TRIG", "CHOLHDL" in the last 168 hours.  Cardiac Profile:  Recent Labs   Lab 08/25/24 0233 08/26/24  0246   BNP 68  --    CPK 1048* 409*     Inflammatory Labs:  No results for input(s): "CRP" in the last 168 hours.  Diabetes:  Recent Labs   Lab 08/26/24  0546   HGBA1C 9.5*     Thyroid & Parathyroid:  Recent Labs   Lab 08/26/24  0546   TSH 27.761*   FREET4 0.27*       Monitor and Evaluation  Food and Nutrient Intake: energy intake, food and beverage intake  Food and Nutrient Adminstration: diet order  Knowledge/Beliefs/Attitudes: beliefs and attitudes  Physical Activity and Function: nutrition-related ADLs and IADLs  Anthropometric Measurements: weight change, weight, body mass index  Biochemical Data, Medical Tests and Procedures: gastrointestinal profile, electrolyte and renal panel, inflammatory profile, glucose/endocrine profile, lipid profile  Nutrition-Focused Physical Findings: overall appearance     Nutrition Risk  Level of Risk/Frequency of Follow-up:  (2 x / week)     Nutrition Follow-Up  RD Follow-up?: " Yes

## 2024-08-26 NOTE — SUBJECTIVE & OBJECTIVE
Interval History: Patient is intubated    Review of Systems   All other systems reviewed and are negative.    Objective:     Vital Signs (Most Recent):  Temp: 97.7 °F (36.5 °C) (08/26/24 1600)  Pulse: (!) 53 (08/26/24 1710)  Resp: 14 (08/26/24 1710)  BP: (!) 101/53 (08/26/24 1700)  SpO2: 95 % (08/26/24 1710) Vital Signs (24h Range):  Temp:  [97.2 °F (36.2 °C)-98.5 °F (36.9 °C)] 97.7 °F (36.5 °C)  Pulse:  [53-72] 53  Resp:  [10-54] 14  SpO2:  [85 %-100 %] 95 %  BP: ()/(8-79) 101/53     Weight: 127 kg (280 lb)  Body mass index is 46.59 kg/m².    Intake/Output Summary (Last 24 hours) at 8/26/2024 1717  Last data filed at 8/26/2024 1700  Gross per 24 hour   Intake 1661.33 ml   Output 887 ml   Net 774.33 ml         Physical Exam  Constitutional:       Appearance: She is ill-appearing.   Cardiovascular:      Rate and Rhythm: Normal rate.   Pulmonary:      Effort: Pulmonary effort is normal.   Abdominal:      General: Abdomen is flat.             Significant Labs: All pertinent labs within the past 24 hours have been reviewed.    Significant Imaging: I have reviewed all pertinent imaging results/findings within the past 24 hours.

## 2024-08-26 NOTE — PROGRESS NOTES
Pharmacokinetic Initial Assessment: IV Vancomycin    Assessment/Plan:    Initiate intravenous vancomycin with loading dose of 2000 mg once followed by a maintenance dose of vancomycin 2000mg IV every 12 hours  Desired empiric serum trough concentration is 15 to 20 mcg/mL  Draw vancomycin trough level 60 min prior to third dose on 8/27 at approximately 0800  Pharmacy will continue to follow and monitor vancomycin.      Please contact pharmacy at extension [phone] with any questions regarding this assessment.     Thank you for the consult,   Rozina Guardado       Patient brief summary:  Karo Leonard is a 64 y.o. female initiated on antimicrobial therapy with IV Vancomycin for treatment of suspected lower respiratory infection    Drug Allergies:   Review of patient's allergies indicates:   Allergen Reactions    Pcn [penicillins] Anaphylaxis    Adhesive     Floxacillin     Keflex [cephalexin]     Sulfa (sulfonamide antibiotics)     Zithromax [azithromycin]     Zofran [ondansetron hcl (pf)]      Dizzy vomiting         Actual Body Weight:   127    Renal Function:   Estimated Creatinine Clearance: 123.8 mL/min (based on SCr of 0.6 mg/dL).,     Dialysis Method (if applicable):  N/A    CBC (last 72 hours):  Recent Labs   Lab Result Units 08/25/24  0233 08/26/24  0246   WBC K/uL 8.96 7.65   Hemoglobin g/dL 9.4* 7.9*   Hematocrit % 30.7* 26.6*   Platelets K/uL 258 220   Gran % % 75.7* 85.3*   Lymph % % 14.4* 11.1*   Mono % % 8.6 2.9*   Eosinophil % % 0.4 0.0   Basophil % % 0.1 0.0   Differential Method  Automated Automated       Metabolic Panel (last 72 hours):  Recent Labs   Lab Result Units 08/25/24  0233 08/25/24  0630 08/26/24  0246   Sodium mmol/L 127*  --  128*   Potassium mmol/L 3.5  --  4.0   Chloride mmol/L 72*  --  77*   CO2 mmol/L >45*  --  >45*   Glucose mg/dL 222*  --  228*   Glucose, UA   --  Negative  --    BUN mg/dL 11  --  9   Creatinine mg/dL 0.8  --  0.6   Albumin g/dL 3.5  --   --    Total Bilirubin  "mg/dL 0.6  --   --    Alkaline Phosphatase U/L 85  --   --    AST U/L 37  --   --    ALT U/L 12  --   --    Magnesium mg/dL 1.4*  --  2.0   Phosphorus mg/dL  --   --  3.7       Drug levels (last 3 results):  No results for input(s): "VANCOMYCINRA", "VANCORANDOM", "VANCOMYCINPE", "VANCOPEAK", "VANCOMYCINTR", "VANCOTROUGH" in the last 72 hours.    Microbiologic Results:  Microbiology Results (last 7 days)       Procedure Component Value Units Date/Time    MRSA Screen by PCR [[id number]] Collected: 08/26/24 0931    Order Status: Sent Specimen: Nasal Swab Updated: 08/26/24 0932    MRSA Screen by PCR [[id number]] Collected: 08/26/24 0927    Order Status: Sent Specimen: Nasal Swab Updated: 08/26/24 0927    Respiratory Infection Panel (PCR), Nasopharyngeal [[id number]]     Order Status: No result Specimen: Nasopharyngeal Swab     Culture, Respiratory with Gram Stain [[id number]] Collected: 08/25/24 0556    Order Status: Completed Specimen: Sputum from Tracheal Aspirate Updated: 08/26/24 0728     Respiratory Culture Normal respiratory denise     Gram Stain (Respiratory) <10 epithelial cells per low power field.     Gram Stain (Respiratory) Few WBC's     Gram Stain (Respiratory) Few Gram positive cocci    Culture, Respiratory with Gram Stain [[id number]]     Order Status: No result Specimen: Respiratory             "

## 2024-08-26 NOTE — ED PROVIDER NOTES
Encounter Date: 2024       History     Chief Complaint   Patient presents with    Low oxygen      Patient arrived via [facility] transports unit [facility]. Per medic, patient is oxygen dependent and was found down by  lying on the oxygen tubing with low oxygen saturation. On EMS arrival patient's oxygen was in the 80s. Patient is drowsy but easily arousal. Patient received Narcan 4 mg intranasal and a DUONEB.       HPI  64-year-old woman with a history of diabetes CAD hypertension, diabetes COPD presents for evaluation shortness of breath.  Her  called EMS after she had a fall.  She got Narcan and DuoNeb with EMS.  On my interview she is sleepy but we will wake up and answer questions, she reports feeling short of breath for a couple of days.  She denies fever chills chest pain belly pain nausea vomiting.  She reports wearing oxygen home.  Review of patient's allergies indicates:   Allergen Reactions    Pcn [penicillins] Anaphylaxis    Adhesive     Floxacillin     Keflex [cephalexin]     Sulfa (sulfonamide antibiotics)     Zithromax [azithromycin]     Zofran [ondansetron hcl (pf)]      Dizzy vomiting       Past Medical History:   Diagnosis Date    Coronary artery disease 2017    cardiac stent    DDD (degenerative disc disease), lumbar 2000    Diabetes mellitus     Hypertension     Thyroid disease      Past Surgical History:   Procedure Laterality Date     SECTION  08/1987    x2 1993    CORONARY STENT PLACEMENT      EXPLORATORY LAPAROTOMY      [place]     HEMORRHOID SURGERY      LAPAROSCOPIC CHOLECYSTECTOMY N/A 2022    Procedure: CHOLECYSTECTOMY, LAPAROSCOPIC;  Surgeon: Leonardo Wilson III, MD;  Location: [facility];  Service: General;  Laterality: N/A;    LUMBAR DISC SURGERY      PILONIDAL CYST DRAINAGE      TONSILLECTOMY      as a child (also adenoids)     No family history on file.  Social History     Substance Use Topics    Alcohol use:  No    Drug use: No     Review of Systems    Physical Exam     Initial Vitals [08/25/24 0208]   BP Pulse Resp Temp SpO2   (!) 144/61 (!) 51 16 97.4 °F (36.3 °C) (!) 85 %      MAP       --         Physical Exam    Nursing note and vitals reviewed.  Constitutional:   Ill appearing woman wearing NRB   HENT:   Head: Normocephalic and atraumatic.   Eyes: Pupils are equal, round, and reactive to light.   3mm and reactive bilaterally   Cardiovascular:  Regular rhythm and intact distal pulses.           Bradycardic    Pulmonary/Chest:   Speaking in 5 word sentences, Increased work of breathing, diminished air movement bilaterally   Abdominal: Abdomen is soft. There is no abdominal tenderness.   Musculoskeletal:         General: Edema present.      Comments: Bruise left lower extremity           ED Course   Critical Care    Date/Time: 8/25/2024 8:49 PM    Performed by: Maninder Funes MD  Authorized by: Maninder Funes MD  Total critical care time (exclusive of procedural time) : 45 minutes  Critical care time was exclusive of separately billable procedures and treating other patients and teaching time.  Critical care was necessary to treat or prevent imminent or life-threatening deterioration of the following conditions: respiratory failure.  Critical care was time spent personally by me on the following activities: development of treatment plan with patient or surrogate, discussions with consultants, discussions with primary provider, evaluation of patient's response to treatment, examination of patient, obtaining history from patient or surrogate, ordering and performing treatments and interventions, ordering and review of laboratory studies, ordering and review of radiographic studies, pulse oximetry, re-evaluation of patient's condition and review of old charts.      Intubation    Date/Time: 8/25/2024 8:49 PM  Location procedure was performed: [facility] EMERGENCY DEPARTMENT    Performed by: Maninder Funes  MD Kev  Authorized by: Maninder Funes MD  Consent Done: Emergent Situation  Indications: respiratory failure  Intubation method: video-assisted  Patient status: paralyzed (RSI)  Preoxygenation: BVM  Sedatives: etomidate  Paralytic: succinylcholine  Laryngoscope size: Glide 4  Tube size: 7.5 mm  Tube type: cuffed  Number of attempts: 1  Cords visualized: yes  Post-procedure assessment: chest rise, ETCO2 monitor and CO2 detector  Breath sounds: equal and absent over the epigastrium  Cuff inflated: yes  ETT to teeth: 22 cm  Tube secured with: ETT camargo  Chest x-ray interpreted by me.  Chest x-ray findings: endotracheal tube in appropriate position  Patient tolerance: Patient tolerated the procedure well with no immediate complications        Labs Reviewed   MAGNESIUM - Abnormal       Result Value    Magnesium 1.4 (*)    CBC W/ AUTO DIFFERENTIAL - Abnormal    WBC 8.96      RBC 3.12 (*)     Hemoglobin 9.4 (*)     Hematocrit 30.7 (*)     MCV 98      MCH 30.1      MCHC 30.6 (*)     RDW 15.7 (*)     Platelets 258      MPV 9.9      Immature Granulocytes 0.8 (*)     Gran # (ANC) 6.8      Immature Grans (Abs) 0.07 (*)     Lymph # 1.3      Mono # 0.8      Eos # 0.0      Baso # 0.01      nRBC 0      Gran % 75.7 (*)     Lymph % 14.4 (*)     Mono % 8.6      Eosinophil % 0.4      Basophil % 0.1      Differential Method Automated     COMPREHENSIVE METABOLIC PANEL - Abnormal    Sodium 127 (*)     Potassium 3.5      Chloride 72 (*)     CO2 >45 (*)     Glucose 222 (*)     BUN 11      Creatinine 0.8      Calcium 8.3 (*)     Total Protein 6.7      Albumin 3.5      Total Bilirubin 0.6      Alkaline Phosphatase 85      AST 37      ALT 12      eGFR >60.0      Anion Gap 10     CK - Abnormal    CPK 1048 (*)    URINALYSIS, REFLEX TO URINE CULTURE - Abnormal    Specimen UA Urine, Clean Catch      Color, UA Yellow      Appearance, UA Clear      pH, UA 6.0      Specific Gravity, UA 1.025      Protein, UA Trace (*)     Glucose, UA  Negative      Ketones, UA Negative      Bilirubin (UA) Negative      Occult Blood UA Negative      Nitrite, UA Negative      Urobilinogen, UA 2.0-3.0 (*)     Leukocytes, UA Negative      Narrative:     Specimen Source->Urine   POCT GLUCOSE - Abnormal    POC Glucose 229 (*)    POCT GLUCOSE - Abnormal    POC Glucose 218 (*)    ISTAT PROCEDURE - Abnormal    POC PH 7.291 (*)     POC PCO2 113.4 (*)     POC PO2 112 (*)     POC HCO3 54.6 (*)     POC BE 28 (*)     POC SATURATED O2 97      POC Glucose 201 (*)     POC Sodium 125 (*)     POC Potassium 3.4 (*)     POC TCO2 >50 (*)     POC Ionized Calcium 1.10      POC Hematocrit 34 (*)     Sample ARTERIAL      Site RR      Allens Test Pass      DelSys NRB      Mode SPONT      Flow 15     ISTAT PROCEDURE - Abnormal    POC PH 7.544 (*)     POC PCO2 59.7 (*)     POC PO2 61 (*)     POC HCO3 51.5 (*)     POC BE 29 (*)     POC SATURATED O2 93      POC Glucose 203 (*)     POC Sodium 121 (*)     POC Potassium 3.5      POC TCO2 >50 (*)     POC Ionized Calcium 0.99 (*)     POC Hematocrit 34 (*)     Rate 18      Sample ARTERIAL      Site RR      Allens Test Pass      DelSys Adult Vent      Mode AC/PRVC      Vt 500      PEEP 5      FiO2 35     TROPONIN I HIGH SENSITIVITY    Troponin I High Sensitivity 5.3     TROPONIN I HIGH SENSITIVITY    Troponin I High Sensitivity 7.8     B-TYPE NATRIURETIC PEPTIDE    BNP 68     SARS-COV-2 RNA AMPLIFICATION, QUAL    SARS-CoV-2 RNA, Amplification, Qual Negative     INFLUENZA A AND B ANTIGEN    Influenza A, Molecular Negative      Influenza B, Molecular Negative      Flu A & B Source Nasal swab      Narrative:     Specimen Source->Nasopharyngeal Swab        ECG Results              EKG 12-lead (In process)        Collection Time Result Time QRS Duration OHS QTC Calculation    08/25/24 04:52:04 08/25/24 05:07:44 94 490                     In process by Interface, Lab In [facility] (08/25/24 05:07:48)                   Narrative:    Test Reason :  R06.02,    Vent. Rate : 060 BPM     Atrial Rate : 060 BPM     P-R Int : 236 ms          QRS Dur : 094 ms      QT Int : 490 ms       P-R-T Axes : 043 013 031 degrees     QTc Int : 490 ms    Sinus rhythm with 1st degree A-V block  Low voltage QRS  Nonspecific T wave abnormality  Prolonged QT  Abnormal ECG  When compared with ECG of 25-AUG-2024 02:23,  Minimal criteria for Anterior infarct are no longer Present  QT has shortened    Referred By: [email or web address]   SELF           Confirmed By:                                      EKG 12-lead (In process)        Collection Time Result Time QRS Duration OHS QTC Calculation    08/25/24 02:23:05 08/25/24 05:08:00 104 399                     In process by Interface, Lab In [facility] (08/25/24 05:08:04)                   Narrative:    Test Reason : R07.9,    Vent. Rate : 050 BPM     Atrial Rate : 000 BPM     P-R Int : 000 ms          QRS Dur : 104 ms      QT Int : 438 ms       P-R-T Axes : 000 024 124 degrees     QTc Int : 399 ms    Junctional rhythm  Low voltage QRS  Cannot rule out Anterior infarct ,age undetermined  Abnormal ECG  When compared with ECG of 09-JUL-2024 08:53,  Junctional rhythm has replaced Sinus rhythm  Vent. rate has decreased BY  26 BPM  Minimal criteria for Anterior infarct are now Present    Referred By: [email or web address]   SELF           Confirmed By:                                      EKG 12-lead (Final result)  Result time 08/26/24 13:40:11      Final result by Unknown User (08/26/24 13:40:11)                                      Imaging Results              CT Chest Without Contrast (Final result)  Result time 08/25/24 08:57:04      Final result by Malik Diez MD (08/25/24 08:57:04)                   Impression:      1. Alveolar infiltrates in the left upper and lower lobes compatible with pneumonia.  Scattered small foci of atelectasis or infiltrates on the right.  Small bilateral pleural effusions.  2. Stable cardiomegaly.  Small to moderate-sized  pericardial effusion is increased compared to June 2024.  3. Support devices in place as above.  Additional stable findings as noted.      Electronically signed by: Malik Diez  Date:    08/25/2024  Time:    08:57               Narrative:    EXAMINATION:  CT CHEST WITHOUT CONTRAST    CLINICAL HISTORY:  Aspiration;.    TECHNIQUE:  Axial imaging through the chest was performed from the thoracic inlet to the adrenal glands. No IV contrast was administered.    COMPARISON:  Same date chest radiograph; CTA chest June 2024    FINDINGS:  The heart is mildly enlarged.  There is a small to moderate-sized pericardial effusion, increased compared to prior study.  Multifocal coronary artery atherosclerotic calcification is noted.  There is ectasia of the ascending aorta at up to 3.9 cm in transverse diameter, stable.  There is no mediastinal mass or pathologic lymphadenopathy.  Endotracheal tube is present with tip at the level similar to the aortic knob.  Nasogastric tube extends to the stomach.    Images at lung windows demonstrate alveolar infiltrates in the apicoposterior segment of the left upper lobe and within dependent portions of the left lower lobe compatible with pneumonia.  There is mild atelectasis or infiltrate at the posterior right lung base, with minimal atelectasis or infiltrate in the posterior segment of the right upper lobe and in the right middle lobe.  Small bilateral pleural effusions are noted.    Images of the upper abdomen demonstrate no acute findings.  No acute osseous abnormalities are identified.  Multilevel degenerative disc disease is noted in the thoracic spine.                                       CT Head Without Contrast (Final result)  Result time 08/25/24 08:50:58      Final result by Malik Diez MD (08/25/24 08:50:58)                   Impression:      1. Normal CT appearance of the brain and calvarium.      Electronically signed by: Malik  Rg  Date:    08/25/2024  Time:    08:50               Narrative:    EXAMINATION:  CT HEAD WITHOUT CONTRAST    CLINICAL HISTORY:  Head trauma, minor (Age >= 65y);.    TECHNIQUE:  Thin section axial images were obtained.  No contrast was administered.    COMPARISON:  None.    FINDINGS:  There is no evidence of intracranial mass, hemorrhage, or midline shift.  The ventricles and sulci are within normal limits.  There are no pathologic extra-axial fluid collections.    There is no evidence of ischemic change or edema.  Cerebellum and brainstem are unremarkable.    The calvarium is intact.  Endotracheal tube is noted.                                       X-Ray Tibia Fibula 2 View Left (Final result)  Result time 08/25/24 07:14:23      Final result by Malik Diez MD (08/25/24 07:14:23)                   Impression:      No acute radiographic abnormalities.      Electronically signed by: Malik Diez  Date:    08/25/2024  Time:    07:14               Narrative:    EXAMINATION:  XR TIBIA FIBULA 2 VIEW LEFT    CLINICAL HISTORY:  .  Unspecified fall, initial encounter    COMPARISON:  None.    FINDINGS:  AP and lateral views are negative for fracture or osseous destructive lesion. There is no periosteal reaction.  No focal soft tissue abnormality is identified.                                       X-Ray Chest AP Portable (Final result)  Result time 08/25/24 05:36:21      Final result by Arelis Humphreys MD (08/25/24 05:36:21)                   Impression:      Please see above.      Electronically signed by: Arelis Humphreys MD  Date:    08/25/2024  Time:    05:36               Narrative:    EXAMINATION:  XR CHEST AP PORTABLE    CLINICAL HISTORY:  et tube placement;    TECHNIQUE:  Single frontal view of the chest was performed.    COMPARISON:  08/25/2024 at 02:44 a.m..    FINDINGS:  Interval intubation with endotracheal tube tip appearing to project at the level of the clavicular heads.  Enteric tube  courses below the diaphragm, extending beyond the field of view.  No interval detrimental change in lung aeration or evidence of new or enlarging pneumothorax relative to examination referenced above.                                       X-Ray Chest AP Portable (Final result)  Result time 08/25/24 04:01:05      Final result by Arelis Humphreys MD (08/25/24 04:01:05)                   Impression:      Please see above.      Electronically signed by: Arelis Humphreys MD  Date:    08/25/2024  Time:    04:01               Narrative:    EXAMINATION:  XR CHEST AP PORTABLE    CLINICAL HISTORY:  Chest Pain;    TECHNIQUE:  Single frontal view of the chest was performed.    COMPARISON:  06/14/2024    FINDINGS:  Image quality is degraded by suboptimal patient positioning and limited field of view.  There is increased opacity within the left mid lower lung zone concerning for possible underlying consolidative change/infectious process.  The aerated portion of the left upper lung zone and the right lung demonstrates diffuse increased interstitial prominence.  No definite pneumothorax noting presumed prominent skin fold projects over the right hemithorax on initial acquired image of 02:44.  The cardiomediastinal silhouette appears unchanged noting obscuration of the left heart border due to aforementioned pleuroparenchymal findings.  There is atherosclerosis of the thoracic aorta.  Osseous structures are intact.                                       Medications   NORepinephrine 4 mg in dextrose 5% 250 mL infusion (premix) (0.05 mcg/kg/min × 127 kg Intravenous New Bag 8/25/24 0452)   fentaNYL 2500 mcg in 0.9% sodium chloride 250 mL infusion premix (0 mcg/hr Intravenous Stopped 8/26/24 0721)   sodium chloride 0.9% flush 10 mL (has no administration in time range)   enoxaparin injection 40 mg (40 mg Subcutaneous Given 8/27/24 0806)   potassium chloride 40 mEq in 100 mL IVPB (FOR CENTRAL LINE ADMINISTRATION ONLY) (has no administration  in time range)     And   potassium chloride 20 mEq in 100 mL IVPB (FOR CENTRAL LINE ADMINISTRATION ONLY) (has no administration in time range)     And   potassium chloride 40 mEq in 100 mL IVPB (FOR CENTRAL LINE ADMINISTRATION ONLY) (has no administration in time range)   magnesium sulfate 2g in water 50mL IVPB (premix) (has no administration in time range)   magnesium sulfate 2g in water 50mL IVPB (premix) (0 g Intravenous Stopped 8/25/24 1213)   sodium phosphate 15 mmol in D5W 250 mL IVPB (has no administration in time range)   sodium phosphate 20.01 mmol in D5W 250 mL IVPB (has no administration in time range)   sodium phosphate 30 mmol in D5W 250 mL IVPB (has no administration in time range)   calcium gluconate 1 g in NS IVPB (premixed) (0 g Intravenous Stopped 8/26/24 0951)   calcium gluconate 1 g in NS IVPB (premixed) (0 g Intravenous Stopped 8/27/24 1007)   calcium gluconate 1 g in NS IVPB (premixed) (has no administration in time range)   levoFLOXacin 750 mg/150 mL IVPB 750 mg (0 mg Intravenous Stopped 8/27/24 0308)   famotidine (PF) injection 20 mg (20 mg Intravenous Given 8/27/24 0806)   albuterol-ipratropium 2.5 mg-0.5 mg/3 mL nebulizer solution 3 mL (3 mLs Nebulization Given 8/27/24 1318)   mupirocin 2 % ointment (1 g Nasal Given 8/27/24 0806)   potassium bicarbonate disintegrating tablet 50 mEq (50 mEq Oral Given 8/27/24 0807)   potassium bicarbonate disintegrating tablet 35 mEq (35 mEq Oral Given 8/25/24 1213)   potassium bicarbonate disintegrating tablet 60 mEq (has no administration in time range)   0.9%  NaCl infusion ( Intravenous New Bag 8/27/24 1448)   propofol (DIPRIVAN) 10 mg/mL infusion (50 mcg/kg/min × 127 kg Intravenous New Bag 8/27/24 1446)   levothyroxine tablet 50 mcg (50 mcg Oral Given 8/27/24 0534)   methylPREDNISolone sodium succinate injection 40 mg (40 mg Intravenous Given 8/27/24 1329)   insulin glargine U-100 (Lantus) pen 10 Units (10 Units Subcutaneous Given 8/27/24 6345)    dextrose 50% injection 12.5 g (has no administration in time range)   glucagon (human recombinant) injection 1 mg (has no administration in time range)   insulin aspart U-100 pen 0-10 Units (10 Units Subcutaneous Given 8/27/24 1642)   cyanocobalamin tablet 1,000 mcg (has no administration in time range)   ferric gluconate (FERRLECIT) 125 mg in 0.9% NaCl 100 mL IVPB (0 mg Intravenous Stopped 8/27/24 1139)   albuterol-ipratropium 2.5 mg-0.5 mg/3 mL nebulizer solution 3 mL (3 mLs Nebulization Given 8/25/24 0338)   methylPREDNISolone sodium succinate injection 125 mg (125 mg Intravenous Given 8/25/24 0334)   levoFLOXacin 750 mg/150 mL IVPB 750 mg (750 mg Intravenous New Bag 8/25/24 0337)   lactated ringers bolus 250 mL (0 mLs Intravenous Stopped 8/25/24 0512)   naloxone 0.4 mg/mL injection 0.4 mg (0.4 mg Intravenous Given 8/25/24 0423)   etomidate injection 30 mg (30 mg Intravenous Given 8/25/24 0425)   succinylcholine chloride 200 mg/10 mL (20 mg/mL) IV Syringe 190 mg (190 mg Intravenous Given 8/25/24 0426)   atropine injection 1 mg (1 mg Intravenous Given 8/25/24 0414)   fentaNYL 50 mcg/mL injection 75 mcg (75 mcg Intravenous Given 8/25/24 0451)   fentaNYL 50 mcg/mL injection 75 mcg (75 mcg Intravenous Given 8/25/24 0501)   midazolam (PF) (VERSED) 1 mg/mL injection 2.5 mg (2.5 mg Intravenous Given 8/26/24 0152)   vancomycin (VANCOCIN) 2,000 mg in D5W 500 mL IVPB (0 mg Intravenous Stopped 8/26/24 1050)   cyanocobalamin injection 1,000 mcg (1,000 mcg Intramuscular Given 8/27/24 1040)     Medical Decision Making  64-year-old woman with a history of COPD CHF CAD presents for evaluation of shortness of breath and fall arrival her heart rate is in the upper 50s, she is afebrile, she is satting greater than 92 on a non-rebreather, her respiratory rate is less than 20.  She does not look or sounds like she has been good air, she has increased work of breathing. She is concerned it is her COPD.  My initial suspicion this is  a COPD exacerbation and then maybe polypharmacy component as she takes opioid pain medicine at home.  She did not tell the nurse that she took her normal dose of opioid pain medicine every 6 hours today.  No history of increasing doses.  Did not meet SIRS criteria on arrival.  Elected to treat her for COPD exacerbation as this is my leading diagnosis.  Also considered ACS, volume overload metabolic or endocrine derangement, trauma, ptx, believe pulmonary embolism is less likely although is consideration.  She was placed on BiPAP with the poor air movement, gas obtained showed profound hypercarbia, she is in not improve on BiPAP, she was given a dose of Narcan without improvement, decision was made to intubate her for hypercarbic respiratory failure respiratory collapse heavy.  Chest x-ray showed consolidative process worsened since prior available chest x-ray.  No obvious pneumothorax.  Troponin negative, BNP negative.  Hypomagnesia.  Mild rhabdo.  I gave her 250 bolus because of the rhabdo.  I am hesitant to give additional fluids, I think her fluid status is tenuous.  She was bradycardic into the upper 40s keiko intubation she was mg of atropine with good effect heart rate going up to the 60s, ordered Levophed for possible post RS I hypotension as well as VB possible hypotension from positive pressure.  Intubated without difficulty, started on Levophed, she has been covered for possible cap with Levaquin for her COPD exacerbation treatment that was started on arrival, she had allergies that precluded more traditional cap coverage, discussed with Dr. Zamora pulrit he will follow, discussed with Dr. Sawyer [facility], they will admit, and plans to order Lasix, gas after intubation she appears to be over ventilated she is alkalotic, we went down on her tidal volumes, they will get a repeat gas in his will followed up by 18    Amount and/or Complexity of Data Reviewed  Independent Historian: spouse      Details: Discussed with  updated him on what has been going on, he reported she did fall today but she was not down for that long, no head strike.  she has been more short breath over last couple days.  External Data Reviewed: notes.     Details: Recent admission for COPD exacerbation  Labs: ordered.  Radiology: ordered and independent interpretation performed. Decision-making details documented in ED Course.  ECG/medicine tests: ordered and independent interpretation performed. Decision-making details documented in ED Course.    Risk  Prescription drug management.  Parenteral controlled substances.  Drug therapy requiring intensive monitoring for toxicity.  Decision regarding hospitalization.               ED Course as of 08/27/24 1655   Sun Aug 25, 2024   0432 EKG on my independent interpretation, [IC]   0444 She was becoming progressively less responsive, bradypneic with poor volumes on BiPAP, repeat fingerstick blood glucose was not hypoglycemic, we Narcan'ed her with no response, we gave her some atropine after her heart rate dropped into the 40s, she came up into the 60s, we hung Levophed in case she had post RS I hypotension, she was intubated without difficulty, on repeat chest x-ray ET tube was between clavicles [IC]   0458 Repeat EKG on my independent interpretation sinus, bradycardic proximally 60 beats per minute, decreased QRS amplitude no STEMI [IC]   0757 Spoke with [facility] Dr. Zamora [IC]      ED Course User Index  [IC] Maninder Funes MD                         Clinical Impression:  Final diagnoses:  [R07.9] Chest pain  [R06.02] Shortness of breath  [W19.XXXA] Fall  [J44.1] COPD with acute exacerbation  [J96.01, J96.02] Acute respiratory failure with hypoxia and hypercapnia (Primary)          ED Disposition Condition    Admit Stable                Maninder Funes MD  08/27/24 1322

## 2024-08-26 NOTE — CARE UPDATE
08/26/24 0717   Patient Assessment/Suction   Level of Consciousness (AVPU) responds to voice   Respiratory Effort Normal;Unlabored   Expansion/Accessory Muscles/Retractions no retractions;no use of accessory muscles   All Lung Fields Breath Sounds coarse   Rhythm/Pattern, Respiratory assisted mechanically   Cough Frequency with stimulation   Cough Type assisted   Skin Integrity   $ Wound Care Tech Time 15 min   Area Observed Lower lip;Upper lip;Cheek;Left;Right   Skin Appearance without discoloration;other (see comments)  (lip appears irritated by bite block)   PRE-TX-O2   Device (Oxygen Therapy) ventilator   $ Is the patient on High Flow Oxygen? Yes   Oxygen Concentration (%) 65   SpO2 (!) 92 %   Pulse Oximetry Type Continuous   $ Pulse Oximetry - Multiple Charge Pulse Oximetry - Multiple   Pulse 70   Resp 15   BP (!) 144/62   Aerosol Therapy   $ Aerosol Therapy Charges Aerosol Treatment   Daily Review of Necessity (SVN) completed   Respiratory Treatment Status (SVN) given   Treatment Route (SVN) in-line;ventilator   Patient Position HOB elevated   Post Treatment Assessment (SVN) breath sounds unchanged   Signs of Intolerance (SVN) none   Breath Sounds Post-Respiratory Treatment   Throughout All Fields Post-Treatment All Fields   Throughout All Fields Post-Treatment no change   Post-treatment Heart Rate (beats/min) 72   Post-treatment Resp Rate (breaths/min) 15        Airway - Non-Surgical 08/25/24 0427 Endotracheal Tube   Placement Date/Time: 08/25/24 0427   Present Prior to Hospital Arrival?: No  Method of Intubation: Glidescope  Inserted by: MD  Staff/Resident Name(s): Dr. Funes  Airway Device: Endotracheal Tube  Mask Ventilation: Easy  Airway Device Size: 7.5  St...   Secured at 24 cm   Measured At Gum line   Secured Location Left   Secured by Commercial tube camargo   Position of ETT in xRay In good position   Bite Block secure and patent   Site Condition Cool;Dry   Status Intact;Secured   Site Assessment  Clean;Dry   Cuff Volume   (MLT)   Vent Select   Conventional Vent Y   Charged w/in last 24h YES   Preset Conventional Ventilator Settings   Vent ID [id number]   Vent Type    Ventilation Type VC   Vent Mode A/C   Humidity HME   Set Rate 14 BPM   Vt Set 400 mL   PEEP/CPAP 5 cmH20   Peak Flow 60 L/min   Peak End Inspiratory Pressure 7.2 cmH20   I-Trigger Type  V-TRIG   Trigger Sensitivity Flow/I-Trigger 3 L/min   Patient Ventilator Parameters   Resp Rate Total 17 br/min   Peak Airway Pressure 32 cmH20   Mean Airway Pressure 8.6 cmH20   Plateau Pressure 0 cmH20   Exhaled Vt 56 mL   Total Ve 3.39 L/m   I:E Ratio Measured 1:6   Auto PEEP 0 cmH20   Tubing ID (mm) 7.5 mm   Tube Type ET   Conventional Ventilator Alarms   Alarms On Y   Ve High Alarm 20 L/min   Ve Low Alarm 2 L/min   Vt High Alarm 1000 mL   Vt Low Alarm 200 mL   Resp Rate High Alarm 40 br/min   Apnea Rate 10   Apnea Volume (mL) 0 mL   Apnea Oxygen Concentration  100   Apnea Flow Rate (L/min) 60   T Apnea 20 sec(s)   IHI Ventilator Associated Pneumonia Bundle (Required)   Daily Awakening Trials Performed Yes   Daily Assessment of Readiness to Extubate Yes   Head of Bed Elevated  HOB 45   Oral Care Mouth suctioned   Peptic Ulcer ProphylaxisProvided Peptic ulcer prophylaxis maintained   Vent Circut Breaks Minimized Yes   VTE Prophylaxis Provided VTE prophylaxis maintained   Ready to Wean/Extubation Screen   FIO2<=50 (chart decimal) (!) 0.65   MV<16L (chart vol.) 3.39   PEEP <=8 (chart #) 5   Ready to Wean Parameters   F/VT Ratio<105 (RSBI) 267.86   Education   $ Education 15 min;Bronchodilator;Ventilator Oxygen

## 2024-08-27 PROBLEM — D64.9 ANEMIA: Status: ACTIVE | Noted: 2024-08-27

## 2024-08-27 LAB
ALBUMIN SERPL BCP-MCNC: 2.9 G/DL (ref 3.5–5.2)
ALLENS TEST: ABNORMAL
ALP SERPL-CCNC: 57 U/L (ref 55–135)
ALT SERPL W/O P-5'-P-CCNC: 8 U/L (ref 10–44)
ANION GAP SERPL CALC-SCNC: 5 MMOL/L (ref 8–16)
AST SERPL-CCNC: 20 U/L (ref 10–40)
BACTERIA SPEC AEROBE CULT: NORMAL
BASOPHILS # BLD AUTO: 0.01 K/UL (ref 0–0.2)
BASOPHILS NFR BLD: 0.1 % (ref 0–1.9)
BILIRUB SERPL-MCNC: 0.4 MG/DL (ref 0.1–1)
BUN SERPL-MCNC: 11 MG/DL (ref 8–23)
CALCIUM SERPL-MCNC: 7.6 MG/DL (ref 8.7–10.5)
CHLORIDE SERPL-SCNC: 78 MMOL/L (ref 95–110)
CO2 SERPL-SCNC: 43 MMOL/L (ref 23–29)
CREAT SERPL-MCNC: 0.8 MG/DL (ref 0.5–1.4)
DELSYS: ABNORMAL
DIFFERENTIAL METHOD BLD: ABNORMAL
EOSINOPHIL # BLD AUTO: 0 K/UL (ref 0–0.5)
EOSINOPHIL NFR BLD: 0 % (ref 0–8)
ERYTHROCYTE [DISTWIDTH] IN BLOOD BY AUTOMATED COUNT: 16.1 % (ref 11.5–14.5)
ERYTHROCYTE [SEDIMENTATION RATE] IN BLOOD BY WESTERGREN METHOD: 14 MM/H
EST. GFR  (NO RACE VARIABLE): >60 ML/MIN/1.73 M^2
FIO2: 60
GLUCOSE SERPL-MCNC: 313 MG/DL (ref 70–110)
GLUCOSE SERPL-MCNC: 320 MG/DL (ref 70–110)
GLUCOSE SERPL-MCNC: 335 MG/DL (ref 70–110)
GLUCOSE SERPL-MCNC: 347 MG/DL (ref 70–110)
GLUCOSE SERPL-MCNC: 367 MG/DL (ref 70–110)
GLUCOSE SERPL-MCNC: 389 MG/DL (ref 70–110)
GLUCOSE SERPL-MCNC: 439 MG/DL (ref 70–110)
GRAM STN SPEC: NORMAL
HCO3 UR-SCNC: 50 MMOL/L (ref 24–28)
HCT VFR BLD AUTO: 26.3 % (ref 37–48.5)
HCT VFR BLD CALC: 30 %PCV (ref 36–54)
HGB BLD-MCNC: 8.1 G/DL (ref 12–16)
IMM GRANULOCYTES # BLD AUTO: 0.09 K/UL (ref 0–0.04)
IMM GRANULOCYTES NFR BLD AUTO: 0.9 % (ref 0–0.5)
LYMPHOCYTES # BLD AUTO: 0.7 K/UL (ref 1–4.8)
LYMPHOCYTES NFR BLD: 7 % (ref 18–48)
MCH RBC QN AUTO: 29.7 PG (ref 27–31)
MCHC RBC AUTO-ENTMCNC: 30.8 G/DL (ref 32–36)
MCV RBC AUTO: 96 FL (ref 82–98)
MIN VOL: 5.99
MODE: ABNORMAL
MONOCYTES # BLD AUTO: 0.6 K/UL (ref 0.3–1)
MONOCYTES NFR BLD: 5.6 % (ref 4–15)
NEUTROPHILS # BLD AUTO: 8.6 K/UL (ref 1.8–7.7)
NEUTROPHILS NFR BLD: 86.4 % (ref 38–73)
NRBC BLD-RTO: 0 /100 WBC
PCO2 BLDA: 69.9 MMHG (ref 35–45)
PEEP: 5
PH SMN: 7.46 [PH] (ref 7.35–7.45)
PIP: 30
PLATELET # BLD AUTO: 280 K/UL (ref 150–450)
PMV BLD AUTO: 10.4 FL (ref 9.2–12.9)
PO2 BLDA: 62 MMHG (ref 80–100)
POC BE: 26 MMOL/L
POC IONIZED CALCIUM: 1.03 MMOL/L (ref 1.06–1.42)
POC SATURATED O2: 91 % (ref 95–100)
POC TCO2: >50 MMOL/L (ref 23–27)
POTASSIUM BLD-SCNC: 4 MMOL/L (ref 3.5–5.1)
POTASSIUM SERPL-SCNC: 3.8 MMOL/L (ref 3.5–5.1)
PROT SERPL-MCNC: 5.6 G/DL (ref 6–8.4)
RBC # BLD AUTO: 2.73 M/UL (ref 4–5.4)
SAMPLE: ABNORMAL
SITE: ABNORMAL
SODIUM BLD-SCNC: 123 MMOL/L (ref 136–145)
SODIUM SERPL-SCNC: 126 MMOL/L (ref 136–145)
SP02: 94
VANCOMYCIN TROUGH SERPL-MCNC: 17 UG/ML
VT: 400
WBC # BLD AUTO: 9.94 K/UL (ref 3.9–12.7)

## 2024-08-27 PROCEDURE — 36415 COLL VENOUS BLD VENIPUNCTURE: CPT | Performed by: FAMILY MEDICINE

## 2024-08-27 PROCEDURE — 80053 COMPREHEN METABOLIC PANEL: CPT | Performed by: FAMILY MEDICINE

## 2024-08-27 PROCEDURE — 63600175 PHARM REV CODE 636 W HCPCS: Performed by: INTERNAL MEDICINE

## 2024-08-27 PROCEDURE — 94761 N-INVAS EAR/PLS OXIMETRY MLT: CPT | Mod: XB

## 2024-08-27 PROCEDURE — 82962 GLUCOSE BLOOD TEST: CPT

## 2024-08-27 PROCEDURE — 84132 ASSAY OF SERUM POTASSIUM: CPT

## 2024-08-27 PROCEDURE — 25000242 PHARM REV CODE 250 ALT 637 W/ HCPCS: Performed by: HOSPITALIST

## 2024-08-27 PROCEDURE — 85014 HEMATOCRIT: CPT

## 2024-08-27 PROCEDURE — 94640 AIRWAY INHALATION TREATMENT: CPT

## 2024-08-27 PROCEDURE — 82330 ASSAY OF CALCIUM: CPT

## 2024-08-27 PROCEDURE — 63600175 PHARM REV CODE 636 W HCPCS: Performed by: HOSPITALIST

## 2024-08-27 PROCEDURE — 99900035 HC TECH TIME PER 15 MIN (STAT)

## 2024-08-27 PROCEDURE — 99900031 HC PATIENT EDUCATION (STAT)

## 2024-08-27 PROCEDURE — 63600175 PHARM REV CODE 636 W HCPCS: Performed by: FAMILY MEDICINE

## 2024-08-27 PROCEDURE — 36600 WITHDRAWAL OF ARTERIAL BLOOD: CPT

## 2024-08-27 PROCEDURE — 25000003 PHARM REV CODE 250: Performed by: HOSPITALIST

## 2024-08-27 PROCEDURE — 85025 COMPLETE CBC W/AUTO DIFF WBC: CPT | Performed by: FAMILY MEDICINE

## 2024-08-27 PROCEDURE — 82803 BLOOD GASES ANY COMBINATION: CPT

## 2024-08-27 PROCEDURE — 80202 ASSAY OF VANCOMYCIN: CPT | Performed by: FAMILY MEDICINE

## 2024-08-27 PROCEDURE — 25000003 PHARM REV CODE 250: Performed by: INTERNAL MEDICINE

## 2024-08-27 PROCEDURE — 20000000 HC ICU ROOM

## 2024-08-27 PROCEDURE — 94003 VENT MGMT INPAT SUBQ DAY: CPT

## 2024-08-27 PROCEDURE — 99291 CRITICAL CARE FIRST HOUR: CPT | Mod: ,,, | Performed by: INTERNAL MEDICINE

## 2024-08-27 PROCEDURE — 25000003 PHARM REV CODE 250: Performed by: FAMILY MEDICINE

## 2024-08-27 PROCEDURE — 99900026 HC AIRWAY MAINTENANCE (STAT)

## 2024-08-27 PROCEDURE — 27100171 HC OXYGEN HIGH FLOW UP TO 24 HOURS

## 2024-08-27 PROCEDURE — 84295 ASSAY OF SERUM SODIUM: CPT

## 2024-08-27 RX ORDER — GLUCAGON 1 MG
1 KIT INJECTION
Status: DISCONTINUED | OUTPATIENT
Start: 2024-08-27 | End: 2024-09-15

## 2024-08-27 RX ORDER — INSULIN ASPART 100 [IU]/ML
0-10 INJECTION, SOLUTION INTRAVENOUS; SUBCUTANEOUS
Status: DISCONTINUED | OUTPATIENT
Start: 2024-08-27 | End: 2024-08-27

## 2024-08-27 RX ORDER — INSULIN ASPART 100 [IU]/ML
10 INJECTION, SOLUTION INTRAVENOUS; SUBCUTANEOUS ONCE
Status: COMPLETED | OUTPATIENT
Start: 2024-08-27 | End: 2024-08-27

## 2024-08-27 RX ORDER — LANOLIN ALCOHOL/MO/W.PET/CERES
1000 CREAM (GRAM) TOPICAL DAILY
Status: DISCONTINUED | OUTPATIENT
Start: 2024-08-28 | End: 2024-09-17 | Stop reason: HOSPADM

## 2024-08-27 RX ORDER — IBUPROFEN 200 MG
16 TABLET ORAL
Status: DISCONTINUED | OUTPATIENT
Start: 2024-08-27 | End: 2024-08-27

## 2024-08-27 RX ORDER — INSULIN GLARGINE 100 [IU]/ML
10 INJECTION, SOLUTION SUBCUTANEOUS DAILY
Status: DISCONTINUED | OUTPATIENT
Start: 2024-08-27 | End: 2024-08-28

## 2024-08-27 RX ORDER — GLUCAGON 1 MG
1 KIT INJECTION
Status: DISCONTINUED | OUTPATIENT
Start: 2024-08-27 | End: 2024-08-27

## 2024-08-27 RX ORDER — IBUPROFEN 200 MG
24 TABLET ORAL
Status: DISCONTINUED | OUTPATIENT
Start: 2024-08-27 | End: 2024-08-27

## 2024-08-27 RX ORDER — INSULIN ASPART 100 [IU]/ML
0-10 INJECTION, SOLUTION INTRAVENOUS; SUBCUTANEOUS EVERY 6 HOURS PRN
Status: DISCONTINUED | OUTPATIENT
Start: 2024-08-27 | End: 2024-09-15

## 2024-08-27 RX ORDER — CYANOCOBALAMIN 1000 UG/ML
1000 INJECTION, SOLUTION INTRAMUSCULAR; SUBCUTANEOUS ONCE
Status: COMPLETED | OUTPATIENT
Start: 2024-08-27 | End: 2024-08-27

## 2024-08-27 RX ADMIN — SODIUM CHLORIDE: 9 INJECTION, SOLUTION INTRAVENOUS at 02:08

## 2024-08-27 RX ADMIN — PROPOFOL 40 MCG/KG/MIN: 10 INJECTION, EMULSION INTRAVENOUS at 08:08

## 2024-08-27 RX ADMIN — INSULIN GLARGINE 10 UNITS: 100 INJECTION, SOLUTION SUBCUTANEOUS at 10:08

## 2024-08-27 RX ADMIN — PROPOFOL 50 MCG/KG/MIN: 10 INJECTION, EMULSION INTRAVENOUS at 05:08

## 2024-08-27 RX ADMIN — PROPOFOL 40 MCG/KG/MIN: 10 INJECTION, EMULSION INTRAVENOUS at 02:08

## 2024-08-27 RX ADMIN — INSULIN ASPART 10 UNITS: 100 INJECTION, SOLUTION INTRAVENOUS; SUBCUTANEOUS at 08:08

## 2024-08-27 RX ADMIN — FAMOTIDINE 20 MG: 10 INJECTION INTRAVENOUS at 08:08

## 2024-08-27 RX ADMIN — METHYLPREDNISOLONE SODIUM SUCCINATE 40 MG: 40 INJECTION, POWDER, FOR SOLUTION INTRAMUSCULAR; INTRAVENOUS at 01:08

## 2024-08-27 RX ADMIN — IPRATROPIUM BROMIDE AND ALBUTEROL SULFATE 3 ML: 2.5; .5 SOLUTION RESPIRATORY (INHALATION) at 12:08

## 2024-08-27 RX ADMIN — MUPIROCIN 1 G: 20 OINTMENT TOPICAL at 08:08

## 2024-08-27 RX ADMIN — INSULIN ASPART 10 UNITS: 100 INJECTION, SOLUTION INTRAVENOUS; SUBCUTANEOUS at 10:08

## 2024-08-27 RX ADMIN — PROPOFOL 50 MCG/KG/MIN: 10 INJECTION, EMULSION INTRAVENOUS at 11:08

## 2024-08-27 RX ADMIN — LEVOTHYROXINE SODIUM 50 MCG: 0.03 TABLET ORAL at 05:08

## 2024-08-27 RX ADMIN — INSULIN ASPART 10 UNITS: 100 INJECTION, SOLUTION INTRAVENOUS; SUBCUTANEOUS at 04:08

## 2024-08-27 RX ADMIN — PROPOFOL 50 MCG/KG/MIN: 10 INJECTION, EMULSION INTRAVENOUS at 07:08

## 2024-08-27 RX ADMIN — LEVOFLOXACIN 750 MG: 5 INJECTION, SOLUTION INTRAVENOUS at 01:08

## 2024-08-27 RX ADMIN — PROPOFOL 50 MCG/KG/MIN: 10 INJECTION, EMULSION INTRAVENOUS at 02:08

## 2024-08-27 RX ADMIN — IPRATROPIUM BROMIDE AND ALBUTEROL SULFATE 3 ML: 2.5; .5 SOLUTION RESPIRATORY (INHALATION) at 07:08

## 2024-08-27 RX ADMIN — PROPOFOL 45 MCG/KG/MIN: 10 INJECTION, EMULSION INTRAVENOUS at 10:08

## 2024-08-27 RX ADMIN — IPRATROPIUM BROMIDE AND ALBUTEROL SULFATE 3 ML: 2.5; .5 SOLUTION RESPIRATORY (INHALATION) at 01:08

## 2024-08-27 RX ADMIN — ENOXAPARIN SODIUM 40 MG: 40 INJECTION SUBCUTANEOUS at 08:08

## 2024-08-27 RX ADMIN — SODIUM CHLORIDE 125 MG: 9 INJECTION, SOLUTION INTRAVENOUS at 10:08

## 2024-08-27 RX ADMIN — METHYLPREDNISOLONE SODIUM SUCCINATE 80 MG: 125 INJECTION, POWDER, FOR SOLUTION INTRAMUSCULAR; INTRAVENOUS at 05:08

## 2024-08-27 RX ADMIN — INSULIN ASPART 8 UNITS: 100 INJECTION, SOLUTION INTRAVENOUS; SUBCUTANEOUS at 10:08

## 2024-08-27 RX ADMIN — METHYLPREDNISOLONE SODIUM SUCCINATE 40 MG: 40 INJECTION, POWDER, FOR SOLUTION INTRAMUSCULAR; INTRAVENOUS at 10:08

## 2024-08-27 RX ADMIN — CYANOCOBALAMIN 1000 MCG: 1000 INJECTION, SOLUTION INTRAMUSCULAR at 10:08

## 2024-08-27 RX ADMIN — PROPOFOL 40 MCG/KG/MIN: 10 INJECTION, EMULSION INTRAVENOUS at 05:08

## 2024-08-27 RX ADMIN — CALCIUM GLUCONATE 2 G: 20 INJECTION, SOLUTION INTRAVENOUS at 08:08

## 2024-08-27 RX ADMIN — INSULIN ASPART 4 UNITS: 100 INJECTION, SOLUTION INTRAVENOUS; SUBCUTANEOUS at 08:08

## 2024-08-27 RX ADMIN — POTASSIUM BICARBONATE 50 MEQ: 977.5 TABLET, EFFERVESCENT ORAL at 08:08

## 2024-08-27 NOTE — ASSESSMENT & PLAN NOTE
MRSA neg, procal neg, respiratory panel neg  MRSA was negative so vanco was discontinued on 8/27.  Continue with Levofloxacin

## 2024-08-27 NOTE — NURSING
Dr. Pedroza on unit and notified of urine output 300 and 340 the last two hours respectively. Physician pleased with urine output and no new orders received.

## 2024-08-27 NOTE — PROGRESS NOTES
Pharmacy Consult Discontinuation: Vancomycin    Karo Leonard [id number] is a 64 y.o. female was consulted for vancomycin pharmacotherapy management by pharmacy.    Pharmacy consult for vancomycin dosing is no longer required.  Vancomycin was discontinued 08/27/2024 @ 0842 by Dr. Zamora.    Thank you for allowing us to participate in this patient's care. Should you have any questions or concerns please feel free to contact the pharmacy department at [phone].    Kwaku Carver, YAKELIN

## 2024-08-27 NOTE — PROGRESS NOTES
Pulmonary/Critical Care  Progress Note      Patient name: Karo Leonard  MRN: [id number]  Date: 2024    Admit Date: 2024  Consult Requested By: Olga Lidia Pedroza DO    Reason for Consult: Respiratory failure, COPD    HPI:    2024 - 63 yo ASCVD, DM, HTN. COPD(cigarette use) had increased SOB at home and a fall.  EMS was called and brought to ER.  Initially tried on BiPAP without response and was subsequently intubated and placed on ventilation.  Initial ABG showed over ventilation and we have adjusted and ABG are getting better.  She is sedated and not able to provide any history.  D/W  who says that she was supposed to see a pulmonologist but couldn't make appointment, she has been a chronic smoker.  She had increase SOB for a few days.  She did have a fall at home but he reports that she was not down for long.  He does report that she has been having recurring falls at home.      2024 - Stable overnight, O2 needs still too high to do SBT.  She is responsive and gets agitated on current sedation and we are adjusting.  No other new issues reported.  Hgb has decreased (no active bleeding reported), NA remains low, CPK is better, TFTF noted and c/w hypothyroid (synthroid started).    2024 - Remains critically ill, O2 needs down a little but still too high.  Difficult to sedate is either agitated or apneic.  Tried SBT this AM and was apneic.  VS reviewed.  She is 3.8 liters +.  Tube feeds have been started.  NA remains low, glucose is elevated.  CXR looks about the same    Review of Systems    Review of Systems   Unable to perform ROS: Intubated       Past Medical History    Past Medical History:   Diagnosis Date    Coronary artery disease 2017    cardiac stent    DDD (degenerative disc disease), lumbar 2000    Diabetes mellitus     Hypertension     Thyroid disease        Past Surgical History    Past Surgical History:   Procedure Laterality Date     SECTION  1987     x2 06/1993 08/1987    CORONARY STENT PLACEMENT  2017    EXPLORATORY LAPAROTOMY  1982    [facility]     HEMORRHOID SURGERY  1990    LAPAROSCOPIC CHOLECYSTECTOMY N/A 6/16/2022    Procedure: CHOLECYSTECTOMY, LAPAROSCOPIC;  Surgeon: Leonardo Wilson III, MD;  Location: [facility];  Service: General;  Laterality: N/A;    LUMBAR DISC SURGERY  2000    PILONIDAL CYST DRAINAGE  1976    TONSILLECTOMY      as a child (also adenoids)       Medications (scheduled):      albuterol-ipratropium  3 mL Nebulization Q6H    enoxparin  40 mg Subcutaneous Q12H    famotidine (PF)  20 mg Intravenous Q12H    levoFLOXacin  750 mg Intravenous Q24H    levothyroxine  50 mcg Oral Before breakfast    methylPREDNISolone injection (PEDS and ADULTS)  80 mg Intravenous Q8H    mupirocin   Nasal BID    vancomycin (VANCOCIN) IV (PEDS and ADULTS)  2,000 mg Intravenous Q12H       Medications (infusions):      0.9% NaCl   Intravenous Continuous 100 mL/hr at 08/27/24 0601 Rate Verify at 08/27/24 0601    fentanyl  0-250 mcg/hr Intravenous Continuous   Stopped at 08/26/24 0721    NORepinephrine bitartrate-D5W  0-3 mcg/kg/min Intravenous Continuous 23.8 mL/hr at 08/25/24 0452 0.05 mcg/kg/min at 08/25/24 0452    propofoL  0-50 mcg/kg/min Intravenous Continuous   Stopped at 08/26/24 1947    propofoL  0-50 mcg/kg/min Intravenous Continuous 30.5 mL/hr at 08/27/24 0601 40 mcg/kg/min at 08/27/24 0601       Medications (prn):       Current Facility-Administered Medications:     calcium gluconate IVPB, 1 g, Intravenous, PRN    calcium gluconate IVPB, 2 g, Intravenous, PRN    calcium gluconate IVPB, 3 g, Intravenous, PRN    dextrose 50%, 12.5 g, Intravenous, PRN    dextrose 50%, 25 g, Intravenous, PRN    glucagon (human recombinant), 1 mg, Intramuscular, PRN    glucose, 16 g, Oral, PRN    glucose, 24 g, Oral, PRN    insulin aspart U-100, 0-5 Units, Subcutaneous, QID (AC + HS) PRN    magnesium sulfate IVPB, 2 g, Intravenous, PRN    magnesium sulfate IVPB, 4 g,  "Intravenous, PRN    potassium bicarbonate, 35 mEq, Oral, PRN    potassium bicarbonate, 50 mEq, Oral, PRN    potassium bicarbonate, 60 mEq, Oral, PRN    potassium chloride in water, 40 mEq, Intravenous, PRN **AND** potassium chloride in water, 60 mEq, Intravenous, PRN **AND** potassium chloride in water, 80 mEq, Intravenous, PRN    sodium chloride 0.9%, 10 mL, Intravenous, PRN    sodium phosphate 15 mmol in D5W 250 mL IVPB, 15 mmol, Intravenous, PRN    sodium phosphate 20.01 mmol in D5W 250 mL IVPB, 20.01 mmol, Intravenous, PRN    sodium phosphate 30 mmol in D5W 250 mL IVPB, 30 mmol, Intravenous, PRN    Pharmacy to dose Vancomycin consult, , , Once **AND** vancomycin - pharmacy to dose, , Intravenous, pharmacy to manage frequency    Family History: No family history on file.    Social History: Tobacco:   Social History     Tobacco Use   Smoking Status Not on file   Smokeless Tobacco Not on file                                EtOH:   Social History     Substance and Sexual Activity   Alcohol Use No                                Drugs:   Social History     Substance and Sexual Activity   Drug Use No       Physical Exam    Vital signs:  Temp:  [97.4 °F (36.3 °C)-97.9 °F (36.6 °C)]   Pulse:  [53-83]   Resp:  [14-26]   BP: ()/(50-79)   SpO2:  [93 %-100 %]     Intake/Output:   Intake/Output Summary (Last 24 hours) at 8/27/2024 0832  Last data filed at 8/27/2024 0601  Gross per 24 hour   Intake 4307.78 ml   Output 1900 ml   Net 2407.78 ml        BMI: Estimated body mass index is 46.59 kg/m² as calculated from the following:    Height as of this encounter: 5' 5" (1.651 m).    Weight as of this encounter: 127 kg (280 lb).    Physical Exam  Vitals and nursing note reviewed.   Constitutional:       General: She is not in acute distress.     Appearance: She is ill-appearing. She is not toxic-appearing or diaphoretic.      Comments: Intubated  Sedated    HENT:      Head: Normocephalic and atraumatic.      Right Ear: " External ear normal.      Left Ear: External ear normal.      Nose: Nose normal. No congestion or rhinorrhea.      Mouth/Throat:      Mouth: Mucous membranes are moist.      Pharynx: Oropharynx is clear. No oropharyngeal exudate or posterior oropharyngeal erythema.      Comments: + ETT  Eyes:      General: No scleral icterus.        Right eye: No discharge.         Left eye: No discharge.      Extraocular Movements: Extraocular movements intact.      Conjunctiva/sclera: Conjunctivae normal.      Pupils: Pupils are equal, round, and reactive to light.   Neck:      Vascular: No carotid bruit.   Cardiovascular:      Rate and Rhythm: Regular rhythm. Tachycardia present.      Pulses: Normal pulses.      Heart sounds: No murmur heard.     No friction rub. No gallop.   Pulmonary:      Effort: Pulmonary effort is normal. No respiratory distress.      Breath sounds: No stridor. Wheezing present. No rhonchi or rales.   Chest:      Chest wall: No tenderness.   Abdominal:      General: There is no distension.      Tenderness: There is no abdominal tenderness. There is no guarding.      Comments: Hypoactive BS   Musculoskeletal:         General: No swelling. Normal range of motion.      Cervical back: Normal range of motion and neck supple. No rigidity or tenderness.      Right lower leg: Edema present.      Left lower leg: Edema present.   Lymphadenopathy:      Cervical: No cervical adenopathy.   Skin:     General: Skin is warm and dry.      Capillary Refill: Capillary refill takes less than 2 seconds.      Coloration: Skin is not jaundiced.      Findings: No bruising.      Comments: + bruise LLE   Neurological:      General: No focal deficit present.      Cranial Nerves: No cranial nerve deficit.      Sensory: No sensory deficit.      Motor: No weakness.   Psychiatric:      Comments: Not able to assess         Laboratory    Recent Labs   Lab 08/27/24  0330 08/27/24  0419   WBC 9.94  --    RBC 2.73*  --    HGB 8.1*  --    HCT  "26.3* 30*     --    MCV 96  --    MCH 29.7  --    MCHC 30.8*  --        Recent Labs   Lab 08/27/24  0330   CALCIUM 7.6*   ALBUMIN 2.9*   PROT 5.6*   *   K 3.8   CO2 43*   CL 78*   BUN 11   CREATININE 0.8   ALKPHOS 57   ALT 8*   AST 20   BILITOT 0.4       No results for input(s): "PT", "INR", "APTT" in the last 24 hours.    No results for input(s): "CPK", "CPKMB", "TROPONINI", "MB" in the last 24 hours.      Additional labs: reviewed    Microbiology:       Microbiology Results (last 7 days)       Procedure Component Value Units Date/Time    Culture, Respiratory with Gram Stain [[id number]] Collected: 08/25/24 0556    Order Status: Completed Specimen: Sputum from Tracheal Aspirate Updated: 08/27/24 0728     Respiratory Culture Normal respiratory denise     Comment: Previous comment was modified by ALEKSANDRA at 07:27 on 08/27/2024 No   significant isolate          Gram Stain (Respiratory) <10 epithelial cells per low power field.     Gram Stain (Respiratory) Few WBC's     Gram Stain (Respiratory) Few Gram positive cocci    Respiratory Infection Panel (PCR), Nasopharyngeal [[id number]] Collected: 08/26/24 0930    Order Status: Completed Specimen: Nasopharyngeal Swab Updated: 08/26/24 1141     Respiratory Infection Panel Source NP swab     Adenovirus Not Detected     Coronavirus 229E, Common Cold Virus Not Detected     Coronavirus HKU1, Common Cold Virus Not Detected     Coronavirus NL63, Common Cold Virus Not Detected     Coronavirus OC43, Common Cold Virus Not Detected     Comment: Coronavirus strains 229E, HKU1, NL63, and OC43 can cause the common   cold   and are not associated with the respiratory disease outbreak caused   by  the COVID-19 (SARS-CoV-2 novel Coronavirus) strain.           SARS-CoV2 (COVID-19) Qualitative PCR Not Detected     Human Metapneumovirus Not Detected     Human Rhinovirus/Enterovirus Not Detected     Influenza A (subtypes H1, H1-2009,H3) Not Detected     Influenza B Not Detected     " Parainfluenza Virus 1 Not Detected     Parainfluenza Virus 2 Not Detected     Parainfluenza Virus 3 Not Detected     Parainfluenza Virus 4 Not Detected     Respiratory Syncytial Virus Not Detected     Bordetella Parapertussis (SC5281) Not Detected     Bordetella pertussis (ptxP) Not Detected     Chlamydia pneumoniae Not Detected     Mycoplasma pneumoniae Not Detected     Comment: Respiratory Infection Panel testing performed by Multiplex PCR.       Narrative:      Respiratory Infection Panel source->NP Swab    MRSA Screen by PCR [[id number]] Collected: 08/26/24 0931    Order Status: Completed Specimen: Nasal Swab Updated: 08/26/24 1127     MRSA SCREEN BY PCR Negative    MRSA Screen by PCR [[id number]] Collected: 08/26/24 0927    Order Status: Canceled Specimen: Nasal Swab     Culture, Respiratory with Gram Stain [[id number]]     Order Status: No result Specimen: Respiratory             Radiology    X-Ray Chest AP Portable  Narrative: EXAMINATION:  XR CHEST AP PORTABLE    CLINICAL HISTORY:  resp fail;    FINDINGS:  Portable chest with comparison chest x-ray 08/26/2024.  Normal cardiomediastinal silhouette.Bilateral mixed interstitial and airspace lung opacities, worse in the left lung are unchanged.  Hazy opacities of the lung bases again noted.  Prominent central vascular structures again noted.  Nasogastric tube tip and side port below the gastroesophageal junction.  No acute osseous abnormality.  Impression: No significant changes CHS lung pattern with bilateral pleural effusions.    Electronically signed by: Huber Holcomb  Date:    08/27/2024  Time:    07:35        Additional Studies    reviewed    Ventilator Information    Vent Mode: A/C  Oxygen Concentration (%):  [60-65] 60  Resp Rate Total:  [14 br/min-25 br/min] 18 br/min  Vt Set:  [400 mL] 400 mL  PEEP/CPAP:  [5 cmH20] 5 cmH20  Mean Airway Pressure:  [8.5 cmH20-10 cmH20] 9.5 cmH20             Recent Labs     08/27/24  0419   PH 7.462*   PCO2 69.9*   PO2  62*   HCO3 50.0*   POCSATURATED 91   BE 26*         Impression    Active Hospital Problems    Diagnosis  POA    *Acute hypoxic on chronic hypercapnic respiratory failure [J96.01, J96.12]  Yes    Acute metabolic encephalopathy [G93.41]  Yes    Fall [W19.XXXA]  Unknown    COPD exacerbation [J44.1]  Yes    Pneumonia [J18.9]  Yes    Hypothyroid [E03.9]  Yes      Resolved Hospital Problems   No resolved problems to display.       Plan    Ventilator and adjust as needed, baseline paCO2 should be about 90  Respiratory treatments, steroids (will decrease), antibiotics (stop vanco as MRSA screen negative)  Cultures and follow  Not ready to wean  Follow NA  Pressors if needed  Sedation as needed, being adjusted  Watch H/H - no active bleeding reported  Follow CPK - has mild rhabdomyolysis  Replace electrolytes  Synthroid and follow  Tube feeds    Thank you for this consult.  I will follow with you while the patient is hospitalized.      Critical Care Time    I have spent > 35 minutes providing critical care services for this pt for the above diagnoses.  These services have included pt evaluation, pt exam, ventilator assessment, SBT assessment, discussions with staff, chart review, data review, note preparation and .  Medications have been reviewed and adjusted as needed.  The patient has life threatening illness with a high risk of decompensation and/or death.      Sonido Zamora MD  [facility] Pulmonary/Critical Care

## 2024-08-27 NOTE — SUBJECTIVE & OBJECTIVE
Interval History: Patient is intubated    Review of Systems   All other systems reviewed and are negative.    Objective:     Vital Signs (Most Recent):  Temp: 98.8 °F (37.1 °C) (08/27/24 1100)  Pulse: 60 (08/27/24 1200)  Resp: 14 (08/27/24 1200)  BP: (!) 108/54 (08/27/24 1200)  SpO2: (!) 94 % (08/27/24 1200) Vital Signs (24h Range):  Temp:  [97.4 °F (36.3 °C)-98.8 °F (37.1 °C)] 98.8 °F (37.1 °C)  Pulse:  [53-83] 60  Resp:  [14-26] 14  SpO2:  [93 %-98 %] 94 %  BP: ()/(50-79) 108/54     Weight: 127 kg (280 lb)  Body mass index is 46.59 kg/m².    Intake/Output Summary (Last 24 hours) at 8/27/2024 1250  Last data filed at 8/27/2024 1100  Gross per 24 hour   Intake 3787.78 ml   Output 2970 ml   Net 817.78 ml         Physical Exam  Constitutional:       Appearance: She is ill-appearing.   Cardiovascular:      Rate and Rhythm: Normal rate.   Pulmonary:      Effort: Pulmonary effort is normal.   Abdominal:      General: Abdomen is flat.             Significant Labs: All pertinent labs within the past 24 hours have been reviewed.    Significant Imaging: I have reviewed all pertinent imaging results/findings within the past 24 hours.

## 2024-08-27 NOTE — NURSING
Nurses Note -- 4 Eyes      8/26/2024   10:04 PM      Skin assessed during: Q Shift Change      [] No Altered Skin Integrity Present    []Prevention Measures Documented      [x] Yes- Altered Skin Integrity Present or Discovered   [] LDA Added if Not in Epic (Describe Wound)   [] New Altered Skin Integrity was Present on Admit and Documented in LDA   [] Wound Image Taken    Wound Care Consulted? Yes    Attending Nurse:  CARLOS Pearson    Second RN/Staff Member:  CARLOS Alvarez

## 2024-08-27 NOTE — PROGRESS NOTES
[facility] Medicine  Progress Note    Patient Name: Karo Leonard  MRN: [id number]  Patient Class: IP- Inpatient   Admission Date: 8/25/2024  Length of Stay: 2 days  Attending Physician: Olga Lidia Pedroza DO  Primary Care Provider: Navneet Hernandez FNP        Subjective:     Principal Problem:Acute hypoxic on chronic hypercapnic respiratory failure        HPI:  64F p/w fall in the context of shortness of breath. EMS reportedly gave narcan w/o appreciable response, then she was given duoneb en route to [facility] ED. Upon arrival, she was tachypneic, had respy sounding phonation, but she denied chest pain, fever, or chills. She was placed on BiPAP, and initially demonstrated improvement. However, she became less responsive and appeared to tire out. She was given additional narcan w/o appreciable response, so she was intubated. She had some hypotension after receiving sedation, so she was placed on low dose levophed. [place] was placed, lasix was given, as was Abx. Large bruise noted on LLE, so x-ray ordered.     Overview/Hospital Course:  Patient came in with acute on chronic hypoxemic hypercapnic respiratory failure.  Patient was intubated after failing BiPAP.  Plan to extubate on 08/28.  Continue with IV antibiotics for COPD exacerbation and possible pneumonia.  MRSA was negative so vanco was discontinued on 8/27.  Patient was started on iron and B12 supplement for anemia    Interval History: Patient is intubated    Review of Systems   All other systems reviewed and are negative.    Objective:     Vital Signs (Most Recent):  Temp: 98.8 °F (37.1 °C) (08/27/24 1100)  Pulse: 60 (08/27/24 1200)  Resp: 14 (08/27/24 1200)  BP: (!) 108/54 (08/27/24 1200)  SpO2: (!) 94 % (08/27/24 1200) Vital Signs (24h Range):  Temp:  [97.4 °F (36.3 °C)-98.8 °F (37.1 °C)] 98.8 °F (37.1 °C)  Pulse:  [53-83] 60  Resp:  [14-26] 14  SpO2:  [93 %-98 %] 94 %  BP: ()/(50-79) 108/54     Weight: 127 kg (280 lb)  Body mass  "index is 46.59 kg/m².    Intake/Output Summary (Last 24 hours) at 8/27/2024 1250  Last data filed at 8/27/2024 1100  Gross per 24 hour   Intake 3787.78 ml   Output 2970 ml   Net 817.78 ml         Physical Exam  Constitutional:       Appearance: She is ill-appearing.   Cardiovascular:      Rate and Rhythm: Normal rate.   Pulmonary:      Effort: Pulmonary effort is normal.   Abdominal:      General: Abdomen is flat.             Significant Labs: All pertinent labs within the past 24 hours have been reviewed.    Significant Imaging: I have reviewed all pertinent imaging results/findings within the past 24 hours.    Assessment/Plan:      * Acute hypoxic on chronic hypercapnic respiratory failure  Likely multifactorial  Patient is currently intubated and plan to extubate on 8/28  Pulmonology is following patient    Pneumonia  MRSA neg, procal neg, respiratory panel neg  MRSA was negative so vanco was discontinued on 8/27.  Continue with Levofloxacin    Anemia  Likely from Iron and b12 deficiency  Started on Iron and B12 supplement on 8/27    Fall  Given ecchymosis on L knee, get x-ray to r/o fxr.    Acute metabolic encephalopathy  Continue to monitor     COPD exacerbation  Continue with steroids and Levofloxacin       VTE Risk Mitigation (From admission, onward)           Ordered     enoxaparin injection 40 mg  Every 12 hours        Note to Pharmacy: Ht: 5' 5" (1.651 m)  Wt: 127 kg (280 lb)  Estimated Creatinine Clearance: 92.8 mL/min (based on SCr of 0.8 mg/dL).  Body mass index is 46.59 kg/m².    08/25/24 0627     IP VTE HIGH RISK PATIENT  Once         08/25/24 0627     Place sequential compression device  Until discontinued         08/25/24 0627                    Discharge Planning   CHELE:      Code Status: Full Code   Is the patient medically ready for discharge?:     Reason for patient still in hospital (select all that apply): Patient trending condition  Discharge Plan A: Home Health            Critical care time " spent on the evaluation and treatment of severe organ dysfunction, review of pertinent labs and imaging studies, discussions with consulting providers and discussions with patient/family: 32 minutes.      Olga Lidia Pedroza DO  Department of Hospital Medicine   [facility]

## 2024-08-27 NOTE — PLAN OF CARE
Problem: Adult Inpatient Plan of Care  Goal: Plan of Care Review  Outcome: Progressing  Goal: Patient-Specific Goal (Individualized)  Outcome: Progressing  Goal: Absence of Hospital-Acquired Illness or Injury  Outcome: Progressing  Goal: Optimal Comfort and Wellbeing  Outcome: Progressing  Goal: Readiness for Transition of Care  Outcome: Progressing     Problem: Bariatric Environmental Safety  Goal: Safety Maintained with Care  Outcome: Progressing     Problem: Skin Injury Risk Increased  Goal: Skin Health and Integrity  Outcome: Progressing     Problem: Diabetes Comorbidity  Goal: Blood Glucose Level Within Targeted Range  Outcome: Progressing     Problem: Pneumonia  Goal: Fluid Balance  Outcome: Progressing  Goal: Resolution of Infection Signs and Symptoms  Outcome: Progressing  Goal: Effective Oxygenation and Ventilation  Outcome: Progressing     Problem: Infection  Goal: Absence of Infection Signs and Symptoms  Outcome: Progressing     Problem: Fall Injury Risk  Goal: Absence of Fall and Fall-Related Injury  Outcome: Progressing     Problem: Enteral Nutrition  Goal: Feeding Tolerance  Outcome: Progressing

## 2024-08-27 NOTE — ASSESSMENT & PLAN NOTE
Likely multifactorial  Patient is currently intubated and plan to extubate on 8/28  Pulmonology is following patient

## 2024-08-27 NOTE — HOSPITAL COURSE
64 F Patient with hx of COPD, morbid obesity was admitted for acute on chronic hypoxemic hypercapnic respiratory failure with sever fall with hit to the face . Patient was intubated after failing  BiPAP. Finished course of IV Levofloxacin for COPD exacerbation and possible pneumonia. Pulm has been managing her steroid dose. Patient was started on iron and B12 supplement for anemia. S/P exubation on 8/31. For her hypertension, patient was started on losartan and her home diltiazem was resumed.  Patient working with PT and OT.  Fall precautions have been addressed with the patient.  Patient moving to medicine telemetry.  Supplemental oxygen weaning is in process.  Patient encouraged to continue BiPAP use at night or as needed as before. Steroid dose is being tapered down. Pulmonology followed the patient.    During her hospital course, patient complained of bloated and also passing a lot of gas and Gas x was given but later complained of mild abdominal pain and KUB was done and found to have Gas in stool distended loops of large bowel measuring up to 6.5 cm.  Findings could reflect ileus or obstruction.  CT scan done demonstrated significant colonic distention consistent with ileus with a large amount of stool in ascending colon, colon measuring up to 11 cm.  General surgery evaluated the patient, NG tube was placed, patient continued on MiraLax and enemas, CT scan was repeated on 09/08/2024 which still shows significant constipation.  Lactulose was added and she had loose watery bowel movements, following which she was started on clears, advanced to full liquids, but her KUB was unchanged and hence we consulted gastroenterology who recommended neostigmine while monitoring in ICU given the risk of bradycardia and her tenuous respiratory status.    Lower endoscopy report:  64 F with colonic ileus and ischemic changes of                          the suspected proximal ascending colon and cecum.                           Unable to intubate the cecum due to body habitus,                          poor visualization. Air was suctioned out and                          procedure aborted. Significant improvement in                          distension of abdomen post endoscopy. Will need                          serial abdominal exams and daily KUB. If running                          fever, rising wbc I would have concern for                          gangrenous right colon but as of now the mucosa                          appears to be viable on limited examination.                          Clears okay today.                          Recommend that surgery continue to follow along                          Optimize all electrolytes (K/Mg/Phos)                          - Preparation of the colon was poor.                          - Dilated in the transverse colon, at the hepatic                          flexure and in the ascending colon.                          - Mucosal ulceration.                          - No specimens collected.     The plan to use neostigmine was canceled.  Patient began to have many bowel movements, and the gas pattern on X-ray began to appear less remarkable.  She was therefore moved out of ICU and back to remote telemetry.

## 2024-08-27 NOTE — RESPIRATORY THERAPY
08/26/24 1951   Patient Assessment/Suction   Level of Consciousness (AVPU) responds to voice   Respiratory Effort Normal;Unlabored   Expansion/Accessory Muscles/Retractions no use of accessory muscles   All Lung Fields Breath Sounds Anterior:;diminished   Rhythm/Pattern, Respiratory assisted mechanically   Cough Frequency with stimulation   Cough Type assisted   Suction Method oral;tracheal   Suction Pressure (mmHg) -120 mmHg   $ Suction Charges Inline Suction Procedure Stat Charge   Secretions Amount small   Secretions Color white   Secretions Characteristics thick   $ Swab or suction? Suction   Aspirate Toleration HAVEN (no adverse reactions)   Skin Integrity   $ Wound Care Tech Time 15 min   Area Observed Right;Left;Cheek;Upper lip;Lower lip;Corner lip   Skin Appearance without discoloration   PRE-TX-O2   Device (Oxygen Therapy) ventilator   Oxygen Concentration (%) 65   SpO2 95 %   Pulse Oximetry Type Continuous   $ Pulse Oximetry - Multiple Charge Pulse Oximetry - Multiple   Pulse (!) 54   Resp 14   Aerosol Therapy   $ Aerosol Therapy Charges Aerosol Treatment   Daily Review of Necessity (SVN) completed   Respiratory Treatment Status (SVN) given   Treatment Route (SVN) in-line;ventilator   Patient Position HOB elevated   Post Treatment Assessment (SVN) breath sounds unchanged   Signs of Intolerance (SVN) none   Breath Sounds Post-Respiratory Treatment   Throughout All Fields Post-Treatment no change   Post-treatment Heart Rate (beats/min) 55   Post-treatment Resp Rate (breaths/min) 14        Airway - Non-Surgical 08/25/24 0427 Endotracheal Tube   Placement Date/Time: 08/25/24 0427   Present Prior to Hospital Arrival?: No  Method of Intubation: Glidescope  Inserted by: MD  Staff/Resident Name(s): Dr. Funes  Airway Device: Endotracheal Tube  Mask Ventilation: Easy  Airway Device Size: 7.5  St...   Secured at 24 cm   Measured At Lips   Secured Location Right   Secured by Commercial tube camargo   Position of  ETT in xRay In good position   Bite Block secure and patent   Site Condition Cool;Dry   Status Intact;Secured;Patent   Site Assessment Clean;Dry   Cuff Volume   (MLT)   Airway Safety   Is Ambu Bag and Mask with Patient? Yes, Adult Ambu Bag and Mask   Suction set is at the bedside? Yes   Respiratory Interventions   Airway/Ventilation Management airway patency maintained   Vent Select   Conventional Vent Y   Charged w/in last 24h YES   Preset Conventional Ventilator Settings   Vent ID [id number]   Vent Type    Ventilation Type VC   Vent Mode A/C   Humidity HME   Set Rate 14 BPM   Vt Set 400 mL   PEEP/CPAP 5 cmH20   Peak Flow 65 L/min   Peak End Inspiratory Pressure 23 cmH20   I-Trigger Type  V-TRIG   Trigger Sensitivity Flow/I-Trigger 3 L/min   Patient Ventilator Parameters   Resp Rate Total 14 br/min   Peak Airway Pressure 32 cmH20   Mean Airway Pressure 9 cmH20   Plateau Pressure 0 cmH20   Exhaled Vt 406 mL   Total Ve 5.67 L/m   I:E Ratio Measured 1:5.3   Auto PEEP 0 cmH20   Conventional Ventilator Alarms   Alarms On Y   Ve High Alarm 25 L/min   Ve Low Alarm 3 L/min   Resp Rate High Alarm 40 br/min   Apnea Rate 14   Apnea Volume (mL) 0 mL   Apnea Oxygen Concentration  100   Apnea Flow Rate (L/min) 64   T Apnea 20 sec(s)   IHI Ventilator Associated Pneumonia Bundle (Required)   Head of Bed Elevated  HOB 45   Oral Care Mouth suctioned   Ready to Wean/Extubation Screen   FIO2<=50 (chart decimal) (!) 0.65   MV<16L (chart vol.) 5.67   PEEP <=8 (chart #) 5   Ready to Wean Parameters   F/VT Ratio<105 (RSBI) (!) 34.48   Vital Capacity   Vital Capacity (mL) 0   Education   $ Education 15 min;Ventilator Oxygen;Suction;Bronchodilator

## 2024-08-28 PROBLEM — E11.9 TYPE 2 DIABETES MELLITUS: Status: ACTIVE | Noted: 2024-08-28

## 2024-08-28 PROBLEM — Z71.89 GOALS OF CARE, COUNSELING/DISCUSSION: Status: ACTIVE | Noted: 2024-08-28

## 2024-08-28 LAB
ALBUMIN SERPL BCP-MCNC: 2.7 G/DL (ref 3.5–5.2)
ALLENS TEST: ABNORMAL
ALLENS TEST: ABNORMAL
ALP SERPL-CCNC: 67 U/L (ref 55–135)
ALT SERPL W/O P-5'-P-CCNC: 7 U/L (ref 10–44)
ANION GAP SERPL CALC-SCNC: 7 MMOL/L (ref 8–16)
AST SERPL-CCNC: 17 U/L (ref 10–40)
BASOPHILS # BLD AUTO: 0 K/UL (ref 0–0.2)
BASOPHILS NFR BLD: 0 % (ref 0–1.9)
BILIRUB SERPL-MCNC: 0.4 MG/DL (ref 0.1–1)
BUN SERPL-MCNC: 9 MG/DL (ref 8–23)
CALCIUM SERPL-MCNC: 8 MG/DL (ref 8.7–10.5)
CHLORIDE SERPL-SCNC: 84 MMOL/L (ref 95–110)
CO2 SERPL-SCNC: 41 MMOL/L (ref 23–29)
CREAT SERPL-MCNC: 0.7 MG/DL (ref 0.5–1.4)
DELSYS: ABNORMAL
DELSYS: ABNORMAL
DIFFERENTIAL METHOD BLD: ABNORMAL
EOSINOPHIL # BLD AUTO: 0 K/UL (ref 0–0.5)
EOSINOPHIL NFR BLD: 0.1 % (ref 0–8)
ERYTHROCYTE [DISTWIDTH] IN BLOOD BY AUTOMATED COUNT: 16.4 % (ref 11.5–14.5)
ERYTHROCYTE [SEDIMENTATION RATE] IN BLOOD BY WESTERGREN METHOD: 12 MM/H
ERYTHROCYTE [SEDIMENTATION RATE] IN BLOOD BY WESTERGREN METHOD: 14 MM/H
EST. GFR  (NO RACE VARIABLE): >60 ML/MIN/1.73 M^2
FIO2: 60
FIO2: 65
GLUCOSE SERPL-MCNC: 283 MG/DL (ref 70–110)
GLUCOSE SERPL-MCNC: 293 MG/DL (ref 70–110)
GLUCOSE SERPL-MCNC: 310 MG/DL (ref 70–110)
GLUCOSE SERPL-MCNC: 314 MG/DL (ref 70–110)
GLUCOSE SERPL-MCNC: 328 MG/DL (ref 70–110)
GLUCOSE SERPL-MCNC: 345 MG/DL (ref 70–110)
GLUCOSE SERPL-MCNC: 371 MG/DL (ref 70–110)
GLUCOSE SERPL-MCNC: 402 MG/DL (ref 70–110)
HCO3 UR-SCNC: 47.7 MMOL/L (ref 24–28)
HCO3 UR-SCNC: 48.1 MMOL/L (ref 24–28)
HCT VFR BLD AUTO: 27.1 % (ref 37–48.5)
HCT VFR BLD CALC: 28 %PCV (ref 36–54)
HCT VFR BLD CALC: 28 %PCV (ref 36–54)
HGB BLD-MCNC: 8.5 G/DL (ref 12–16)
IMM GRANULOCYTES # BLD AUTO: 0.1 K/UL (ref 0–0.04)
IMM GRANULOCYTES NFR BLD AUTO: 1.1 % (ref 0–0.5)
LYMPHOCYTES # BLD AUTO: 0.5 K/UL (ref 1–4.8)
LYMPHOCYTES NFR BLD: 5.7 % (ref 18–48)
MCH RBC QN AUTO: 30.7 PG (ref 27–31)
MCHC RBC AUTO-ENTMCNC: 31.4 G/DL (ref 32–36)
MCV RBC AUTO: 98 FL (ref 82–98)
MODE: ABNORMAL
MODE: ABNORMAL
MONOCYTES # BLD AUTO: 0.6 K/UL (ref 0.3–1)
MONOCYTES NFR BLD: 6.7 % (ref 4–15)
NEUTROPHILS # BLD AUTO: 7.7 K/UL (ref 1.8–7.7)
NEUTROPHILS NFR BLD: 86.4 % (ref 38–73)
NRBC BLD-RTO: 0 /100 WBC
PCO2 BLDA: 62.5 MMHG (ref 35–45)
PCO2 BLDA: 65.1 MMHG (ref 35–45)
PEEP: 5
PEEP: 8
PH SMN: 7.48 [PH] (ref 7.35–7.45)
PH SMN: 7.49 [PH] (ref 7.35–7.45)
PLATELET # BLD AUTO: 303 K/UL (ref 150–450)
PMV BLD AUTO: 9.9 FL (ref 9.2–12.9)
PO2 BLDA: 59 MMHG (ref 80–100)
PO2 BLDA: 68 MMHG (ref 80–100)
POC BE: 24 MMOL/L
POC BE: 24 MMOL/L
POC IONIZED CALCIUM: 1.11 MMOL/L (ref 1.06–1.42)
POC IONIZED CALCIUM: 1.12 MMOL/L (ref 1.06–1.42)
POC SATURATED O2: 91 % (ref 95–100)
POC SATURATED O2: 93 % (ref 95–100)
POC TCO2: 50 MMOL/L (ref 23–27)
POC TCO2: >50 MMOL/L (ref 23–27)
POTASSIUM BLD-SCNC: 3.8 MMOL/L (ref 3.5–5.1)
POTASSIUM BLD-SCNC: 3.9 MMOL/L (ref 3.5–5.1)
POTASSIUM SERPL-SCNC: 4 MMOL/L (ref 3.5–5.1)
PROT SERPL-MCNC: 5.1 G/DL (ref 6–8.4)
RBC # BLD AUTO: 2.77 M/UL (ref 4–5.4)
SAMPLE: ABNORMAL
SAMPLE: ABNORMAL
SITE: ABNORMAL
SITE: ABNORMAL
SODIUM BLD-SCNC: 129 MMOL/L (ref 136–145)
SODIUM BLD-SCNC: 131 MMOL/L (ref 136–145)
SODIUM SERPL-SCNC: 132 MMOL/L (ref 136–145)
SP02: 96
VT: 400
VT: 400
WBC # BLD AUTO: 8.92 K/UL (ref 3.9–12.7)

## 2024-08-28 PROCEDURE — 63600175 PHARM REV CODE 636 W HCPCS: Performed by: INTERNAL MEDICINE

## 2024-08-28 PROCEDURE — 94640 AIRWAY INHALATION TREATMENT: CPT

## 2024-08-28 PROCEDURE — 82962 GLUCOSE BLOOD TEST: CPT

## 2024-08-28 PROCEDURE — 63600175 PHARM REV CODE 636 W HCPCS: Performed by: FAMILY MEDICINE

## 2024-08-28 PROCEDURE — 84132 ASSAY OF SERUM POTASSIUM: CPT

## 2024-08-28 PROCEDURE — 20000000 HC ICU ROOM

## 2024-08-28 PROCEDURE — 99900031 HC PATIENT EDUCATION (STAT)

## 2024-08-28 PROCEDURE — 85014 HEMATOCRIT: CPT

## 2024-08-28 PROCEDURE — 80053 COMPREHEN METABOLIC PANEL: CPT | Performed by: FAMILY MEDICINE

## 2024-08-28 PROCEDURE — 94761 N-INVAS EAR/PLS OXIMETRY MLT: CPT | Mod: XB

## 2024-08-28 PROCEDURE — 37799 UNLISTED PX VASCULAR SURGERY: CPT

## 2024-08-28 PROCEDURE — 84295 ASSAY OF SERUM SODIUM: CPT

## 2024-08-28 PROCEDURE — 25000003 PHARM REV CODE 250: Performed by: FAMILY MEDICINE

## 2024-08-28 PROCEDURE — 82803 BLOOD GASES ANY COMBINATION: CPT

## 2024-08-28 PROCEDURE — 25000003 PHARM REV CODE 250: Performed by: INTERNAL MEDICINE

## 2024-08-28 PROCEDURE — 36620 INSERTION CATHETER ARTERY: CPT

## 2024-08-28 PROCEDURE — 99900026 HC AIRWAY MAINTENANCE (STAT)

## 2024-08-28 PROCEDURE — 85025 COMPLETE CBC W/AUTO DIFF WBC: CPT | Performed by: FAMILY MEDICINE

## 2024-08-28 PROCEDURE — 27100171 HC OXYGEN HIGH FLOW UP TO 24 HOURS

## 2024-08-28 PROCEDURE — 94003 VENT MGMT INPAT SUBQ DAY: CPT

## 2024-08-28 PROCEDURE — 25000242 PHARM REV CODE 250 ALT 637 W/ HCPCS: Performed by: HOSPITALIST

## 2024-08-28 PROCEDURE — 36600 WITHDRAWAL OF ARTERIAL BLOOD: CPT

## 2024-08-28 PROCEDURE — 25000003 PHARM REV CODE 250: Performed by: HOSPITALIST

## 2024-08-28 PROCEDURE — 27000221 HC OXYGEN, UP TO 24 HOURS

## 2024-08-28 PROCEDURE — 99900035 HC TECH TIME PER 15 MIN (STAT)

## 2024-08-28 PROCEDURE — 82330 ASSAY OF CALCIUM: CPT

## 2024-08-28 PROCEDURE — 99291 CRITICAL CARE FIRST HOUR: CPT | Mod: ,,, | Performed by: INTERNAL MEDICINE

## 2024-08-28 PROCEDURE — 63600175 PHARM REV CODE 636 W HCPCS: Performed by: HOSPITALIST

## 2024-08-28 RX ORDER — INSULIN GLARGINE 100 [IU]/ML
20 INJECTION, SOLUTION SUBCUTANEOUS DAILY
Status: DISCONTINUED | OUTPATIENT
Start: 2024-08-28 | End: 2024-08-28

## 2024-08-28 RX ORDER — HYDRALAZINE HYDROCHLORIDE 20 MG/ML
10 INJECTION INTRAMUSCULAR; INTRAVENOUS EVERY 6 HOURS PRN
Status: DISCONTINUED | OUTPATIENT
Start: 2024-08-28 | End: 2024-09-17 | Stop reason: HOSPADM

## 2024-08-28 RX ORDER — LOSARTAN POTASSIUM 50 MG/1
50 TABLET ORAL DAILY
Status: DISCONTINUED | OUTPATIENT
Start: 2024-08-28 | End: 2024-08-29

## 2024-08-28 RX ORDER — INSULIN GLARGINE 100 [IU]/ML
20 INJECTION, SOLUTION SUBCUTANEOUS 2 TIMES DAILY
Status: DISCONTINUED | OUTPATIENT
Start: 2024-08-28 | End: 2024-08-29

## 2024-08-28 RX ORDER — MORPHINE SULFATE 2 MG/ML
2 INJECTION, SOLUTION INTRAMUSCULAR; INTRAVENOUS
Status: DISCONTINUED | OUTPATIENT
Start: 2024-08-28 | End: 2024-09-03

## 2024-08-28 RX ADMIN — CYANOCOBALAMIN TAB 1000 MCG 1000 MCG: 1000 TAB at 08:08

## 2024-08-28 RX ADMIN — METHYLPREDNISOLONE SODIUM SUCCINATE 40 MG: 40 INJECTION, POWDER, FOR SOLUTION INTRAMUSCULAR; INTRAVENOUS at 01:08

## 2024-08-28 RX ADMIN — INSULIN ASPART 10 UNITS: 100 INJECTION, SOLUTION INTRAVENOUS; SUBCUTANEOUS at 06:08

## 2024-08-28 RX ADMIN — PROPOFOL 50 MCG/KG/MIN: 10 INJECTION, EMULSION INTRAVENOUS at 05:08

## 2024-08-28 RX ADMIN — IPRATROPIUM BROMIDE AND ALBUTEROL SULFATE 3 ML: 2.5; .5 SOLUTION RESPIRATORY (INHALATION) at 07:08

## 2024-08-28 RX ADMIN — LOSARTAN POTASSIUM 50 MG: 50 TABLET, FILM COATED ORAL at 10:08

## 2024-08-28 RX ADMIN — SODIUM CHLORIDE: 9 INJECTION, SOLUTION INTRAVENOUS at 04:08

## 2024-08-28 RX ADMIN — MUPIROCIN 1 G: 20 OINTMENT TOPICAL at 08:08

## 2024-08-28 RX ADMIN — PROPOFOL 50 MCG/KG/MIN: 10 INJECTION, EMULSION INTRAVENOUS at 08:08

## 2024-08-28 RX ADMIN — ENOXAPARIN SODIUM 40 MG: 40 INJECTION SUBCUTANEOUS at 08:08

## 2024-08-28 RX ADMIN — INSULIN ASPART 8 UNITS: 100 INJECTION, SOLUTION INTRAVENOUS; SUBCUTANEOUS at 11:08

## 2024-08-28 RX ADMIN — PROPOFOL 50 MCG/KG/MIN: 10 INJECTION, EMULSION INTRAVENOUS at 04:08

## 2024-08-28 RX ADMIN — IPRATROPIUM BROMIDE AND ALBUTEROL SULFATE 3 ML: 2.5; .5 SOLUTION RESPIRATORY (INHALATION) at 12:08

## 2024-08-28 RX ADMIN — PROPOFOL 50 MCG/KG/MIN: 10 INJECTION, EMULSION INTRAVENOUS at 11:08

## 2024-08-28 RX ADMIN — METHYLPREDNISOLONE SODIUM SUCCINATE 40 MG: 40 INJECTION, POWDER, FOR SOLUTION INTRAMUSCULAR; INTRAVENOUS at 09:08

## 2024-08-28 RX ADMIN — INSULIN ASPART 6 UNITS: 100 INJECTION, SOLUTION INTRAVENOUS; SUBCUTANEOUS at 06:08

## 2024-08-28 RX ADMIN — MORPHINE SULFATE 2 MG: 2 INJECTION, SOLUTION INTRAMUSCULAR; INTRAVENOUS at 08:08

## 2024-08-28 RX ADMIN — SODIUM CHLORIDE 125 MG: 9 INJECTION, SOLUTION INTRAVENOUS at 09:08

## 2024-08-28 RX ADMIN — PROPOFOL 50 MCG/KG/MIN: 10 INJECTION, EMULSION INTRAVENOUS at 06:08

## 2024-08-28 RX ADMIN — INSULIN GLARGINE 20 UNITS: 100 INJECTION, SOLUTION SUBCUTANEOUS at 08:08

## 2024-08-28 RX ADMIN — PROPOFOL 50 MCG/KG/MIN: 10 INJECTION, EMULSION INTRAVENOUS at 02:08

## 2024-08-28 RX ADMIN — IPRATROPIUM BROMIDE AND ALBUTEROL SULFATE 3 ML: 2.5; .5 SOLUTION RESPIRATORY (INHALATION) at 01:08

## 2024-08-28 RX ADMIN — FAMOTIDINE 20 MG: 10 INJECTION INTRAVENOUS at 08:08

## 2024-08-28 RX ADMIN — PROPOFOL 50 MCG/KG/MIN: 10 INJECTION, EMULSION INTRAVENOUS at 09:08

## 2024-08-28 RX ADMIN — LEVOFLOXACIN 750 MG: 5 INJECTION, SOLUTION INTRAVENOUS at 01:08

## 2024-08-28 RX ADMIN — LEVOTHYROXINE SODIUM 50 MCG: 0.03 TABLET ORAL at 05:08

## 2024-08-28 RX ADMIN — INSULIN GLARGINE 20 UNITS: 100 INJECTION, SOLUTION SUBCUTANEOUS at 09:08

## 2024-08-28 RX ADMIN — PROPOFOL 45 MCG/KG/MIN: 10 INJECTION, EMULSION INTRAVENOUS at 12:08

## 2024-08-28 RX ADMIN — METHYLPREDNISOLONE SODIUM SUCCINATE 40 MG: 40 INJECTION, POWDER, FOR SOLUTION INTRAMUSCULAR; INTRAVENOUS at 05:08

## 2024-08-28 NOTE — PLAN OF CARE
Problem: Adult Inpatient Plan of Care  Goal: Plan of Care Review  Outcome: Progressing  Goal: Patient-Specific Goal (Individualized)  Outcome: Progressing  Goal: Absence of Hospital-Acquired Illness or Injury  Outcome: Progressing  Goal: Optimal Comfort and Wellbeing  Outcome: Progressing  Goal: Readiness for Transition of Care  Outcome: Progressing     Problem: Bariatric Environmental Safety  Goal: Safety Maintained with Care  Outcome: Progressing     Problem: Skin Injury Risk Increased  Goal: Skin Health and Integrity  Outcome: Progressing     Problem: Skin Injury Risk Increased  Goal: Skin Health and Integrity  Outcome: Progressing     Problem: Diabetes Comorbidity  Goal: Blood Glucose Level Within Targeted Range  Outcome: Progressing     Problem: Pneumonia  Goal: Fluid Balance  Outcome: Progressing  Goal: Resolution of Infection Signs and Symptoms  Outcome: Progressing  Goal: Effective Oxygenation and Ventilation  Outcome: Progressing     Problem: Infection  Goal: Absence of Infection Signs and Symptoms  Outcome: Progressing     Problem: Fall Injury Risk  Goal: Absence of Fall and Fall-Related Injury  Outcome: Progressing     Problem: Restraint, Nonviolent  Goal: Absence of Harm or Injury  Outcome: Progressing     Problem: Enteral Nutrition  Goal: Feeding Tolerance  Outcome: Progressing     Problem: Mechanical Ventilation Invasive  Goal: Effective Communication  Outcome: Progressing  Goal: Optimal Device Function  Outcome: Progressing  Goal: Mechanical Ventilation Liberation  Outcome: Progressing  Goal: Optimal Nutrition Delivery  Outcome: Progressing  Goal: Absence of Device-Related Skin and Tissue Injury  Outcome: Progressing  Goal: Absence of Ventilator-Induced Lung Injury  Outcome: Progressing

## 2024-08-28 NOTE — CARE UPDATE
08/27/24 2100   Patient Assessment/Suction   Level of Consciousness (AVPU) responds to voice   Respiratory Effort Unlabored   Expansion/Accessory Muscles/Retractions expansion symmetric   All Lung Fields Breath Sounds diminished   Rhythm/Pattern, Respiratory assisted mechanically   Cough Frequency with stimulation   Cough Type assisted   Suction Method tracheal   $ Suction Charges Inline Suction Procedure Stat Charge   Secretions Amount small   Secretions Color white   Secretions Characteristics thin   PRE-TX-O2   Device (Oxygen Therapy) ventilator   $ Is the patient on Low Flow Oxygen? Yes   Oxygen Concentration (%) 60   SpO2 95 %   Pulse Oximetry Type Continuous   $ Pulse Oximetry - Multiple Charge Pulse Oximetry - Multiple   Pulse 60   Resp 14   Vent Select   Charged w/in last 24h YES   Preset Conventional Ventilator Settings   Vent Type    Ventilation Type VC   Vent Mode A/C   Set Rate 14 BPM   Vt Set 400 mL   PEEP/CPAP 5 cmH20   Peak Flow 65 L/min   Peak End Inspiratory Pressure 16 cmH20   I-Trigger Type  V-TRIG   Trigger Sensitivity Flow/I-Trigger 3 L/min   Patient Ventilator Parameters   Resp Rate Total 14 br/min   Peak Airway Pressure 26 cmH20   Mean Airway Pressure 8.2 cmH20   Plateau Pressure 0 cmH20   Exhaled Vt 411 mL   Total Ve 5.92 L/m   I:E Ratio Measured 1:5.1   Auto PEEP 0 cmH20   Conventional Ventilator Alarms   Ve High Alarm 25 L/min   Ve Low Alarm 3 L/min   Resp Rate High Alarm 40 br/min   Apnea Rate 14   Apnea Volume (mL) 0 mL   Apnea Oxygen Concentration  100   Apnea Flow Rate (L/min) 64   T Apnea 20 sec(s)   Ready to Wean/Extubation Screen   FIO2<=50 (chart decimal) (!) 0.6   MV<16L (chart vol.) 5.92   PEEP <=8 (chart #) 5   Ready to Wean Parameters   F/VT Ratio<105 (RSBI) (!) 34.06   Vital Capacity   Vital Capacity (mL) 0   Education   $ Education Bronchodilator;15 min

## 2024-08-28 NOTE — SUBJECTIVE & OBJECTIVE
Interval History: Patient is intubated. Introduce the topic on goal of care and discussed with  (POA).  understands that patient is unwell.    Review of Systems   All other systems reviewed and are negative.    Objective:     Vital Signs (Most Recent):  Temp: 97.9 °F (36.6 °C) (08/28/24 1130)  Pulse: (!) 53 (08/28/24 1302)  Resp: 13 (08/28/24 1302)  BP: 132/60 (08/28/24 1200)  SpO2: 96 % (08/28/24 1302) Vital Signs (24h Range):  Temp:  [97.3 °F (36.3 °C)-98.3 °F (36.8 °C)] 97.9 °F (36.6 °C)  Pulse:  [52-73] 53  Resp:  [12-18] 13  SpO2:  [91 %-98 %] 96 %  BP: (105-177)/(53-75) 132/60  Arterial Line BP: (121-165)/(53-64) 121/53     Weight: 121.2 kg (267 lb 3.2 oz)  Body mass index is 44.46 kg/m².    Intake/Output Summary (Last 24 hours) at 8/28/2024 1304  Last data filed at 8/28/2024 1200  Gross per 24 hour   Intake 4593.72 ml   Output 3085 ml   Net 1508.72 ml         Physical Exam  Constitutional:       Appearance: She is obese. She is ill-appearing.   Cardiovascular:      Rate and Rhythm: Normal rate.   Pulmonary:      Effort: Pulmonary effort is normal.   Abdominal:      General: Abdomen is flat.             Significant Labs: All pertinent labs within the past 24 hours have been reviewed.    Significant Imaging: I have reviewed all pertinent imaging results/findings within the past 24 hours.

## 2024-08-28 NOTE — ASSESSMENT & PLAN NOTE
Started Losartan on 8/28  Can make adjustment as needed  Holding home diltiazem due to bradycardia

## 2024-08-28 NOTE — ASSESSMENT & PLAN NOTE
Introduce the topic on goal of care  Will redisucss again following day based on patient's prognosis

## 2024-08-28 NOTE — PROGRESS NOTES
[facility] Medicine  Progress Note    Patient Name: Karo Leonard  MRN: [id number]  Patient Class: IP- Inpatient   Admission Date: 8/25/2024  Length of Stay: 3 days  Attending Physician: Olga Lidia Pedroza DO  Primary Care Provider: Navneet Hernandez FNP        Subjective:     Principal Problem:Acute hypoxic on chronic hypercapnic respiratory failure        HPI:  64F p/w fall in the context of shortness of breath. EMS reportedly gave narcan w/o appreciable response, then she was given duoneb en route to [facility] ED. Upon arrival, she was tachypneic, had respy sounding phonation, but she denied chest pain, fever, or chills. She was placed on BiPAP, and initially demonstrated improvement. However, she became less responsive and appeared to tire out. She was given additional narcan w/o appreciable response, so she was intubated. She had some hypotension after receiving sedation, so she was placed on low dose levophed. [place] was placed, lasix was given, as was Abx. Large bruise noted on LLE, so x-ray ordered.     Overview/Hospital Course:  Patient came in with acute on chronic hypoxemic hypercapnic respiratory failure.  Patient was intubated after failing BiPAP.  Continue with IV antibiotics for COPD exacerbation and possible pneumonia.  MRSA was negative so vanco was discontinued on 8/27.  Patient was started on iron and B12 supplement for anemia. Introduce the goal of care with patient.    Interval History: Patient is intubated. Introduce the topic on goal of care and discussed with  (POA).  understands that patient is unwell.    Review of Systems   All other systems reviewed and are negative.    Objective:     Vital Signs (Most Recent):  Temp: 97.9 °F (36.6 °C) (08/28/24 1130)  Pulse: (!) 53 (08/28/24 1302)  Resp: 13 (08/28/24 1302)  BP: 132/60 (08/28/24 1200)  SpO2: 96 % (08/28/24 1302) Vital Signs (24h Range):  Temp:  [97.3 °F (36.3 °C)-98.3 °F (36.8 °C)] 97.9 °F (36.6 °C)  Pulse:   [52-73] 53  Resp:  [12-18] 13  SpO2:  [91 %-98 %] 96 %  BP: (105-177)/(53-75) 132/60  Arterial Line BP: (121-165)/(53-64) 121/53     Weight: 121.2 kg (267 lb 3.2 oz)  Body mass index is 44.46 kg/m².    Intake/Output Summary (Last 24 hours) at 8/28/2024 1304  Last data filed at 8/28/2024 1200  Gross per 24 hour   Intake 4593.72 ml   Output 3085 ml   Net 1508.72 ml         Physical Exam  Constitutional:       Appearance: She is obese. She is ill-appearing.   Cardiovascular:      Rate and Rhythm: Normal rate.   Pulmonary:      Effort: Pulmonary effort is normal.   Abdominal:      General: Abdomen is flat.             Significant Labs: All pertinent labs within the past 24 hours have been reviewed.    Significant Imaging: I have reviewed all pertinent imaging results/findings within the past 24 hours.    Assessment/Plan:      * Acute hypoxic on chronic hypercapnic respiratory failure  Likely multifactorial  Patient is currently intubated and unable to extubate since patient is still requiring ventilator  Pulmonology is following patient    Pneumonia  MRSA neg, procal neg, respiratory panel neg  MRSA was negative so vanco was discontinued on 8/27.  Continue with Levofloxacin    Anemia  Likely from Iron and b12 deficiency  Started on Iron and B12 supplement on 8/27    Goals of care, counseling/discussion  Introduce the topic on goal of care  Will redisucss again following day based on patient's prognosis          Type 2 diabetes mellitus  Continue with SSI and glargine  Make adjustment as needed    Fall  Imaging ruled out acute fracture    Acute metabolic encephalopathy  Continue to monitor     COPD exacerbation  Continue with steroids and Levofloxacin     Hypertension  Started Losartan on 8/28  Can make adjustment as needed  Holding home diltiazem due to bradycardia    Hypothyroid  Continue home levothyroxine        VTE Risk Mitigation (From admission, onward)           Ordered     enoxaparin injection 40 mg  Every 12  "hours        Note to Pharmacy: Ht: 5' 5" (1.651 m)  Wt: 127 kg (280 lb)  Estimated Creatinine Clearance: 92.8 mL/min (based on SCr of 0.8 mg/dL).  Body mass index is 46.59 kg/m².    08/25/24 0627     IP VTE HIGH RISK PATIENT  Once         08/25/24 0627     Place sequential compression device  Until discontinued         08/25/24 0627                    Discharge Planning   CHELE:      Code Status: Full Code   Is the patient medically ready for discharge?:     Reason for patient still in hospital (select all that apply): Patient trending condition  Discharge Plan A: Home Health            Critical care time spent on the evaluation and treatment of severe organ dysfunction, review of pertinent labs and imaging studies, discussions with consulting providers and discussions with patient/family: 32 minutes.      Olga Lidia Pedroza DO  Department of Hospital Medicine   [facility]    "

## 2024-08-28 NOTE — NURSING
Pt intubated/sedated. Ventilator settings increased from 60 to 65 Fi02 and PEEP 5 to 8 with increase in Pa02 on ABG. Will do AM ABG to determine if decreasing vent settings is appropriate.     Tube feeds stopped this AM due to hyperglycemia and vomitus of tube feeds. Will restart at trickle tomorrow per Dr. Zamora. Lantus increased to 20 units BID from 10 units daily. Blood glucoses down trending slowly. Blood pressure as high as 180's in morning shift. BP medications start per Dr. Pedroza.     VSS, Sinus bradycardia as low as 50.   950ml urine output for shift. NS at 100ml/hr.     Turning q2, elevating all extremities, waffle mattress in place, heel boots in place. Mepilex on saccrum.     Plan of care discussed with spouse, Wayne. Spouse verbalized understanding. Bed in low locked position, call light in reach, bed rails up x3, bed alarms active (zone 2). Continue to monitor closely.

## 2024-08-28 NOTE — NURSING
Nurses Note -- 4 Eyes      8/28/2024   12:11 PM      Skin assessed during: Q Shift Change      [x] No Altered Skin Integrity Present    []Prevention Measures Documented      [] Yes- Altered Skin Integrity Present or Discovered   [] LDA Added if Not in Epic (Describe Wound)   [] New Altered Skin Integrity was Present on Admit and Documented in LDA   [] Wound Image Taken    Wound Care Consulted? No    Attending Nurse:  Olga Forte RN/Staff Member:  CARLOS Altamirano

## 2024-08-28 NOTE — PROGRESS NOTES
Pulmonary/Critical Care  Progress Note      Patient name: Karo Leonard  MRN: [id number]  Date: 08/28/2024    Admit Date: 8/25/2024  Consult Requested By: Olga Lidia Pedroza DO    Reason for Consult: Respiratory failure, COPD    HPI:    8/25/2024 - 63 yo ASCVD, DM, HTN. COPD(cigarette use) had increased SOB at home and a fall.  EMS was called and brought to ER.  Initially tried on BiPAP without response and was subsequently intubated and placed on ventilation.  Initial ABG showed over ventilation and we have adjusted and ABG are getting better.  She is sedated and not able to provide any history.  D/W  who says that she was supposed to see a pulmonologist but couldn't make appointment, she has been a chronic smoker.  She had increase SOB for a few days.  She did have a fall at home but he reports that she was not down for long.  He does report that she has been having recurring falls at home.      8/26/2024 - Stable overnight, O2 needs still too high to do SBT.  She is responsive and gets agitated on current sedation and we are adjusting.  No other new issues reported.  Hgb has decreased (no active bleeding reported), NA remains low, CPK is better, TFTF noted and c/w hypothyroid (synthroid started).    8/27/2024 - Remains critically ill, O2 needs down a little but still too high.  Difficult to sedate is either agitated or apneic.  Tried SBT this AM and was apneic.  VS reviewed.  She is 3.8 liters +.  Tube feeds have been started.  NA remains low, glucose is elevated.  CXR looks about the same    8/28/2024 - Stable overnight, O2 needs still up.  She does get agitated at times and we have adjusted meds.  Trouble with tube feeds and will hold today and restart tomorrow.  Not ready to wean because on increased FiO2 and will try to increase PEP to see if that helps.  She does not have a lot of secretions.  She is over ventilaed some and we adjusted vent.  CXR is about the same    Review of Systems    Review of  Systems   Unable to perform ROS: Intubated       Past Medical History    Past Medical History:   Diagnosis Date    Coronary artery disease     cardiac stent    DDD (degenerative disc disease), lumbar     Diabetes mellitus     Hypertension     Thyroid disease        Past Surgical History    Past Surgical History:   Procedure Laterality Date     SECTION  08/1987    x2 1993    CORONARY STENT PLACEMENT  2017    EXPLORATORY LAPAROTOMY  1982    Anabaptism     HEMORRHOID SURGERY      LAPAROSCOPIC CHOLECYSTECTOMY N/A 2022    Procedure: CHOLECYSTECTOMY, LAPAROSCOPIC;  Surgeon: Leonardo Wilson III, MD;  Location: [facility];  Service: General;  Laterality: N/A;    LUMBAR DISC SURGERY      PILONIDAL CYST DRAINAGE      TONSILLECTOMY      as a child (also adenoids)       Medications (scheduled):      albuterol-ipratropium  3 mL Nebulization Q6H    cyanocobalamin  1,000 mcg Oral Daily    enoxparin  40 mg Subcutaneous Q12H    famotidine (PF)  20 mg Intravenous Q12H    ferric gluconate (FERRLECIT) 125 mg in 0.9% NaCl 100 mL IVPB  125 mg Intravenous Daily    insulin glargine U-100  20 Units Subcutaneous Daily    levoFLOXacin  750 mg Intravenous Q24H    levothyroxine  50 mcg Oral Before breakfast    methylPREDNISolone injection (PEDS and ADULTS)  40 mg Intravenous Q8H    mupirocin   Nasal BID       Medications (infusions):      0.9% NaCl   Intravenous Continuous 100 mL/hr at 24 0700 Rate Verify at 24 0700    fentanyl  0-250 mcg/hr Intravenous Continuous   Stopped at 24 0721    NORepinephrine bitartrate-D5W  0-3 mcg/kg/min Intravenous Continuous 23.8 mL/hr at 24 0452 0.05 mcg/kg/min at 24 0452    propofoL  0-50 mcg/kg/min Intravenous Continuous 38.1 mL/hr at 24 0628 50 mcg/kg/min at 24 0628       Medications (prn):       Current Facility-Administered Medications:     calcium gluconate IVPB, 1 g, Intravenous, PRN    calcium gluconate IVPB,  "2 g, Intravenous, PRN    calcium gluconate IVPB, 3 g, Intravenous, PRN    dextrose 50%, 12.5 g, Intravenous, PRN    glucagon (human recombinant), 1 mg, Intramuscular, PRN    insulin aspart U-100, 0-10 Units, Subcutaneous, Q6H PRN    magnesium sulfate IVPB, 2 g, Intravenous, PRN    magnesium sulfate IVPB, 4 g, Intravenous, PRN    morphine, 2 mg, Intravenous, Q1H PRN    potassium bicarbonate, 35 mEq, Oral, PRN    potassium bicarbonate, 50 mEq, Oral, PRN    potassium bicarbonate, 60 mEq, Oral, PRN    potassium chloride in water, 40 mEq, Intravenous, PRN **AND** potassium chloride in water, 60 mEq, Intravenous, PRN **AND** potassium chloride in water, 80 mEq, Intravenous, PRN    sodium chloride 0.9%, 10 mL, Intravenous, PRN    sodium phosphate 15 mmol in D5W 250 mL IVPB, 15 mmol, Intravenous, PRN    sodium phosphate 20.01 mmol in D5W 250 mL IVPB, 20.01 mmol, Intravenous, PRN    sodium phosphate 30 mmol in D5W 250 mL IVPB, 30 mmol, Intravenous, PRN    Family History: No family history on file.    Social History: Tobacco:   Social History     Tobacco Use   Smoking Status Not on file   Smokeless Tobacco Not on file                                EtOH:   Social History     Substance and Sexual Activity   Alcohol Use No                                Drugs:   Social History     Substance and Sexual Activity   Drug Use No       Physical Exam    Vital signs:  Temp:  [97.3 °F (36.3 °C)-98.8 °F (37.1 °C)]   Pulse:  [52-73]   Resp:  [12-18]   BP: ()/(53-75)   SpO2:  [91 %-97 %]     Intake/Output:   Intake/Output Summary (Last 24 hours) at 8/28/2024 0850  Last data filed at 8/28/2024 0800  Gross per 24 hour   Intake 3941.32 ml   Output 3900 ml   Net 41.32 ml        BMI: Estimated body mass index is 44.46 kg/m² as calculated from the following:    Height as of this encounter: 5' 5" (1.651 m).    Weight as of this encounter: 121.2 kg (267 lb 3.2 oz).    Physical Exam  Vitals and nursing note reviewed.   Constitutional:       " General: She is not in acute distress.     Appearance: She is ill-appearing. She is not toxic-appearing or diaphoretic.      Comments: Intubated  Sedated    HENT:      Head: Normocephalic and atraumatic.      Comments: Some bruising right forehead and eye from her fall     Right Ear: External ear normal.      Left Ear: External ear normal.      Nose: Nose normal. No congestion or rhinorrhea.      Mouth/Throat:      Mouth: Mucous membranes are moist.      Pharynx: Oropharynx is clear. No oropharyngeal exudate or posterior oropharyngeal erythema.      Comments: + ETT  Eyes:      General: No scleral icterus.        Right eye: No discharge.         Left eye: No discharge.      Extraocular Movements: Extraocular movements intact.      Conjunctiva/sclera: Conjunctivae normal.      Pupils: Pupils are equal, round, and reactive to light.   Neck:      Vascular: No carotid bruit.   Cardiovascular:      Rate and Rhythm: Regular rhythm. Tachycardia present.      Pulses: Normal pulses.      Heart sounds: No murmur heard.     No friction rub. No gallop.   Pulmonary:      Effort: Pulmonary effort is normal. No respiratory distress.      Breath sounds: No stridor. Wheezing present. No rhonchi or rales.   Chest:      Chest wall: No tenderness.   Abdominal:      General: There is no distension.      Tenderness: There is no abdominal tenderness. There is no guarding.      Comments: Hypoactive BS   Musculoskeletal:         General: No swelling. Normal range of motion.      Cervical back: Normal range of motion and neck supple. No rigidity or tenderness.      Right lower leg: Edema present.      Left lower leg: Edema present.   Lymphadenopathy:      Cervical: No cervical adenopathy.   Skin:     General: Skin is warm and dry.      Capillary Refill: Capillary refill takes less than 2 seconds.      Coloration: Skin is not jaundiced.      Findings: No bruising.      Comments: + bruise LLE   Neurological:      General: No focal deficit  "present.      Cranial Nerves: No cranial nerve deficit.      Sensory: No sensory deficit.      Motor: No weakness.   Psychiatric:      Comments: Not able to assess         Laboratory    Recent Labs   Lab 08/28/24  0355   WBC 8.92   RBC 2.77*   HGB 8.5*   HCT 27.1*      MCV 98   MCH 30.7   MCHC 31.4*       Recent Labs   Lab 08/28/24  0355   CALCIUM 8.0*   ALBUMIN 2.7*   PROT 5.1*   *   K 4.0   CO2 41*   CL 84*   BUN 9   CREATININE 0.7   ALKPHOS 67   ALT 7*   AST 17   BILITOT 0.4       No results for input(s): "PT", "INR", "APTT" in the last 24 hours.    No results for input(s): "CPK", "CPKMB", "TROPONINI", "MB" in the last 24 hours.      Additional labs: reviewed    Microbiology:       Microbiology Results (last 7 days)       Procedure Component Value Units Date/Time    Culture, Respiratory with Gram Stain [[id number]] Collected: 08/25/24 0556    Order Status: Completed Specimen: Sputum from Tracheal Aspirate Updated: 08/27/24 0728     Respiratory Culture Normal respiratory denise     Comment: Previous comment was modified by ALEKSANDRA at 07:27 on 08/27/2024 No   significant isolate          Gram Stain (Respiratory) <10 epithelial cells per low power field.     Gram Stain (Respiratory) Few WBC's     Gram Stain (Respiratory) Few Gram positive cocci    Respiratory Infection Panel (PCR), Nasopharyngeal [[id number]] Collected: 08/26/24 0930    Order Status: Completed Specimen: Nasopharyngeal Swab Updated: 08/26/24 1141     Respiratory Infection Panel Source NP swab     Adenovirus Not Detected     Coronavirus 229E, Common Cold Virus Not Detected     Coronavirus HKU1, Common Cold Virus Not Detected     Coronavirus NL63, Common Cold Virus Not Detected     Coronavirus OC43, Common Cold Virus Not Detected     Comment: Coronavirus strains 229E, HKU1, NL63, and OC43 can cause the common   cold   and are not associated with the respiratory disease outbreak caused   by  the COVID-19 (SARS-CoV-2 novel Coronavirus) strain.    "        SARS-CoV2 (COVID-19) Qualitative PCR Not Detected     Human Metapneumovirus Not Detected     Human Rhinovirus/Enterovirus Not Detected     Influenza A (subtypes H1, H1-2009,H3) Not Detected     Influenza B Not Detected     Parainfluenza Virus 1 Not Detected     Parainfluenza Virus 2 Not Detected     Parainfluenza Virus 3 Not Detected     Parainfluenza Virus 4 Not Detected     Respiratory Syncytial Virus Not Detected     Bordetella Parapertussis (OH8743) Not Detected     Bordetella pertussis (ptxP) Not Detected     Chlamydia pneumoniae Not Detected     Mycoplasma pneumoniae Not Detected     Comment: Respiratory Infection Panel testing performed by Multiplex PCR.       Narrative:      Respiratory Infection Panel source->NP Swab    MRSA Screen by PCR [[id number]] Collected: 08/26/24 0931    Order Status: Completed Specimen: Nasal Swab Updated: 08/26/24 1127     MRSA SCREEN BY PCR Negative    MRSA Screen by PCR [[id number]] Collected: 08/26/24 0927    Order Status: Canceled Specimen: Nasal Swab     Culture, Respiratory with Gram Stain [[id number]]     Order Status: No result Specimen: Respiratory             Radiology    X-Ray Chest AP Portable  Narrative: EXAMINATION:  XR CHEST AP PORTABLE    CLINICAL HISTORY:  resp fail;    FINDINGS:  Portable chest at 458 compared with 08/27/2024 shows is unchanged support tubes and lines and cardiomediastinal silhouette.  Patient is rotated.    Bibasilar pleuroparenchymal opacities are unchanged as are pulmonary interstitial opacities.  Central pulmonary vasculature unchanged.  No pneumothorax.  Impression: No significant change.    Electronically signed by: Ozzie Martinez  Date:    08/28/2024  Time:    07:19        Additional Studies    reviewed    Ventilator Information    Vent Mode: A/C  Oxygen Concentration (%):  [60-65] 65  Resp Rate Total:  [13 br/min-21 br/min] 15 br/min  Vt Set:  [400 mL] 400 mL  PEEP/CPAP:  [5 cmH20-8 cmH20] 8 cmH20  Mean Airway Pressure:  [7.9  owY79-82 cmH20] 12 cmH20             Recent Labs     08/28/24  0315   PH 7.491*   PCO2 62.5*   PO2 59*   HCO3 47.7*   POCSATURATED 91   BE 24*         Impression    Active Hospital Problems    Diagnosis  POA    *Acute hypoxic on chronic hypercapnic respiratory failure [J96.01, J96.12]  Yes    Anemia [D64.9]  Yes    Acute metabolic encephalopathy [G93.41]  Yes    Fall [W19.XXXA]  Unknown    COPD exacerbation [J44.1]  Yes    Pneumonia [J18.9]  Yes    Hypothyroid [E03.9]  Yes      Resolved Hospital Problems   No resolved problems to display.       Plan    Ventilator and adjust as needed, baseline paCO2 should be about 90  Respiratory treatments, steroids, antibiotics  Cultures and follow  Not ready to wean  Follow NA - better today  Pressors if needed  Sedation as needed, being adjusted  Watch H/H - no active bleeding reported  Follow CPK - has mild rhabdomyolysis  Replace electrolytes  Synthroid and follow  Tube feeds held today - will try GLUCERNA tomorrow  Watch BS and treat as needed    Thank you for this consult.  I will follow with you while the patient is hospitalized.      Critical Care Time    I have spent > 35 minutes providing critical care services for this pt for the above diagnoses.  These services have included pt evaluation, pt exam, ventilator assessment, SBT assessment, discussions with staff, chart review, data review, note preparation and .  Medications have been reviewed and adjusted as needed.  The patient has life threatening illness with a high risk of decompensation and/or death.      Sonido Zamora MD  [facility] Pulmonary/Critical Care

## 2024-08-28 NOTE — PLAN OF CARE
Problem: Adult Inpatient Plan of Care  Goal: Plan of Care Review  8/28/2024 1815 by Olga Young RN  Outcome: Progressing  8/28/2024 1739 by Olga Young RN  Outcome: Progressing  Flowsheets (Taken 8/28/2024 1739)  Plan of Care Reviewed With: spouse  Goal: Patient-Specific Goal (Individualized)  8/28/2024 1815 by Olga Young RN  Outcome: Progressing  8/28/2024 1739 by Olga Young RN  Outcome: Progressing  Flowsheets (Taken 8/28/2024 1739)  Individualized Care Needs: fall precautions, turning q2, heel protectors in place, aspiration precautions  Anxieties, Fears or Concerns: Spouse concerned for prognosis  Patient/Family-Specific Goals (Include Timeframe): Pt to be extubated by 8/29/2024 1100  Goal: Absence of Hospital-Acquired Illness or Injury  8/28/2024 1815 by Olga Young RN  Outcome: Progressing  8/28/2024 1739 by Olga Young RN  Outcome: Progressing  Intervention: Identify and Manage Fall Risk  Flowsheets (Taken 8/28/2024 1739)  Safety Promotion/Fall Prevention:   assistive device/personal item within reach   bed alarm set   Fall Risk signage in place   Fall Risk reviewed with patient/family   medications reviewed   lighting adjusted   side rails raised x 2   pulse ox  Intervention: Prevent Skin Injury  Flowsheets (Taken 8/28/2024 1739)  Body Position: log-rolled  Skin Protection: transparent dressing maintained  Device Skin Pressure Protection:   pressure points protected   skin-to-device areas padded   skin-to-skin areas padded   tubing/devices free from skin contact  Intervention: Prevent Infection  Flowsheets (Taken 8/28/2024 1739)  Infection Prevention: rest/sleep promoted  Goal: Optimal Comfort and Wellbeing  8/28/2024 1815 by Olga Young RN  Outcome: Progressing  8/28/2024 1739 by Olga Young RN  Outcome: Progressing  Intervention: Monitor Pain and Promote Comfort  Flowsheets (Taken 8/28/2024 1739)  Pain Management Interventions: pain management plan reviewed with  patient/caregiver  Intervention: Provide Person-Centered Care  Flowsheets (Taken 8/28/2024 1739)  Trust Relationship/Rapport:   questions encouraged   reassurance provided   thoughts/feelings acknowledged   empathic listening provided   questions answered   emotional support provided   choices provided   care explained  Goal: Readiness for Transition of Care  8/28/2024 1815 by Olga Young RN  Outcome: Progressing  8/28/2024 1739 by Olga Young RN  Outcome: Progressing     Problem: Bariatric Environmental Safety  Goal: Safety Maintained with Care  8/28/2024 1815 by Olga Young RN  Outcome: Progressing  8/28/2024 1739 by Olga Young RN  Outcome: Progressing     Problem: Skin Injury Risk Increased  Goal: Skin Health and Integrity  8/28/2024 1815 by Olga Young RN  Outcome: Progressing  8/28/2024 1739 by Olga Young RN  Outcome: Progressing  Intervention: Optimize Skin Protection  8/28/2024 1815 by Olga Young RN  Flowsheets (Taken 8/28/2024 1815)  Head of Bed (HOB) Positioning: HOB at 30-45 degrees  8/28/2024 1739 by Olga Young RN  Flowsheets (Taken 8/28/2024 1739)  Pressure Reduction Techniques:   heels elevated off bed   positioned off wounds   pressure points protected   weight shift assistance provided   sit time limited to 2 hours  Skin Protection: transparent dressing maintained  Head of Bed (HOB) Positioning: HOB at 30-45 degrees  Intervention: Promote and Optimize Oral Intake  Flowsheets (Taken 8/28/2024 1739)  Oral Nutrition Promotion: adaptive equipment use encouraged     Problem: Diabetes Comorbidity  Goal: Blood Glucose Level Within Targeted Range  8/28/2024 1815 by Olga Young RN  Outcome: Progressing  8/28/2024 1739 by Olga Young RN  Outcome: Progressing  Intervention: Monitor and Manage Glycemia  Flowsheets (Taken 8/28/2024 1815)  Glycemic Management: blood glucose monitored     Problem: Pneumonia  Goal: Fluid Balance  8/28/2024 1815 by Olga Young  RN  Outcome: Progressing  8/28/2024 1739 by Olga Young RN  Outcome: Progressing  Intervention: Monitor and Manage Fluid Balance  Flowsheets (Taken 8/28/2024 1815)  Fluid/Electrolyte Management: fluids provided  Goal: Resolution of Infection Signs and Symptoms  8/28/2024 1815 by Olga Young RN  Outcome: Progressing  8/28/2024 1739 by Olga Young RN  Outcome: Progressing  Intervention: Prevent Infection Progression  Flowsheets (Taken 8/28/2024 1815)  Infection Management: aseptic technique maintained  Goal: Effective Oxygenation and Ventilation  8/28/2024 1815 by Olga Young RN  Outcome: Progressing  8/28/2024 1739 by Olga Young RN  Outcome: Progressing  Intervention: Promote Airway Secretion Clearance  Flowsheets (Taken 8/28/2024 1815)  Cough And Deep Breathing: unable to perform  Intervention: Optimize Oxygenation and Ventilation  Flowsheets (Taken 8/28/2024 1815)  Airway/Ventilation Management:   airway patency maintained   calming measures promoted   humidification applied   pulmonary hygiene promoted  Head of Bed (HOB) Positioning: HOB at 30-45 degrees     Problem: Infection  Goal: Absence of Infection Signs and Symptoms  8/28/2024 1815 by Olga Young RN  Outcome: Progressing  8/28/2024 1739 by Olga Young RN  Outcome: Progressing  Intervention: Prevent or Manage Infection  Flowsheets (Taken 8/28/2024 1815)  Infection Management: aseptic technique maintained     Problem: Fall Injury Risk  Goal: Absence of Fall and Fall-Related Injury  8/28/2024 1815 by Olga Young RN  Outcome: Progressing  8/28/2024 1739 by Olga Young RN  Outcome: Progressing  Intervention: Identify and Manage Contributors  Flowsheets (Taken 8/28/2024 1815)  Self-Care Promotion: independence encouraged  Medication Review/Management: medications reviewed  Intervention: Promote Injury-Free Environment  Flowsheets (Taken 8/28/2024 1815)  Safety Promotion/Fall Prevention:   assistive device/personal  item within reach   bed alarm set   Fall Risk signage in place   Fall Risk reviewed with patient/family   instructed to call staff for mobility   high risk medications identified   medications reviewed   lighting adjusted   side rails raised x 2   pulse ox   supervised activity     Problem: Restraint, Nonviolent  Goal: Absence of Harm or Injury  8/28/2024 1815 by Olga Young RN  Outcome: Progressing  8/28/2024 1739 by Olga Young RN  Outcome: Progressing  Intervention: Protect Dignity, Rights and Personal Wellbeing  Flowsheets (Taken 8/28/2024 1815)  Trust Relationship/Rapport:   care explained   questions encouraged   choices provided   reassurance provided   thoughts/feelings acknowledged   empathic listening provided   questions answered   emotional support provided     Problem: Enteral Nutrition  Goal: Feeding Tolerance  8/28/2024 1815 by Olga Young RN  Outcome: Progressing  8/28/2024 1739 by Olga Young RN  Outcome: Progressing  Intervention: Prevent and Manage Feeding Intolerance  Flowsheets (Taken 8/28/2024 1815)  Nutrition Support Management: weight trending reviewed     Problem: Mechanical Ventilation Invasive  Goal: Effective Communication  8/28/2024 1815 by Olga Young RN  Outcome: Progressing  8/28/2024 1739 by Olga Yonug RN  Outcome: Progressing  Intervention: Ensure Effective Communication  Flowsheets (Taken 8/28/2024 1815)  Communication Enhancement Strategies:   extra time allowed for response   call light answered in person  Trust Relationship/Rapport:   care explained   questions encouraged   choices provided   reassurance provided   thoughts/feelings acknowledged   empathic listening provided   questions answered   emotional support provided  Goal: Optimal Device Function  8/28/2024 1815 by Olga Young RN  Outcome: Progressing  8/28/2024 1739 by Olga Young RN  Outcome: Progressing  Intervention: Optimize Device Care and Function  Flowsheets (Taken  8/28/2024 1815)  Airway/Ventilation Management:   airway patency maintained   calming measures promoted   humidification applied   pulmonary hygiene promoted  Oral Care: suction provided  Goal: Mechanical Ventilation Liberation  8/28/2024 1815 by Olga Young RN  Outcome: Progressing  8/28/2024 1739 by Olga Young RN  Outcome: Progressing  Intervention: Promote Extubation and Mechanical Ventilation Liberation  Flowsheets (Taken 8/28/2024 1815)  Sleep/Rest Enhancement: relaxation techniques promoted  Medication Review/Management: medications reviewed  Goal: Optimal Nutrition Delivery  8/28/2024 1815 by Olga Young RN  Outcome: Progressing  8/28/2024 1739 by Olga Young RN  Outcome: Progressing  Intervention: Optimize Nutrition Delivery  Flowsheets (Taken 8/28/2024 1815)  Nutrition Support Management: weight trending reviewed  Goal: Absence of Device-Related Skin and Tissue Injury  8/28/2024 1815 by Olga Young RN  Outcome: Progressing  8/28/2024 1739 by Olga Young RN  Outcome: Progressing  Intervention: Maintain Skin and Tissue Health  Flowsheets (Taken 8/28/2024 1815)  Device Skin Pressure Protection:   pressure points protected   skin-to-device areas padded   skin-to-skin areas padded   tubing/devices free from skin contact   absorbent pad utilized/changed  Goal: Absence of Ventilator-Induced Lung Injury  8/28/2024 1815 by Olga Young RN  Outcome: Progressing  8/28/2024 1739 by Olga Young RN  Outcome: Progressing

## 2024-08-28 NOTE — CARE UPDATE
08/28/24 0735   Patient Assessment/Suction   Level of Consciousness (AVPU) responds to voice   Respiratory Effort Normal;Unlabored   Expansion/Accessory Muscles/Retractions expansion symmetric   All Lung Fields Breath Sounds diminished   Rhythm/Pattern, Respiratory assisted mechanically   Cough Frequency with stimulation   Cough Type assisted   Skin Integrity   $ Wound Care Tech Time 15 min   Area Observed Right;Cheek;Upper lip;Lower lip;Corner lip   Skin Appearance without discoloration  (bruising around right eye and forhead)   PRE-TX-O2   Device (Oxygen Therapy) ventilator   $ Is the patient on High Flow Oxygen? Yes   Oxygen Concentration (%) 65   SpO2 95 %   Pulse 60   Resp 16   Aerosol Therapy   $ Aerosol Therapy Charges Aerosol Treatment   Daily Review of Necessity (SVN) completed   Respiratory Treatment Status (SVN) given   Treatment Route (SVN) in-line   Patient Position HOB elevated   Post Treatment Assessment (SVN) breath sounds unchanged   Signs of Intolerance (SVN) none   Breath Sounds Post-Respiratory Treatment   Throughout All Fields Post-Treatment All Fields   Throughout All Fields Post-Treatment no change   Post-treatment Heart Rate (beats/min) 57   Post-treatment Resp Rate (breaths/min) 14        Airway - Non-Surgical 08/25/24 0427 Endotracheal Tube   Placement Date/Time: 08/25/24 0427   Present Prior to Hospital Arrival?: No  Method of Intubation: Glidescope  Inserted by: MD  Staff/Resident Name(s): Dr. Funes  Airway Device: Endotracheal Tube  Mask Ventilation: Easy  Airway Device Size: 7.5  St...   Secured at 24 cm   Measured At Lips   Secured Location Right   Secured by Commercial tube camargo   Position of ETT in xRay In good position   Bite Block secure and patent   Site Condition Cool;Dry   Status Intact;Secured;Patent   Site Assessment Clean;Dry   Cuff Pressure   (mlt)   General Safety Checklist   Safety Promotion/Fall Prevention side rails raised   Airway Safety   Is Ambu Bag and Mask  with Patient? Yes, Adult Ambu Bag and Mask   Suction set is at the bedside? Yes   Vent Select   Conventional Vent Y   $ Ventilator Subsequent 1   Charged w/in last 24h YES   Preset Conventional Ventilator Settings   Vent ID [id number]   Vent Type    Ventilation Type VC   Vent Mode A/C   Humidity HME   Set Rate 12 BPM   Vt Set 400 mL   PEEP/CPAP 5 cmH20   Peak Flow 65 L/min   Peak End Inspiratory Pressure 19 cmH20   I-Trigger Type  V-TRIG   Trigger Sensitivity Flow/I-Trigger 3 L/min   Patient Ventilator Parameters   Resp Rate Total 18 br/min   Peak Airway Pressure 28 cmH20   Mean Airway Pressure 9.2 cmH20   Plateau Pressure 0 cmH20   Exhaled Vt 474 mL   Total Ve 7.65 L/m   I:E Ratio Measured 1:4   Auto PEEP 0 cmH20   Tubing ID (mm) 7.5 mm   Tube Type ET   Conventional Ventilator Alarms   Alarms On Y   Ve High Alarm 25 L/min   Ve Low Alarm 3 L/min   Vt High Alarm 1000 mL   Vt Low Alarm 200 mL   Resp Rate High Alarm 40 br/min   Apnea Rate 14   Apnea Volume (mL) 0 mL   Apnea Oxygen Concentration  100   Apnea Flow Rate (L/min) 64   T Apnea 20 sec(s)   Ready to Wean/Extubation Screen   FIO2<=50 (chart decimal) (!) 0.65   MV<16L (chart vol.) 7.65   PEEP <=8 (chart #) 5   Ready to Wean Parameters   F/VT Ratio<105 (RSBI) (!) 33.76   Vital Capacity   Vital Capacity (mL) 0   Education   $ Education Bronchodilator;Suction;Ventilator Oxygen;15 min

## 2024-08-28 NOTE — ASSESSMENT & PLAN NOTE
Likely multifactorial  Patient is currently intubated and unable to extubate since patient is still requiring ventilator  Pulmonology is following patient

## 2024-08-29 LAB
ALBUMIN SERPL BCP-MCNC: 2.7 G/DL (ref 3.5–5.2)
ALLENS TEST: ABNORMAL
ALP SERPL-CCNC: 57 U/L (ref 55–135)
ALT SERPL W/O P-5'-P-CCNC: 7 U/L (ref 10–44)
ANION GAP SERPL CALC-SCNC: 1 MMOL/L (ref 8–16)
AST SERPL-CCNC: 14 U/L (ref 10–40)
BASOPHILS # BLD AUTO: 0.01 K/UL (ref 0–0.2)
BASOPHILS NFR BLD: 0.1 % (ref 0–1.9)
BILIRUB SERPL-MCNC: 0.3 MG/DL (ref 0.1–1)
BUN SERPL-MCNC: 7 MG/DL (ref 8–23)
CALCIUM SERPL-MCNC: 7.7 MG/DL (ref 8.7–10.5)
CHLORIDE SERPL-SCNC: 88 MMOL/L (ref 95–110)
CO2 SERPL-SCNC: 45 MMOL/L (ref 23–29)
CREAT SERPL-MCNC: 0.6 MG/DL (ref 0.5–1.4)
DELSYS: ABNORMAL
DIFFERENTIAL METHOD BLD: ABNORMAL
EOSINOPHIL # BLD AUTO: 0 K/UL (ref 0–0.5)
EOSINOPHIL NFR BLD: 0.1 % (ref 0–8)
ERYTHROCYTE [DISTWIDTH] IN BLOOD BY AUTOMATED COUNT: 16.8 % (ref 11.5–14.5)
EST. GFR  (NO RACE VARIABLE): >60 ML/MIN/1.73 M^2
GLUCOSE SERPL-MCNC: 235 MG/DL (ref 70–110)
GLUCOSE SERPL-MCNC: 248 MG/DL (ref 70–110)
GLUCOSE SERPL-MCNC: 250 MG/DL (ref 70–110)
GLUCOSE SERPL-MCNC: 250 MG/DL (ref 70–110)
GLUCOSE SERPL-MCNC: 280 MG/DL (ref 70–110)
GLUCOSE SERPL-MCNC: 287 MG/DL (ref 70–110)
HCO3 UR-SCNC: 47 MMOL/L (ref 24–28)
HCT VFR BLD AUTO: 27.7 % (ref 37–48.5)
HCT VFR BLD CALC: 29 %PCV (ref 36–54)
HGB BLD-MCNC: 8.7 G/DL (ref 12–16)
IMM GRANULOCYTES # BLD AUTO: 0.15 K/UL (ref 0–0.04)
IMM GRANULOCYTES NFR BLD AUTO: 1.7 % (ref 0–0.5)
LYMPHOCYTES # BLD AUTO: 0.6 K/UL (ref 1–4.8)
LYMPHOCYTES NFR BLD: 6.9 % (ref 18–48)
MCH RBC QN AUTO: 31 PG (ref 27–31)
MCHC RBC AUTO-ENTMCNC: 31.4 G/DL (ref 32–36)
MCV RBC AUTO: 99 FL (ref 82–98)
MODE: ABNORMAL
MONOCYTES # BLD AUTO: 0.5 K/UL (ref 0.3–1)
MONOCYTES NFR BLD: 5.3 % (ref 4–15)
NEUTROPHILS # BLD AUTO: 7.4 K/UL (ref 1.8–7.7)
NEUTROPHILS NFR BLD: 85.9 % (ref 38–73)
NRBC BLD-RTO: 0 /100 WBC
PCO2 BLDA: 62.1 MMHG (ref 35–45)
PH SMN: 7.49 [PH] (ref 7.35–7.45)
PLATELET # BLD AUTO: 334 K/UL (ref 150–450)
PMV BLD AUTO: 9.7 FL (ref 9.2–12.9)
PO2 BLDA: 71 MMHG (ref 80–100)
POC BE: 24 MMOL/L
POC IONIZED CALCIUM: 1.08 MMOL/L (ref 1.06–1.42)
POC SATURATED O2: 94 % (ref 95–100)
POC TCO2: 49 MMOL/L (ref 23–27)
POTASSIUM BLD-SCNC: 3.7 MMOL/L (ref 3.5–5.1)
POTASSIUM SERPL-SCNC: 3.8 MMOL/L (ref 3.5–5.1)
PROT SERPL-MCNC: 5 G/DL (ref 6–8.4)
RBC # BLD AUTO: 2.81 M/UL (ref 4–5.4)
SAMPLE: ABNORMAL
SITE: ABNORMAL
SODIUM BLD-SCNC: 133 MMOL/L (ref 136–145)
SODIUM SERPL-SCNC: 134 MMOL/L (ref 136–145)
WBC # BLD AUTO: 8.64 K/UL (ref 3.9–12.7)

## 2024-08-29 PROCEDURE — 25000003 PHARM REV CODE 250: Performed by: HOSPITALIST

## 2024-08-29 PROCEDURE — 82803 BLOOD GASES ANY COMBINATION: CPT

## 2024-08-29 PROCEDURE — 94640 AIRWAY INHALATION TREATMENT: CPT

## 2024-08-29 PROCEDURE — 25000242 PHARM REV CODE 250 ALT 637 W/ HCPCS: Performed by: HOSPITALIST

## 2024-08-29 PROCEDURE — 63600175 PHARM REV CODE 636 W HCPCS: Performed by: INTERNAL MEDICINE

## 2024-08-29 PROCEDURE — 27100171 HC OXYGEN HIGH FLOW UP TO 24 HOURS

## 2024-08-29 PROCEDURE — 94003 VENT MGMT INPAT SUBQ DAY: CPT

## 2024-08-29 PROCEDURE — 25000003 PHARM REV CODE 250: Performed by: FAMILY MEDICINE

## 2024-08-29 PROCEDURE — 99900031 HC PATIENT EDUCATION (STAT)

## 2024-08-29 PROCEDURE — 37799 UNLISTED PX VASCULAR SURGERY: CPT

## 2024-08-29 PROCEDURE — 99900035 HC TECH TIME PER 15 MIN (STAT)

## 2024-08-29 PROCEDURE — 63600175 PHARM REV CODE 636 W HCPCS: Performed by: HOSPITALIST

## 2024-08-29 PROCEDURE — 99291 CRITICAL CARE FIRST HOUR: CPT | Mod: ,,, | Performed by: INTERNAL MEDICINE

## 2024-08-29 PROCEDURE — 85025 COMPLETE CBC W/AUTO DIFF WBC: CPT | Performed by: FAMILY MEDICINE

## 2024-08-29 PROCEDURE — 99900026 HC AIRWAY MAINTENANCE (STAT)

## 2024-08-29 PROCEDURE — 25000003 PHARM REV CODE 250: Performed by: INTERNAL MEDICINE

## 2024-08-29 PROCEDURE — 20000000 HC ICU ROOM

## 2024-08-29 PROCEDURE — 94761 N-INVAS EAR/PLS OXIMETRY MLT: CPT | Mod: XB

## 2024-08-29 PROCEDURE — 76937 US GUIDE VASCULAR ACCESS: CPT

## 2024-08-29 PROCEDURE — 80053 COMPREHEN METABOLIC PANEL: CPT | Performed by: FAMILY MEDICINE

## 2024-08-29 PROCEDURE — 82962 GLUCOSE BLOOD TEST: CPT

## 2024-08-29 PROCEDURE — 63600175 PHARM REV CODE 636 W HCPCS: Performed by: FAMILY MEDICINE

## 2024-08-29 PROCEDURE — 94002 VENT MGMT INPAT INIT DAY: CPT

## 2024-08-29 RX ORDER — LOSARTAN POTASSIUM 50 MG/1
100 TABLET ORAL DAILY
Status: DISCONTINUED | OUTPATIENT
Start: 2024-08-30 | End: 2024-08-29

## 2024-08-29 RX ORDER — INSULIN GLARGINE 100 [IU]/ML
25 INJECTION, SOLUTION SUBCUTANEOUS 2 TIMES DAILY
Status: DISCONTINUED | OUTPATIENT
Start: 2024-08-29 | End: 2024-08-30

## 2024-08-29 RX ORDER — LOSARTAN POTASSIUM 50 MG/1
50 TABLET ORAL ONCE
Status: DISCONTINUED | OUTPATIENT
Start: 2024-08-29 | End: 2024-08-29

## 2024-08-29 RX ORDER — LOSARTAN POTASSIUM 50 MG/1
50 TABLET ORAL DAILY
Status: DISCONTINUED | OUTPATIENT
Start: 2024-08-30 | End: 2024-08-30

## 2024-08-29 RX ADMIN — PROPOFOL 45 MCG/KG/MIN: 10 INJECTION, EMULSION INTRAVENOUS at 01:08

## 2024-08-29 RX ADMIN — PROPOFOL 50 MCG/KG/MIN: 10 INJECTION, EMULSION INTRAVENOUS at 11:08

## 2024-08-29 RX ADMIN — PROPOFOL 50 MCG/KG/MIN: 10 INJECTION, EMULSION INTRAVENOUS at 06:08

## 2024-08-29 RX ADMIN — POTASSIUM BICARBONATE 50 MEQ: 977.5 TABLET, EFFERVESCENT ORAL at 12:08

## 2024-08-29 RX ADMIN — INSULIN GLARGINE 20 UNITS: 100 INJECTION, SOLUTION SUBCUTANEOUS at 08:08

## 2024-08-29 RX ADMIN — IPRATROPIUM BROMIDE AND ALBUTEROL SULFATE 3 ML: 2.5; .5 SOLUTION RESPIRATORY (INHALATION) at 01:08

## 2024-08-29 RX ADMIN — FAMOTIDINE 20 MG: 10 INJECTION INTRAVENOUS at 08:08

## 2024-08-29 RX ADMIN — MUPIROCIN 1 G: 20 OINTMENT TOPICAL at 08:08

## 2024-08-29 RX ADMIN — IPRATROPIUM BROMIDE AND ALBUTEROL SULFATE 3 ML: 2.5; .5 SOLUTION RESPIRATORY (INHALATION) at 07:08

## 2024-08-29 RX ADMIN — MORPHINE SULFATE 2 MG: 2 INJECTION, SOLUTION INTRAMUSCULAR; INTRAVENOUS at 06:08

## 2024-08-29 RX ADMIN — SODIUM CHLORIDE: 9 INJECTION, SOLUTION INTRAVENOUS at 06:08

## 2024-08-29 RX ADMIN — METHYLPREDNISOLONE SODIUM SUCCINATE 40 MG: 40 INJECTION, POWDER, FOR SOLUTION INTRAMUSCULAR; INTRAVENOUS at 09:08

## 2024-08-29 RX ADMIN — METHYLPREDNISOLONE SODIUM SUCCINATE 40 MG: 40 INJECTION, POWDER, FOR SOLUTION INTRAMUSCULAR; INTRAVENOUS at 01:08

## 2024-08-29 RX ADMIN — MORPHINE SULFATE 2 MG: 2 INJECTION, SOLUTION INTRAMUSCULAR; INTRAVENOUS at 08:08

## 2024-08-29 RX ADMIN — PROPOFOL 50 MCG/KG/MIN: 10 INJECTION, EMULSION INTRAVENOUS at 08:08

## 2024-08-29 RX ADMIN — ENOXAPARIN SODIUM 40 MG: 40 INJECTION SUBCUTANEOUS at 08:08

## 2024-08-29 RX ADMIN — LEVOFLOXACIN 750 MG: 5 INJECTION, SOLUTION INTRAVENOUS at 01:08

## 2024-08-29 RX ADMIN — LEVOTHYROXINE SODIUM 50 MCG: 0.03 TABLET ORAL at 05:08

## 2024-08-29 RX ADMIN — INSULIN ASPART 3 UNITS: 100 INJECTION, SOLUTION INTRAVENOUS; SUBCUTANEOUS at 01:08

## 2024-08-29 RX ADMIN — CYANOCOBALAMIN TAB 1000 MCG 1000 MCG: 1000 TAB at 08:08

## 2024-08-29 RX ADMIN — INSULIN ASPART 6 UNITS: 100 INJECTION, SOLUTION INTRAVENOUS; SUBCUTANEOUS at 06:08

## 2024-08-29 RX ADMIN — PROPOFOL 50 MCG/KG/MIN: 10 INJECTION, EMULSION INTRAVENOUS at 03:08

## 2024-08-29 RX ADMIN — INSULIN GLARGINE 25 UNITS: 100 INJECTION, SOLUTION SUBCUTANEOUS at 08:08

## 2024-08-29 RX ADMIN — PROPOFOL 45 MCG/KG/MIN: 10 INJECTION, EMULSION INTRAVENOUS at 09:08

## 2024-08-29 RX ADMIN — METHYLPREDNISOLONE SODIUM SUCCINATE 40 MG: 40 INJECTION, POWDER, FOR SOLUTION INTRAMUSCULAR; INTRAVENOUS at 05:08

## 2024-08-29 RX ADMIN — SODIUM CHLORIDE 125 MG: 9 INJECTION, SOLUTION INTRAVENOUS at 10:08

## 2024-08-29 RX ADMIN — PROPOFOL 45 MCG/KG/MIN: 10 INJECTION, EMULSION INTRAVENOUS at 12:08

## 2024-08-29 RX ADMIN — INSULIN ASPART 4 UNITS: 100 INJECTION, SOLUTION INTRAVENOUS; SUBCUTANEOUS at 12:08

## 2024-08-29 RX ADMIN — INSULIN ASPART 4 UNITS: 100 INJECTION, SOLUTION INTRAVENOUS; SUBCUTANEOUS at 05:08

## 2024-08-29 NOTE — PROGRESS NOTES
Pulmonary/Critical Care  Progress Note      Patient name: Karo Leonard  MRN: [id number]  Date: 08/29/2024    Admit Date: 8/25/2024  Consult Requested By: Olga Lidia Pedroza DO    Reason for Consult: Respiratory failure, COPD    HPI:    8/25/2024 - 65 yo ASCVD, DM, HTN. COPD(cigarette use) had increased SOB at home and a fall.  EMS was called and brought to ER.  Initially tried on BiPAP without response and was subsequently intubated and placed on ventilation.  Initial ABG showed over ventilation and we have adjusted and ABG are getting better.  She is sedated and not able to provide any history.  D/W  who says that she was supposed to see a pulmonologist but couldn't make appointment, she has been a chronic smoker.  She had increase SOB for a few days.  She did have a fall at home but he reports that she was not down for long.  He does report that she has been having recurring falls at home.      8/26/2024 - Stable overnight, O2 needs still too high to do SBT.  She is responsive and gets agitated on current sedation and we are adjusting.  No other new issues reported.  Hgb has decreased (no active bleeding reported), NA remains low, CPK is better, TFTF noted and c/w hypothyroid (synthroid started).    8/27/2024 - Remains critically ill, O2 needs down a little but still too high.  Difficult to sedate is either agitated or apneic.  Tried SBT this AM and was apneic.  VS reviewed.  She is 3.8 liters +.  Tube feeds have been started.  NA remains low, glucose is elevated.  CXR looks about the same    8/28/2024 - Stable overnight, O2 needs still up.  She does get agitated at times and we have adjusted meds.  Trouble with tube feeds and will hold today and restart tomorrow.  Not ready to wean because on increased FiO2 and will try to increase PEP to see if that helps.  She does not have a lot of secretions.  She is over ventilaed some and we adjusted vent.  CXR is about the same    8/29/2024 - Stable overnight,, O2  needs still up some (I decreased FiO2 and increased PEEP some) and she is overventilated (we adjusted rate).  Will retry tube feeds (pulmocare at 20 to see if she tolerates).  She is 4.6 liters +.  CXR is about the same.    Review of Systems    Review of Systems   Unable to perform ROS: Intubated       Past Medical History    Past Medical History:   Diagnosis Date    Coronary artery disease     cardiac stent    DDD (degenerative disc disease), lumbar 2000    Diabetes mellitus     Hypertension     Thyroid disease        Past Surgical History    Past Surgical History:   Procedure Laterality Date     SECTION  08/1987    x2 1993    CORONARY STENT PLACEMENT      EXPLORATORY LAPAROTOMY      [facility]     HEMORRHOID SURGERY      LAPAROSCOPIC CHOLECYSTECTOMY N/A 2022    Procedure: CHOLECYSTECTOMY, LAPAROSCOPIC;  Surgeon: Leonardo Wilson III, MD;  Location: [facility];  Service: General;  Laterality: N/A;    LUMBAR DISC SURGERY      PILONIDAL CYST DRAINAGE      TONSILLECTOMY      as a child (also adenoids)       Medications (scheduled):      albuterol-ipratropium  3 mL Nebulization Q6H    cyanocobalamin  1,000 mcg Oral Daily    enoxparin  40 mg Subcutaneous Q12H    famotidine (PF)  20 mg Intravenous Q12H    ferric gluconate (FERRLECIT) 125 mg in 0.9% NaCl 100 mL IVPB  125 mg Intravenous Daily    insulin glargine U-100  20 Units Subcutaneous BID    levoFLOXacin  750 mg Intravenous Q24H    levothyroxine  50 mcg Oral Before breakfast    losartan  50 mg Oral Daily    methylPREDNISolone injection (PEDS and ADULTS)  40 mg Intravenous Q8H    mupirocin   Nasal BID       Medications (infusions):      0.9% NaCl   Intravenous Continuous 100 mL/hr at 24 0700 Rate Verify at 24 0700    fentanyl  0-250 mcg/hr Intravenous Continuous   Stopped at 24 0721    NORepinephrine bitartrate-D5W  0-3 mcg/kg/min Intravenous Continuous 23.8 mL/hr at 24 0452 0.05  mcg/kg/min at 08/25/24 0452    propofoL  0-50 mcg/kg/min Intravenous Continuous 34.3 mL/hr at 08/29/24 0857 45 mcg/kg/min at 08/29/24 0857       Medications (prn):       Current Facility-Administered Medications:     calcium gluconate IVPB, 1 g, Intravenous, PRN    calcium gluconate IVPB, 2 g, Intravenous, PRN    calcium gluconate IVPB, 3 g, Intravenous, PRN    dextrose 50%, 12.5 g, Intravenous, PRN    glucagon (human recombinant), 1 mg, Intramuscular, PRN    hydrALAZINE, 10 mg, Intravenous, Q6H PRN    insulin aspart U-100, 0-10 Units, Subcutaneous, Q6H PRN    magnesium sulfate IVPB, 2 g, Intravenous, PRN    magnesium sulfate IVPB, 4 g, Intravenous, PRN    morphine, 2 mg, Intravenous, Q1H PRN    potassium bicarbonate, 35 mEq, Oral, PRN    potassium bicarbonate, 50 mEq, Oral, PRN    potassium bicarbonate, 60 mEq, Oral, PRN    potassium chloride in water, 40 mEq, Intravenous, PRN **AND** potassium chloride in water, 60 mEq, Intravenous, PRN **AND** potassium chloride in water, 80 mEq, Intravenous, PRN    sodium chloride 0.9%, 10 mL, Intravenous, PRN    sodium phosphate 15 mmol in D5W 250 mL IVPB, 15 mmol, Intravenous, PRN    sodium phosphate 20.01 mmol in D5W 250 mL IVPB, 20.01 mmol, Intravenous, PRN    sodium phosphate 30 mmol in D5W 250 mL IVPB, 30 mmol, Intravenous, PRN    Family History: No family history on file.    Social History: Tobacco:   Social History     Tobacco Use   Smoking Status Not on file   Smokeless Tobacco Not on file                                EtOH:   Social History     Substance and Sexual Activity   Alcohol Use No                                Drugs:   Social History     Substance and Sexual Activity   Drug Use No       Physical Exam    Vital signs:  Temp:  [97.6 °F (36.4 °C)-97.9 °F (36.6 °C)]   Pulse:  [52-62]   Resp:  [12-18]   BP: (121-163)/(56-72)   SpO2:  [92 %-98 %]   Arterial Line BP: (116-165)/(51-71)     Intake/Output:   Intake/Output Summary (Last 24 hours) at 8/29/2024  "0901  Last data filed at 8/29/2024 0700  Gross per 24 hour   Intake 3276.51 ml   Output 2095 ml   Net 1181.51 ml        BMI: Estimated body mass index is 45.12 kg/m² as calculated from the following:    Height as of this encounter: 5' 5" (1.651 m).    Weight as of this encounter: 123 kg (271 lb 2.7 oz).    Physical Exam  Vitals and nursing note reviewed.   Constitutional:       General: She is not in acute distress.     Appearance: She is ill-appearing. She is not toxic-appearing or diaphoretic.      Comments: Intubated  Sedated    HENT:      Head: Normocephalic and atraumatic.      Comments: Some bruising right forehead and eye from her fall     Right Ear: External ear normal.      Left Ear: External ear normal.      Nose: Nose normal. No congestion or rhinorrhea.      Mouth/Throat:      Mouth: Mucous membranes are moist.      Pharynx: Oropharynx is clear. No oropharyngeal exudate or posterior oropharyngeal erythema.      Comments: + ETT  Eyes:      General: No scleral icterus.        Right eye: No discharge.         Left eye: No discharge.      Extraocular Movements: Extraocular movements intact.      Conjunctiva/sclera: Conjunctivae normal.      Pupils: Pupils are equal, round, and reactive to light.   Neck:      Vascular: No carotid bruit.   Cardiovascular:      Rate and Rhythm: Regular rhythm. Tachycardia present.      Pulses: Normal pulses.      Heart sounds: No murmur heard.     No friction rub. No gallop.   Pulmonary:      Effort: Pulmonary effort is normal. No respiratory distress.      Breath sounds: No stridor. Wheezing present. No rhonchi or rales.   Chest:      Chest wall: No tenderness.   Abdominal:      General: There is no distension.      Tenderness: There is no abdominal tenderness. There is no guarding.      Comments: Hypoactive BS   Musculoskeletal:         General: No swelling. Normal range of motion.      Cervical back: Normal range of motion and neck supple. No rigidity or tenderness.      " "Right lower leg: Edema present.      Left lower leg: Edema present.   Lymphadenopathy:      Cervical: No cervical adenopathy.   Skin:     General: Skin is warm and dry.      Capillary Refill: Capillary refill takes less than 2 seconds.      Coloration: Skin is not jaundiced.      Findings: No bruising.      Comments: + bruise LLE   Neurological:      General: No focal deficit present.      Cranial Nerves: No cranial nerve deficit.      Sensory: No sensory deficit.      Motor: No weakness.   Psychiatric:      Comments: Not able to assess         Laboratory    Recent Labs   Lab 08/29/24  0346 08/29/24  0407   WBC 8.64  --    RBC 2.81*  --    HGB 8.7*  --    HCT 27.7* 29*     --    MCV 99*  --    MCH 31.0  --    MCHC 31.4*  --        Recent Labs   Lab 08/29/24  0346   CALCIUM 7.7*   ALBUMIN 2.7*   PROT 5.0*   *   K 3.8   CO2 45*   CL 88*   BUN 7*   CREATININE 0.6   ALKPHOS 57   ALT 7*   AST 14   BILITOT 0.3       No results for input(s): "PT", "INR", "APTT" in the last 24 hours.    No results for input(s): "CPK", "CPKMB", "TROPONINI", "MB" in the last 24 hours.      Additional labs: reviewed    Microbiology:       Microbiology Results (last 7 days)       Procedure Component Value Units Date/Time    Culture, Respiratory with Gram Stain [[id number]] Collected: 08/25/24 0556    Order Status: Completed Specimen: Sputum from Tracheal Aspirate Updated: 08/27/24 0728     Respiratory Culture Normal respiratory denise     Comment: Previous comment was modified by ALEKSANDRA at 07:27 on 08/27/2024 No   significant isolate          Gram Stain (Respiratory) <10 epithelial cells per low power field.     Gram Stain (Respiratory) Few WBC's     Gram Stain (Respiratory) Few Gram positive cocci    Respiratory Infection Panel (PCR), Nasopharyngeal [[id number]] Collected: 08/26/24 0930    Order Status: Completed Specimen: Nasopharyngeal Swab Updated: 08/26/24 1141     Respiratory Infection Panel Source NP swab     Adenovirus Not " Detected     Coronavirus 229E, Common Cold Virus Not Detected     Coronavirus HKU1, Common Cold Virus Not Detected     Coronavirus NL63, Common Cold Virus Not Detected     Coronavirus OC43, Common Cold Virus Not Detected     Comment: Coronavirus strains 229E, HKU1, NL63, and OC43 can cause the common   cold   and are not associated with the respiratory disease outbreak caused   by  the COVID-19 (SARS-CoV-2 novel Coronavirus) strain.           SARS-CoV2 (COVID-19) Qualitative PCR Not Detected     Human Metapneumovirus Not Detected     Human Rhinovirus/Enterovirus Not Detected     Influenza A (subtypes H1, H1-2009,H3) Not Detected     Influenza B Not Detected     Parainfluenza Virus 1 Not Detected     Parainfluenza Virus 2 Not Detected     Parainfluenza Virus 3 Not Detected     Parainfluenza Virus 4 Not Detected     Respiratory Syncytial Virus Not Detected     Bordetella Parapertussis (NM7156) Not Detected     Bordetella pertussis (ptxP) Not Detected     Chlamydia pneumoniae Not Detected     Mycoplasma pneumoniae Not Detected     Comment: Respiratory Infection Panel testing performed by Multiplex PCR.       Narrative:      Respiratory Infection Panel source->NP Swab    MRSA Screen by PCR [[id number]] Collected: 08/26/24 0931    Order Status: Completed Specimen: Nasal Swab Updated: 08/26/24 1127     MRSA SCREEN BY PCR Negative    MRSA Screen by PCR [[id number]] Collected: 08/26/24 0927    Order Status: Canceled Specimen: Nasal Swab     Culture, Respiratory with Gram Stain [[id number]]     Order Status: No result Specimen: Respiratory             Radiology    X-Ray Chest AP Portable  Narrative: EXAMINATION:  XR CHEST AP PORTABLE    CLINICAL HISTORY:  Intubated;    COMPARISON:  08/28/2024    FINDINGS:  Cardiopericardial silhouette is enlarged and stable compared to prior.  Atherosclerotic calcification of the aorta.  Enteric tube is in place, with tip located in the abdomen, beyond the field of view.  A line/tube courses  over the left neck, left chest, and left upper quadrant.  ET tube is faintly visualized with tip located about 5 cm above the clavicle.    Cardiomegaly obscures evaluation of the left lung base, with prominent epicardial fat, corresponding to CT findings from 06/18/2024.  Improved aeration of the right lung.  No pneumothorax.  No acute osseous abnormality.  Impression: Moderate/severe cardiomegaly.    Lines and tubes as above.    Electronically signed by: Jeremi Vincent  Date:    08/29/2024  Time:    07:16        Additional Studies    reviewed    Ventilator Information    Vent Mode: A/C  Oxygen Concentration (%):  [55-65] 55  Resp Rate Total:  [10 br/min-17 br/min] 10 br/min  Vt Set:  [400 mL] 400 mL  PEEP/CPAP:  [8 cmH20-10 cmH20] 10 cmH20  Mean Airway Pressure:  [10 klY65-92 cmH20] 12 cmH20             Recent Labs     08/29/24  0407   PH 7.487*   PCO2 62.1*   PO2 71*   HCO3 47.0*   POCSATURATED 94   BE 24*         Impression    Active Hospital Problems    Diagnosis  POA    *Acute hypoxic on chronic hypercapnic respiratory failure [J96.01, J96.12]  Yes    Goals of care, counseling/discussion [Z71.89]  Not Applicable    Type 2 diabetes mellitus [E11.9]  Yes    Anemia [D64.9]  Yes    Acute metabolic encephalopathy [G93.41]  Yes    Fall [W19.XXXA]  Unknown    COPD exacerbation [J44.1]  Yes    Pneumonia [J18.9]  Yes    Hypothyroid [E03.9]  Yes    Hypertension [I10]  Yes      Resolved Hospital Problems   No resolved problems to display.       Plan    Ventilator and adjust as needed, baseline paCO2 should be about 90  Respiratory treatments, steroids, antibiotics  Cultures and follow  Not ready to wean  Follow NA - better today  Pressors if needed  Sedation as needed, being adjusted  Watch H/H - no active bleeding reported  Replace electrolytes  Synthroid and follow, recheck TSH in a few days  Trial of low rate pulmocare  Watch BS and treat as needed    Thank you for this consult.  I will follow with you while the patient  is hospitalized.      Critical Care Time    I have spent > 35 minutes providing critical care services for this pt for the above diagnoses.  These services have included pt evaluation, pt exam, ventilator assessment, SBT assessment, discussions with staff, chart review, data review, note preparation and .  Medications have been reviewed and adjusted as needed.  The patient has life threatening illness with a high risk of decompensation and/or death.      Sonido Zamora MD  [facility] Pulmonary/Critical Care

## 2024-08-29 NOTE — PHYSICIAN QUERY
Please specify the diagnosis or diagnoses that correspond(s) to the indicators listed in the query message:  Hyponatremia

## 2024-08-29 NOTE — RESPIRATORY THERAPY
08/28/24 1941   Patient Assessment/Suction   Level of Consciousness (AVPU) responds to voice   Respiratory Effort Normal;Unlabored   Expansion/Accessory Muscles/Retractions no retractions;no use of accessory muscles   All Lung Fields Breath Sounds clear   Rhythm/Pattern, Respiratory assisted mechanically   Cough Frequency with stimulation   Cough Type assisted   Suction Method tracheal   Suction Pressure (mmHg) -120 mmHg   $ Suction Charges Inline Suction Procedure Stat Charge   Secretions Amount small   Secretions Color white   Secretions Characteristics thick   Skin Integrity   $ Wound Care Tech Time 15 min   Area Observed Upper lip;Lower lip;Corner lip   Skin Appearance without discoloration   PRE-TX-O2   Device (Oxygen Therapy) ventilator   SpO2 (!) 93 %   Pulse Oximetry Type Continuous   $ Pulse Oximetry - Multiple Charge Pulse Oximetry - Multiple   Pulse (!) 55   Resp 14   Aerosol Therapy   $ Aerosol Therapy Charges Aerosol Treatment   Daily Review of Necessity (SVN) completed   Respiratory Treatment Status (SVN) given   Treatment Route (SVN) in-line;ventilator   Patient Position HOB elevated   Post Treatment Assessment (SVN) breath sounds unchanged   Signs of Intolerance (SVN) none        Airway - Non-Surgical 08/25/24 0427 Endotracheal Tube   Placement Date/Time: 08/25/24 0427   Present Prior to Hospital Arrival?: No  Method of Intubation: Glidescope  Inserted by: MD  Staff/Resident Name(s): Dr. Funes  Airway Device: Endotracheal Tube  Mask Ventilation: Easy  Airway Device Size: 7.5  St...   Secured at 24 cm   Measured At Lips   Secured Location Left   Secured by Commercial tube camargo   Bite Block left;secure and patent   Status Intact;Secured;Patent   Airway Safety   Is Ambu Bag and Mask with Patient? Yes, Adult Ambu Bag and Mask   Suction set is at the bedside? Yes   Respiratory Interventions   Airway/Ventilation Management airway patency maintained   Vent Select   Conventional Vent Y   Charged w/in  last 24h YES   Preset Conventional Ventilator Settings   Vent ID 9   Vent Type    Ventilation Type VC   Vent Mode A/C   Humidity HME   Set Rate 12 BPM   Vt Set 400 mL   PEEP/CPAP 8 cmH20   Peak Flow 65 L/min   Peak End Inspiratory Pressure 20 cmH20   Trigger Sensitivity Flow/I-Trigger 3 L/min   Patient Ventilator Parameters   Resp Rate Total 14 br/min   Peak Airway Pressure 27 cmH20   Mean Airway Pressure 11 cmH20   Exhaled Vt 430 mL   Total Ve 5.8 L/m   Tubing ID (mm) 7.5 mm   Tube Type ET   Conventional Ventilator Alarms   Alarms On Y   Ready to Wean/Extubation Screen   MV<16L (chart vol.) 5.8   PEEP <=8 (chart #) 8   Ready to Wean Parameters   F/VT Ratio<105 (RSBI) (!) 32.56   Education   $ Education Bronchodilator;Suction;Ventilator Oxygen;30 min

## 2024-08-29 NOTE — SUBJECTIVE & OBJECTIVE
Interval History: Patient is intubated. I    Review of Systems   All other systems reviewed and are negative.    Objective:     Vital Signs (Most Recent):  Temp: 97.2 °F (36.2 °C) (08/29/24 0700)  Pulse: (!) 57 (08/29/24 1100)  Resp: 12 (08/29/24 1100)  BP: (!) 155/70 (08/29/24 1100)  SpO2: (!) 94 % (08/29/24 1100) Vital Signs (24h Range):  Temp:  [97.2 °F (36.2 °C)-97.9 °F (36.6 °C)] 97.2 °F (36.2 °C)  Pulse:  [52-62] 57  Resp:  [10-18] 12  SpO2:  [92 %-97 %] 94 %  BP: (121-159)/(56-72) 155/70  Arterial Line BP: (116-159)/(51-71) 151/64     Weight: 123 kg (271 lb 2.7 oz)  Body mass index is 45.12 kg/m².    Intake/Output Summary (Last 24 hours) at 8/29/2024 1212  Last data filed at 8/29/2024 1200  Gross per 24 hour   Intake 3390.61 ml   Output 2225 ml   Net 1165.61 ml         Physical Exam  Constitutional:       Appearance: She is obese. She is ill-appearing.   Cardiovascular:      Rate and Rhythm: Bradycardia present.   Pulmonary:      Effort: Pulmonary effort is normal.   Abdominal:      General: Abdomen is flat.             Significant Labs: All pertinent labs within the past 24 hours have been reviewed.    Significant Imaging: I have reviewed all pertinent imaging results/findings within the past 24 hours.

## 2024-08-29 NOTE — PLAN OF CARE
Problem: Adult Inpatient Plan of Care  Goal: Patient-Specific Goal (Individualized)  Outcome: Progressing  Goal: Absence of Hospital-Acquired Illness or Injury  Outcome: Progressing  Goal: Optimal Comfort and Wellbeing  Outcome: Progressing     Problem: Skin Injury Risk Increased  Goal: Skin Health and Integrity  Outcome: Progressing     Problem: Infection  Goal: Absence of Infection Signs and Symptoms  Outcome: Progressing     Problem: Restraint, Nonviolent  Goal: Absence of Harm or Injury  Outcome: Progressing     Problem: Mechanical Ventilation Invasive  Goal: Effective Communication  Outcome: Progressing

## 2024-08-29 NOTE — CARE UPDATE
08/29/24 0745   PRE-TX-O2   Oxygen Concentration (%) 55   SpO2 (!) 92 %   Pulse (!) 55   Resp 12   Vent Select   Charged w/in last 24h NO   Preset Conventional Ventilator Settings   Ventilation Type VC   Vent Mode A/C   Set Rate 10 BPM   Vt Set 400 mL   PEEP/CPAP 10 cmH20   Peak Flow 65 L/min   Peak End Inspiratory Pressure 19 cmH20   I-Trigger Type  V-TRIG   Trigger Sensitivity Flow/I-Trigger 3 L/min   Patient Ventilator Parameters   Resp Rate Total 10 br/min   Peak Airway Pressure 28 cmH20   Mean Airway Pressure 12 cmH20   Plateau Pressure 21 cmH20   Exhaled Vt 414 mL   Total Ve 4.33 L/m   I:E Ratio Measured 1:7.7   Auto PEEP 0 cmH20   Conventional Ventilator Alarms   Ve High Alarm 20 L/min   Ve Low Alarm 3 L/min   Resp Rate High Alarm 40 br/min   Apnea Rate 12   Apnea Volume (mL) 0 mL   Apnea Oxygen Concentration  100   Apnea Flow Rate (L/min) 65   T Apnea 20 sec(s)   Ready to Wean/Extubation Screen   FIO2<=50 (chart decimal) (!) 0.55   MV<16L (chart vol.) 4.33   PEEP <=8 (chart #) (!) 10     Dr. Zamora is at bedside making changes to ventilator setting. I am at the bedside as well.

## 2024-08-29 NOTE — PROGRESS NOTES
[facility] Medicine  Progress Note    Patient Name: Karo Leonard  MRN: [id number]  Patient Class: IP- Inpatient   Admission Date: 8/25/2024  Length of Stay: 4 days  Attending Physician: Olga Lidia Pedroza DO  Primary Care Provider: Navneet Hernandez FNP        Subjective:     Principal Problem:Acute hypoxic on chronic hypercapnic respiratory failure        HPI:  64F p/w fall in the context of shortness of breath. EMS reportedly gave narcan w/o appreciable response, then she was given duoneb en route to [facility] ED. Upon arrival, she was tachypneic, had respy sounding phonation, but she denied chest pain, fever, or chills. She was placed on BiPAP, and initially demonstrated improvement. However, she became less responsive and appeared to tire out. She was given additional narcan w/o appreciable response, so she was intubated. She had some hypotension after receiving sedation, so she was placed on low dose levophed. Pick[place] was placed, lasix was given, as was Abx. Large bruise noted on LLE, so x-ray ordered.     Overview/Hospital Course:  Patient came in with acute on chronic hypoxemic hypercapnic respiratory failure.  Patient was intubated after failing BiPAP.  Continue with IV antibiotics for COPD exacerbation and possible pneumonia.  MRSA was negative so vanco was discontinued on 8/27.  Patient was started on iron and B12 supplement for anemia. Patient is still requiring ventilation support. Discussed the goal of care with patient's  on 8/28.Will redisucss again based on patient's prognosis and palliative consult as needed  in future       Interval History: Patient is intubated. I    Review of Systems   All other systems reviewed and are negative.    Objective:     Vital Signs (Most Recent):  Temp: 97.2 °F (36.2 °C) (08/29/24 0700)  Pulse: (!) 57 (08/29/24 1100)  Resp: 12 (08/29/24 1100)  BP: (!) 155/70 (08/29/24 1100)  SpO2: (!) 94 % (08/29/24 1100) Vital Signs (24h Range):  Temp:  [97.2  °F (36.2 °C)-97.9 °F (36.6 °C)] 97.2 °F (36.2 °C)  Pulse:  [52-62] 57  Resp:  [10-18] 12  SpO2:  [92 %-97 %] 94 %  BP: (121-159)/(56-72) 155/70  Arterial Line BP: (116-159)/(51-71) 151/64     Weight: 123 kg (271 lb 2.7 oz)  Body mass index is 45.12 kg/m².    Intake/Output Summary (Last 24 hours) at 8/29/2024 1224  Last data filed at 8/29/2024 1200  Gross per 24 hour   Intake 3390.61 ml   Output 2225 ml   Net 1165.61 ml         Physical Exam  Constitutional:       Appearance: She is obese. She is ill-appearing.   Cardiovascular:      Rate and Rhythm: Bradycardia present.   Pulmonary:      Effort: Pulmonary effort is normal.   Abdominal:      General: Abdomen is flat.             Significant Labs: All pertinent labs within the past 24 hours have been reviewed.    Significant Imaging: I have reviewed all pertinent imaging results/findings within the past 24 hours.      Assessment/Plan:      * Acute hypoxic on chronic hypercapnic respiratory failure  Likely multifactorial  Patient is currently intubated and unable to extubate since patient is still requiring ventilator  Pulmonology is following patient    COPD exacerbation  Continue with steroids and Levofloxacin     Pneumonia  MRSA neg, procal neg, respiratory panel neg  MRSA was negative so vanco was discontinued on 8/27.  Continue with Levofloxacin    Goals of care, counseling/discussion  Discussed the goal of care with patient's  on 8/28.  Will redisucss again based on patient's prognosis and palliative consult as needed  in future         Anemia  Likely from Iron and b12 deficiency  Started on Iron and B12 supplement on 8/27    Type 2 diabetes mellitus  Continue with SSI and glargine  Make adjustment as needed    Fall  Imaging ruled out acute fracture    Acute metabolic encephalopathy  CT head: no acute changes  Continue to monitor     Hypertension  Started Losartan on 8/28  Can make adjustment as needed  Holding home diltiazem due to  "bradycardia    Hypothyroid  Continue home levothyroxine        VTE Risk Mitigation (From admission, onward)           Ordered     enoxaparin injection 40 mg  Every 12 hours        Note to Pharmacy: Ht: 5' 5" (1.651 m)  Wt: 127 kg (280 lb)  Estimated Creatinine Clearance: 92.8 mL/min (based on SCr of 0.8 mg/dL).  Body mass index is 46.59 kg/m².    08/25/24 0627     IP VTE HIGH RISK PATIENT  Once         08/25/24 0627     Place sequential compression device  Until discontinued         08/25/24 0627                    Discharge Planning   CHELE:      Code Status: Full Code   Is the patient medically ready for discharge?:     Reason for patient still in hospital (select all that apply): Patient trending condition  Discharge Plan A: Home Health            Critical care time spent on the evaluation and treatment of severe organ dysfunction, review of pertinent labs and imaging studies, discussions with consulting providers and discussions with patient/family: 32 minutes.      Olga Lidia Pedroza DO  Department of Hospital Medicine   [facility]    "

## 2024-08-29 NOTE — NURSING
Nurses Note -- 4 Eyes      8/29/2024   10:22 AM      Skin assessed during: Q Shift Change      [x] No Altered Skin Integrity Present    []Prevention Measures Documented      [] Yes- Altered Skin Integrity Present or Discovered   [] LDA Added if Not in Epic (Describe Wound)   [] New Altered Skin Integrity was Present on Admit and Documented in LDA   [] Wound Image Taken    Wound Care Consulted? No    Attending Nurse:  Tressa Forte RN/Staff Member:  CARLOS Espinoza

## 2024-08-29 NOTE — PROGRESS NOTES
[facility]  Adult Nutrition   Progress Note (Nutrition Support Management)    SUMMARY     Recommendations  Recommendation/Intervention: 1. Pulmocare @ goal rate 20 mL/hr per MD order (provides 720 kcal, 30 gm protein 50.7 gm carbohydrate and 377 mL free water. 2. Recommend adding ProtGold TID.(210 kcal, 52 gm protein). to meet 72% EPN. 3. Propofol at current rate provides 905 mL lipid kcal, will monitor triglyceride labs.  Goals: 1. Patient to recieve >/= 75% EEN / EPN by follow up.  Nutrition Goal Status: goal not met  Communication of RD Recs: reviewed with physician    Nutrition Diagnosis PES Statement:  Inadequate (suboptimal) protein-energy intake related to NPO status as evidenced by patient intubated, sedated with lipid sedation (propofol)      Dietitian Rounds Brief  Feeding held yesterday. Restarted Pulmocare @ 20 mL per MD. Recommendations for increased protein and triglyceride lab. RD to follow and make recommendations PRN.    Nutrition Related Social Determinants of Health: SDOH: Unable to assess at this time.     Malnutrition Assessment                 Orbital Region (Subcutaneous Fat Loss): well nourished  Upper Arm Region (Subcutaneous Fat Loss): well nourished   Christianity Region (Muscle Loss): well nourished  Clavicle Bone Region (Muscle Loss): well nourished  Clavicle and Acromion Bone Region (Muscle Loss): well nourished                 Diet order:   Current Diet Order: NPO      Current Nutrition Support Formula Ordered: Pulmocare  Current Nutrition Support Rate Ordered: 20 (ml)  Current Nutrition Support Frequency Ordered: continuous    Evaluation of Received Nutrient/Fluid Intake  Enteral Calories (kcal): 720  Enteral Protein (gm): 30  Enteral (Free Water) Fluid (mL): 337  Free Water Flush Fluid (mL): 30  Lipid Calories (kcals): 905 kcals (Propofol @ 34.3 mL/hr x 24 hours)  Other Calories (kcal): 301 (propofol @ 11.4 mL/hr /24 hrs)  Energy Calories Required: meeting needs  Protein  "Required: not meeting needs  Fluid Required: not meeting needs  Tolerance: other (see comments) (just re-started this am)     % Intake of Estimated Energy Needs: 0 - 25 %  % Meal Intake: NPO      Intake/Output Summary (Last 24 hours) at 8/29/2024 1414  Last data filed at 8/29/2024 1408  Gross per 24 hour   Intake 3182.51 ml   Output 2255 ml   Net 927.51 ml        Anthropometrics  Temp: 97.3 °F (36.3 °C)  Height Method: Stated  Height: 5' 5" (165.1 cm)  Height (inches): 65 in  Weight Method: Bed Scale  Weight: 123 kg (271 lb 2.7 oz)  Weight (lb): 271.17 lb  Ideal Body Weight (IBW), Female: 125 lb  % Ideal Body Weight, Female (lb): 224 %  BMI (Calculated): 45.1  BMI Grade: greater than 40 - morbid obesity       Estimated/Assessed Needs  Weight Used For Calorie Calculations: 127 kg (279 lb 15.8 oz)  Energy Calorie Requirements (kcal): 1397--1778 kcal/kg  Energy Need Method: Kcal/kg  Protein Requirements: 114-143 gm/day (2.0-2.5 gm/kg IBW  Weight Used For Protein Calculations: 57 kg (125 lb 10.6 oz) (IBW)  Fluid Requirements (mL): 174-222 gm/day (11-15 cho servings / day)  Estimated Fluid Requirement Method: RDA Method  RDA Method (mL): 1397       Reason for Assessment  Reason For Assessment: RD follow-up  Diagnosis: pulmonary disease  Relevant Medical History: ASCVD, DM 2, HTN. COPD(cigarette use)  Interdisciplinary Rounds: did not attend  General Information Comments: Per pulmonary consult: patient with home O2, history of COPD had increased SOB at home and fell.  Nutrition Discharge Planning: Pending    Nutrition/Diet History  Spiritual, Cultural Beliefs, Mandaeism Practices, Values that Affect Care: no  Food Allergies: NKFA  Factors Affecting Nutritional Intake: NPO, on mechanical ventilation    Nutrition Risk Screen  Nutrition Risk Screen: tube feeding or parenteral nutrition     MST Score: 0  Have you recently lost weight without trying?: No  Weight loss score: 0  Have you been eating poorly because of a decreased " appetite?: No  Appetite score: 0       Weight History:  Wt Readings from Last 10 Encounters:   08/29/24 123 kg (271 lb 2.7 oz)   06/20/24 95.6 kg (210 lb 12.2 oz)   06/16/24 97.5 kg (215 lb)   06/16/22 97 kg (213 lb 13.5 oz)   06/14/22 97 kg (213 lb 13.5 oz)   06/14/22 101.5 kg (223 lb 12.3 oz)   06/12/22 101.5 kg (223 lb 12.3 oz)   06/08/22 104.3 kg (229 lb 15 oz)   07/21/20 108.8 kg (239 lb 13.8 oz)   08/13/13 122.5 kg (270 lb)        Lab/Procedures/Meds: Pertinent Labs/Meds Reviewed    Medications:Pertinent Medications Reviewed  Scheduled Meds:   albuterol-ipratropium  3 mL Nebulization Q6H    cyanocobalamin  1,000 mcg Oral Daily    enoxparin  40 mg Subcutaneous Q12H    famotidine (PF)  20 mg Intravenous Q12H    ferric gluconate (FERRLECIT) 125 mg in 0.9% NaCl 100 mL IVPB  125 mg Intravenous Daily    insulin glargine U-100  25 Units Subcutaneous BID    levoFLOXacin  750 mg Intravenous Q24H    levothyroxine  50 mcg Oral Before breakfast    [START ON 8/30/2024] losartan  50 mg Oral Daily    methylPREDNISolone injection (PEDS and ADULTS)  40 mg Intravenous Q8H    mupirocin   Nasal BID     Continuous Infusions:   0.9% NaCl   Intravenous Continuous 50 mL/hr at 08/29/24 0907 Rate Change at 08/29/24 0907    fentanyl  0-250 mcg/hr Intravenous Continuous   Stopped at 08/26/24 0721    NORepinephrine bitartrate-D5W  0-3 mcg/kg/min Intravenous Continuous 23.8 mL/hr at 08/25/24 0452 0.05 mcg/kg/min at 08/25/24 0452    propofoL  0-50 mcg/kg/min Intravenous Continuous 34.3 mL/hr at 08/29/24 1254 45 mcg/kg/min at 08/29/24 1254     PRN Meds:.  Current Facility-Administered Medications:     calcium gluconate IVPB, 1 g, Intravenous, PRN    calcium gluconate IVPB, 2 g, Intravenous, PRN    calcium gluconate IVPB, 3 g, Intravenous, PRN    dextrose 50%, 12.5 g, Intravenous, PRN    glucagon (human recombinant), 1 mg, Intramuscular, PRN    hydrALAZINE, 10 mg, Intravenous, Q6H PRN    insulin aspart U-100, 0-10 Units, Subcutaneous, Q6H  "PRN    magnesium sulfate IVPB, 2 g, Intravenous, PRN    magnesium sulfate IVPB, 4 g, Intravenous, PRN    morphine, 2 mg, Intravenous, Q1H PRN    potassium bicarbonate, 35 mEq, Oral, PRN    potassium bicarbonate, 50 mEq, Oral, PRN    potassium bicarbonate, 60 mEq, Oral, PRN    potassium chloride in water, 40 mEq, Intravenous, PRN **AND** potassium chloride in water, 60 mEq, Intravenous, PRN **AND** potassium chloride in water, 80 mEq, Intravenous, PRN    sodium chloride 0.9%, 10 mL, Intravenous, PRN    sodium phosphate 15 mmol in D5W 250 mL IVPB, 15 mmol, Intravenous, PRN    sodium phosphate 20.01 mmol in D5W 250 mL IVPB, 20.01 mmol, Intravenous, PRN    sodium phosphate 30 mmol in D5W 250 mL IVPB, 30 mmol, Intravenous, PRN    Labs: Pertinent Labs Reviewed  Clinical Chemistry:  Recent Labs   Lab 08/26/24  0246 08/27/24  0330 08/28/24  0355 08/29/24  0346   * 126* 132* 134*   K 4.0 3.8 4.0 3.8   CL 77* 78* 84* 88*   CO2 >45* 43* 41* 45*   * 320* 314* 250*   BUN 9 11 9 7*   CREATININE 0.6 0.8 0.7 0.6   CALCIUM 7.7* 7.6* 8.0* 7.7*   PROT  --  5.6* 5.1* 5.0*   ALBUMIN  --  2.9* 2.7* 2.7*   BILITOT  --  0.4 0.4 0.3   ALKPHOS  --  57 67 57   AST  --  20 17 14   ALT  --  8* 7* 7*   ANIONGAP 6* 5* 7* 1*   MG 2.0  --   --   --    PHOS 3.7  --   --   --      CBC:   Recent Labs   Lab 08/29/24  0346 08/29/24  0407   WBC 8.64  --    RBC 2.81*  --    HGB 8.7*  --    HCT 27.7* 29*     --    MCV 99*  --    MCH 31.0  --    MCHC 31.4*  --      Lipid Panel:  No results for input(s): "CHOL", "HDL", "LDLCALC", "TRIG", "CHOLHDL" in the last 168 hours.  Cardiac Profile:  Recent Labs   Lab 08/25/24  0233 08/26/24  0246   BNP 68  --    CPK 1048* 409*     Inflammatory Labs:  No results for input(s): "CRP" in the last 168 hours.  Diabetes:  Recent Labs   Lab 08/26/24  0546   HGBA1C 9.5*     Thyroid & Parathyroid:  Recent Labs   Lab 08/26/24  0546   TSH 27.761*   FREET4 0.27*       Monitor and Evaluation  Food and Nutrient " Intake: energy intake, food and beverage intake  Food and Nutrient Adminstration: diet order  Knowledge/Beliefs/Attitudes: beliefs and attitudes  Physical Activity and Function: nutrition-related ADLs and IADLs  Anthropometric Measurements: weight change, weight, body mass index  Biochemical Data, Medical Tests and Procedures: gastrointestinal profile, electrolyte and renal panel, inflammatory profile, glucose/endocrine profile, lipid profile  Nutrition-Focused Physical Findings: overall appearance     Nutrition Risk  Level of Risk/Frequency of Follow-up:  (2 times / week)     Nutrition Follow-Up  RD Follow-up?: Yes

## 2024-08-29 NOTE — SUBJECTIVE & OBJECTIVE
Interval History: Patient is intubated. She is able to communicate via writing.    Review of Systems   All other systems reviewed and are negative.    Objective:     Vital Signs (Most Recent):  Temp: 97.9 °F (36.6 °C) (08/30/24 0715)  Pulse: 77 (08/30/24 1153)  Resp: 12 (08/30/24 1153)  BP: (!) 159/67 (08/30/24 0930)  SpO2: 97 % (08/30/24 1153) Vital Signs (24h Range):  Temp:  [97.5 °F (36.4 °C)-98.3 °F (36.8 °C)] 97.9 °F (36.6 °C)  Pulse:  [54-80] 77  Resp:  [9-21] 12  SpO2:  [90 %-100 %] 97 %  BP: (127-171)/(60-79) 159/67  Arterial Line BP: (144-171)/(60-79) 171/79     Weight: 123.1 kg (271 lb 6.2 oz)  Body mass index is 45.16 kg/m².    Intake/Output Summary (Last 24 hours) at 8/30/2024 1329  Last data filed at 8/30/2024 0725  Gross per 24 hour   Intake 2070.07 ml   Output 1032 ml   Net 1038.07 ml         Physical Exam  Constitutional:       Appearance: She is obese. She is ill-appearing.   Cardiovascular:      Rate and Rhythm: Bradycardia present.   Pulmonary:      Effort: Pulmonary effort is normal.   Abdominal:      General: Abdomen is flat.   Musculoskeletal:         General: Swelling present.             Significant Labs: All pertinent labs within the past 24 hours have been reviewed.    Significant Imaging: I have reviewed all pertinent imaging results/findings within the past 24 hours.

## 2024-08-29 NOTE — ASSESSMENT & PLAN NOTE
Discussed the goal of care with patient's  on 8/28.  Will redisucss again based on patient's prognosis and palliative consult as needed  in future

## 2024-08-29 NOTE — PLAN OF CARE
Problem: Adult Inpatient Plan of Care  Goal: Plan of Care Review  8/29/2024 0438 by Marlena Esteban RN  Outcome: Progressing  Goal: Patient-Specific Goal (Individualized)  8/29/2024 0438 by Marlena Esteban RN  Outcome: Progressing  Goal: Absence of Hospital-Acquired Illness or Injury  8/29/2024 0438 by Marlena Esteban RN  Outcome: Progressing  Goal: Optimal Comfort and Wellbeing  8/29/2024 0438 by Marlean Esteban RN  Outcome: Progressing  Goal: Readiness for Transition of Care  Outcome: Progressing

## 2024-08-29 NOTE — RESPIRATORY THERAPY
08/29/24 0713   Patient Assessment/Suction   Level of Consciousness (AVPU) responds to voice   Respiratory Effort Unlabored;Normal   Expansion/Accessory Muscles/Retractions no use of accessory muscles;no retractions;expansion symmetric   All Lung Fields Breath Sounds Anterior:;Lateral:   HARRISON Breath Sounds clear;diminished   LLL Breath Sounds diminished   RUL Breath Sounds clear   RML Breath Sounds clear;diminished   RLL Breath Sounds diminished   Rhythm/Pattern, Respiratory assisted mechanically   Cough Frequency with stimulation   Cough Type assisted   Suction Pressure (mmHg) -120 mmHg   PRE-TX-O2   Device (Oxygen Therapy) ventilator   $ Is the patient on High Flow Oxygen? Yes   Oxygen Concentration (%) 65   SpO2 95 %   Pulse Oximetry Type Continuous   $ Pulse Oximetry - Multiple Charge Pulse Oximetry - Multiple   Pulse (!) 55   Resp 12   Aerosol Therapy   $ Aerosol Therapy Charges Aerosol Treatment   Daily Review of Necessity (SVN) completed   Respiratory Treatment Status (SVN) given   Treatment Route (SVN) in-line;ventilator   Patient Position semi-Brand's   Post Treatment Assessment (SVN) breath sounds unchanged   Signs of Intolerance (SVN) none   Breath Sounds Post-Respiratory Treatment   Throughout All Fields Post-Treatment All Fields   Throughout All Fields Post-Treatment no change   Post-treatment Heart Rate (beats/min) 52   Post-treatment Resp Rate (breaths/min) 12        Airway - Non-Surgical 08/25/24 0427 Endotracheal Tube   Placement Date/Time: 08/25/24 0427   Present Prior to Hospital Arrival?: No  Method of Intubation: Glidescope  Inserted by: MD  Staff/Resident Name(s): Dr. Funes  Airway Device: Endotracheal Tube  Mask Ventilation: Easy  Airway Device Size: 7.5  St...   Secured at 24 cm   Measured At Lips   Secured Location Center   Secured by Commercial tube camargo   Bite Block center   Site Condition Cool;Dry   Status Intact;Secured;Patent   Site Assessment Clean;Dry;No bleeding;No drainage    Cuff Volume   (robel cuff in green)   Arterial Line 08/28/24 0900 Left Radial   Placement Date/Time: 08/28/24 0900   Orientation: Left  Location: Radial  Placement directed by: Ultrasound  Site Prep: Chlorhexidine   Local Anesthetic: None  Inserted by: Respiratory Therapist  Securement Method: (c) Other (Comments)  Insertion atte...   Site Assessment Clean;Dry;Intact;No redness;No swelling   Line Status Pulsatile blood flow   Art Line Waveform Appropriate   Arterial Line Interventions Zeroed and calibrated;Leveled;Connections checked and tightened   Color/Movement/Sensation Capillary refill less than 3 sec   Dressing Type CHG impregnated dressing/sponge   Dressing Status Clean;Dry;Intact   Airway Safety   Is Ambu Bag and Mask with Patient? Yes, Adult Ambu Bag and Mask   ETT Size 7.5   Vent Select   Conventional Vent Y   Charged w/in last 24h YES   Preset Conventional Ventilator Settings   Vent ID [id number]   Vent Type    Ventilation Type VC   Vent Mode A/C   Humidity HME   Set Rate 12 BPM   Vt Set 400 mL   PEEP/CPAP 8 cmH20   Peak Flow 65 L/min   Peak End Inspiratory Pressure 20 cmH20   I-Trigger Type  V-TRIG   Trigger Sensitivity Flow/I-Trigger 3 L/min   Patient Ventilator Parameters   Resp Rate Total 13 br/min   Spont Resp Rate 1 br/min   All Fields Breath Sounds Anterior:;Lateral:;clear;diminished   Peak Airway Pressure 34 cmH20   Mean Airway Pressure 12 cmH20   Plateau Pressure 21 cmH20   Exhaled Vt 441 mL   Total Ve 5.6 L/m   I:E Ratio Measured 1:6.4   Auto PEEP 0 cmH20   Tubing ID (mm) 7.5 mm   Tube Type ET   Conventional Ventilator Alarms   Alarms On Y   Ve High Alarm 20 L/min   Ve Low Alarm 3 L/min   Vt High Alarm 1000 mL   Vt Low Alarm 200 mL   Resp Rate High Alarm 40 br/min   Press High Alarm 40 cmH2O   Apnea Rate 12   Apnea Volume (mL) 0 mL   Apnea Oxygen Concentration  100   Apnea Flow Rate (L/min) 65   T Apnea 20 sec(s)   Ready to Wean/Extubation Screen   FIO2<=50 (chart decimal) (!) 0.65   MV<16L (chart  "vol.) 5.6   PEEP <=8 (chart #) 8   IBW/VT Calculations   Height 5' 5" (1.651 m)   IBW/kg (Calculated) Female 57 kg   Low Range Vt 4cc/kg FEMALE 228 mL   Low Range Vt 6cc/kg FEMALE 342 mL   Adult Moderate Range vt 8cc/kg FEMALE 456 mL   Adult High Range Vt 10cc/kg FEMALE 570 mL   Current VT Set/IBW (Calculated) FEMALE 7.02 ML\Kg     "

## 2024-08-30 ENCOUNTER — CLINICAL SUPPORT (OUTPATIENT)
Dept: CARDIOLOGY | Facility: HOSPITAL | Age: 64
End: 2024-08-30
Attending: FAMILY MEDICINE
Payer: MEDICARE

## 2024-08-30 VITALS — BODY MASS INDEX: 45.15 KG/M2 | WEIGHT: 271 LBS | HEIGHT: 65 IN

## 2024-08-30 LAB
ALBUMIN SERPL BCP-MCNC: 2.6 G/DL (ref 3.5–5.2)
ALLENS TEST: ABNORMAL
ALP SERPL-CCNC: 58 U/L (ref 55–135)
ALT SERPL W/O P-5'-P-CCNC: 6 U/L (ref 10–44)
ANION GAP SERPL CALC-SCNC: 2 MMOL/L (ref 8–16)
APICAL FOUR CHAMBER EJECTION FRACTION: 53 %
AST SERPL-CCNC: 15 U/L (ref 10–40)
BASOPHILS # BLD AUTO: 0 K/UL (ref 0–0.2)
BASOPHILS NFR BLD: 0 % (ref 0–1.9)
BILIRUB SERPL-MCNC: 0.4 MG/DL (ref 0.1–1)
BSA FOR ECHO PROCEDURE: 2.37 M2
BUN SERPL-MCNC: 7 MG/DL (ref 8–23)
CALCIUM SERPL-MCNC: 7.5 MG/DL (ref 8.7–10.5)
CHLORIDE SERPL-SCNC: 90 MMOL/L (ref 95–110)
CO2 SERPL-SCNC: 43 MMOL/L (ref 23–29)
CREAT SERPL-MCNC: 0.5 MG/DL (ref 0.5–1.4)
CV ECHO LV RWT: 0.49 CM
DELSYS: ABNORMAL
DIFFERENTIAL METHOD BLD: ABNORMAL
ECHO LV POSTERIOR WALL: 1.1 CM (ref 0.6–1.1)
EOSINOPHIL # BLD AUTO: 0 K/UL (ref 0–0.5)
EOSINOPHIL NFR BLD: 0.1 % (ref 0–8)
ERYTHROCYTE [DISTWIDTH] IN BLOOD BY AUTOMATED COUNT: 16.8 % (ref 11.5–14.5)
EST. GFR  (NO RACE VARIABLE): >60 ML/MIN/1.73 M^2
FIO2: 58
FIO2: 60
FRACTIONAL SHORTENING: 29 % (ref 28–44)
GLUCOSE SERPL-MCNC: 216 MG/DL (ref 70–110)
GLUCOSE SERPL-MCNC: 221 MG/DL (ref 70–110)
GLUCOSE SERPL-MCNC: 222 MG/DL (ref 70–110)
GLUCOSE SERPL-MCNC: 233 MG/DL (ref 70–110)
GLUCOSE SERPL-MCNC: 237 MG/DL (ref 70–110)
GLUCOSE SERPL-MCNC: 242 MG/DL (ref 70–110)
GLUCOSE SERPL-MCNC: 248 MG/DL (ref 70–110)
GLUCOSE SERPL-MCNC: 275 MG/DL (ref 70–110)
HCO3 UR-SCNC: 45.1 MMOL/L (ref 24–28)
HCO3 UR-SCNC: 45.5 MMOL/L (ref 24–28)
HCO3 UR-SCNC: 45.5 MMOL/L (ref 24–28)
HCO3 UR-SCNC: 46.1 MMOL/L (ref 24–28)
HCT VFR BLD AUTO: 27.5 % (ref 37–48.5)
HCT VFR BLD CALC: 29 %PCV (ref 36–54)
HCT VFR BLD CALC: 30 %PCV (ref 36–54)
HGB BLD-MCNC: 8.8 G/DL (ref 12–16)
IMM GRANULOCYTES # BLD AUTO: 0.28 K/UL (ref 0–0.04)
IMM GRANULOCYTES NFR BLD AUTO: 2.9 % (ref 0–0.5)
INTERVENTRICULAR SEPTUM: 1.2 CM (ref 0.6–1.1)
LEFT INTERNAL DIMENSION IN SYSTOLE: 3.2 CM (ref 2.1–4)
LEFT VENTRICLE DIASTOLIC VOLUME INDEX: 41.09 ML/M2
LEFT VENTRICLE DIASTOLIC VOLUME: 92.45 ML
LEFT VENTRICLE END DIASTOLIC VOLUME APICAL 4 CHAMBER: 30.7 ML
LEFT VENTRICLE MASS INDEX: 83 G/M2
LEFT VENTRICLE SYSTOLIC VOLUME INDEX: 18.2 ML/M2
LEFT VENTRICLE SYSTOLIC VOLUME: 40.96 ML
LEFT VENTRICULAR INTERNAL DIMENSION IN DIASTOLE: 4.5 CM (ref 3.5–6)
LEFT VENTRICULAR MASS: 186.39 G
LVED V (TEICH): 92.45 ML
LVES V (TEICH): 40.96 ML
LYMPHOCYTES # BLD AUTO: 0.8 K/UL (ref 1–4.8)
LYMPHOCYTES NFR BLD: 8.2 % (ref 18–48)
MCH RBC QN AUTO: 31.3 PG (ref 27–31)
MCHC RBC AUTO-ENTMCNC: 32 G/DL (ref 32–36)
MCV RBC AUTO: 98 FL (ref 82–98)
MODE: ABNORMAL
MONOCYTES # BLD AUTO: 0.5 K/UL (ref 0.3–1)
MONOCYTES NFR BLD: 5.2 % (ref 4–15)
NEUTROPHILS # BLD AUTO: 8.1 K/UL (ref 1.8–7.7)
NEUTROPHILS NFR BLD: 83.6 % (ref 38–73)
NRBC BLD-RTO: 0 /100 WBC
PCO2 BLDA: 58.9 MMHG (ref 35–45)
PCO2 BLDA: 59.8 MMHG (ref 35–45)
PCO2 BLDA: 60.9 MMHG (ref 35–45)
PCO2 BLDA: 61.1 MMHG (ref 35–45)
PEEP: 10
PEEP: 10
PH SMN: 7.48 [PH] (ref 7.35–7.45)
PH SMN: 7.48 [PH] (ref 7.35–7.45)
PH SMN: 7.49 [PH] (ref 7.35–7.45)
PH SMN: 7.5 [PH] (ref 7.35–7.45)
PLATELET # BLD AUTO: 330 K/UL (ref 150–450)
PMV BLD AUTO: 9.3 FL (ref 9.2–12.9)
PO2 BLDA: 56 MMHG (ref 80–100)
PO2 BLDA: 64 MMHG (ref 80–100)
PO2 BLDA: 67 MMHG (ref 80–100)
PO2 BLDA: 70 MMHG (ref 80–100)
POC BE: 22 MMOL/L
POC BE: 23 MMOL/L
POC IONIZED CALCIUM: 1.05 MMOL/L (ref 1.06–1.42)
POC IONIZED CALCIUM: 1.07 MMOL/L (ref 1.06–1.42)
POC SATURATED O2: 90 % (ref 95–100)
POC SATURATED O2: 93 % (ref 95–100)
POC SATURATED O2: 94 % (ref 95–100)
POC SATURATED O2: 94 % (ref 95–100)
POC TCO2: 47 MMOL/L (ref 23–27)
POC TCO2: 48 MMOL/L (ref 23–27)
POTASSIUM BLD-SCNC: 3.9 MMOL/L (ref 3.5–5.1)
POTASSIUM BLD-SCNC: 4 MMOL/L (ref 3.5–5.1)
POTASSIUM SERPL-SCNC: 3.9 MMOL/L (ref 3.5–5.1)
PROT SERPL-MCNC: 4.8 G/DL (ref 6–8.4)
PS: 12
PS: 15
RA PRESSURE ESTIMATED: 3 MMHG
RBC # BLD AUTO: 2.81 M/UL (ref 4–5.4)
SAMPLE: ABNORMAL
SITE: ABNORMAL
SODIUM BLD-SCNC: 133 MMOL/L (ref 136–145)
SODIUM BLD-SCNC: 133 MMOL/L (ref 136–145)
SODIUM SERPL-SCNC: 135 MMOL/L (ref 136–145)
SP02: 92
SP02: 97
SPONT RATE: 12
SPONT RATE: 9
TRIGL SERPL-MCNC: 864 MG/DL (ref 30–150)
WBC # BLD AUTO: 9.65 K/UL (ref 3.9–12.7)
Z-SCORE OF LEFT VENTRICULAR DIMENSION IN END DIASTOLE: -5.94
Z-SCORE OF LEFT VENTRICULAR DIMENSION IN END SYSTOLE: -3.42

## 2024-08-30 PROCEDURE — 94761 N-INVAS EAR/PLS OXIMETRY MLT: CPT | Mod: XB

## 2024-08-30 PROCEDURE — 82803 BLOOD GASES ANY COMBINATION: CPT

## 2024-08-30 PROCEDURE — 37799 UNLISTED PX VASCULAR SURGERY: CPT

## 2024-08-30 PROCEDURE — 25000003 PHARM REV CODE 250: Performed by: HOSPITALIST

## 2024-08-30 PROCEDURE — 25000242 PHARM REV CODE 250 ALT 637 W/ HCPCS: Performed by: HOSPITALIST

## 2024-08-30 PROCEDURE — 99900035 HC TECH TIME PER 15 MIN (STAT)

## 2024-08-30 PROCEDURE — 85025 COMPLETE CBC W/AUTO DIFF WBC: CPT | Performed by: FAMILY MEDICINE

## 2024-08-30 PROCEDURE — 63600175 PHARM REV CODE 636 W HCPCS: Performed by: INTERNAL MEDICINE

## 2024-08-30 PROCEDURE — 93308 TTE F-UP OR LMTD: CPT | Mod: 26,,, | Performed by: INTERNAL MEDICINE

## 2024-08-30 PROCEDURE — 25000003 PHARM REV CODE 250: Performed by: FAMILY MEDICINE

## 2024-08-30 PROCEDURE — 63600175 PHARM REV CODE 636 W HCPCS: Performed by: HOSPITALIST

## 2024-08-30 PROCEDURE — 63600175 PHARM REV CODE 636 W HCPCS: Performed by: FAMILY MEDICINE

## 2024-08-30 PROCEDURE — 94640 AIRWAY INHALATION TREATMENT: CPT

## 2024-08-30 PROCEDURE — 93308 TTE F-UP OR LMTD: CPT

## 2024-08-30 PROCEDURE — 20000000 HC ICU ROOM

## 2024-08-30 PROCEDURE — 99291 CRITICAL CARE FIRST HOUR: CPT | Mod: ,,, | Performed by: INTERNAL MEDICINE

## 2024-08-30 PROCEDURE — 84478 ASSAY OF TRIGLYCERIDES: CPT | Performed by: FAMILY MEDICINE

## 2024-08-30 PROCEDURE — 99900026 HC AIRWAY MAINTENANCE (STAT)

## 2024-08-30 PROCEDURE — 80053 COMPREHEN METABOLIC PANEL: CPT | Performed by: FAMILY MEDICINE

## 2024-08-30 PROCEDURE — 94003 VENT MGMT INPAT SUBQ DAY: CPT

## 2024-08-30 PROCEDURE — 27100171 HC OXYGEN HIGH FLOW UP TO 24 HOURS

## 2024-08-30 RX ORDER — FUROSEMIDE 10 MG/ML
40 INJECTION INTRAMUSCULAR; INTRAVENOUS EVERY 12 HOURS
Status: DISCONTINUED | OUTPATIENT
Start: 2024-08-30 | End: 2024-08-31

## 2024-08-30 RX ORDER — INSULIN GLARGINE 100 [IU]/ML
30 INJECTION, SOLUTION SUBCUTANEOUS 2 TIMES DAILY
Status: DISCONTINUED | OUTPATIENT
Start: 2024-08-30 | End: 2024-08-31

## 2024-08-30 RX ORDER — LOSARTAN POTASSIUM 50 MG/1
100 TABLET ORAL DAILY
Status: DISCONTINUED | OUTPATIENT
Start: 2024-08-30 | End: 2024-09-17 | Stop reason: HOSPADM

## 2024-08-30 RX ADMIN — IPRATROPIUM BROMIDE AND ALBUTEROL SULFATE 3 ML: 2.5; .5 SOLUTION RESPIRATORY (INHALATION) at 01:08

## 2024-08-30 RX ADMIN — CYANOCOBALAMIN TAB 1000 MCG 1000 MCG: 1000 TAB at 09:08

## 2024-08-30 RX ADMIN — HYDRALAZINE HYDROCHLORIDE 10 MG: 20 INJECTION INTRAMUSCULAR; INTRAVENOUS at 11:08

## 2024-08-30 RX ADMIN — INSULIN ASPART 3 UNITS: 100 INJECTION, SOLUTION INTRAVENOUS; SUBCUTANEOUS at 12:08

## 2024-08-30 RX ADMIN — FAMOTIDINE 20 MG: 10 INJECTION INTRAVENOUS at 09:08

## 2024-08-30 RX ADMIN — ENOXAPARIN SODIUM 40 MG: 40 INJECTION SUBCUTANEOUS at 08:08

## 2024-08-30 RX ADMIN — METHYLPREDNISOLONE SODIUM SUCCINATE 40 MG: 40 INJECTION, POWDER, FOR SOLUTION INTRAMUSCULAR; INTRAVENOUS at 10:08

## 2024-08-30 RX ADMIN — LOSARTAN POTASSIUM 100 MG: 50 TABLET, FILM COATED ORAL at 09:08

## 2024-08-30 RX ADMIN — IPRATROPIUM BROMIDE AND ALBUTEROL SULFATE 3 ML: 2.5; .5 SOLUTION RESPIRATORY (INHALATION) at 07:08

## 2024-08-30 RX ADMIN — MORPHINE SULFATE 2 MG: 2 INJECTION, SOLUTION INTRAMUSCULAR; INTRAVENOUS at 05:08

## 2024-08-30 RX ADMIN — ENOXAPARIN SODIUM 40 MG: 40 INJECTION SUBCUTANEOUS at 09:08

## 2024-08-30 RX ADMIN — PROPOFOL 50 MCG/KG/MIN: 10 INJECTION, EMULSION INTRAVENOUS at 05:08

## 2024-08-30 RX ADMIN — PROPOFOL 50 MCG/KG/MIN: 10 INJECTION, EMULSION INTRAVENOUS at 01:08

## 2024-08-30 RX ADMIN — INSULIN ASPART 2 UNITS: 100 INJECTION, SOLUTION INTRAVENOUS; SUBCUTANEOUS at 08:08

## 2024-08-30 RX ADMIN — INSULIN ASPART 4 UNITS: 100 INJECTION, SOLUTION INTRAVENOUS; SUBCUTANEOUS at 05:08

## 2024-08-30 RX ADMIN — INSULIN GLARGINE 30 UNITS: 100 INJECTION, SOLUTION SUBCUTANEOUS at 08:08

## 2024-08-30 RX ADMIN — IPRATROPIUM BROMIDE AND ALBUTEROL SULFATE 3 ML: 2.5; .5 SOLUTION RESPIRATORY (INHALATION) at 02:08

## 2024-08-30 RX ADMIN — METHYLPREDNISOLONE SODIUM SUCCINATE 40 MG: 40 INJECTION, POWDER, FOR SOLUTION INTRAMUSCULAR; INTRAVENOUS at 02:08

## 2024-08-30 RX ADMIN — PROPOFOL 25 MCG/KG/MIN: 10 INJECTION, EMULSION INTRAVENOUS at 02:08

## 2024-08-30 RX ADMIN — LEVOFLOXACIN 750 MG: 5 INJECTION, SOLUTION INTRAVENOUS at 01:08

## 2024-08-30 RX ADMIN — INSULIN ASPART 4 UNITS: 100 INJECTION, SOLUTION INTRAVENOUS; SUBCUTANEOUS at 04:08

## 2024-08-30 RX ADMIN — INSULIN ASPART 4 UNITS: 100 INJECTION, SOLUTION INTRAVENOUS; SUBCUTANEOUS at 11:08

## 2024-08-30 RX ADMIN — LEVOTHYROXINE SODIUM 50 MCG: 0.03 TABLET ORAL at 05:08

## 2024-08-30 RX ADMIN — SODIUM CHLORIDE 125 MG: 9 INJECTION, SOLUTION INTRAVENOUS at 09:08

## 2024-08-30 RX ADMIN — PROPOFOL 25 MCG/KG/MIN: 10 INJECTION, EMULSION INTRAVENOUS at 08:08

## 2024-08-30 RX ADMIN — FUROSEMIDE 40 MG: 10 INJECTION, SOLUTION INTRAVENOUS at 09:08

## 2024-08-30 RX ADMIN — INSULIN GLARGINE 25 UNITS: 100 INJECTION, SOLUTION SUBCUTANEOUS at 09:08

## 2024-08-30 RX ADMIN — FUROSEMIDE 40 MG: 10 INJECTION, SOLUTION INTRAVENOUS at 08:08

## 2024-08-30 RX ADMIN — FAMOTIDINE 20 MG: 10 INJECTION INTRAVENOUS at 08:08

## 2024-08-30 RX ADMIN — METHYLPREDNISOLONE SODIUM SUCCINATE 40 MG: 40 INJECTION, POWDER, FOR SOLUTION INTRAMUSCULAR; INTRAVENOUS at 05:08

## 2024-08-30 RX ADMIN — PROPOFOL 20 MCG/KG/MIN: 10 INJECTION, EMULSION INTRAVENOUS at 08:08

## 2024-08-30 NOTE — PLAN OF CARE
Discussed with attending during rounds this am. Pt still intubated; with propofol and given IV Lasix today. Discharge plan uncertain at this time due to acuity of pt. CM will continue to follow.       08/30/24 1616   Post-Acute Status   Discharge Delays None known at this time   Discharge Plan   Discharge Plan A Home Health   Discharge Plan B Skilled Nursing Facility

## 2024-08-30 NOTE — CARE UPDATE
08/30/24 0728   Patient Assessment/Suction   Level of Consciousness (AVPU) responds to voice   Respiratory Effort Normal;Unlabored   Expansion/Accessory Muscles/Retractions no use of accessory muscles;no retractions;expansion symmetric   All Lung Fields Breath Sounds Anterior:;Lateral:   HARRISON Breath Sounds diminished   LLL Breath Sounds diminished   RUL Breath Sounds diminished   RML Breath Sounds diminished   RLL Breath Sounds diminished   Rhythm/Pattern, Respiratory assisted mechanically   Cough Frequency with stimulation   Cough Type assisted   Suction Method tracheal;oral   Suction Pressure (mmHg) -120 mmHg   $ Suction Charges Inline Suction Procedure Stat Charge   Secretions Amount copious   Secretions Color yellow   Secretions Characteristics thick   PRE-TX-O2   Device (Oxygen Therapy) ventilator   $ Is the patient on High Flow Oxygen? Yes   Oxygen Concentration (%) 55   SpO2 (!) 93 %   Pulse Oximetry Type Continuous   $ Pulse Oximetry - Multiple Charge Pulse Oximetry - Multiple   Pulse (!) 57   Resp 10   Aerosol Therapy   $ Aerosol Therapy Charges Aerosol Treatment   Daily Review of Necessity (SVN) completed   Respiratory Treatment Status (SVN) given   Treatment Route (SVN) in-line;ventilator   Patient Position HOB elevated   Post Treatment Assessment (SVN) increased aeration   Signs of Intolerance (SVN) none   Breath Sounds Post-Respiratory Treatment   Throughout All Fields Post-Treatment All Fields   Throughout All Fields Post-Treatment aeration increased   Post-treatment Heart Rate (beats/min) 61   Post-treatment Resp Rate (breaths/min) 12        Airway - Non-Surgical 08/25/24 0427 Endotracheal Tube   Placement Date/Time: 08/25/24 0427   Present Prior to Hospital Arrival?: No  Method of Intubation: Glidescope  Inserted by: MD  Staff/Resident Name(s): Dr. Funes  Airway Device: Endotracheal Tube  Mask Ventilation: Easy  Airway Device Size: 7.5  St...   Secured at 24 cm   Measured At Lips   Secured  Location Right   Secured by Commercial tube camargo   Position of ETT in xRay In good position   Bite Block right   Site Condition Dry;Cool   Status Intact;Secured;Patent   Site Assessment Clean;Dry;No bleeding;No drainage   Cuff Volume   (leaking , 1/2 cc air added)   Subglottic Suctioning Other (Comment)   Arterial Line 08/28/24 0900 Left Radial   Placement Date/Time: 08/28/24 0900   Orientation: Left  Location: Radial  Placement directed by: Ultrasound  Site Prep: Chlorhexidine   Local Anesthetic: None  Inserted by: Respiratory Therapist  Securement Method: (c) Other (Comments)  Insertion atte...   Site Assessment Clean;Dry;Intact;No redness;No swelling   Line Status Pulsatile blood flow   Art Line Waveform Appropriate   Color/Movement/Sensation Capillary refill less than 3 sec   Dressing Type CHG impregnated dressing/sponge   Dressing Status Clean;Dry;Intact   General Safety Checklist   Safety Promotion/Fall Prevention side rails raised   Airway Safety   Is Ambu Bag and Mask with Patient? Yes, Adult Ambu Bag and Mask   O2  at bedside? No   Suction set is at the bedside? Yes   Vent Select   Conventional Vent Y   $ Ventilator Subsequent 1   Charged w/in last 24h YES   Preset Conventional Ventilator Settings   Vent ID [id number]   Vent Type    Ventilation Type VC   Vent Mode Spont   PEEP/CPAP 10 cmH20   Pressure Support 15 cmH20   Peak End Inspiratory Pressure 25 cmH20   Insp Rise Time  70 %   I-Trigger Type  V-TRIG   Trigger Sensitivity Flow/I-Trigger 3 L/min   Patient Ventilator Parameters   Resp Rate Total 9.6 br/min   Peak Airway Pressure 25 cmH20   Mean Airway Pressure 13 cmH20   Plateau Pressure 21 cmH20   Exhaled Vt 556 mL   Total Ve 5.36 L/m   Spont Ve 5.36 L   I:E Ratio Measured 1:5   Auto PEEP 0 cmH20   Inspired Tidal Volume (VTI) 1 mL   Conventional Ventilator Alarms   Alarms On Y   Ve High Alarm 20 L/min   Ve Low Alarm 3 L/min   Vt High Alarm 1000 mL   Vt Low Alarm 200 mL   Resp Rate High Alarm 40  br/min   Press High Alarm 40 cmH2O   Apnea Rate 12   Apnea Volume (mL) 0 mL   Apnea Oxygen Concentration  100   Apnea Flow Rate (L/min) 65   T Apnea 20 sec(s)   Ready to Wean/Extubation Screen   FIO2<=50 (chart decimal) (!) 0.55   MV<16L (chart vol.) 5.36   PEEP <=8 (chart #) (!) 10   Ready to Wean Parameters   F/VT Ratio<105 (RSBI) (!) 17.99

## 2024-08-30 NOTE — RESPIRATORY THERAPY
08/30/24 0804   Patient Assessment/Suction   Level of Consciousness (AVPU) responds to voice   Respiratory Effort Normal   Expansion/Accessory Muscles/Retractions expansion symmetric   All Lung Fields Breath Sounds Anterior:;Lateral:   HARRISON Breath Sounds diminished   LLL Breath Sounds diminished   RUL Breath Sounds diminished   RML Breath Sounds diminished   RLL Breath Sounds diminished   Rhythm/Pattern, Respiratory assisted mechanically   Cough Frequency with stimulation   PRE-TX-O2   Device (Oxygen Therapy) ventilator   Oxygen Concentration (%) 58   SpO2 (!) 92 %   Pulse Oximetry Type Continuous   Pulse 60   Resp 13   BP (!) 148/65   Airway Safety   Is Ambu Bag and Mask with Patient? Yes, Adult Ambu Bag and Mask   Vent Select   Conventional Vent Y   Charged w/in last 24h YES   Preset Conventional Ventilator Settings   Ventilation Type VC   Vent Mode Spont   PEEP/CPAP 10 cmH20   Pressure Support 15 cmH20   Peak End Inspiratory Pressure 25 cmH20   Insp Rise Time  70 %   I-Trigger Type  V-TRIG   Trigger Sensitivity Flow/I-Trigger 3 L/min   Patient Ventilator Parameters   Resp Rate Total 13 br/min   Peak Airway Pressure 25 cmH20   Mean Airway Pressure 13 cmH20   Plateau Pressure 21 cmH20   Exhaled Vt 536 mL   Total Ve 7.16 L/m   Spont Ve 7.16 L   I:E Ratio Measured 1:3.5   Auto PEEP 0 cmH20   Inspired Tidal Volume (VTI) 1 mL   Conventional Ventilator Alarms   Ve High Alarm 20 L/min   Ve Low Alarm 3 L/min   Resp Rate High Alarm 40 br/min   Apnea Rate 12   Apnea Volume (mL) 0 mL   Apnea Oxygen Concentration  100   Apnea Flow Rate (L/min) 65   T Apnea 20 sec(s)   Ready to Wean/Extubation Screen   FIO2<=50 (chart decimal) (!) 0.58   MV<16L (chart vol.) 7.16   PEEP <=8 (chart #) (!) 10   Labs   $ Was an ABG obtained? Arterial Blood Draw from Existing Line   $ Labs Tech Time 15 min   Critical Value Communication   Date Result Received 08/30/24   Time Result Received 0804   [facility] Department of Critical Value  Respiratory   Who communicated critical value from resulting department? Esthela Willett, RRT   Critical Test #1 PH   Critical Test #2 PCO2   Critical Test #3  PO2   Critical Test #4 HCO3   Name of Notified Physician/Designee Dr. Solis   Date Notified 08/30/24   Time Notified 0805   Read Back Verification Yes

## 2024-08-30 NOTE — RESPIRATORY THERAPY
08/29/24 1935   Patient Assessment/Suction   Level of Consciousness (AVPU) responds to voice   Respiratory Effort Normal;Unlabored   Expansion/Accessory Muscles/Retractions no retractions;no use of accessory muscles   All Lung Fields Breath Sounds clear   Rhythm/Pattern, Respiratory assisted mechanically   Cough Frequency with stimulation   Cough Type assisted   Suction Method tracheal   Suction Pressure (mmHg) -120 mmHg   $ Suction Charges Inline Suction Procedure Stat Charge   Secretions Amount small   Secretions Color white   Secretions Characteristics thick   Skin Integrity   $ Wound Care Tech Time 15 min   Area Observed Upper lip;Lower lip;Corner lip   Skin Appearance without discoloration   PRE-TX-O2   Device (Oxygen Therapy) ventilator   Oxygen Concentration (%) 55   SpO2 (!) 94 %   Pulse Oximetry Type Continuous   $ Pulse Oximetry - Multiple Charge Pulse Oximetry - Multiple   Pulse 76   Resp (!) 21   Aerosol Therapy   $ Aerosol Therapy Charges Aerosol Treatment   Daily Review of Necessity (SVN) completed   Respiratory Treatment Status (SVN) given   Treatment Route (SVN) in-line;ventilator   Patient Position HOB elevated   Post Treatment Assessment (SVN) breath sounds unchanged   Signs of Intolerance (SVN) none        Airway - Non-Surgical 08/25/24 0427 Endotracheal Tube   Placement Date/Time: 08/25/24 0427   Present Prior to Hospital Arrival?: No  Method of Intubation: Glidescope  Inserted by: MD  Staff/Resident Name(s): Dr. Funes  Airway Device: Endotracheal Tube  Mask Ventilation: Easy  Airway Device Size: 7.5  St...   Secured at 24 cm   Measured At Lips   Secured Location Right   Secured by Commercial tube camargo   Bite Block right   Status Intact;Secured;Patent   Airway Safety   Is Ambu Bag and Mask with Patient? Yes, Adult Ambu Bag and Mask   Suction set is at the bedside? Yes   Respiratory Interventions   Airway/Ventilation Management airway patency maintained   Vent Select   Conventional Vent Y    Charged w/in last 24h YES   Preset Conventional Ventilator Settings   Vent ID [id number]   Vent Type    Ventilation Type VC   Vent Mode A/C   Humidity HME   Set Rate 10 BPM   Vt Set 400 mL   PEEP/CPAP 10 cmH20   Peak Flow 50 L/min   Trigger Sensitivity Flow/I-Trigger 3 L/min   Patient Ventilator Parameters   Resp Rate Total 15 br/min   Peak Airway Pressure 23 cmH20   Mean Airway Pressure 13 cmH20   Exhaled Vt 428 mL   Total Ve 5.59 L/m   Conventional Ventilator Alarms   Alarms On Y   Ready to Wean/Extubation Screen   FIO2<=50 (chart decimal) (!) 0.55   MV<16L (chart vol.) 5.59   PEEP <=8 (chart #) (!) 10   Ready to Wean Parameters   F/VT Ratio<105 (RSBI) (!) 49.07   Education   $ Education Bronchodilator;Suction;Ventilator Oxygen;30 min

## 2024-08-30 NOTE — PROGRESS NOTES
Pulmonary/Critical Care  Progress Note      Patient name: Karo Leonard  MRN: [id number]  Date: 08/30/2024    Admit Date: 8/25/2024  Consult Requested By: Olga Lidia Pedroza DO    Reason for Consult: Respiratory failure, COPD    HPI:    8/25/2024 - 63 yo ASCVD, DM, HTN. COPD(cigarette use) had increased SOB at home and a fall.  EMS was called and brought to ER.  Initially tried on BiPAP without response and was subsequently intubated and placed on ventilation.  Initial ABG showed over ventilation and we have adjusted and ABG are getting better.  She is sedated and not able to provide any history.  D/W  who says that she was supposed to see a pulmonologist but couldn't make appointment, she has been a chronic smoker.  She had increase SOB for a few days.  She did have a fall at home but he reports that she was not down for long.  He does report that she has been having recurring falls at home.      8/26/2024 - Stable overnight, O2 needs still too high to do SBT.  She is responsive and gets agitated on current sedation and we are adjusting.  No other new issues reported.  Hgb has decreased (no active bleeding reported), NA remains low, CPK is better, TFTF noted and c/w hypothyroid (synthroid started).    8/27/2024 - Remains critically ill, O2 needs down a little but still too high.  Difficult to sedate is either agitated or apneic.  Tried SBT this AM and was apneic.  VS reviewed.  She is 3.8 liters +.  Tube feeds have been started.  NA remains low, glucose is elevated.  CXR looks about the same    8/28/2024 - Stable overnight, O2 needs still up.  She does get agitated at times and we have adjusted meds.  Trouble with tube feeds and will hold today and restart tomorrow.  Not ready to wean because on increased FiO2 and will try to increase PEP to see if that helps.  She does not have a lot of secretions.  She is over ventilaed some and we adjusted vent.  CXR is about the same    8/29/2024 - Stable overnight,, O2  needs still up some (I decreased FiO2 and increased PEEP some) and she is overventilated (we adjusted rate).  [facility]  She is 4.6 liters +.  CXR is about the same.    - continues on vent, settings adjusted for alkalosis- now on PS 12/5. CXR looks wet and pt with severe edema- fluids discontinued and starting lasix. Propofol has been weaned a little. Pt is awake and denies pain. Her blood pressure was up to 200 systolic at the time of my evaluation- RN states she just gave am blood pressure med and also Lasix.     Review of Systems    Review of Systems   Unable to perform ROS: Intubated       Past Medical History    Past Medical History:   Diagnosis Date    Coronary artery disease     cardiac stent    DDD (degenerative disc disease), lumbar 2000    Diabetes mellitus     Hypertension     Thyroid disease        Past Surgical History    Past Surgical History:   Procedure Laterality Date     SECTION  08/1987    x2 1993    CORONARY STENT PLACEMENT      EXPLORATORY LAPAROTOMY      [place]     HEMORRHOID SURGERY      LAPAROSCOPIC CHOLECYSTECTOMY N/A 2022    Procedure: CHOLECYSTECTOMY, LAPAROSCOPIC;  Surgeon: Leonardo Wilson III, MD;  Location: [facility];  Service: General;  Laterality: N/A;    LUMBAR DISC SURGERY      PILONIDAL CYST DRAINAGE      TONSILLECTOMY      as a child (also adenoids)       Medications (scheduled):      albuterol-ipratropium  3 mL Nebulization Q6H    cyanocobalamin  1,000 mcg Oral Daily    enoxparin  40 mg Subcutaneous Q12H    famotidine (PF)  20 mg Intravenous Q12H    ferric gluconate (FERRLECIT) 125 mg in 0.9% NaCl 100 mL IVPB  125 mg Intravenous Daily    furosemide (LASIX) injection  40 mg Intravenous Q12H    insulin glargine U-100  25 Units Subcutaneous BID    levoFLOXacin  750 mg Intravenous Q24H    levothyroxine  50 mcg Oral Before breakfast    losartan  100 mg Oral Daily     methylPREDNISolone injection (PEDS and ADULTS)  40 mg Intravenous Q8H       Medications (infusions):      0.9% NaCl   Intravenous Continuous   Paused at 08/30/24 0720    fentanyl  0-250 mcg/hr Intravenous Continuous   Stopped at 08/26/24 0721    NORepinephrine bitartrate-D5W  0-3 mcg/kg/min Intravenous Continuous 23.8 mL/hr at 08/25/24 0452 0.05 mcg/kg/min at 08/25/24 0452    propofoL  0-50 mcg/kg/min Intravenous Continuous 19.1 mL/hr at 08/30/24 0750 25 mcg/kg/min at 08/30/24 0750       Medications (prn):       Current Facility-Administered Medications:     calcium gluconate IVPB, 1 g, Intravenous, PRN    calcium gluconate IVPB, 2 g, Intravenous, PRN    calcium gluconate IVPB, 3 g, Intravenous, PRN    dextrose 50%, 12.5 g, Intravenous, PRN    glucagon (human recombinant), 1 mg, Intramuscular, PRN    hydrALAZINE, 10 mg, Intravenous, Q6H PRN    insulin aspart U-100, 0-10 Units, Subcutaneous, Q6H PRN    magnesium sulfate IVPB, 2 g, Intravenous, PRN    magnesium sulfate IVPB, 4 g, Intravenous, PRN    morphine, 2 mg, Intravenous, Q1H PRN    potassium bicarbonate, 35 mEq, Oral, PRN    potassium bicarbonate, 50 mEq, Oral, PRN    potassium bicarbonate, 60 mEq, Oral, PRN    potassium chloride in water, 40 mEq, Intravenous, PRN **AND** potassium chloride in water, 60 mEq, Intravenous, PRN **AND** potassium chloride in water, 80 mEq, Intravenous, PRN    sodium chloride 0.9%, 10 mL, Intravenous, PRN    sodium phosphate 15 mmol in D5W 250 mL IVPB, 15 mmol, Intravenous, PRN    sodium phosphate 20.01 mmol in D5W 250 mL IVPB, 20.01 mmol, Intravenous, PRN    sodium phosphate 30 mmol in D5W 250 mL IVPB, 30 mmol, Intravenous, PRN    Family History: No family history on file.    Social History: Tobacco:   Social History     Tobacco Use   Smoking Status Not on file   Smokeless Tobacco Not on file                                EtOH:   Social History     Substance and Sexual Activity   Alcohol Use No                                 "Drugs:   Social History     Substance and Sexual Activity   Drug Use No       Physical Exam    Vital signs:  Temp:  [97.3 °F (36.3 °C)-98.3 °F (36.8 °C)]   Pulse:  [56-80]   Resp:  [9-24]   BP: (127-171)/(60-79)   SpO2:  [90 %-100 %]   Arterial Line BP: (132-171)/(58-79)     Intake/Output:   Intake/Output Summary (Last 24 hours) at 8/30/2024 0915  Last data filed at 8/30/2024 0725  Gross per 24 hour   Intake 2498.47 ml   Output 1277 ml   Net 1221.47 ml        BMI: Estimated body mass index is 45.16 kg/m² as calculated from the following:    Height as of this encounter: 5' 5" (1.651 m).    Weight as of this encounter: 123.1 kg (271 lb 6.2 oz).    Physical Exam  Vitals and nursing note reviewed.   Constitutional:       General: She is not in acute distress.     Appearance: She is ill-appearing. She is not toxic-appearing or diaphoretic.      Comments: Intubated  Sedated    HENT:      Head: Normocephalic and atraumatic.      Comments: Some bruising right forehead and eye from her fall     Right Ear: External ear normal.      Left Ear: External ear normal.      Nose: Nose normal. No congestion or rhinorrhea.      Mouth/Throat:      Mouth: Mucous membranes are moist.      Pharynx: Oropharynx is clear. No oropharyngeal exudate or posterior oropharyngeal erythema.      Comments: + ETT  Eyes:      General: No scleral icterus.        Right eye: No discharge.         Left eye: No discharge.      Extraocular Movements: Extraocular movements intact.      Conjunctiva/sclera: Conjunctivae normal.      Pupils: Pupils are equal, round, and reactive to light.   Neck:      Vascular: No carotid bruit.   Cardiovascular:      Rate and Rhythm: Regular rhythm. Tachycardia present.      Pulses: Normal pulses.      Heart sounds: No murmur heard.     No friction rub. No gallop.   Pulmonary:      Effort: Pulmonary effort is normal. No respiratory distress.      Breath sounds: No stridor. Wheezing present. No rhonchi or rales.   Chest:      " "Chest wall: No tenderness.   Abdominal:      General: There is no distension.      Tenderness: There is no abdominal tenderness. There is no guarding.      Comments: Hypoactive BS   Musculoskeletal:         General: No swelling. Normal range of motion.      Cervical back: Normal range of motion and neck supple. No rigidity or tenderness.      Right lower leg: Edema present.      Left lower leg: Edema present.   Lymphadenopathy:      Cervical: No cervical adenopathy.   Skin:     General: Skin is warm and dry.      Capillary Refill: Capillary refill takes less than 2 seconds.      Coloration: Skin is not jaundiced.      Findings: No bruising.      Comments: + bruise LLE   Neurological:      General: No focal deficit present.      Cranial Nerves: No cranial nerve deficit.      Sensory: No sensory deficit.      Motor: No weakness.   Psychiatric:      Comments: Not able to assess         Laboratory    Recent Labs   Lab 08/30/24  0358 08/30/24  0452 08/30/24  0601   WBC 9.65  --   --    RBC 2.81*  --   --    HGB 8.8*  --   --    HCT 27.5*   < > 30*     --   --    MCV 98  --   --    MCH 31.3*  --   --    MCHC 32.0  --   --     < > = values in this interval not displayed.       Recent Labs   Lab 08/30/24  0358   CALCIUM 7.5*   ALBUMIN 2.6*   PROT 4.8*   *   K 3.9   CO2 43*   CL 90*   BUN 7*   CREATININE 0.5   ALKPHOS 58   ALT 6*   AST 15   BILITOT 0.4       No results for input(s): "PT", "INR", "APTT" in the last 24 hours.    No results for input(s): "CPK", "CPKMB", "TROPONINI", "MB" in the last 24 hours.      Additional labs: reviewed    Microbiology:       Microbiology Results (last 7 days)       Procedure Component Value Units Date/Time    Culture, Respiratory with Gram Stain [[id number]] Collected: 08/25/24 0556    Order Status: Completed Specimen: Sputum from Tracheal Aspirate Updated: 08/27/24 0728     Respiratory Culture Normal respiratory denise     Comment: Previous comment was modified by ALEKSANDRA at 07:27 " on 08/27/2024 No   significant isolate          Gram Stain (Respiratory) <10 epithelial cells per low power field.     Gram Stain (Respiratory) Few WBC's     Gram Stain (Respiratory) Few Gram positive cocci    Respiratory Infection Panel (PCR), Nasopharyngeal [[id number]] Collected: 08/26/24 0930    Order Status: Completed Specimen: Nasopharyngeal Swab Updated: 08/26/24 1141     Respiratory Infection Panel Source NP swab     Adenovirus Not Detected     Coronavirus 229E, Common Cold Virus Not Detected     Coronavirus HKU1, Common Cold Virus Not Detected     Coronavirus NL63, Common Cold Virus Not Detected     Coronavirus OC43, Common Cold Virus Not Detected     Comment: Coronavirus strains 229E, HKU1, NL63, and OC43 can cause the common   cold   and are not associated with the respiratory disease outbreak caused   by  the COVID-19 (SARS-CoV-2 novel Coronavirus) strain.           SARS-CoV2 (COVID-19) Qualitative PCR Not Detected     Human Metapneumovirus Not Detected     Human Rhinovirus/Enterovirus Not Detected     Influenza A (subtypes H1, H1-2009,H3) Not Detected     Influenza B Not Detected     Parainfluenza Virus 1 Not Detected     Parainfluenza Virus 2 Not Detected     Parainfluenza Virus 3 Not Detected     Parainfluenza Virus 4 Not Detected     Respiratory Syncytial Virus Not Detected     Bordetella Parapertussis (MR4833) Not Detected     Bordetella pertussis (ptxP) Not Detected     Chlamydia pneumoniae Not Detected     Mycoplasma pneumoniae Not Detected     Comment: Respiratory Infection Panel testing performed by Multiplex PCR.       Narrative:      Respiratory Infection Panel source->NP Swab    MRSA Screen by PCR [[id number]] Collected: 08/26/24 0931    Order Status: Completed Specimen: Nasal Swab Updated: 08/26/24 1127     MRSA SCREEN BY PCR Negative    MRSA Screen by PCR [[id number]] Collected: 08/26/24 0927    Order Status: Canceled Specimen: Nasal Swab     Culture, Respiratory with Gram Stain  [[id number]]     Order Status: No result Specimen: Respiratory             Radiology    X-Ray Chest AP Portable  Narrative: CLINICAL HISTORY:  ([id number] y/o  (1960) F    Intubated;    TECHNIQUE:  (A#[id number], exam time 8/30/2024 5:13)    XR CHEST AP PORTABLE [id number]    COMPARISON:  Radiograph from 08/29/2024.    FINDINGS:  The lungs show perihilar pulmonary vascular prominence with mildly increased central hilar interstitial lung markings bilaterally.  There is blunting of both costophrenic angles consistent with a small left and trace right pleural effusions and adjacent atelectasis. no pneumothorax is identified. Moderate to severe cardiopericardial silhouette enlargement. Atheromatous calcifications are seen at the aortic arch. Osseous structures appear unchanged. The visualized upper abdomen is unchanged.    Lines and tubes: An endotracheal tube is faintly visualized, with tip projecting 2-3 cm from the laura.  There is an enteric tube with tip below the field of view (subdiaphragmatic).  Impression: Unchanged radiograph of the chest when accounting for differences in imaging technique.    Electronically signed by: Aleks Nascimento  Date:    08/30/2024  Time:    08:15        Additional Studies    reviewed    Ventilator Information    Vent Mode: Spont  Oxygen Concentration (%):  [55] 55  Resp Rate Total:  [8.4 br/min-22 br/min] 9.6 br/min  Vt Set:  [350 mL-400 mL] 350 mL  PEEP/CPAP:  [10 cmH20] 10 cmH20  Pressure Support:  [15 cmH20] 15 cmH20  Mean Airway Pressure:  [12 feP58-24 cmH20] 13 cmH20             Recent Labs     08/30/24  0804   PH 7.480*   PCO2 61.1*   PO2 56*   HCO3 45.5*   POCSATURATED 90   BE 22*         Impression    Active Hospital Problems    Diagnosis  POA    *Acute hypoxic on chronic hypercapnic respiratory failure [J96.01, J96.12]  Yes    Goals of care, counseling/discussion [Z71.89]  Not Applicable    Type 2 diabetes mellitus [E11.9]  Yes    Anemia [D64.9]  Yes    Acute metabolic  encephalopathy [G93.41]  Yes    Fall [W19.XXXA]  Unknown    COPD exacerbation [J44.1]  Yes    Pneumonia [J18.9]  Yes    Hypothyroid [E03.9]  Yes    Hypertension [I10]  Yes      Resolved Hospital Problems   No resolved problems to display.       Plan    Ventilator and adjust as needed, baseline paCO2 should be about 90-- continue PSV, adjusted for alkalosis  Respiratory treatments, steroids continue  Not ready for extubation given fluid overload  Levaquin day 7- discontinue after today's dose  Discontinue IVF and start lasix 40mg bid  Antihypertensives continue  Sedation as needed, being adjusted  Watch H/H - no active bleeding reported  Replace electrolytes  Synthroid and follow, recheck TSH in a few days  Trial of low rate pulmocare  Watch BS and treat as needed      The following were evaluated and adjusted as needed: mechanical ventilator settings and weaning status, intravenous fluids and nutritional status, sedation and neurologic status, antibiotics, acid base balance and oxygenation needs, and input and output and renal status       Critical Care  - THE PATIENT HAS A HIGH PROBABILITY OF IMMINENT OR LIFE THREATENING DETERIORATION.  Over 50%time of encounter was in direct care at bedside.  Time was 30 to 74 minutes required for patient care.  Time needed for all of the above totaled 40 minutes.    Adri Stephenson MD  Pulmonary & Critical Care Medicine

## 2024-08-30 NOTE — NURSING
Nurses Note -- 4 Eyes      8/30/2024   5:06 PM      Skin assessed during: Daily Assessment      [x] No Altered Skin Integrity Present    []Prevention Measures Documented      [] Yes- Altered Skin Integrity Present or Discovered   [] LDA Added if Not in Epic (Describe Wound)   [] New Altered Skin Integrity was Present on Admit and Documented in LDA   [] Wound Image Taken    Wound Care Consulted? No    Attending Nurse:  Chey Forte RN/Staff Member:  Marlena

## 2024-08-30 NOTE — PROGRESS NOTES
[facility]  Progress Note    Patient Name: Karo Leonard  MRN: [id number]  Patient Class: IP- Inpatient   Admission Date: 8/25/2024  Length of Stay: 5 days  Attending Physician: Olga Lidia Pedroza DO  Primary Care Provider: Navneet Hernandez FNP        Subjective:     Principal Problem:Acute hypoxic on chronic hypercapnic respiratory failure        HPI:  64F p/w fall in the context of shortness of breath. EMS reportedly gave narcan w/o appreciable response, then she was given duoneb en route to [facility] ED. Upon arrival, she was tachypneic, had respy sounding phonation, but she denied chest pain, fever, or chills. She was placed on BiPAP, and initially demonstrated improvement. However, she became less responsive and appeared to tire out. She was given additional narcan w/o appreciable response, so she was intubated. She had some hypotension after receiving sedation, so she was placed on low dose levophed. [place] was placed, lasix was given, as was Abx. Large bruise noted on LLE, so x-ray ordered.     Overview/Hospital Course:  Patient came in with acute on chronic hypoxemic hypercapnic respiratory failure.  Patient was intubated after failing BiPAP.  Continue with IV antibiotics for COPD exacerbation and possible pneumonia.  MRSA was negative so vanco was discontinued on 8/27.  Patient was started on iron and B12 supplement for anemia. Patient is still requiring ventilation support. Discussed the goal of care with patient's  on 8/28. Patient was fluid overloaded and started on diuretic on 8/30.        Interval History: Patient is intubated. She is able to communicate via writing.    Review of Systems   All other systems reviewed and are negative.    Objective:     Vital Signs (Most Recent):  Temp: 97.9 °F (36.6 °C) (08/30/24 0715)  Pulse: 77 (08/30/24 1153)  Resp: 12 (08/30/24 1153)  BP: (!) 159/67 (08/30/24 0930)  SpO2: 97 % (08/30/24 1153) Vital Signs (24h Range):  Temp:  [97.5  °F (36.4 °C)-98.3 °F (36.8 °C)] 97.9 °F (36.6 °C)  Pulse:  [54-80] 77  Resp:  [9-21] 12  SpO2:  [90 %-100 %] 97 %  BP: (127-171)/(60-79) 159/67  Arterial Line BP: (144-171)/(60-79) 171/79     Weight: 123.1 kg (271 lb 6.2 oz)  Body mass index is 45.16 kg/m².    Intake/Output Summary (Last 24 hours) at 8/30/2024 1329  Last data filed at 8/30/2024 0725  Gross per 24 hour   Intake 2070.07 ml   Output 1032 ml   Net 1038.07 ml         Physical Exam  Constitutional:       Appearance: She is obese. She is ill-appearing.   Cardiovascular:      Rate and Rhythm: Bradycardia present.   Pulmonary:      Effort: Pulmonary effort is normal.   Abdominal:      General: Abdomen is flat.   Musculoskeletal:         General: Swelling present.             Significant Labs: All pertinent labs within the past 24 hours have been reviewed.    Significant Imaging: I have reviewed all pertinent imaging results/findings within the past 24 hours.    Assessment/Plan:      * Acute hypoxic on chronic hypercapnic respiratory failure  Likely multifactorial  Patient is currently intubated and unable to extubate since patient is still requiring ventilator  Patient was fluid overloaded and started on diuretic on 8/30.   Follow up Echo   Pulmonology is following patient    COPD exacerbation  Continue with steroids and Levofloxacin     Pneumonia  MRSA neg, procal neg, respiratory panel neg  MRSA was negative so vanco was discontinued on 8/27.  Continue with Levofloxacin    Goals of care, counseling/discussion  Discussed the goal of care with patient's  on 8/28.  Will redisucss again based on patient's prognosis and palliative consult as needed  in future         Anemia  Likely from Iron and b12 deficiency  Started on Iron and B12 supplement on 8/27    Type 2 diabetes mellitus  Continue with SSI and glargine  Make adjustment as needed    Fall  Imaging ruled out acute fracture    Acute metabolic encephalopathy  CT head: no acute changes  Continue to  "monitor     Hypertension  Started Losartan on 8/28  Can make adjustment as needed  Holding home diltiazem due to bradycardia    Hypothyroid  Continue home levothyroxine        VTE Risk Mitigation (From admission, onward)           Ordered     enoxaparin injection 40 mg  Every 12 hours        Note to Pharmacy: Ht: 5' 5" (1.651 m)  Wt: 127 kg (280 lb)  Estimated Creatinine Clearance: 92.8 mL/min (based on SCr of 0.8 mg/dL).  Body mass index is 46.59 kg/m².    08/25/24 0627     IP VTE HIGH RISK PATIENT  Once         08/25/24 0627     Place sequential compression device  Until discontinued         08/25/24 0627                    Discharge Planning   CHELE:      Code Status: Full Code   Is the patient medically ready for discharge?:     Reason for patient still in hospital (select all that apply): Patient trending condition  Discharge Plan A: Home Health            Critical care time spent on the evaluation and treatment of severe organ dysfunction, review of pertinent labs and imaging studies, discussions with consulting providers and discussions with patient/family: 32 minutes.      Olga Lidia Pedroza DO  Department of Hospital Medicine   Critical access hospital    "

## 2024-08-30 NOTE — RESPIRATORY THERAPY
08/30/24 0915   PRE-TX-O2   Oxygen Concentration (%) 60   SpO2 97 %   Pulse (!) 54   Resp 10   Vent Select   Charged w/in last 24h YES   Preset Conventional Ventilator Settings   Ventilation Type VC   Vent Mode Spont   PEEP/CPAP 10 cmH20   Pressure Support 12 cmH20   Peak End Inspiratory Pressure 22 cmH20   Insp Rise Time  70 %   I-Trigger Type  V-TRIG   Trigger Sensitivity Flow/I-Trigger 3 L/min   Patient Ventilator Parameters   Resp Rate Total 9.3 br/min   Peak Airway Pressure 22 cmH20   Mean Airway Pressure 12 cmH20   Plateau Pressure 21 cmH20   Exhaled Vt 555 mL   Total Ve 5.2 L/m   Spont Ve 5.2 L   I:E Ratio Measured 1:4.5   Auto PEEP 0 cmH20   Inspired Tidal Volume (VTI) 1 mL   Conventional Ventilator Alarms   Ve High Alarm 20 L/min   Ve Low Alarm 3 L/min   Resp Rate High Alarm 40 br/min   Apnea Rate 12   Apnea Volume (mL) 0 mL   Apnea Oxygen Concentration  100   Apnea Flow Rate (L/min) 65   T Apnea 20 sec(s)   Ready to Wean/Extubation Screen   FIO2<=50 (chart decimal) (!) 0.6   MV<16L (chart vol.) 5.2   PEEP <=8 (chart #) (!) 10   Labs   $ Was an ABG obtained? Arterial Blood Draw from Existing Line;POCT - Blood gas   $ Labs Tech Time 15 min   Critical Value Communication   Date Result Received 08/30/24   Time Result Received 0915   Resulting Department of Critical Value Respiratory   Who communicated critical value from resulting department? Esthela Willett, RRT   Critical Test #1 PH   Critical Test #2 PCO2   Critical Test #3  HCO3   Name of Notified Physician/Designee Dr. Solis   Date Notified 08/30/24   Time Notified 0918   Read Back Verification Yes

## 2024-08-30 NOTE — ASSESSMENT & PLAN NOTE
Likely multifactorial  Patient is currently intubated and unable to extubate since patient is still requiring ventilator  Patient was fluid overloaded and started on diuretic on 8/30.   Follow up Echo   Pulmonology is following patient

## 2024-08-31 LAB
ALBUMIN SERPL BCP-MCNC: 2.8 G/DL (ref 3.5–5.2)
ALLENS TEST: ABNORMAL
ALLENS TEST: ABNORMAL
ALP SERPL-CCNC: 61 U/L (ref 55–135)
ALT SERPL W/O P-5'-P-CCNC: 8 U/L (ref 10–44)
ANION GAP SERPL CALC-SCNC: 4 MMOL/L (ref 8–16)
ANION GAP SERPL CALC-SCNC: 9 MMOL/L (ref 8–16)
AST SERPL-CCNC: 15 U/L (ref 10–40)
BASOPHILS # BLD AUTO: 0.01 K/UL (ref 0–0.2)
BASOPHILS NFR BLD: 0.1 % (ref 0–1.9)
BILIRUB SERPL-MCNC: 0.4 MG/DL (ref 0.1–1)
BUN SERPL-MCNC: 9 MG/DL (ref 8–23)
BUN SERPL-MCNC: 9 MG/DL (ref 8–23)
CALCIUM SERPL-MCNC: 7.6 MG/DL (ref 8.7–10.5)
CALCIUM SERPL-MCNC: 8.1 MG/DL (ref 8.7–10.5)
CHLORIDE SERPL-SCNC: 90 MMOL/L (ref 95–110)
CHLORIDE SERPL-SCNC: 95 MMOL/L (ref 95–110)
CO2 SERPL-SCNC: 33 MMOL/L (ref 23–29)
CO2 SERPL-SCNC: 43 MMOL/L (ref 23–29)
CREAT SERPL-MCNC: 0.7 MG/DL (ref 0.5–1.4)
CREAT SERPL-MCNC: 0.7 MG/DL (ref 0.5–1.4)
DELSYS: ABNORMAL
DELSYS: ABNORMAL
DIFFERENTIAL METHOD BLD: ABNORMAL
EOSINOPHIL # BLD AUTO: 0 K/UL (ref 0–0.5)
EOSINOPHIL NFR BLD: 0.3 % (ref 0–8)
ERYTHROCYTE [DISTWIDTH] IN BLOOD BY AUTOMATED COUNT: 17.3 % (ref 11.5–14.5)
EST. GFR  (NO RACE VARIABLE): >60 ML/MIN/1.73 M^2
EST. GFR  (NO RACE VARIABLE): >60 ML/MIN/1.73 M^2
FIO2: 60
FIO2: 60
GLUCOSE SERPL-MCNC: 180 MG/DL (ref 70–110)
GLUCOSE SERPL-MCNC: 180 MG/DL (ref 70–110)
GLUCOSE SERPL-MCNC: 184 MG/DL (ref 70–110)
GLUCOSE SERPL-MCNC: 186 MG/DL (ref 70–110)
GLUCOSE SERPL-MCNC: 187 MG/DL (ref 70–110)
GLUCOSE SERPL-MCNC: 189 MG/DL (ref 70–110)
GLUCOSE SERPL-MCNC: 255 MG/DL (ref 70–110)
GLUCOSE SERPL-MCNC: 323 MG/DL (ref 70–110)
HCO3 UR-SCNC: 36.6 MMOL/L (ref 24–28)
HCO3 UR-SCNC: 43.3 MMOL/L (ref 24–28)
HCT VFR BLD AUTO: 29.4 % (ref 37–48.5)
HCT VFR BLD CALC: 31 %PCV (ref 36–54)
HGB BLD-MCNC: 8.8 G/DL (ref 12–16)
IMM GRANULOCYTES # BLD AUTO: 0.2 K/UL (ref 0–0.04)
IMM GRANULOCYTES NFR BLD AUTO: 2.1 % (ref 0–0.5)
LYMPHOCYTES # BLD AUTO: 0.7 K/UL (ref 1–4.8)
LYMPHOCYTES NFR BLD: 7.3 % (ref 18–48)
MAGNESIUM SERPL-MCNC: 2.1 MG/DL (ref 1.6–2.6)
MAP: 14
MCH RBC QN AUTO: 29.2 PG (ref 27–31)
MCHC RBC AUTO-ENTMCNC: 29.9 G/DL (ref 32–36)
MCV RBC AUTO: 98 FL (ref 82–98)
MIN VOL: 8
MIN VOL: 8.46
MODE: ABNORMAL
MODE: ABNORMAL
MONOCYTES # BLD AUTO: 0.6 K/UL (ref 0.3–1)
MONOCYTES NFR BLD: 5.7 % (ref 4–15)
NEUTROPHILS # BLD AUTO: 8.1 K/UL (ref 1.8–7.7)
NEUTROPHILS NFR BLD: 84.5 % (ref 38–73)
NRBC BLD-RTO: 0 /100 WBC
OHS QRS DURATION: 94 MS
OHS QTC CALCULATION: 490 MS
PCO2 BLDA: 43.5 MMHG (ref 35–45)
PCO2 BLDA: 47.6 MMHG (ref 35–45)
PEEP: 10
PEEP: 5
PH SMN: 7.53 [PH] (ref 7.35–7.45)
PH SMN: 7.57 [PH] (ref 7.35–7.45)
PLATELET # BLD AUTO: 309 K/UL (ref 150–450)
PMV BLD AUTO: 9.6 FL (ref 9.2–12.9)
PO2 BLDA: 65 MMHG (ref 80–100)
PO2 BLDA: 69 MMHG (ref 80–100)
POC BE: 14 MMOL/L
POC BE: 21 MMOL/L
POC IONIZED CALCIUM: 1.01 MMOL/L (ref 1.06–1.42)
POC SATURATED O2: 94 % (ref 95–100)
POC SATURATED O2: 96 % (ref 95–100)
POC TCO2: 38 MMOL/L (ref 23–27)
POC TCO2: 45 MMOL/L (ref 23–27)
POTASSIUM BLD-SCNC: 3.6 MMOL/L (ref 3.5–5.1)
POTASSIUM SERPL-SCNC: 3.5 MMOL/L (ref 3.5–5.1)
POTASSIUM SERPL-SCNC: 3.6 MMOL/L (ref 3.5–5.1)
PROT SERPL-MCNC: 5.4 G/DL (ref 6–8.4)
PS: 12
PS: 12
RBC # BLD AUTO: 3.01 M/UL (ref 4–5.4)
SAMPLE: ABNORMAL
SAMPLE: ABNORMAL
SITE: ABNORMAL
SITE: ABNORMAL
SODIUM BLD-SCNC: 134 MMOL/L (ref 136–145)
SODIUM SERPL-SCNC: 137 MMOL/L (ref 136–145)
SODIUM SERPL-SCNC: 137 MMOL/L (ref 136–145)
SP02: 97
SPONT RATE: 12
SPONT RATE: 15
VOL: 781
WBC # BLD AUTO: 9.63 K/UL (ref 3.9–12.7)

## 2024-08-31 PROCEDURE — 99900017 HC EXTUBATION W/PARAMETERS (STAT)

## 2024-08-31 PROCEDURE — 82330 ASSAY OF CALCIUM: CPT

## 2024-08-31 PROCEDURE — 63600175 PHARM REV CODE 636 W HCPCS: Performed by: HOSPITALIST

## 2024-08-31 PROCEDURE — 85014 HEMATOCRIT: CPT

## 2024-08-31 PROCEDURE — 63600175 PHARM REV CODE 636 W HCPCS: Performed by: INTERNAL MEDICINE

## 2024-08-31 PROCEDURE — 27100171 HC OXYGEN HIGH FLOW UP TO 24 HOURS

## 2024-08-31 PROCEDURE — 85025 COMPLETE CBC W/AUTO DIFF WBC: CPT | Performed by: FAMILY MEDICINE

## 2024-08-31 PROCEDURE — 25000003 PHARM REV CODE 250: Performed by: INTERNAL MEDICINE

## 2024-08-31 PROCEDURE — 25000242 PHARM REV CODE 250 ALT 637 W/ HCPCS: Performed by: HOSPITALIST

## 2024-08-31 PROCEDURE — 94150 VITAL CAPACITY TEST: CPT

## 2024-08-31 PROCEDURE — 83735 ASSAY OF MAGNESIUM: CPT | Performed by: INTERNAL MEDICINE

## 2024-08-31 PROCEDURE — 94660 CPAP INITIATION&MGMT: CPT | Mod: XB

## 2024-08-31 PROCEDURE — 84439 ASSAY OF FREE THYROXINE: CPT | Performed by: INTERNAL MEDICINE

## 2024-08-31 PROCEDURE — 84295 ASSAY OF SERUM SODIUM: CPT

## 2024-08-31 PROCEDURE — 63600175 PHARM REV CODE 636 W HCPCS: Performed by: FAMILY MEDICINE

## 2024-08-31 PROCEDURE — 25000003 PHARM REV CODE 250: Performed by: HOSPITALIST

## 2024-08-31 PROCEDURE — 84443 ASSAY THYROID STIM HORMONE: CPT | Performed by: INTERNAL MEDICINE

## 2024-08-31 PROCEDURE — 94640 AIRWAY INHALATION TREATMENT: CPT

## 2024-08-31 PROCEDURE — 37799 UNLISTED PX VASCULAR SURGERY: CPT

## 2024-08-31 PROCEDURE — 94003 VENT MGMT INPAT SUBQ DAY: CPT

## 2024-08-31 PROCEDURE — 80048 BASIC METABOLIC PNL TOTAL CA: CPT | Performed by: INTERNAL MEDICINE

## 2024-08-31 PROCEDURE — 99900031 HC PATIENT EDUCATION (STAT)

## 2024-08-31 PROCEDURE — 84132 ASSAY OF SERUM POTASSIUM: CPT

## 2024-08-31 PROCEDURE — 99900035 HC TECH TIME PER 15 MIN (STAT)

## 2024-08-31 PROCEDURE — 93005 ELECTROCARDIOGRAM TRACING: CPT | Performed by: INTERNAL MEDICINE

## 2024-08-31 PROCEDURE — 99900026 HC AIRWAY MAINTENANCE (STAT)

## 2024-08-31 PROCEDURE — 99291 CRITICAL CARE FIRST HOUR: CPT | Mod: ,,, | Performed by: INTERNAL MEDICINE

## 2024-08-31 PROCEDURE — 20000000 HC ICU ROOM

## 2024-08-31 PROCEDURE — 93010 ELECTROCARDIOGRAM REPORT: CPT | Mod: ,,, | Performed by: INTERNAL MEDICINE

## 2024-08-31 PROCEDURE — 25000003 PHARM REV CODE 250: Performed by: FAMILY MEDICINE

## 2024-08-31 PROCEDURE — 94761 N-INVAS EAR/PLS OXIMETRY MLT: CPT | Mod: XB

## 2024-08-31 PROCEDURE — 94010 BREATHING CAPACITY TEST: CPT

## 2024-08-31 PROCEDURE — 80053 COMPREHEN METABOLIC PANEL: CPT | Performed by: FAMILY MEDICINE

## 2024-08-31 PROCEDURE — 82803 BLOOD GASES ANY COMBINATION: CPT

## 2024-08-31 RX ORDER — AMOXICILLIN 250 MG
1 CAPSULE ORAL 2 TIMES DAILY
Status: DISCONTINUED | OUTPATIENT
Start: 2024-08-31 | End: 2024-09-17 | Stop reason: HOSPADM

## 2024-08-31 RX ORDER — METOPROLOL TARTRATE 1 MG/ML
5 INJECTION, SOLUTION INTRAVENOUS ONCE
Status: COMPLETED | OUTPATIENT
Start: 2024-08-31 | End: 2024-08-31

## 2024-08-31 RX ORDER — DIAZEPAM 10 MG/2ML
5 INJECTION INTRAMUSCULAR ONCE
Status: COMPLETED | OUTPATIENT
Start: 2024-08-31 | End: 2024-08-31

## 2024-08-31 RX ORDER — AMLODIPINE BESYLATE 5 MG/1
10 TABLET ORAL DAILY
Status: DISCONTINUED | OUTPATIENT
Start: 2024-08-31 | End: 2024-09-01

## 2024-08-31 RX ORDER — INSULIN GLARGINE 100 [IU]/ML
35 INJECTION, SOLUTION SUBCUTANEOUS 2 TIMES DAILY
Status: DISCONTINUED | OUTPATIENT
Start: 2024-08-31 | End: 2024-09-01

## 2024-08-31 RX ADMIN — SODIUM CHLORIDE 125 MG: 9 INJECTION, SOLUTION INTRAVENOUS at 11:08

## 2024-08-31 RX ADMIN — SENNOSIDES AND DOCUSATE SODIUM 1 TABLET: 8.6; 5 TABLET ORAL at 09:08

## 2024-08-31 RX ADMIN — CALCIUM GLUCONATE 2 G: 20 INJECTION, SOLUTION INTRAVENOUS at 04:08

## 2024-08-31 RX ADMIN — ENOXAPARIN SODIUM 40 MG: 40 INJECTION SUBCUTANEOUS at 11:08

## 2024-08-31 RX ADMIN — PROMETHAZINE HYDROCHLORIDE 12.5 MG: 25 INJECTION INTRAMUSCULAR; INTRAVENOUS at 09:08

## 2024-08-31 RX ADMIN — LEVOFLOXACIN 750 MG: 5 INJECTION, SOLUTION INTRAVENOUS at 02:08

## 2024-08-31 RX ADMIN — SENNOSIDES AND DOCUSATE SODIUM 1 TABLET: 8.6; 5 TABLET ORAL at 02:08

## 2024-08-31 RX ADMIN — IPRATROPIUM BROMIDE AND ALBUTEROL SULFATE 3 ML: 2.5; .5 SOLUTION RESPIRATORY (INHALATION) at 07:08

## 2024-08-31 RX ADMIN — LEVOTHYROXINE SODIUM 50 MCG: 0.03 TABLET ORAL at 05:08

## 2024-08-31 RX ADMIN — IPRATROPIUM BROMIDE AND ALBUTEROL SULFATE 3 ML: 2.5; .5 SOLUTION RESPIRATORY (INHALATION) at 12:08

## 2024-08-31 RX ADMIN — ACETAZOLAMIDE SODIUM 250 MG: 500 INJECTION, POWDER, LYOPHILIZED, FOR SOLUTION INTRAVENOUS at 05:08

## 2024-08-31 RX ADMIN — FAMOTIDINE 20 MG: 10 INJECTION INTRAVENOUS at 11:08

## 2024-08-31 RX ADMIN — DIAZEPAM 5 MG: 5 INJECTION INTRAMUSCULAR; INTRAVENOUS at 09:08

## 2024-08-31 RX ADMIN — AMLODIPINE BESYLATE 10 MG: 5 TABLET ORAL at 11:08

## 2024-08-31 RX ADMIN — FAMOTIDINE 20 MG: 10 INJECTION INTRAVENOUS at 09:08

## 2024-08-31 RX ADMIN — METHYLPREDNISOLONE SODIUM SUCCINATE 40 MG: 40 INJECTION, POWDER, FOR SOLUTION INTRAMUSCULAR; INTRAVENOUS at 09:08

## 2024-08-31 RX ADMIN — IPRATROPIUM BROMIDE AND ALBUTEROL SULFATE 3 ML: 2.5; .5 SOLUTION RESPIRATORY (INHALATION) at 01:08

## 2024-08-31 RX ADMIN — INSULIN ASPART 1 UNITS: 100 INJECTION, SOLUTION INTRAVENOUS; SUBCUTANEOUS at 05:08

## 2024-08-31 RX ADMIN — POTASSIUM BICARBONATE 50 MEQ: 977.5 TABLET, EFFERVESCENT ORAL at 05:08

## 2024-08-31 RX ADMIN — CYANOCOBALAMIN TAB 1000 MCG 1000 MCG: 1000 TAB at 11:08

## 2024-08-31 RX ADMIN — PROPOFOL 10 MCG/KG/MIN: 10 INJECTION, EMULSION INTRAVENOUS at 02:08

## 2024-08-31 RX ADMIN — SODIUM CHLORIDE 500 ML: 9 INJECTION, SOLUTION INTRAVENOUS at 10:08

## 2024-08-31 RX ADMIN — HYDRALAZINE HYDROCHLORIDE 10 MG: 20 INJECTION INTRAMUSCULAR; INTRAVENOUS at 05:08

## 2024-08-31 RX ADMIN — METOPROLOL TARTRATE 5 MG: 1 INJECTION, SOLUTION INTRAVENOUS at 09:08

## 2024-08-31 RX ADMIN — INSULIN GLARGINE 35 UNITS: 100 INJECTION, SOLUTION SUBCUTANEOUS at 11:08

## 2024-08-31 RX ADMIN — INSULIN GLARGINE 35 UNITS: 100 INJECTION, SOLUTION SUBCUTANEOUS at 09:08

## 2024-08-31 RX ADMIN — LOSARTAN POTASSIUM 100 MG: 50 TABLET, FILM COATED ORAL at 11:08

## 2024-08-31 RX ADMIN — MORPHINE SULFATE 2 MG: 2 INJECTION, SOLUTION INTRAMUSCULAR; INTRAVENOUS at 04:08

## 2024-08-31 RX ADMIN — METHYLPREDNISOLONE SODIUM SUCCINATE 40 MG: 40 INJECTION, POWDER, FOR SOLUTION INTRAMUSCULAR; INTRAVENOUS at 02:08

## 2024-08-31 RX ADMIN — ENOXAPARIN SODIUM 40 MG: 40 INJECTION SUBCUTANEOUS at 09:08

## 2024-08-31 RX ADMIN — METHYLPREDNISOLONE SODIUM SUCCINATE 40 MG: 40 INJECTION, POWDER, FOR SOLUTION INTRAMUSCULAR; INTRAVENOUS at 05:08

## 2024-08-31 NOTE — RESPIRATORY THERAPY
08/31/24 1033   Labs   $ Was an ABG obtained? Arterial Blood Draw from Existing Line;POCT - Blood gas   $ Labs Tech Time 15 min   Critical Value Communication   Date Result Received 08/31/24   Time Result Received 1035   Resulting Department of Critical Value Respiratory   Who communicated critical value from resulting department? Esthela Willett, RRT   Critical Test #1 PH   Critical Test #2 PO2   Critical Test #3  HCO3     Pt is doing well. Critical result are due to her COPD.

## 2024-08-31 NOTE — PLAN OF CARE
Problem: Adult Inpatient Plan of Care  Goal: Plan of Care Review  Outcome: Progressing  Goal: Patient-Specific Goal (Individualized)  Outcome: Progressing  Goal: Absence of Hospital-Acquired Illness or Injury  Outcome: Progressing  Goal: Optimal Comfort and Wellbeing  Outcome: Progressing  Goal: Readiness for Transition of Care  Outcome: Progressing     Problem: Bariatric Environmental Safety  Goal: Safety Maintained with Care  Outcome: Progressing     Problem: Skin Injury Risk Increased  Goal: Skin Health and Integrity  Outcome: Progressing     Problem: Pneumonia  Goal: Fluid Balance  Outcome: Progressing  Goal: Resolution of Infection Signs and Symptoms  Outcome: Progressing  Goal: Effective Oxygenation and Ventilation  Outcome: Progressing     Problem: Infection  Goal: Absence of Infection Signs and Symptoms  Outcome: Progressing     Problem: Fall Injury Risk  Goal: Absence of Fall and Fall-Related Injury  Outcome: Progressing     Problem: Restraint, Nonviolent  Goal: Absence of Harm or Injury  Outcome: Progressing     Problem: Enteral Nutrition  Goal: Feeding Tolerance  Outcome: Progressing     Problem: Mechanical Ventilation Invasive  Goal: Effective Communication  Outcome: Progressing  Goal: Optimal Device Function  Outcome: Progressing  Goal: Mechanical Ventilation Liberation  Outcome: Progressing  Goal: Optimal Nutrition Delivery  Outcome: Progressing  Goal: Absence of Device-Related Skin and Tissue Injury  Outcome: Progressing  Goal: Absence of Ventilator-Induced Lung Injury  Outcome: Progressing

## 2024-08-31 NOTE — PT/OT/SLP PROGRESS
Occupational Therapy      Patient Name:  Karo Leonard   MRN:  [id number]    Patient not seen today secondary to  (OT evaluation will be deferred until tomorrow morning secondary to recent extubation. Will follow-up 09/01/24.). Will follow-up 9/1/2024.    8/31/2024

## 2024-08-31 NOTE — ASSESSMENT & PLAN NOTE
Likely multifactorial  S/P extubation on 8/31  Oxygen supplement as need  EF 60-65%  Pulmonology is following patient

## 2024-08-31 NOTE — ASSESSMENT & PLAN NOTE
Started Losartan on 8/28 and amlodipine on 8/31  Can make adjustment as needed  Holding home diltiazem due to bradycardia

## 2024-08-31 NOTE — RESPIRATORY THERAPY
Pt meets extubation parameters except FIO2 is 60    08/31/24 0711   Patient Assessment/Suction   Level of Consciousness (AVPU) alert   Respiratory Effort Normal;Unlabored   Expansion/Accessory Muscles/Retractions no use of accessory muscles;no retractions;expansion symmetric   All Lung Fields Breath Sounds Anterior:;Lateral:   HARRISON Breath Sounds clear   LLL Breath Sounds diminished;clear   RUL Breath Sounds clear   RML Breath Sounds diminished;clear   RLL Breath Sounds diminished   Rhythm/Pattern, Respiratory assisted mechanically   Cough Frequency with stimulation   Cough Type assisted   Suction Pressure (mmHg) -120 mmHg   PRE-TX-O2   Device (Oxygen Therapy) ventilator   $ Is the patient on High Flow Oxygen? Yes   Oxygen Concentration (%) 60   SpO2 (!) 93 %   Pulse Oximetry Type Continuous   $ Pulse Oximetry - Multiple Charge Pulse Oximetry - Multiple   Pulse 84   Resp 13   Aerosol Therapy   $ Aerosol Therapy Charges Aerosol Treatment   Daily Review of Necessity (SVN) completed   Respiratory Treatment Status (SVN) given   Treatment Route (SVN) in-line;ventilator   Patient Position HOB elevated   Post Treatment Assessment (SVN) increased aeration   Signs of Intolerance (SVN) none   Breath Sounds Post-Respiratory Treatment   Throughout All Fields Post-Treatment All Fields   Throughout All Fields Post-Treatment aeration increased   Post-treatment Heart Rate (beats/min) 80   Post-treatment Resp Rate (breaths/min) 15        Airway - Non-Surgical 08/25/24 0427 Endotracheal Tube   Placement Date/Time: 08/25/24 0427   Present Prior to Hospital Arrival?: No  Method of Intubation: Glidescope  Inserted by: MD  Staff/Resident Name(s): Dr. Funes  Airway Device: Endotracheal Tube  Mask Ventilation: Easy  Airway Device Size: 7.5  St...   Secured at 24 cm   Measured At Lips   Secured Location Right   Position of ETT in xRay In good position   Bite Block right   Site Condition Cool;Dry   Status Intact;Secured;Patent   Site  Assessment Clean;Dry;No bleeding;No drainage   Cuff Volume   (Trucuff in the green)   General Safety Checklist   Safety Promotion/Fall Prevention side rails raised   Airway Safety   Is Ambu Bag and Mask with Patient? Yes, Adult Ambu Bag and Mask   O2  at bedside? No   Suction set is at the bedside? Yes   Vent Select   Conventional Vent Y   $ Ventilator Subsequent 1   Charged w/in last 24h YES   Preset Conventional Ventilator Settings   Vent ID [id number]   Vent Type    Ventilation Type VC   Vent Mode Spont   Humidity HME   PEEP/CPAP 10 cmH20   Pressure Support 12 cmH20   Peak End Inspiratory Pressure 23 cmH20   Insp Rise Time  70 %   I-Trigger Type  V-TRIG   Trigger Sensitivity Flow/I-Trigger 3 L/min   Patient Ventilator Parameters   Resp Rate Total 13 br/min   Peak Airway Pressure 23 cmH20   Mean Airway Pressure 13 cmH20   Plateau Pressure 21 cmH20   Exhaled Vt 736 mL   Total Ve 8.97 L/m   Spont Ve 8.97 L   I:E Ratio Measured 1:3.1   Auto PEEP 0 cmH20   Tubing ID (mm) 7.5 mm   Tube Type ET   Inspired Tidal Volume (VTI) 1 mL   Conventional Ventilator Alarms   Alarms On Y   Ve High Alarm 20 L/min   Ve Low Alarm 2.5 L/min   Vt High Alarm 1000 mL   Vt Low Alarm 200 mL   Resp Rate High Alarm 40 br/min   Press High Alarm 40 cmH2O   Apnea Rate 12   Apnea Volume (mL) 0 mL   Apnea Oxygen Concentration  100   Apnea Flow Rate (L/min) 65   T Apnea 20 sec(s)   IHI Ventilator Associated Pneumonia Bundle (Required)   Daily Awakening Trials Performed Yes   Daily Assessment of Readiness to Extubate Yes   Head of Bed Elevated  HOB 30   Vent Circut Breaks Minimized Yes   Ready to Wean/Extubation Screen   FIO2<=50 (chart decimal) (!) 0.6   MV<16L (chart vol.) 8.97   PEEP <=8 (chart #) (!) 10   Ready to Wean Parameters   $ Vital Capacity Charge Vital Capacity, Total (Peak Flow)   $ Extubation Tech Time Tech Time 15 min;Extubation w/ Parameters   SBT Safety Screen Pass   Spon. Breathing Trial Initiated? Initiated   Sedation Vacation  "Completed? Yes   Cough Reflex? Yes   F/VT Ratio<105 (RSBI) (!) 17.66   Negative Inspiratory Force   $ Negative Inspiratory Force Charges Given   Negative Inspiratory Force (cm H2O) -32   Effort (NIF) good   Patient Position (NIF) Brand's   Vital Capacity   Vital Capacity (mL) 0   Spont VT   $ Spont Vol Completed Completed   Spont Vol (mL) 575 mL   IBW/VT Calculations   Height 5' 5" (1.651 m)   IBW/kg (Calculated) Female 57 kg   Low Range Vt 4cc/kg FEMALE 228 mL   Low Range Vt 6cc/kg FEMALE 342 mL   Adult Moderate Range vt 8cc/kg FEMALE 456 mL   Adult High Range Vt 10cc/kg FEMALE 570 mL   and peep is 10.   "

## 2024-08-31 NOTE — SUBJECTIVE & OBJECTIVE
Interval History:  Patient said she feels tired however she feels much better    Review of Systems   All other systems reviewed and are negative.    Objective:     Vital Signs (Most Recent):  Temp: 98.3 °F (36.8 °C) (08/31/24 1200)  Pulse: 73 (08/31/24 1200)  Resp: 11 (08/31/24 1200)  BP: (!) 147/65 (08/31/24 1200)  SpO2: (!) 93 % (08/31/24 1200) Vital Signs (24h Range):  Temp:  [97.8 °F (36.6 °C)-98.4 °F (36.9 °C)] 98.3 °F (36.8 °C)  Pulse:  [60-98] 73  Resp:  [8-24] 11  SpO2:  [91 %-99 %] 93 %  BP: (111-180)/(57-99) 147/65  Arterial Line BP: ()/(49-89) 159/66     Weight: 121.2 kg (267 lb 3.2 oz)  Body mass index is 44.46 kg/m².    Intake/Output Summary (Last 24 hours) at 8/31/2024 1253  Last data filed at 8/31/2024 1200  Gross per 24 hour   Intake 268.81 ml   Output 2955 ml   Net -2686.19 ml         Physical Exam  Constitutional:       Appearance: She is obese. She is ill-appearing.   Cardiovascular:      Rate and Rhythm: Normal rate.      Pulses: Normal pulses.   Pulmonary:      Effort: Pulmonary effort is normal.   Abdominal:      General: Bowel sounds are normal. There is no distension.      Tenderness: There is no abdominal tenderness.   Skin:     Comments: Hematoma noted on left lower extremity.   Neurological:      Mental Status: She is alert and oriented to person, place, and time.             Significant Labs: All pertinent labs within the past 24 hours have been reviewed.    Significant Imaging: I have reviewed all pertinent imaging results/findings within the past 24 hours.

## 2024-08-31 NOTE — PROGRESS NOTES
Pulmonary/Critical Care  Progress Note      Patient name: Karo Leonard  MRN: [id number]  Date: 08/31/2024    Admit Date: 8/25/2024  Consult Requested By: Olga Lidia Pedroza DO    Reason for Consult: Respiratory failure, COPD    HPI:    8/25/2024 - 63 yo ASCVD, DM, HTN. COPD(cigarette use) had increased SOB at home and a fall.  EMS was called and brought to ER.  Initially tried on BiPAP without response and was subsequently intubated and placed on ventilation.  Initial ABG showed over ventilation and we have adjusted and ABG are getting better.  She is sedated and not able to provide any history.  D/W  who says that she was supposed to see a pulmonologist but couldn't make appointment, she has been a chronic smoker.  She had increase SOB for a few days.  She did have a fall at home but he reports that she was not down for long.  He does report that she has been having recurring falls at home.      8/26/2024 - Stable overnight, O2 needs still too high to do SBT.  She is responsive and gets agitated on current sedation and we are adjusting.  No other new issues reported.  Hgb has decreased (no active bleeding reported), NA remains low, CPK is better, TFTF noted and c/w hypothyroid (synthroid started).    8/27/2024 - Remains critically ill, O2 needs down a little but still too high.  Difficult to sedate is either agitated or apneic.  Tried SBT this AM and was apneic.  VS reviewed.  She is 3.8 liters +.  Tube feeds have been started.  NA remains low, glucose is elevated.  CXR looks about the same    8/28/2024 - Stable overnight, O2 needs still up.  She does get agitated at times and we have adjusted meds.  Trouble with tube feeds and will hold today and restart tomorrow.  Not ready to wean because on increased FiO2 and will try to increase PEP to see if that helps.  She does not have a lot of secretions.  She is over ventilaed some and we adjusted vent.  CXR is about the same    8/29/2024 - Stable overnight,, O2  needs still up some (I decreased FiO2 and increased PEEP some) and she is overventilated (we adjusted rate).  [facility]  She is 4.6 liters +.  CXR is about the same.    - continues on vent, settings adjusted for alkalosis- now on PS 12/5. CXR looks wet and pt with severe edema- fluids discontinued and starting lasix. Propofol has been weaned a little. Pt is awake and denies pain. Her blood pressure was up to 200 systolic at the time of my evaluation- RN states she just gave am blood pressure med and also Lasix.    - pt awake, off sedation, writing and alert. Denies pain, states feeling better. She was significantly more alkalotic this am due to lasix so given a dose of diamox this am. Tolerating PS 10/5 now- will repeat abg soon and consider extubation     Review of Systems    Review of Systems   Unable to perform ROS: Intubated       Past Medical History    Past Medical History:   Diagnosis Date    Coronary artery disease     cardiac stent    DDD (degenerative disc disease), lumbar 2000    Diabetes mellitus     Hypertension     Thyroid disease        Past Surgical History    Past Surgical History:   Procedure Laterality Date     SECTION  08/1987    x2 1993    CORONARY STENT PLACEMENT      EXPLORATORY LAPAROTOMY      Orthodoxy     HEMORRHOID SURGERY      LAPAROSCOPIC CHOLECYSTECTOMY N/A 2022    Procedure: CHOLECYSTECTOMY, LAPAROSCOPIC;  Surgeon: Leonardo Wilson III, MD;  Location: [facility];  Service: General;  Laterality: N/A;    LUMBAR DISC SURGERY      PILONIDAL CYST DRAINAGE      TONSILLECTOMY      as a child (also adenoids)       Medications (scheduled):      albuterol-ipratropium  3 mL Nebulization Q6H    cyanocobalamin  1,000 mcg Oral Daily    enoxparin  40 mg Subcutaneous Q12H    famotidine (PF)  20 mg Intravenous Q12H    ferric gluconate (FERRLECIT) 125 mg in 0.9% NaCl 100 mL IVPB  125  mg Intravenous Daily    insulin glargine U-100  30 Units Subcutaneous BID    levoFLOXacin  750 mg Intravenous Q24H    levothyroxine  50 mcg Oral Before breakfast    losartan  100 mg Oral Daily    methylPREDNISolone injection (PEDS and ADULTS)  40 mg Intravenous Q8H       Medications (infusions):      fentanyl  0-250 mcg/hr Intravenous Continuous   Stopped at 08/26/24 0721    propofoL  0-50 mcg/kg/min Intravenous Continuous   Paused at 08/31/24 0352       Medications (prn):       Current Facility-Administered Medications:     calcium gluconate IVPB, 1 g, Intravenous, PRN    calcium gluconate IVPB, 2 g, Intravenous, PRN    calcium gluconate IVPB, 3 g, Intravenous, PRN    dextrose 50%, 12.5 g, Intravenous, PRN    glucagon (human recombinant), 1 mg, Intramuscular, PRN    hydrALAZINE, 10 mg, Intravenous, Q6H PRN    insulin aspart U-100, 0-10 Units, Subcutaneous, Q6H PRN    magnesium sulfate IVPB, 2 g, Intravenous, PRN    magnesium sulfate IVPB, 4 g, Intravenous, PRN    morphine, 2 mg, Intravenous, Q1H PRN    potassium bicarbonate, 35 mEq, Oral, PRN    potassium bicarbonate, 50 mEq, Oral, PRN    potassium bicarbonate, 60 mEq, Oral, PRN    potassium chloride in water, 40 mEq, Intravenous, PRN **AND** potassium chloride in water, 60 mEq, Intravenous, PRN **AND** potassium chloride in water, 80 mEq, Intravenous, PRN    sodium chloride 0.9%, 10 mL, Intravenous, PRN    sodium phosphate 15 mmol in D5W 250 mL IVPB, 15 mmol, Intravenous, PRN    sodium phosphate 20.01 mmol in D5W 250 mL IVPB, 20.01 mmol, Intravenous, PRN    sodium phosphate 30 mmol in D5W 250 mL IVPB, 30 mmol, Intravenous, PRN    Family History: No family history on file.    Social History: Tobacco:   Social History     Tobacco Use   Smoking Status Not on file   Smokeless Tobacco Not on file                                EtOH:   Social History     Substance and Sexual Activity   Alcohol Use No                                Drugs:   Social History     Substance  "and Sexual Activity   Drug Use No       Physical Exam    Vital signs:  Temp:  [97.8 °F (36.6 °C)-98.4 °F (36.9 °C)]   Pulse:  [60-91]   Resp:  [8-24]   BP: (111-180)/(57-99)   SpO2:  [91 %-99 %]   Arterial Line BP: ()/(49-94)     Intake/Output:   Intake/Output Summary (Last 24 hours) at 8/31/2024 0954  Last data filed at 8/31/2024 0702  Gross per 24 hour   Intake 334.64 ml   Output 3455 ml   Net -3120.36 ml        BMI: Estimated body mass index is 44.46 kg/m² as calculated from the following:    Height as of this encounter: 5' 5" (1.651 m).    Weight as of this encounter: 121.2 kg (267 lb 3.2 oz).    Physical Exam  Vitals and nursing note reviewed.   Constitutional:       General: She is not in acute distress.     Appearance: She is not ill-appearing, toxic-appearing or diaphoretic.      Comments: Intubated  awake   HENT:      Head: Normocephalic.      Comments: Some bruising right forehead and bilat eyes from her fall     Right Ear: External ear normal.      Left Ear: External ear normal.      Nose: Nose normal. No congestion or rhinorrhea.      Mouth/Throat:      Mouth: Mucous membranes are moist.      Pharynx: Oropharynx is clear. No oropharyngeal exudate or posterior oropharyngeal erythema.      Comments: + ETT  Eyes:      General: No scleral icterus.        Right eye: No discharge.         Left eye: No discharge.      Extraocular Movements: Extraocular movements intact.      Conjunctiva/sclera: Conjunctivae normal.      Pupils: Pupils are equal, round, and reactive to light.   Neck:      Vascular: No carotid bruit.   Cardiovascular:      Rate and Rhythm: Regular rhythm. Tachycardia present.      Pulses: Normal pulses.      Heart sounds: No murmur heard.     No friction rub. No gallop.   Pulmonary:      Effort: Pulmonary effort is normal. No respiratory distress.      Breath sounds: No stridor. No wheezing, rhonchi or rales.   Chest:      Chest wall: No tenderness.   Abdominal:      General: There is no " "distension.      Tenderness: There is no abdominal tenderness. There is no guarding.      Comments: Hypoactive BS   Musculoskeletal:         General: No swelling. Normal range of motion.      Cervical back: Normal range of motion and neck supple. No rigidity or tenderness.      Right lower leg: Edema present.      Left lower leg: Edema present.   Lymphadenopathy:      Cervical: No cervical adenopathy.   Skin:     General: Skin is warm and dry.      Capillary Refill: Capillary refill takes less than 2 seconds.      Coloration: Skin is not jaundiced.      Findings: Bruising present.      Comments: + bruise LLE   Neurological:      General: No focal deficit present.      Cranial Nerves: No cranial nerve deficit.      Sensory: No sensory deficit.      Motor: No weakness.   Psychiatric:         Mood and Affect: Mood normal.         Behavior: Behavior normal.         Laboratory    Recent Labs   Lab 08/31/24  0308 08/31/24  0410   WBC 9.63  --    RBC 3.01*  --    HGB 8.8*  --    HCT 29.4* 31*     --    MCV 98  --    MCH 29.2  --    MCHC 29.9*  --        Recent Labs   Lab 08/31/24  0308   CALCIUM 7.6*   ALBUMIN 2.8*   PROT 5.4*      K 3.6   CO2 43*   CL 90*   BUN 9   CREATININE 0.7   ALKPHOS 61   ALT 8*   AST 15   BILITOT 0.4       No results for input(s): "PT", "INR", "APTT" in the last 24 hours.    No results for input(s): "CPK", "CPKMB", "TROPONINI", "MB" in the last 24 hours.      Additional labs: reviewed    Microbiology:       Microbiology Results (last 7 days)       Procedure Component Value Units Date/Time    Culture, Respiratory with Gram Stain [[id number]] Collected: 08/25/24 0556    Order Status: Completed Specimen: Sputum from Tracheal Aspirate Updated: 08/27/24 0728     Respiratory Culture Normal respiratory denise     Comment: Previous comment was modified by [email or web address] at 07:27 on 08/27/2024 No   significant isolate          Gram Stain (Respiratory) <10 epithelial cells per low power field.     Gram " Stain (Respiratory) Few WBC's     Gram Stain (Respiratory) Few Gram positive cocci    Respiratory Infection Panel (PCR), Nasopharyngeal [[id number]] Collected: 08/26/24 0930    Order Status: Completed Specimen: Nasopharyngeal Swab Updated: 08/26/24 1141     Respiratory Infection Panel Source NP swab     Adenovirus Not Detected     Coronavirus 229E, Common Cold Virus Not Detected     Coronavirus HKU1, Common Cold Virus Not Detected     Coronavirus NL63, Common Cold Virus Not Detected     Coronavirus OC43, Common Cold Virus Not Detected     Comment: Coronavirus strains 229E, HKU1, NL63, and OC43 can cause the common   cold   and are not associated with the respiratory disease outbreak caused   by  the COVID-19 (SARS-CoV-2 novel Coronavirus) strain.           SARS-CoV2 (COVID-19) Qualitative PCR Not Detected     Human Metapneumovirus Not Detected     Human Rhinovirus/Enterovirus Not Detected     Influenza A (subtypes H1, H1-2009,H3) Not Detected     Influenza B Not Detected     Parainfluenza Virus 1 Not Detected     Parainfluenza Virus 2 Not Detected     Parainfluenza Virus 3 Not Detected     Parainfluenza Virus 4 Not Detected     Respiratory Syncytial Virus Not Detected     Bordetella Parapertussis (BJ8233) Not Detected     Bordetella pertussis (ptxP) Not Detected     Chlamydia pneumoniae Not Detected     Mycoplasma pneumoniae Not Detected     Comment: Respiratory Infection Panel testing performed by Multiplex PCR.       Narrative:      Respiratory Infection Panel source->NP Swab    MRSA Screen by PCR [[id number]] Collected: 08/26/24 0931    Order Status: Completed Specimen: Nasal Swab Updated: 08/26/24 1127     MRSA SCREEN BY PCR Negative    MRSA Screen by PCR [[id number]] Collected: 08/26/24 0927    Order Status: Canceled Specimen: Nasal Swab     Culture, Respiratory with Gram Stain [[id number]]     Order Status: No result Specimen: Respiratory             Radiology    X-Ray Chest AP Portable  Narrative:  EXAMINATION:  XR CHEST AP PORTABLE    CLINICAL HISTORY:  Intubated;    FINDINGS:  Portable chest radiograph at 05:00 hours compared 08/30/2024 shows ET and NG tubes unchanged in position, with stable enlarged cardiac silhouette and normal pulmonary vascularity.    There are persistent left lower lung opacities obscuring the left hemidiaphragm, with no new pleural or parenchymal abnormality.  No pneumothorax.  Impression: No significant interval change.    Electronically signed by: Christiano Tirado  Date:    08/31/2024  Time:    07:42        Additional Studies    reviewed    Ventilator Information    Vent Mode: Spont  Oxygen Concentration (%):  [60] 60  Resp Rate Total:  [8.1 br/min-26 br/min] 10 br/min  PEEP/CPAP:  [10 cmH20] 10 cmH20  Pressure Support:  [12 cmH20] 12 cmH20  Mean Airway Pressure:  [13 cmH20-15 cmH20] 13 cmH20             Recent Labs     08/31/24  0410   PH 7.568*   PCO2 47.6*   PO2 69*   HCO3 43.3*   POCSATURATED 96   BE 21*         Impression    Active Hospital Problems    Diagnosis  POA    *Acute hypoxic on chronic hypercapnic respiratory failure [J96.01, J96.12]  Yes    Goals of care, counseling/discussion [Z71.89]  Not Applicable    Type 2 diabetes mellitus [E11.9]  Yes    Anemia [D64.9]  Yes    Acute metabolic encephalopathy [G93.41]  Yes    Fall [W19.XXXA]  Unknown    COPD exacerbation [J44.1]  Yes    Pneumonia [J18.9]  Yes    Hypothyroid [E03.9]  Yes    Hypertension [I10]  Yes      Resolved Hospital Problems   No resolved problems to display.       Plan    Ventilator and adjust as needed, - continue PSV, adjusted for alkalosis  Repeat ABG on 10/5 and consider a trial of extubation if stable  After extubation will need bipap nightly and PRN  Respiratory treatments, steroids continue  Tolerated diuresis, diamox x1 given this am for alkalosis  Completed levaquin 7d  Discontinue lasix  Antihypertensives continue  Watch H/H - no active bleeding reported  Replace electrolytes  Synthroid   Repeat TSH in  am  Watch BS and treat as needed  Spouse at bedside updated  D/w RN and RT      The following were evaluated and adjusted as needed: mechanical ventilator settings and weaning status, intravenous fluids and nutritional status, sedation and neurologic status, antibiotics, acid base balance and oxygenation needs, and input and output and renal status       Critical Care  - THE PATIENT HAS A HIGH PROBABILITY OF IMMINENT OR LIFE THREATENING DETERIORATION.  Over 50%time of encounter was in direct care at bedside.  Time was 30 to 74 minutes required for patient care.  Time needed for all of the above totaled 36 minutes.    Adri Stephenson MD  Pulmonary & Critical Care Medicine

## 2024-08-31 NOTE — PROGRESS NOTES
[facility] Medicine  Progress Note    Patient Name: Karo Leonard  MRN: [id number]  Patient Class: IP- Inpatient   Admission Date: 8/25/2024  Length of Stay: 6 days  Attending Physician: Olga Lidia Pedroza DO  Primary Care Provider: Navneet Hernandez FNP        Subjective:     Principal Problem:Acute hypoxic on chronic hypercapnic respiratory failure        HPI:  64F p/w fall in the context of shortness of breath. EMS reportedly gave narcan w/o appreciable response, then she was given duoneb en route to [facility] ED. Upon arrival, she was tachypneic, had respy sounding phonation, but she denied chest pain, fever, or chills. She was placed on BiPAP, and initially demonstrated improvement. However, she became less responsive and appeared to tire out. She was given additional narcan w/o appreciable response, so she was intubated. She had some hypotension after receiving sedation, so she was placed on low dose levophed. [place] was placed, lasix was given, as was Abx. Large bruise noted on LLE, so x-ray ordered.     Overview/Hospital Course:  Patient came in with acute on chronic hypoxemic hypercapnic respiratory failure.  Patient was intubated after failing BiPAP.  Continue with IV antibiotics for COPD exacerbation and possible pneumonia.  MRSA was negative so vanco was discontinued on 8/27.  Patient was started on iron and B12 supplement for anemia. . Discussed the goal of care with patient's  on 8/28. Patient was fluid overloaded and given diuretic on 8/30. S/P exubation on 8/31.  For her hypertension, patient was started on losartan and amlodipine during the hospital stay.       Interval History:  Patient said she feels tired however she feels much better    Review of Systems   All other systems reviewed and are negative.    Objective:     Vital Signs (Most Recent):  Temp: 98.3 °F (36.8 °C) (08/31/24 1200)  Pulse: 73 (08/31/24 1200)  Resp: 11 (08/31/24 1200)  BP: (!) 147/65 (08/31/24  1200)  SpO2: (!) 93 % (08/31/24 1200) Vital Signs (24h Range):  Temp:  [97.8 °F (36.6 °C)-98.4 °F (36.9 °C)] 98.3 °F (36.8 °C)  Pulse:  [60-98] 73  Resp:  [8-24] 11  SpO2:  [91 %-99 %] 93 %  BP: (111-180)/(57-99) 147/65  Arterial Line BP: ()/(49-89) 159/66     Weight: 121.2 kg (267 lb 3.2 oz)  Body mass index is 44.46 kg/m².    Intake/Output Summary (Last 24 hours) at 8/31/2024 1253  Last data filed at 8/31/2024 1200  Gross per 24 hour   Intake 268.81 ml   Output 2955 ml   Net -2686.19 ml         Physical Exam  Constitutional:       Appearance: She is obese. She is ill-appearing.   Cardiovascular:      Rate and Rhythm: Normal rate.      Pulses: Normal pulses.   Pulmonary:      Effort: Pulmonary effort is normal.   Abdominal:      General: Bowel sounds are normal. There is no distension.      Tenderness: There is no abdominal tenderness.   Skin:     Comments: Hematoma noted on left lower extremity.   Neurological:      Mental Status: She is alert and oriented to person, place, and time.             Significant Labs: All pertinent labs within the past 24 hours have been reviewed.    Significant Imaging: I have reviewed all pertinent imaging results/findings within the past 24 hours.    Assessment/Plan:      * Acute hypoxic on chronic hypercapnic respiratory failure  Likely multifactorial  S/P extubation on 8/31  Oxygen supplement as need  EF 60-65%  Pulmonology is following patient    COPD exacerbation  Continue with steroids and Levofloxacin     Pneumonia  MRSA neg, procal neg, respiratory panel neg  MRSA was negative so vanco was discontinued on 8/27.  Continue with Levofloxacin    Hypertension  Started Losartan on 8/28 and amlodipine on 8/31  Can make adjustment as needed  Holding home diltiazem due to bradycardia    Goals of care, counseling/discussion  Discussed the goal of care with patient's  on 8/28.    Anemia  Likely from Iron and b12 deficiency  Started on Iron and B12 supplement on 8/27    Type  "2 diabetes mellitus  Continue with SSI and glargine  Make adjustment as needed    Fall  Imaging ruled out acute fracture  US ordered based on physical finding of hematoma in left lower extremity    Acute metabolic encephalopathy  Resolved  CT head: no acute changes  Continue to monitor     Hypothyroid  Continue home levothyroxine        VTE Risk Mitigation (From admission, onward)           Ordered     enoxaparin injection 40 mg  Every 12 hours        Note to Pharmacy: Ht: 5' 5" (1.651 m)  Wt: 127 kg (280 lb)  Estimated Creatinine Clearance: 92.8 mL/min (based on SCr of 0.8 mg/dL).  Body mass index is 46.59 kg/m².    08/25/24 0627     IP VTE HIGH RISK PATIENT  Once         08/25/24 0627     Place sequential compression device  Until discontinued         08/25/24 0627                    Discharge Planning   CHELE:      Code Status: Full Code   Is the patient medically ready for discharge?:     Reason for patient still in hospital (select all that apply): Patient trending condition  Discharge Plan A: Home Health   Discharge Delays: None known at this time        Critical care time spent on the evaluation and treatment of severe organ dysfunction, review of pertinent labs and imaging studies, discussions with consulting providers and discussions with patient/family: 32 minutes.      Olga Lidia Pedroza DO  Department of Hospital Medicine   Critical access hospital    "

## 2024-08-31 NOTE — PT/OT/SLP PROGRESS
Physical Therapy      Patient Name:  Karo Leonard   MRN:  [id number]    Patient not seen today secondary to PT and RN determined that PT evaluation will be deferred until tomorrow morning secondary to recent extubation. Will follow-up 09/01/24.

## 2024-08-31 NOTE — ASSESSMENT & PLAN NOTE
Imaging ruled out acute fracture  US ordered based on physical finding of hematoma in left lower extremity

## 2024-08-31 NOTE — CARE UPDATE
Pt not extubated. PEEP 10, FiO2 60%, pH 7.568, pO2 69. Dr. Stephenson added Diamox.         08/31/24 0410   Labs   $ Was an ABG obtained? Arterial Blood Draw from Existing Line;POCT - Blood gas;POCT - Calcium;POCT - Hematocrit;POCT - PH, Blood;POCT - Potassium;POCT - Sodium   $ Labs Tech Time 15 min   Critical Value Communication   Date Result Received 08/31/24   Time Result Received 0410   Resulting Department of Critical Value resp   Who communicated critical value from resulting department? cmsii, rrt   Critical Test #1 pH   Doctor not notified of critical value due to: known abnormal - expected abnormal   Name of Notified Physician/Designee Dr. Stephenson   Date Notified 08/31/24   Time Notified 0412   Physician Directive Add Diamox and diurese

## 2024-08-31 NOTE — CARE UPDATE
08/30/24 1922   Patient Assessment/Suction   Level of Consciousness (AVPU) alert   Respiratory Effort Normal;Unlabored   Expansion/Accessory Muscles/Retractions no use of accessory muscles   All Lung Fields Breath Sounds diminished;clear   Rhythm/Pattern, Respiratory assisted mechanically   Skin Integrity   $ Wound Care Tech Time 15 min   Area Observed Corner lip;Lower lip;Upper lip   Skin Appearance without discoloration   PRE-TX-O2   Device (Oxygen Therapy) ventilator   SpO2 97 %   Pulse Oximetry Type Continuous   Pulse 63   Resp 11   Aerosol Therapy   $ Aerosol Therapy Charges Aerosol Treatment  (duo)   Daily Review of Necessity (SVN) completed   Respiratory Treatment Status (SVN) given   Treatment Route (SVN) in-line;ventilator   Patient Position HOB elevated   Post Treatment Assessment (SVN) increased aeration   Signs of Intolerance (SVN) none   Breath Sounds Post-Respiratory Treatment   Throughout All Fields Post-Treatment All Fields   Throughout All Fields Post-Treatment aeration increased   Post-treatment Heart Rate (beats/min) 63   Post-treatment Resp Rate (breaths/min) 11        Airway - Non-Surgical 08/25/24 0427 Endotracheal Tube   Placement Date/Time: 08/25/24 0427   Present Prior to Hospital Arrival?: No  Method of Intubation: Glidescope  Inserted by: MD  Staff/Resident Name(s): Dr. Funes  Airway Device: Endotracheal Tube  Mask Ventilation: Easy  Airway Device Size: 7.5  St...   Secured at 24 cm   Measured At Lips   Secured Location Right   Secured by Commercial tube camargo   Position of ETT in xRay In good position   Bite Block right   Site Condition Cool;Dry   Status Intact;Secured   Site Assessment Clean;Dry;No bleeding;No drainage   General Safety Checklist   Safety Promotion/Fall Prevention side rails raised   Airway Safety   Is Ambu Bag and Mask with Patient? Yes, Adult Ambu Bag and Mask   O2  at bedside? Yes   Suction set is at the bedside? Yes   Vent Select   Conventional Vent Y    Charged w/in last 24h YES   Preset Conventional Ventilator Settings   Vent ID [id number]   Vent Type    Humidity HME   Patient Ventilator Parameters   Tubing ID (mm) 7.5 mm   Tube Type ET   Conventional Ventilator Alarms   Alarms On Y

## 2024-09-01 LAB
ALBUMIN SERPL BCP-MCNC: 2.8 G/DL (ref 3.5–5.2)
ALLENS TEST: ABNORMAL
ALP SERPL-CCNC: 64 U/L (ref 55–135)
ALT SERPL W/O P-5'-P-CCNC: 9 U/L (ref 10–44)
ANION GAP SERPL CALC-SCNC: 3 MMOL/L (ref 8–16)
AST SERPL-CCNC: 15 U/L (ref 10–40)
BASOPHILS # BLD AUTO: 0.01 K/UL (ref 0–0.2)
BASOPHILS NFR BLD: 0.1 % (ref 0–1.9)
BILIRUB SERPL-MCNC: 0.4 MG/DL (ref 0.1–1)
BUN SERPL-MCNC: 10 MG/DL (ref 8–23)
CALCIUM SERPL-MCNC: 8.1 MG/DL (ref 8.7–10.5)
CHLORIDE SERPL-SCNC: 97 MMOL/L (ref 95–110)
CO2 SERPL-SCNC: 37 MMOL/L (ref 23–29)
CREAT SERPL-MCNC: 0.6 MG/DL (ref 0.5–1.4)
DELSYS: ABNORMAL
DIFFERENTIAL METHOD BLD: ABNORMAL
EOSINOPHIL # BLD AUTO: 0 K/UL (ref 0–0.5)
EOSINOPHIL NFR BLD: 0.1 % (ref 0–8)
EP: 6
ERYTHROCYTE [DISTWIDTH] IN BLOOD BY AUTOMATED COUNT: 17.7 % (ref 11.5–14.5)
ERYTHROCYTE [SEDIMENTATION RATE] IN BLOOD BY WESTERGREN METHOD: 16 MM/H
EST. GFR  (NO RACE VARIABLE): >60 ML/MIN/1.73 M^2
FIO2: 50
GLUCOSE SERPL-MCNC: 210 MG/DL (ref 70–110)
GLUCOSE SERPL-MCNC: 219 MG/DL (ref 70–110)
GLUCOSE SERPL-MCNC: 236 MG/DL (ref 70–110)
GLUCOSE SERPL-MCNC: 250 MG/DL (ref 70–110)
GLUCOSE SERPL-MCNC: 293 MG/DL (ref 70–110)
GLUCOSE SERPL-MCNC: 299 MG/DL (ref 70–110)
HCO3 UR-SCNC: 37 MMOL/L (ref 24–28)
HCT VFR BLD AUTO: 29.4 % (ref 37–48.5)
HCT VFR BLD CALC: 32 %PCV (ref 36–54)
HGB BLD-MCNC: 8.9 G/DL (ref 12–16)
IMM GRANULOCYTES # BLD AUTO: 0.2 K/UL (ref 0–0.04)
IMM GRANULOCYTES NFR BLD AUTO: 2.1 % (ref 0–0.5)
IP: 12
LYMPHOCYTES # BLD AUTO: 0.7 K/UL (ref 1–4.8)
LYMPHOCYTES NFR BLD: 7.4 % (ref 18–48)
MCH RBC QN AUTO: 29.7 PG (ref 27–31)
MCHC RBC AUTO-ENTMCNC: 30.3 G/DL (ref 32–36)
MCV RBC AUTO: 98 FL (ref 82–98)
MODE: ABNORMAL
MONOCYTES # BLD AUTO: 0.7 K/UL (ref 0.3–1)
MONOCYTES NFR BLD: 6.9 % (ref 4–15)
NEUTROPHILS # BLD AUTO: 8.1 K/UL (ref 1.8–7.7)
NEUTROPHILS NFR BLD: 83.4 % (ref 38–73)
NRBC BLD-RTO: 0 /100 WBC
PCO2 BLDA: 54.8 MMHG (ref 35–45)
PH SMN: 7.44 [PH] (ref 7.35–7.45)
PLATELET # BLD AUTO: 269 K/UL (ref 150–450)
PMV BLD AUTO: 9.7 FL (ref 9.2–12.9)
PO2 BLDA: 87 MMHG (ref 80–100)
POC BE: 13 MMOL/L
POC IONIZED CALCIUM: 1.19 MMOL/L (ref 1.06–1.42)
POC SATURATED O2: 97 % (ref 95–100)
POC TCO2: 39 MMOL/L (ref 23–27)
POTASSIUM BLD-SCNC: 3.8 MMOL/L (ref 3.5–5.1)
POTASSIUM SERPL-SCNC: 3.8 MMOL/L (ref 3.5–5.1)
PROT SERPL-MCNC: 5.5 G/DL (ref 6–8.4)
RBC # BLD AUTO: 3 M/UL (ref 4–5.4)
SAMPLE: ABNORMAL
SITE: ABNORMAL
SODIUM BLD-SCNC: 136 MMOL/L (ref 136–145)
SODIUM SERPL-SCNC: 137 MMOL/L (ref 136–145)
T4 FREE SERPL-MCNC: 0.28 NG/DL (ref 0.71–1.51)
TSH SERPL DL<=0.005 MIU/L-ACNC: 42.68 UIU/ML (ref 0.34–5.6)
WBC # BLD AUTO: 9.73 K/UL (ref 3.9–12.7)

## 2024-09-01 PROCEDURE — 99900031 HC PATIENT EDUCATION (STAT)

## 2024-09-01 PROCEDURE — 20000000 HC ICU ROOM

## 2024-09-01 PROCEDURE — 25000242 PHARM REV CODE 250 ALT 637 W/ HCPCS: Performed by: HOSPITALIST

## 2024-09-01 PROCEDURE — 99900035 HC TECH TIME PER 15 MIN (STAT)

## 2024-09-01 PROCEDURE — 84295 ASSAY OF SERUM SODIUM: CPT

## 2024-09-01 PROCEDURE — 97165 OT EVAL LOW COMPLEX 30 MIN: CPT

## 2024-09-01 PROCEDURE — 37799 UNLISTED PX VASCULAR SURGERY: CPT

## 2024-09-01 PROCEDURE — 97535 SELF CARE MNGMENT TRAINING: CPT

## 2024-09-01 PROCEDURE — 85014 HEMATOCRIT: CPT

## 2024-09-01 PROCEDURE — 25000003 PHARM REV CODE 250: Performed by: INTERNAL MEDICINE

## 2024-09-01 PROCEDURE — 27000221 HC OXYGEN, UP TO 24 HOURS

## 2024-09-01 PROCEDURE — 93010 ELECTROCARDIOGRAM REPORT: CPT | Mod: ,,, | Performed by: INTERNAL MEDICINE

## 2024-09-01 PROCEDURE — 84132 ASSAY OF SERUM POTASSIUM: CPT

## 2024-09-01 PROCEDURE — 94799 UNLISTED PULMONARY SVC/PX: CPT | Mod: XB

## 2024-09-01 PROCEDURE — 93005 ELECTROCARDIOGRAM TRACING: CPT | Performed by: INTERNAL MEDICINE

## 2024-09-01 PROCEDURE — 94761 N-INVAS EAR/PLS OXIMETRY MLT: CPT | Mod: XB

## 2024-09-01 PROCEDURE — 63600175 PHARM REV CODE 636 W HCPCS: Performed by: FAMILY MEDICINE

## 2024-09-01 PROCEDURE — 63600175 PHARM REV CODE 636 W HCPCS: Performed by: HOSPITALIST

## 2024-09-01 PROCEDURE — 25000003 PHARM REV CODE 250: Performed by: FAMILY MEDICINE

## 2024-09-01 PROCEDURE — 85025 COMPLETE CBC W/AUTO DIFF WBC: CPT | Performed by: FAMILY MEDICINE

## 2024-09-01 PROCEDURE — 80053 COMPREHEN METABOLIC PANEL: CPT | Performed by: FAMILY MEDICINE

## 2024-09-01 PROCEDURE — 94660 CPAP INITIATION&MGMT: CPT

## 2024-09-01 PROCEDURE — 63600175 PHARM REV CODE 636 W HCPCS: Performed by: INTERNAL MEDICINE

## 2024-09-01 PROCEDURE — 25000003 PHARM REV CODE 250: Performed by: HOSPITALIST

## 2024-09-01 PROCEDURE — 94640 AIRWAY INHALATION TREATMENT: CPT

## 2024-09-01 PROCEDURE — 82803 BLOOD GASES ANY COMBINATION: CPT

## 2024-09-01 PROCEDURE — 97161 PT EVAL LOW COMPLEX 20 MIN: CPT

## 2024-09-01 PROCEDURE — 99233 SBSQ HOSP IP/OBS HIGH 50: CPT | Mod: ,,, | Performed by: INTERNAL MEDICINE

## 2024-09-01 PROCEDURE — 82330 ASSAY OF CALCIUM: CPT

## 2024-09-01 RX ORDER — PROMETHAZINE HYDROCHLORIDE 12.5 MG/1
12.5 TABLET ORAL EVERY 6 HOURS PRN
Status: DISCONTINUED | OUTPATIENT
Start: 2024-09-01 | End: 2024-09-02

## 2024-09-01 RX ORDER — DILTIAZEM HYDROCHLORIDE 5 MG/ML
0.25 INJECTION INTRAVENOUS ONCE
Status: COMPLETED | OUTPATIENT
Start: 2024-09-01 | End: 2024-09-01

## 2024-09-01 RX ORDER — DILTIAZEM HYDROCHLORIDE 120 MG/1
240 CAPSULE, COATED, EXTENDED RELEASE ORAL DAILY
Status: DISCONTINUED | OUTPATIENT
Start: 2024-09-01 | End: 2024-09-17 | Stop reason: HOSPADM

## 2024-09-01 RX ORDER — DILTIAZEM HYDROCHLORIDE 5 MG/ML
15 INJECTION INTRAVENOUS ONCE
Status: DISCONTINUED | OUTPATIENT
Start: 2024-09-01 | End: 2024-09-01

## 2024-09-01 RX ORDER — HYDROCODONE BITARTRATE AND ACETAMINOPHEN 5; 325 MG/1; MG/1
1 TABLET ORAL EVERY 6 HOURS PRN
Status: DISCONTINUED | OUTPATIENT
Start: 2024-09-01 | End: 2024-09-03

## 2024-09-01 RX ORDER — LIDOCAINE 50 MG/G
1 PATCH TOPICAL
Status: DISCONTINUED | OUTPATIENT
Start: 2024-09-01 | End: 2024-09-09

## 2024-09-01 RX ORDER — INSULIN GLARGINE 100 [IU]/ML
40 INJECTION, SOLUTION SUBCUTANEOUS 2 TIMES DAILY
Status: DISCONTINUED | OUTPATIENT
Start: 2024-09-01 | End: 2024-09-02

## 2024-09-01 RX ORDER — POTASSIUM CHLORIDE 20 MEQ/1
40 TABLET, EXTENDED RELEASE ORAL ONCE
Status: COMPLETED | OUTPATIENT
Start: 2024-09-01 | End: 2024-09-01

## 2024-09-01 RX ORDER — PREDNISONE 20 MG/1
40 TABLET ORAL DAILY
Status: DISCONTINUED | OUTPATIENT
Start: 2024-09-02 | End: 2024-09-03

## 2024-09-01 RX ADMIN — IPRATROPIUM BROMIDE AND ALBUTEROL SULFATE 3 ML: 2.5; .5 SOLUTION RESPIRATORY (INHALATION) at 12:09

## 2024-09-01 RX ADMIN — POTASSIUM CHLORIDE 40 MEQ: 1500 TABLET, EXTENDED RELEASE ORAL at 01:09

## 2024-09-01 RX ADMIN — FAMOTIDINE 20 MG: 10 INJECTION INTRAVENOUS at 08:09

## 2024-09-01 RX ADMIN — INSULIN GLARGINE 40 UNITS: 100 INJECTION, SOLUTION SUBCUTANEOUS at 08:09

## 2024-09-01 RX ADMIN — FAMOTIDINE 20 MG: 10 INJECTION INTRAVENOUS at 10:09

## 2024-09-01 RX ADMIN — LIDOCAINE 1 PATCH: 50 PATCH CUTANEOUS at 03:09

## 2024-09-01 RX ADMIN — ENOXAPARIN SODIUM 40 MG: 40 INJECTION SUBCUTANEOUS at 08:09

## 2024-09-01 RX ADMIN — PROMETHAZINE HYDROCHLORIDE 12.5 MG: 12.5 TABLET ORAL at 10:09

## 2024-09-01 RX ADMIN — DILTIAZEM HYDROCHLORIDE 30.5 MG: 5 INJECTION, SOLUTION INTRAVENOUS at 10:09

## 2024-09-01 RX ADMIN — MORPHINE SULFATE 2 MG: 2 INJECTION, SOLUTION INTRAMUSCULAR; INTRAVENOUS at 10:09

## 2024-09-01 RX ADMIN — IPRATROPIUM BROMIDE AND ALBUTEROL SULFATE 3 ML: 2.5; .5 SOLUTION RESPIRATORY (INHALATION) at 07:09

## 2024-09-01 RX ADMIN — MORPHINE SULFATE 2 MG: 2 INJECTION, SOLUTION INTRAMUSCULAR; INTRAVENOUS at 01:09

## 2024-09-01 RX ADMIN — LEVOTHYROXINE SODIUM 50 MCG: 0.03 TABLET ORAL at 05:09

## 2024-09-01 RX ADMIN — INSULIN GLARGINE 40 UNITS: 100 INJECTION, SOLUTION SUBCUTANEOUS at 10:09

## 2024-09-01 RX ADMIN — METHYLPREDNISOLONE SODIUM SUCCINATE 40 MG: 40 INJECTION, POWDER, FOR SOLUTION INTRAMUSCULAR; INTRAVENOUS at 05:09

## 2024-09-01 RX ADMIN — SENNOSIDES AND DOCUSATE SODIUM 1 TABLET: 8.6; 5 TABLET ORAL at 08:09

## 2024-09-01 RX ADMIN — METHYLPREDNISOLONE SODIUM SUCCINATE 40 MG: 40 INJECTION, POWDER, FOR SOLUTION INTRAMUSCULAR; INTRAVENOUS at 01:09

## 2024-09-01 RX ADMIN — IPRATROPIUM BROMIDE AND ALBUTEROL SULFATE 3 ML: 2.5; .5 SOLUTION RESPIRATORY (INHALATION) at 01:09

## 2024-09-01 RX ADMIN — INSULIN ASPART 6 UNITS: 100 INJECTION, SOLUTION INTRAVENOUS; SUBCUTANEOUS at 04:09

## 2024-09-01 RX ADMIN — SODIUM CHLORIDE 125 MG: 9 INJECTION, SOLUTION INTRAVENOUS at 01:09

## 2024-09-01 RX ADMIN — HYDROCODONE BITARTRATE AND ACETAMINOPHEN 1 TABLET: 5; 325 TABLET ORAL at 06:09

## 2024-09-01 RX ADMIN — INSULIN ASPART 4 UNITS: 100 INJECTION, SOLUTION INTRAVENOUS; SUBCUTANEOUS at 05:09

## 2024-09-01 RX ADMIN — INSULIN ASPART 3 UNITS: 100 INJECTION, SOLUTION INTRAVENOUS; SUBCUTANEOUS at 08:09

## 2024-09-01 RX ADMIN — INSULIN ASPART 2 UNITS: 100 INJECTION, SOLUTION INTRAVENOUS; SUBCUTANEOUS at 12:09

## 2024-09-01 RX ADMIN — HYDROCODONE BITARTRATE AND ACETAMINOPHEN 1 TABLET: 5; 325 TABLET ORAL at 12:09

## 2024-09-01 RX ADMIN — DILTIAZEM HYDROCHLORIDE 240 MG: 120 CAPSULE, COATED, EXTENDED RELEASE ORAL at 10:09

## 2024-09-01 RX ADMIN — PROMETHAZINE HYDROCHLORIDE 12.5 MG: 12.5 TABLET ORAL at 06:09

## 2024-09-01 RX ADMIN — ENOXAPARIN SODIUM 40 MG: 40 INJECTION SUBCUTANEOUS at 10:09

## 2024-09-01 NOTE — NURSING
Attempted to transfer pt to [facility] per her request. Moderate assist to side of bed but pt unable to stand with max assist x2.  Pt has complaints of dizziness. Assisted pt back to bed. VSS.  at bedside.

## 2024-09-01 NOTE — PT/OT/SLP EVAL
Physical Therapy Evaluation    Patient Name:  Karo Leonard   MRN:  [id number]    Recommendations:     Discharge Recommendations: Moderate Intensity Therapy   Discharge Equipment Recommendations: to be determined by next level of care   Barriers to discharge:   cannot handle her at this status, needs rehab    Assessment:     Karo Leonard is a 64 y.o. female admitted with a medical diagnosis of Acute hypoxic on chronic hypercapnic respiratory failure.  She presents with the following impairments/functional limitations: weakness, impaired endurance, impaired sensation, impaired self care skills, impaired functional mobility, gait instability, impaired balance, decreased coordination, decreased upper extremity function, decreased lower extremity function, decreased safety awareness, pain, decreased ROM, impaired coordination, impaired skin, edema, impaired cardiopulmonary response to activity, impaired joint extensibility, impaired muscle length     Found in bed,  at bedside;  L knee pain 8/10/ hematoma and very swollen;  maxA x2 persons with supine to sit bedside; poor balance sit static; sat x 3-5 min then requested to lay back down.    Rehab Prognosis: Fair; patient would benefit from acute skilled PT services to address these deficits and reach maximum level of function.    Recent Surgery: * No surgery found *      Plan:     During this hospitalization, patient to be seen 6 x/week to address the identified rehab impairments via gait training, therapeutic activities, therapeutic exercises, neuromuscular re-education and progress toward the following goals:    Plan of Care Expires:  10/01/24    Subjective     Chief Complaint: L knee 8/10, says she fell and put most weight on L knee  Patient/Family Comments/goals: return to PLOF  Pain/Comfort:  Pain Rating 1: 8/10  Location - Side 1: Left  Location 1: knee  Pain Addressed 1: Reposition, Distraction    Patients cultural, spiritual, Buddhism  conflicts given the current situation: no    Living Environment:  [facility], , ramp  Prior to admission, patients level of function was wc bound mostly with occ walk up to 50' with 2.5L02 furniture walking; says home is too narrow to fit RW or wc thru doors etc.  Equipment used at home: wheelchair, walker, rolling, bedside commode, bath bench, cane, straight, CPAP, oxygen.  DME owned (not currently used): rolling walker, bedside commode, shower chair, wheelchair, and no grab bars .  Upon discharge, patient will have assistance from husb.    Objective:     Communicated with patient, husb and nurse prior to session.  Patient found HOB elevated with blood pressure cuff, shah catheter, oxygen, peripheral IV, pulse ox (continuous), telemetry  upon PT entry to room.    General Precautions: Standard, fall  Orthopedic Precautions:N/A   Braces: N/A  Respiratory Status: Nasal cannula, flow 7 L/min    Exams:  Cognitive Exam:  Patient is oriented to Person, Place, Time, and Situation  RLE ROM: WFL  RLE Strength: poor+  LLE ROM: limited by hematoma and pain L knee  LLE Strength: poor    Functional Mobility:  Bed Mobility:     Supine to Sit: maximal assistance and of 2 persons  Sit to Supine: maximal assistance and of 2 persons      AM-PAC 6 CLICK MOBILITY  Total Score:8       Treatment & Education:  Safety, fall prec, pacing    Patient left HOB elevated with all lines intact, call button in reach, nurse notified, and  present.    GOALS:   Multidisciplinary Problems       Physical Therapy Goals          Problem: Physical Therapy    Goal Priority Disciplines Outcome Goal Variances Interventions   Physical Therapy Goal     PT, PT/OT      Description: Goals to be met by: 10/01/24     Patient will increase functional independence with mobility by performin. Supine to sit with [place]  2. Sit to supine with [place]  3. Sit to stand transfer with Modified [place]  4. Gait  x 50 feet with Modified  [place] using Rolling Walker.                          History:     Past Medical History:   Diagnosis Date    Coronary artery disease     cardiac stent    DDD (degenerative disc disease), lumbar     Diabetes mellitus     Hypertension     Thyroid disease        Past Surgical History:   Procedure Laterality Date     SECTION  08/1987    x2 1993    CORONARY STENT PLACEMENT  2017    EXPLORATORY LAPAROTOMY      [place]     HEMORRHOID SURGERY      LAPAROSCOPIC CHOLECYSTECTOMY N/A 2022    Procedure: CHOLECYSTECTOMY, LAPAROSCOPIC;  Surgeon: Leonardo Wilson III, MD;  Location: [facility];  Service: General;  Laterality: N/A;    LUMBAR DISC SURGERY      PILONIDAL CYST DRAINAGE      TONSILLECTOMY      as a child (also adenoids)       Time Tracking:     PT Received On: 24  PT Start Time: 1105     PT Stop Time: 1117  PT Total Time (min): 12 min     Billable Minutes: Evaluation 12      2024

## 2024-09-01 NOTE — ASSESSMENT & PLAN NOTE
Improving  Likely multifactorial  S/P extubation on 8/31  Oxygen supplement as need  EF 60-65%  Pulmonology is following patient

## 2024-09-01 NOTE — NURSING
"Around 2106 patient converted to Afib with RVR with 's-170's. Patient was asymptomatic and states, "I've been in Afib before and see Dr. Bruner." Dr. Whitman notified and orders given for Metoprolol 5mg IVP once. Medication given 2135 and patient's 's-120's. Patient also requesting medication for anxiety and nausea. Dr. Whitman ordered Phenergan 12.5mg IVPB once and Valium 5mg IVP once. Phenergan given at 2140 and Valium given at 2150. Patient's BP slowly dropped to 80's over 40's. Dr. Whitman notified and orders for a 500cc NS bolus given. Patient now resting comfortably with bipap on and BP up 127/58 with HR in the 90's-110's.   "

## 2024-09-01 NOTE — SUBJECTIVE & OBJECTIVE
Interval History:  Patient said she feels tired however she feels much better    Review of Systems   All other systems reviewed and are negative.    Objective:     Vital Signs (Most Recent):  Temp: 98 °F (36.7 °C) (09/01/24 0500)  Pulse: 74 (09/01/24 0901)  Resp: (!) 22 (09/01/24 1229)  BP: (!) 150/67 (09/01/24 0901)  SpO2: (!) 94 % (09/01/24 0901) Vital Signs (24h Range):  Temp:  [97.9 °F (36.6 °C)-98.5 °F (36.9 °C)] 98 °F (36.7 °C)  Pulse:  [] 74  Resp:  [9-31] 22  SpO2:  [90 %-100 %] 94 %  BP: ()/(44-80) 150/67  Arterial Line BP: ()/(-16-73) 34/-16     Weight: 121.2 kg (267 lb 3.2 oz)  Body mass index is 44.46 kg/m².    Intake/Output Summary (Last 24 hours) at 9/1/2024 1252  Last data filed at 9/1/2024 0601  Gross per 24 hour   Intake 1540 ml   Output 3045 ml   Net -1505 ml         Physical Exam  Constitutional:       Appearance: She is obese. She is not ill-appearing.   Cardiovascular:      Rate and Rhythm: Normal rate.      Pulses: Normal pulses.   Pulmonary:      Effort: Pulmonary effort is normal.   Abdominal:      General: Bowel sounds are normal. There is no distension.      Tenderness: There is no abdominal tenderness.   Skin:     Comments: Hematoma noted on left lower extremity.   Neurological:      Mental Status: She is alert and oriented to person, place, and time.             Significant Labs: All pertinent labs within the past 24 hours have been reviewed.    Significant Imaging: I have reviewed all pertinent imaging results/findings within the past 24 hours.

## 2024-09-01 NOTE — RESPIRATORY THERAPY
09/01/24 0824   PRE-TX-O2   SpO2 (!) 93 %   Pulse 66   Resp (!) 9   Preset CPAP/BiPAP Settings   Mode Of Delivery BiPAP S/T   $ CPAP/BiPAP Daily Charge BiPAP/CPAP Daily   $ Is patient using?   (for naps and night use)

## 2024-09-01 NOTE — NURSING
Nurses Note -- 4 Eyes      8/31/2024  8:00 PM      Skin assessed during: Q Shift Change      [x] No Altered Skin Integrity Present    []Prevention Measures Documented      [] Yes- Altered Skin Integrity Present or Discovered   [] LDA Added if Not in Epic (Describe Wound)   [] New Altered Skin Integrity was Present on Admit and Documented in LDA   [] Wound Image Taken    Wound Care Consulted? No    Attending Nurse:  Lili Forte RN/Staff Member:  CARLOS Tamez

## 2024-09-01 NOTE — RESPIRATORY THERAPY
09/01/24 1324   Patient Assessment/Suction   Level of Consciousness (AVPU) alert   Respiratory Effort Normal;Unlabored   Expansion/Accessory Muscles/Retractions no use of accessory muscles;no retractions;expansion symmetric   All Lung Fields Breath Sounds Anterior:;Lateral:   HARRISON Breath Sounds clear   LLL Breath Sounds coarse;diminished   RUL Breath Sounds clear   RML Breath Sounds diminished   RLL Breath Sounds coarse;diminished   Rhythm/Pattern, Respiratory shallow;unlabored;pattern regular   Cough Frequency infrequent   Cough Type congested   PRE-TX-O2   Device (Oxygen Therapy) Oxymask   Flow (L/min) (Oxygen Therapy) 7   SpO2 96 %   Pulse Oximetry Type Continuous   $ Pulse Oximetry - Multiple Charge Pulse Oximetry - Multiple   Pulse 78   Resp (!) 23   Aerosol Therapy   $ Aerosol Therapy Charges Aerosol Treatment   Daily Review of Necessity (SVN) completed   Respiratory Treatment Status (SVN) given   Treatment Route (SVN) mask;oxygen   Patient Position HOB elevated   Post Treatment Assessment (SVN) increased aeration   Signs of Intolerance (SVN) none   Breath Sounds Post-Respiratory Treatment   Throughout All Fields Post-Treatment All Fields   Throughout All Fields Post-Treatment aeration increased   Post-treatment Heart Rate (beats/min) 72   Post-treatment Resp Rate (breaths/min) 12   Incentive Spirometer   $ Incentive Spirometer Charges done with encouragement   Incentive Spirometer Predicted Level (mL) 1500   Administration (IS) instruction provided, follow-up   Number of Repetitions (IS) 10   Level Incentive Spirometer (mL) 1000   Patient Tolerance (IS) good

## 2024-09-01 NOTE — CARE UPDATE
08/31/24 1957   Patient Assessment/Suction   Level of Consciousness (AVPU) alert   Respiratory Effort Unlabored   Expansion/Accessory Muscles/Retractions expansion symmetric   All Lung Fields Breath Sounds diminished   Rhythm/Pattern, Respiratory depth regular;pattern regular   Cough Frequency infrequent   Cough Type good;nonproductive   PRE-TX-O2   Device (Oxygen Therapy) Oxymask   $ Is the patient on Low Flow Oxygen? Yes   Flow (L/min) (Oxygen Therapy) 7   SpO2 95 %   Pulse Oximetry Type Continuous   $ Pulse Oximetry - Multiple Charge Pulse Oximetry - Multiple   Pulse 73   Resp 12   Aerosol Therapy   $ Aerosol Therapy Charges Aerosol Treatment   Daily Review of Necessity (SVN) completed   Respiratory Treatment Status (SVN) given   Treatment Route (SVN) mask   Patient Position HOB elevated   Post Treatment Assessment (SVN) breath sounds unchanged   Signs of Intolerance (SVN) none   Breath Sounds Post-Respiratory Treatment   Throughout All Fields Post-Treatment All Fields   Throughout All Fields Post-Treatment no change   Post-treatment Heart Rate (beats/min) 73   Post-treatment Resp Rate (breaths/min) 15   Education   $ Education BiPAP;15 min

## 2024-09-01 NOTE — PT/OT/SLP EVAL
Occupational Therapy   Evaluation    Name: Karo Leonard  MRN: [id number]  Admitting Diagnosis: Acute hypoxic on chronic hypercapnic respiratory failure  Recent Surgery: * No surgery found *      Recommendations:     Discharge Recommendations: Moderate Intensity Therapy  Discharge Equipment Recommendations:  to be determined by next level of care  Barriers to discharge:  Decreased caregiver support    Assessment:     Karo Leonard is a 64 y.o. female with a medical diagnosis of Acute hypoxic on chronic hypercapnic respiratory failure.  She presents with c/o LLE pain noting hematoma on LLE. Noted edema in bilateral hands limiting FM coordination. Patient sat EOB requiring Max A with intermittent CGA when sitting. Performance deficits affecting function: weakness, impaired endurance, impaired sensation, impaired self care skills, impaired functional mobility, gait instability, impaired balance, decreased lower extremity function, decreased upper extremity function, decreased coordination, pain, decreased ROM, edema, impaired cardiopulmonary response to activity, impaired skin.      Rehab Prognosis: Good; patient would benefit from acute skilled OT services to address these deficits and reach maximum level of function.       Plan:     Patient to be seen 5 x/week to address the above listed problems via self-care/home management, therapeutic activities, therapeutic exercises  Plan of Care Expires: 09/22/24  Plan of Care Reviewed with: patient, spouse    Subjective     Chief Complaint: L knee pain  Patient/Family Comments/goals: none    Occupational Profile:  Living Environment: Patient lives with  in a [facility] with ramp.   Previous level of function: Patient was Mod I with ADLs. Patient was ambulatory w/o AD at home.  Equipment Used at Home: wheelchair, walker, rolling, bedside commode, shower chair, CPAP, oxygen  Assistance upon Discharge: Patient will receive assistance from spouse but would benefit from SNF  placement.    Pain/Comfort:  Pain Rating 1: 8/10  Location - Side 1: Left  Location - Orientation 1: generalized  Location 1: knee  Pain Addressed 1: Reposition, Distraction, Cessation of Activity  Pain Rating Post-Intervention 1: 8/10    Patients cultural, spiritual, Pentec[employer]al conflicts given the current situation: no    Objective:     Communicated with: nurse prior to session.  Patient found HOB elevated with blood pressure cuff, shah catheter, oxygen, peripheral IV, pulse ox (continuous), telemetry upon OT entry to room.    General Precautions: Standard, fall  Orthopedic Precautions: N/A  Braces: N/A  Respiratory Status: Nasal cannula, flow 7 L/min    Occupational Performance:    Bed Mobility:    Patient completed Scooting/Bridging with maximal assistance  Patient completed Supine to Sit with maximal assistance  Patient completed Sit to Supine with maximal assistance and 2 persons    Functional Mobility/Transfers:  Deferred transfers due to LLE pain and fatigue    Activities of Daily Living:  Grooming: stand by assistance with all grooming tasks in bed  Lower Body Dressing: maximal assistance with all LB dressing in bed  Toileting: dependence with shah cath in place    Cognitive/Visual Perceptual:  Cognitive/Psychosocial Skills:     -       Oriented to: x4   -       Follows Commands/attention:Follows multistep  commands  -       Communication: clear/fluent  -       Safety awareness/insight to disability: intact   -       Mood/Affect/Coping skills/emotional control: Appropriate to situation and Cooperative  Visual/Perceptual:      -Intact     Physical Exam:  Postural examination/scapula alignment:    -       Rounded shoulders  -       Forward head  Upper Extremity Range of Motion:     -       Right Upper Extremity: WFL  -       Left Upper Extremity: WFL  Upper Extremity Strength:    -       Right Upper Extremity: WFL  -       Left Upper Extremity: WFL   Strength:    -       Right Upper Extremity: WFL  -        Left Upper Extremity: WFL  Fine Motor Coordination:    -       Intact  Gross motor coordination:   WFL in BUE    AMPAC 6 Click ADL:  AMPA Total Score: 10    Treatment & Education:  Pt educated on role of OT/POC, importance of time EOB/OOB, safety with ADLs, importance of intermittent mobility, safe techniques for transfers/ambulation, discharge recommendations/options, and use of call light for assistance and fall prevention.         Patient left HOB elevated with all lines intact, call button in reach, nurse notified, and  present    GOALS:   Multidisciplinary Problems       Occupational Therapy Goals          Problem: Occupational Therapy    Goal Priority Disciplines Outcome Interventions   Occupational Therapy Goal     OT, PT/OT Progressing    Description: Goals to be met by: 2024     Patient will increase functional independence with ADLs by performing:    LE Dressing with Minimal Assistance.  Grooming while seated with Supervision.  Toileting from bedside commode with Minimal Assistance for hygiene and clothing management.   Supine to sit with Minimal Assistance.  Toilet transfer to bedside commode with Minimal Assistance.  Upper extremity exercise program, with supervision.                         History:     Past Medical History:   Diagnosis Date    Coronary artery disease     cardiac stent    DDD (degenerative disc disease), lumbar 2000    Diabetes mellitus     Hypertension     Thyroid disease          Past Surgical History:   Procedure Laterality Date     SECTION  08/1987    x2 1993    CORONARY STENT PLACEMENT      EXPLORATORY LAPAROTOMY      Hindu     HEMORRHOID SURGERY      LAPAROSCOPIC CHOLECYSTECTOMY N/A 2022    Procedure: CHOLECYSTECTOMY, LAPAROSCOPIC;  Surgeon: Leonardo Wilson III, MD;  Location: [facility];  Service: General;  Laterality: N/A;    LUMBAR DISC SURGERY      PILONIDAL CYST DRAINAGE      TONSILLECTOMY      as  a child (also adenoids)       Time Tracking:     OT Date of Treatment: 09/01/24  OT Start Time: 1105  OT Stop Time: 1120  OT Total Time (min): 15 min    Billable Minutes:Evaluation 5  Self Care/Home Management 10    9/1/2024

## 2024-09-01 NOTE — PLAN OF CARE
Problem: Occupational Therapy  Goal: Occupational Therapy Goal  Description: Goals to be met by: 9/22/2024     Patient will increase functional independence with ADLs by performing:    LE Dressing with Minimal Assistance.  Grooming while seated with Supervision.  Toileting from bedside commode with Minimal Assistance for hygiene and clothing management.   Supine to sit with Minimal Assistance.  Toilet transfer to bedside commode with Minimal Assistance.  Upper extremity exercise program, with supervision.    Outcome: Progressing

## 2024-09-01 NOTE — PROGRESS NOTES
Pulmonary/Critical Care  Progress Note      Patient name: Karo Leonard  MRN: [id number]  Date: 09/01/2024    Admit Date: 8/25/2024  Consult Requested By: Olga Lidia Pedroza DO    Reason for Consult: Respiratory failure, COPD    HPI:    8/25/2024 - 65 yo ASCVD, DM, HTN. COPD(cigarette use) had increased SOB at home and a fall.  EMS was called and brought to ER.  Initially tried on BiPAP without response and was subsequently intubated and placed on ventilation.  Initial ABG showed over ventilation and we have adjusted and ABG are getting better.  She is sedated and not able to provide any history.  D/W  who says that she was supposed to see a pulmonologist but couldn't make appointment, she has been a chronic smoker.  She had increase SOB for a few days.  She did have a fall at home but he reports that she was not down for long.  He does report that she has been having recurring falls at home.      8/26/2024 - Stable overnight, O2 needs still too high to do SBT.  She is responsive and gets agitated on current sedation and we are adjusting.  No other new issues reported.  Hgb has decreased (no active bleeding reported), NA remains low, CPK is better, TFTF noted and c/w hypothyroid (synthroid started).    8/27/2024 - Remains critically ill, O2 needs down a little but still too high.  Difficult to sedate is either agitated or apneic.  Tried SBT this AM and was apneic.  VS reviewed.  She is 3.8 liters +.  Tube feeds have been started.  NA remains low, glucose is elevated.  CXR looks about the same    8/28/2024 - Stable overnight, O2 needs still up.  She does get agitated at times and we have adjusted meds.  Trouble with tube feeds and will hold today and restart tomorrow.  Not ready to wean because on increased FiO2 and will try to increase PEP to see if that helps.  She does not have a lot of secretions.  She is over ventilaed some and we adjusted vent.  CXR is about the same    8/29/2024 - Stable overnight,, O2  needs still up some (I decreased FiO2 and increased PEEP some) and she is overventilated (we adjusted rate).  [facility]  She is 4.6 liters +.  CXR is about the same.    - continues on vent, settings adjusted for alkalosis- now on PS 12/5. CXR looks wet and pt with severe edema- fluids discontinued and starting lasix. Propofol has been weaned a little. Pt is awake and denies pain. Her blood pressure was up to 200 systolic at the time of my evaluation- RN states she just gave am blood pressure med and also Lasix.    - pt awake, off sedation, writing and alert. Denies pain, states feeling better. She was significantly more alkalotic this am due to lasix so given a dose of diamox this am. Tolerating PS 10/5 now- will repeat abg soon and consider extubation     - extubated yesterday and tolerated well    Review of Systems    Review of Systems   Unable to perform ROS: Intubated       Past Medical History    Past Medical History:   Diagnosis Date    Coronary artery disease 2017    cardiac stent    DDD (degenerative disc disease), lumbar 2000    Diabetes mellitus     Hypertension     Thyroid disease        Past Surgical History    Past Surgical History:   Procedure Laterality Date     SECTION  08/1987    x2 1993    CORONARY STENT PLACEMENT      EXPLORATORY LAPAROTOMY      Sabianism     HEMORRHOID SURGERY      LAPAROSCOPIC CHOLECYSTECTOMY N/A 2022    Procedure: CHOLECYSTECTOMY, LAPAROSCOPIC;  Surgeon: Leonardo Wilson III, MD;  Location: [facility];  Service: General;  Laterality: N/A;    LUMBAR DISC SURGERY      PILONIDAL CYST DRAINAGE      TONSILLECTOMY      as a child (also adenoids)       Medications (scheduled):      albuterol-ipratropium  3 mL Nebulization Q6H    cyanocobalamin  1,000 mcg Oral Daily    diltiaZEM  240 mg Oral Daily    enoxparin  40 mg Subcutaneous Q12H    famotidine (PF)  20 mg  Intravenous Q12H    ferric gluconate (FERRLECIT) 125 mg in 0.9% NaCl 100 mL IVPB  125 mg Intravenous Daily    insulin glargine U-100  40 Units Subcutaneous BID    levothyroxine  50 mcg Oral Before breakfast    losartan  100 mg Oral Daily    methylPREDNISolone injection (PEDS and ADULTS)  40 mg Intravenous Q8H    senna-docusate 8.6-50 mg  1 tablet Oral BID       Medications (infusions):           Medications (prn):       Current Facility-Administered Medications:     calcium gluconate IVPB, 1 g, Intravenous, PRN    calcium gluconate IVPB, 2 g, Intravenous, PRN    calcium gluconate IVPB, 3 g, Intravenous, PRN    dextrose 50%, 12.5 g, Intravenous, PRN    glucagon (human recombinant), 1 mg, Intramuscular, PRN    hydrALAZINE, 10 mg, Intravenous, Q6H PRN    insulin aspart U-100, 0-10 Units, Subcutaneous, Q6H PRN    magnesium sulfate IVPB, 2 g, Intravenous, PRN    magnesium sulfate IVPB, 4 g, Intravenous, PRN    morphine, 2 mg, Intravenous, Q1H PRN    potassium bicarbonate, 35 mEq, Oral, PRN    potassium bicarbonate, 50 mEq, Oral, PRN    potassium bicarbonate, 60 mEq, Oral, PRN    potassium chloride in water, 40 mEq, Intravenous, PRN **AND** potassium chloride in water, 60 mEq, Intravenous, PRN **AND** potassium chloride in water, 80 mEq, Intravenous, PRN    sodium chloride 0.9%, 10 mL, Intravenous, PRN    sodium phosphate 15 mmol in D5W 250 mL IVPB, 15 mmol, Intravenous, PRN    sodium phosphate 20.01 mmol in D5W 250 mL IVPB, 20.01 mmol, Intravenous, PRN    sodium phosphate 30 mmol in D5W 250 mL IVPB, 30 mmol, Intravenous, PRN    Family History: No family history on file.    Social History: Tobacco:   Social History     Tobacco Use   Smoking Status Not on file   Smokeless Tobacco Not on file                                EtOH:   Social History     Substance and Sexual Activity   Alcohol Use No                                Drugs:   Social History     Substance and Sexual Activity   Drug Use No       Physical  "Exam    Vital signs:  Temp:  [97.9 °F (36.6 °C)-98.5 °F (36.9 °C)]   Pulse:  []   Resp:  [9-31]   BP: ()/(44-80)   SpO2:  [90 %-100 %]   Arterial Line BP: ()/(-16-73)     Intake/Output:   Intake/Output Summary (Last 24 hours) at 9/1/2024 1105  Last data filed at 9/1/2024 0601  Gross per 24 hour   Intake 1540 ml   Output 3695 ml   Net -2155 ml        BMI: Estimated body mass index is 44.46 kg/m² as calculated from the following:    Height as of this encounter: 5' 5" (1.651 m).    Weight as of this encounter: 121.2 kg (267 lb 3.2 oz).    Physical Exam  Vitals and nursing note reviewed.   Constitutional:       General: She is not in acute distress.     Appearance: She is not ill-appearing, toxic-appearing or diaphoretic.      Comments: Awake, alert no distress   HENT:      Head: Normocephalic.      Comments: Some bruising right forehead and bilat eyes from her fall     Right Ear: External ear normal.      Left Ear: External ear normal.      Nose: Nose normal. No congestion or rhinorrhea.      Mouth/Throat:      Mouth: Mucous membranes are moist.      Pharynx: Oropharynx is clear. No oropharyngeal exudate or posterior oropharyngeal erythema.   Eyes:      General: No scleral icterus.        Right eye: No discharge.         Left eye: No discharge.      Extraocular Movements: Extraocular movements intact.      Conjunctiva/sclera: Conjunctivae normal.      Pupils: Pupils are equal, round, and reactive to light.   Neck:      Vascular: No carotid bruit.   Cardiovascular:      Rate and Rhythm: Normal rate and regular rhythm.      Pulses: Normal pulses.      Heart sounds: No murmur heard.     No friction rub. No gallop.   Pulmonary:      Effort: Pulmonary effort is normal. No respiratory distress.      Breath sounds: No stridor. No wheezing, rhonchi or rales.   Chest:      Chest wall: No tenderness.   Abdominal:      General: There is no distension.      Tenderness: There is no abdominal tenderness. There is " "no guarding.      Comments: Hypoactive BS   Musculoskeletal:         General: No swelling. Normal range of motion.      Cervical back: Normal range of motion and neck supple. No rigidity or tenderness.      Right lower leg: Edema present.      Left lower leg: Edema present.   Lymphadenopathy:      Cervical: No cervical adenopathy.   Skin:     General: Skin is warm and dry.      Capillary Refill: Capillary refill takes less than 2 seconds.      Coloration: Skin is not jaundiced.      Findings: Bruising present.      Comments: + bruise LLE   Neurological:      General: No focal deficit present.      Cranial Nerves: No cranial nerve deficit.      Sensory: No sensory deficit.      Motor: No weakness.   Psychiatric:         Mood and Affect: Mood normal.         Behavior: Behavior normal.         Laboratory    Recent Labs   Lab 09/01/24  0525   WBC 9.73   RBC 3.00*   HGB 8.9*   HCT 29.4*      MCV 98   MCH 29.7   MCHC 30.3*       Recent Labs   Lab 09/01/24  0525   CALCIUM 8.1*   ALBUMIN 2.8*   PROT 5.5*      K 3.8   CO2 37*   CL 97   BUN 10   CREATININE 0.6   ALKPHOS 64   ALT 9*   AST 15   BILITOT 0.4       No results for input(s): "PT", "INR", "APTT" in the last 24 hours.    No results for input(s): "CPK", "CPKMB", "TROPONINI", "MB" in the last 24 hours.      Additional labs: reviewed    Microbiology:       Microbiology Results (last 7 days)       Procedure Component Value Units Date/Time    Culture, Respiratory with Gram Stain [[id number]] Collected: 08/25/24 0556    Order Status: Completed Specimen: Sputum from Tracheal Aspirate Updated: 08/27/24 0728     Respiratory Culture Normal respiratory denise     Comment: Previous comment was modified by ALEKSANDRA at 07:27 on 08/27/2024 No   significant isolate          Gram Stain (Respiratory) <10 epithelial cells per low power field.     Gram Stain (Respiratory) Few WBC's     Gram Stain (Respiratory) Few Gram positive cocci    Respiratory Infection Panel (PCR), " Nasopharyngeal [[id number]] Collected: 08/26/24 0930    Order Status: Completed Specimen: Nasopharyngeal Swab Updated: 08/26/24 1141     Respiratory Infection Panel Source NP swab     Adenovirus Not Detected     Coronavirus 229E, Common Cold Virus Not Detected     Coronavirus HKU1, Common Cold Virus Not Detected     Coronavirus NL63, Common Cold Virus Not Detected     Coronavirus OC43, Common Cold Virus Not Detected     Comment: Coronavirus strains 229E, HKU1, NL63, and OC43 can cause the common   cold   and are not associated with the respiratory disease outbreak caused   by  the COVID-19 (SARS-CoV-2 novel Coronavirus) strain.           SARS-CoV2 (COVID-19) Qualitative PCR Not Detected     Human Metapneumovirus Not Detected     Human Rhinovirus/Enterovirus Not Detected     Influenza A (subtypes H1, H1-2009,H3) Not Detected     Influenza B Not Detected     Parainfluenza Virus 1 Not Detected     Parainfluenza Virus 2 Not Detected     Parainfluenza Virus 3 Not Detected     Parainfluenza Virus 4 Not Detected     Respiratory Syncytial Virus Not Detected     Bordetella Parapertussis (JH5440) Not Detected     Bordetella pertussis (ptxP) Not Detected     Chlamydia pneumoniae Not Detected     Mycoplasma pneumoniae Not Detected     Comment: Respiratory Infection Panel testing performed by Multiplex PCR.       Narrative:      Respiratory Infection Panel source->NP Swab    MRSA Screen by PCR [[id number]] Collected: 08/26/24 0931    Order Status: Completed Specimen: Nasal Swab Updated: 08/26/24 1127     MRSA SCREEN BY PCR Negative    MRSA Screen by PCR [[id number]] Collected: 08/26/24 0927    Order Status: Canceled Specimen: Nasal Swab     Culture, Respiratory with Gram Stain [[id number]]     Order Status: No result Specimen: Respiratory             Radiology    US Extremity Non Vascular Complete Left  Narrative: EXAMINATION:  US EXTREMITY NON VASCULAR COMPLETE LEFT    CLINICAL HISTORY:  hematoma;    TECHNIQUE:  Targeted  grayscale and color Doppler sonographic analysis was performed.    COMPARISON:  None.    FINDINGS:  Targeted evaluation of the left proximal pretibial soft tissues shows a 6 x 3.5 x 1.8 cm circumscribed, heterogeneous hypoechoic mass in the subcutaneous fat.  This mass has increased through transmission of sound, and no internal color Doppler vascular flow.  Impression: Complex mass in the left pretibial subcutaneous fat, presumably reflecting hematoma given clinical history.    Electronically signed by: Christiano Tirado  Date:    08/31/2024  Time:    14:18  X-Ray Chest AP Portable  Narrative: EXAMINATION:  XR CHEST AP PORTABLE    CLINICAL HISTORY:  Intubated;    FINDINGS:  Portable chest radiograph at 05:00 hours compared 08/30/2024 shows ET and NG tubes unchanged in position, with stable enlarged cardiac silhouette and normal pulmonary vascularity.    There are persistent left lower lung opacities obscuring the left hemidiaphragm, with no new pleural or parenchymal abnormality.  No pneumothorax.  Impression: No significant interval change.    Electronically signed by: Christiano Tirado  Date:    08/31/2024  Time:    07:42        Additional Studies    reviewed    Ventilator Information    Oxygen Concentration (%):  [50] 50             Recent Labs     09/01/24  0318   PH 7.437   PCO2 54.8*   PO2 87   HCO3 37.0*   POCSATURATED 97   BE 13*         Impression    Active Hospital Problems    Diagnosis  POA    *Acute hypoxic on chronic hypercapnic respiratory failure [J96.01, J96.12]  Yes    Goals of care, counseling/discussion [Z71.89]  Not Applicable    Type 2 diabetes mellitus [E11.9]  Yes    Anemia [D64.9]  Yes    Acute metabolic encephalopathy [G93.41]  Yes    Fall [W19.XXXA]  Unknown    COPD exacerbation [J44.1]  Yes    Pneumonia [J18.9]  Yes    Hypothyroid [E03.9]  Yes    Hypertension [I10]  Yes      Resolved Hospital Problems   No resolved problems to display.       Plan    Continue O2 to maintain sats 89-92%  Continue bipap  nightly  Respiratory treatments  Wean steroid to prednisone 40mg daily  Completed levaquin 7d  Discontinue lasix  Antihypertensives continue  Watch H/H - no active bleeding reported  Replace electrolytes  Synthroid   Watch BS and treat as needed  Spouse at bedside updated  D/w RN and RT  Transfer out of ICU  Pulmonary will sign off at this time; please call if further questions arise        Adri Stephenson MD  Pulmonary & Critical Care Medicine

## 2024-09-01 NOTE — RESPIRATORY THERAPY
09/01/24 0720   Patient Assessment/Suction   Level of Consciousness (AVPU) alert   Respiratory Effort Normal;Unlabored   Expansion/Accessory Muscles/Retractions no use of accessory muscles;no retractions;expansion symmetric   All Lung Fields Breath Sounds Anterior:   HARRISON Breath Sounds diminished   LLL Breath Sounds diminished   RUL Breath Sounds diminished   RML Breath Sounds diminished   RLL Breath Sounds diminished   Rhythm/Pattern, Respiratory unlabored;pattern regular;shallow   Cough Frequency no cough   PRE-TX-O2   Device (Oxygen Therapy) Oxymask   $ Is the patient on Low Flow Oxygen? Yes   Flow (L/min) (Oxygen Therapy) 7   SpO2 98 %   Pulse Oximetry Type Continuous   $ Pulse Oximetry - Multiple Charge Pulse Oximetry - Multiple   Pulse 67   Resp 13   Aerosol Therapy   $ Aerosol Therapy Charges Aerosol Treatment   Daily Review of Necessity (SVN) completed   Respiratory Treatment Status (SVN) given   Treatment Route (SVN) mask;oxygen   Patient Position HOB elevated   Post Treatment Assessment (SVN) increased aeration   Signs of Intolerance (SVN) none   Breath Sounds Post-Respiratory Treatment   Throughout All Fields Post-Treatment All Fields   Throughout All Fields Post-Treatment aeration increased   Post-treatment Heart Rate (beats/min) 66   Post-treatment Resp Rate (breaths/min) 12   Incentive Spirometer   $ Incentive Spirometer Charges done with encouragement   Incentive Spirometer Predicted Level (mL) 1500   Administration (IS) instruction provided, initial     Pt uses BIPAP at night

## 2024-09-01 NOTE — PROGRESS NOTES
[facility] Medicine  Progress Note    Patient Name: Karo Leonard  MRN: [id number]  Patient Class: IP- Inpatient   Admission Date: 8/25/2024  Length of Stay: 7 days  Attending Physician: Olga Lidia Pedroza DO  Primary Care Provider: Navneet Hernandez FNP        Subjective:     Principal Problem:Acute hypoxic on chronic hypercapnic respiratory failure        HPI:  64F p/w fall in the context of shortness of breath. EMS reportedly gave narcan w/o appreciable response, then she was given duoneb en route to [facility] ED. Upon arrival, she was tachypneic, had respy sounding phonation, but she denied chest pain, fever, or chills. She was placed on BiPAP, and initially demonstrated improvement. However, she became less responsive and appeared to tire out. She was given additional narcan w/o appreciable response, so she was intubated. She had some hypotension after receiving sedation, so she was placed on low dose levophed. [place] was placed, lasix was given, as was Abx. Large bruise noted on LLE, so x-ray ordered.     Overview/Hospital Course:  64 F Patient with hx of COPD, morbid obesity was admitted for acute on chronic hypoxemic hypercapnic respiratory failure. Patient was intubated after failing  BiPAP. Finished course of IV Levofloxacin for COPD exacerbation and possible pneumonia. Pulm has been managing her steroid dose. Patient was started on iron and B12 supplement for anemia. S/P exubation on 8/31. For her hypertension, patient was started on losartan and her home diltiazem was resumed. Likely can be downgrade on 9/2 if ok by pulm.       Interval History:  Patient said she feels tired however she feels much better    Review of Systems   All other systems reviewed and are negative.    Objective:     Vital Signs (Most Recent):  Temp: 98 °F (36.7 °C) (09/01/24 0500)  Pulse: 74 (09/01/24 0901)  Resp: (!) 22 (09/01/24 1229)  BP: (!) 150/67 (09/01/24 0901)  SpO2: (!) 94 % (09/01/24 0901) Vital Signs  (24h Range):  Temp:  [97.9 °F (36.6 °C)-98.5 °F (36.9 °C)] 98 °F (36.7 °C)  Pulse:  [] 74  Resp:  [9-31] 22  SpO2:  [90 %-100 %] 94 %  BP: ()/(44-80) 150/67  Arterial Line BP: ()/(-16-73) 34/-16     Weight: 121.2 kg (267 lb 3.2 oz)  Body mass index is 44.46 kg/m².    Intake/Output Summary (Last 24 hours) at 9/1/2024 1252  Last data filed at 9/1/2024 0601  Gross per 24 hour   Intake 1540 ml   Output 3045 ml   Net -1505 ml         Physical Exam  Constitutional:       Appearance: She is obese. She is not ill-appearing.   Cardiovascular:      Rate and Rhythm: Normal rate.      Pulses: Normal pulses.   Pulmonary:      Effort: Pulmonary effort is normal.   Abdominal:      General: Bowel sounds are normal. There is no distension.      Tenderness: There is no abdominal tenderness.   Skin:     Comments: Hematoma noted on left lower extremity.   Neurological:      Mental Status: She is alert and oriented to person, place, and time.             Significant Labs: All pertinent labs within the past 24 hours have been reviewed.    Significant Imaging: I have reviewed all pertinent imaging results/findings within the past 24 hours.    Assessment/Plan:      * Acute hypoxic on chronic hypercapnic respiratory failure  Improving  Likely multifactorial  S/P extubation on 8/31  Oxygen supplement as need  EF 60-65%  Pulmonology is following patient    COPD exacerbation  Continue with steroids, pulm is managing   Completed Levofloxacin antibiotic course on 9/1    Pneumonia  MRSA neg, procal neg, respiratory panel neg  Completed Levofloxacin antibiotic course on 9/1    Hypertension  Started Losartan on 8/28 and amlodipine on 8/31  Can make adjustment as needed  Resumed home diltiazem on 9/1    Goals of care, counseling/discussion  Discussed the goal of care with patient's  on 8/28.    Anemia  Likely from Iron and b12 deficiency  Started on Iron and B12 supplement on 8/27    Type 2 diabetes mellitus  Continue with  "SSI and glargine  Make adjustment as needed    Fall  Imaging ruled out acute fracture  US confirmed hematoma on left lower extremity    Acute metabolic encephalopathy  Resolved  CT head: no acute changes  Continue to monitor     Hypothyroid  Continue home levothyroxine        VTE Risk Mitigation (From admission, onward)           Ordered     enoxaparin injection 40 mg  Every 12 hours        Note to Pharmacy: Ht: 5' 5" (1.651 m)  Wt: 127 kg (280 lb)  Estimated Creatinine Clearance: 92.8 mL/min (based on SCr of 0.8 mg/dL).  Body mass index is 46.59 kg/m².    08/25/24 0627     IP VTE HIGH RISK PATIENT  Once         08/25/24 0627     Place sequential compression device  Until discontinued         08/25/24 0627                    Discharge Planning   CHELE:      Code Status: Full Code   Is the patient medically ready for discharge?:     Reason for patient still in hospital (select all that apply): Patient trending condition  Discharge Plan A: Home Health   Discharge Delays: None known at this time        Critical care time spent on the evaluation and treatment of severe organ dysfunction, review of pertinent labs and imaging studies, discussions with consulting providers and discussions with patient/family: 32 minutes.      Olga Lidia Pedroza DO  Department of Hospital Medicine   [facility]    "

## 2024-09-01 NOTE — ASSESSMENT & PLAN NOTE
Started Losartan on 8/28 and amlodipine on 8/31  Can make adjustment as needed  Resumed home diltiazem on 9/1

## 2024-09-02 LAB
ALBUMIN SERPL BCP-MCNC: 2.9 G/DL (ref 3.5–5.2)
ALP SERPL-CCNC: 70 U/L (ref 55–135)
ALT SERPL W/O P-5'-P-CCNC: 11 U/L (ref 10–44)
ANION GAP SERPL CALC-SCNC: 4 MMOL/L (ref 8–16)
AST SERPL-CCNC: 17 U/L (ref 10–40)
BASOPHILS # BLD AUTO: 0.01 K/UL (ref 0–0.2)
BASOPHILS NFR BLD: 0.1 % (ref 0–1.9)
BILIRUB SERPL-MCNC: 0.4 MG/DL (ref 0.1–1)
BUN SERPL-MCNC: 14 MG/DL (ref 8–23)
CALCIUM SERPL-MCNC: 8.4 MG/DL (ref 8.7–10.5)
CHLORIDE SERPL-SCNC: 97 MMOL/L (ref 95–110)
CO2 SERPL-SCNC: 34 MMOL/L (ref 23–29)
CREAT SERPL-MCNC: 0.7 MG/DL (ref 0.5–1.4)
DIFFERENTIAL METHOD BLD: ABNORMAL
EOSINOPHIL # BLD AUTO: 0 K/UL (ref 0–0.5)
EOSINOPHIL NFR BLD: 0 % (ref 0–8)
ERYTHROCYTE [DISTWIDTH] IN BLOOD BY AUTOMATED COUNT: 17.7 % (ref 11.5–14.5)
EST. GFR  (NO RACE VARIABLE): >60 ML/MIN/1.73 M^2
GLUCOSE SERPL-MCNC: 157 MG/DL (ref 70–110)
GLUCOSE SERPL-MCNC: 183 MG/DL (ref 70–110)
GLUCOSE SERPL-MCNC: 191 MG/DL (ref 70–110)
GLUCOSE SERPL-MCNC: 300 MG/DL (ref 70–110)
GLUCOSE SERPL-MCNC: 315 MG/DL (ref 70–110)
HCT VFR BLD AUTO: 29.5 % (ref 37–48.5)
HGB BLD-MCNC: 9.1 G/DL (ref 12–16)
IMM GRANULOCYTES # BLD AUTO: 0.13 K/UL (ref 0–0.04)
IMM GRANULOCYTES NFR BLD AUTO: 1.2 % (ref 0–0.5)
LYMPHOCYTES # BLD AUTO: 1 K/UL (ref 1–4.8)
LYMPHOCYTES NFR BLD: 8.7 % (ref 18–48)
MCH RBC QN AUTO: 30.8 PG (ref 27–31)
MCHC RBC AUTO-ENTMCNC: 30.8 G/DL (ref 32–36)
MCV RBC AUTO: 100 FL (ref 82–98)
MONOCYTES # BLD AUTO: 0.9 K/UL (ref 0.3–1)
MONOCYTES NFR BLD: 7.7 % (ref 4–15)
NEUTROPHILS # BLD AUTO: 9.1 K/UL (ref 1.8–7.7)
NEUTROPHILS NFR BLD: 82.3 % (ref 38–73)
NRBC BLD-RTO: 0 /100 WBC
PLATELET # BLD AUTO: 221 K/UL (ref 150–450)
PMV BLD AUTO: 9.6 FL (ref 9.2–12.9)
POTASSIUM SERPL-SCNC: 3.7 MMOL/L (ref 3.5–5.1)
PROT SERPL-MCNC: 5.5 G/DL (ref 6–8.4)
RBC # BLD AUTO: 2.95 M/UL (ref 4–5.4)
SODIUM SERPL-SCNC: 135 MMOL/L (ref 136–145)
WBC # BLD AUTO: 11.07 K/UL (ref 3.9–12.7)

## 2024-09-02 PROCEDURE — 94799 UNLISTED PULMONARY SVC/PX: CPT | Mod: XB

## 2024-09-02 PROCEDURE — 99900035 HC TECH TIME PER 15 MIN (STAT)

## 2024-09-02 PROCEDURE — 27000221 HC OXYGEN, UP TO 24 HOURS

## 2024-09-02 PROCEDURE — 94761 N-INVAS EAR/PLS OXIMETRY MLT: CPT

## 2024-09-02 PROCEDURE — 25000003 PHARM REV CODE 250: Performed by: FAMILY MEDICINE

## 2024-09-02 PROCEDURE — 63600175 PHARM REV CODE 636 W HCPCS: Performed by: FAMILY MEDICINE

## 2024-09-02 PROCEDURE — 63600175 PHARM REV CODE 636 W HCPCS: Performed by: HOSPITALIST

## 2024-09-02 PROCEDURE — 25000003 PHARM REV CODE 250: Performed by: INTERNAL MEDICINE

## 2024-09-02 PROCEDURE — 21400001 HC TELEMETRY ROOM

## 2024-09-02 PROCEDURE — 80053 COMPREHEN METABOLIC PANEL: CPT | Performed by: FAMILY MEDICINE

## 2024-09-02 PROCEDURE — 63600175 PHARM REV CODE 636 W HCPCS: Performed by: INTERNAL MEDICINE

## 2024-09-02 PROCEDURE — 94660 CPAP INITIATION&MGMT: CPT

## 2024-09-02 PROCEDURE — 85025 COMPLETE CBC W/AUTO DIFF WBC: CPT | Performed by: FAMILY MEDICINE

## 2024-09-02 PROCEDURE — 25000242 PHARM REV CODE 250 ALT 637 W/ HCPCS: Performed by: HOSPITALIST

## 2024-09-02 PROCEDURE — 27100171 HC OXYGEN HIGH FLOW UP TO 24 HOURS

## 2024-09-02 PROCEDURE — 25000003 PHARM REV CODE 250: Performed by: HOSPITALIST

## 2024-09-02 PROCEDURE — 82962 GLUCOSE BLOOD TEST: CPT

## 2024-09-02 PROCEDURE — 99900031 HC PATIENT EDUCATION (STAT)

## 2024-09-02 PROCEDURE — 94640 AIRWAY INHALATION TREATMENT: CPT

## 2024-09-02 RX ORDER — MECLIZINE HCL 12.5 MG 12.5 MG/1
12.5 TABLET ORAL 3 TIMES DAILY PRN
Status: DISCONTINUED | OUTPATIENT
Start: 2024-09-02 | End: 2024-09-04

## 2024-09-02 RX ORDER — INSULIN GLARGINE 100 [IU]/ML
42 INJECTION, SOLUTION SUBCUTANEOUS 2 TIMES DAILY
Status: DISCONTINUED | OUTPATIENT
Start: 2024-09-02 | End: 2024-09-08

## 2024-09-02 RX ADMIN — PREDNISONE 40 MG: 20 TABLET ORAL at 08:09

## 2024-09-02 RX ADMIN — IPRATROPIUM BROMIDE AND ALBUTEROL SULFATE 3 ML: 2.5; .5 SOLUTION RESPIRATORY (INHALATION) at 07:09

## 2024-09-02 RX ADMIN — INSULIN GLARGINE 42 UNITS: 100 INJECTION, SOLUTION SUBCUTANEOUS at 08:09

## 2024-09-02 RX ADMIN — DILTIAZEM HYDROCHLORIDE 240 MG: 120 CAPSULE, COATED, EXTENDED RELEASE ORAL at 08:09

## 2024-09-02 RX ADMIN — FAMOTIDINE 20 MG: 10 INJECTION INTRAVENOUS at 08:09

## 2024-09-02 RX ADMIN — INSULIN ASPART 6 UNITS: 100 INJECTION, SOLUTION INTRAVENOUS; SUBCUTANEOUS at 04:09

## 2024-09-02 RX ADMIN — HYDROCODONE BITARTRATE AND ACETAMINOPHEN 1 TABLET: 5; 325 TABLET ORAL at 04:09

## 2024-09-02 RX ADMIN — MECLIZINE HYDROCHLORIDE 12.5 MG: 12.5 TABLET ORAL at 01:09

## 2024-09-02 RX ADMIN — ENOXAPARIN SODIUM 40 MG: 40 INJECTION SUBCUTANEOUS at 08:09

## 2024-09-02 RX ADMIN — INSULIN ASPART 2 UNITS: 100 INJECTION, SOLUTION INTRAVENOUS; SUBCUTANEOUS at 11:09

## 2024-09-02 RX ADMIN — LIDOCAINE 1 PATCH: 50 PATCH CUTANEOUS at 01:09

## 2024-09-02 RX ADMIN — INSULIN ASPART 4 UNITS: 100 INJECTION, SOLUTION INTRAVENOUS; SUBCUTANEOUS at 08:09

## 2024-09-02 RX ADMIN — POTASSIUM BICARBONATE 50 MEQ: 977.5 TABLET, EFFERVESCENT ORAL at 04:09

## 2024-09-02 RX ADMIN — MORPHINE SULFATE 2 MG: 2 INJECTION, SOLUTION INTRAMUSCULAR; INTRAVENOUS at 03:09

## 2024-09-02 RX ADMIN — LOSARTAN POTASSIUM 100 MG: 50 TABLET, FILM COATED ORAL at 08:09

## 2024-09-02 RX ADMIN — CYANOCOBALAMIN TAB 1000 MCG 1000 MCG: 1000 TAB at 08:09

## 2024-09-02 RX ADMIN — SENNOSIDES AND DOCUSATE SODIUM 1 TABLET: 8.6; 5 TABLET ORAL at 08:09

## 2024-09-02 RX ADMIN — HYDROCODONE BITARTRATE AND ACETAMINOPHEN 1 TABLET: 5; 325 TABLET ORAL at 07:09

## 2024-09-02 RX ADMIN — IPRATROPIUM BROMIDE AND ALBUTEROL SULFATE 3 ML: 2.5; .5 SOLUTION RESPIRATORY (INHALATION) at 12:09

## 2024-09-02 RX ADMIN — SODIUM CHLORIDE 125 MG: 9 INJECTION, SOLUTION INTRAVENOUS at 08:09

## 2024-09-02 RX ADMIN — LEVOTHYROXINE SODIUM 50 MCG: 0.03 TABLET ORAL at 06:09

## 2024-09-02 RX ADMIN — IPRATROPIUM BROMIDE AND ALBUTEROL SULFATE 3 ML: 2.5; .5 SOLUTION RESPIRATORY (INHALATION) at 01:09

## 2024-09-02 RX ADMIN — MORPHINE SULFATE 2 MG: 2 INJECTION, SOLUTION INTRAMUSCULAR; INTRAVENOUS at 09:09

## 2024-09-02 NOTE — CARE UPDATE
09/02/24 0722   Patient Assessment/Suction   Level of Consciousness (AVPU) alert   Respiratory Effort Normal;Unlabored   Expansion/Accessory Muscles/Retractions expansion symmetric   All Lung Fields Breath Sounds diminished;clear   Rhythm/Pattern, Respiratory unlabored   Cough Frequency no cough   Skin Integrity   $ Wound Care Tech Time 15 min   Area Observed Bridge of nose   Skin Appearance without discoloration   PRE-TX-O2   Device (Oxygen Therapy) Oxymask   $ Is the patient on High Flow Oxygen? Yes   Flow (L/min) (Oxygen Therapy) 6  (decreased from 7l to wean as tolerated)   SpO2 98 %   Pulse Oximetry Type Continuous   $ Pulse Oximetry - Multiple Charge Pulse Oximetry - Multiple   Pulse 68   Resp 14   Aerosol Therapy   $ Aerosol Therapy Charges Aerosol Treatment   Daily Review of Necessity (SVN) completed   Respiratory Treatment Status (SVN) given   Treatment Route (SVN) mask;oxygen   Patient Position HOB elevated   Post Treatment Assessment (SVN) breath sounds unchanged   Signs of Intolerance (SVN) none   Breath Sounds Post-Respiratory Treatment   Throughout All Fields Post-Treatment All Fields   Throughout All Fields Post-Treatment no change   Post-treatment Heart Rate (beats/min) 68   Post-treatment Resp Rate (breaths/min) 15   Preset CPAP/BiPAP Settings   Mode Of Delivery BiPAP S/T;Standby   $ CPAP/BiPAP Daily Charge BiPAP/CPAP Daily   Education   $ Education Bronchodilator;Oxygen;15 min

## 2024-09-02 NOTE — PROGRESS NOTES
[facility]  Adult Nutrition   Progress Note (Follow-Up)    SUMMARY     Recommendations  Recommendation/Intervention:   1. Continue 2000 DM diet. DM education provided.   2. Honor food prefs as diet allows.   3. RD following.  Goals: 1. Patient to recieve >/= 75% EEN / EPN by follow up.  Nutrition Goal Status: progressing towards goal  Communication of RD Recs: reviewed with physician    Nutrition Diagnosis PES Statement:   Altered nutrition related lab values related to stress/ uncontrolled BG as evidence by A1C 9.5.   Inadequate (suboptimal) protein-energy intake related to NPO status as evidenced by patient intubated, sedated with lipid sedation (propofol) - D/c'd     Dietitian Rounds Brief  RD follow up. Pt seen in room with family at bedside. She was extubated on 8/31 and po intake is good. No issues with N/V. Pt states she is allergic to all artifical sweetners and she  just uses small amounts of sugar at home to [employer].  A1C is 9.5- state it was better in May before she was sick. Pt states she is compliant with checking sugars, limiting po intake  and medications at home.Py states she does not take insulin at home.     Nutrition Related Social Determinants of Health: SDOH: None Identified  Food Insecurity: No Food Insecurity (8/26/2024)    Hunger Vital Sign     Worried About Running Out of Food in the Last Year: Never true     Ran Out of Food in the Last Year: Never true      Malnutrition Assessment     Orbital Region (Subcutaneous Fat Loss): well nourished  Upper Arm Region (Subcutaneous Fat Loss): well nourished   Caodaism Region (Muscle Loss): well nourished  Clavicle Bone Region (Muscle Loss): well nourished  Clavicle and Acromion Bone Region (Muscle Loss): well nourished         Diet order:   Current Diet Order: 2000 DM      Current Nutrition Support Formula Ordered: Pulmocare  Current Nutrition Support Rate Ordered: 0 (ml)  Current Nutrition Support Frequency Ordered:  "continuous    Evaluation of Received Nutrient/Fluid Intake  Enteral Calories (kcal): 0  Enteral Protein (gm): 0  Enteral (Free Water) Fluid (mL): 0  Free Water Flush Fluid (mL): 0  Lipid Calories (kcals): 0 kcals  Other Calories (kcal): 0  Energy Calories Required: meeting needs  Protein Required: meeting needs  Fluid Required: meeting needs    Tolerance: tolerating     % Intake of Estimated Energy Needs: 75 - 100 %  % Meal Intake: 75 - 100 %      Intake/Output Summary (Last 24 hours) at 9/2/2024 1220  Last data filed at 9/2/2024 1133  Gross per 24 hour   Intake 700 ml   Output 1275 ml   Net -575 ml        Anthropometrics  Temp: 98.1 °F (36.7 °C)  Height Method: Stated  Height: 5' 5" (165.1 cm)  Height (inches): 65 in  Weight Method: Bed Scale  Weight: 118.6 kg (261 lb 7.5 oz)  Weight (lb): 261.47 lb  Ideal Body Weight (IBW), Female: 125 lb  % Ideal Body Weight, Female (lb): 224 %  BMI (Calculated): 43.5  BMI Grade: greater than 40 - morbid obesity       Estimated/Assessed Needs  Weight Used For Calorie Calculations: 118.6 kg (261 lb 7.5 oz)  Energy Calorie Requirements (kcal): 2397-4441 kcal (20-25 kcal/ kg bw)  Energy Need Method: Kcal/kg  Protein Requirements: 114-143 gm/day (2.0-2.5 gm/kg IBW  Weight Used For Protein Calculations: 57 kg (125 lb 10.6 oz) (IBW)  Fluid Requirements (mL): per MD  Estimated Fluid Requirement Method: RDA Method  RDA Method (mL): 2372  CHO Requirement: 200-250 gm (40-50% of 2000 kcal)    Reason for Assessment  Reason For Assessment: RD follow-up  Diagnosis: pulmonary disease  Relevant Medical History: ASCVD, DM 2, HTN. COPD(cigarette use)  Interdisciplinary Rounds: did not attend  General Information Comments: Per pulmonary consult: patient with home O2, history of COPD had increased SOB at home and fell.  Nutrition Discharge Planning: DM diet- no artifical sweetners    Nutrition/Diet History  Patient Reported Diet/Restrictions/Preferences: diabetic diet  Spiritual, Cultural Beliefs, " Sikhism Practices, Values that Affect Care: no  Food Allergies: other (see comments) (artifical sweetners)  Factors Affecting Nutritional Intake: None identified at this time    Nutrition Risk Screen  Nutrition Risk Screen: tube feeding or parenteral nutrition     MST Score: 0  Have you recently lost weight without trying?: No  Weight loss score: 0  Have you been eating poorly because of a decreased appetite?: No  Appetite score: 0       Weight History:  Wt Readings from Last 10 Encounters:   09/02/24 118.6 kg (261 lb 7.5 oz)   08/30/24 122.9 kg (271 lb)   06/20/24 95.6 kg (210 lb 12.2 oz)   06/16/24 97.5 kg (215 lb)   06/16/22 97 kg (213 lb 13.5 oz)   06/14/22 97 kg (213 lb 13.5 oz)   06/14/22 101.5 kg (223 lb 12.3 oz)   06/12/22 101.5 kg (223 lb 12.3 oz)   06/08/22 104.3 kg (229 lb 15 oz)   07/21/20 108.8 kg (239 lb 13.8 oz)        Lab/Procedures/Meds: Pertinent Labs/Meds Reviewed    Medications:Pertinent Medications Reviewed  Scheduled Meds:   albuterol-ipratropium  3 mL Nebulization Q6H    cyanocobalamin  1,000 mcg Oral Daily    diltiaZEM  240 mg Oral Daily    enoxparin  40 mg Subcutaneous Q12H    famotidine (PF)  20 mg Intravenous Q12H    ferric gluconate (FERRLECIT) 125 mg in 0.9% NaCl 100 mL IVPB  125 mg Intravenous Daily    insulin glargine U-100  42 Units Subcutaneous BID    levothyroxine  50 mcg Oral Before breakfast    LIDOcaine  1 patch Transdermal Q24H    losartan  100 mg Oral Daily    predniSONE  40 mg Oral Daily    senna-docusate 8.6-50 mg  1 tablet Oral BID         Labs: Pertinent Labs Reviewed  Clinical Chemistry:  Recent Labs   Lab 08/31/24  0308 08/31/24  2245 09/01/24  0525 09/02/24  0336    137 137 135*   K 3.6 3.5 3.8 3.7   CL 90* 95 97 97   CO2 43* 33* 37* 34*   * 255* 210* 191*   BUN 9 9 10 14   CREATININE 0.7 0.7 0.6 0.7   CALCIUM 7.6* 8.1* 8.1* 8.4*   PROT 5.4*  --  5.5* 5.5*   ALBUMIN 2.8*  --  2.8* 2.9*   BILITOT 0.4  --  0.4 0.4   ALKPHOS 61  --  64 70   AST 15  --  15 17    ALT 8*  --  9* 11   ANIONGAP 4* 9 3* 4*   MG  --  2.1  --   --      CBC:   Recent Labs   Lab 09/02/24  0336   WBC 11.07   RBC 2.95*   HGB 9.1*   HCT 29.5*      *   MCH 30.8   MCHC 30.8*     Monitor and Evaluation  Food and Nutrient Intake: energy intake, food and beverage intake  Food and Nutrient Adminstration: diet order  Knowledge/Beliefs/Attitudes: food and nutrition knowledge/skill  Physical Activity and Function: nutrition-related ADLs and IADLs  Anthropometric Measurements: weight, weight change  Biochemical Data, Medical Tests and Procedures: gastrointestinal profile, glucose/endocrine profile, inflammatory profile, lipid profile  Nutrition-Focused Physical Findings: overall appearance     Nutrition Risk  Level of Risk/Frequency of Follow-up:  (weekly)     Nutrition Follow-Up  RD Follow-up?: Yes    Pretty Pang, ANNETTE 09/02/2024 12:27 PM

## 2024-09-02 NOTE — ASSESSMENT & PLAN NOTE
Imaging ruled out acute fracture  US confirmed hematoma on left lower extremity  Fall precautions discussed with the patient.  Meclizine side effects reviewed with the patient.

## 2024-09-02 NOTE — PROGRESS NOTES
[facility]  Progress Note    Patient Name: Karo Leonard  MRN: [id number]  Patient Class: IP- Inpatient   Admission Date: 8/25/2024  Length of Stay: 8 days  Attending Physician: Olga Lidia Pedroza DO  Primary Care Provider: Navneet Hernandez FNP        Subjective:     Principal Problem:Acute hypoxic on chronic hypercapnic respiratory failure        HPI:  64F p/w fall in the context of shortness of breath. EMS reportedly gave narcan w/o appreciable response, then she was given duoneb en route to [facility] ED. Upon arrival, she was tachypneic, had respy sounding phonation, but she denied chest pain, fever, or chills. She was placed on BiPAP, and initially demonstrated improvement. However, she became less responsive and appeared to tire out. She was given additional narcan w/o appreciable response, so she was intubated. She had some hypotension after receiving sedation, so she was placed on low dose levophed. Pick[place] was placed, lasix was given, as was Abx. Large bruise noted on LLE, so x-ray ordered.     Overview/Hospital Course:  64 F Patient with hx of COPD, morbid obesity was admitted for acute on chronic hypoxemic hypercapnic respiratory failure. Patient was intubated after failing  BiPAP. Finished course of IV Levofloxacin for COPD exacerbation and possible pneumonia. Pulm has been managing her steroid dose. Patient was started on iron and B12 supplement for anemia. S/P exubation on 8/31. For her hypertension, patient was started on losartan and her home diltiazem was resumed. Likely can be downgrade on 9/2 if ok by pulm.       Interval History:  Patient without any new subjective complaints other than dizziness.  Patient is currently on 6 liter/minute supplemental oxygen via nasal cannula.  Patient used BiPAP therapy last night.  Patient's  is present at bedside.  Patient requesting meclizine to be resumed which she receives on regular basis for chronic vertigo.  Fall  precautions addressed.    Review of Systems   Constitutional:  Positive for fatigue.   All other systems reviewed and are negative.    Objective:     Vital Signs (Most Recent):  Temp: 98.1 °F (36.7 °C) (09/02/24 0706)  Pulse: 68 (09/02/24 0722)  Resp: 14 (09/02/24 0722)  BP: 138/73 (09/02/24 0706)  SpO2: 98 % (09/02/24 0722) Vital Signs (24h Range):  Temp:  [97.6 °F (36.4 °C)-98.6 °F (37 °C)] 98.1 °F (36.7 °C)  Pulse:  [] 68  Resp:  [10-31] 14  SpO2:  [87 %-100 %] 98 %  BP: (109-165)/(57-73) 138/73     Weight: 118.6 kg (261 lb 7.5 oz)  Body mass index is 43.51 kg/m².    Intake/Output Summary (Last 24 hours) at 9/2/2024 0814  Last data filed at 9/2/2024 0606  Gross per 24 hour   Intake 460 ml   Output 1075 ml   Net -615 ml         Physical Exam  Constitutional:       Appearance: She is obese. She is not ill-appearing.   Cardiovascular:      Rate and Rhythm: Normal rate.      Pulses: Normal pulses.   Pulmonary:      Effort: Pulmonary effort is normal.   Abdominal:      General: Bowel sounds are normal. There is no distension.      Tenderness: There is no abdominal tenderness.   Skin:     Comments: Hematoma noted on left lower extremity.   Neurological:      Mental Status: She is alert and oriented to person, place, and time.             Significant Labs:   Lab Results   Component Value Date    WBC 11.07 09/02/2024    HGB 9.1 (L) 09/02/2024    HCT 29.5 (L) 09/02/2024     (H) 09/02/2024     09/02/2024       CMP  Sodium   Date Value Ref Range Status   09/02/2024 135 (L) 136 - 145 mmol/L Final     Potassium   Date Value Ref Range Status   09/02/2024 3.7 3.5 - 5.1 mmol/L Final     Chloride   Date Value Ref Range Status   09/02/2024 97 95 - 110 mmol/L Final     CO2   Date Value Ref Range Status   09/02/2024 34 (H) 23 - 29 mmol/L Final     Glucose   Date Value Ref Range Status   09/02/2024 191 (H) 70 - 110 mg/dL Final     BUN   Date Value Ref Range Status   09/02/2024 14 8 - 23 mg/dL Final     Creatinine    Date Value Ref Range Status   09/02/2024 0.7 0.5 - 1.4 mg/dL Final     Calcium   Date Value Ref Range Status   09/02/2024 8.4 (L) 8.7 - 10.5 mg/dL Final     Total Protein   Date Value Ref Range Status   09/02/2024 5.5 (L) 6.0 - 8.4 g/dL Final     Albumin   Date Value Ref Range Status   09/02/2024 2.9 (L) 3.5 - 5.2 g/dL Final     Total Bilirubin   Date Value Ref Range Status   09/02/2024 0.4 0.1 - 1.0 mg/dL Final     Comment:     For infants and newborns, interpretation of results should be based  on gestational age, weight and in agreement with clinical  observations.    Premature Infant recommended reference ranges:  Up to 24 hours.............<8.0 mg/dL  Up to 48 hours............<12.0 mg/dL  3-5 days..................<15.0 mg/dL  6-29 days.................<15.0 mg/dL       Alkaline Phosphatase   Date Value Ref Range Status   09/02/2024 70 55 - 135 U/L Final     AST   Date Value Ref Range Status   09/02/2024 17 10 - 40 U/L Final     ALT   Date Value Ref Range Status   09/02/2024 11 10 - 44 U/L Final     Anion Gap   Date Value Ref Range Status   09/02/2024 4 (L) 8 - 16 mmol/L Final     eGFR   Date Value Ref Range Status   09/02/2024 >60.0 >60 mL/min/1.73 m^2 Final     Microbiology Results (last 7 days)       Procedure Component Value Units Date/Time    Culture, Respiratory with Gram Stain [[id number]] Collected: 08/25/24 0556    Order Status: Completed Specimen: Sputum from Tracheal Aspirate Updated: 08/27/24 0728     Respiratory Culture Normal respiratory denise     Comment: Previous comment was modified by ALEKSANDRA at 07:27 on 08/27/2024 No   significant isolate          Gram Stain (Respiratory) <10 epithelial cells per low power field.     Gram Stain (Respiratory) Few WBC's     Gram Stain (Respiratory) Few Gram positive cocci    Respiratory Infection Panel (PCR), Nasopharyngeal [[id number]] Collected: 08/26/24 0930    Order Status: Completed Specimen: Nasopharyngeal Swab Updated: 08/26/24 1141     Respiratory  Infection Panel Source NP swab     Adenovirus Not Detected     Coronavirus 229E, Common Cold Virus Not Detected     Coronavirus HKU1, Common Cold Virus Not Detected     Coronavirus NL63, Common Cold Virus Not Detected     Coronavirus OC43, Common Cold Virus Not Detected     Comment: Coronavirus strains 229E, HKU1, NL63, and OC43 can cause the common   cold   and are not associated with the respiratory disease outbreak caused   by  the COVID-19 (SARS-CoV-2 novel Coronavirus) strain.           SARS-CoV2 (COVID-19) Qualitative PCR Not Detected     Human Metapneumovirus Not Detected     Human Rhinovirus/Enterovirus Not Detected     Influenza A (subtypes H1, H1-2009,H3) Not Detected     Influenza B Not Detected     Parainfluenza Virus 1 Not Detected     Parainfluenza Virus 2 Not Detected     Parainfluenza Virus 3 Not Detected     Parainfluenza Virus 4 Not Detected     Respiratory Syncytial Virus Not Detected     Bordetella Parapertussis (VD7179) Not Detected     Bordetella pertussis (ptxP) Not Detected     Chlamydia pneumoniae Not Detected     Mycoplasma pneumoniae Not Detected     Comment: Respiratory Infection Panel testing performed by Multiplex PCR.       Narrative:      Respiratory Infection Panel source->NP Swab    MRSA Screen by PCR [[id number]] Collected: 08/26/24 0931    Order Status: Completed Specimen: Nasal Swab Updated: 08/26/24 1127     MRSA SCREEN BY PCR Negative    MRSA Screen by PCR [[id number]] Collected: 08/26/24 0927    Order Status: Canceled Specimen: Nasal Swab           Significant Imaging:   ECHO:    Left Ventricle: The left ventricle is normal in size. Mildly increased wall thickness. There is mild concentric hypertrophy. Normal wall motion. There is normal systolic function with a visually estimated ejection fraction of 60 - 65%. There is normal diastolic function.    Right Ventricle: Normal right ventricular cavity size. Wall thickness is normal. Systolic function is normal.    IVC/SVC: Normal  "venous pressure at 3 mmHg.        Assessment/Plan:      * Acute hypoxic on chronic hypercapnic respiratory failure  Improving  Likely multifactorial  S/P extubation on 8/31  Oxygen supplement as need  EF 60-65%  Pulmonology is following patient    Type 2 diabetes mellitus  Continue with SSI and glargine  Make adjustment as needed    Goals of care, counseling/discussion  Discussed the goal of care with patient's  on 8/28.    Anemia  Likely from Iron and b12 deficiency  Started on Iron and B12 supplement on 8/27    Fall  Imaging ruled out acute fracture  US confirmed hematoma on left lower extremity  Fall precautions discussed with the patient.  Meclizine side effects reviewed with the patient.    Acute metabolic encephalopathy  Resolved  CT head: no acute changes  Continue to monitor     Pneumonia  MRSA neg, procal neg, respiratory panel neg  Completed Levofloxacin antibiotic course on 9/1    COPD exacerbation  Continue with steroids, pulm is managing   Completed Levofloxacin antibiotic course on 9/1    Hypertension  Started Losartan on 8/28 and amlodipine on 8/31  Can make adjustment as needed  Resumed home diltiazem on 9/1    Hypothyroid  Continue home levothyroxine      Discussed with patient at bedside.  Answered all questions.  Patient encouraged to participate with PT and OT.  Patient can be transferred to medicine telemetry floor.  Fall precautions addressed with the patient.  VTE Risk Mitigation (From admission, onward)           Ordered     enoxaparin injection 40 mg  Every 12 hours        Note to Pharmacy: Ht: 5' 5" (1.651 m)  Wt: 127 kg (280 lb)  Estimated Creatinine Clearance: 92.8 mL/min (based on SCr of 0.8 mg/dL).  Body mass index is 46.59 kg/m².    08/25/24 0627     IP VTE HIGH RISK PATIENT  Once         08/25/24 0627     Place sequential compression device  Until discontinued         08/25/24 0627                    Discharge Planning   CHELE:  9/4    Code Status: Full Code   Is the patient " medically ready for discharge?:     Reason for patient still in hospital (select all that apply): Patient trending condition and Consult recommendations  Discharge Plan A: Home Health   Discharge Delays: None known at this time        Critical care time spent on the evaluation and treatment of severe organ dysfunction, review of pertinent labs and imaging studies, discussions with consulting providers and discussions with patient/family: 35 minutes.      Terrance Garcia MD  Department of Hospital Medicine   [facility]

## 2024-09-02 NOTE — CARE UPDATE
09/01/24 1912   Patient Assessment/Suction   Level of Consciousness (AVPU) alert   Respiratory Effort Unlabored   Expansion/Accessory Muscles/Retractions expansion symmetric   All Lung Fields Breath Sounds clear   Rhythm/Pattern, Respiratory depth regular;pattern regular   Cough Frequency infrequent   Cough Type good;nonproductive   PRE-TX-O2   Device (Oxygen Therapy) Oxymask   $ Is the patient on Low Flow Oxygen? Yes   Flow (L/min) (Oxygen Therapy) 7   SpO2 (!) 94 %   Pulse Oximetry Type Continuous   $ Pulse Oximetry - Multiple Charge Pulse Oximetry - Multiple   Pulse 74   Resp (!) 24   BP (!) 142/67   Aerosol Therapy   $ Aerosol Therapy Charges Aerosol Treatment   Daily Review of Necessity (SVN) completed   Respiratory Treatment Status (SVN) given   Treatment Route (SVN) mask   Patient Position HOB elevated   Post Treatment Assessment (SVN) breath sounds unchanged   Signs of Intolerance (SVN) none   Breath Sounds Post-Respiratory Treatment   Throughout All Fields Post-Treatment All Fields   Throughout All Fields Post-Treatment no change   Post-treatment Heart Rate (beats/min) 68   Post-treatment Resp Rate (breaths/min) 10   Incentive Spirometer   $ Incentive Spirometer Charges done with encouragement   Incentive Spirometer Predicted Level (mL) 1500   Administration (IS) instruction provided, follow-up   Number of Repetitions (IS) 10   Level Incentive Spirometer (mL) 750   Patient Tolerance (IS) good   Education   $ Education Oxygen;15 min

## 2024-09-02 NOTE — PLAN OF CARE
Problem: Adult Inpatient Plan of Care  Goal: Optimal Comfort and Wellbeing  Outcome: Progressing     Problem: Diabetes Comorbidity  Goal: Blood Glucose Level Within Targeted Range  Outcome: Progressing     Problem: Pneumonia  Goal: Effective Oxygenation and Ventilation  Outcome: Progressing

## 2024-09-02 NOTE — SUBJECTIVE & OBJECTIVE
Interval History:  Patient without any new subjective complaints other than dizziness.  Patient is currently on 6 liter/minute supplemental oxygen via nasal cannula.  Patient used BiPAP therapy last night.  Patient's  is present at bedside.  Patient requesting meclizine to be resumed which she receives on regular basis for chronic vertigo.  Fall precautions addressed.    Review of Systems   Constitutional:  Positive for fatigue.   All other systems reviewed and are negative.    Objective:     Vital Signs (Most Recent):  Temp: 98.1 °F (36.7 °C) (09/02/24 0706)  Pulse: 68 (09/02/24 0722)  Resp: 14 (09/02/24 0722)  BP: 138/73 (09/02/24 0706)  SpO2: 98 % (09/02/24 0722) Vital Signs (24h Range):  Temp:  [97.6 °F (36.4 °C)-98.6 °F (37 °C)] 98.1 °F (36.7 °C)  Pulse:  [] 68  Resp:  [10-31] 14  SpO2:  [87 %-100 %] 98 %  BP: (109-165)/(57-73) 138/73     Weight: 118.6 kg (261 lb 7.5 oz)  Body mass index is 43.51 kg/m².    Intake/Output Summary (Last 24 hours) at 9/2/2024 0814  Last data filed at 9/2/2024 0606  Gross per 24 hour   Intake 460 ml   Output 1075 ml   Net -615 ml         Physical Exam  Constitutional:       Appearance: She is obese. She is not ill-appearing.   Cardiovascular:      Rate and Rhythm: Normal rate.      Pulses: Normal pulses.   Pulmonary:      Effort: Pulmonary effort is normal.   Abdominal:      General: Bowel sounds are normal. There is no distension.      Tenderness: There is no abdominal tenderness.   Skin:     Comments: Hematoma noted on left lower extremity.   Neurological:      Mental Status: She is alert and oriented to person, place, and time.             Significant Labs:   Lab Results   Component Value Date    WBC 11.07 09/02/2024    HGB 9.1 (L) 09/02/2024    HCT 29.5 (L) 09/02/2024     (H) 09/02/2024     09/02/2024       CMP  Sodium   Date Value Ref Range Status   09/02/2024 135 (L) 136 - 145 mmol/L Final     Potassium   Date Value Ref Range Status   09/02/2024 3.7  3.5 - 5.1 mmol/L Final     Chloride   Date Value Ref Range Status   09/02/2024 97 95 - 110 mmol/L Final     CO2   Date Value Ref Range Status   09/02/2024 34 (H) 23 - 29 mmol/L Final     Glucose   Date Value Ref Range Status   09/02/2024 191 (H) 70 - 110 mg/dL Final     BUN   Date Value Ref Range Status   09/02/2024 14 8 - 23 mg/dL Final     Creatinine   Date Value Ref Range Status   09/02/2024 0.7 0.5 - 1.4 mg/dL Final     Calcium   Date Value Ref Range Status   09/02/2024 8.4 (L) 8.7 - 10.5 mg/dL Final     Total Protein   Date Value Ref Range Status   09/02/2024 5.5 (L) 6.0 - 8.4 g/dL Final     Albumin   Date Value Ref Range Status   09/02/2024 2.9 (L) 3.5 - 5.2 g/dL Final     Total Bilirubin   Date Value Ref Range Status   09/02/2024 0.4 0.1 - 1.0 mg/dL Final     Comment:     For infants and newborns, interpretation of results should be based  on gestational age, weight and in agreement with clinical  observations.    Premature Infant recommended reference ranges:  Up to 24 hours.............<8.0 mg/dL  Up to 48 hours............<12.0 mg/dL  3-5 days..................<15.0 mg/dL  6-29 days.................<15.0 mg/dL       Alkaline Phosphatase   Date Value Ref Range Status   09/02/2024 70 55 - 135 U/L Final     AST   Date Value Ref Range Status   09/02/2024 17 10 - 40 U/L Final     ALT   Date Value Ref Range Status   09/02/2024 11 10 - 44 U/L Final     Anion Gap   Date Value Ref Range Status   09/02/2024 4 (L) 8 - 16 mmol/L Final     eGFR   Date Value Ref Range Status   09/02/2024 >60.0 >60 mL/min/1.73 m^2 Final     Microbiology Results (last 7 days)       Procedure Component Value Units Date/Time    Culture, Respiratory with Gram Stain [[id number]] Collected: 08/25/24 0556    Order Status: Completed Specimen: Sputum from Tracheal Aspirate Updated: 08/27/24 0728     Respiratory Culture Normal respiratory denise     Comment: Previous comment was modified by ALEKSANDRA at 07:27 on 08/27/2024 No   significant isolate           Gram Stain (Respiratory) <10 epithelial cells per low power field.     Gram Stain (Respiratory) Few WBC's     Gram Stain (Respiratory) Few Gram positive cocci    Respiratory Infection Panel (PCR), Nasopharyngeal [[id number]] Collected: 08/26/24 0930    Order Status: Completed Specimen: Nasopharyngeal Swab Updated: 08/26/24 1141     Respiratory Infection Panel Source NP swab     Adenovirus Not Detected     Coronavirus 229E, Common Cold Virus Not Detected     Coronavirus HKU1, Common Cold Virus Not Detected     Coronavirus NL63, Common Cold Virus Not Detected     Coronavirus OC43, Common Cold Virus Not Detected     Comment: Coronavirus strains 229E, HKU1, NL63, and OC43 can cause the common   cold   and are not associated with the respiratory disease outbreak caused   by  the COVID-19 (SARS-CoV-2 novel Coronavirus) strain.           SARS-CoV2 (COVID-19) Qualitative PCR Not Detected     Human Metapneumovirus Not Detected     Human Rhinovirus/Enterovirus Not Detected     Influenza A (subtypes H1, H1-2009,H3) Not Detected     Influenza B Not Detected     Parainfluenza Virus 1 Not Detected     Parainfluenza Virus 2 Not Detected     Parainfluenza Virus 3 Not Detected     Parainfluenza Virus 4 Not Detected     Respiratory Syncytial Virus Not Detected     Bordetella Parapertussis (UD3649) Not Detected     Bordetella pertussis (ptxP) Not Detected     Chlamydia pneumoniae Not Detected     Mycoplasma pneumoniae Not Detected     Comment: Respiratory Infection Panel testing performed by Multiplex PCR.       Narrative:      Respiratory Infection Panel source->NP Swab    MRSA Screen by PCR [[id number]] Collected: 08/26/24 0931    Order Status: Completed Specimen: Nasal Swab Updated: 08/26/24 1127     MRSA SCREEN BY PCR Negative    MRSA Screen by PCR [[id number]] Collected: 08/26/24 0927    Order Status: Canceled Specimen: Nasal Swab           Significant Imaging:   ECHO:    Left Ventricle: The left ventricle is normal in size.  Mildly increased wall thickness. There is mild concentric hypertrophy. Normal wall motion. There is normal systolic function with a visually estimated ejection fraction of 60 - 65%. There is normal diastolic function.    Right Ventricle: Normal right ventricular cavity size. Wall thickness is normal. Systolic function is normal.    IVC/SVC: Normal venous pressure at 3 mmHg.

## 2024-09-03 LAB
ALBUMIN SERPL BCP-MCNC: 3 G/DL (ref 3.5–5.2)
ALP SERPL-CCNC: 77 U/L (ref 55–135)
ALT SERPL W/O P-5'-P-CCNC: 10 U/L (ref 10–44)
ANION GAP SERPL CALC-SCNC: 5 MMOL/L (ref 8–16)
AST SERPL-CCNC: 12 U/L (ref 10–40)
BASOPHILS # BLD AUTO: 0.01 K/UL (ref 0–0.2)
BASOPHILS NFR BLD: 0.1 % (ref 0–1.9)
BILIRUB SERPL-MCNC: 0.4 MG/DL (ref 0.1–1)
BUN SERPL-MCNC: 14 MG/DL (ref 8–23)
CALCIUM SERPL-MCNC: 8.6 MG/DL (ref 8.7–10.5)
CHLORIDE SERPL-SCNC: 96 MMOL/L (ref 95–110)
CO2 SERPL-SCNC: 34 MMOL/L (ref 23–29)
CREAT SERPL-MCNC: 0.6 MG/DL (ref 0.5–1.4)
DIFFERENTIAL METHOD BLD: ABNORMAL
EOSINOPHIL # BLD AUTO: 0 K/UL (ref 0–0.5)
EOSINOPHIL NFR BLD: 0.1 % (ref 0–8)
ERYTHROCYTE [DISTWIDTH] IN BLOOD BY AUTOMATED COUNT: 17.4 % (ref 11.5–14.5)
EST. GFR  (NO RACE VARIABLE): >60 ML/MIN/1.73 M^2
GLUCOSE SERPL-MCNC: 157 MG/DL (ref 70–110)
GLUCOSE SERPL-MCNC: 204 MG/DL (ref 70–110)
GLUCOSE SERPL-MCNC: 209 MG/DL (ref 70–110)
GLUCOSE SERPL-MCNC: 215 MG/DL (ref 70–110)
GLUCOSE SERPL-MCNC: 241 MG/DL (ref 70–110)
GLUCOSE SERPL-MCNC: 290 MG/DL (ref 70–110)
HCT VFR BLD AUTO: 29.4 % (ref 37–48.5)
HGB BLD-MCNC: 8.8 G/DL (ref 12–16)
IMM GRANULOCYTES # BLD AUTO: 0.13 K/UL (ref 0–0.04)
IMM GRANULOCYTES NFR BLD AUTO: 1.3 % (ref 0–0.5)
LYMPHOCYTES # BLD AUTO: 0.9 K/UL (ref 1–4.8)
LYMPHOCYTES NFR BLD: 9.6 % (ref 18–48)
MAGNESIUM SERPL-MCNC: 2 MG/DL (ref 1.6–2.6)
MCH RBC QN AUTO: 29.5 PG (ref 27–31)
MCHC RBC AUTO-ENTMCNC: 29.9 G/DL (ref 32–36)
MCV RBC AUTO: 99 FL (ref 82–98)
MONOCYTES # BLD AUTO: 1 K/UL (ref 0.3–1)
MONOCYTES NFR BLD: 9.9 % (ref 4–15)
NEUTROPHILS # BLD AUTO: 7.6 K/UL (ref 1.8–7.7)
NEUTROPHILS NFR BLD: 79 % (ref 38–73)
NRBC BLD-RTO: 0 /100 WBC
PLATELET # BLD AUTO: 198 K/UL (ref 150–450)
PMV BLD AUTO: 9.4 FL (ref 9.2–12.9)
POTASSIUM SERPL-SCNC: 3.7 MMOL/L (ref 3.5–5.1)
PROT SERPL-MCNC: 5.3 G/DL (ref 6–8.4)
RBC # BLD AUTO: 2.98 M/UL (ref 4–5.4)
SODIUM SERPL-SCNC: 135 MMOL/L (ref 136–145)
WBC # BLD AUTO: 9.68 K/UL (ref 3.9–12.7)

## 2024-09-03 PROCEDURE — 27000221 HC OXYGEN, UP TO 24 HOURS

## 2024-09-03 PROCEDURE — 94640 AIRWAY INHALATION TREATMENT: CPT

## 2024-09-03 PROCEDURE — 97110 THERAPEUTIC EXERCISES: CPT | Mod: CO

## 2024-09-03 PROCEDURE — 94799 UNLISTED PULMONARY SVC/PX: CPT | Mod: XB

## 2024-09-03 PROCEDURE — 63600175 PHARM REV CODE 636 W HCPCS: Performed by: INTERNAL MEDICINE

## 2024-09-03 PROCEDURE — 80053 COMPREHEN METABOLIC PANEL: CPT | Performed by: FAMILY MEDICINE

## 2024-09-03 PROCEDURE — 25000003 PHARM REV CODE 250: Performed by: HOSPITALIST

## 2024-09-03 PROCEDURE — 85025 COMPLETE CBC W/AUTO DIFF WBC: CPT | Performed by: FAMILY MEDICINE

## 2024-09-03 PROCEDURE — 94761 N-INVAS EAR/PLS OXIMETRY MLT: CPT

## 2024-09-03 PROCEDURE — 99233 SBSQ HOSP IP/OBS HIGH 50: CPT | Mod: ,,, | Performed by: INTERNAL MEDICINE

## 2024-09-03 PROCEDURE — 63600175 PHARM REV CODE 636 W HCPCS: Performed by: FAMILY MEDICINE

## 2024-09-03 PROCEDURE — 97530 THERAPEUTIC ACTIVITIES: CPT

## 2024-09-03 PROCEDURE — 36415 COLL VENOUS BLD VENIPUNCTURE: CPT | Performed by: FAMILY MEDICINE

## 2024-09-03 PROCEDURE — 25000242 PHARM REV CODE 250 ALT 637 W/ HCPCS: Performed by: HOSPITALIST

## 2024-09-03 PROCEDURE — 63600175 PHARM REV CODE 636 W HCPCS: Performed by: HOSPITALIST

## 2024-09-03 PROCEDURE — 21400001 HC TELEMETRY ROOM

## 2024-09-03 PROCEDURE — 86580 TB INTRADERMAL TEST: CPT | Performed by: INTERNAL MEDICINE

## 2024-09-03 PROCEDURE — 30200315 PPD INTRADERMAL TEST REV CODE 302: Performed by: INTERNAL MEDICINE

## 2024-09-03 PROCEDURE — 25000003 PHARM REV CODE 250: Performed by: FAMILY MEDICINE

## 2024-09-03 PROCEDURE — 94799 UNLISTED PULMONARY SVC/PX: CPT

## 2024-09-03 PROCEDURE — 83735 ASSAY OF MAGNESIUM: CPT | Performed by: FAMILY MEDICINE

## 2024-09-03 PROCEDURE — 25000003 PHARM REV CODE 250: Performed by: INTERNAL MEDICINE

## 2024-09-03 RX ORDER — HYDROCODONE BITARTRATE AND ACETAMINOPHEN 5; 325 MG/1; MG/1
1 TABLET ORAL EVERY 4 HOURS PRN
Status: DISCONTINUED | OUTPATIENT
Start: 2024-09-03 | End: 2024-09-06

## 2024-09-03 RX ADMIN — MORPHINE SULFATE 2 MG: 2 INJECTION, SOLUTION INTRAMUSCULAR; INTRAVENOUS at 10:09

## 2024-09-03 RX ADMIN — IPRATROPIUM BROMIDE AND ALBUTEROL SULFATE 3 ML: 2.5; .5 SOLUTION RESPIRATORY (INHALATION) at 07:09

## 2024-09-03 RX ADMIN — INSULIN ASPART 2 UNITS: 100 INJECTION, SOLUTION INTRAVENOUS; SUBCUTANEOUS at 02:09

## 2024-09-03 RX ADMIN — IPRATROPIUM BROMIDE AND ALBUTEROL SULFATE 3 ML: 2.5; .5 SOLUTION RESPIRATORY (INHALATION) at 01:09

## 2024-09-03 RX ADMIN — CYANOCOBALAMIN TAB 1000 MCG 1000 MCG: 1000 TAB at 08:09

## 2024-09-03 RX ADMIN — HYDROCODONE BITARTRATE AND ACETAMINOPHEN 1 TABLET: 5; 325 TABLET ORAL at 02:09

## 2024-09-03 RX ADMIN — SODIUM CHLORIDE 125 MG: 9 INJECTION, SOLUTION INTRAVENOUS at 08:09

## 2024-09-03 RX ADMIN — ENOXAPARIN SODIUM 40 MG: 40 INJECTION SUBCUTANEOUS at 08:09

## 2024-09-03 RX ADMIN — INSULIN ASPART 3 UNITS: 100 INJECTION, SOLUTION INTRAVENOUS; SUBCUTANEOUS at 09:09

## 2024-09-03 RX ADMIN — HYDROCODONE BITARTRATE AND ACETAMINOPHEN 1 TABLET: 5; 325 TABLET ORAL at 11:09

## 2024-09-03 RX ADMIN — FAMOTIDINE 20 MG: 10 INJECTION INTRAVENOUS at 08:09

## 2024-09-03 RX ADMIN — INSULIN ASPART 4 UNITS: 100 INJECTION, SOLUTION INTRAVENOUS; SUBCUTANEOUS at 10:09

## 2024-09-03 RX ADMIN — LOSARTAN POTASSIUM 100 MG: 50 TABLET, FILM COATED ORAL at 08:09

## 2024-09-03 RX ADMIN — LIDOCAINE 1 PATCH: 50 PATCH CUTANEOUS at 02:09

## 2024-09-03 RX ADMIN — INSULIN ASPART 4 UNITS: 100 INJECTION, SOLUTION INTRAVENOUS; SUBCUTANEOUS at 04:09

## 2024-09-03 RX ADMIN — PREDNISONE 40 MG: 20 TABLET ORAL at 08:09

## 2024-09-03 RX ADMIN — DILTIAZEM HYDROCHLORIDE 240 MG: 120 CAPSULE, COATED, EXTENDED RELEASE ORAL at 08:09

## 2024-09-03 RX ADMIN — INSULIN GLARGINE 42 UNITS: 100 INJECTION, SOLUTION SUBCUTANEOUS at 09:09

## 2024-09-03 RX ADMIN — HYDROCODONE BITARTRATE AND ACETAMINOPHEN 1 TABLET: 5; 325 TABLET ORAL at 06:09

## 2024-09-03 RX ADMIN — IPRATROPIUM BROMIDE AND ALBUTEROL SULFATE 3 ML: 2.5; .5 SOLUTION RESPIRATORY (INHALATION) at 12:09

## 2024-09-03 RX ADMIN — TUBERCULIN PURIFIED PROTEIN DERIVATIVE 5 UNITS: 5 INJECTION, SOLUTION INTRADERMAL at 10:09

## 2024-09-03 RX ADMIN — FAMOTIDINE 20 MG: 10 INJECTION INTRAVENOUS at 09:09

## 2024-09-03 RX ADMIN — HYDROCODONE BITARTRATE AND ACETAMINOPHEN 1 TABLET: 5; 325 TABLET ORAL at 05:09

## 2024-09-03 RX ADMIN — POTASSIUM BICARBONATE 50 MEQ: 977.5 TABLET, EFFERVESCENT ORAL at 05:09

## 2024-09-03 RX ADMIN — LEVOTHYROXINE SODIUM 50 MCG: 0.03 TABLET ORAL at 05:09

## 2024-09-03 RX ADMIN — INSULIN GLARGINE 42 UNITS: 100 INJECTION, SOLUTION SUBCUTANEOUS at 08:09

## 2024-09-03 NOTE — PT/OT/SLP PROGRESS
"Occupational Therapy   Treatment    Name: Karo Leonard  MRN: [id number]  Admitting Diagnosis:  Acute hypoxic on chronic hypercapnic respiratory failure       Recommendations:     Discharge Recommendations: Moderate Intensity Therapy  Discharge Equipment Recommendations:  to be determined by next level of care  Barriers to discharge:  Decreased caregiver support    Assessment:     Karo Leonard is a 64 y.o. female with a medical diagnosis of Acute hypoxic on chronic hypercapnic respiratory failure.  She presents with "I feel like I've been hit by a truck" and pleasant and participatory as well as receptive to all ed and TherEx. Continue POC and Lung exercises assigned today 3x daily. Performance deficits affecting function are weakness, impaired endurance, impaired self care skills, impaired functional mobility, gait instability, impaired balance, decreased upper extremity function, decreased lower extremity function, impaired cardiopulmonary response to activity, impaired skin, edema.     Rehab Prognosis:  Good; patient would benefit from acute skilled OT services to address these deficits and reach maximum level of function.       Plan:     Patient to be seen 5 x/week to address the above listed problems via self-care/home management, therapeutic activities, therapeutic exercises  Plan of Care Expires: 09/22/24  Plan of Care Reviewed with: patient, spouse    Subjective     Chief Complaint: I feel like I've been hit by a truck!  Patient/Family Comments/goals: breathe better, rehab  Pain/Comfort:  Pain Rating 1: other (see comments) (unrated)  Location - Orientation 1: generalized  Location 1:  (I feel like I've been hit by a truck!)  Pain Addressed 1: Distraction  Pain Rating Post-Intervention 1: other (see comments) (unresolved)    Objective:     Communicated with: RRT prior to session.  Patient found HOB elevated with blood pressure cuff, pulse ox (continuous), bed alarm, telemetry, peripheral IV, oxygen " upon OT entry to room.    General Precautions: Standard, fall    Orthopedic Precautions:N/A  Braces: N/A  Respiratory Status: Nasal cannula, flow 7 L/min     Occupational Performance:     Bed Mobility:    Not attempted    Functional Mobility/Transfers:  Not attempted  Functional Mobility: POOR    Activities of Daily Living:  Not attempted    AMPAC 6 Click ADL: 10    Treatment & Education:  -With HOB raised, pt completed BUE abduction x 5 with SpO2 90% and, overhead press x 5 with SpO2 93%, and scapular retraction x 5 with SpO2 93% with purposeful inhale through nose upon exertion and exhale through mouth with relaxation with the intent of deeper breathing and chest expansion with each rep. Handout left with pt and encouraged to complete this circuit at least 3 x daily to improve lung health. She v/u and agreeable.    Patient left HOB elevated with all lines intact, call button in reach, and bed alarm on    GOALS:   Multidisciplinary Problems       Occupational Therapy Goals          Problem: Occupational Therapy    Goal Priority Disciplines Outcome Interventions   Occupational Therapy Goal     OT, PT/OT Progressing    Description: Goals to be met by: 9/22/2024     Patient will increase functional independence with ADLs by performing:    LE Dressing with Minimal Assistance.  Grooming while seated with Supervision.  Toileting from bedside commode with Minimal Assistance for hygiene and clothing management.   Supine to sit with Minimal Assistance.  Toilet transfer to bedside commode with Minimal Assistance.  Upper extremity exercise program, with supervision.                         Time Tracking:     OT Date of Treatment: 09/03/24  OT Start Time: 1320  OT Stop Time: 1337  OT Total Time (min): 17 min    Billable Minutes:Therapeutic Exercise 17 min    OT/NYA: NYA     Number of NYA visits since last OT visit: 1    9/3/2024

## 2024-09-03 NOTE — NURSING
Nurses Note -- 4 Eyes      9/2/2024   9:26 PM      Skin assessed during: Q Shift Change      [] No Altered Skin Integrity Present    []Prevention Measures Documented      [x] Yes- Altered Skin Integrity Present or Discovered   [] LDA Added if Not in Epic (Describe Wound)   [] New Altered Skin Integrity was Present on Admit and Documented in LDA   [] Wound Image Taken    Wound Care Consulted? No    Attending Nurse:  Eliud Forte RN/Staff Member:  Severino   Bruised skin around eyes, head, hip, arm, and knee

## 2024-09-03 NOTE — NURSING
Per dr. Garcia hold Lovenox for 24 hrs. Pt complaining of significant headaches. Increased swelling was also noted in head bilat eyes and head.

## 2024-09-03 NOTE — PLAN OF CARE
CM present during rounding this am with tx team. Met with pt and spouse this afternoon and discussed therapy recommendations for moderate therapy.  Spouse states that he prefers [facility] but is fine with sending out referrals to [facility] to see who can accept pt.  Referrals sent. May need to send updated clinical as pt only with one PT/OT note.  Pt choice list also given to pt spouse to review.  [facility] called to [facility] and TOM sent via fax. Will continue to follow.       09/03/24 1314   Discharge Reassessment   Assessment Type Discharge Planning Reassessment   Did the patient's condition or plan change since previous assessment? Yes   Discharge Plan discussed with: Spouse/sig other;Patient   Name(s) and Number(s) Wayne Leonard (Spouse)  [phone]   Discharge Plan A Skilled Nursing Facility   Discharge Plan B Home Health   DME Needed Upon Discharge  none   Transition of Care Barriers None   Why the patient remains in the hospital Requires continued medical care   Post-Acute Status   Post-Acute Authorization Placement   Post-Acute Placement Status Referrals Sent   Discharge Delays None known at this time

## 2024-09-03 NOTE — NURSING
Nurses Note -- 4 Eyes      9/3/2024   10:38 AM      Skin assessed during: Daily Assessment      [] No Altered Skin Integrity Present    []Prevention Measures Documented      [x] Yes- Altered Skin Integrity Present or Discovered   [] LDA Added if Not in Epic (Describe Wound)   [] New Altered Skin Integrity was Present on Admit and Documented in LDA   [] Wound Image Taken    Wound Care Consulted? No    Attending Nurse:  Flores Forte RN/Staff Member:  roshni Nuñez

## 2024-09-03 NOTE — PT/OT/SLP PROGRESS
Physical Therapy Treatment    Patient Name:  Karo Leonard   MRN:  [id number]    Recommendations:     Discharge Recommendations: Moderate Intensity Therapy  Discharge Equipment Recommendations: to be determined by next level of care  Barriers to discharge:  Increased caregiver burden of care, weakness, impaired mobility    Assessment:     Karo Leonard is a 64 y.o. female admitted with a medical diagnosis of Acute hypoxic on chronic hypercapnic respiratory failure.  She presents with the following impairments/functional limitations: weakness, impaired endurance, impaired self care skills, impaired functional mobility, gait instability, impaired balance, decreased upper extremity function, decreased lower extremity function, decreased safety awareness, impaired cardiopulmonary response to activity.    Rehab Prognosis: Good and Fair; patient would benefit from acute skilled PT services to address these deficits and reach maximum level of function.    Recent Surgery: * No surgery found *      Plan:     During this hospitalization, patient to be seen 6 x/week to address the identified rehab impairments via gait training, therapeutic activities, therapeutic exercises and progress toward the following goals:    Plan of Care Expires:  10/01/24    Subjective     Chief Complaint: weakness   Patient/Family Comments/goals: Return to PLOF  Pain/Comfort:  Pain Rating 1: 0/10      Objective:     Communicated with nurse prior to session.  Patient found supine with blood pressure cuff, bed alarm, pulse ox (continuous), telemetry, oxygen upon PT entry to room.     General Precautions: Standard, fall  Orthopedic Precautions: N/A  Braces: N/A  Respiratory Status: Nasal cannula, flow 3 L/min     Functional Mobility:  Bed Mobility:     Supine to Sit: maximal assistance and of 2 persons  Sit to Supine: maximal assistance and of 2 persons  Transfers:     Sit to Stand:  maximal assistance and of 2 persons with rolling walker. Pt    Immediately complained of dizziness/vertigo and returned to EOB  Balance: CGA for sitting at EOB as pt leaned to the L      AM-PAC 6 CLICK MOBILITY          Treatment & Education:  Pt was educated on safety, use of call light, PT POC  with plan for t/f to chair at next PT session.     Patient left supine with all lines intact, call button in reach, bed alarm on, and her   present..    GOALS:   Multidisciplinary Problems       Physical Therapy Goals          Problem: Physical Therapy    Goal Priority Disciplines Outcome Goal Variances Interventions   Physical Therapy Goal     PT, PT/OT Progressing     Description: Goals to be met by: 10/01/24     Patient will increase functional independence with mobility by performin. Supine to sit with minimal assistance  2. Sit to supine with minimal assistance  3. Sit to stand transfer with minimal assistance  4. Gait  x 50 feet  Minimal assistance using Rolling Walker.                          Time Tracking:     PT Received On:    PT Start Time: 1130     PT Stop Time: 1153  PT Total Time (min): 23 min     Billable Minutes: Therapeutic Activity 23 minutes    Treatment Type: Treatment  PT/PTA: PT     Number of PTA visits since last PT visit: 0     2024

## 2024-09-03 NOTE — PLAN OF CARE
Problem: Physical Therapy  Goal: Physical Therapy Goal  Description: Goals to be met by: 10/01/24     Patient will increase functional independence with mobility by performin. Supine to sit with Fajardo  2. Sit to supine with Fajardo  3. Sit to stand transfer with Modified Fajardo  4. Gait  x 50 feet with Modified Fajardo using Rolling Walker.     Outcome: Progressing

## 2024-09-03 NOTE — SUBJECTIVE & OBJECTIVE
Interval History:  No new events noted overnight.  Patient resting.  Use BiPAP therapy overnight.  Supplemental oxygen need improved down to 3 liters/minute closer to baseline.  Awaiting skilled nursing facility placement.  Awaiting transfer to telemetry floor.    Review of Systems   Constitutional:  Positive for fatigue.   All other systems reviewed and are negative.    Objective:     Vital Signs (Most Recent):  Temp: 98 °F (36.7 °C) (09/03/24 0702)  Pulse: 80 (09/03/24 0713)  Resp: (!) 22 (09/03/24 1029)  BP: (!) 140/65 (09/03/24 0804)  SpO2: 100 % (09/03/24 0713) Vital Signs (24h Range):  Temp:  [98 °F (36.7 °C)-98.4 °F (36.9 °C)] 98 °F (36.7 °C)  Pulse:  [63-88] 80  Resp:  [10-26] 22  SpO2:  [90 %-100 %] 100 %  BP: ()/(62-97) 140/65     Weight: 119.5 kg (263 lb 7.2 oz)  Body mass index is 43.84 kg/m².    Intake/Output Summary (Last 24 hours) at 9/3/2024 1036  Last data filed at 9/3/2024 0537  Gross per 24 hour   Intake 720 ml   Output 750 ml   Net -30 ml         Physical Exam  Constitutional:       Appearance: She is obese. She is not ill-appearing.   Cardiovascular:      Rate and Rhythm: Normal rate.      Pulses: Normal pulses.   Pulmonary:      Effort: Pulmonary effort is normal.   Abdominal:      General: Bowel sounds are normal. There is no distension.      Tenderness: There is no abdominal tenderness.   Skin:     Comments: Hematoma noted on left lower extremity.   Neurological:      Mental Status: She is alert and oriented to person, place, and time.             Significant Labs:   Lab Results   Component Value Date    WBC 9.68 09/03/2024    HGB 8.8 (L) 09/03/2024    HCT 29.4 (L) 09/03/2024    MCV 99 (H) 09/03/2024     09/03/2024       CMP  Sodium   Date Value Ref Range Status   09/03/2024 135 (L) 136 - 145 mmol/L Final     Potassium   Date Value Ref Range Status   09/03/2024 3.7 3.5 - 5.1 mmol/L Final     Chloride   Date Value Ref Range Status   09/03/2024 96 95 - 110 mmol/L Final     CO2    Date Value Ref Range Status   09/03/2024 34 (H) 23 - 29 mmol/L Final     Glucose   Date Value Ref Range Status   09/03/2024 209 (H) 70 - 110 mg/dL Final     BUN   Date Value Ref Range Status   09/03/2024 14 8 - 23 mg/dL Final     Creatinine   Date Value Ref Range Status   09/03/2024 0.6 0.5 - 1.4 mg/dL Final     Calcium   Date Value Ref Range Status   09/03/2024 8.6 (L) 8.7 - 10.5 mg/dL Final     Total Protein   Date Value Ref Range Status   09/03/2024 5.3 (L) 6.0 - 8.4 g/dL Final     Albumin   Date Value Ref Range Status   09/03/2024 3.0 (L) 3.5 - 5.2 g/dL Final     Total Bilirubin   Date Value Ref Range Status   09/03/2024 0.4 0.1 - 1.0 mg/dL Final     Comment:     For infants and newborns, interpretation of results should be based  on gestational age, weight and in agreement with clinical  observations.    Premature Infant recommended reference ranges:  Up to 24 hours.............<8.0 mg/dL  Up to 48 hours............<12.0 mg/dL  3-5 days..................<15.0 mg/dL  6-29 days.................<15.0 mg/dL       Alkaline Phosphatase   Date Value Ref Range Status   09/03/2024 77 55 - 135 U/L Final     AST   Date Value Ref Range Status   09/03/2024 12 10 - 40 U/L Final     ALT   Date Value Ref Range Status   09/03/2024 10 10 - 44 U/L Final     Anion Gap   Date Value Ref Range Status   09/03/2024 5 (L) 8 - 16 mmol/L Final     eGFR   Date Value Ref Range Status   09/03/2024 >60.0 >60 mL/min/1.73 m^2 Final     Microbiology Results (last 7 days)       ** No results found for the last 168 hours. **          Significant Imaging:   ECHO:    Left Ventricle: The left ventricle is normal in size. Mildly increased wall thickness. There is mild concentric hypertrophy. Normal wall motion. There is normal systolic function with a visually estimated ejection fraction of 60 - 65%. There is normal diastolic function.    Right Ventricle: Normal right ventricular cavity size. Wall thickness is normal. Systolic function is normal.     IVC/SVC: Normal venous pressure at 3 mmHg.

## 2024-09-03 NOTE — PROGRESS NOTES
Pulmonary/Critical Care  Progress Note      Patient name: Karo Leonard  MRN: [id number]  Date: 09/03/2024    Admit Date: 8/25/2024  Consult Requested By: Terrance Garcia MD    Reason for Consult: Respiratory failure, COPD    HPI:    8/25/2024 - 63 yo ASCVD, DM, HTN. COPD(cigarette use) had increased SOB at home and a fall.  EMS was called and brought to ER.  Initially tried on BiPAP without response and was subsequently intubated and placed on ventilation.  Initial ABG showed over ventilation and we have adjusted and ABG are getting better.  She is sedated and not able to provide any history.  D/W  who says that she was supposed to see a pulmonologist but couldn't make appointment, she has been a chronic smoker.  She had increase SOB for a few days.  She did have a fall at home but he reports that she was not down for long.  He does report that she has been having recurring falls at home.      8/26/2024 - Stable overnight, O2 needs still too high to do SBT.  She is responsive and gets agitated on current sedation and we are adjusting.  No other new issues reported.  Hgb has decreased (no active bleeding reported), NA remains low, CPK is better, TFTF noted and c/w hypothyroid (synthroid started).    8/27/2024 - Remains critically ill, O2 needs down a little but still too high.  Difficult to sedate is either agitated or apneic.  Tried SBT this AM and was apneic.  VS reviewed.  She is 3.8 liters +.  Tube feeds have been started.  NA remains low, glucose is elevated.  CXR looks about the same    8/28/2024 - Stable overnight, O2 needs still up.  She does get agitated at times and we have adjusted meds.  Trouble with tube feeds and will hold today and restart tomorrow.  Not ready to wean because on increased FiO2 and will try to increase PEP to see if that helps.  She does not have a lot of secretions.  She is over ventilaed some and we adjusted vent.  CXR is about the same    8/29/2024 - Stable overnight,, O2  needs still up some (I decreased FiO2 and increased PEEP some) and she is overventilated (we adjusted rate).  [facility]  She is 4.6 liters +.  CXR is about the same.    - continues on vent, settings adjusted for alkalosis- now on PS 12/5. CXR looks wet and pt with severe edema- fluids discontinued and starting lasix. Propofol has been weaned a little. Pt is awake and denies pain. Her blood pressure was up to 200 systolic at the time of my evaluation- RN states she just gave am blood pressure med and also Lasix.    - pt awake, off sedation, writing and alert. Denies pain, states feeling better. She was significantly more alkalotic this am due to lasix so given a dose of diamox this am. Tolerating PS 10/5 now- will repeat abg soon and consider extubation     - extubated yesterday and tolerated well    9/3/2024 - STable overnight, no new issues reported and is feeling better ovrall.  She is very weak.  O2 decreased to 5 LPM when I saqw her.  No new issues reported.  BP is up some.  Labs reviewed    Review of Systems    Review of Systems   Unable to perform ROS: Intubated       Past Medical History    Past Medical History:   Diagnosis Date    Coronary artery disease     cardiac stent    DDD (degenerative disc disease), lumbar 2000    Diabetes mellitus     Hypertension     Thyroid disease        Past Surgical History    Past Surgical History:   Procedure Laterality Date     SECTION  08/1987    x2 1993    CORONARY STENT PLACEMENT      EXPLORATORY LAPAROTOMY      [facility]     HEMORRHOID SURGERY      LAPAROSCOPIC CHOLECYSTECTOMY N/A 2022    Procedure: CHOLECYSTECTOMY, LAPAROSCOPIC;  Surgeon: Leonardo Wilson III, MD;  Location: [facility] OR;  Service: General;  Laterality: N/A;    LUMBAR DISC SURGERY      PILONIDAL CYST DRAINAGE      TONSILLECTOMY      as a child (also adenoids)       Medications  (scheduled):      albuterol-ipratropium  3 mL Nebulization Q6H    cyanocobalamin  1,000 mcg Oral Daily    diltiaZEM  240 mg Oral Daily    enoxparin  40 mg Subcutaneous Q12H    famotidine (PF)  20 mg Intravenous Q12H    insulin glargine U-100  42 Units Subcutaneous BID    levothyroxine  50 mcg Oral Before breakfast    LIDOcaine  1 patch Transdermal Q24H    losartan  100 mg Oral Daily    predniSONE  40 mg Oral Daily    senna-docusate 8.6-50 mg  1 tablet Oral BID       Medications (infusions):           Medications (prn):       Current Facility-Administered Medications:     calcium gluconate IVPB, 1 g, Intravenous, PRN    calcium gluconate IVPB, 2 g, Intravenous, PRN    calcium gluconate IVPB, 3 g, Intravenous, PRN    dextrose 50%, 12.5 g, Intravenous, PRN    glucagon (human recombinant), 1 mg, Intramuscular, PRN    hydrALAZINE, 10 mg, Intravenous, Q6H PRN    HYDROcodone-acetaminophen, 1 tablet, Oral, Q6H PRN    insulin aspart U-100, 0-10 Units, Subcutaneous, Q6H PRN    magnesium sulfate IVPB, 2 g, Intravenous, PRN    magnesium sulfate IVPB, 4 g, Intravenous, PRN    meclizine, 12.5 mg, Oral, TID PRN    morphine, 2 mg, Intravenous, Q1H PRN    potassium bicarbonate, 35 mEq, Oral, PRN    potassium bicarbonate, 50 mEq, Oral, PRN    potassium bicarbonate, 60 mEq, Oral, PRN    potassium chloride in water, 40 mEq, Intravenous, PRN **AND** potassium chloride in water, 60 mEq, Intravenous, PRN **AND** potassium chloride in water, 80 mEq, Intravenous, PRN    sodium chloride 0.9%, 10 mL, Intravenous, PRN    sodium phosphate 15 mmol in D5W 250 mL IVPB, 15 mmol, Intravenous, PRN    sodium phosphate 20.01 mmol in D5W 250 mL IVPB, 20.01 mmol, Intravenous, PRN    sodium phosphate 30 mmol in D5W 250 mL IVPB, 30 mmol, Intravenous, PRN    Family History: No family history on file.    Social History: Tobacco:   Social History     Tobacco Use   Smoking Status Not on file   Smokeless Tobacco Not on file                                EtOH:  "  Social History     Substance and Sexual Activity   Alcohol Use No                                Drugs:   Social History     Substance and Sexual Activity   Drug Use No       Physical Exam    Vital signs:  Temp:  [98 °F (36.7 °C)-98.4 °F (36.9 °C)]   Pulse:  [63-88]   Resp:  [10-26]   BP: ()/(62-97)   SpO2:  [90 %-100 %]     Intake/Output:   Intake/Output Summary (Last 24 hours) at 9/3/2024 0922  Last data filed at 9/3/2024 0537  Gross per 24 hour   Intake 720 ml   Output 750 ml   Net -30 ml        BMI: Estimated body mass index is 43.84 kg/m² as calculated from the following:    Height as of this encounter: 5' 5" (1.651 m).    Weight as of this encounter: 119.5 kg (263 lb 7.2 oz).    Physical Exam  Vitals and nursing note reviewed.   Constitutional:       General: She is not in acute distress.     Appearance: She is not ill-appearing, toxic-appearing or diaphoretic.      Comments: Awake, alert no distress   HENT:      Head: Normocephalic.      Comments: Some bruising right forehead and bilat eyes from her fall     Right Ear: External ear normal.      Left Ear: External ear normal.      Nose: Nose normal. No congestion or rhinorrhea.      Mouth/Throat:      Mouth: Mucous membranes are moist.      Pharynx: Oropharynx is clear. No oropharyngeal exudate or posterior oropharyngeal erythema.   Eyes:      General: No scleral icterus.        Right eye: No discharge.         Left eye: No discharge.      Extraocular Movements: Extraocular movements intact.      Conjunctiva/sclera: Conjunctivae normal.      Pupils: Pupils are equal, round, and reactive to light.   Neck:      Vascular: No carotid bruit.   Cardiovascular:      Rate and Rhythm: Normal rate and regular rhythm.      Pulses: Normal pulses.      Heart sounds: No murmur heard.     No friction rub. No gallop.   Pulmonary:      Effort: Pulmonary effort is normal. No respiratory distress.      Breath sounds: No stridor. No wheezing, rhonchi or rales.   Chest:    " "  Chest wall: No tenderness.   Abdominal:      General: There is no distension.      Tenderness: There is no abdominal tenderness. There is no guarding.      Comments: Hypoactive BS   Musculoskeletal:         General: No swelling. Normal range of motion.      Cervical back: Normal range of motion and neck supple. No rigidity or tenderness.      Right lower leg: Edema present.      Left lower leg: Edema present.   Lymphadenopathy:      Cervical: No cervical adenopathy.   Skin:     General: Skin is warm and dry.      Capillary Refill: Capillary refill takes less than 2 seconds.      Coloration: Skin is not jaundiced.      Findings: Bruising present.      Comments: + bruise LLE   Neurological:      General: No focal deficit present.      Cranial Nerves: No cranial nerve deficit.      Sensory: No sensory deficit.      Motor: No weakness.   Psychiatric:         Mood and Affect: Mood normal.         Behavior: Behavior normal.         Laboratory    Recent Labs   Lab 09/03/24  0330   WBC 9.68   RBC 2.98*   HGB 8.8*   HCT 29.4*      MCV 99*   MCH 29.5   MCHC 29.9*       Recent Labs   Lab 09/03/24  0330   CALCIUM 8.6*   ALBUMIN 3.0*   PROT 5.3*   *   K 3.7   CO2 34*   CL 96   BUN 14   CREATININE 0.6   ALKPHOS 77   ALT 10   AST 12   BILITOT 0.4       No results for input(s): "PT", "INR", "APTT" in the last 24 hours.    No results for input(s): "CPK", "CPKMB", "TROPONINI", "MB" in the last 24 hours.      Additional labs: reviewed    Microbiology:       Microbiology Results (last 7 days)       ** No results found for the last 168 hours. **            Radiology    US Extremity Non Vascular Complete Left  Narrative: EXAMINATION:  US EXTREMITY NON VASCULAR COMPLETE LEFT    CLINICAL HISTORY:  hematoma;    TECHNIQUE:  Targeted grayscale and color Doppler sonographic analysis was performed.    COMPARISON:  None.    FINDINGS:  Targeted evaluation of the left proximal pretibial soft tissues shows a 6 x 3.5 x 1.8 cm " circumscribed, heterogeneous hypoechoic mass in the subcutaneous fat.  This mass has increased through transmission of sound, and no internal color Doppler vascular flow.  Impression: Complex mass in the left pretibial subcutaneous fat, presumably reflecting hematoma given clinical history.    Electronically signed by: Christiano Tirado  Date:    08/31/2024  Time:    14:18  X-Ray Chest AP Portable  Narrative: EXAMINATION:  XR CHEST AP PORTABLE    CLINICAL HISTORY:  Intubated;    FINDINGS:  Portable chest radiograph at 05:00 hours compared 08/30/2024 shows ET and NG tubes unchanged in position, with stable enlarged cardiac silhouette and normal pulmonary vascularity.    There are persistent left lower lung opacities obscuring the left hemidiaphragm, with no new pleural or parenchymal abnormality.  No pneumothorax.  Impression: No significant interval change.    Electronically signed by: Christiano Tirado  Date:    08/31/2024  Time:    07:42        Additional Studies    reviewed    Ventilator Information    Oxygen Concentration (%):  [50] 50             Recent Labs     09/01/24  0318   PH 7.437   PCO2 54.8*   PO2 87   HCO3 37.0*   POCSATURATED 97   BE 13*         Impression    Active Hospital Problems    Diagnosis  POA    *Acute hypoxic on chronic hypercapnic respiratory failure [J96.01, J96.12]  Yes    Goals of care, counseling/discussion [Z71.89]  Not Applicable    Type 2 diabetes mellitus [E11.9]  Yes    Anemia [D64.9]  Yes    Acute metabolic encephalopathy [G93.41]  Yes    Fall [W19.XXXA]  Yes    COPD exacerbation [J44.1]  Yes    Pneumonia [J18.9]  Yes    Hypothyroid [E03.9]  Yes    Hypertension [I10]  Yes      Resolved Hospital Problems   No resolved problems to display.       Plan    Continue O2 to maintain sats 89-92%  Continue bipap nightly  Respiratory treatments  Wean steroid   Completed levaquin 7d  Discontinue lasix  Antihypertensives continue  Watch H/H - no active bleeding reported  Replace electrolytes  Synthroid    Watch BS and treat as needed  D/w RN and RT  Transfer out of ICU      Sonido Zamora MD  [facility] Pulmonary/Critical Care  09/03/2024

## 2024-09-03 NOTE — CARE UPDATE
09/02/24 1937   Patient Assessment/Suction   Level of Consciousness (AVPU) alert   Respiratory Effort Unlabored   Expansion/Accessory Muscles/Retractions expansion symmetric   All Lung Fields Breath Sounds coarse;diminished   Rhythm/Pattern, Respiratory depth regular;pattern regular   Cough Frequency infrequent   Cough Type good;loose;nonproductive   PRE-TX-O2   Device (Oxygen Therapy) Oxymask   $ Is the patient on Low Flow Oxygen? Yes   Flow (L/min) (Oxygen Therapy) 5   SpO2 95 %   Pulse Oximetry Type Continuous   $ Pulse Oximetry - Multiple Charge Pulse Oximetry - Multiple   Pulse 77   Resp (!) 24   Aerosol Therapy   $ Aerosol Therapy Charges Aerosol Treatment   Daily Review of Necessity (SVN) completed   Respiratory Treatment Status (SVN) given   Treatment Route (SVN) mask   Patient Position HOB elevated   Post Treatment Assessment (SVN) breath sounds unchanged   Signs of Intolerance (SVN) none   Breath Sounds Post-Respiratory Treatment   Throughout All Fields Post-Treatment All Fields   Throughout All Fields Post-Treatment no change   Post-treatment Heart Rate (beats/min) 76   Post-treatment Resp Rate (breaths/min) 14   Incentive Spirometer   $ Incentive Spirometer Charges done with encouragement   Incentive Spirometer Predicted Level (mL) 1500   Administration (IS) instruction provided, follow-up   Number of Repetitions (IS) 10   Level Incentive Spirometer (mL) 750   Patient Tolerance (IS) good;no adverse signs/symptoms present   Education   $ Education BiPAP;15 min

## 2024-09-03 NOTE — RESPIRATORY THERAPY
Weaning pt's O2 as tolerated to Home O2 order 3LPM   09/03/24 0713   Patient Assessment/Suction   Level of Consciousness (AVPU) alert   Respiratory Effort Normal;Unlabored   Expansion/Accessory Muscles/Retractions no use of accessory muscles;no retractions;expansion symmetric   All Lung Fields Breath Sounds diminished   Rhythm/Pattern, Respiratory unlabored;pattern regular;shallow   Cough Frequency infrequent   PRE-TX-O2   Device (Oxygen Therapy) Oxymask   $ Is the patient on Low Flow Oxygen? Yes   Flow (L/min) (Oxygen Therapy) 3   SpO2 100 %   Pulse Oximetry Type Continuous   $ Pulse Oximetry - Multiple Charge Pulse Oximetry - Multiple   Pulse 80   Resp 17   Aerosol Therapy   $ Aerosol Therapy Charges Aerosol Treatment   Daily Review of Necessity (SVN) completed   Respiratory Treatment Status (SVN) given   Treatment Route (SVN) mask;oxygen   Patient Position HOB elevated   Post Treatment Assessment (SVN) increased aeration   Signs of Intolerance (SVN) none   Breath Sounds Post-Respiratory Treatment   Throughout All Fields Post-Treatment All Fields   Throughout All Fields Post-Treatment aeration increased   Post-treatment Heart Rate (beats/min) 75   Post-treatment Resp Rate (breaths/min) 18   Incentive Spirometer   $ Incentive Spirometer Charges done with encouragement   Incentive Spirometer Predicted Level (mL) 1500   Administration (IS) instruction provided, follow-up   Number of Repetitions (IS) 10   Level Incentive Spirometer (mL) 1000   Patient Tolerance (IS) good   Preset CPAP/BiPAP Settings   Mode Of Delivery BiPAP S/T   $ Is patient using? Other (see comments)  (during naps and at night)

## 2024-09-03 NOTE — PROGRESS NOTES
[facility]  Progress Note    Patient Name: Karo Leonard  MRN: [id number]  Patient Class: IP- Inpatient   Admission Date: 8/25/2024  Length of Stay: 9 days  Attending Physician: Terrance Garcia MD  Primary Care Provider: Navneet Hernandez FNP        Subjective:     Principal Problem:Acute hypoxic on chronic hypercapnic respiratory failure        HPI:  64F p/w fall in the context of shortness of breath. EMS reportedly gave narcan w/o appreciable response, then she was given duoneb en route to [facility] ED. Upon arrival, she was tachypneic, had respy sounding phonation, but she denied chest pain, fever, or chills. She was placed on BiPAP, and initially demonstrated improvement. However, she became less responsive and appeared to tire out. She was given additional narcan w/o appreciable response, so she was intubated. She had some hypotension after receiving sedation, so she was placed on low dose levophed. [place] was placed, lasix was given, as was Abx. Large bruise noted on LLE, so x-ray ordered.     Overview/Hospital Course:  64 F Patient with hx of COPD, morbid obesity was admitted for acute on chronic hypoxemic hypercapnic respiratory failure. Patient was intubated after failing  BiPAP. Finished course of IV Levofloxacin for COPD exacerbation and possible pneumonia. Pulm has been managing her steroid dose. Patient was started on iron and B12 supplement for anemia. S/P exubation on 8/31. For her hypertension, patient was started on losartan and her home diltiazem was resumed.  Patient working with PT and OT.  Fall precautions have been addressed with the patient.  Patient moving to medicine telemetry.  Supplemental oxygen weaning is in process.  Patient encouraged to continue BiPAP use at night or as needed as before.       Interval History:  No new events noted overnight.  Patient resting.  Use BiPAP therapy overnight.  Supplemental oxygen need improved down to 3 liters/minute closer  to baseline.  Awaiting skilled nursing facility placement.  Awaiting transfer to telemetry floor.    Review of Systems   Constitutional:  Positive for fatigue.   All other systems reviewed and are negative.    Objective:     Vital Signs (Most Recent):  Temp: 98 °F (36.7 °C) (09/03/24 0702)  Pulse: 80 (09/03/24 0713)  Resp: (!) 22 (09/03/24 1029)  BP: (!) 140/65 (09/03/24 0804)  SpO2: 100 % (09/03/24 0713) Vital Signs (24h Range):  Temp:  [98 °F (36.7 °C)-98.4 °F (36.9 °C)] 98 °F (36.7 °C)  Pulse:  [63-88] 80  Resp:  [10-26] 22  SpO2:  [90 %-100 %] 100 %  BP: ()/(62-97) 140/65     Weight: 119.5 kg (263 lb 7.2 oz)  Body mass index is 43.84 kg/m².    Intake/Output Summary (Last 24 hours) at 9/3/2024 1036  Last data filed at 9/3/2024 0537  Gross per 24 hour   Intake 720 ml   Output 750 ml   Net -30 ml         Physical Exam  Constitutional:       Appearance: She is obese. She is not ill-appearing.   Cardiovascular:      Rate and Rhythm: Normal rate.      Pulses: Normal pulses.   Pulmonary:      Effort: Pulmonary effort is normal.   Abdominal:      General: Bowel sounds are normal. There is no distension.      Tenderness: There is no abdominal tenderness.   Skin:     Comments: Hematoma noted on left lower extremity.   Neurological:      Mental Status: She is alert and oriented to person, place, and time.             Significant Labs:   Lab Results   Component Value Date    WBC 9.68 09/03/2024    HGB 8.8 (L) 09/03/2024    HCT 29.4 (L) 09/03/2024    MCV 99 (H) 09/03/2024     09/03/2024       CMP  Sodium   Date Value Ref Range Status   09/03/2024 135 (L) 136 - 145 mmol/L Final     Potassium   Date Value Ref Range Status   09/03/2024 3.7 3.5 - 5.1 mmol/L Final     Chloride   Date Value Ref Range Status   09/03/2024 96 95 - 110 mmol/L Final     CO2   Date Value Ref Range Status   09/03/2024 34 (H) 23 - 29 mmol/L Final     Glucose   Date Value Ref Range Status   09/03/2024 209 (H) 70 - 110 mg/dL Final     BUN    Date Value Ref Range Status   09/03/2024 14 8 - 23 mg/dL Final     Creatinine   Date Value Ref Range Status   09/03/2024 0.6 0.5 - 1.4 mg/dL Final     Calcium   Date Value Ref Range Status   09/03/2024 8.6 (L) 8.7 - 10.5 mg/dL Final     Total Protein   Date Value Ref Range Status   09/03/2024 5.3 (L) 6.0 - 8.4 g/dL Final     Albumin   Date Value Ref Range Status   09/03/2024 3.0 (L) 3.5 - 5.2 g/dL Final     Total Bilirubin   Date Value Ref Range Status   09/03/2024 0.4 0.1 - 1.0 mg/dL Final     Comment:     For infants and newborns, interpretation of results should be based  on gestational age, weight and in agreement with clinical  observations.    Premature Infant recommended reference ranges:  Up to 24 hours.............<8.0 mg/dL  Up to 48 hours............<12.0 mg/dL  3-5 days..................<15.0 mg/dL  6-29 days.................<15.0 mg/dL       Alkaline Phosphatase   Date Value Ref Range Status   09/03/2024 77 55 - 135 U/L Final     AST   Date Value Ref Range Status   09/03/2024 12 10 - 40 U/L Final     ALT   Date Value Ref Range Status   09/03/2024 10 10 - 44 U/L Final     Anion Gap   Date Value Ref Range Status   09/03/2024 5 (L) 8 - 16 mmol/L Final     eGFR   Date Value Ref Range Status   09/03/2024 >60.0 >60 mL/min/1.73 m^2 Final     Microbiology Results (last 7 days)       ** No results found for the last 168 hours. **          Significant Imaging:   ECHO:    Left Ventricle: The left ventricle is normal in size. Mildly increased wall thickness. There is mild concentric hypertrophy. Normal wall motion. There is normal systolic function with a visually estimated ejection fraction of 60 - 65%. There is normal diastolic function.    Right Ventricle: Normal right ventricular cavity size. Wall thickness is normal. Systolic function is normal.    IVC/SVC: Normal venous pressure at 3 mmHg.        Assessment/Plan:      * Acute hypoxic on chronic hypercapnic respiratory failure  Acute on chronic diastolic  "heart failure (HFpEF)   Improving  Likely multifactorial  S/P extubation on 8/31  Oxygen supplement as need  EF 60-65%  Pulmonology is following patient    Type 2 diabetes mellitus  Continue with SSI and glargine  Make adjustment as needed    Goals of care, counseling/discussion  Discussed the goal of care with patient's  on 8/28.    Anemia  Likely from Iron and b12 deficiency  Started on Iron and B12 supplement on 8/27    Fall  Imaging ruled out acute fracture  US confirmed hematoma on left lower extremity  Fall precautions discussed with the patient.  Meclizine side effects reviewed with the patient.    Acute metabolic encephalopathy  Resolved  CT head: no acute changes  Continue to monitor     Pneumonia  MRSA neg, procal neg, respiratory panel neg  Completed Levofloxacin antibiotic course on 9/1    COPD exacerbation  Continue with steroids, pulm is managing   Completed Levofloxacin antibiotic course on 9/1    Hypertension  Started Losartan on 8/28 and amlodipine on 8/31  Can make adjustment as needed  Resumed home diltiazem on 9/1    Hypothyroid  Continue home levothyroxine      Discussed with  regarding disposition.  Awaiting skilled nursing facility placement.  VTE Risk Mitigation (From admission, onward)           Ordered     enoxaparin injection 40 mg  Every 12 hours        Note to Pharmacy: Ht: 5' 5" (1.651 m)  Wt: 127 kg (280 lb)  Estimated Creatinine Clearance: 92.8 mL/min (based on SCr of 0.8 mg/dL).  Body mass index is 46.59 kg/m².    08/25/24 0627     IP VTE HIGH RISK PATIENT  Once         08/25/24 0627     Place sequential compression device  Until discontinued         08/25/24 0627                    Discharge Planning   CHELE: 9/4/2024     Code Status: Full Code   Is the patient medically ready for discharge?:     Reason for patient still in hospital (select all that apply): Patient trending condition and Consult recommendations  Discharge Plan A: Home Health   Discharge Delays: None " known at this time        Critical care time spent on the evaluation and treatment of severe organ dysfunction, review of pertinent labs and imaging studies, discussions with consulting providers and discussions with patient/family: 35 minutes.      Terrance Garcia MD  Department of Hospital Medicine   [facility]

## 2024-09-03 NOTE — ASSESSMENT & PLAN NOTE
Continue with steroids, pulm is managing.  Reducing prednisone 30 mg p.o. q.day now.  Completed Levofloxacin antibiotic course on 9/1

## 2024-09-03 NOTE — PLAN OF CARE
Problem: Adult Inpatient Plan of Care  Goal: Plan of Care Review  Outcome: Progressing  Goal: Patient-Specific Goal (Individualized)  Outcome: Progressing  Goal: Absence of Hospital-Acquired Illness or Injury  Outcome: Progressing  Goal: Optimal Comfort and Wellbeing  Outcome: Progressing  Goal: Readiness for Transition of Care  Outcome: Progressing     Problem: Bariatric Environmental Safety  Goal: Safety Maintained with Care  Outcome: Progressing     Problem: Skin Injury Risk Increased  Goal: Skin Health and Integrity  Outcome: Progressing     Problem: Diabetes Comorbidity  Goal: Blood Glucose Level Within Targeted Range  Outcome: Progressing     Problem: Pneumonia  Goal: Fluid Balance  Outcome: Progressing  Goal: Resolution of Infection Signs and Symptoms  Outcome: Progressing  Goal: Effective Oxygenation and Ventilation  Outcome: Progressing     Problem: Infection  Goal: Absence of Infection Signs and Symptoms  Outcome: Progressing     Problem: Fall Injury Risk  Goal: Absence of Fall and Fall-Related Injury  Outcome: Progressing     Problem: Enteral Nutrition  Goal: Feeding Tolerance  Outcome: Progressing     Problem: Mechanical Ventilation Invasive  Goal: Optimal Nutrition Delivery  Outcome: Progressing  Goal: Absence of Device-Related Skin and Tissue Injury  Outcome: Progressing  Goal: Absence of Ventilator-Induced Lung Injury  Outcome: Progressing

## 2024-09-04 LAB
ALBUMIN SERPL BCP-MCNC: 3 G/DL (ref 3.5–5.2)
ALP SERPL-CCNC: 71 U/L (ref 55–135)
ALT SERPL W/O P-5'-P-CCNC: 10 U/L (ref 10–44)
ANION GAP SERPL CALC-SCNC: 3 MMOL/L (ref 8–16)
AST SERPL-CCNC: 12 U/L (ref 10–40)
BASOPHILS # BLD AUTO: 0 K/UL (ref 0–0.2)
BASOPHILS NFR BLD: 0 % (ref 0–1.9)
BILIRUB SERPL-MCNC: 0.4 MG/DL (ref 0.1–1)
BUN SERPL-MCNC: 13 MG/DL (ref 8–23)
CALCIUM SERPL-MCNC: 8.9 MG/DL (ref 8.7–10.5)
CHLORIDE SERPL-SCNC: 97 MMOL/L (ref 95–110)
CO2 SERPL-SCNC: 36 MMOL/L (ref 23–29)
CREAT SERPL-MCNC: 0.5 MG/DL (ref 0.5–1.4)
DIFFERENTIAL METHOD BLD: ABNORMAL
EOSINOPHIL # BLD AUTO: 0 K/UL (ref 0–0.5)
EOSINOPHIL NFR BLD: 0.1 % (ref 0–8)
ERYTHROCYTE [DISTWIDTH] IN BLOOD BY AUTOMATED COUNT: 17.4 % (ref 11.5–14.5)
EST. GFR  (NO RACE VARIABLE): >60 ML/MIN/1.73 M^2
GLUCOSE SERPL-MCNC: 153 MG/DL (ref 70–110)
GLUCOSE SERPL-MCNC: 259 MG/DL (ref 70–110)
HCT VFR BLD AUTO: 28.8 % (ref 37–48.5)
HGB BLD-MCNC: 8.9 G/DL (ref 12–16)
IMM GRANULOCYTES # BLD AUTO: 0.12 K/UL (ref 0–0.04)
IMM GRANULOCYTES NFR BLD AUTO: 1.2 % (ref 0–0.5)
LYMPHOCYTES # BLD AUTO: 1 K/UL (ref 1–4.8)
LYMPHOCYTES NFR BLD: 10.3 % (ref 18–48)
MCH RBC QN AUTO: 30.9 PG (ref 27–31)
MCHC RBC AUTO-ENTMCNC: 30.9 G/DL (ref 32–36)
MCV RBC AUTO: 100 FL (ref 82–98)
MONOCYTES # BLD AUTO: 1 K/UL (ref 0.3–1)
MONOCYTES NFR BLD: 9.9 % (ref 4–15)
NEUTROPHILS # BLD AUTO: 7.8 K/UL (ref 1.8–7.7)
NEUTROPHILS NFR BLD: 78.5 % (ref 38–73)
NRBC BLD-RTO: 0 /100 WBC
PLATELET # BLD AUTO: 172 K/UL (ref 150–450)
PMV BLD AUTO: 9.9 FL (ref 9.2–12.9)
POCT GLUCOSE: 312 MG/DL (ref 70–110)
POTASSIUM SERPL-SCNC: 3.7 MMOL/L (ref 3.5–5.1)
PROT SERPL-MCNC: 5.6 G/DL (ref 6–8.4)
RBC # BLD AUTO: 2.88 M/UL (ref 4–5.4)
SODIUM SERPL-SCNC: 136 MMOL/L (ref 136–145)
WBC # BLD AUTO: 9.99 K/UL (ref 3.9–12.7)

## 2024-09-04 PROCEDURE — 94761 N-INVAS EAR/PLS OXIMETRY MLT: CPT

## 2024-09-04 PROCEDURE — 94640 AIRWAY INHALATION TREATMENT: CPT

## 2024-09-04 PROCEDURE — 25000003 PHARM REV CODE 250: Performed by: FAMILY MEDICINE

## 2024-09-04 PROCEDURE — 63600175 PHARM REV CODE 636 W HCPCS: Performed by: INTERNAL MEDICINE

## 2024-09-04 PROCEDURE — 97110 THERAPEUTIC EXERCISES: CPT

## 2024-09-04 PROCEDURE — 25000003 PHARM REV CODE 250: Performed by: HOSPITALIST

## 2024-09-04 PROCEDURE — 99900031 HC PATIENT EDUCATION (STAT)

## 2024-09-04 PROCEDURE — 21400001 HC TELEMETRY ROOM

## 2024-09-04 PROCEDURE — 94799 UNLISTED PULMONARY SVC/PX: CPT

## 2024-09-04 PROCEDURE — 25000003 PHARM REV CODE 250: Performed by: INTERNAL MEDICINE

## 2024-09-04 PROCEDURE — 27100171 HC OXYGEN HIGH FLOW UP TO 24 HOURS

## 2024-09-04 PROCEDURE — 94660 CPAP INITIATION&MGMT: CPT

## 2024-09-04 PROCEDURE — 99233 SBSQ HOSP IP/OBS HIGH 50: CPT | Mod: ,,, | Performed by: INTERNAL MEDICINE

## 2024-09-04 PROCEDURE — 63600175 PHARM REV CODE 636 W HCPCS: Performed by: HOSPITALIST

## 2024-09-04 PROCEDURE — 25000242 PHARM REV CODE 250 ALT 637 W/ HCPCS: Performed by: HOSPITALIST

## 2024-09-04 PROCEDURE — 80053 COMPREHEN METABOLIC PANEL: CPT | Performed by: FAMILY MEDICINE

## 2024-09-04 PROCEDURE — 99900035 HC TECH TIME PER 15 MIN (STAT)

## 2024-09-04 PROCEDURE — 85025 COMPLETE CBC W/AUTO DIFF WBC: CPT | Performed by: FAMILY MEDICINE

## 2024-09-04 RX ORDER — LEVOTHYROXINE SODIUM 25 UG/1
75 TABLET ORAL
Status: DISCONTINUED | OUTPATIENT
Start: 2024-09-05 | End: 2024-09-11

## 2024-09-04 RX ORDER — MECLIZINE HCL 12.5 MG 12.5 MG/1
25 TABLET ORAL 3 TIMES DAILY PRN
Status: DISCONTINUED | OUTPATIENT
Start: 2024-09-04 | End: 2024-09-17 | Stop reason: HOSPADM

## 2024-09-04 RX ADMIN — DILTIAZEM HYDROCHLORIDE 240 MG: 120 CAPSULE, COATED, EXTENDED RELEASE ORAL at 09:09

## 2024-09-04 RX ADMIN — MECLIZINE HYDROCHLORIDE 25 MG: 12.5 TABLET ORAL at 02:09

## 2024-09-04 RX ADMIN — ENOXAPARIN SODIUM 40 MG: 40 INJECTION SUBCUTANEOUS at 08:09

## 2024-09-04 RX ADMIN — PREDNISONE 30 MG: 20 TABLET ORAL at 09:09

## 2024-09-04 RX ADMIN — FAMOTIDINE 20 MG: 10 INJECTION INTRAVENOUS at 09:09

## 2024-09-04 RX ADMIN — SENNOSIDES AND DOCUSATE SODIUM 1 TABLET: 8.6; 5 TABLET ORAL at 08:09

## 2024-09-04 RX ADMIN — IPRATROPIUM BROMIDE AND ALBUTEROL SULFATE 3 ML: 2.5; .5 SOLUTION RESPIRATORY (INHALATION) at 12:09

## 2024-09-04 RX ADMIN — INSULIN ASPART 4 UNITS: 100 INJECTION, SOLUTION INTRAVENOUS; SUBCUTANEOUS at 08:09

## 2024-09-04 RX ADMIN — IPRATROPIUM BROMIDE AND ALBUTEROL SULFATE 3 ML: 2.5; .5 SOLUTION RESPIRATORY (INHALATION) at 07:09

## 2024-09-04 RX ADMIN — FAMOTIDINE 20 MG: 10 INJECTION INTRAVENOUS at 08:09

## 2024-09-04 RX ADMIN — HYDROCODONE BITARTRATE AND ACETAMINOPHEN 1 TABLET: 5; 325 TABLET ORAL at 10:09

## 2024-09-04 RX ADMIN — HYDROCODONE BITARTRATE AND ACETAMINOPHEN 1 TABLET: 5; 325 TABLET ORAL at 05:09

## 2024-09-04 RX ADMIN — IPRATROPIUM BROMIDE AND ALBUTEROL SULFATE 3 ML: 2.5; .5 SOLUTION RESPIRATORY (INHALATION) at 01:09

## 2024-09-04 RX ADMIN — INSULIN GLARGINE 42 UNITS: 100 INJECTION, SOLUTION SUBCUTANEOUS at 11:09

## 2024-09-04 RX ADMIN — INSULIN GLARGINE 42 UNITS: 100 INJECTION, SOLUTION SUBCUTANEOUS at 08:09

## 2024-09-04 RX ADMIN — LEVOTHYROXINE SODIUM 50 MCG: 0.03 TABLET ORAL at 05:09

## 2024-09-04 RX ADMIN — LOSARTAN POTASSIUM 100 MG: 50 TABLET, FILM COATED ORAL at 09:09

## 2024-09-04 RX ADMIN — CYANOCOBALAMIN TAB 1000 MCG 1000 MCG: 1000 TAB at 09:09

## 2024-09-04 RX ADMIN — LIDOCAINE 1 PATCH: 50 PATCH CUTANEOUS at 02:09

## 2024-09-04 RX ADMIN — HYDROCODONE BITARTRATE AND ACETAMINOPHEN 1 TABLET: 5; 325 TABLET ORAL at 08:09

## 2024-09-04 NOTE — SUBJECTIVE & OBJECTIVE
Interval History:     Seen patient in multidisciplinary round  Gross swelling of the right eye and also forehead hematoma noted.  Vision is normal  She is on oxygen at her baseline oxygen at home is 2.5 L. she is not in shortness a breath.  She did very poorly with physical therapy and awaited for the placement    Review of Systems  Objective:     Vital Signs (Most Recent):  Temp: 97.8 °F (36.6 °C) (09/04/24 1118)  Pulse: 87 (09/04/24 1259)  Resp: 16 (09/04/24 1259)  BP: (!) 156/85 (map 96) (09/04/24 1118)  SpO2: (!) 93 % (09/04/24 1259) Vital Signs (24h Range):  Temp:  [97.5 °F (36.4 °C)-98.4 °F (36.9 °C)] 97.8 °F (36.6 °C)  Pulse:  [66-88] 87  Resp:  [11-22] 16  SpO2:  [93 %-99 %] 93 %  BP: (130-163)/(68-85) 156/85     Weight: 119.4 kg (263 lb 3.7 oz)  Body mass index is 43.8 kg/m².    Intake/Output Summary (Last 24 hours) at 9/4/2024 1505  Last data filed at 9/3/2024 2258  Gross per 24 hour   Intake 200 ml   Output 150 ml   Net 50 ml         Physical Exam  Vitals and nursing note reviewed.   HENT:      Head: Normocephalic and atraumatic.   Eyes:      Conjunctiva/sclera: Conjunctivae normal.   Neck:      Vascular: No JVD.   Cardiovascular:      Rate and Rhythm: Normal rate.      Heart sounds: Normal heart sounds.   Pulmonary:      Effort: Pulmonary effort is normal.      Comments: Decreased breath sounds  Abdominal:      General: Abdomen is flat.      Palpations: Abdomen is soft.   Musculoskeletal:         General: Normal range of motion.   Skin:     General: Skin is warm.   Neurological:      Mental Status: She is alert and oriented to person, place, and time.   Psychiatric:         Mood and Affect: Mood normal.             Significant Labs: All pertinent labs within the past 24 hours have been reviewed.  CBC:   Recent Labs   Lab 09/03/24  0330 09/04/24  0442   WBC 9.68 9.99   HGB 8.8* 8.9*   HCT 29.4* 28.8*    172     CMP:   Recent Labs   Lab 09/03/24  0330 09/04/24  0442   * 136   K 3.7 3.7   CL 96  97   CO2 34* 36*   * 153*   BUN 14 13   CREATININE 0.6 0.5   CALCIUM 8.6* 8.9   PROT 5.3* 5.6*   ALBUMIN 3.0* 3.0*   BILITOT 0.4 0.4   ALKPHOS 77 71   AST 12 12   ALT 10 10   ANIONGAP 5* 3*       Significant Imaging: I have reviewed all pertinent imaging results/findings within the past 24 hours.

## 2024-09-04 NOTE — NURSING
Nurses Note -- 4 Eyes      9/3/2024   1901 PM      Skin assessed during: Q Shift Change      [] No Altered Skin Integrity Present    []Prevention Measures Documented      [x] Yes- Altered Skin Integrity Present or Discovered   [] LDA Added if Not in Epic (Describe Wound)   [] New Altered Skin Integrity was Present on Admit and Documented in LDA   [] Wound Image Taken    Wound Care Consulted? No    Attending Nurse:  Eliud Forte RN/Staff Member:  Severino GONZALEZ

## 2024-09-04 NOTE — ASSESSMENT & PLAN NOTE
Imaging ruled out acute fracture but severe swollen noted  US confirmed hematoma on left lower extremity  Fall precautions discussed with the patient.

## 2024-09-04 NOTE — PT/OT/SLP PROGRESS
Occupational Therapy   Treatment    Name: Karo Leonard  MRN: [id number]  Admitting Diagnosis:  Acute hypoxic on chronic hypercapnic respiratory failure       Recommendations:     Discharge Recommendations: Moderate Intensity Therapy  Discharge Equipment Recommendations:  to be determined by next level of care  Barriers to discharge:       Assessment:     Karo Leonard is a 64 y.o. female with a medical diagnosis of Acute hypoxic on chronic hypercapnic respiratory failure. Pt agreeable to OT therapy session this AM. Performance deficits affecting function are weakness, impaired endurance, impaired self care skills, impaired functional mobility, gait instability, impaired balance, decreased upper extremity function, decreased lower extremity function, decreased safety awareness, pain, decreased ROM, impaired cardiopulmonary response to activity.     Rehab Prognosis:  Good; patient would benefit from acute skilled OT services to address these deficits and reach maximum level of function.       Plan:     Patient to be seen 5 x/week to address the above listed problems via self-care/home management, therapeutic activities, therapeutic exercises  Plan of Care Expires: 09/22/24  Plan of Care Reviewed with: patient, spouse    Subjective     Chief Complaint: none stated  Patient/Family Comments/goals: none stated  Pain/Comfort:  Pain Rating 1:  (not rated)  Location - Side 1: Left  Location 1: arm (with exercise)  Pain Addressed 1: Cessation of Activity    Objective:     Communicated with: nursing prior to session.  Patient found HOB elevated with bed alarm, oxygen, telemetry, peripheral IV upon OT entry to room.    General Precautions: Standard, fall    Orthopedic Precautions:N/A  Braces: N/A  Respiratory Status:  3 L oxymask     Occupational Performance:     Therapeutic Exercise:  BUE exercises with red tband x 10 reps each of shldr horizontal abd, shldr flex, elbow flex, elbow ext with SBA bed level; pt tolerated  well; pt unable to complete all reps of each exercises on LUE due to pain    Treatment & Education:  Pt educated on role of OT/POC, importance of OOB/EOB activity, use of call bell, and safety during ADLs, transfers, and functional mobility.    Patient left HOB elevated with all lines intact, call button in reach, bed alarm on, and spouse present    GOALS:   Multidisciplinary Problems       Occupational Therapy Goals          Problem: Occupational Therapy    Goal Priority Disciplines Outcome Interventions   Occupational Therapy Goal     OT, PT/OT Progressing    Description: Goals to be met by: 9/22/2024     Patient will increase functional independence with ADLs by performing:    LE Dressing with Minimal Assistance.  Grooming while seated with Supervision.  Toileting from bedside commode with Minimal Assistance for hygiene and clothing management.   Supine to sit with Minimal Assistance.  Toilet transfer to bedside commode with Minimal Assistance.  Upper extremity exercise program, with supervision.                         Time Tracking:     OT Date of Treatment: 09/04/24  OT Start Time: 1122  OT Stop Time: 1132  OT Total Time (min): 10 min    Billable Minutes:Therapeutic Exercise 10    OT/NYA: OT       9/4/2024

## 2024-09-04 NOTE — NURSING
Nurses Note -- 4 Eyes      9/4/2024   3:54 AM      Skin assessed during: Transfer      [] No Altered Skin Integrity Present    []Prevention Measures Documented      [x] Yes- Altered Skin Integrity Present or Discovered   [x] LDA Added if Not in Epic (Describe Wound)   [] New Altered Skin Integrity was Present on Admit and Documented in LDA   [] Wound Image Taken    Wound Care Consulted? No    Attending Nurse:  Hailey Forte RN/Staff Member:  CARLOS Astudillo

## 2024-09-04 NOTE — PROGRESS NOTES
[facility] Medicine  Progress Note    Patient Name: Karo Leonard  MRN: [id number]  Patient Class: IP- Inpatient   Admission Date: 8/25/2024  Length of Stay: 10 days  Attending Physician: Bryan Nevarez MD  Primary Care Provider: Navneet Hernandez FNP        Subjective:     Principal Problem:Acute hypoxic on chronic hypercapnic respiratory failure        HPI:  64F p/w fall in the context of shortness of breath. EMS reportedly gave narcan w/o appreciable response, then she was given duoneb en route to [facility] ED. Upon arrival, she was tachypneic, had respy sounding phonation, but she denied chest pain, fever, or chills. She was placed on BiPAP, and initially demonstrated improvement. However, she became less responsive and appeared to tire out. She was given additional narcan w/o appreciable response, so she was intubated. She had some hypotension after receiving sedation, so she was placed on low dose levophed. [place] was placed, lasix was given, as was Abx. Large bruise noted on LLE, so x-ray ordered.     Overview/Hospital Course:  64 F Patient with hx of COPD, morbid obesity was admitted for acute on chronic hypoxemic hypercapnic respiratory failure with sever fall with hit to the face . Patient was intubated after failing  BiPAP. Finished course of IV Levofloxacin for COPD exacerbation and possible pneumonia. Pulm has been managing her steroid dose. Patient was started on iron and B12 supplement for anemia. S/P exubation on 8/31. For her hypertension, patient was started on losartan and her home diltiazem was resumed.  Patient working with PT and OT.  Fall precautions have been addressed with the patient.  Patient moving to medicine telemetry.  Supplemental oxygen weaning is in process.  Patient encouraged to continue BiPAP use at night or as needed as before.  Patient oxygen requirement progressively improving.  Patient is physically deconditioned and working with PT and OT.  Awaiting  skilled nursing facility placement.  Steroid dose is being tapered down.       Interval History:     Seen patient in multidisciplinary round  Gross swelling of the right eye and also forehead hematoma noted.  Vision is normal  She is on oxygen at her baseline oxygen at home is 2.5 L. she is not in shortness a breath.  She did very poorly with physical therapy and awaited for the placement    Review of Systems  Objective:     Vital Signs (Most Recent):  Temp: 97.8 °F (36.6 °C) (09/04/24 1118)  Pulse: 87 (09/04/24 1259)  Resp: 16 (09/04/24 1259)  BP: (!) 156/85 (map 96) (09/04/24 1118)  SpO2: (!) 93 % (09/04/24 1259) Vital Signs (24h Range):  Temp:  [97.5 °F (36.4 °C)-98.4 °F (36.9 °C)] 97.8 °F (36.6 °C)  Pulse:  [66-88] 87  Resp:  [11-22] 16  SpO2:  [93 %-99 %] 93 %  BP: (130-163)/(68-85) 156/85     Weight: 119.4 kg (263 lb 3.7 oz)  Body mass index is 43.8 kg/m².    Intake/Output Summary (Last 24 hours) at 9/4/2024 1505  Last data filed at 9/3/2024 2258  Gross per 24 hour   Intake 200 ml   Output 150 ml   Net 50 ml         Physical Exam  Vitals and nursing note reviewed.   HENT:      Head: Normocephalic and atraumatic.   Eyes:      Conjunctiva/sclera: Conjunctivae normal.   Neck:      Vascular: No JVD.   Cardiovascular:      Rate and Rhythm: Normal rate.      Heart sounds: Normal heart sounds.   Pulmonary:      Effort: Pulmonary effort is normal.      Comments: Decreased breath sounds  Abdominal:      General: Abdomen is flat.      Palpations: Abdomen is soft.   Musculoskeletal:         General: Normal range of motion.   Skin:     General: Skin is warm.   Neurological:      Mental Status: She is alert and oriented to person, place, and time.   Psychiatric:         Mood and Affect: Mood normal.             Significant Labs: All pertinent labs within the past 24 hours have been reviewed.  CBC:   Recent Labs   Lab 09/03/24  0330 09/04/24  0442   WBC 9.68 9.99   HGB 8.8* 8.9*   HCT 29.4* 28.8*    172     CMP:  "  Recent Labs   Lab 09/03/24  0330 09/04/24  0442   * 136   K 3.7 3.7   CL 96 97   CO2 34* 36*   * 153*   BUN 14 13   CREATININE 0.6 0.5   CALCIUM 8.6* 8.9   PROT 5.3* 5.6*   ALBUMIN 3.0* 3.0*   BILITOT 0.4 0.4   ALKPHOS 77 71   AST 12 12   ALT 10 10   ANIONGAP 5* 3*       Significant Imaging: I have reviewed all pertinent imaging results/findings within the past 24 hours.    Assessment/Plan:      * Acute hypoxic on chronic hypercapnic respiratory failure  Appear secondary to COPD exacerbation and failed BiPAP   Improving  Likely multifactorial  S/P extubation on 8/31  Oxygen supplement as need, patient already on home oxygen  EF 60-65%  Pulmonology is following patient    COPD exacerbation  Continue with steroids  Tapering prednisone  Completed Levofloxacin antibiotic course on 9/1    Pneumonia  MRSA neg, procal neg, respiratory panel neg  Completed Levofloxacin antibiotic course on 9/1    Type 2 diabetes mellitus  Continue with SSI and glargine  Make adjustment as needed    Goals of care, counseling/discussion  Discussed the goal of care with patient's  on 8/28.    Anemia  Likely from Iron and b12 deficiency  Started on Iron and B12 supplement on 8/27    Fall  Imaging ruled out acute fracture but severe swollen noted  US confirmed hematoma on left lower extremity  Fall precautions discussed with the patient.      Acute metabolic encephalopathy  Resolved  CT head: no acute changes  Continue to monitor     Hypertension  Started Losartan on 8/28 and amlodipine on 8/31  Can make adjustment as needed  Resumed home diltiazem on 9/1    Hypothyroid  Continue home levothyroxine, increased dose         VTE Risk Mitigation (From admission, onward)           Ordered     enoxaparin injection 40 mg  Every 12 hours        Note to Pharmacy: Ht: 5' 5" (1.651 m)  Wt: 127 kg (280 lb)  Estimated Creatinine Clearance: 92.8 mL/min (based on SCr of 0.8 mg/dL).  Body mass index is 46.59 kg/m².    08/25/24 0627     IP " VTE HIGH RISK PATIENT  Once         08/25/24 0627     Place sequential compression device  Until discontinued         08/25/24 0627                    Discharge Planning   CHELE: 9/7/2024     Code Status: Full Code   Is the patient medically ready for discharge?:     Reason for patient still in hospital (select all that apply): Treatment  Discharge Plan A: Skilled Nursing Facility   Discharge Delays: None known at this time              Bryan Nevarez MD  Department of Hospital Medicine   [facility]

## 2024-09-04 NOTE — PLAN OF CARE
Patient declined by Bryce via [employer] system.  SW left voice message for Prisca with [employer] to return call regarding acceptance and authorization.    As courtesy this SW sent patient information to [employer] [facility] [place], [place], [place] via [employer] for review.       09/04/24 1024   Post-Acute Status   Post-Acute Authorization Placement   Post-Acute Placement Status Pending post-acute provider review/more information requested

## 2024-09-04 NOTE — PT/OT/SLP PROGRESS
Physical Therapy      Patient Name:  Karo Leonard   MRN:  [id number]    Patient not seen today x4 attempts. Will follow-up 9/5/24.

## 2024-09-04 NOTE — PLAN OF CARE
Problem: Adult Inpatient Plan of Care  Goal: Plan of Care Review  Outcome: Progressing  Goal: Patient-Specific Goal (Individualized)  Outcome: Progressing  Goal: Absence of Hospital-Acquired Illness or Injury  Outcome: Progressing  Goal: Optimal Comfort and Wellbeing  Outcome: Progressing  Goal: Readiness for Transition of Care  Outcome: Progressing     Problem: Bariatric Environmental Safety  Goal: Safety Maintained with Care  Outcome: Progressing     Problem: Skin Injury Risk Increased  Goal: Skin Health and Integrity  Outcome: Progressing     Problem: Diabetes Comorbidity  Goal: Blood Glucose Level Within Targeted Range  Outcome: Progressing     Problem: Pneumonia  Goal: Fluid Balance  Outcome: Progressing  Goal: Resolution of Infection Signs and Symptoms  Outcome: Progressing  Goal: Effective Oxygenation and Ventilation  Outcome: Progressing     Problem: Infection  Goal: Absence of Infection Signs and Symptoms  Outcome: Progressing     Problem: Fall Injury Risk  Goal: Absence of Fall and Fall-Related Injury  Outcome: Progressing     Problem: Enteral Nutrition  Goal: Feeding Tolerance  Outcome: Progressing     Problem: Mechanical Ventilation Invasive  Goal: Optimal Nutrition Delivery  Outcome: Progressing  Goal: Absence of Device-Related Skin and Tissue Injury  Outcome: Progressing  Goal: Absence of Ventilator-Induced Lung Injury  Outcome: Progressing     Problem: Wound  Goal: Optimal Coping  Outcome: Progressing  Goal: Optimal Functional Ability  Outcome: Progressing  Goal: Absence of Infection Signs and Symptoms  Outcome: Progressing  Goal: Improved Oral Intake  Outcome: Progressing  Goal: Optimal Pain Control and Function  Outcome: Progressing  Goal: Skin Health and Integrity  Outcome: Progressing  Goal: Optimal Wound Healing  Outcome: Progressing

## 2024-09-04 NOTE — PLAN OF CARE
09/04/24 0713   Patient Assessment/Suction   Level of Consciousness (AVPU) responds to voice   Respiratory Effort Normal;Unlabored   Expansion/Accessory Muscles/Retractions no retractions;no use of accessory muscles   All Lung Fields Breath Sounds diminished   Rhythm/Pattern, Respiratory unlabored;pattern regular;depth regular   PRE-TX-O2   Device (Oxygen Therapy) BIPAP   $ Is the patient on High Flow Oxygen? Yes   Oxygen Concentration (%) 45   SpO2 99 %   Pulse Oximetry Type Intermittent   $ Pulse Oximetry - Multiple Charge Pulse Oximetry - Multiple   Pulse 66   Resp 18   Aerosol Therapy   $ Aerosol Therapy Charges Aerosol Treatment   Daily Review of Necessity (SVN) completed   Respiratory Treatment Status (SVN) given   Treatment Route (SVN) in-line   Patient Position HOB elevated   Post Treatment Assessment (SVN) breath sounds unchanged   Signs of Intolerance (SVN) none   Breath Sounds Post-Respiratory Treatment   Post-treatment Heart Rate (beats/min) 67   Post-treatment Resp Rate (breaths/min) 18   Preset CPAP/BiPAP Settings   Mode Of Delivery BiPAP S/T   $ CPAP/BiPAP Daily Charge BiPAP/CPAP Daily   $ Initial CPAP/BiPAP Setup? No   $ Is patient using? Yes   Size of Mask Medium/Large   Sized Appropriately? Yes   Equipment Type V60   Ipap 12   EPAP (cm H2O) 6   Pressure Support (cm H2O) 6   Set Rate (Breaths/Min) 16   ITime (sec) 1   Rise Time (sec) 3   Patient CPAP/BiPAP Settings   RR Total (Breaths/Min) 18   Tidal Volume (mL) 428   VE Minute Ventilation (L/min) 7.7 L/min   Peak Inspiratory Pressure (cm H2O) 12   TiTOT (%) 41   Total Leak (L/Min) 0   Patient Trigger - ST Mode Only (%) 45   CPAP/BiPAP Alarms   High Pressure (cm H2O) 30   Low Pressure (cm H2O) 5   Minute Ventilation (L/Min) 3   High RR (breaths/min) 30   Low RR (breaths/min) 5   Apnea (Sec) 20   Education   $ Education BiPAP;Bronchodilator;15 min

## 2024-09-04 NOTE — PROGRESS NOTES
Pulmonary/Critical Care  Progress Note      Patient name: Karo Leonard  MRN: [id number]  Date: 09/04/2024    Admit Date: 8/25/2024  Consult Requested By: Bryan Nevarez MD    Reason for Consult: Respiratory failure, COPD    HPI:    8/25/2024 - 65 yo ASCVD, DM, HTN. COPD(cigarette use) had increased SOB at home and a fall.  EMS was called and brought to ER.  Initially tried on BiPAP without response and was subsequently intubated and placed on ventilation.  Initial ABG showed over ventilation and we have adjusted and ABG are getting better.  She is sedated and not able to provide any history.  D/W  who says that she was supposed to see a pulmonologist but couldn't make appointment, she has been a chronic smoker.  She had increase SOB for a few days.  She did have a fall at home but he reports that she was not down for long.  He does report that she has been having recurring falls at home.      8/26/2024 - Stable overnight, O2 needs still too high to do SBT.  She is responsive and gets agitated on current sedation and we are adjusting.  No other new issues reported.  Hgb has decreased (no active bleeding reported), NA remains low, CPK is better, TFTF noted and c/w hypothyroid (synthroid started).    8/27/2024 - Remains critically ill, O2 needs down a little but still too high.  Difficult to sedate is either agitated or apneic.  Tried SBT this AM and was apneic.  VS reviewed.  She is 3.8 liters +.  Tube feeds have been started.  NA remains low, glucose is elevated.  CXR looks about the same    8/28/2024 - Stable overnight, O2 needs still up.  She does get agitated at times and we have adjusted meds.  Trouble with tube feeds and will hold today and restart tomorrow.  Not ready to wean because on increased FiO2 and will try to increase PEP to see if that helps.  She does not have a lot of secretions.  She is over ventilaed some and we adjusted vent.  CXR is about the same    8/29/2024 - Stable overnight,, O2  needs still up some (I decreased FiO2 and increased PEEP some) and she is overventilated (we adjusted rate).  [facility]  She is 4.6 liters +.  CXR is about the same.    - continues on vent, settings adjusted for alkalosis- now on PS 12/5. CXR looks wet and pt with severe edema- fluids discontinued and starting lasix. Propofol has been weaned a little. Pt is awake and denies pain. Her blood pressure was up to 200 systolic at the time of my evaluation- RN states she just gave am blood pressure med and also Lasix.    - pt awake, off sedation, writing and alert. Denies pain, states feeling better. She was significantly more alkalotic this am due to lasix so given a dose of diamox this am. Tolerating PS 10/5 now- will repeat abg soon and consider extubation     - extubated yesterday and tolerated well    9/3/2024 - STable overnight, no new issues reported and is feeling better ovrall.  She is very weak.  O2 decreased to 5 LPM when I saqw her.  No new issues reported.  BP is up some.  Labs reviewed    2024 - Stable overnight, no new issues reported, she feels better overall.      Review of Systems    Review of Systems   Unable to perform ROS: Intubated       Past Medical History    Past Medical History:   Diagnosis Date    Coronary artery disease     cardiac stent    DDD (degenerative disc disease), lumbar 2000    Diabetes mellitus     Hypertension     Thyroid disease        Past Surgical History    Past Surgical History:   Procedure Laterality Date     SECTION  08/1987    x2 1993    CORONARY STENT PLACEMENT      EXPLORATORY LAPAROTOMY      Mu-ism     HEMORRHOID SURGERY      LAPAROSCOPIC CHOLECYSTECTOMY N/A 2022    Procedure: CHOLECYSTECTOMY, LAPAROSCOPIC;  Surgeon: Leonardo Wilson III, MD;  Location: [facility] OR;  Service: General;  Laterality: N/A;    LUMBAR DISC SURGERY      PILONIDAL CYST  DRAINAGE  1976    TONSILLECTOMY      as a child (also adenoids)       Medications (scheduled):      albuterol-ipratropium  3 mL Nebulization Q6H    cyanocobalamin  1,000 mcg Oral Daily    diltiaZEM  240 mg Oral Daily    enoxparin  40 mg Subcutaneous Q12H    famotidine (PF)  20 mg Intravenous Q12H    insulin glargine U-100  42 Units Subcutaneous BID    [START ON 9/5/2024] levothyroxine  75 mcg Oral Before breakfast    LIDOcaine  1 patch Transdermal Q24H    losartan  100 mg Oral Daily    predniSONE  30 mg Oral Daily    senna-docusate 8.6-50 mg  1 tablet Oral BID       Medications (infusions):           Medications (prn):       Current Facility-Administered Medications:     calcium gluconate IVPB, 1 g, Intravenous, PRN    calcium gluconate IVPB, 2 g, Intravenous, PRN    calcium gluconate IVPB, 3 g, Intravenous, PRN    dextrose 50%, 12.5 g, Intravenous, PRN    glucagon (human recombinant), 1 mg, Intramuscular, PRN    hydrALAZINE, 10 mg, Intravenous, Q6H PRN    HYDROcodone-acetaminophen, 1 tablet, Oral, Q4H PRN    insulin aspart U-100, 0-10 Units, Subcutaneous, Q6H PRN    magnesium sulfate IVPB, 2 g, Intravenous, PRN    magnesium sulfate IVPB, 4 g, Intravenous, PRN    meclizine, 25 mg, Oral, TID PRN    potassium bicarbonate, 35 mEq, Oral, PRN    potassium bicarbonate, 50 mEq, Oral, PRN    potassium bicarbonate, 60 mEq, Oral, PRN    potassium chloride in water, 40 mEq, Intravenous, PRN **AND** potassium chloride in water, 60 mEq, Intravenous, PRN **AND** potassium chloride in water, 80 mEq, Intravenous, PRN    sodium chloride 0.9%, 10 mL, Intravenous, PRN    sodium phosphate 15 mmol in D5W 250 mL IVPB, 15 mmol, Intravenous, PRN    sodium phosphate 20.01 mmol in D5W 250 mL IVPB, 20.01 mmol, Intravenous, PRN    sodium phosphate 30 mmol in D5W 250 mL IVPB, 30 mmol, Intravenous, PRN    Family History: No family history on file.    Social History: Tobacco:   Social History     Tobacco Use   Smoking Status Not on file  "  Smokeless Tobacco Not on file                                EtOH:   Social History     Substance and Sexual Activity   Alcohol Use No                                Drugs:   Social History     Substance and Sexual Activity   Drug Use No       Physical Exam    Vital signs:  Temp:  [97.5 °F (36.4 °C)-98.4 °F (36.9 °C)]   Pulse:  [66-88]   Resp:  [11-22]   BP: (130-163)/(68-85)   SpO2:  [93 %-99 %]     Intake/Output:   Intake/Output Summary (Last 24 hours) at 9/4/2024 1617  Last data filed at 9/3/2024 2258  Gross per 24 hour   Intake 200 ml   Output 150 ml   Net 50 ml        BMI: Estimated body mass index is 43.8 kg/m² as calculated from the following:    Height as of this encounter: 5' 5" (1.651 m).    Weight as of this encounter: 119.4 kg (263 lb 3.7 oz).    Physical Exam  Vitals and nursing note reviewed.   Constitutional:       General: She is not in acute distress.     Appearance: She is not ill-appearing, toxic-appearing or diaphoretic.      Comments: Awake, alert no distress   HENT:      Head: Normocephalic.      Comments: Some bruising right forehead and bilat eyes from her fall     Right Ear: External ear normal.      Left Ear: External ear normal.      Nose: Nose normal. No congestion or rhinorrhea.      Mouth/Throat:      Mouth: Mucous membranes are moist.      Pharynx: Oropharynx is clear. No oropharyngeal exudate or posterior oropharyngeal erythema.   Eyes:      General: No scleral icterus.        Right eye: No discharge.         Left eye: No discharge.      Extraocular Movements: Extraocular movements intact.      Conjunctiva/sclera: Conjunctivae normal.      Pupils: Pupils are equal, round, and reactive to light.   Neck:      Vascular: No carotid bruit.   Cardiovascular:      Rate and Rhythm: Normal rate and regular rhythm.      Pulses: Normal pulses.      Heart sounds: No murmur heard.     No friction rub. No gallop.   Pulmonary:      Effort: Pulmonary effort is normal. No respiratory distress.      " "Breath sounds: No stridor. No wheezing, rhonchi or rales.   Chest:      Chest wall: No tenderness.   Abdominal:      General: There is no distension.      Tenderness: There is no abdominal tenderness. There is no guarding.      Comments: Hypoactive BS   Musculoskeletal:         General: No swelling. Normal range of motion.      Cervical back: Normal range of motion and neck supple. No rigidity or tenderness.      Right lower leg: Edema present.      Left lower leg: Edema present.   Lymphadenopathy:      Cervical: No cervical adenopathy.   Skin:     General: Skin is warm and dry.      Capillary Refill: Capillary refill takes less than 2 seconds.      Coloration: Skin is not jaundiced.      Findings: Bruising present.      Comments: + bruise LLE   Neurological:      General: No focal deficit present.      Cranial Nerves: No cranial nerve deficit.      Sensory: No sensory deficit.      Motor: No weakness.   Psychiatric:         Mood and Affect: Mood normal.         Behavior: Behavior normal.         Laboratory    Recent Labs   Lab 09/04/24  0442   WBC 9.99   RBC 2.88*   HGB 8.9*   HCT 28.8*      *   MCH 30.9   MCHC 30.9*       Recent Labs   Lab 09/04/24  0442   CALCIUM 8.9   ALBUMIN 3.0*   PROT 5.6*      K 3.7   CO2 36*   CL 97   BUN 13   CREATININE 0.5   ALKPHOS 71   ALT 10   AST 12   BILITOT 0.4       No results for input(s): "PT", "INR", "APTT" in the last 24 hours.    No results for input(s): "CPK", "CPKMB", "TROPONINI", "MB" in the last 24 hours.      Additional labs: reviewed    Microbiology:       Microbiology Results (last 7 days)       ** No results found for the last 168 hours. **            Radiology    US Extremity Non Vascular Complete Left  Narrative: EXAMINATION:  US EXTREMITY NON VASCULAR COMPLETE LEFT    CLINICAL HISTORY:  hematoma;    TECHNIQUE:  Targeted grayscale and color Doppler sonographic analysis was performed.    COMPARISON:  None.    FINDINGS:  Targeted evaluation of the " "left proximal pretibial soft tissues shows a 6 x 3.5 x 1.8 cm circumscribed, heterogeneous hypoechoic mass in the subcutaneous fat.  This mass has increased through transmission of sound, and no internal color Doppler vascular flow.  Impression: Complex mass in the left pretibial subcutaneous fat, presumably reflecting hematoma given clinical history.    Electronically signed by: Christiano Tirado  Date:    08/31/2024  Time:    14:18  X-Ray Chest AP Portable  Narrative: EXAMINATION:  XR CHEST AP PORTABLE    CLINICAL HISTORY:  Intubated;    FINDINGS:  Portable chest radiograph at 05:00 hours compared 08/30/2024 shows ET and NG tubes unchanged in position, with stable enlarged cardiac silhouette and normal pulmonary vascularity.    There are persistent left lower lung opacities obscuring the left hemidiaphragm, with no new pleural or parenchymal abnormality.  No pneumothorax.  Impression: No significant interval change.    Electronically signed by: Christiano Tirado  Date:    08/31/2024  Time:    07:42        Additional Studies    reviewed    Ventilator Information    Oxygen Concentration (%):  [45] 45             No results for input(s): "PH", "PCO2", "PO2", "HCO3", "POCSATURATED", "BE" in the last 72 hours.        Impression    Active Hospital Problems    Diagnosis  POA    *Acute hypoxic on chronic hypercapnic respiratory failure [J96.01, J96.12]  Yes    Goals of care, counseling/discussion [Z71.89]  Not Applicable    Type 2 diabetes mellitus [E11.9]  Yes    Anemia [D64.9]  Yes    Acute metabolic encephalopathy [G93.41]  Yes    Fall [W19.XXXA]  Yes    COPD exacerbation [J44.1]  Yes    Pneumonia [J18.9]  Yes    Hypothyroid [E03.9]  Yes    Hypertension [I10]  Yes      Resolved Hospital Problems   No resolved problems to display.       Plan    Continue O2 to maintain sats 89-92%  Continue bipap nightly  Respiratory treatments  Wean steroid   Completed levaquin 7d  Discontinue lasix  Antihypertensives continue  Watch H/H - no " active bleeding reported  Replace electrolytes  Synthroid   Watch BS and treat as needed  D/w RN and RT  Increase activity as able      Sonido Zamora MD  [facility] Pulmonary/Critical Care  09/04/2024

## 2024-09-04 NOTE — ASSESSMENT & PLAN NOTE
Appear secondary to COPD exacerbation and failed BiPAP   Improving  Likely multifactorial  S/P extubation on 8/31  Oxygen supplement as need, patient already on home oxygen  EF 60-65%  Pulmonology is following patient

## 2024-09-04 NOTE — CARE UPDATE
09/03/24 1931   Patient Assessment/Suction   Level of Consciousness (AVPU) alert   Respiratory Effort Normal;Unlabored   Expansion/Accessory Muscles/Retractions no use of accessory muscles;no retractions;expansion symmetric   All Lung Fields Breath Sounds diminished   Rhythm/Pattern, Respiratory unlabored;pattern regular;no shortness of breath reported   Cough Frequency infrequent   PRE-TX-O2   Device (Oxygen Therapy) nasal cannula   Flow (L/min) (Oxygen Therapy) 3   SpO2 (!) 94 %   Pulse Oximetry Type Continuous   Pulse 82   Resp (!) 21   Aerosol Therapy   $ Aerosol Therapy Charges Aerosol Treatment  (duo)   Daily Review of Necessity (SVN) completed   Respiratory Treatment Status (SVN) given   Treatment Route (SVN) mask;oxygen   Patient Position HOB elevated   Post Treatment Assessment (SVN) increased aeration   Signs of Intolerance (SVN) none   Breath Sounds Post-Respiratory Treatment   Throughout All Fields Post-Treatment All Fields   Throughout All Fields Post-Treatment aeration increased   Post-treatment Heart Rate (beats/min) 82   Post-treatment Resp Rate (breaths/min) 21   Incentive Spirometer   $ Incentive Spirometer Charges done with encouragement   Incentive Spirometer Predicted Level (mL) 1500   Administration (IS) instruction provided, follow-up;proper technique demonstrated   Number of Repetitions (IS) 10   Level Incentive Spirometer (mL) 1000   Patient Tolerance (IS) no adverse signs/symptoms present

## 2024-09-05 LAB
ALBUMIN SERPL BCP-MCNC: 3.1 G/DL (ref 3.5–5.2)
ALP SERPL-CCNC: 69 U/L (ref 55–135)
ALT SERPL W/O P-5'-P-CCNC: 10 U/L (ref 10–44)
ANION GAP SERPL CALC-SCNC: 4 MMOL/L (ref 8–16)
AST SERPL-CCNC: 12 U/L (ref 10–40)
BASOPHILS # BLD AUTO: 0.01 K/UL (ref 0–0.2)
BASOPHILS NFR BLD: 0.1 % (ref 0–1.9)
BILIRUB SERPL-MCNC: 0.4 MG/DL (ref 0.1–1)
BUN SERPL-MCNC: 15 MG/DL (ref 8–23)
CALCIUM SERPL-MCNC: 9 MG/DL (ref 8.7–10.5)
CHLORIDE SERPL-SCNC: 96 MMOL/L (ref 95–110)
CO2 SERPL-SCNC: 37 MMOL/L (ref 23–29)
CREAT SERPL-MCNC: 0.5 MG/DL (ref 0.5–1.4)
DIFFERENTIAL METHOD BLD: ABNORMAL
EOSINOPHIL # BLD AUTO: 0.1 K/UL (ref 0–0.5)
EOSINOPHIL NFR BLD: 0.5 % (ref 0–8)
ERYTHROCYTE [DISTWIDTH] IN BLOOD BY AUTOMATED COUNT: 17.6 % (ref 11.5–14.5)
EST. GFR  (NO RACE VARIABLE): >60 ML/MIN/1.73 M^2
GLUCOSE SERPL-MCNC: 113 MG/DL (ref 70–110)
HCT VFR BLD AUTO: 30.5 % (ref 37–48.5)
HGB BLD-MCNC: 9.2 G/DL (ref 12–16)
IMM GRANULOCYTES # BLD AUTO: 0.06 K/UL (ref 0–0.04)
IMM GRANULOCYTES NFR BLD AUTO: 0.7 % (ref 0–0.5)
LYMPHOCYTES # BLD AUTO: 1.7 K/UL (ref 1–4.8)
LYMPHOCYTES NFR BLD: 18.2 % (ref 18–48)
MCH RBC QN AUTO: 30.5 PG (ref 27–31)
MCHC RBC AUTO-ENTMCNC: 30.2 G/DL (ref 32–36)
MCV RBC AUTO: 101 FL (ref 82–98)
MONOCYTES # BLD AUTO: 0.9 K/UL (ref 0.3–1)
MONOCYTES NFR BLD: 9.8 % (ref 4–15)
NEUTROPHILS # BLD AUTO: 6.4 K/UL (ref 1.8–7.7)
NEUTROPHILS NFR BLD: 70.7 % (ref 38–73)
NRBC BLD-RTO: 0 /100 WBC
PLATELET # BLD AUTO: 160 K/UL (ref 150–450)
PMV BLD AUTO: 10 FL (ref 9.2–12.9)
POCT GLUCOSE: 105 MG/DL (ref 70–110)
POCT GLUCOSE: 163 MG/DL (ref 70–110)
POCT GLUCOSE: 258 MG/DL (ref 70–110)
POCT GLUCOSE: 270 MG/DL (ref 70–110)
POTASSIUM SERPL-SCNC: 3.6 MMOL/L (ref 3.5–5.1)
PROT SERPL-MCNC: 5.7 G/DL (ref 6–8.4)
RBC # BLD AUTO: 3.02 M/UL (ref 4–5.4)
SODIUM SERPL-SCNC: 137 MMOL/L (ref 136–145)
TB INDURATION 48 - 72 HR READ: NORMAL
TB SKIN TEST 48 - 72 HR READ: NORMAL
WBC # BLD AUTO: 9.1 K/UL (ref 3.9–12.7)

## 2024-09-05 PROCEDURE — 21400001 HC TELEMETRY ROOM

## 2024-09-05 PROCEDURE — 99232 SBSQ HOSP IP/OBS MODERATE 35: CPT | Mod: ,,, | Performed by: INTERNAL MEDICINE

## 2024-09-05 PROCEDURE — 80053 COMPREHEN METABOLIC PANEL: CPT | Performed by: FAMILY MEDICINE

## 2024-09-05 PROCEDURE — 97530 THERAPEUTIC ACTIVITIES: CPT | Mod: CQ

## 2024-09-05 PROCEDURE — 63600175 PHARM REV CODE 636 W HCPCS: Performed by: HOSPITALIST

## 2024-09-05 PROCEDURE — 99900031 HC PATIENT EDUCATION (STAT)

## 2024-09-05 PROCEDURE — 94640 AIRWAY INHALATION TREATMENT: CPT

## 2024-09-05 PROCEDURE — 97110 THERAPEUTIC EXERCISES: CPT

## 2024-09-05 PROCEDURE — 25000003 PHARM REV CODE 250: Performed by: INTERNAL MEDICINE

## 2024-09-05 PROCEDURE — 27100171 HC OXYGEN HIGH FLOW UP TO 24 HOURS

## 2024-09-05 PROCEDURE — 25000242 PHARM REV CODE 250 ALT 637 W/ HCPCS: Performed by: HOSPITALIST

## 2024-09-05 PROCEDURE — 94761 N-INVAS EAR/PLS OXIMETRY MLT: CPT

## 2024-09-05 PROCEDURE — 85025 COMPLETE CBC W/AUTO DIFF WBC: CPT | Performed by: FAMILY MEDICINE

## 2024-09-05 PROCEDURE — 94660 CPAP INITIATION&MGMT: CPT

## 2024-09-05 PROCEDURE — 63600175 PHARM REV CODE 636 W HCPCS: Performed by: INTERNAL MEDICINE

## 2024-09-05 PROCEDURE — 99900035 HC TECH TIME PER 15 MIN (STAT)

## 2024-09-05 PROCEDURE — 25000003 PHARM REV CODE 250: Performed by: FAMILY MEDICINE

## 2024-09-05 PROCEDURE — 25000003 PHARM REV CODE 250: Performed by: HOSPITALIST

## 2024-09-05 RX ORDER — BISACODYL 10 MG/1
10 SUPPOSITORY RECTAL DAILY PRN
Status: CANCELLED | OUTPATIENT
Start: 2024-09-05

## 2024-09-05 RX ORDER — LABETALOL HYDROCHLORIDE 5 MG/ML
10 INJECTION, SOLUTION INTRAVENOUS
Status: CANCELLED | OUTPATIENT
Start: 2024-09-05

## 2024-09-05 RX ORDER — SODIUM CHLORIDE 0.9 % (FLUSH) 0.9 %
10 SYRINGE (ML) INJECTION
Status: CANCELLED | OUTPATIENT
Start: 2024-09-05

## 2024-09-05 RX ORDER — PREDNISONE 20 MG/1
20 TABLET ORAL DAILY
Status: DISCONTINUED | OUTPATIENT
Start: 2024-09-06 | End: 2024-09-09

## 2024-09-05 RX ORDER — SIMETHICONE 80 MG
1 TABLET,CHEWABLE ORAL 3 TIMES DAILY PRN
Status: DISCONTINUED | OUTPATIENT
Start: 2024-09-05 | End: 2024-09-17 | Stop reason: HOSPADM

## 2024-09-05 RX ADMIN — MECLIZINE HYDROCHLORIDE 25 MG: 12.5 TABLET ORAL at 09:09

## 2024-09-05 RX ADMIN — ENOXAPARIN SODIUM 40 MG: 40 INJECTION SUBCUTANEOUS at 09:09

## 2024-09-05 RX ADMIN — IPRATROPIUM BROMIDE AND ALBUTEROL SULFATE 3 ML: 2.5; .5 SOLUTION RESPIRATORY (INHALATION) at 07:09

## 2024-09-05 RX ADMIN — IPRATROPIUM BROMIDE AND ALBUTEROL SULFATE 3 ML: 2.5; .5 SOLUTION RESPIRATORY (INHALATION) at 12:09

## 2024-09-05 RX ADMIN — SENNOSIDES AND DOCUSATE SODIUM 1 TABLET: 8.6; 5 TABLET ORAL at 09:09

## 2024-09-05 RX ADMIN — DILTIAZEM HYDROCHLORIDE 240 MG: 120 CAPSULE, COATED, EXTENDED RELEASE ORAL at 09:09

## 2024-09-05 RX ADMIN — HYDROCODONE BITARTRATE AND ACETAMINOPHEN 1 TABLET: 5; 325 TABLET ORAL at 09:09

## 2024-09-05 RX ADMIN — FAMOTIDINE 20 MG: 10 INJECTION INTRAVENOUS at 09:09

## 2024-09-05 RX ADMIN — PREDNISONE 30 MG: 20 TABLET ORAL at 09:09

## 2024-09-05 RX ADMIN — INSULIN GLARGINE 42 UNITS: 100 INJECTION, SOLUTION SUBCUTANEOUS at 09:09

## 2024-09-05 RX ADMIN — LEVOTHYROXINE SODIUM 75 MCG: 0.03 TABLET ORAL at 05:09

## 2024-09-05 RX ADMIN — CYANOCOBALAMIN TAB 1000 MCG 1000 MCG: 1000 TAB at 09:09

## 2024-09-05 RX ADMIN — LOSARTAN POTASSIUM 100 MG: 50 TABLET, FILM COATED ORAL at 09:09

## 2024-09-05 RX ADMIN — FAMOTIDINE 20 MG: 10 INJECTION INTRAVENOUS at 10:09

## 2024-09-05 RX ADMIN — HYDROCODONE BITARTRATE AND ACETAMINOPHEN 1 TABLET: 5; 325 TABLET ORAL at 02:09

## 2024-09-05 RX ADMIN — IPRATROPIUM BROMIDE AND ALBUTEROL SULFATE 3 ML: 2.5; .5 SOLUTION RESPIRATORY (INHALATION) at 01:09

## 2024-09-05 RX ADMIN — SIMETHICONE 80 MG: 80 TABLET, CHEWABLE ORAL at 09:09

## 2024-09-05 RX ADMIN — LIDOCAINE 1 PATCH: 50 PATCH CUTANEOUS at 02:09

## 2024-09-05 NOTE — CARE UPDATE
09/04/24 1946   Patient Assessment/Suction   Level of Consciousness (AVPU) alert   Respiratory Effort Normal;Unlabored   All Lung Fields Breath Sounds diminished   PRE-TX-O2   Device (Oxygen Therapy) Oxymask   Flow (L/min) (Oxygen Therapy) 3   SpO2 98 %   $ Pulse Oximetry - Multiple Charge Pulse Oximetry - Multiple   Pulse 83   Resp 19   Aerosol Therapy   $ Aerosol Therapy Charges Aerosol Treatment   Daily Review of Necessity (SVN) completed   Respiratory Treatment Status (SVN) given   Treatment Route (SVN) mask   Patient Position HOB elevated   Post Treatment Assessment (SVN) breath sounds unchanged   Signs of Intolerance (SVN) none   Breath Sounds Post-Respiratory Treatment   Throughout All Fields Post-Treatment All Fields   Throughout All Fields Post-Treatment aeration increased   Post-treatment Heart Rate (beats/min) 81   Post-treatment Resp Rate (breaths/min) 19   Incentive Spirometer   $ Incentive Spirometer Charges done with encouragement   Administration (IS) mouthpiece utilized   Number of Repetitions (IS) 10   Level Incentive Spirometer (mL) 1500   Patient Tolerance (IS) good   Education   $ Education BiPAP;Oxygen;Bronchodilator;15 min

## 2024-09-05 NOTE — SUBJECTIVE & OBJECTIVE
Interval History:     Doing well   Hematoma smaller . Having gas bloating but normal bowl movement   She wants vaccines and communicated to RN     Review of Systems  Objective:     Vital Signs (Most Recent):  Temp: 97.7 °F (36.5 °C) (09/05/24 1140)  Pulse: 91 (09/05/24 1140)  Resp: 18 (09/05/24 1140)  BP: (!) 143/74 (09/05/24 1140)  SpO2: 97 % (09/05/24 1140) Vital Signs (24h Range):  Temp:  [97.7 °F (36.5 °C)-98.6 °F (37 °C)] 97.7 °F (36.5 °C)  Pulse:  [72-91] 91  Resp:  [16-21] 18  SpO2:  [94 %-98 %] 97 %  BP: (143-187)/(64-74) 143/74     Weight: 119.7 kg (263 lb 14.3 oz)  Body mass index is 43.91 kg/m².    Intake/Output Summary (Last 24 hours) at 9/5/2024 1259  Last data filed at 9/5/2024 1234  Gross per 24 hour   Intake 720 ml   Output 650 ml   Net 70 ml         Physical Exam  Vitals and nursing note reviewed.   HENT:      Head: Normocephalic and atraumatic.   Eyes:      Conjunctiva/sclera: Conjunctivae normal.   Neck:      Vascular: No JVD.   Cardiovascular:      Rate and Rhythm: Normal rate.      Heart sounds: Normal heart sounds.   Pulmonary:      Effort: Pulmonary effort is normal.      Breath sounds: Normal breath sounds.   Abdominal:      Palpations: Abdomen is soft.   Musculoskeletal:         General: Normal range of motion.   Skin:     General: Skin is warm.   Neurological:      Mental Status: She is alert and oriented to person, place, and time.   Psychiatric:         Mood and Affect: Mood normal.             Significant Labs: All pertinent labs within the past 24 hours have been reviewed.  CBC:   Recent Labs   Lab 09/04/24 0442 09/05/24  0555   WBC 9.99 9.10   HGB 8.9* 9.2*   HCT 28.8* 30.5*    160     CMP:   Recent Labs   Lab 09/04/24  0442 09/05/24  0555    137   K 3.7 3.6   CL 97 96   CO2 36* 37*   * 113*   BUN 13 15   CREATININE 0.5 0.5   CALCIUM 8.9 9.0   PROT 5.6* 5.7*   ALBUMIN 3.0* 3.1*   BILITOT 0.4 0.4   ALKPHOS 71 69   AST 12 12   ALT 10 10   ANIONGAP 3* 4*     Cardiac  "Markers: No results for input(s): "CKMB", "MYOGLOBIN", "BNP", "TROPISTAT" in the last 48 hours.    Significant Imaging: I have reviewed all pertinent imaging results/findings within the past 24 hours.  "

## 2024-09-05 NOTE — PROGRESS NOTES
Pulmonary/Critical Care  Progress Note      Patient name: Karo Leonard  MRN: [id number]  Date: 09/05/2024    Admit Date: 8/25/2024  Consult Requested By: Bryan Nevarez MD    Reason for Consult: Respiratory failure, COPD    HPI:    8/25/2024 - 65 yo ASCVD, DM, HTN. COPD(cigarette use) had increased SOB at home and a fall.  EMS was called and brought to ER.  Initially tried on BiPAP without response and was subsequently intubated and placed on ventilation.  Initial ABG showed over ventilation and we have adjusted and ABG are getting better.  She is sedated and not able to provide any history.  D/W  who says that she was supposed to see a pulmonologist but couldn't make appointment, she has been a chronic smoker.  She had increase SOB for a few days.  She did have a fall at home but he reports that she was not down for long.  He does report that she has been having recurring falls at home.      8/26/2024 - Stable overnight, O2 needs still too high to do SBT.  She is responsive and gets agitated on current sedation and we are adjusting.  No other new issues reported.  Hgb has decreased (no active bleeding reported), NA remains low, CPK is better, TFTF noted and c/w hypothyroid (synthroid started).    8/27/2024 - Remains critically ill, O2 needs down a little but still too high.  Difficult to sedate is either agitated or apneic.  Tried SBT this AM and was apneic.  VS reviewed.  She is 3.8 liters +.  Tube feeds have been started.  NA remains low, glucose is elevated.  CXR looks about the same    8/28/2024 - Stable overnight, O2 needs still up.  She does get agitated at times and we have adjusted meds.  Trouble with tube feeds and will hold today and restart tomorrow.  Not ready to wean because on increased FiO2 and will try to increase PEP to see if that helps.  She does not have a lot of secretions.  She is over ventilaed some and we adjusted vent.  CXR is about the same    8/29/2024 - Stable overnight,, O2  needs still up some (I decreased FiO2 and increased PEEP some) and she is overventilated (we adjusted rate).  [facility]  She is 4.6 liters +.  CXR is about the same.    - continues on vent, settings adjusted for alkalosis- now on PS 12/5. CXR looks wet and pt with severe edema- fluids discontinued and starting lasix. Propofol has been weaned a little. Pt is awake and denies pain. Her blood pressure was up to 200 systolic at the time of my evaluation- RN states she just gave am blood pressure med and also Lasix.    - pt awake, off sedation, writing and alert. Denies pain, states feeling better. She was significantly more alkalotic this am due to lasix so given a dose of diamox this am. Tolerating PS 10/5 now- will repeat abg soon and consider extubation     - extubated yesterday and tolerated well    9/3/2024 - STable overnight, no new issues reported and is feeling better ovrall.  She is very weak.  O2 decreased to 5 LPM when I saqw her.  No new issues reported.  BP is up some.  Labs reviewed    2024 - Stable overnight, no new issues reported, she feels better overall.      2024 - Stable overnight, she complains of issues with vertigo when getting up.  Possible placement at [facility] is being worked on.  No new respiratory complaints.      Review of Systems    Review of Systems   Unable to perform ROS: Intubated       Past Medical History    Past Medical History:   Diagnosis Date    Coronary artery disease 2017    cardiac stent    DDD (degenerative disc disease), lumbar 2000    Diabetes mellitus 2017    Hypertension     Thyroid disease        Past Surgical History    Past Surgical History:   Procedure Laterality Date     SECTION  08/1987    x2 1993    CORONARY STENT PLACEMENT  2017    EXPLORATORY LAPAROTOMY  1982    [facility]     HEMORRHOID SURGERY      LAPAROSCOPIC CHOLECYSTECTOMY N/A 2022     Procedure: CHOLECYSTECTOMY, LAPAROSCOPIC;  Surgeon: Leonardo Wilson III, MD;  Location: [facility];  Service: General;  Laterality: N/A;    LUMBAR DISC SURGERY  2000    PILONIDAL CYST DRAINAGE  1976    TONSILLECTOMY      as a child (also adenoids)       Medications (scheduled):      albuterol-ipratropium  3 mL Nebulization Q6H    cyanocobalamin  1,000 mcg Oral Daily    diltiaZEM  240 mg Oral Daily    enoxparin  40 mg Subcutaneous Q12H    famotidine (PF)  20 mg Intravenous Q12H    insulin glargine U-100  42 Units Subcutaneous BID    levothyroxine  75 mcg Oral Before breakfast    LIDOcaine  1 patch Transdermal Q24H    losartan  100 mg Oral Daily    [START ON 9/6/2024] predniSONE  20 mg Oral Daily    senna-docusate 8.6-50 mg  1 tablet Oral BID       Medications (infusions):           Medications (prn):       Current Facility-Administered Medications:     calcium gluconate IVPB, 1 g, Intravenous, PRN    calcium gluconate IVPB, 2 g, Intravenous, PRN    calcium gluconate IVPB, 3 g, Intravenous, PRN    dextrose 50%, 12.5 g, Intravenous, PRN    glucagon (human recombinant), 1 mg, Intramuscular, PRN    hydrALAZINE, 10 mg, Intravenous, Q6H PRN    HYDROcodone-acetaminophen, 1 tablet, Oral, Q4H PRN    insulin aspart U-100, 0-10 Units, Subcutaneous, Q6H PRN    magnesium sulfate IVPB, 2 g, Intravenous, PRN    magnesium sulfate IVPB, 4 g, Intravenous, PRN    meclizine, 25 mg, Oral, TID PRN    potassium bicarbonate, 35 mEq, Oral, PRN    potassium bicarbonate, 50 mEq, Oral, PRN    potassium bicarbonate, 60 mEq, Oral, PRN    potassium chloride in water, 40 mEq, Intravenous, PRN **AND** potassium chloride in water, 60 mEq, Intravenous, PRN **AND** potassium chloride in water, 80 mEq, Intravenous, PRN    simethicone, 1 tablet, Oral, TID PRN    sodium chloride 0.9%, 10 mL, Intravenous, PRN    sodium phosphate 15 mmol in D5W 250 mL IVPB, 15 mmol, Intravenous, PRN    sodium phosphate 20.01 mmol in D5W 250 mL IVPB, 20.01 mmol, Intravenous,  "PRN    sodium phosphate 30 mmol in D5W 250 mL IVPB, 30 mmol, Intravenous, PRN    Family History: No family history on file.    Social History: Tobacco:   Social History     Tobacco Use   Smoking Status Not on file   Smokeless Tobacco Not on file                                EtOH:   Social History     Substance and Sexual Activity   Alcohol Use No                                Drugs:   Social History     Substance and Sexual Activity   Drug Use No       Physical Exam    Vital signs:  Temp:  [97.7 °F (36.5 °C)-98.6 °F (37 °C)]   Pulse:  [72-91]   Resp:  [16-21]   BP: (143-187)/(64-74)   SpO2:  [91 %-98 %]     Intake/Output:   Intake/Output Summary (Last 24 hours) at 9/5/2024 1506  Last data filed at 9/5/2024 1310  Gross per 24 hour   Intake 960 ml   Output 650 ml   Net 310 ml        BMI: Estimated body mass index is 43.91 kg/m² as calculated from the following:    Height as of this encounter: 5' 5" (1.651 m).    Weight as of this encounter: 119.7 kg (263 lb 14.3 oz).    Physical Exam  Vitals and nursing note reviewed.   Constitutional:       General: She is not in acute distress.     Appearance: She is not ill-appearing, toxic-appearing or diaphoretic.      Comments: Awake, alert no distress   HENT:      Head: Normocephalic.      Comments: Some bruising right forehead and bilat eyes from her fall     Right Ear: External ear normal.      Left Ear: External ear normal.      Nose: Nose normal. No congestion or rhinorrhea.      Mouth/Throat:      Mouth: Mucous membranes are moist.      Pharynx: Oropharynx is clear. No oropharyngeal exudate or posterior oropharyngeal erythema.   Eyes:      General: No scleral icterus.        Right eye: No discharge.         Left eye: No discharge.      Extraocular Movements: Extraocular movements intact.      Conjunctiva/sclera: Conjunctivae normal.      Pupils: Pupils are equal, round, and reactive to light.   Neck:      Vascular: No carotid bruit.   Cardiovascular:      Rate and " "Rhythm: Normal rate and regular rhythm.      Pulses: Normal pulses.      Heart sounds: No murmur heard.     No friction rub. No gallop.   Pulmonary:      Effort: Pulmonary effort is normal. No respiratory distress.      Breath sounds: No stridor. No wheezing, rhonchi or rales.   Chest:      Chest wall: No tenderness.   Abdominal:      General: There is no distension.      Tenderness: There is no abdominal tenderness. There is no guarding.      Comments: Hypoactive BS   Musculoskeletal:         General: No swelling. Normal range of motion.      Cervical back: Normal range of motion and neck supple. No rigidity or tenderness.      Right lower leg: Edema present.      Left lower leg: Edema present.   Lymphadenopathy:      Cervical: No cervical adenopathy.   Skin:     General: Skin is warm and dry.      Capillary Refill: Capillary refill takes less than 2 seconds.      Coloration: Skin is not jaundiced.      Findings: Bruising present.      Comments: + bruise LLE   Neurological:      General: No focal deficit present.      Cranial Nerves: No cranial nerve deficit.      Sensory: No sensory deficit.      Motor: No weakness.   Psychiatric:         Mood and Affect: Mood normal.         Behavior: Behavior normal.         Laboratory    Recent Labs   Lab 09/05/24  0555   WBC 9.10   RBC 3.02*   HGB 9.2*   HCT 30.5*      *   MCH 30.5   MCHC 30.2*       Recent Labs   Lab 09/05/24  0555   CALCIUM 9.0   ALBUMIN 3.1*   PROT 5.7*      K 3.6   CO2 37*   CL 96   BUN 15   CREATININE 0.5   ALKPHOS 69   ALT 10   AST 12   BILITOT 0.4       No results for input(s): "PT", "INR", "APTT" in the last 24 hours.    No results for input(s): "CPK", "CPKMB", "TROPONINI", "MB" in the last 24 hours.      Additional labs: reviewed    Microbiology:       Microbiology Results (last 7 days)       ** No results found for the last 168 hours. **            Radiology    US Extremity Non Vascular Complete Left  Narrative: EXAMINATION:  US " "EXTREMITY NON VASCULAR COMPLETE LEFT    CLINICAL HISTORY:  hematoma;    TECHNIQUE:  Targeted grayscale and color Doppler sonographic analysis was performed.    COMPARISON:  None.    FINDINGS:  Targeted evaluation of the left proximal pretibial soft tissues shows a 6 x 3.5 x 1.8 cm circumscribed, heterogeneous hypoechoic mass in the subcutaneous fat.  This mass has increased through transmission of sound, and no internal color Doppler vascular flow.  Impression: Complex mass in the left pretibial subcutaneous fat, presumably reflecting hematoma given clinical history.    Electronically signed by: Christiano Tirado  Date:    08/31/2024  Time:    14:18  X-Ray Chest AP Portable  Narrative: EXAMINATION:  XR CHEST AP PORTABLE    CLINICAL HISTORY:  Intubated;    FINDINGS:  Portable chest radiograph at 05:00 hours compared 08/30/2024 shows ET and NG tubes unchanged in position, with stable enlarged cardiac silhouette and normal pulmonary vascularity.    There are persistent left lower lung opacities obscuring the left hemidiaphragm, with no new pleural or parenchymal abnormality.  No pneumothorax.  Impression: No significant interval change.    Electronically signed by: Christiano Tirado  Date:    08/31/2024  Time:    07:42        Additional Studies    reviewed    Ventilator Information    Oxygen Concentration (%):  [45] 45             No results for input(s): "PH", "PCO2", "PO2", "HCO3", "POCSATURATED", "BE" in the last 72 hours.        Impression    Active Hospital Problems    Diagnosis  POA    *Acute hypoxic on chronic hypercapnic respiratory failure [J96.01, J96.12]  Yes    Goals of care, counseling/discussion [Z71.89]  Not Applicable    Type 2 diabetes mellitus [E11.9]  Yes    Anemia [D64.9]  Yes    Acute metabolic encephalopathy [G93.41]  Yes    Fall [W19.XXXA]  Yes    COPD exacerbation [J44.1]  Yes    Pneumonia [J18.9]  Yes    Hypothyroid [E03.9]  Yes    Hypertension [I10]  Yes      Resolved Hospital Problems   No resolved " problems to display.       Plan    Continue O2 to maintain sats 89-92%  Continue bipap nightly  Respiratory treatments  Wean steroid (to decrease to 20 mg/d 9/6)  Antihypertensives continue  Replace electrolytes as needed  Synthroid   Watch BS and treat as needed  Increase activity as able      Dr. Luong is covering for the weekend, please call if needed.  I will return next week.     Sonido Zamora MD  [facility] Pulmonary/Critical Care  09/05/2024

## 2024-09-05 NOTE — PLAN OF CARE
[facility] is not able to accept pt tonight due to bipap setting beyond their limits.  [facility]  Will follow.  [facility]

## 2024-09-05 NOTE — PROGRESS NOTES
[facility] Medicine  Progress Note    Patient Name: Karo Leonard  MRN: [id number]  Patient Class: IP- Inpatient   Admission Date: 8/25/2024  Length of Stay: 11 days  Attending Physician: Bryan Nevarez MD  Primary Care Provider: Navneet Hernandez FNP        Subjective:     Principal Problem:Acute hypoxic on chronic hypercapnic respiratory failure        HPI:  64F p/w fall in the context of shortness of breath. EMS reportedly gave narcan w/o appreciable response, then she was given duoneb en route to [facility] ED. Upon arrival, she was tachypneic, had respy sounding phonation, but she denied chest pain, fever, or chills. She was placed on BiPAP, and initially demonstrated improvement. However, she became less responsive and appeared to tire out. She was given additional narcan w/o appreciable response, so she was intubated. She had some hypotension after receiving sedation, so she was placed on low dose levophed. [place] was placed, lasix was given, as was Abx. Large bruise noted on LLE, so x-ray ordered.     Overview/Hospital Course:  64 F Patient with hx of COPD, morbid obesity was admitted for acute on chronic hypoxemic hypercapnic respiratory failure with sever fall with hit to the face . Patient was intubated after failing  BiPAP. Finished course of IV Levofloxacin for COPD exacerbation and possible pneumonia. Pulm has been managing her steroid dose. Patient was started on iron and B12 supplement for anemia. S/P exubation on 8/31. For her hypertension, patient was started on losartan and her home diltiazem was resumed.  Patient working with PT and OT.  Fall precautions have been addressed with the patient.  Patient moving to medicine telemetry.  Supplemental oxygen weaning is in process.  Patient encouraged to continue BiPAP use at night or as needed as before.  Patient oxygen requirement progressively improving.  Patient is physically deconditioned and working with PT and OT.  Awaiting  skilled nursing facility placement.  Steroid dose is being tapered down.       Interval History:     Doing well   Hematoma smaller . Having gas bloating but normal bowl movement   She wants vaccines and communicated to RN     Review of Systems  Objective:     Vital Signs (Most Recent):  Temp: 97.7 °F (36.5 °C) (09/05/24 1140)  Pulse: 91 (09/05/24 1140)  Resp: 18 (09/05/24 1140)  BP: (!) 143/74 (09/05/24 1140)  SpO2: 97 % (09/05/24 1140) Vital Signs (24h Range):  Temp:  [97.7 °F (36.5 °C)-98.6 °F (37 °C)] 97.7 °F (36.5 °C)  Pulse:  [72-91] 91  Resp:  [16-21] 18  SpO2:  [94 %-98 %] 97 %  BP: (143-187)/(64-74) 143/74     Weight: 119.7 kg (263 lb 14.3 oz)  Body mass index is 43.91 kg/m².    Intake/Output Summary (Last 24 hours) at 9/5/2024 1259  Last data filed at 9/5/2024 1234  Gross per 24 hour   Intake 720 ml   Output 650 ml   Net 70 ml         Physical Exam  Vitals and nursing note reviewed.   HENT:      Head: Normocephalic and atraumatic.   Eyes:      Conjunctiva/sclera: Conjunctivae normal.   Neck:      Vascular: No JVD.   Cardiovascular:      Rate and Rhythm: Normal rate.      Heart sounds: Normal heart sounds.   Pulmonary:      Effort: Pulmonary effort is normal.      Breath sounds: Normal breath sounds.   Abdominal:      Palpations: Abdomen is soft.   Musculoskeletal:         General: Normal range of motion.   Skin:     General: Skin is warm.   Neurological:      Mental Status: She is alert and oriented to person, place, and time.   Psychiatric:         Mood and Affect: Mood normal.             Significant Labs: All pertinent labs within the past 24 hours have been reviewed.  CBC:   Recent Labs   Lab 09/04/24 0442 09/05/24  0555   WBC 9.99 9.10   HGB 8.9* 9.2*   HCT 28.8* 30.5*    160     CMP:   Recent Labs   Lab 09/04/24 0442 09/05/24  0555    137   K 3.7 3.6   CL 97 96   CO2 36* 37*   * 113*   BUN 13 15   CREATININE 0.5 0.5   CALCIUM 8.9 9.0   PROT 5.6* 5.7*   ALBUMIN 3.0* 3.1*   BILITOT  "0.4 0.4   ALKPHOS 71 69   AST 12 12   ALT 10 10   ANIONGAP 3* 4*     Cardiac Markers: No results for input(s): "CKMB", "MYOGLOBIN", "BNP", "TROPISTAT" in the last 48 hours.    Significant Imaging: I have reviewed all pertinent imaging results/findings within the past 24 hours.    Assessment/Plan:      * Acute hypoxic on chronic hypercapnic respiratory failure  Appear secondary to COPD exacerbation and failed BiPAP   Improving  Likely multifactorial  S/P extubation on 8/31  Oxygen supplement as need, patient already on home oxygen 2 to 3 L  EF 60-65%  Pulmonology is following patient    COPD exacerbation  Continue with steroids  Tapering prednisone and change to 20 mg daily   Completed Levofloxacin antibiotic course on 9/1    Pneumonia  MRSA neg, procal neg, respiratory panel neg  Completed Levofloxacin antibiotic course on 9/1    Type 2 diabetes mellitus  Continue with SSI and glargine  Make adjustment as needed    Goals of care, counseling/discussion  Discussed the goal of care with patient's  on 8/28.    Anemia  Likely from Iron and b12 deficiency  Started on Iron and B12 supplement on 8/27    Fall  Imaging ruled out acute fracture but severe swollen noted  US confirmed hematoma on left lower extremity  Fall precautions discussed with the patient.    PT OT and SNF placement     Acute metabolic encephalopathy  Resolved  CT head: no acute changes  Continue to monitor     Hypertension  Started Losartan on 8/28 and amlodipine on 8/31  Can make adjustment as needed  Resumed home diltiazem on 9/1    Hypothyroid  Continue home levothyroxine, increased dose         VTE Risk Mitigation (From admission, onward)           Ordered     enoxaparin injection 40 mg  Every 12 hours        Note to Pharmacy: Ht: 5' 5" (1.651 m)  Wt: 127 kg (280 lb)  Estimated Creatinine Clearance: 92.8 mL/min (based on SCr of 0.8 mg/dL).  Body mass index is 46.59 kg/m².    08/25/24 0627     IP VTE HIGH RISK PATIENT  Once         08/25/24 0627 "     Place sequential compression device  Until discontinued         08/25/24 0627                    Discharge Planning   CHELE: 9/5/2024     Code Status: Full Code   Is the patient medically ready for discharge?:     Reason for patient still in hospital (select all that apply): Treatment  Discharge Plan A: Skilled Nursing Facility   Discharge Delays: None known at this time              Bryan Nevarez MD  Department of Hospital Medicine   [facility]

## 2024-09-05 NOTE — PT/OT/SLP PROGRESS
Occupational Therapy   Treatment    Name: Karo Leonard  MRN: [id number]  Admitting Diagnosis:  Acute hypoxic on chronic hypercapnic respiratory failure       Recommendations:     Discharge Recommendations: Moderate Intensity Therapy  Discharge Equipment Recommendations:  to be determined by next level of care  Barriers to discharge:       Assessment:     Karo Leonard is a 64 y.o. female with a medical diagnosis of Acute hypoxic on chronic hypercapnic respiratory failure.  Pt agreeable to OT therapy session this AM. Performance deficits affecting function are weakness, impaired endurance, impaired self care skills, impaired functional mobility, gait instability, impaired balance, decreased upper extremity function, decreased lower extremity function, pain, decreased safety awareness, impaired skin, impaired cardiopulmonary response to activity.     Rehab Prognosis:  Fair; patient would benefit from acute skilled OT services to address these deficits and reach maximum level of function.       Plan:     Patient to be seen 5 x/week to address the above listed problems via self-care/home management, therapeutic activities, therapeutic exercises  Plan of Care Expires: 09/22/24  Plan of Care Reviewed with: patient, spouse    Subjective     Chief Complaint: none stated  Patient/Family Comments/goals: none stated  Pain/Comfort:  Pain Rating 1:  (not rated)  Location 1: back  Pain Addressed 1: Reposition, Distraction, Cessation of Activity    Objective:     Communicated with: nursing prior to session.  Patient found up in chair with chair check, oxygen, PureWick, peripheral IV, telemetry upon OT entry to room.    General Precautions: Standard, fall    Orthopedic Precautions:N/A  Braces: N/A  Respiratory Status: Nasal cannula, flow 3 L/min     Occupational Performance:     Activities of Daily Living:  Grooming: setup assistance seated in chair for oral care with swab and to wash face      Therapeutic Exercise:  HARLEYE  resistive exercises x 10 reps each shldr horizontal abd, shldr flex, elbow flex, elbow ext with SBA seated in chair; pt tolerated well    Treatment & Education:  Pt educated on role of OT/POC, importance of OOB/EOB activity, use of call bell, and safety during ADLs, transfers, and functional mobility.    Patient left up in chair with all lines intact, call button in reach, chair alarm on, and spouse present    GOALS:   Multidisciplinary Problems       Occupational Therapy Goals          Problem: Occupational Therapy    Goal Priority Disciplines Outcome Interventions   Occupational Therapy Goal     OT, PT/OT Progressing    Description: Goals to be met by: 9/22/2024     Patient will increase functional independence with ADLs by performing:    LE Dressing with Minimal Assistance.  Grooming while seated with Supervision.  Toileting from bedside commode with Minimal Assistance for hygiene and clothing management.   Supine to sit with Minimal Assistance.  Toilet transfer to bedside commode with Minimal Assistance.  Upper extremity exercise program, with supervision.                         Time Tracking:     OT Date of Treatment: 09/05/24  OT Start Time: 1104  OT Stop Time: 1121  OT Total Time (min): 17 min    Billable Minutes:Therapeutic Exercise 17    OT/NYA: OT       9/5/2024

## 2024-09-05 NOTE — PLAN OF CARE
Hard faxed bipap orders to Agustina, intake at [facility] at [phone] per her request.  Gave family phone number to call Agustina to arrange signing of admissions paperwork.  Will continue to follow

## 2024-09-05 NOTE — ASSESSMENT & PLAN NOTE
Imaging ruled out acute fracture but severe swollen noted  US confirmed hematoma on left lower extremity  Fall precautions discussed with the patient.    PT OT and SNF placement

## 2024-09-05 NOTE — ASSESSMENT & PLAN NOTE
Continue with steroids  Tapering prednisone and change to 20 mg daily   Completed Levofloxacin antibiotic course on 9/1

## 2024-09-05 NOTE — PLAN OF CARE
CM spoke to patient and family at [facility] to notify of [facility] being the only accepting facility at this time.  They are agreeable to [facility]. Patient and family notified that the facilty will be calling them to complete paperwork.  Maryanne with CM notified and is calling facility.

## 2024-09-05 NOTE — PT/OT/SLP PROGRESS
Physical Therapy Treatment    Patient Name:  Karo Leonard   MRN:  [id number]    Recommendations:     Discharge Recommendations: Moderate Intensity Therapy  Discharge Equipment Recommendations: to be determined by next level of care  Barriers to discharge:  Increased caregiver burden    Assessment:     Karo Leonard is a 64 y.o. female admitted with a medical diagnosis of Acute hypoxic on chronic hypercapnic respiratory failure.  She presents with the following impairments/functional limitations: weakness, impaired endurance, impaired self care skills, impaired functional mobility, gait instability, impaired balance, decreased lower extremity function, pain, impaired cardiopulmonary response to activity . Pt with HOB elevated, pre medicated, and agreeable to PT. She required min a for mobility today with use of rw. Declined ambulation secondary to LBP that she attributes to a failed back sx. Pt left up in bed side chair with no complaints.     Rehab Prognosis: Fair; patient would benefit from acute skilled PT services to address these deficits and reach maximum level of function.    Recent Surgery: * No surgery found *      Plan:     During this hospitalization, patient to be seen 6 x/week to address the identified rehab impairments via gait training, therapeutic activities, therapeutic exercises and progress toward the following goals:    Plan of Care Expires:  10/01/24    Subjective     Chief Complaint: Face and LBP  Patient/Family Comments/goals: Pt agreeable to PT. She normally gets around the house via w/c.   Pain/Comfort:  Pain Rating 1: 9/10  Location - Orientation 1: upper  Location 1: face  Pain Addressed 1: Pre-medicate for activity, Cessation of Activity  Pain Rating Post-Intervention 1: 9/10      Objective:     Communicated with RN prior to session.  Patient found HOB elevated with bed alarm, oxygen, PureWick, peripheral IV, telemetry upon PT entry to room.     General Precautions: Standard,  fall  Orthopedic Precautions: N/A  Braces: N/A  Respiratory Status: Nasal cannula, flow 5 L/min     Functional Mobility:  Bed Mobility:     Supine to Sit: minimum assistance  Transfers:     Sit to Stand:  minimum assistance with rolling walker  Bed to Chair: minimum assistance with  rolling walker  using  Stand Pivot      AM-PAC 6 CLICK MOBILITY          Treatment & Education:  Pt was educated on the following: call light use, importance of OOB activity and functional mobility to negate the negative effects of prolonged bed rest during this hospitalization, safe transfers/ambulation and discharge planning recommendations/options.     Patient left up in chair with all lines intact, call button in reach, and chair alarm on..    GOALS:   Multidisciplinary Problems       Physical Therapy Goals          Problem: Physical Therapy    Goal Priority Disciplines Outcome Goal Variances Interventions   Physical Therapy Goal     PT, PT/OT Progressing     Description: Goals to be met by: 10/01/24     Patient will increase functional independence with mobility by performin. Supine to sit with minimal assistance  2. Sit to supine with minimal assistance  3. Sit to stand transfer with minimal assistance  4. Gait  x 50 feet  Minimal assistance using Rolling Walker.                          Time Tracking:     PT Received On: 24  PT Start Time: 933     PT Stop Time: 957  PT Total Time (min): 24 min     Billable Minutes: Therapeutic Activity 24    Treatment Type: 6th Visit  PT/PTA: PTA     Number of PTA visits since last PT visit: 2024

## 2024-09-05 NOTE — PLAN OF CARE
Problem: Adult Inpatient Plan of Care  Goal: Plan of Care Review  Outcome: Progressing  Goal: Patient-Specific Goal (Individualized)  Outcome: Progressing  Goal: Absence of Hospital-Acquired Illness or Injury  Outcome: Progressing  Goal: Optimal Comfort and Wellbeing  Outcome: Progressing     Problem: Bariatric Environmental Safety  Goal: Safety Maintained with Care  Outcome: Progressing     Problem: Skin Injury Risk Increased  Goal: Skin Health and Integrity  Outcome: Progressing     Problem: Diabetes Comorbidity  Goal: Blood Glucose Level Within Targeted Range  Outcome: Progressing     Problem: Pneumonia  Goal: Fluid Balance  Outcome: Progressing  Goal: Effective Oxygenation and Ventilation  Outcome: Progressing

## 2024-09-05 NOTE — PLAN OF CARE
09/05/24 0707   Patient Assessment/Suction   Level of Consciousness (AVPU) alert   Respiratory Effort Normal;Unlabored   Expansion/Accessory Muscles/Retractions no retractions;no use of accessory muscles   All Lung Fields Breath Sounds diminished   Rhythm/Pattern, Respiratory unlabored;pattern regular;depth regular   PRE-TX-O2   Device (Oxygen Therapy) Oxymask   $ Is the patient on Low Flow Oxygen? Yes   Flow (L/min) (Oxygen Therapy) 3   SpO2 96 %   Pulse Oximetry Type Intermittent   $ Pulse Oximetry - Multiple Charge Pulse Oximetry - Multiple   Pulse 83   Resp 16   Aerosol Therapy   $ Aerosol Therapy Charges Aerosol Treatment   Daily Review of Necessity (SVN) completed   Respiratory Treatment Status (SVN) given   Treatment Route (SVN) mask;oxygen   Patient Position HOB elevated   Post Treatment Assessment (SVN) breath sounds unchanged   Signs of Intolerance (SVN) none   Breath Sounds Post-Respiratory Treatment   Post-treatment Heart Rate (beats/min) 81   Post-treatment Resp Rate (breaths/min) 18   Preset CPAP/BiPAP Settings   Mode Of Delivery BiPAP S/T;Standby   $ CPAP/BiPAP Daily Charge BiPAP/CPAP Daily   Education   $ Education BiPAP;Bronchodilator;15 min

## 2024-09-05 NOTE — CARE UPDATE
09/05/24 0002   Patient Assessment/Suction   Level of Consciousness (AVPU) alert   Respiratory Effort Normal;Unlabored   All Lung Fields Breath Sounds diminished   Skin Integrity   $ Wound Care Tech Time 15 min   Area Observed Bridge of nose   Skin Appearance without discoloration   PRE-TX-O2   Device (Oxygen Therapy) BIPAP   $ Is the patient on High Flow Oxygen? Yes   Oxygen Concentration (%) 45   SpO2 95 %   $ Pulse Oximetry - Multiple Charge Pulse Oximetry - Multiple   Pulse 77   Resp (!) 21   Aerosol Therapy   $ Aerosol Therapy Charges Aerosol Treatment   Daily Review of Necessity (SVN) completed   Respiratory Treatment Status (SVN) given   Treatment Route (SVN) in-line   Patient Position HOB elevated   Post Treatment Assessment (SVN) breath sounds unchanged   Signs of Intolerance (SVN) none   Breath Sounds Post-Respiratory Treatment   Throughout All Fields Post-Treatment All Fields   Throughout All Fields Post-Treatment aeration increased   Post-treatment Heart Rate (beats/min) 79   Post-treatment Resp Rate (breaths/min) 18   Ready to Wean/Extubation Screen   FIO2<=50 (chart decimal) 0.45   Preset CPAP/BiPAP Settings   Mode Of Delivery BiPAP S/T   $ CPAP/BiPAP Daily Charge BiPAP/CPAP Daily   $ Is patient using? Yes   Size of Mask Medium/Large   Sized Appropriately? Yes   Equipment Type V60   Airway Device Type medium full face mask   Ipap 12   EPAP (cm H2O) 6   Pressure Support (cm H2O) 6   Set Rate (Breaths/Min) 16   ITime (sec) 1   Rise Time (sec) 3   Patient CPAP/BiPAP Settings   RR Total (Breaths/Min) 21   Tidal Volume (mL) 465   VE Minute Ventilation (L/min) 9.6 L/min   Peak Inspiratory Pressure (cm H2O) 12   TiTOT (%) 35   Total Leak (L/Min) 0   Patient Trigger - ST Mode Only (%) 44   Education   $ Education 15 min

## 2024-09-05 NOTE — ASSESSMENT & PLAN NOTE
Appear secondary to COPD exacerbation and failed BiPAP   Improving  Likely multifactorial  S/P extubation on 8/31  Oxygen supplement as need, patient already on home oxygen 2 to 3 L  EF 60-65%  Pulmonology is following patient

## 2024-09-06 ENCOUNTER — TELEPHONE (OUTPATIENT)
Dept: SURGERY | Facility: CLINIC | Age: 64
End: 2024-09-06
Payer: MEDICARE

## 2024-09-06 PROBLEM — K56.7 ILEUS: Status: ACTIVE | Noted: 2024-09-06

## 2024-09-06 LAB
ANION GAP SERPL CALC-SCNC: 4 MMOL/L (ref 8–16)
BUN SERPL-MCNC: 13 MG/DL (ref 8–23)
CALCIUM SERPL-MCNC: 9.3 MG/DL (ref 8.7–10.5)
CHLORIDE SERPL-SCNC: 96 MMOL/L (ref 95–110)
CO2 SERPL-SCNC: 35 MMOL/L (ref 23–29)
CREAT SERPL-MCNC: 0.5 MG/DL (ref 0.5–1.4)
EST. GFR  (NO RACE VARIABLE): >60 ML/MIN/1.73 M^2
GLUCOSE SERPL-MCNC: 109 MG/DL (ref 70–110)
POCT GLUCOSE: 101 MG/DL (ref 70–110)
POCT GLUCOSE: 109 MG/DL (ref 70–110)
POCT GLUCOSE: 111 MG/DL (ref 70–110)
POCT GLUCOSE: 131 MG/DL (ref 70–110)
POTASSIUM SERPL-SCNC: 3.9 MMOL/L (ref 3.5–5.1)
SODIUM SERPL-SCNC: 135 MMOL/L (ref 136–145)

## 2024-09-06 PROCEDURE — 94761 N-INVAS EAR/PLS OXIMETRY MLT: CPT

## 2024-09-06 PROCEDURE — 63600175 PHARM REV CODE 636 W HCPCS: Performed by: INTERNAL MEDICINE

## 2024-09-06 PROCEDURE — 99900031 HC PATIENT EDUCATION (STAT)

## 2024-09-06 PROCEDURE — 25000003 PHARM REV CODE 250: Performed by: HOSPITALIST

## 2024-09-06 PROCEDURE — 21400001 HC TELEMETRY ROOM

## 2024-09-06 PROCEDURE — 80048 BASIC METABOLIC PNL TOTAL CA: CPT | Performed by: INTERNAL MEDICINE

## 2024-09-06 PROCEDURE — 94640 AIRWAY INHALATION TREATMENT: CPT

## 2024-09-06 PROCEDURE — 99900035 HC TECH TIME PER 15 MIN (STAT)

## 2024-09-06 PROCEDURE — 94660 CPAP INITIATION&MGMT: CPT

## 2024-09-06 PROCEDURE — 63600175 PHARM REV CODE 636 W HCPCS: Performed by: HOSPITALIST

## 2024-09-06 PROCEDURE — 25000003 PHARM REV CODE 250: Performed by: INTERNAL MEDICINE

## 2024-09-06 PROCEDURE — 94799 UNLISTED PULMONARY SVC/PX: CPT

## 2024-09-06 PROCEDURE — 99223 1ST HOSP IP/OBS HIGH 75: CPT | Mod: ,,, | Performed by: SURGERY

## 2024-09-06 PROCEDURE — 25000003 PHARM REV CODE 250: Performed by: SURGERY

## 2024-09-06 PROCEDURE — 25000003 PHARM REV CODE 250: Performed by: FAMILY MEDICINE

## 2024-09-06 PROCEDURE — 36415 COLL VENOUS BLD VENIPUNCTURE: CPT | Performed by: INTERNAL MEDICINE

## 2024-09-06 PROCEDURE — 25000242 PHARM REV CODE 250 ALT 637 W/ HCPCS: Performed by: HOSPITALIST

## 2024-09-06 PROCEDURE — 99232 SBSQ HOSP IP/OBS MODERATE 35: CPT | Mod: ,,, | Performed by: INTERNAL MEDICINE

## 2024-09-06 PROCEDURE — 27100171 HC OXYGEN HIGH FLOW UP TO 24 HOURS

## 2024-09-06 RX ORDER — POLYETHYLENE GLYCOL 3350 17 G/17G
17 POWDER, FOR SOLUTION ORAL DAILY
Status: DISCONTINUED | OUTPATIENT
Start: 2024-09-06 | End: 2024-09-07

## 2024-09-06 RX ORDER — HYDROCODONE BITARTRATE AND ACETAMINOPHEN 5; 325 MG/1; MG/1
1 TABLET ORAL EVERY 8 HOURS PRN
Status: DISCONTINUED | OUTPATIENT
Start: 2024-09-06 | End: 2024-09-17 | Stop reason: HOSPADM

## 2024-09-06 RX ADMIN — MECLIZINE HYDROCHLORIDE 25 MG: 12.5 TABLET ORAL at 08:09

## 2024-09-06 RX ADMIN — LEVOTHYROXINE SODIUM 75 MCG: 0.03 TABLET ORAL at 05:09

## 2024-09-06 RX ADMIN — INSULIN GLARGINE 42 UNITS: 100 INJECTION, SOLUTION SUBCUTANEOUS at 09:09

## 2024-09-06 RX ADMIN — MECLIZINE HYDROCHLORIDE 25 MG: 12.5 TABLET ORAL at 09:09

## 2024-09-06 RX ADMIN — DILTIAZEM HYDROCHLORIDE 240 MG: 120 CAPSULE, COATED, EXTENDED RELEASE ORAL at 09:09

## 2024-09-06 RX ADMIN — LIDOCAINE 1 PATCH: 50 PATCH CUTANEOUS at 02:09

## 2024-09-06 RX ADMIN — ENOXAPARIN SODIUM 40 MG: 40 INJECTION SUBCUTANEOUS at 08:09

## 2024-09-06 RX ADMIN — IPRATROPIUM BROMIDE AND ALBUTEROL SULFATE 3 ML: 2.5; .5 SOLUTION RESPIRATORY (INHALATION) at 07:09

## 2024-09-06 RX ADMIN — ENOXAPARIN SODIUM 40 MG: 40 INJECTION SUBCUTANEOUS at 09:09

## 2024-09-06 RX ADMIN — IPRATROPIUM BROMIDE AND ALBUTEROL SULFATE 3 ML: 2.5; .5 SOLUTION RESPIRATORY (INHALATION) at 01:09

## 2024-09-06 RX ADMIN — POTASSIUM BICARBONATE 50 MEQ: 977.5 TABLET, EFFERVESCENT ORAL at 09:09

## 2024-09-06 RX ADMIN — Medication: at 05:09

## 2024-09-06 RX ADMIN — HYDROCODONE BITARTRATE AND ACETAMINOPHEN 1 TABLET: 5; 325 TABLET ORAL at 08:09

## 2024-09-06 RX ADMIN — FAMOTIDINE 20 MG: 10 INJECTION INTRAVENOUS at 08:09

## 2024-09-06 RX ADMIN — HYDROCODONE BITARTRATE AND ACETAMINOPHEN 1 TABLET: 5; 325 TABLET ORAL at 09:09

## 2024-09-06 RX ADMIN — POLYETHYLENE GLYCOL 3350 17 G: 17 POWDER, FOR SOLUTION ORAL at 03:09

## 2024-09-06 RX ADMIN — SIMETHICONE 80 MG: 80 TABLET, CHEWABLE ORAL at 05:09

## 2024-09-06 RX ADMIN — LOSARTAN POTASSIUM 100 MG: 50 TABLET, FILM COATED ORAL at 09:09

## 2024-09-06 RX ADMIN — PREDNISONE 20 MG: 20 TABLET ORAL at 09:09

## 2024-09-06 RX ADMIN — IPRATROPIUM BROMIDE AND ALBUTEROL SULFATE 3 ML: 2.5; .5 SOLUTION RESPIRATORY (INHALATION) at 08:09

## 2024-09-06 RX ADMIN — CYANOCOBALAMIN TAB 1000 MCG 1000 MCG: 1000 TAB at 09:09

## 2024-09-06 RX ADMIN — FAMOTIDINE 20 MG: 10 INJECTION INTRAVENOUS at 09:09

## 2024-09-06 NOTE — SUBJECTIVE & OBJECTIVE
"Interval History:     Still complained of gas in the abdomen and later KUB was done with possible ileus versus obstruction and CT scan of the abdomen without contrast ordered. CBC repeated including electrolytes    Review of Systems  Objective:     Vital Signs (Most Recent):  Temp: 98 °F (36.7 °C) (09/06/24 0733)  Pulse: 81 (09/06/24 0733)  Resp: 18 (09/06/24 0906)  BP: (!) 145/65 (09/06/24 0902)  SpO2: 100 % (09/06/24 0733) Vital Signs (24h Range):  Temp:  [97.7 °F (36.5 °C)-98 °F (36.7 °C)] 98 °F (36.7 °C)  Pulse:  [79-91] 81  Resp:  [16-21] 18  SpO2:  [91 %-100 %] 100 %  BP: (143-173)/(64-81) 145/65     Weight: 119.7 kg (263 lb 14.3 oz)  Body mass index is 43.91 kg/m².    Intake/Output Summary (Last 24 hours) at 9/6/2024 1117  Last data filed at 9/5/2024 1816  Gross per 24 hour   Intake 720 ml   Output 600 ml   Net 120 ml         Physical Exam        Significant Labs: All pertinent labs within the past 24 hours have been reviewed.  CBC:   Recent Labs   Lab 09/05/24  0555   WBC 9.10   HGB 9.2*   HCT 30.5*        CMP:   Recent Labs   Lab 09/05/24  0555      K 3.6   CL 96   CO2 37*   *   BUN 15   CREATININE 0.5   CALCIUM 9.0   PROT 5.7*   ALBUMIN 3.1*   BILITOT 0.4   ALKPHOS 69   AST 12   ALT 10   ANIONGAP 4*     Cardiac Markers: No results for input(s): "CKMB", "MYOGLOBIN", "BNP", "TROPISTAT" in the last 48 hours.    Significant Imaging: I have reviewed all pertinent imaging results/findings within the past 24 hours.  "

## 2024-09-06 NOTE — ASSESSMENT & PLAN NOTE
Appear secondary to COPD exacerbation and failed BiPAP   Improving  Likely multifactorial  S/P extubation on 8/31  Oxygen supplement as need, patient already on home oxygen 2 to 3 L  EF 60-65%  Pulmonology is following patient  Having problem with placement because she need BiPAP at night along with oxygen 7 L and facility can not accept with the level of BiPAP  Trying to send to  [facility]

## 2024-09-06 NOTE — CARE UPDATE
09/06/24 0140   Patient Assessment/Suction   Level of Consciousness (AVPU) alert   Respiratory Effort Normal;Unlabored   Expansion/Accessory Muscles/Retractions no retractions;no use of accessory muscles   All Lung Fields Breath Sounds clear;diminished   Rhythm/Pattern, Respiratory no shortness of breath reported   Cough Frequency no cough   Skin Integrity   $ Wound Care Tech Time 15 min   Area Observed Cheek;Left;Right;Lower lip;Bridge of nose   Skin Appearance without discoloration   PRE-TX-O2   Device (Oxygen Therapy) BIPAP   $ Is the patient on High Flow Oxygen? Yes   Oxygen Concentration (%) 32   SpO2 96 %   Pulse Oximetry Type Continuous   $ Pulse Oximetry - Multiple Charge Pulse Oximetry - Multiple   Pulse 80   Resp (!) 21   Positioning Supine;HOB elevated 30 degrees   Positioning   Head of Bed (HOB) Positioning HOB at 30 degrees   Positioning/Transfer Devices pillows;in use   Aerosol Therapy   $ Aerosol Therapy Charges Aerosol Treatment   Daily Review of Necessity (SVN) completed   Respiratory Treatment Status (SVN) given   Treatment Route (SVN) in-line;oxygen   Patient Position HOB elevated   Post Treatment Assessment (SVN) patient reports breathing improved   Signs of Intolerance (SVN) none   Ready to Wean/Extubation Screen   FIO2<=50 (chart decimal) 0.32   Preset CPAP/BiPAP Settings   Mode Of Delivery BiPAP   $ Initial CPAP/BiPAP Setup? No   $ Is patient using? Yes   Size of Mask Medium/Large   Sized Appropriately? Yes   Equipment Type V60   Airway Device Type medium full face mask  (chanaged to under the nose medium mask)   Ipap 12   EPAP (cm H2O) 6   Pressure Support (cm H2O) 6   Set Rate (Breaths/Min) 16   ITime (sec) 1   Rise Time (sec) 3   Patient CPAP/BiPAP Settings   CPAP/BIPAP ID 15   Timed Inspiration (Sec) 1   IPAP Rise Time (sec) 3   RR Total (Breaths/Min) 19   Tidal Volume (mL) 495   VE Minute Ventilation (L/min) 6.2 L/min   Peak Inspiratory Pressure (cm H2O) 12   TiTOT (%) 33   Total Leak  (L/Min) 22   Patient Trigger - ST Mode Only (%) 39   CPAP/BiPAP Alarms   High Pressure (cm H2O) 40   Low Pressure (cm H2O) 5   Minute Ventilation (L/Min) 3   High RR (breaths/min) 40   Low RR (breaths/min) 8   Apnea (Sec) 20   Education   $ Education Bronchodilator;BiPAP;30 min

## 2024-09-06 NOTE — PLAN OF CARE
09/06/24 0720   Patient Assessment/Suction   Level of Consciousness (AVPU) alert   Respiratory Effort Normal;Unlabored   Expansion/Accessory Muscles/Retractions no use of accessory muscles   All Lung Fields Breath Sounds Anterior:;Lateral:;diminished   Rhythm/Pattern, Respiratory pattern regular   Cough Frequency infrequent   Cough Type nonproductive   Skin Integrity   $ Wound Care Tech Time 15 min   Area Observed Left;Right;Behind ear;Cheek;Bridge of nose   Skin Appearance without discoloration   PRE-TX-O2   Device (Oxygen Therapy) Oxymask   $ Is the patient on Low Flow Oxygen? Yes   Flow (L/min) (Oxygen Therapy) 3   SpO2 97 %   Pulse Oximetry Type Intermittent   $ Pulse Oximetry - Multiple Charge Pulse Oximetry - Multiple   Pulse 81   Resp 20   Aerosol Therapy   $ Aerosol Therapy Charges Aerosol Treatment  (duoneb)   Daily Review of Necessity (SVN) completed   Respiratory Treatment Status (SVN) given   Treatment Route (SVN) mask;oxygen   Patient Position HOB elevated   Post Treatment Assessment (SVN) breath sounds improved   Signs of Intolerance (SVN) none   Breath Sounds Post-Respiratory Treatment   Throughout All Fields Post-Treatment All Fields   Throughout All Fields Post-Treatment aeration increased   Post-treatment Heart Rate (beats/min) 88   Post-treatment Resp Rate (breaths/min) 19   Incentive Spirometer   $ Incentive Spirometer Charges refused   Administration (IS) refused   General Safety Checklist   Safety Promotion/Fall Prevention side rails raised   Preset CPAP/BiPAP Settings   Mode Of Delivery BiPAP S/T;Standby   $ CPAP/BiPAP Daily Charge BiPAP/CPAP Daily   $ Initial CPAP/BiPAP Setup? No   $ Is patient using?   (QHS)   Equipment Type V60   Education   $ Education BiPAP;Bronchodilator;Oxygen;15 min

## 2024-09-06 NOTE — PLAN OF CARE
Rev codes emailed to Agustina, [facility] representative, by Adri Da Silva.    Agustina is reviewing Medicare benefits and will accept today if pt has Medicare days.

## 2024-09-06 NOTE — PROGRESS NOTES
Pulmonary/Critical Care  Progress Note      Patient name: Karo Leonard  MRN: [id number]  Date: 09/06/2024    Admit Date: 8/25/2024  Consult Requested By: Bryan Nevarez MD    Reason for Consult: Respiratory failure, COPD    HPI:    8/25/2024 - 65 yo ASCVD, DM, HTN. COPD(cigarette use) had increased SOB at home and a fall.  EMS was called and brought to ER.  Initially tried on BiPAP without response and was subsequently intubated and placed on ventilation.  Initial ABG showed over ventilation and we have adjusted and ABG are getting better.  She is sedated and not able to provide any history.  D/W  who says that she was supposed to see a pulmonologist but couldn't make appointment, she has been a chronic smoker.  She had increase SOB for a few days.  She did have a fall at home but he reports that she was not down for long.  He does report that she has been having recurring falls at home.      8/26/2024 - Stable overnight, O2 needs still too high to do SBT.  She is responsive and gets agitated on current sedation and we are adjusting.  No other new issues reported.  Hgb has decreased (no active bleeding reported), NA remains low, CPK is better, TFTF noted and c/w hypothyroid (synthroid started).    8/27/2024 - Remains critically ill, O2 needs down a little but still too high.  Difficult to sedate is either agitated or apneic.  Tried SBT this AM and was apneic.  VS reviewed.  She is 3.8 liters +.  Tube feeds have been started.  NA remains low, glucose is elevated.  CXR looks about the same    8/28/2024 - Stable overnight, O2 needs still up.  She does get agitated at times and we have adjusted meds.  Trouble with tube feeds and will hold today and restart tomorrow.  Not ready to wean because on increased FiO2 and will try to increase PEP to see if that helps.  She does not have a lot of secretions.  She is over ventilaed some and we adjusted vent.  CXR is about the same    8/29/2024 - Stable overnight,, O2  needs still up some (I decreased FiO2 and increased PEEP some) and she is overventilated (we adjusted rate).  [facility]  She is 4.6 liters +.  CXR is about the same.    8/30- continues on vent, settings adjusted for alkalosis- now on PS 12/5. CXR looks wet and pt with severe edema- fluids discontinued and starting lasix. Propofol has been weaned a little. Pt is awake and denies pain. Her blood pressure was up to 200 systolic at the time of my evaluation- RN states she just gave am blood pressure med and also Lasix.    8/31- pt awake, off sedation, writing and alert. Denies pain, states feeling better. She was significantly more alkalotic this am due to lasix so given a dose of diamox this am. Tolerating PS 10/5 now- will repeat abg soon and consider extubation     9/1- extubated yesterday and tolerated well    9/3/2024 - STable overnight, no new issues reported and is feeling better ovrall.  She is very weak.  O2 decreased to 5 LPM when I saqw her.  No new issues reported.  BP is up some.  Labs reviewed    9/4/2024 - Stable overnight, no new issues reported, she feels better overall.      9/5/2024 - Stable overnight, she complains of issues with vertigo when getting up.  Possible placement at [facility] is being worked on.  No new respiratory complaints.      9/6 the patient has developed very tender and tympanitic abdomen.  She was supposed to be going to an LTAC today.  She is getting ready to receive an enema which will hopefully clear her rectum and decrease the obstruction.    Review of Systems    Review of Systems   Unable to perform ROS: Intubated   Gastrointestinal:  Positive for abdominal pain and constipation.       Past Medical History    Past Medical History:   Diagnosis Date    Coronary artery disease 2017    cardiac stent    DDD (degenerative disc disease), lumbar 2000    Diabetes mellitus 2017    Hypertension 2000    Thyroid disease 1989        Past Surgical History    Past Surgical History:   Procedure Laterality Date     SECTION  08/1987    x2 1993    CORONARY STENT PLACEMENT  2017    EXPLORATORY LAPAROTOMY  1982    [facility]     HEMORRHOID SURGERY      LAPAROSCOPIC CHOLECYSTECTOMY N/A 2022    Procedure: CHOLECYSTECTOMY, LAPAROSCOPIC;  Surgeon: Leonardo Wilson III, MD;  Location: [facility];  Service: General;  Laterality: N/A;    LUMBAR DISC SURGERY      PILONIDAL CYST DRAINAGE      TONSILLECTOMY      as a child (also adenoids)       Medications (scheduled):      albuterol-ipratropium  3 mL Nebulization Q6H    Brown Bomb (magnesium citrate 300 mL, glycerin 100 mL,  mL) 900 mL enema   Rectal Once    cyanocobalamin  1,000 mcg Oral Daily    diltiaZEM  240 mg Oral Daily    enoxparin  40 mg Subcutaneous Q12H    famotidine (PF)  20 mg Intravenous Q12H    insulin glargine U-100  42 Units Subcutaneous BID    levothyroxine  75 mcg Oral Before breakfast    LIDOcaine  1 patch Transdermal Q24H    losartan  100 mg Oral Daily    polyethylene glycol  17 g Oral Daily    predniSONE  20 mg Oral Daily    senna-docusate 8.6-50 mg  1 tablet Oral BID       Medications (infusions):           Medications (prn):       Current Facility-Administered Medications:     calcium gluconate IVPB, 1 g, Intravenous, PRN    calcium gluconate IVPB, 2 g, Intravenous, PRN    calcium gluconate IVPB, 3 g, Intravenous, PRN    dextrose 50%, 12.5 g, Intravenous, PRN    glucagon (human recombinant), 1 mg, Intramuscular, PRN    hydrALAZINE, 10 mg, Intravenous, Q6H PRN    HYDROcodone-acetaminophen, 1 tablet, Oral, Q8H PRN    insulin aspart U-100, 0-10 Units, Subcutaneous, Q6H PRN    magnesium sulfate IVPB, 2 g, Intravenous, PRN    magnesium sulfate IVPB, 4 g, Intravenous, PRN    meclizine, 25 mg, Oral, TID PRN    potassium bicarbonate, 35 mEq, Oral, PRN    potassium bicarbonate, 50 mEq, Oral, PRN    potassium bicarbonate, 60 mEq, Oral, PRN    potassium  "chloride in water, 40 mEq, Intravenous, PRN **AND** potassium chloride in water, 60 mEq, Intravenous, PRN **AND** potassium chloride in water, 80 mEq, Intravenous, PRN    simethicone, 1 tablet, Oral, TID PRN    sodium chloride 0.9%, 10 mL, Intravenous, PRN    sodium phosphate 15 mmol in D5W 250 mL IVPB, 15 mmol, Intravenous, PRN    sodium phosphate 20.01 mmol in D5W 250 mL IVPB, 20.01 mmol, Intravenous, PRN    sodium phosphate 30 mmol in D5W 250 mL IVPB, 30 mmol, Intravenous, PRN    Family History: No family history on file.    Social History: Tobacco:   Social History     Tobacco Use   Smoking Status Not on file   Smokeless Tobacco Not on file                                EtOH:   Social History     Substance and Sexual Activity   Alcohol Use No                                Drugs:   Social History     Substance and Sexual Activity   Drug Use No       Physical Exam    Vital signs:  Temp:  [97.7 °F (36.5 °C)-98 °F (36.7 °C)]   Pulse:  [78-86]   Resp:  [18-21]   BP: (145-182)/(65-81)   SpO2:  [95 %-100 %]     Intake/Output:   Intake/Output Summary (Last 24 hours) at 9/6/2024 1645  Last data filed at 9/6/2024 1300  Gross per 24 hour   Intake 240 ml   Output --   Net 240 ml        BMI: Estimated body mass index is 43.91 kg/m² as calculated from the following:    Height as of this encounter: 5' 5" (1.651 m).    Weight as of this encounter: 119.7 kg (263 lb 14.3 oz).    Physical Exam  Vitals and nursing note reviewed.   Constitutional:       General: She is not in acute distress.     Appearance: She is not ill-appearing, toxic-appearing or diaphoretic.      Comments: Awake, alert no distress   HENT:      Head: Normocephalic.      Comments: Some bruising right forehead and bilat eyes from her fall     Right Ear: External ear normal.      Left Ear: External ear normal.      Nose: Nose normal. No congestion or rhinorrhea.      Mouth/Throat:      Mouth: Mucous membranes are moist.      Pharynx: Oropharynx is clear. No " "oropharyngeal exudate or posterior oropharyngeal erythema.   Eyes:      General: No scleral icterus.        Right eye: No discharge.         Left eye: No discharge.      Extraocular Movements: Extraocular movements intact.      Conjunctiva/sclera: Conjunctivae normal.      Pupils: Pupils are equal, round, and reactive to light.   Neck:      Vascular: No carotid bruit.   Cardiovascular:      Rate and Rhythm: Normal rate and regular rhythm.      Pulses: Normal pulses.      Heart sounds: No murmur heard.     No friction rub. No gallop.   Pulmonary:      Effort: Pulmonary effort is normal. No respiratory distress.      Breath sounds: No stridor. No wheezing, rhonchi or rales.   Chest:      Chest wall: No tenderness.   Abdominal:      General: There is distension.      Tenderness: There is abdominal tenderness. There is guarding.      Comments: Extremely tender even to light touch   Musculoskeletal:         General: No swelling. Normal range of motion.      Cervical back: Normal range of motion and neck supple. No rigidity or tenderness.      Right lower leg: Edema present.      Left lower leg: Edema present.   Lymphadenopathy:      Cervical: No cervical adenopathy.   Skin:     General: Skin is warm and dry.      Capillary Refill: Capillary refill takes less than 2 seconds.      Coloration: Skin is not jaundiced.      Findings: Bruising (Extensive ecchymoses to the eyes and frontal bone on the right with a large hematoma) present.      Comments: + bruise LLE   Neurological:      General: No focal deficit present.      Cranial Nerves: No cranial nerve deficit.      Sensory: No sensory deficit.      Motor: No weakness.   Psychiatric:         Mood and Affect: Mood normal.         Behavior: Behavior normal.      Comments: The patient is upset         Laboratory    No results for input(s): "WBC", "RBC", "HGB", "HCT", "PLT", "MCV", "MCH", "MCHC" in the last 24 hours.      Recent Labs   Lab 09/06/24  1128   CALCIUM 9.3   NA " 135*   K 3.9   CO2 35*   CL 96   BUN 13   CREATININE 0.5       Microbiology:       Microbiology Results (last 7 days)       ** No results found for the last 168 hours. **            Radiology    CT Abdomen Pelvis  Without Contrast  Narrative: CMS MANDATED QUALITY DATA - CT RADIATION - 436    All CT scans at this facility utilize dose modulation, iterative reconstruction, and/or weight based dosing when appropriate to reduce radiation dose to as low as reasonably achievable.    EXAMINATION:  CT ABDOMEN PELVIS WITHOUT CONTRAST    CLINICAL HISTORY:  Bowel obstruction suspected;Nausea/vomiting;    TECHNIQUE:  CT abdomen and pelvis without IV or oral contrast. Study is tailored for detection of urinary tract calculi and evaluation of solid organs, hollow viscera, and vascular structures is limited.    COMPARISON:  None    FINDINGS:  CT Abdomen:    There are stable tiny bilateral pleural effusions with bibasilar airspace disease.  There is a small pericardial effusion.  There is coronary artery calcification.    The liver is normal in size and attenuation.  There is no focal mass or biliary duct dilatation.    Gallbladder is absent.    Spleen and pancreas and adrenal glands are normal.    The kidneys are symmetric in size without hydronephrosis or calculi.  There is no perinephric stranding.    Bowel: The stomach is normal.  The small bowel is unremarkable.    There is dilatation of the ascending colon.  The cecum measures 11.4 cm.  The descending colon is decompressed.  There are no thick-walled bowel loops.    There is no pneumatosis, pneumoperitoneum or free fluid.    There is no mesenteric or retroperitoneal adenopathy.    Aorta is normal in caliber.    Abdominal wall.  There is diffuse dependent anasarca.  There is no hernia.    There are degenerative changes of the spine.    CT Pelvis:    Bladder is normal.    Uterus and ovaries are normal.  Impression: Gaseous distension of the ascending colon.  The cecum measures  "11.4 cm.    There is no small bowel obstruction    Prior cholecystectomy    Tiny pleural effusions and bibasilar atelectasis    Small pericardial effusion    Dependent anasarca    Electronically signed by: Alyssa Antonio  Date:    09/06/2024  Time:    11:34  X-Ray KUB  Narrative: EXAMINATION:  XR KUB    CLINICAL HISTORY:  Abdominal distention;    FINDINGS:  AP abdominal radiograph.  There are multiple loops of stool and gas distended loops of large bowel measuring up to 6.5 cm.  Small gas bubble in the rectum.  No gross organomegaly.  There are hazy opacities of the left lung base.  Degenerative changes of the spine, pelvis and hips.  Impression: Gas in stool distended loops of large bowel measuring up to 6.5 cm.  Findings could reflect ileus or obstruction.    Electronically signed by: Huber Holcomb  Date:    09/06/2024  Time:    06:59        Ventilator Information    Oxygen Concentration (%):  [32] 32  The patient is on 3 L           No results for input(s): "PH", "PCO2", "PO2", "HCO3", "POCSATURATED", "BE" in the last 72 hours.        Impression    Active Hospital Problems    Diagnosis  POA    *Acute hypoxic on chronic hypercapnic respiratory failure [J96.01, J96.12]  Yes    Ileus [K56.7]  Unknown    Goals of care, counseling/discussion [Z71.89]  Not Applicable    Type 2 diabetes mellitus [E11.9]  Yes    Anemia [D64.9]  Yes    Acute metabolic encephalopathy [G93.41]  Yes    Fall [W19.XXXA]  Yes    COPD exacerbation [J44.1]  Yes    Pneumonia [J18.9]  Yes    Hypothyroid [E03.9]  Yes    Hypertension [I10]  Yes      Resolved Hospital Problems   No resolved problems to display.     Patient's respiratory status is stable.  Her abdomen is a problem.  It is distended and tympanitic and her right colon is quite dilated.    Plan    Continue O2 to maintain sats 89-92%  Continue bipap nightly  Respiratory treatments  Wean steroids Q 3 days  Antihypertensives to continue  Replace electrolytes as needed  Synthroid   Watch BS " and treat as needed  Increase activity as able    Surgery following her abdomen.  She is receiving an enema now which hopeful help however, the left colon is not distended just the right.

## 2024-09-06 NOTE — NURSING
Pt's abdomen became increasingly distended , pt c/o gas like discomfort. Stool appears clear and jelly like. MD notified. KUB ordered.

## 2024-09-06 NOTE — PROGRESS NOTES
[facility] Medicine  Progress Note    Patient Name: Karo Leonard  MRN: [id number]  Patient Class: IP- Inpatient   Admission Date: 8/25/2024  Length of Stay: 12 days  Attending Physician: Bryan Nevarez MD  Primary Care Provider: Navneet Hernandez FNP        Subjective:     Principal Problem:Acute hypoxic on chronic hypercapnic respiratory failure        HPI:  64F p/w fall in the context of shortness of breath. EMS reportedly gave narcan w/o appreciable response, then she was given duoneb en route to [facility] ED. Upon arrival, she was tachypneic, had respy sounding phonation, but she denied chest pain, fever, or chills. She was placed on BiPAP, and initially demonstrated improvement. However, she became less responsive and appeared to tire out. She was given additional narcan w/o appreciable response, so she was intubated. She had some hypotension after receiving sedation, so she was placed on low dose levophed. [place] was placed, lasix was given, as was Abx. Large bruise noted on LLE, so x-ray ordered.     Overview/Hospital Course:  64 F Patient with hx of COPD, morbid obesity was admitted for acute on chronic hypoxemic hypercapnic respiratory failure with sever fall with hit to the face . Patient was intubated after failing  BiPAP. Finished course of IV Levofloxacin for COPD exacerbation and possible pneumonia. Pulm has been managing her steroid dose. Patient was started on iron and B12 supplement for anemia. S/P exubation on 8/31. For her hypertension, patient was started on losartan and her home diltiazem was resumed.  Patient working with PT and OT.  Fall precautions have been addressed with the patient.  Patient moving to medicine telemetry.  Supplemental oxygen weaning is in process.  Patient encouraged to continue BiPAP use at night or as needed as before.  Patient oxygen requirement progressively improving.  Patient is physically deconditioned and working with PT and OT.  Awaiting  "skilled nursing facility placement.  Steroid dose is being tapered down.  Patient complained of bloated and also passing a lot of gas and Gas x was given but later complained of mild abdominal pain and KUB was done and found to have Gas in stool distended loops of large bowel measuring up to 6.5 cm.  Findings could reflect ileus or obstruction.   CT scan of abdomen ordered and surgeon consulted .       Interval History:     Still complained of gas in the abdomen and later KUB was done with possible ileus versus obstruction and CT scan of the abdomen without contrast ordered. CBC repeated including electrolytes    Review of Systems  Objective:     Vital Signs (Most Recent):  Temp: 98 °F (36.7 °C) (09/06/24 0733)  Pulse: 81 (09/06/24 0733)  Resp: 18 (09/06/24 0906)  BP: (!) 145/65 (09/06/24 0902)  SpO2: 100 % (09/06/24 0733) Vital Signs (24h Range):  Temp:  [97.7 °F (36.5 °C)-98 °F (36.7 °C)] 98 °F (36.7 °C)  Pulse:  [79-91] 81  Resp:  [16-21] 18  SpO2:  [91 %-100 %] 100 %  BP: (143-173)/(64-81) 145/65     Weight: 119.7 kg (263 lb 14.3 oz)  Body mass index is 43.91 kg/m².    Intake/Output Summary (Last 24 hours) at 9/6/2024 1117  Last data filed at 9/5/2024 1816  Gross per 24 hour   Intake 720 ml   Output 600 ml   Net 120 ml         Physical Exam        Significant Labs: All pertinent labs within the past 24 hours have been reviewed.  CBC:   Recent Labs   Lab 09/05/24  0555   WBC 9.10   HGB 9.2*   HCT 30.5*        CMP:   Recent Labs   Lab 09/05/24  0555      K 3.6   CL 96   CO2 37*   *   BUN 15   CREATININE 0.5   CALCIUM 9.0   PROT 5.7*   ALBUMIN 3.1*   BILITOT 0.4   ALKPHOS 69   AST 12   ALT 10   ANIONGAP 4*     Cardiac Markers: No results for input(s): "CKMB", "MYOGLOBIN", "BNP", "TROPISTAT" in the last 48 hours.    Significant Imaging: I have reviewed all pertinent imaging results/findings within the past 24 hours.    Assessment/Plan:      * Acute hypoxic on chronic hypercapnic respiratory " "failure  Appear secondary to COPD exacerbation and failed BiPAP   Improving  Likely multifactorial  S/P extubation on 8/31  Oxygen supplement as need, patient already on home oxygen 2 to 3 L  EF 60-65%  Pulmonology is following patient  Having problem with placement because she need BiPAP at night along with oxygen 7 L and facility can not accept with the level of BiPAP  Trying to send to  LTAC    COPD exacerbation  Continue with steroids  Tapering prednisone and change to 20 mg daily   Completed Levofloxacin antibiotic course on 9/1    Pneumonia  MRSA neg, procal neg, respiratory panel neg  Completed Levofloxacin antibiotic course on 9/1    Ileus  KUB with Gas in stool distended loops of large bowel measuring up to 6.5 cm.  Findings could reflect ileus or obstruction.  NPO and CT abdomen and pelvis ordered  Decreased narcotic doses  Surgery consultation         Type 2 diabetes mellitus  Continue with SSI and glargine  Make adjustment as needed    Goals of care, counseling/discussion  Discussed the goal of care with patient's  on 8/28.    Anemia  Likely from Iron and b12 deficiency  Started on Iron and B12 supplement on 8/27    Fall  Imaging ruled out acute fracture but severe swelling noted.  No visual disturbance  US confirmed hematoma on left lower extremity which is getting smaller  Fall precautions discussed with the patient.    PT OT and SNF placement /LTAC placement    Acute metabolic encephalopathy  Resolved  CT head: no acute changes  Continue to monitor     Hypertension  Started Losartan on 8/28 and amlodipine on 8/31  Can make adjustment as needed  Resumed home diltiazem on 9/1    Hypothyroid  Continue home levothyroxine, increased dose         VTE Risk Mitigation (From admission, onward)           Ordered     enoxaparin injection 40 mg  Every 12 hours        Note to Pharmacy: Ht: 5' 5" (1.651 m)  Wt: 127 kg (280 lb)  Estimated Creatinine Clearance: 92.8 mL/min (based on SCr of 0.8 mg/dL).  Body " mass index is 46.59 kg/m².    08/25/24 0627     IP VTE HIGH RISK PATIENT  Once         08/25/24 0627     Place sequential compression device  Until discontinued         08/25/24 0627                    Discharge Planning   CHELE: 9/9/2024     Code Status: Full Code   Is the patient medically ready for discharge?:     Reason for patient still in hospital (select all that apply): Treatment  Discharge Plan A: Skilled Nursing Facility   Discharge Delays: None known at this time              Bryan Nevarez MD  Department of Hospital Medicine   [facility]

## 2024-09-06 NOTE — PT/OT/SLP PROGRESS
Occupational Therapy      Patient Name:  Karo Leonard   MRN:  [id number]    Patient not seen today secondary to  (declined due to abdominal pain). Will follow-up next service date.    9/6/2024

## 2024-09-06 NOTE — PT/OT/SLP PROGRESS
Physical Therapy      Patient Name:  Karo Leonard   MRN:  [id number]    Patient refused tx today secondary to severe abdominal discomfort. Will follow-up 9/7/24.     none

## 2024-09-06 NOTE — NURSING
Nurses Note -- 4 Eyes      9/6/2024   5:32 PM      Skin assessed during: Daily Assessment      [] No Altered Skin Integrity Present    []Prevention Measures Documented      [x] Yes- Altered Skin Integrity Present or Discovered   [] LDA Added if Not in Epic (Describe Wound)   [] New Altered Skin Integrity was Present on Admit and Documented in LDA   [] Wound Image Taken    Wound Care Consulted? No    Attending Nurse:  Rocio Forte RN/Staff Member:  SNOW Costa

## 2024-09-06 NOTE — CARE UPDATE
09/05/24 1959   Patient Assessment/Suction   Level of Consciousness (AVPU) alert   Respiratory Effort Normal   Expansion/Accessory Muscles/Retractions no use of accessory muscles   All Lung Fields Breath Sounds diminished   Rhythm/Pattern, Respiratory unlabored   Cough Frequency no cough   Skin Integrity   $ Wound Care Tech Time 15 min   Area Observed Bridge of nose   Skin Appearance   (BRUSED FROM HOME FALL)   Barrier used? Transparent Film   PRE-TX-O2   Device (Oxygen Therapy) nasal cannula   Flow (L/min) (Oxygen Therapy) 3   SpO2 95 %   Pulse Oximetry Type Continuous   $ Pulse Oximetry - Multiple Charge Pulse Oximetry - Multiple   Pulse 85   Resp 19   Aerosol Therapy   $ Aerosol Therapy Charges Aerosol Treatment   Daily Review of Necessity (SVN) completed   Respiratory Treatment Status (SVN) given   Treatment Route (SVN) mask   Patient Position semi-Brand's   Post Treatment Assessment (SVN) breath sounds unchanged;vital signs unchanged   Signs of Intolerance (SVN) none   Preset CPAP/BiPAP Settings   Mode Of Delivery BiPAP;Standby   Education   $ Education BiPAP;Bronchodilator;15 min   Respiratory Evaluation   $ Care Plan Tech Time 15 min

## 2024-09-06 NOTE — TELEPHONE ENCOUNTER
----- Message from Ashley Miranda sent at 9/6/2024  9:46 AM CDT -----  Regarding: In patient consult  Contact: Ruth Ann with [facility]  Type: Needs Medical Advice  Who Called:  Ruth Ann with [facility]  Symptoms (please be specific):  SBO  How long has patient had these symptoms:    Pharmacy name and phone #:    Best Call Back Number: [phone]  Additional Information: Patient is in room 2506.  Please call Ruth Ann to advise.  Thanks!

## 2024-09-06 NOTE — PLAN OF CARE
Problem: Skin Injury Risk Increased  Goal: Skin Health and Integrity  Outcome: Progressing     Problem: Bariatric Environmental Safety  Goal: Safety Maintained with Care  Outcome: Progressing     Problem: Adult Inpatient Plan of Care  Goal: Optimal Comfort and Wellbeing  Outcome: Progressing     Problem: Adult Inpatient Plan of Care  Goal: Absence of Hospital-Acquired Illness or Injury  Outcome: Progressing     Problem: Fall Injury Risk  Goal: Absence of Fall and Fall-Related Injury  Outcome: Progressing     Problem: Wound  Goal: Optimal Wound Healing  Outcome: Progressing     Problem: Wound  Goal: Skin Health and Integrity  Outcome: Progressing

## 2024-09-06 NOTE — SUBJECTIVE & OBJECTIVE
No current facility-administered medications on file prior to encounter.     Current Outpatient Medications on File Prior to Encounter   Medication Sig    chlorzoxazone (LORZONE) 750 mg Tab Take 1 tablet by mouth every 6 (six) hours.    promethazine (PHENERGAN) 25 MG tablet Take 25 mg by mouth every 6 (six) hours as needed.    TRELEGY ELLIPTA 200-62.5-25 mcg inhaler Inhale 1 puff into the lungs once daily.    albuterol (PROVENTIL/VENTOLIN HFA) 90 mcg/actuation inhaler Inhale 1 puff into the lungs every 4 (four) hours as needed for Wheezing or Shortness of Breath.    albuterol-ipratropium (DUO-NEB) 2.5 mg-0.5 mg/3 mL nebulizer solution Take 3 mLs by nebulization every 6 (six) hours as needed for Wheezing or Shortness of Breath. Rescue    apixaban (ELIQUIS) 5 mg Tab Take 1 tablet (5 mg total) by mouth 2 (two) times daily.    arnica 20 % Tinc Apply 1 application topically once daily.    ascorbic acid, vitamin C, (VITAMIN C) 500 MG tablet Take 500 mg by mouth once daily.    aspirin 81 MG Chew Take 81 mg by mouth once daily.    atorvastatin (LIPITOR) 80 MG tablet Take 80 mg by mouth every evening.    desonide (DESOWEN) 0.05 % cream Apply 1 application topically 2 (two) times daily.    diltiaZEM (CARDIZEM CD) 240 MG 24 hr capsule Take 1 capsule (240 mg total) by mouth once daily.    estradioL (ESTRACE) 0.01 % (0.1 mg/gram) vaginal cream Place 1 g vaginally every 7 days. Monday's    ezetimibe (ZETIA) 10 mg tablet Take 10 mg by mouth once daily. (Patient not taking: Reported on 6/26/2024)    furosemide (LASIX) 20 MG tablet Take 1 tablet (20 mg total) by mouth once daily.    gabapentin (NEURONTIN) 600 MG tablet Take 600 mg by mouth 3 (three) times daily.    guaiFENesin (MUCINEX) 600 mg 12 hr tablet Take 1,200 mg by mouth 2 (two) times daily. (Patient not taking: Reported on 7/9/2024)    hydrocortisone 0.5 % cream Apply 1 application topically 2 (two) times daily as needed.    JANUVIA 50 mg Tab Take 50 mg by mouth once daily.     meclizine (ANTIVERT) 25 mg tablet Take 25 mg by mouth 4 (four) times daily as needed for Dizziness or Nausea. (Patient not taking: Reported on 2024)    pantoprazole (PROTONIX) 40 MG tablet Take 1 tablet (40 mg total) by mouth 2 (two) times daily.    potassium chloride SA (K-DUR,KLOR-CON M) 10 MEQ tablet Take 1 tablet (10 mEq total) by mouth once daily.    tiotropium (SPIRIVA) 18 mcg inhalation capsule Inhale 1 capsule into the lungs once daily.       Review of patient's allergies indicates:   Allergen Reactions    Pcn [penicillins] Anaphylaxis    Adhesive     Floxacillin     Keflex [cephalexin]     Sulfa (sulfonamide antibiotics)     Zithromax [azithromycin]     Zofran [ondansetron hcl (pf)]      Dizzy vomiting         Past Medical History:   Diagnosis Date    Coronary artery disease     cardiac stent    DDD (degenerative disc disease), lumbar 2000    Diabetes mellitus     Hypertension     Thyroid disease      Past Surgical History:   Procedure Laterality Date     SECTION  08/1987    x2 1993    CORONARY STENT PLACEMENT      EXPLORATORY LAPAROTOMY      Buddhist     HEMORRHOID SURGERY      LAPAROSCOPIC CHOLECYSTECTOMY N/A 2022    Procedure: CHOLECYSTECTOMY, LAPAROSCOPIC;  Surgeon: Leonardo Wilson III, MD;  Location: [facility];  Service: General;  Laterality: N/A;    LUMBAR DISC SURGERY      PILONIDAL CYST DRAINAGE      TONSILLECTOMY      as a child (also adenoids)     Family History    None       Tobacco Use    Smoking status: Not on file    Smokeless tobacco: Not on file   Substance and Sexual Activity    Alcohol use: No    Drug use: No    Sexual activity: Not Currently     Review of Systems   Constitutional:  Negative for activity change.   Respiratory:  Negative for apnea.    Cardiovascular:  Negative for chest pain.   Gastrointestinal:  Positive for abdominal distention and abdominal pain. Negative for nausea and vomiting.   Skin:  Negative for  color change.   Hematological:  Negative for adenopathy.     Objective:     Vital Signs (Most Recent):  Temp: 98 °F (36.7 °C) (09/06/24 0733)  Pulse: 81 (09/06/24 1347)  Resp: (!) 21 (09/06/24 1347)  BP: (!) 145/65 (09/06/24 0902)  SpO2: 96 % (09/06/24 1347) Vital Signs (24h Range):  Temp:  [97.7 °F (36.5 °C)-98 °F (36.7 °C)] 98 °F (36.7 °C)  Pulse:  [79-86] 81  Resp:  [18-21] 21  SpO2:  [95 %-100 %] 96 %  BP: (145-173)/(65-81) 145/65     Weight: 119.7 kg (263 lb 14.3 oz)  Body mass index is 43.91 kg/m².     Physical Exam  Vitals reviewed.   Cardiovascular:      Rate and Rhythm: Normal rate.      Pulses: Normal pulses.   Pulmonary:      Effort: Pulmonary effort is normal.   Abdominal:      General: Abdomen is flat. There is distension.      Palpations: There is no mass.      Tenderness: There is abdominal tenderness. There is no guarding.      Hernia: No hernia is present.      Comments: No peritoneal signs but localized discomfort particularly along the R side.     Musculoskeletal:      Cervical back: Normal range of motion.   Skin:     General: Skin is warm.   Neurological:      Mental Status: She is alert.   Psychiatric:         Mood and Affect: Mood normal.            I have reviewed all pertinent lab results within the past 24 hours.  CBC:   Recent Labs   Lab 09/05/24  0555   WBC 9.10   RBC 3.02*   HGB 9.2*   HCT 30.5*      *   MCH 30.5   MCHC 30.2*     BMP:   Recent Labs   Lab 09/03/24  0330 09/04/24  0442 09/06/24  1128   *   < > 109   *   < > 135*   K 3.7   < > 3.9   CL 96   < > 96   CO2 34*   < > 35*   BUN 14   < > 13   CREATININE 0.6   < > 0.5   CALCIUM 8.6*   < > 9.3   MG 2.0  --   --     < > = values in this interval not displayed.       Significant Diagnostics:  Ct scan demonstrates significant colonic distention consistent with ileus.  Large amount of stool in ascending colon.  Colon measures up to 11 cm.

## 2024-09-06 NOTE — ASSESSMENT & PLAN NOTE
Imaging ruled out acute fracture but severe swelling noted.  No visual disturbance  US confirmed hematoma on left lower extremity which is getting smaller  Fall precautions discussed with the patient.    PT OT and SNF placement /LTAC placement

## 2024-09-06 NOTE — HPI
This is a 64-year-old female who has been admitted to the hospital for the last 2 weeks.  Patient initially presented following a fall at home with the associated facial trauma.  On presentation she was in respiratory failure requiring intubation and ICU admission.  Patient continues to require oxygen support but has been extubated and transferred to the floor.  She was no longer requiring any pressure support.  Patient has noted increased abdominal pain and distention for the last several days.  He is uncertain when she last had a bowel movement.  Given pain and distention she had a CT scan performed today which did demonstrate significant distention of the colon with a large amount of stool in the right colon.  The ascending colon measured up to 11 cm.  On CT scan there is no evidence of a small-bowel obstruction.  Surgery was consulted for evaluation.  Patient only surgical history is that of a laparoscopic cholecystectomy and  x2.

## 2024-09-06 NOTE — ASSESSMENT & PLAN NOTE
D/w pt and her .  Findings suggestive of significant ileus.  COnt to keep npo.  Will order enema and miralax.  WIll monitor closely for any acute changes.  Surgery to follow

## 2024-09-06 NOTE — CONSULTS
[facility]  General Surgery  Consult Note    Patient Name: Karo Leonard  MRN: [id number]  Code Status: Full Code  Admission Date: 2024  Hospital Length of Stay: 12 days  Attending Physician: Bryan Nevarez MD  Primary Care Provider: Navneet Hernandez FNP    Patient information was obtained from patient and ER records.     Inpatient consult to General Surgery  Consult performed by: Bunny Cuevas MD  Consult ordered by: Bryan Nevarez MD        Subjective:     Principal Problem: Acute hypoxic on chronic hypercapnic respiratory failure    History of Present Illness: This is a 64-year-old female who has been admitted to the hospital for the last 2 weeks.  Patient initially presented following a fall at home with the associated facial trauma.  On presentation she was in respiratory failure requiring intubation and ICU admission.  Patient continues to require oxygen support but has been extubated and transferred to the floor.  She was no longer requiring any pressure support.  Patient has noted increased abdominal pain and distention for the last several days.  He is uncertain when she last had a bowel movement.  Given pain and distention she had a CT scan performed today which did demonstrate significant distention of the colon with a large amount of stool in the right colon.  The ascending colon measured up to 11 cm.  On CT scan there is no evidence of a small-bowel obstruction.  Surgery was consulted for evaluation.  Patient only surgical history is that of a laparoscopic cholecystectomy and  x2.      No current facility-administered medications on file prior to encounter.     Current Outpatient Medications on File Prior to Encounter   Medication Sig    chlorzoxazone (LORZONE) 750 mg Tab Take 1 tablet by mouth every 6 (six) hours.    promethazine (PHENERGAN) 25 MG tablet Take 25 mg by mouth every 6 (six) hours as needed.    TRELEGY ELLIPTA 200-62.5-25 mcg inhaler Inhale 1 puff  into the lungs once daily.    albuterol (PROVENTIL/VENTOLIN HFA) 90 mcg/actuation inhaler Inhale 1 puff into the lungs every 4 (four) hours as needed for Wheezing or Shortness of Breath.    albuterol-ipratropium (DUO-NEB) 2.5 mg-0.5 mg/3 mL nebulizer solution Take 3 mLs by nebulization every 6 (six) hours as needed for Wheezing or Shortness of Breath. Rescue    apixaban (ELIQUIS) 5 mg Tab Take 1 tablet (5 mg total) by mouth 2 (two) times daily.    arnica 20 % Tinc Apply 1 application topically once daily.    ascorbic acid, vitamin C, (VITAMIN C) 500 MG tablet Take 500 mg by mouth once daily.    aspirin 81 MG Chew Take 81 mg by mouth once daily.    atorvastatin (LIPITOR) 80 MG tablet Take 80 mg by mouth every evening.    desonide (DESOWEN) 0.05 % cream Apply 1 application topically 2 (two) times daily.    diltiaZEM (CARDIZEM CD) 240 MG 24 hr capsule Take 1 capsule (240 mg total) by mouth once daily.    estradioL (ESTRACE) 0.01 % (0.1 mg/gram) vaginal cream Place 1 g vaginally every 7 days. Monday's    ezetimibe (ZETIA) 10 mg tablet Take 10 mg by mouth once daily. (Patient not taking: Reported on 6/26/2024)    furosemide (LASIX) 20 MG tablet Take 1 tablet (20 mg total) by mouth once daily.    gabapentin (NEURONTIN) 600 MG tablet Take 600 mg by mouth 3 (three) times daily.    guaiFENesin (MUCINEX) 600 mg 12 hr tablet Take 1,200 mg by mouth 2 (two) times daily. (Patient not taking: Reported on 7/9/2024)    hydrocortisone 0.5 % cream Apply 1 application topically 2 (two) times daily as needed.    JANUVIA 50 mg Tab Take 50 mg by mouth once daily.    meclizine (ANTIVERT) 25 mg tablet Take 25 mg by mouth 4 (four) times daily as needed for Dizziness or Nausea. (Patient not taking: Reported on 7/9/2024)    pantoprazole (PROTONIX) 40 MG tablet Take 1 tablet (40 mg total) by mouth 2 (two) times daily.    potassium chloride SA (K-DUR,KLOR-CON M) 10 MEQ tablet Take 1 tablet (10 mEq total) by mouth once daily.    tiotropium  (SPIRIVA) 18 mcg inhalation capsule Inhale 1 capsule into the lungs once daily.       Review of patient's allergies indicates:   Allergen Reactions    Pcn [penicillins] Anaphylaxis    Adhesive     Floxacillin     Keflex [cephalexin]     Sulfa (sulfonamide antibiotics)     Zithromax [azithromycin]     Zofran [ondansetron hcl (pf)]      Dizzy vomiting         Past Medical History:   Diagnosis Date    Coronary artery disease     cardiac stent    DDD (degenerative disc disease), lumbar 2000    Diabetes mellitus     Hypertension     Thyroid disease      Past Surgical History:   Procedure Laterality Date     SECTION  08/1987    x2 1993    CORONARY STENT PLACEMENT      EXPLORATORY LAPAROTOMY      Yarsanism     HEMORRHOID SURGERY      LAPAROSCOPIC CHOLECYSTECTOMY N/A 2022    Procedure: CHOLECYSTECTOMY, LAPAROSCOPIC;  Surgeon: Leonardo Wilson III, MD;  Location: [facility];  Service: General;  Laterality: N/A;    LUMBAR DISC SURGERY      PILONIDAL CYST DRAINAGE      TONSILLECTOMY      as a child (also adenoids)     Family History    None       Tobacco Use    Smoking status: Not on file    Smokeless tobacco: Not on file   Substance and Sexual Activity    Alcohol use: No    Drug use: No    Sexual activity: Not Currently     Review of Systems   Constitutional:  Negative for activity change.   Respiratory:  Negative for apnea.    Cardiovascular:  Negative for chest pain.   Gastrointestinal:  Positive for abdominal distention and abdominal pain. Negative for nausea and vomiting.   Skin:  Negative for color change.   Hematological:  Negative for adenopathy.     Objective:     Vital Signs (Most Recent):  Temp: 98 °F (36.7 °C) (24 0733)  Pulse: 81 (24 1347)  Resp: (!) 21 (24 1347)  BP: (!) 145/65 (24 0902)  SpO2: 96 % (24 1347) Vital Signs (24h Range):  Temp:  [97.7 °F (36.5 °C)-98 °F (36.7 °C)] 98 °F (36.7 °C)  Pulse:  [79-86] 81  Resp:   "[18-21] 21  SpO2:  [95 %-100 %] 96 %  BP: (145-173)/(65-81) 145/65     Weight: 119.7 kg (263 lb 14.3 oz)  Body mass index is 43.91 kg/m².     Physical Exam  Vitals reviewed.   Cardiovascular:      Rate and Rhythm: Normal rate.      Pulses: Normal pulses.   Pulmonary:      Effort: Pulmonary effort is normal.   Abdominal:      General: Abdomen is flat. There is distension.      Palpations: There is no mass.      Tenderness: There is abdominal tenderness. There is no guarding.      Hernia: No hernia is present.      Comments: No peritoneal signs but localized discomfort particularly along the R side.     Musculoskeletal:      Cervical back: Normal range of motion.   Skin:     General: Skin is warm.   Neurological:      Mental Status: She is alert.   Psychiatric:         Mood and Affect: Mood normal.            I have reviewed all pertinent lab results within the past 24 hours.  CBC:   Recent Labs   Lab 09/05/24  0555   WBC 9.10   RBC 3.02*   HGB 9.2*   HCT 30.5*      *   MCH 30.5   MCHC 30.2*     BMP:   Recent Labs   Lab 09/03/24  0330 09/04/24  0442 09/06/24  1128   *   < > 109   *   < > 135*   K 3.7   < > 3.9   CL 96   < > 96   CO2 34*   < > 35*   BUN 14   < > 13   CREATININE 0.6   < > 0.5   CALCIUM 8.6*   < > 9.3   MG 2.0  --   --     < > = values in this interval not displayed.       Significant Diagnostics:  Ct scan demonstrates significant colonic distention consistent with ileus.  Large amount of stool in ascending colon.  Colon measures up to 11 cm.        Assessment/Plan:     Ileus  D/w pt and her .  Findings suggestive of significant ileus.  COnt to keep npo.  Will order enema and miralax.  WIll monitor closely for any acute changes.  Surgery to follow      VTE Risk Mitigation (From admission, onward)           Ordered     enoxaparin injection 40 mg  Every 12 hours        Note to Pharmacy: Ht: 5' 5" (1.651 m)  Wt: 127 kg (280 lb)  Estimated Creatinine Clearance: 92.8 mL/min " (based on SCr of 0.8 mg/dL).  Body mass index is 46.59 kg/m².    08/25/24 0627     IP VTE HIGH RISK PATIENT  Once         08/25/24 0627     Place sequential compression device  Until discontinued         08/25/24 0627                    Thank you for your consult. I will follow-up with patient. Please contact us if you have any additional questions.    Bunny Cuevas MD  General Surgery  [facility]

## 2024-09-06 NOTE — ASSESSMENT & PLAN NOTE
KUB with Gas in stool distended loops of large bowel measuring up to 6.5 cm.  Findings could reflect ileus or obstruction.  NPO and CT abdomen and pelvis ordered  Decreased narcotic doses  Surgery consultation

## 2024-09-07 PROBLEM — Z71.89 GOALS OF CARE, COUNSELING/DISCUSSION: Status: RESOLVED | Noted: 2024-08-28 | Resolved: 2024-09-07

## 2024-09-07 LAB
POCT GLUCOSE: 69 MG/DL (ref 70–110)
POCT GLUCOSE: 74 MG/DL (ref 70–110)
POCT GLUCOSE: 75 MG/DL (ref 70–110)
POCT GLUCOSE: 79 MG/DL (ref 70–110)
POCT GLUCOSE: 87 MG/DL (ref 70–110)

## 2024-09-07 PROCEDURE — 94640 AIRWAY INHALATION TREATMENT: CPT

## 2024-09-07 PROCEDURE — 99900035 HC TECH TIME PER 15 MIN (STAT)

## 2024-09-07 PROCEDURE — 25000003 PHARM REV CODE 250

## 2024-09-07 PROCEDURE — 63600175 PHARM REV CODE 636 W HCPCS: Performed by: HOSPITALIST

## 2024-09-07 PROCEDURE — 94761 N-INVAS EAR/PLS OXIMETRY MLT: CPT

## 2024-09-07 PROCEDURE — 21400001 HC TELEMETRY ROOM

## 2024-09-07 PROCEDURE — 99231 SBSQ HOSP IP/OBS SF/LOW 25: CPT | Mod: ,,, | Performed by: SURGERY

## 2024-09-07 PROCEDURE — 25000003 PHARM REV CODE 250: Performed by: HOSPITALIST

## 2024-09-07 PROCEDURE — 99232 SBSQ HOSP IP/OBS MODERATE 35: CPT | Mod: ,,, | Performed by: INTERNAL MEDICINE

## 2024-09-07 PROCEDURE — 63600175 PHARM REV CODE 636 W HCPCS: Performed by: INTERNAL MEDICINE

## 2024-09-07 PROCEDURE — 27100171 HC OXYGEN HIGH FLOW UP TO 24 HOURS

## 2024-09-07 PROCEDURE — 25000003 PHARM REV CODE 250: Performed by: SURGERY

## 2024-09-07 PROCEDURE — 63600175 PHARM REV CODE 636 W HCPCS

## 2024-09-07 PROCEDURE — 94660 CPAP INITIATION&MGMT: CPT

## 2024-09-07 PROCEDURE — 25000003 PHARM REV CODE 250: Performed by: INTERNAL MEDICINE

## 2024-09-07 PROCEDURE — 99900031 HC PATIENT EDUCATION (STAT)

## 2024-09-07 PROCEDURE — 94799 UNLISTED PULMONARY SVC/PX: CPT

## 2024-09-07 PROCEDURE — 25000242 PHARM REV CODE 250 ALT 637 W/ HCPCS: Performed by: HOSPITALIST

## 2024-09-07 PROCEDURE — 25000003 PHARM REV CODE 250: Performed by: FAMILY MEDICINE

## 2024-09-07 RX ORDER — GABAPENTIN 300 MG/1
300 CAPSULE ORAL 3 TIMES DAILY
Status: DISCONTINUED | OUTPATIENT
Start: 2024-09-07 | End: 2024-09-09

## 2024-09-07 RX ORDER — ACETAMINOPHEN 325 MG/1
650 TABLET ORAL EVERY 8 HOURS PRN
Status: DISCONTINUED | OUTPATIENT
Start: 2024-09-07 | End: 2024-09-13

## 2024-09-07 RX ORDER — SODIUM CHLORIDE, SODIUM LACTATE, POTASSIUM CHLORIDE, CALCIUM CHLORIDE 600; 310; 30; 20 MG/100ML; MG/100ML; MG/100ML; MG/100ML
INJECTION, SOLUTION INTRAVENOUS CONTINUOUS
Status: DISCONTINUED | OUTPATIENT
Start: 2024-09-07 | End: 2024-09-08

## 2024-09-07 RX ORDER — POLYETHYLENE GLYCOL 3350 17 G/17G
17 POWDER, FOR SOLUTION ORAL 3 TIMES DAILY PRN
Status: DISCONTINUED | OUTPATIENT
Start: 2024-09-07 | End: 2024-09-17 | Stop reason: HOSPADM

## 2024-09-07 RX ORDER — KETOROLAC TROMETHAMINE 30 MG/ML
15 INJECTION, SOLUTION INTRAMUSCULAR; INTRAVENOUS EVERY 6 HOURS PRN
Status: COMPLETED | OUTPATIENT
Start: 2024-09-07 | End: 2024-09-08

## 2024-09-07 RX ADMIN — INSULIN GLARGINE 42 UNITS: 100 INJECTION, SOLUTION SUBCUTANEOUS at 09:09

## 2024-09-07 RX ADMIN — KETOROLAC TROMETHAMINE 15 MG: 30 INJECTION, SOLUTION INTRAMUSCULAR; INTRAVENOUS at 09:09

## 2024-09-07 RX ADMIN — LEVOTHYROXINE SODIUM 75 MCG: 0.03 TABLET ORAL at 05:09

## 2024-09-07 RX ADMIN — LOSARTAN POTASSIUM 100 MG: 50 TABLET, FILM COATED ORAL at 09:09

## 2024-09-07 RX ADMIN — CYANOCOBALAMIN TAB 1000 MCG 1000 MCG: 1000 TAB at 09:09

## 2024-09-07 RX ADMIN — MECLIZINE HYDROCHLORIDE 25 MG: 12.5 TABLET ORAL at 09:09

## 2024-09-07 RX ADMIN — LIDOCAINE 1 PATCH: 50 PATCH CUTANEOUS at 03:09

## 2024-09-07 RX ADMIN — POLYETHYLENE GLYCOL 3350 17 G: 17 POWDER, FOR SOLUTION ORAL at 09:09

## 2024-09-07 RX ADMIN — IPRATROPIUM BROMIDE AND ALBUTEROL SULFATE 3 ML: 2.5; .5 SOLUTION RESPIRATORY (INHALATION) at 08:09

## 2024-09-07 RX ADMIN — HYDROCODONE BITARTRATE AND ACETAMINOPHEN 1 TABLET: 5; 325 TABLET ORAL at 05:09

## 2024-09-07 RX ADMIN — KETOROLAC TROMETHAMINE 15 MG: 30 INJECTION, SOLUTION INTRAMUSCULAR; INTRAVENOUS at 06:09

## 2024-09-07 RX ADMIN — SODIUM CHLORIDE, POTASSIUM CHLORIDE, SODIUM LACTATE AND CALCIUM CHLORIDE: 600; 310; 30; 20 INJECTION, SOLUTION INTRAVENOUS at 06:09

## 2024-09-07 RX ADMIN — FAMOTIDINE 20 MG: 10 INJECTION INTRAVENOUS at 09:09

## 2024-09-07 RX ADMIN — IPRATROPIUM BROMIDE AND ALBUTEROL SULFATE 3 ML: 2.5; .5 SOLUTION RESPIRATORY (INHALATION) at 01:09

## 2024-09-07 RX ADMIN — SENNOSIDES AND DOCUSATE SODIUM 1 TABLET: 8.6; 5 TABLET ORAL at 09:09

## 2024-09-07 RX ADMIN — DILTIAZEM HYDROCHLORIDE 240 MG: 120 CAPSULE, COATED, EXTENDED RELEASE ORAL at 09:09

## 2024-09-07 RX ADMIN — PREDNISONE 20 MG: 20 TABLET ORAL at 11:09

## 2024-09-07 RX ADMIN — ENOXAPARIN SODIUM 40 MG: 40 INJECTION SUBCUTANEOUS at 09:09

## 2024-09-07 RX ADMIN — Medication: at 12:09

## 2024-09-07 RX ADMIN — IPRATROPIUM BROMIDE AND ALBUTEROL SULFATE 3 ML: 2.5; .5 SOLUTION RESPIRATORY (INHALATION) at 07:09

## 2024-09-07 RX ADMIN — GABAPENTIN 300 MG: 300 CAPSULE ORAL at 09:09

## 2024-09-07 RX ADMIN — FAMOTIDINE 20 MG: 10 INJECTION INTRAVENOUS at 10:09

## 2024-09-07 RX ADMIN — GABAPENTIN 300 MG: 300 CAPSULE ORAL at 05:09

## 2024-09-07 NOTE — NURSING
Nurses Note -- 4 Eyes      9/7/2024   12:24 PM      Skin assessed during: Daily Assessment      [] No Altered Skin Integrity Present    []Prevention Measures Documented      [x] Yes- Altered Skin Integrity Present or Discovered   [] LDA Added if Not in Epic (Describe Wound)   [] New Altered Skin Integrity was Present on Admit and Documented in LDA   [] Wound Image Taken    Wound Care Consulted? No    Attending Nurse:  Rocio Forte RN/Staff Member:SNOW [place]

## 2024-09-07 NOTE — CARE UPDATE
09/06/24 2026   Patient Assessment/Suction   Level of Consciousness (AVPU) alert   Respiratory Effort Normal;Unlabored   Expansion/Accessory Muscles/Retractions no use of accessory muscles   All Lung Fields Breath Sounds equal bilaterally;clear;diminished   Rhythm/Pattern, Respiratory no shortness of breath reported   Cough Frequency with stimulation   Cough Type no productive sputum   PRE-TX-O2   Device (Oxygen Therapy) Oxymask   $ Is the patient on Low Flow Oxygen? Yes   Flow (L/min) (Oxygen Therapy) 3   SpO2 95 %   Pulse Oximetry Type Continuous   $ Pulse Oximetry - Multiple Charge Pulse Oximetry - Multiple   Pulse 70   Resp 16   Positioning Supine;HOB elevated 30 degrees   Positioning   Head of Bed (HOB) Positioning HOB at 30 degrees   Positioning/Transfer Devices pillows;in use   Aerosol Therapy   $ Aerosol Therapy Charges Aerosol Treatment   Daily Review of Necessity (SVN) completed   Respiratory Treatment Status (SVN) given   Treatment Route (SVN) mask;oxygen   Patient Position HOB elevated   Post Treatment Assessment (SVN) increased aeration   Signs of Intolerance (SVN) none   Education   $ Education Bronchodilator;Oxygen;30 min;BiPAP

## 2024-09-07 NOTE — PLAN OF CARE
09/07/24 0722   Patient Assessment/Suction   Level of Consciousness (AVPU) alert   Respiratory Effort Normal;Unlabored   Expansion/Accessory Muscles/Retractions no use of accessory muscles   All Lung Fields Breath Sounds Anterior:;Lateral:;diminished   Rhythm/Pattern, Respiratory pattern regular   Cough Frequency infrequent   Cough Type nonproductive   Skin Integrity   $ Wound Care Tech Time 15 min   Area Observed Left;Right;Cheek;Bridge of nose   Skin Appearance without discoloration   PRE-TX-O2   Device (Oxygen Therapy) Oxymask   $ Is the patient on Low Flow Oxygen? Yes   Flow (L/min) (Oxygen Therapy) 3   SpO2 96 %   Pulse Oximetry Type Intermittent   $ Pulse Oximetry - Multiple Charge Pulse Oximetry - Multiple   Pulse 78   Resp 20   Aerosol Therapy   $ Aerosol Therapy Charges Aerosol Treatment   Daily Review of Necessity (SVN) completed   Respiratory Treatment Status (SVN) given   Treatment Route (SVN) mask;oxygen   Patient Position HOB elevated   Post Treatment Assessment (SVN) breath sounds improved   Signs of Intolerance (SVN) none   Breath Sounds Post-Respiratory Treatment   Throughout All Fields Post-Treatment All Fields   Throughout All Fields Post-Treatment aeration increased   Post-treatment Heart Rate (beats/min) 79   Post-treatment Resp Rate (breaths/min) 20   Incentive Spirometer   $ Incentive Spirometer Charges done with encouragement   Incentive Spirometer Predicted Level (mL) 1500   Administration (IS) proper technique demonstrated   Number of Repetitions (IS) 5   Level Incentive Spirometer (mL) 1000   Patient Tolerance (IS) no adverse signs/symptoms present   General Safety Checklist   Safety Promotion/Fall Prevention side rails raised   Respiratory Interventions   Cough And Deep Breathing done with encouragement   Preset CPAP/BiPAP Settings   Mode Of Delivery BiPAP S/T;Standby   $ CPAP/BiPAP Daily Charge BiPAP/CPAP Daily   $ Initial CPAP/BiPAP Setup? No   $ Is patient using? Other (see  comments)  (QHS)   Equipment Type V60   Education   $ Education Bronchodilator;Oxygen;15 min

## 2024-09-07 NOTE — PT/OT/SLP PROGRESS
Physical Therapy      Patient Name:  Karo Leonard   MRN:  [id number]    Patient not seen today secondary to Patient unwilling to participate (continues with abdominal pain, unwilling to move. Encoruaged increased mobility in bed.). Will follow-up .

## 2024-09-07 NOTE — SUBJECTIVE & OBJECTIVE
Interval History:  Patient passing flatus but only had a very small amount of stool output with the enema.  KUB showed no significant change.  Repeat CT scan ordered for tomorrow morning.  She complained of a bad headache, Tylenol ordered.  For worsening abdominal pain, avoiding scheduled opiates given her SBO, ordered Ketoralac.  Given poor oral intake, gentle IV hydration in place.      Review of Systems   Constitutional:         Positive for headache   Respiratory:  Positive for shortness of breath.    Gastrointestinal:  Positive for abdominal distention, abdominal pain and constipation.   Musculoskeletal:  Positive for back pain.     Objective:     Vital Signs (Most Recent):  Temp: 98.1 °F (36.7 °C) (09/07/24 1455)  Pulse: 93 (09/07/24 1455)  Resp: 20 (09/07/24 1455)  BP: 126/62 (map 85) (09/07/24 1455)  SpO2: (!) 93 % (09/07/24 1455) Vital Signs (24h Range):  Temp:  [97.6 °F (36.4 °C)-98.5 °F (36.9 °C)] 98.1 °F (36.7 °C)  Pulse:  [68-93] 93  Resp:  [16-22] 20  SpO2:  [93 %-98 %] 93 %  BP: (126-159)/(56-86) 126/62     Weight: 119.7 kg (263 lb 14.3 oz)  Body mass index is 43.91 kg/m².    Intake/Output Summary (Last 24 hours) at 9/7/2024 1844  Last data filed at 9/7/2024 1257  Gross per 24 hour   Intake 0 ml   Output 750 ml   Net -750 ml         Physical Exam  HENT:      Head:      Comments: Ecchymosis around bilateral eyes     Mouth/Throat:      Mouth: Mucous membranes are dry.   Eyes:      Pupils: Pupils are equal, round, and reactive to light.   Cardiovascular:      Rate and Rhythm: Normal rate.   Pulmonary:      Effort: Pulmonary effort is normal. No respiratory distress.      Breath sounds: Normal breath sounds. No stridor. No wheezing.   Abdominal:      General: There is distension.      Palpations: There is no mass.      Tenderness: There is abdominal tenderness. There is no guarding or rebound.   Musculoskeletal:      Right lower leg: Edema present.      Left lower leg: Edema present.   Neurological:       "Mental Status: She is alert and oriented to person, place, and time.             Significant Labs: All pertinent labs within the past 24 hours have been reviewed.  CBC: No results for input(s): "WBC", "HGB", "HCT", "PLT" in the last 48 hours.  CMP:   Recent Labs   Lab 09/06/24  1128   *   K 3.9   CL 96   CO2 35*      BUN 13   CREATININE 0.5   CALCIUM 9.3   ANIONGAP 4*       Significant Imaging: I have reviewed all pertinent imaging results/findings within the past 24 hours.  "

## 2024-09-07 NOTE — PLAN OF CARE
Problem: Adult Inpatient Plan of Care  Goal: Optimal Comfort and Wellbeing  Outcome: Progressing     Problem: Adult Inpatient Plan of Care  Goal: Absence of Hospital-Acquired Illness or Injury  Outcome: Progressing     Problem: Bariatric Environmental Safety  Goal: Safety Maintained with Care  Outcome: Progressing     Problem: Pneumonia  Goal: Effective Oxygenation and Ventilation  Outcome: Progressing     Problem: Fall Injury Risk  Goal: Absence of Fall and Fall-Related Injury  Outcome: Progressing     Problem: Wound  Goal: Skin Health and Integrity  Outcome: Progressing     Problem: Wound  Goal: Optimal Pain Control and Function  Outcome: Progressing

## 2024-09-07 NOTE — NURSING
Nurses Note -- 4 Eyes      9/6/2024   11:39 PM      Skin assessed during: Daily Assessment      [x] No Altered Skin Integrity Present    []Prevention Measures Documented  bruised    [] Yes- Altered Skin Integrity Present or Discovered   [] LDA Added if Not in Epic (Describe Wound)   [] New Altered Skin Integrity was Present on Admit and Documented in LDA   [] Wound Image Taken    Wound Care Consulted? No    Attending Nurse:  Allyn Forte RN/Staff Member:  Malcom Bennett LPN

## 2024-09-07 NOTE — PROGRESS NOTES
Pulmonary/Critical Care  Progress Note      Patient name: Karo Leonard  MRN: [id number]  Date: 09/07/2024    Admit Date: 8/25/2024  Consult Requested By: Ashleigh Maldonado, *    Reason for Consult: Respiratory failure, COPD    HPI:    8/25/2024 - 65 yo ASCVD, DM, HTN. COPD(cigarette use) had increased SOB at home and a fall.  EMS was called and brought to ER.  Initially tried on BiPAP without response and was subsequently intubated and placed on ventilation.  Initial ABG showed over ventilation and we have adjusted and ABG are getting better.  She is sedated and not able to provide any history.  D/W  who says that she was supposed to see a pulmonologist but couldn't make appointment, she has been a chronic smoker.  She had increase SOB for a few days.  She did have a fall at home but he reports that she was not down for long.  He does report that she has been having recurring falls at home.      8/26/2024 - Stable overnight, O2 needs still too high to do SBT.  She is responsive and gets agitated on current sedation and we are adjusting.  No other new issues reported.  Hgb has decreased (no active bleeding reported), NA remains low, CPK is better, TFTF noted and c/w hypothyroid (synthroid started).    8/27/2024 - Remains critically ill, O2 needs down a little but still too high.  Difficult to sedate is either agitated or apneic.  Tried SBT this AM and was apneic.  VS reviewed.  She is 3.8 liters +.  Tube feeds have been started.  NA remains low, glucose is elevated.  CXR looks about the same    8/28/2024 - Stable overnight, O2 needs still up.  She does get agitated at times and we have adjusted meds.  Trouble with tube feeds and will hold today and restart tomorrow.  Not ready to wean because on increased FiO2 and will try to increase PEP to see if that helps.  She does not have a lot of secretions.  She is over ventilaed some and we adjusted vent.  CXR is about the same    8/29/2024 - Stable  overnight,, O2 needs still up some (I decreased FiO2 and increased PEEP some) and she is overventilated (we adjusted rate).  [facility]  She is 4.6 liters +.  CXR is about the same.    8/30- continues on vent, settings adjusted for alkalosis- now on PS 12/5. CXR looks wet and pt with severe edema- fluids discontinued and starting lasix. Propofol has been weaned a little. Pt is awake and denies pain. Her blood pressure was up to 200 systolic at the time of my evaluation- RN states she just gave am blood pressure med and also Lasix.    8/31- pt awake, off sedation, writing and alert. Denies pain, states feeling better. She was significantly more alkalotic this am due to lasix so given a dose of diamox this am. Tolerating PS 10/5 now- will repeat abg soon and consider extubation     9/1- extubated yesterday and tolerated well    9/3/2024 - STable overnight, no new issues reported and is feeling better ovrall.  She is very weak.  O2 decreased to 5 LPM when I saqw her.  No new issues reported.  BP is up some.  Labs reviewed    9/4/2024 - Stable overnight, no new issues reported, she feels better overall.      9/5/2024 - Stable overnight, she complains of issues with vertigo when getting up.  Possible placement at [facility] is being worked on.  No new respiratory complaints.      9/6 the patient has developed very tender and tympanitic abdomen.  She was supposed to be going to an LTAC today.  She is getting ready to receive an enema which will hopefully clear her rectum and decrease the obstruction.    9/7 the patient has had 2 bowel movements.  The 1 she just had was quite large.  However, she continues to complain of abdominal pain.  She is also complaining that are pain meds have been decreased.  Explained how narcotics slow down GI transit and could be adding to her GI problems    Review of Systems    Review of Systems   Constitutional:         Diffuse pain    Gastrointestinal:  Positive for abdominal pain and constipation.   Neurological:  Positive for headaches.       Past Medical History    Past Medical History:   Diagnosis Date    Coronary artery disease     cardiac stent    DDD (degenerative disc disease), lumbar     Diabetes mellitus     Hypertension     Thyroid disease        Past Surgical History    Past Surgical History:   Procedure Laterality Date     SECTION  08/1987    x2 1993    CORONARY STENT PLACEMENT      EXPLORATORY LAPAROTOMY  1982    Oriental orthodox     HEMORRHOID SURGERY      LAPAROSCOPIC CHOLECYSTECTOMY N/A 2022    Procedure: CHOLECYSTECTOMY, LAPAROSCOPIC;  Surgeon: Leonardo Wilson III, MD;  Location: [facility] OR;  Service: General;  Laterality: N/A;    LUMBAR DISC SURGERY      PILONIDAL CYST DRAINAGE      TONSILLECTOMY      as a child (also adenoids)       Medications (scheduled):      albuterol-ipratropium  3 mL Nebulization Q6H    Brown Bomb (magnesium citrate 300 mL, glycerin 100 mL,  mL) 900 mL enema   Rectal Once    cyanocobalamin  1,000 mcg Oral Daily    diltiaZEM  240 mg Oral Daily    enoxparin  40 mg Subcutaneous Q12H    famotidine (PF)  20 mg Intravenous Q12H    insulin glargine U-100  42 Units Subcutaneous BID    levothyroxine  75 mcg Oral Before breakfast    LIDOcaine  1 patch Transdermal Q24H    losartan  100 mg Oral Daily    predniSONE  20 mg Oral Daily    senna-docusate 8.6-50 mg  1 tablet Oral BID       Medications (infusions):           Medications (prn):       Current Facility-Administered Medications:     calcium gluconate IVPB, 1 g, Intravenous, PRN    calcium gluconate IVPB, 2 g, Intravenous, PRN    calcium gluconate IVPB, 3 g, Intravenous, PRN    dextrose 50%, 12.5 g, Intravenous, PRN    glucagon (human recombinant), 1 mg, Intramuscular, PRN    hydrALAZINE, 10 mg, Intravenous, Q6H PRN    HYDROcodone-acetaminophen, 1 tablet, Oral, Q8H PRN    insulin aspart U-100, 0-10  "Units, Subcutaneous, Q6H PRN    magnesium sulfate IVPB, 2 g, Intravenous, PRN    magnesium sulfate IVPB, 4 g, Intravenous, PRN    meclizine, 25 mg, Oral, TID PRN    polyethylene glycol, 17 g, Oral, TID PRN    potassium bicarbonate, 35 mEq, Oral, PRN    potassium bicarbonate, 50 mEq, Oral, PRN    potassium bicarbonate, 60 mEq, Oral, PRN    potassium chloride in water, 40 mEq, Intravenous, PRN **AND** potassium chloride in water, 60 mEq, Intravenous, PRN **AND** potassium chloride in water, 80 mEq, Intravenous, PRN    simethicone, 1 tablet, Oral, TID PRN    sodium chloride 0.9%, 10 mL, Intravenous, PRN    sodium phosphate 15 mmol in D5W 250 mL IVPB, 15 mmol, Intravenous, PRN    sodium phosphate 20.01 mmol in D5W 250 mL IVPB, 20.01 mmol, Intravenous, PRN    sodium phosphate 30 mmol in D5W 250 mL IVPB, 30 mmol, Intravenous, PRN    Family History: No family history on file.    Social History: Tobacco:   Social History     Tobacco Use   Smoking Status Not on file   Smokeless Tobacco Not on file                                EtOH:   Social History     Substance and Sexual Activity   Alcohol Use No                                Drugs:   Social History     Substance and Sexual Activity   Drug Use No       Physical Exam    Vital signs:  Temp:  [97.6 °F (36.4 °C)-98.5 °F (36.9 °C)]   Pulse:  [68-81]   Resp:  [16-22]   BP: (129-182)/(56-86)   SpO2:  [95 %-98 %]     Intake/Output:   Intake/Output Summary (Last 24 hours) at 9/7/2024 1131  Last data filed at 9/7/2024 0942  Gross per 24 hour   Intake 0 ml   Output 750 ml   Net -750 ml        BMI: Estimated body mass index is 43.91 kg/m² as calculated from the following:    Height as of this encounter: 5' 5" (1.651 m).    Weight as of this encounter: 119.7 kg (263 lb 14.3 oz).    Physical Exam  Vitals and nursing note reviewed.   Constitutional:       General: She is not in acute distress.     Appearance: She is not ill-appearing, toxic-appearing or diaphoretic.      Comments: " Awake, alert, unhappy   HENT:      Head: Normocephalic.      Comments: Some bruising right forehead and bilat eyes from her fall     Right Ear: External ear normal.      Left Ear: External ear normal.      Nose: Nose normal. No congestion or rhinorrhea.      Mouth/Throat:      Mouth: Mucous membranes are moist.      Pharynx: Oropharynx is clear. No oropharyngeal exudate or posterior oropharyngeal erythema.   Eyes:      General: No scleral icterus.        Right eye: No discharge.         Left eye: No discharge.      Extraocular Movements: Extraocular movements intact.      Conjunctiva/sclera: Conjunctivae normal.      Pupils: Pupils are equal, round, and reactive to light.   Neck:      Vascular: No carotid bruit.   Cardiovascular:      Rate and Rhythm: Normal rate and regular rhythm.      Pulses: Normal pulses.      Heart sounds: No murmur heard.     No friction rub. No gallop.   Pulmonary:      Effort: Pulmonary effort is normal. No respiratory distress.      Breath sounds: No stridor. No wheezing, rhonchi or rales.   Chest:      Chest wall: No tenderness.   Abdominal:      General: There is distension.      Tenderness: There is abdominal tenderness. There is guarding.      Comments: Seems a little less tender to me.   Musculoskeletal:         General: No swelling. Normal range of motion.      Cervical back: Normal range of motion and neck supple. No rigidity or tenderness.      Right lower leg: Edema present.      Left lower leg: Edema present.   Lymphadenopathy:      Cervical: No cervical adenopathy.   Skin:     General: Skin is warm and dry.      Capillary Refill: Capillary refill takes less than 2 seconds.      Coloration: Skin is not jaundiced.      Findings: Bruising (Extensive ecchymoses to the eyes and frontal bone on the right with a large hematoma) present.      Comments: + bruise LLE   Neurological:      General: No focal deficit present.      Cranial Nerves: No cranial nerve deficit.      Sensory: No  "sensory deficit.      Motor: No weakness.   Psychiatric:         Mood and Affect: Mood normal.         Behavior: Behavior normal.      Comments: The patient is upset         Laboratory    No results for input(s): "WBC", "RBC", "HGB", "HCT", "PLT", "MCV", "MCH", "MCHC" in the last 24 hours.      No results for input(s): "GLUCOSE", "CALCIUM", "ALBUMIN", "PROT", "NA", "K", "CO2", "CL", "BUN", "CREATININE", "ALKPHOS", "ALT", "AST", "BILITOT" in the last 24 hours.      Microbiology:       Microbiology Results (last 7 days)       ** No results found for the last 168 hours. **            Radiology    X-Ray KUB  Narrative: EXAMINATION:  XR KUB    CLINICAL HISTORY:  SBO;    TECHNIQUE:  Single AP view of the abdomen in supine projection.    COMPARISON:  09/06/2024    FINDINGS:  Multiple scattered air-filled loops of both large and small bowel are noted throughout the abdominal cavity without disproportionate distension.  Single loop diameter of the colonic lumen measures 8.7 cm in the widest dimension.  Large volume of fecal load burden is established in the ascending colon.  Clearance of stool throughout the remaining segments of the colon noted.  Clips in the right upper quadrant heart manifestation of prior cholecystectomy.  Minimal levo convexity of lumbar spine with chronic degenerative spondylosis changes.  Impression: Prominent fecal load within the ascending colon with no further clearance upon comparison leading to constipation.    Electronically signed by: Aleks Lizarraga MD  Date:    09/07/2024  Time:    10:50        Ventilator Information    Oxygen Concentration (%):  [32] 32  The patient is on 3 L           No results for input(s): "PH", "PCO2", "PO2", "HCO3", "POCSATURATED", "BE" in the last 72 hours.        Impression    Active Hospital Problems    Diagnosis  POA    *Acute hypoxic on chronic hypercapnic respiratory failure [J96.01, J96.12]  Yes    Ileus [K56.7]  Unknown    Goals of care, counseling/discussion " [Z71.89]  Not Applicable    Type 2 diabetes mellitus [E11.9]  Yes    Anemia [D64.9]  Yes    Acute metabolic encephalopathy [G93.41]  Yes    Fall [W19.XXXA]  Yes    COPD exacerbation [J44.1]  Yes    Pneumonia [J18.9]  Yes    Hypothyroid [E03.9]  Yes    Hypertension [I10]  Yes      Resolved Hospital Problems   No resolved problems to display.     Patient's respiratory status is stable.  Her abdomen is a problem.  It is distended and tympanitic and her right colon is quite dilated.    Plan    Continue O2 to maintain sats 89-92%  Continue bipap nightly  Respiratory treatments  Wean steroids Q 3 days  Antihypertensives to continue  Replace electrolytes as needed  Synthroid   Watch BS and treat as needed  Increase activity as able  Check labs  Plans for repeat CT abdomen noted

## 2024-09-07 NOTE — PROGRESS NOTES
[facility] Medicine  Progress Note    Patient Name: Karo Leonard  MRN: [id number]  Patient Class: IP- Inpatient   Admission Date: 8/25/2024  Length of Stay: 13 days  Attending Physician: Ashleigh Maldonado, *  Primary Care Provider: Navneet Hernandez FNP        Subjective:     Principal Problem:Acute hypoxic on chronic hypercapnic respiratory failure        HPI:  64F p/w fall in the context of shortness of breath. EMS reportedly gave narcan w/o appreciable response, then she was given duoneb en route to [facility] ED. Upon arrival, she was tachypneic, had respy sounding phonation, but she denied chest pain, fever, or chills. She was placed on BiPAP, and initially demonstrated improvement. However, she became less responsive and appeared to tire out. She was given additional narcan w/o appreciable response, so she was intubated. She had some hypotension after receiving sedation, so she was placed on low dose levophed. [place] was placed, lasix was given, as was Abx. Large bruise noted on LLE, so x-ray ordered.     Overview/Hospital Course:  64 F Patient with hx of COPD, morbid obesity was admitted for acute on chronic hypoxemic hypercapnic respiratory failure with sever fall with hit to the face . Patient was intubated after failing  BiPAP. Finished course of IV Levofloxacin for COPD exacerbation and possible pneumonia. Pulm has been managing her steroid dose. Patient was started on iron and B12 supplement for anemia. S/P exubation on 8/31. For her hypertension, patient was started on losartan and her home diltiazem was resumed.  Patient working with PT and OT.  Fall precautions have been addressed with the patient.  Patient moving to medicine telemetry.  Supplemental oxygen weaning is in process.  Patient encouraged to continue BiPAP use at night or as needed as before.  Patient oxygen requirement progressively improving.  Patient is physically deconditioned and working with PT and OT.   Awaiting skilled nursing facility placement.  Steroid dose is being tapered down.  Patient complained of bloated and also passing a lot of gas and Gas x was given but later complained of mild abdominal pain and KUB was done and found to have Gas in stool distended loops of large bowel measuring up to 6.5 cm.  Findings could reflect ileus or obstruction.  CT scan done demonstrated significant colonic distention consistent with ileus with a large amount of stool in ascending colon, colon measuring up to 11 cm.  General surgery evaluated the patient, NG tube was placed, patient continued on MiraLax and enemas, CT scan was repeated on 09/08/2024.    Interval History:  Patient passing flatus but only had a very small amount of stool output with the enema.  KUB showed no significant change.  Repeat CT scan ordered for tomorrow morning.  She complained of a bad headache, Tylenol ordered.  For worsening abdominal pain, avoiding scheduled opiates given her SBO, ordered Ketoralac.  Given poor oral intake, gentle IV hydration in place.      Review of Systems   Constitutional:         Positive for headache   Respiratory:  Positive for shortness of breath.    Gastrointestinal:  Positive for abdominal distention, abdominal pain and constipation.   Musculoskeletal:  Positive for back pain.     Objective:     Vital Signs (Most Recent):  Temp: 98.1 °F (36.7 °C) (09/07/24 1455)  Pulse: 93 (09/07/24 1455)  Resp: 20 (09/07/24 1455)  BP: 126/62 (map 85) (09/07/24 1455)  SpO2: (!) 93 % (09/07/24 1455) Vital Signs (24h Range):  Temp:  [97.6 °F (36.4 °C)-98.5 °F (36.9 °C)] 98.1 °F (36.7 °C)  Pulse:  [68-93] 93  Resp:  [16-22] 20  SpO2:  [93 %-98 %] 93 %  BP: (126-159)/(56-86) 126/62     Weight: 119.7 kg (263 lb 14.3 oz)  Body mass index is 43.91 kg/m².    Intake/Output Summary (Last 24 hours) at 9/7/2024 1844  Last data filed at 9/7/2024 1257  Gross per 24 hour   Intake 0 ml   Output 750 ml   Net -750 ml         Physical Exam  HENT:       "Head:      Comments: Ecchymosis around bilateral eyes     Mouth/Throat:      Mouth: Mucous membranes are dry.   Eyes:      Pupils: Pupils are equal, round, and reactive to light.   Cardiovascular:      Rate and Rhythm: Normal rate.   Pulmonary:      Effort: Pulmonary effort is normal. No respiratory distress.      Breath sounds: Normal breath sounds. No stridor. No wheezing.   Abdominal:      General: There is distension.      Palpations: There is no mass.      Tenderness: There is abdominal tenderness. There is no guarding or rebound.   Musculoskeletal:      Right lower leg: Edema present.      Left lower leg: Edema present.   Neurological:      Mental Status: She is alert and oriented to person, place, and time.             Significant Labs: All pertinent labs within the past 24 hours have been reviewed.  CBC: No results for input(s): "WBC", "HGB", "HCT", "PLT" in the last 48 hours.  CMP:   Recent Labs   Lab 09/06/24  1128   *   K 3.9   CL 96   CO2 35*      BUN 13   CREATININE 0.5   CALCIUM 9.3   ANIONGAP 4*       Significant Imaging: I have reviewed all pertinent imaging results/findings within the past 24 hours.    Assessment/Plan:      * Acute hypoxic on chronic hypercapnic respiratory failure  Appear secondary to COPD exacerbation and failed BiPAP   Improving  Likely multifactorial  S/P extubation on 8/31  Oxygen supplement as need, patient already on home oxygen 2 to 3 L  EF 60-65%  Pulmonology is following patient  Having problem with placement because she need BiPAP at night along with oxygen 7 L and facility can not accept with the level of BiPAP  Trying to send to  LTAC    Ileus  KUB with Gas in stool distended loops of large bowel measuring up to 6.5 cm.  Findings could reflect ileus or obstruction.  CT scan done demonstrated significant colonic distention consistent with ileus with a large amount of stool in ascending colon, colon measuring up to 11 cm. General surgery evaluated the " "patient, NG tube was placed, patient continued on MiraLax and enemas  Avoid scheduled opiates   Ordered Tylenol and ketorolac for pain  IV fluids given poor oral intake  This morning was passing flatus, had 2 small bowel movements with enemas but KUB showed similar changes   Plan to repeat CT scan tomorrow morning       Type 2 diabetes mellitus  Continue with SSI and glargine  Make adjustment as needed    Anemia  Likely from Iron and b12 deficiency  Started on Iron and B12 supplement on 8/27    Fall  Imaging ruled out acute fracture but severe swelling noted.  No visual disturbance  US confirmed hematoma on left lower extremity which is getting smaller  Fall precautions discussed with the patient.    PT OT and SNF placement /LTAC placement    Acute metabolic encephalopathy  Resolved  CT head: no acute changes  Continue to monitor     Pneumonia  MRSA neg, procal neg, respiratory panel neg  Completed Levofloxacin antibiotic course on 9/1    COPD exacerbation  Continue with steroids  Taper prednisone   Completed Levofloxacin antibiotic course on 9/1    Hypertension  Started Losartan on 8/28 and amlodipine on 8/31  Can make adjustment as needed  Resumed home diltiazem on 9/1    Hypothyroid  Continue home levothyroxine, increased dose         VTE Risk Mitigation (From admission, onward)           Ordered     enoxaparin injection 40 mg  Every 12 hours        Note to Pharmacy: Ht: 5' 5" (1.651 m)  Wt: 127 kg (280 lb)  Estimated Creatinine Clearance: 92.8 mL/min (based on SCr of 0.8 mg/dL).  Body mass index is 46.59 kg/m².    08/25/24 0627     IP VTE HIGH RISK PATIENT  Once         08/25/24 0627     Place sequential compression device  Until discontinued         08/25/24 0627                    Discharge Planning   CHELE: 9/9/2024     Code Status: Full Code   Is the patient medically ready for discharge?:     Reason for patient still in hospital (select all that apply): Treatment  Discharge Plan A: Skilled Nursing Facility "   Discharge Delays: None known at this time              Ashleigh Maldonado MD  Department of Hospital Medicine   [facility]

## 2024-09-07 NOTE — PROGRESS NOTES
Patient seen and examined.  Minimal output with brown bottom enema last night.  Notes continued abdominal distention and pressure.  No other acute findings.  Abdomen is distended and obese.  Some tenderness noted particularly in the right side.  No acute findings    Wt Readings from Last 3 Encounters:   09/05/24 119.7 kg (263 lb 14.3 oz)   08/30/24 122.9 kg (271 lb)   06/20/24 95.6 kg (210 lb 12.2 oz)     Temp Readings from Last 3 Encounters:   09/07/24 97.6 °F (36.4 °C) (Axillary)   06/20/24 98 °F (36.7 °C) (Oral)   06/30/22 97.7 °F (36.5 °C)     BP Readings from Last 3 Encounters:   09/07/24 (!) 131/56   06/20/24 139/63   06/30/22 117/73     Pulse Readings from Last 3 Encounters:   09/07/24 68   06/20/24 64   06/30/22 73     AAOx3  Soft/dist/ tenderness present.  No peritoneal findings    Lab Results   Component Value Date    WBC 9.10 09/05/2024    HGB 9.2 (L) 09/05/2024    HCT 30.5 (L) 09/05/2024     (H) 09/05/2024     09/05/2024       BMP  Lab Results   Component Value Date     (L) 09/06/2024    K 3.9 09/06/2024    CL 96 09/06/2024    CO2 35 (H) 09/06/2024    BUN 13 09/06/2024    CREATININE 0.5 09/06/2024    CALCIUM 9.3 09/06/2024    ANIONGAP 4 (L) 09/06/2024    EGFRNORACEVR >60.0 09/06/2024     A?P:  Colonic ileus     Continue with MiraLax and enemas.  Would repeat CT scan tomorrow morning

## 2024-09-07 NOTE — CARE UPDATE
09/07/24 0145   Patient Assessment/Suction   Level of Consciousness (AVPU) responds to voice   Respiratory Effort Normal;Unlabored   Expansion/Accessory Muscles/Retractions no use of accessory muscles   All Lung Fields Breath Sounds diminished;clear   Rhythm/Pattern, Respiratory no shortness of breath reported   Cough Frequency no cough   Skin Integrity   $ Wound Care Tech Time 15 min   Area Observed Left;Right;Cheek   Skin Appearance without discoloration   PRE-TX-O2   Device (Oxygen Therapy) BIPAP   $ Is the patient on High Flow Oxygen? Yes   Oxygen Concentration (%) 32   SpO2 95 %   Pulse Oximetry Type Continuous   $ Pulse Oximetry - Multiple Charge Pulse Oximetry - Multiple   Pulse 71   Resp 20   Positioning Supine;HOB elevated 30 degrees   Positioning   Positioning/Transfer Devices pillows;in use   Aerosol Therapy   $ Aerosol Therapy Charges Aerosol Treatment   Daily Review of Necessity (SVN) completed   Respiratory Treatment Status (SVN) given   Treatment Route (SVN) PAP device;in-line   Patient Position HOB elevated   Post Treatment Assessment (SVN) increased aeration   Signs of Intolerance (SVN) none   Ready to Wean/Extubation Screen   FIO2<=50 (chart decimal) 0.32   Preset CPAP/BiPAP Settings   Mode Of Delivery BiPAP   $ Initial CPAP/BiPAP Setup? No   $ Is patient using? Yes   Size of Mask Medium/Large   Sized Appropriately? Yes   Equipment Type V60   Airway Device Type medium full face mask   Humidifier not applicable   Ipap 12   EPAP (cm H2O) 6   Pressure Support (cm H2O) 6   Set Rate (Breaths/Min) 16   ITime (sec) 1   Rise Time (sec) 3   Patient CPAP/BiPAP Settings   CPAP/BIPAP ID 15   Timed Inspiration (Sec) 1   IPAP Rise Time (sec) 3   RR Total (Breaths/Min) 19   Tidal Volume (mL) 398   VE Minute Ventilation (L/min) 6.3 L/min   Peak Inspiratory Pressure (cm H2O) 12   TiTOT (%) 28   Total Leak (L/Min) 0   Patient Trigger - ST Mode Only (%) 43   CPAP/BiPAP Backup Settings   Backup Rate 16 breaths per  minute (bpm)   CPAP/BiPAP Alarms   High Pressure (cm H2O) 40   Low Pressure (cm H2O) 5   Minute Ventilation (L/Min) 3   High RR (breaths/min) 40   Low RR (breaths/min) 8   Apnea (Sec) 20

## 2024-09-07 NOTE — ASSESSMENT & PLAN NOTE
KUB with Gas in stool distended loops of large bowel measuring up to 6.5 cm.  Findings could reflect ileus or obstruction.  CT scan done demonstrated significant colonic distention consistent with ileus with a large amount of stool in ascending colon, colon measuring up to 11 cm. General surgery evaluated the patient, NG tube was placed, patient continued on MiraLax and enemas  Avoid scheduled opiates   Ordered Tylenol and ketorolac for pain  IV fluids given poor oral intake  This morning was passing flatus, had 2 small bowel movements with enemas but KUB showed similar changes   Plan to repeat CT scan tomorrow morning

## 2024-09-08 LAB
ANION GAP SERPL CALC-SCNC: 6 MMOL/L (ref 8–16)
BUN SERPL-MCNC: 12 MG/DL (ref 8–23)
CALCIUM SERPL-MCNC: 8.6 MG/DL (ref 8.7–10.5)
CHLORIDE SERPL-SCNC: 97 MMOL/L (ref 95–110)
CO2 SERPL-SCNC: 34 MMOL/L (ref 23–29)
CREAT SERPL-MCNC: 0.6 MG/DL (ref 0.5–1.4)
ERYTHROCYTE [DISTWIDTH] IN BLOOD BY AUTOMATED COUNT: 17.9 % (ref 11.5–14.5)
EST. GFR  (NO RACE VARIABLE): >60 ML/MIN/1.73 M^2
GLUCOSE SERPL-MCNC: 82 MG/DL (ref 70–110)
GLUCOSE SERPL-MCNC: 90 MG/DL (ref 70–110)
HCT VFR BLD AUTO: 29.1 % (ref 37–48.5)
HGB BLD-MCNC: 8.7 G/DL (ref 12–16)
MCH RBC QN AUTO: 30.7 PG (ref 27–31)
MCHC RBC AUTO-ENTMCNC: 29.9 G/DL (ref 32–36)
MCV RBC AUTO: 103 FL (ref 82–98)
PLATELET # BLD AUTO: 125 K/UL (ref 150–450)
PMV BLD AUTO: 11 FL (ref 9.2–12.9)
POCT GLUCOSE: 187 MG/DL (ref 70–110)
POCT GLUCOSE: 226 MG/DL (ref 70–110)
POCT GLUCOSE: 52 MG/DL (ref 70–110)
POCT GLUCOSE: 56 MG/DL (ref 70–110)
POCT GLUCOSE: 57 MG/DL (ref 70–110)
POCT GLUCOSE: 74 MG/DL (ref 70–110)
POCT GLUCOSE: 80 MG/DL (ref 70–110)
POCT GLUCOSE: 89 MG/DL (ref 70–110)
POTASSIUM SERPL-SCNC: 4.5 MMOL/L (ref 3.5–5.1)
RBC # BLD AUTO: 2.83 M/UL (ref 4–5.4)
SODIUM SERPL-SCNC: 137 MMOL/L (ref 136–145)
WBC # BLD AUTO: 7.37 K/UL (ref 3.9–12.7)

## 2024-09-08 PROCEDURE — 85027 COMPLETE CBC AUTOMATED: CPT | Performed by: INTERNAL MEDICINE

## 2024-09-08 PROCEDURE — 25000003 PHARM REV CODE 250: Performed by: FAMILY MEDICINE

## 2024-09-08 PROCEDURE — 21400001 HC TELEMETRY ROOM

## 2024-09-08 PROCEDURE — 27100171 HC OXYGEN HIGH FLOW UP TO 24 HOURS

## 2024-09-08 PROCEDURE — 63600175 PHARM REV CODE 636 W HCPCS

## 2024-09-08 PROCEDURE — 36415 COLL VENOUS BLD VENIPUNCTURE: CPT

## 2024-09-08 PROCEDURE — 25000003 PHARM REV CODE 250: Performed by: SURGERY

## 2024-09-08 PROCEDURE — 94799 UNLISTED PULMONARY SVC/PX: CPT

## 2024-09-08 PROCEDURE — 99900031 HC PATIENT EDUCATION (STAT)

## 2024-09-08 PROCEDURE — 99232 SBSQ HOSP IP/OBS MODERATE 35: CPT | Mod: ,,, | Performed by: INTERNAL MEDICINE

## 2024-09-08 PROCEDURE — 63600175 PHARM REV CODE 636 W HCPCS: Performed by: INTERNAL MEDICINE

## 2024-09-08 PROCEDURE — 82947 ASSAY GLUCOSE BLOOD QUANT: CPT

## 2024-09-08 PROCEDURE — 94640 AIRWAY INHALATION TREATMENT: CPT

## 2024-09-08 PROCEDURE — 25000003 PHARM REV CODE 250: Performed by: HOSPITALIST

## 2024-09-08 PROCEDURE — 99900035 HC TECH TIME PER 15 MIN (STAT)

## 2024-09-08 PROCEDURE — 94660 CPAP INITIATION&MGMT: CPT

## 2024-09-08 PROCEDURE — 99232 SBSQ HOSP IP/OBS MODERATE 35: CPT | Mod: ,,, | Performed by: SURGERY

## 2024-09-08 PROCEDURE — 63600175 PHARM REV CODE 636 W HCPCS: Performed by: HOSPITALIST

## 2024-09-08 PROCEDURE — 25000003 PHARM REV CODE 250: Performed by: INTERNAL MEDICINE

## 2024-09-08 PROCEDURE — 25000242 PHARM REV CODE 250 ALT 637 W/ HCPCS: Performed by: HOSPITALIST

## 2024-09-08 PROCEDURE — 80048 BASIC METABOLIC PNL TOTAL CA: CPT | Performed by: INTERNAL MEDICINE

## 2024-09-08 PROCEDURE — 94761 N-INVAS EAR/PLS OXIMETRY MLT: CPT

## 2024-09-08 PROCEDURE — 25000003 PHARM REV CODE 250

## 2024-09-08 RX ORDER — DEXTROSE, SODIUM CHLORIDE, SODIUM LACTATE, POTASSIUM CHLORIDE, AND CALCIUM CHLORIDE 5; .6; .31; .03; .02 G/100ML; G/100ML; G/100ML; G/100ML; G/100ML
INJECTION, SOLUTION INTRAVENOUS CONTINUOUS
Status: DISCONTINUED | OUTPATIENT
Start: 2024-09-08 | End: 2024-09-10

## 2024-09-08 RX ORDER — LACTULOSE 10 G/15ML
20 SOLUTION ORAL 3 TIMES DAILY
Status: DISCONTINUED | OUTPATIENT
Start: 2024-09-08 | End: 2024-09-15

## 2024-09-08 RX ORDER — INSULIN GLARGINE 100 [IU]/ML
30 INJECTION, SOLUTION SUBCUTANEOUS 2 TIMES DAILY
Status: DISCONTINUED | OUTPATIENT
Start: 2024-09-08 | End: 2024-09-15

## 2024-09-08 RX ADMIN — LACTULOSE 20 G: 20 SOLUTION ORAL at 10:09

## 2024-09-08 RX ADMIN — PREDNISONE 20 MG: 20 TABLET ORAL at 08:09

## 2024-09-08 RX ADMIN — FAMOTIDINE 20 MG: 10 INJECTION INTRAVENOUS at 09:09

## 2024-09-08 RX ADMIN — SENNOSIDES AND DOCUSATE SODIUM 1 TABLET: 8.6; 5 TABLET ORAL at 10:09

## 2024-09-08 RX ADMIN — LACTULOSE 20 G: 20 SOLUTION ORAL at 02:09

## 2024-09-08 RX ADMIN — ACETAMINOPHEN 650 MG: 325 TABLET ORAL at 10:09

## 2024-09-08 RX ADMIN — DEXTROSE MONOHYDRATE 12.5 G: 25 INJECTION, SOLUTION INTRAVENOUS at 04:09

## 2024-09-08 RX ADMIN — FAMOTIDINE 20 MG: 10 INJECTION INTRAVENOUS at 10:09

## 2024-09-08 RX ADMIN — IPRATROPIUM BROMIDE AND ALBUTEROL SULFATE 3 ML: 2.5; .5 SOLUTION RESPIRATORY (INHALATION) at 12:09

## 2024-09-08 RX ADMIN — LEVOTHYROXINE SODIUM 75 MCG: 0.03 TABLET ORAL at 06:09

## 2024-09-08 RX ADMIN — LOSARTAN POTASSIUM 100 MG: 50 TABLET, FILM COATED ORAL at 08:09

## 2024-09-08 RX ADMIN — INSULIN GLARGINE 30 UNITS: 100 INJECTION, SOLUTION SUBCUTANEOUS at 10:09

## 2024-09-08 RX ADMIN — DILTIAZEM HYDROCHLORIDE 240 MG: 120 CAPSULE, COATED, EXTENDED RELEASE ORAL at 08:09

## 2024-09-08 RX ADMIN — ENOXAPARIN SODIUM 40 MG: 40 INJECTION SUBCUTANEOUS at 10:09

## 2024-09-08 RX ADMIN — CYANOCOBALAMIN TAB 1000 MCG 1000 MCG: 1000 TAB at 08:09

## 2024-09-08 RX ADMIN — SODIUM CHLORIDE, POTASSIUM CHLORIDE, SODIUM LACTATE AND CALCIUM CHLORIDE: 600; 310; 30; 20 INJECTION, SOLUTION INTRAVENOUS at 08:09

## 2024-09-08 RX ADMIN — SENNOSIDES AND DOCUSATE SODIUM 1 TABLET: 8.6; 5 TABLET ORAL at 08:09

## 2024-09-08 RX ADMIN — IPRATROPIUM BROMIDE AND ALBUTEROL SULFATE 3 ML: 2.5; .5 SOLUTION RESPIRATORY (INHALATION) at 08:09

## 2024-09-08 RX ADMIN — IPRATROPIUM BROMIDE AND ALBUTEROL SULFATE 3 ML: 2.5; .5 SOLUTION RESPIRATORY (INHALATION) at 01:09

## 2024-09-08 RX ADMIN — POLYETHYLENE GLYCOL 3350 17 G: 17 POWDER, FOR SOLUTION ORAL at 08:09

## 2024-09-08 RX ADMIN — GABAPENTIN 300 MG: 300 CAPSULE ORAL at 10:09

## 2024-09-08 RX ADMIN — LIDOCAINE 1 PATCH: 50 PATCH CUTANEOUS at 12:09

## 2024-09-08 RX ADMIN — DEXTROSE MONOHYDRATE 12.5 G: 25 INJECTION, SOLUTION INTRAVENOUS at 08:09

## 2024-09-08 RX ADMIN — KETOROLAC TROMETHAMINE 15 MG: 30 INJECTION, SOLUTION INTRAMUSCULAR; INTRAVENOUS at 11:09

## 2024-09-08 RX ADMIN — HYDROCODONE BITARTRATE AND ACETAMINOPHEN 1 TABLET: 5; 325 TABLET ORAL at 12:09

## 2024-09-08 RX ADMIN — GABAPENTIN 300 MG: 300 CAPSULE ORAL at 08:09

## 2024-09-08 RX ADMIN — KETOROLAC TROMETHAMINE 15 MG: 30 INJECTION, SOLUTION INTRAMUSCULAR; INTRAVENOUS at 04:09

## 2024-09-08 RX ADMIN — IPRATROPIUM BROMIDE AND ALBUTEROL SULFATE 3 ML: 2.5; .5 SOLUTION RESPIRATORY (INHALATION) at 07:09

## 2024-09-08 RX ADMIN — MAGESIUM CITRATE: 1.75 LIQUID ORAL at 12:09

## 2024-09-08 RX ADMIN — KETOROLAC TROMETHAMINE 15 MG: 30 INJECTION, SOLUTION INTRAMUSCULAR; INTRAVENOUS at 03:09

## 2024-09-08 RX ADMIN — SODIUM CHLORIDE, SODIUM LACTATE, POTASSIUM CHLORIDE, CALCIUM CHLORIDE AND DEXTROSE MONOHYDRATE: 5; 600; 310; 30; 20 INJECTION, SOLUTION INTRAVENOUS at 10:09

## 2024-09-08 RX ADMIN — ENOXAPARIN SODIUM 40 MG: 40 INJECTION SUBCUTANEOUS at 08:09

## 2024-09-08 RX ADMIN — SODIUM CHLORIDE, SODIUM LACTATE, POTASSIUM CHLORIDE, CALCIUM CHLORIDE AND DEXTROSE MONOHYDRATE: 5; 600; 310; 30; 20 INJECTION, SOLUTION INTRAVENOUS at 08:09

## 2024-09-08 RX ADMIN — KETOROLAC TROMETHAMINE 15 MG: 30 INJECTION, SOLUTION INTRAMUSCULAR; INTRAVENOUS at 09:09

## 2024-09-08 RX ADMIN — GABAPENTIN 300 MG: 300 CAPSULE ORAL at 12:09

## 2024-09-08 NOTE — NURSING
Patient blood sugar 56 for 0739am checks. Patient awake alert and oriented . Patine thas no acute distress noted. Patient given 12.5 of dextrose.     Patient blood sugar rechecked at 0839. Blood sugar 89. MD notifed. Patient started on LR with d5 @75. Plan of care ongoing.

## 2024-09-08 NOTE — PLAN OF CARE
SNF vs  LTAC pending CT ordered by general sx consult for possible bowel obstruction.     09/08/24 1016   Discharge Reassessment   Assessment Type Discharge Planning Reassessment   Did the patient's condition or plan change since previous assessment? Yes   Discharge Plan discussed with: Patient   Discharge Plan A Skilled Nursing Facility   Discharge Plan B Long-term acute care facility (LTAC)   DME Needed Upon Discharge  none   Transition of Care Barriers None   Why the patient remains in the hospital Requires continued medical care

## 2024-09-08 NOTE — PROGRESS NOTES
[facility] Medicine  Progress Note    Patient Name: Karo Leonard  MRN: [id number]  Patient Class: IP- Inpatient   Admission Date: 8/25/2024  Length of Stay: 14 days  Attending Physician: Ashleigh Maldonado, *  Primary Care Provider: Navneet Hernandez FNP        Subjective:     Principal Problem:Acute hypoxic on chronic hypercapnic respiratory failure        HPI:  64F p/w fall in the context of shortness of breath. EMS reportedly gave narcan w/o appreciable response, then she was given duoneb en route to [facility] ED. Upon arrival, she was tachypneic, had respy sounding phonation, but she denied chest pain, fever, or chills. She was placed on BiPAP, and initially demonstrated improvement. However, she became less responsive and appeared to tire out. She was given additional narcan w/o appreciable response, so she was intubated. She had some hypotension after receiving sedation, so she was placed on low dose levophed. [place] was placed, lasix was given, as was Abx. Large bruise noted on LLE, so x-ray ordered.     Overview/Hospital Course:  64 F Patient with hx of COPD, morbid obesity was admitted for acute on chronic hypoxemic hypercapnic respiratory failure with sever fall with hit to the face . Patient was intubated after failing  BiPAP. Finished course of IV Levofloxacin for COPD exacerbation and possible pneumonia. Pulm has been managing her steroid dose. Patient was started on iron and B12 supplement for anemia. S/P exubation on 8/31. For her hypertension, patient was started on losartan and her home diltiazem was resumed.  Patient working with PT and OT.  Fall precautions have been addressed with the patient.  Patient moving to medicine telemetry.  Supplemental oxygen weaning is in process.  Patient encouraged to continue BiPAP use at night or as needed as before.  Patient oxygen requirement progressively improving.  Patient is physically deconditioned and working with PT and OT.   Awaiting skilled nursing facility placement.  Steroid dose is being tapered down.  Patient complained of bloated and also passing a lot of gas and Gas x was given but later complained of mild abdominal pain and KUB was done and found to have Gas in stool distended loops of large bowel measuring up to 6.5 cm.  Findings could reflect ileus or obstruction.  CT scan done demonstrated significant colonic distention consistent with ileus with a large amount of stool in ascending colon, colon measuring up to 11 cm.  General surgery evaluated the patient, NG tube was placed, patient continued on MiraLax and enemas, CT scan was repeated on 09/08/2024 which still shows significant constipation.  Repeating enema today and surgery added lactulose.    Interval History:  Had episode of hypoglycemia this morning, started on D5 LR.  Blood sugars improved.  Repeat CT still shows significant constipation.  General surgery recommended a repeat enema and lactulose.  Started on clear liquid diet as per General surgery.    Review of Systems   Constitutional:         Positive for headache   Respiratory:  Positive for shortness of breath.    Gastrointestinal:  Positive for abdominal distention, abdominal pain and constipation.   Musculoskeletal:  Positive for back pain.     Objective:     Vital Signs (Most Recent):  Temp: 97.7 °F (36.5 °C) (09/08/24 1503)  Pulse: 82 (09/08/24 1503)  Resp: 19 (09/08/24 1503)  BP: 116/63 (09/08/24 1503)  SpO2: (!) 90 % (09/08/24 1503) Vital Signs (24h Range):  Temp:  [97 °F (36.1 °C)-98.1 °F (36.7 °C)] 97.7 °F (36.5 °C)  Pulse:  [64-82] 82  Resp:  [17-21] 19  SpO2:  [90 %-99 %] 90 %  BP: (101-186)/(50-67) 116/63     Weight: 119.7 kg (263 lb 14.3 oz)  Body mass index is 43.91 kg/m².    Intake/Output Summary (Last 24 hours) at 9/8/2024 1706  Last data filed at 9/8/2024 0857  Gross per 24 hour   Intake 60 ml   Output 690 ml   Net -630 ml         Physical Exam  HENT:      Head:      Comments: Ecchymosis around  bilateral eyes     Mouth/Throat:      Mouth: Mucous membranes are dry.   Eyes:      Pupils: Pupils are equal, round, and reactive to light.   Cardiovascular:      Rate and Rhythm: Normal rate.   Pulmonary:      Effort: Pulmonary effort is normal. No respiratory distress.      Breath sounds: Normal breath sounds. No stridor. No wheezing.   Abdominal:      General: There is distension.      Palpations: There is no mass.      Tenderness: There is abdominal tenderness. There is no guarding or rebound.   Musculoskeletal:      Right lower leg: Edema present.      Left lower leg: Edema present.   Neurological:      Mental Status: She is alert and oriented to person, place, and time.             Significant Labs: All pertinent labs within the past 24 hours have been reviewed.  CBC:   Recent Labs   Lab 09/08/24 0459   WBC 7.37   HGB 8.7*   HCT 29.1*   *     CMP:   Recent Labs   Lab 09/08/24 0458 09/08/24  0839     --    K 4.5  --    CL 97  --    CO2 34*  --    GLU 90 82   BUN 12  --    CREATININE 0.6  --    CALCIUM 8.6*  --    ANIONGAP 6*  --        Significant Imaging: I have reviewed all pertinent imaging results/findings within the past 24 hours.    Assessment/Plan:      * Acute hypoxic on chronic hypercapnic respiratory failure  Appear secondary to COPD exacerbation and failed BiPAP   Improving  Likely multifactorial  S/P extubation on 8/31  Oxygen supplement as need, patient already on home oxygen 2 to 3 L  EF 60-65%  Pulmonology is following patient  Having problem with placement because she need BiPAP at night along with oxygen 7 L and facility can not accept with the level of BiPAP  Trying to send to  LTAC  Encouraged incentive spirometry given her continuous lying in bed and positioning, she demonstrates understanding and is motivated    Ileus  KUB with Gas in stool distended loops of large bowel measuring up to 6.5 cm.  Findings could reflect ileus or obstruction.  CT scan done demonstrated  "significant colonic distention consistent with ileus with a large amount of stool in ascending colon, colon measuring up to 11 cm. General surgery evaluated the patient, NG tube was placed, patient continued on MiraLax and enemas  Avoid scheduled opiates   Ordered Tylenol and ketorolac for pain  IV fluids given poor oral intake  Repeat CT showed significant constipation   General surgery recommended lactulose and repeat enema today   Starting clear liquid diet today       Type 2 diabetes mellitus  Continue with SSI   We will reduce glargine given poor oral intake, episode of hypoglycemia this morning    Make adjustment as needed    Anemia  Likely from Iron and b12 deficiency  Started on Iron and B12 supplement on 8/27    Fall  Imaging ruled out acute fracture but severe swelling noted.  No visual disturbance  US confirmed hematoma on left lower extremity which is getting smaller  Fall precautions discussed with the patient.    PT OT and SNF placement /LTAC placement    Acute metabolic encephalopathy  Resolved  CT head: no acute changes  Continue to monitor     Pneumonia  MRSA neg, procal neg, respiratory panel neg  Completed Levofloxacin antibiotic course on 9/1    COPD exacerbation  Continue with steroids  Taper prednisone   Completed Levofloxacin antibiotic course on 9/1    Hypertension  Started Losartan on 8/28 and amlodipine on 8/31  Can make adjustment as needed  Resumed home diltiazem on 9/1    Hypothyroid  Continue home levothyroxine, increased dose         VTE Risk Mitigation (From admission, onward)           Ordered     enoxaparin injection 40 mg  Every 12 hours        Note to Pharmacy: Ht: 5' 5" (1.651 m)  Wt: 127 kg (280 lb)  Estimated Creatinine Clearance: 92.8 mL/min (based on SCr of 0.8 mg/dL).  Body mass index is 46.59 kg/m².    08/25/24 0627     IP VTE HIGH RISK PATIENT  Once         08/25/24 0627     Place sequential compression device  Until discontinued         08/25/24 0627              "       Discharge Planning   CHELE: 9/9/2024     Code Status: Full Code   Is the patient medically ready for discharge?:     Reason for patient still in hospital (select all that apply): Treatment  Discharge Plan A: Skilled Nursing Facility   Discharge Delays: None known at this time              Ashleigh Maldonado MD  Department of Hospital Medicine   [facility]

## 2024-09-08 NOTE — ASSESSMENT & PLAN NOTE
KUB with Gas in stool distended loops of large bowel measuring up to 6.5 cm.  Findings could reflect ileus or obstruction.  CT scan done demonstrated significant colonic distention consistent with ileus with a large amount of stool in ascending colon, colon measuring up to 11 cm. General surgery evaluated the patient, NG tube was placed, patient continued on MiraLax and enemas  Avoid scheduled opiates   Ordered Tylenol and ketorolac for pain  IV fluids given poor oral intake  Repeat CT showed significant constipation   General surgery recommended lactulose and repeat enema today   Starting clear liquid diet today

## 2024-09-08 NOTE — NURSING
Nurses Note -- 4 Eyes      9/8/2024   3:14 PM      Skin assessed during: Daily Assessment      [] No Altered Skin Integrity Present    []Prevention Measures Documented      [x] Yes- Altered Skin Integrity Present or Discovered   [] LDA Added if Not in Epic (Describe Wound)   [] New Altered Skin Integrity was Present on Admit and Documented in LDA   [] Wound Image Taken    Wound Care Consulted? Yes    Attending Nurse:  Tiffani Forte RN/Staff Member:  Rabia

## 2024-09-08 NOTE — CARE UPDATE
09/08/24 0726   Patient Assessment/Suction   Level of Consciousness (AVPU) alert   Respiratory Effort Normal;Unlabored   Expansion/Accessory Muscles/Retractions no use of accessory muscles;no retractions;expansion symmetric   All Lung Fields Breath Sounds diminished   Rhythm/Pattern, Respiratory unlabored;pattern regular;depth regular;chest wiggle adequate;no shortness of breath reported   Cough Frequency no cough   Skin Integrity   $ Wound Care Tech Time 15 min   Area Observed Bridge of nose   Skin Appearance without discoloration   Barrier used? Transparent Film   PRE-TX-O2   Device (Oxygen Therapy) Oxymask   $ Is the patient on Low Flow Oxygen? Yes   $ Is the patient on High Flow Oxygen? Yes   Flow (L/min) (Oxygen Therapy) 3   SpO2 97 %   Pulse Oximetry Type Intermittent   $ Pulse Oximetry - Multiple Charge Pulse Oximetry - Multiple   Pulse 79   Resp 19   Aerosol Therapy   $ Aerosol Therapy Charges Aerosol Treatment   Daily Review of Necessity (SVN) completed   Respiratory Treatment Status (SVN) given   Treatment Route (SVN) oxygen;mask   Patient Position HOB elevated   Post Treatment Assessment (SVN) increased aeration   Signs of Intolerance (SVN) none   Breath Sounds Post-Respiratory Treatment   Throughout All Fields Post-Treatment All Fields   Throughout All Fields Post-Treatment aeration increased   Post-treatment Heart Rate (beats/min) 80   Post-treatment Resp Rate (breaths/min) 20   Preset CPAP/BiPAP Settings   Mode Of Delivery Standby;BiPAP   $ CPAP/BiPAP Daily Charge BiPAP/CPAP Daily   Equipment Type V60   Education   $ Education Bronchodilator;15 min

## 2024-09-08 NOTE — PROGRESS NOTES
Pulmonary/Critical Care  Progress Note      Patient name: Karo Leonard  MRN: [id number]  Date: 09/08/2024    Admit Date: 8/25/2024  Consult Requested By: Ashleigh Maldonado, *    Reason for Consult: Respiratory failure, COPD    HPI:    8/25/2024 - 65 yo ASCVD, DM, HTN. COPD(cigarette use) had increased SOB at home and a fall.  EMS was called and brought to ER.  Initially tried on BiPAP without response and was subsequently intubated and placed on ventilation.  Initial ABG showed over ventilation and we have adjusted and ABG are getting better.  She is sedated and not able to provide any history.  D/W  who says that she was supposed to see a pulmonologist but couldn't make appointment, she has been a chronic smoker.  She had increase SOB for a few days.  She did have a fall at home but he reports that she was not down for long.  He does report that she has been having recurring falls at home.      8/26/2024 - Stable overnight, O2 needs still too high to do SBT.  She is responsive and gets agitated on current sedation and we are adjusting.  No other new issues reported.  Hgb has decreased (no active bleeding reported), NA remains low, CPK is better, TFTF noted and c/w hypothyroid (synthroid started).    8/27/2024 - Remains critically ill, O2 needs down a little but still too high.  Difficult to sedate is either agitated or apneic.  Tried SBT this AM and was apneic.  VS reviewed.  She is 3.8 liters +.  Tube feeds have been started.  NA remains low, glucose is elevated.  CXR looks about the same    8/28/2024 - Stable overnight, O2 needs still up.  She does get agitated at times and we have adjusted meds.  Trouble with tube feeds and will hold today and restart tomorrow.  Not ready to wean because on increased FiO2 and will try to increase PEP to see if that helps.  She does not have a lot of secretions.  She is over ventilaed some and we adjusted vent.  CXR is about the same    8/29/2024 - Stable  overnight,, O2 needs still up some (I decreased FiO2 and increased PEEP some) and she is overventilated (we adjusted rate).  Will retry tube feeds ([facility] at 20 to see if she tolerates).  She is 4.6 liters +.  CXR is about the same.    8/30- continues on vent, settings adjusted for alkalosis- now on PS 12/5. CXR looks wet and pt with severe edema- fluids discontinued and starting lasix. Propofol has been weaned a little. Pt is awake and denies pain. Her blood pressure was up to 200 systolic at the time of my evaluation- RN states she just gave am blood pressure med and also Lasix.    8/31- pt awake, off sedation, writing and alert. Denies pain, states feeling better. She was significantly more alkalotic this am due to lasix so given a dose of diamox this am. Tolerating PS 10/5 now- will repeat abg soon and consider extubation     9/1- extubated yesterday and tolerated well    9/3/2024 - STable overnight, no new issues reported and is feeling better ovrall.  She is very weak.  O2 decreased to 5 LPM when I saqw her.  No new issues reported.  BP is up some.  Labs reviewed    9/4/2024 - Stable overnight, no new issues reported, she feels better overall.      9/5/2024 - Stable overnight, she complains of issues with vertigo when getting up.  Possible placement at [facility] is being worked on.  No new respiratory complaints.      9/6 the patient has developed very tender and tympanitic abdomen.  She was supposed to be going to an LTAC today.  She is getting ready to receive an enema which will hopefully clear her rectum and decrease the obstruction.    9/7 the patient has had 2 bowel movements.  The 1 she just had was quite large.  However, she continues to complain of abdominal pain.  She is also complaining that are pain meds have been decreased.  Explained how narcotics slow down GI transit and could be adding to her GI problems    9/8 the patient is still complaining of severe abdominal pain and distention.   She has not yet had her CT today.    Review of Systems    Review of Systems   Constitutional:         Diffuse pain   Gastrointestinal:  Positive for abdominal pain and constipation.       Past Medical History    Past Medical History:   Diagnosis Date    Coronary artery disease     cardiac stent    DDD (degenerative disc disease), lumbar 2000    Diabetes mellitus     Hypertension     Thyroid disease        Past Surgical History    Past Surgical History:   Procedure Laterality Date     SECTION  08/1987    x2 1993    CORONARY STENT PLACEMENT      EXPLORATORY LAPAROTOMY      Scientologist     HEMORRHOID SURGERY      LAPAROSCOPIC CHOLECYSTECTOMY N/A 2022    Procedure: CHOLECYSTECTOMY, LAPAROSCOPIC;  Surgeon: Leonardo Wilson III, MD;  Location: [facility];  Service: General;  Laterality: N/A;    LUMBAR DISC SURGERY      PILONIDAL CYST DRAINAGE      TONSILLECTOMY      as a child (also adenoids)       Medications (scheduled):      albuterol-ipratropium  3 mL Nebulization Q6H    cyanocobalamin  1,000 mcg Oral Daily    diltiaZEM  240 mg Oral Daily    enoxparin  40 mg Subcutaneous Q12H    famotidine (PF)  20 mg Intravenous Q12H    gabapentin  300 mg Oral TID    insulin glargine U-100  42 Units Subcutaneous BID    levothyroxine  75 mcg Oral Before breakfast    LIDOcaine  1 patch Transdermal Q24H    losartan  100 mg Oral Daily    predniSONE  20 mg Oral Daily    senna-docusate 8.6-50 mg  1 tablet Oral BID       Medications (infusions):      dextrose 5% lactated ringers   Intravenous Continuous 75 mL/hr at 24 0847 New Bag at 24 0847         Medications (prn):       Current Facility-Administered Medications:     acetaminophen, 650 mg, Oral, Q8H PRN    calcium gluconate IVPB, 1 g, Intravenous, PRN    calcium gluconate IVPB, 2 g, Intravenous, PRN    calcium gluconate IVPB, 3 g, Intravenous, PRN    dextrose 50%, 12.5 g, Intravenous, PRN    glucagon (human  "recombinant), 1 mg, Intramuscular, PRN    hydrALAZINE, 10 mg, Intravenous, Q6H PRN    HYDROcodone-acetaminophen, 1 tablet, Oral, Q8H PRN    insulin aspart U-100, 0-10 Units, Subcutaneous, Q6H PRN    ketorolac, 15 mg, Intravenous, Q6H PRN    magnesium sulfate IVPB, 2 g, Intravenous, PRN    magnesium sulfate IVPB, 4 g, Intravenous, PRN    meclizine, 25 mg, Oral, TID PRN    polyethylene glycol, 17 g, Oral, TID PRN    potassium bicarbonate, 35 mEq, Oral, PRN    potassium bicarbonate, 50 mEq, Oral, PRN    potassium bicarbonate, 60 mEq, Oral, PRN    potassium chloride in water, 40 mEq, Intravenous, PRN **AND** potassium chloride in water, 60 mEq, Intravenous, PRN **AND** potassium chloride in water, 80 mEq, Intravenous, PRN    simethicone, 1 tablet, Oral, TID PRN    sodium chloride 0.9%, 10 mL, Intravenous, PRN    sodium phosphate 15 mmol in D5W 250 mL IVPB, 15 mmol, Intravenous, PRN    sodium phosphate 20.01 mmol in D5W 250 mL IVPB, 20.01 mmol, Intravenous, PRN    sodium phosphate 30 mmol in D5W 250 mL IVPB, 30 mmol, Intravenous, PRN    Family History: No family history on file.    Social History: Tobacco:   Social History     Tobacco Use   Smoking Status Not on file   Smokeless Tobacco Not on file                                EtOH:   Social History     Substance and Sexual Activity   Alcohol Use No                                Drugs:   Social History     Substance and Sexual Activity   Drug Use No       Physical Exam    Vital signs:  Temp:  [97 °F (36.1 °C)-98.1 °F (36.7 °C)]   Pulse:  [64-93]   Resp:  [17-21]   BP: (101-186)/(50-63)   SpO2:  [93 %-99 %]     Intake/Output:   Intake/Output Summary (Last 24 hours) at 9/8/2024 0953  Last data filed at 9/8/2024 0857  Gross per 24 hour   Intake 0 ml   Output 690 ml   Net -690 ml        BMI: Estimated body mass index is 43.91 kg/m² as calculated from the following:    Height as of this encounter: 5' 5" (1.651 m).    Weight as of this encounter: 119.7 kg (263 lb 14.3 " oz).    Physical Exam  Vitals and nursing note reviewed.   Constitutional:       General: She is not in acute distress.     Appearance: She is not ill-appearing, toxic-appearing or diaphoretic.      Comments: Awake, alert   HENT:      Head: Normocephalic.      Comments: Some bruising right forehead and bilat eyes from her fall     Right Ear: External ear normal.      Left Ear: External ear normal.      Nose: Nose normal. No congestion or rhinorrhea.      Mouth/Throat:      Mouth: Mucous membranes are moist.      Pharynx: Oropharynx is clear. No oropharyngeal exudate or posterior oropharyngeal erythema.   Eyes:      General: No scleral icterus.        Right eye: No discharge.         Left eye: No discharge.      Extraocular Movements: Extraocular movements intact.      Conjunctiva/sclera: Conjunctivae normal.      Pupils: Pupils are equal, round, and reactive to light.   Neck:      Vascular: No carotid bruit.   Cardiovascular:      Rate and Rhythm: Normal rate and regular rhythm.      Pulses: Normal pulses.      Heart sounds: No murmur heard.     No friction rub. No gallop.   Pulmonary:      Effort: Pulmonary effort is normal. No respiratory distress.      Breath sounds: No stridor. No wheezing, rhonchi or rales.   Chest:      Chest wall: No tenderness.   Abdominal:      General: There is distension.      Tenderness: There is abdominal tenderness. There is guarding.      Comments: Still very tender   Musculoskeletal:         General: No swelling. Normal range of motion.      Cervical back: Normal range of motion and neck supple. No rigidity or tenderness.      Right lower leg: Edema present.      Left lower leg: Edema present.   Lymphadenopathy:      Cervical: No cervical adenopathy.   Skin:     General: Skin is warm and dry.      Capillary Refill: Capillary refill takes less than 2 seconds.      Coloration: Skin is not jaundiced.      Findings: Bruising (Extensive ecchymoses to the eyes and frontal bone on the right  "with a large hematoma) present.      Comments: + bruise LLE   Neurological:      General: No focal deficit present.      Cranial Nerves: No cranial nerve deficit.      Sensory: No sensory deficit.      Motor: No weakness.   Psychiatric:         Mood and Affect: Mood normal.         Behavior: Behavior normal.         Laboratory    Recent Labs   Lab 09/08/24  0459   WBC 7.37   RBC 2.83*   HGB 8.7*   HCT 29.1*   *   *   MCH 30.7   MCHC 29.9*         Recent Labs   Lab 09/08/24  0458   CALCIUM 8.6*      K 4.5   CO2 34*   CL 97   BUN 12   CREATININE 0.6         Microbiology:       Microbiology Results (last 7 days)       ** No results found for the last 168 hours. **            Radiology    X-Ray KUB  Narrative: EXAMINATION:  XR KUB    CLINICAL HISTORY:  SBO;    TECHNIQUE:  Single AP view of the abdomen in supine projection.    COMPARISON:  09/06/2024    FINDINGS:  Multiple scattered air-filled loops of both large and small bowel are noted throughout the abdominal cavity without disproportionate distension.  Single loop diameter of the colonic lumen measures 8.7 cm in the widest dimension.  Large volume of fecal load burden is established in the ascending colon.  Clearance of stool throughout the remaining segments of the colon noted.  Clips in the right upper quadrant heart manifestation of prior cholecystectomy.  Minimal levo convexity of lumbar spine with chronic degenerative spondylosis changes.  Impression: Prominent fecal load within the ascending colon with no further clearance upon comparison leading to constipation.    Electronically signed by: Aleks Lizarraga MD  Date:    09/07/2024  Time:    10:50        Ventilator Information    Oxygen Concentration (%):  [32] 32  The patient is on 3 L OxyMask           No results for input(s): "PH", "PCO2", "PO2", "HCO3", "POCSATURATED", "BE" in the last 72 hours.        Impression    Active Hospital Problems    Diagnosis  POA    *Acute hypoxic on chronic " hypercapnic respiratory failure [J96.01, J96.12]  Yes    Ileus [K56.7]  Unknown    Type 2 diabetes mellitus [E11.9]  Yes    Anemia [D64.9]  Yes    Acute metabolic encephalopathy [G93.41]  Yes    Fall [W19.XXXA]  Yes    COPD exacerbation [J44.1]  Yes    Pneumonia [J18.9]  Yes    Hypothyroid [E03.9]  Yes    Hypertension [I10]  Yes      Resolved Hospital Problems    Diagnosis Date Resolved POA    Goals of care, counseling/discussion [Z71.89] 09/07/2024 Not Applicable     Patient's respiratory status is stable.  Her abdomen is a problem.  It is distended and tympanitic and her right colon is quite dilated.    Plan    Continue O2 to maintain sats 89-92%  Continue bipap nightly  Respiratory treatments  Wean steroids Q 3 days  Antihypertensives to continue  Replace electrolytes as needed  Synthroid   Watch BS and treat as needed  Increase activity as able  Check labs  Plans for repeat CT abdomen noted

## 2024-09-08 NOTE — SUBJECTIVE & OBJECTIVE
Interval History:  Had episode of hypoglycemia this morning, started on D5 LR.  Blood sugars improved.  Repeat CT still shows significant constipation.  General surgery recommended a repeat enema and lactulose.  Started on clear liquid diet as per General surgery.    Review of Systems   Constitutional:         Positive for headache   Respiratory:  Positive for shortness of breath.    Gastrointestinal:  Positive for abdominal distention, abdominal pain and constipation.   Musculoskeletal:  Positive for back pain.     Objective:     Vital Signs (Most Recent):  Temp: 97.7 °F (36.5 °C) (09/08/24 1503)  Pulse: 82 (09/08/24 1503)  Resp: 19 (09/08/24 1503)  BP: 116/63 (09/08/24 1503)  SpO2: (!) 90 % (09/08/24 1503) Vital Signs (24h Range):  Temp:  [97 °F (36.1 °C)-98.1 °F (36.7 °C)] 97.7 °F (36.5 °C)  Pulse:  [64-82] 82  Resp:  [17-21] 19  SpO2:  [90 %-99 %] 90 %  BP: (101-186)/(50-67) 116/63     Weight: 119.7 kg (263 lb 14.3 oz)  Body mass index is 43.91 kg/m².    Intake/Output Summary (Last 24 hours) at 9/8/2024 1706  Last data filed at 9/8/2024 0857  Gross per 24 hour   Intake 60 ml   Output 690 ml   Net -630 ml         Physical Exam  HENT:      Head:      Comments: Ecchymosis around bilateral eyes     Mouth/Throat:      Mouth: Mucous membranes are dry.   Eyes:      Pupils: Pupils are equal, round, and reactive to light.   Cardiovascular:      Rate and Rhythm: Normal rate.   Pulmonary:      Effort: Pulmonary effort is normal. No respiratory distress.      Breath sounds: Normal breath sounds. No stridor. No wheezing.   Abdominal:      General: There is distension.      Palpations: There is no mass.      Tenderness: There is abdominal tenderness. There is no guarding or rebound.   Musculoskeletal:      Right lower leg: Edema present.      Left lower leg: Edema present.   Neurological:      Mental Status: She is alert and oriented to person, place, and time.             Significant Labs: All pertinent labs within the past  24 hours have been reviewed.  CBC:   Recent Labs   Lab 09/08/24 0459   WBC 7.37   HGB 8.7*   HCT 29.1*   *     CMP:   Recent Labs   Lab 09/08/24 0458 09/08/24  0839     --    K 4.5  --    CL 97  --    CO2 34*  --    GLU 90 82   BUN 12  --    CREATININE 0.6  --    CALCIUM 8.6*  --    ANIONGAP 6*  --        Significant Imaging: I have reviewed all pertinent imaging results/findings within the past 24 hours.

## 2024-09-08 NOTE — CARE UPDATE
09/08/24 0036   Patient Assessment/Suction   Level of Consciousness (AVPU) responds to voice   Respiratory Effort Normal;Unlabored   Expansion/Accessory Muscles/Retractions no use of accessory muscles   All Lung Fields Breath Sounds diminished;clear   Rhythm/Pattern, Respiratory pattern regular;unlabored   Cough Frequency no cough   Skin Integrity   $ Wound Care Tech Time 15 min   Area Observed Left;Right;Cheek;Lower lip   Skin Appearance without discoloration   PRE-TX-O2   Device (Oxygen Therapy) BIPAP   $ Is the patient on High Flow Oxygen? Yes   Oxygen Concentration (%) 32   SpO2 95 %   Pulse Oximetry Type Continuous   $ Pulse Oximetry - Multiple Charge Pulse Oximetry - Multiple   Pulse 75   Resp 20   Positioning Supine;HOB elevated 30 degrees   Aerosol Therapy   $ Aerosol Therapy Charges Aerosol Treatment   Daily Review of Necessity (SVN) completed   Respiratory Treatment Status (SVN) given   Treatment Route (SVN) PAP device;oxygen   Patient Position HOB elevated   Post Treatment Assessment (SVN) increased aeration   Signs of Intolerance (SVN) none   Incentive Spirometer   $ Incentive Spirometer Charges done with encouragement;proper technique demonstrated   Incentive Spirometer Predicted Level (mL) 1500   Administration (IS) proper technique demonstrated   Number of Repetitions (IS) 5   Level Incentive Spirometer (mL) 1000   Patient Tolerance (IS) good;no adverse signs/symptoms present   Ready to Wean/Extubation Screen   FIO2<=50 (chart decimal) 0.32   Preset CPAP/BiPAP Settings   Mode Of Delivery BiPAP   $ Initial CPAP/BiPAP Setup? No   $ Is patient using? Yes   Size of Mask Medium/Large   Sized Appropriately? Yes   Equipment Type V60   Airway Device Type medium full face mask   Humidifier not applicable   Ipap 12   EPAP (cm H2O) 6   Pressure Support (cm H2O) 6   Set Rate (Breaths/Min) 16   ITime (sec) 1   Rise Time (sec) 3   Patient CPAP/BiPAP Settings   CPAP/BIPAP ID 15   Timed Inspiration (Sec) 1   IPAP  Rise Time (sec) 3   RR Total (Breaths/Min) 17   Tidal Volume (mL) 360   VE Minute Ventilation (L/min) 6 L/min   Peak Inspiratory Pressure (cm H2O) 11   TiTOT (%) 23   Total Leak (L/Min) 0   Patient Trigger - ST Mode Only (%) 50   CPAP/BiPAP Backup Settings   Backup Rate 16 breaths per minute (bpm)   CPAP/BiPAP Alarms   High Pressure (cm H2O) 40   Low Pressure (cm H2O) 5   Minute Ventilation (L/Min) 3   High RR (breaths/min) 40   Low RR (breaths/min) 8   Apnea (Sec) 20   Education   $ Education BiPAP;15 min

## 2024-09-08 NOTE — CARE UPDATE
09/07/24 2020   Patient Assessment/Suction   Level of Consciousness (AVPU) alert   Respiratory Effort Normal;Unlabored   Expansion/Accessory Muscles/Retractions no use of accessory muscles   All Lung Fields Breath Sounds clear;diminished;equal bilaterally   Rhythm/Pattern, Respiratory unlabored;pattern regular   Cough Frequency no cough   PRE-TX-O2   Device (Oxygen Therapy) Oxymask   $ Is the patient on Low Flow Oxygen? Yes   Flow (L/min) (Oxygen Therapy) 3   SpO2 96 %   Pulse Oximetry Type Continuous   $ Pulse Oximetry - Multiple Charge Pulse Oximetry - Multiple   Pulse 79   Resp 19   Positioning Supine;HOB elevated 30 degrees   Positioning   Head of Bed (HOB) Positioning HOB elevated   Positioning/Transfer Devices pillows;in use   Aerosol Therapy   $ Aerosol Therapy Charges Aerosol Treatment   Daily Review of Necessity (SVN) completed   Respiratory Treatment Status (SVN) given   Treatment Route (SVN) mask;oxygen   Patient Position HOB elevated   Post Treatment Assessment (SVN) increased aeration   Signs of Intolerance (SVN) none   Education   $ Education Bronchodilator;Oxygen;30 min

## 2024-09-08 NOTE — PROGRESS NOTES
Pt seen and examined.  NO acuted events  No significant bowel activity  Cont with abd pain and discomfort  CT scan this am with continued distention /ileus of the colon.  Significant stool in the colon particularly the right side    Wt Readings from Last 3 Encounters:   09/05/24 119.7 kg (263 lb 14.3 oz)   08/30/24 122.9 kg (271 lb)   06/20/24 95.6 kg (210 lb 12.2 oz)     Temp Readings from Last 3 Encounters:   09/08/24 97.6 °F (36.4 °C) (Axillary)   06/20/24 98 °F (36.7 °C) (Oral)   06/30/22 97.7 °F (36.5 °C)     BP Readings from Last 3 Encounters:   09/08/24 (!) 135/55   06/20/24 139/63   06/30/22 117/73     Pulse Readings from Last 3 Encounters:   09/08/24 79   06/20/24 64   06/30/22 73     AAOx3  Soft/dist/min tenderness    Lab Results   Component Value Date    WBC 7.37 09/08/2024    HGB 8.7 (L) 09/08/2024    HCT 29.1 (L) 09/08/2024     (H) 09/08/2024     (L) 09/08/2024       BMP  Lab Results   Component Value Date     09/08/2024    K 4.5 09/08/2024    CL 97 09/08/2024    CO2 34 (H) 09/08/2024    BUN 12 09/08/2024    CREATININE 0.6 09/08/2024    CALCIUM 8.6 (L) 09/08/2024    ANIONGAP 6 (L) 09/08/2024    EGFRNORACEVR >60.0 09/08/2024     A/P: colonic ileuc  Cont enemas.    Lactulose started  Ok to have clears given no n/v  D?w pt and family.  NO surgical plans at present.  Should she develop profound distention, necrosis, or hemodynamic instability would have to consider ex lap with end ileostomy

## 2024-09-08 NOTE — NURSING
Nurses Note -- 4 Eyes      9/8/2024   4:35 AM      Skin assessed during: Daily Assessment      [] No Altered Skin Integrity Present    []Prevention Measures Documented      [x] Yes- Altered Skin Integrity Present or Discovered   [] LDA Added if Not in Epic (Describe Wound)   [] New Altered Skin Integrity was Present on Admit and Documented in LDA   [] Wound Image Taken    Wound Care Consulted? No    Attending Nurse:  Allyn Forte RN/Staff Member:  Malcom Bennett LPN

## 2024-09-08 NOTE — ASSESSMENT & PLAN NOTE
Appear secondary to COPD exacerbation and failed BiPAP   Improving  Likely multifactorial  S/P extubation on 8/31  Oxygen supplement as need, patient already on home oxygen 2 to 3 L  EF 60-65%  Pulmonology is following patient  Having problem with placement because she need BiPAP at night along with oxygen 7 L and facility can not accept with the level of BiPAP  Trying to send to  LTAC  Encouraged incentive spirometry given her continuous lying in bed and positioning, she demonstrates understanding and is motivated

## 2024-09-08 NOTE — ASSESSMENT & PLAN NOTE
Continue with SSI   We will reduce glargine given poor oral intake, episode of hypoglycemia this morning    Make adjustment as needed

## 2024-09-09 LAB
POCT GLUCOSE: 203 MG/DL (ref 70–110)
POCT GLUCOSE: 212 MG/DL (ref 70–110)
POCT GLUCOSE: 221 MG/DL (ref 70–110)
POCT GLUCOSE: 253 MG/DL (ref 70–110)
POCT GLUCOSE: 276 MG/DL (ref 70–110)

## 2024-09-09 PROCEDURE — 94799 UNLISTED PULMONARY SVC/PX: CPT

## 2024-09-09 PROCEDURE — 94761 N-INVAS EAR/PLS OXIMETRY MLT: CPT

## 2024-09-09 PROCEDURE — 63600175 PHARM REV CODE 636 W HCPCS

## 2024-09-09 PROCEDURE — 99900035 HC TECH TIME PER 15 MIN (STAT)

## 2024-09-09 PROCEDURE — 94660 CPAP INITIATION&MGMT: CPT

## 2024-09-09 PROCEDURE — 94640 AIRWAY INHALATION TREATMENT: CPT

## 2024-09-09 PROCEDURE — 25000003 PHARM REV CODE 250: Performed by: INTERNAL MEDICINE

## 2024-09-09 PROCEDURE — 21400001 HC TELEMETRY ROOM

## 2024-09-09 PROCEDURE — 63600175 PHARM REV CODE 636 W HCPCS: Performed by: INTERNAL MEDICINE

## 2024-09-09 PROCEDURE — 25000003 PHARM REV CODE 250: Performed by: SURGERY

## 2024-09-09 PROCEDURE — 99231 SBSQ HOSP IP/OBS SF/LOW 25: CPT | Mod: ,,, | Performed by: INTERNAL MEDICINE

## 2024-09-09 PROCEDURE — 63600175 PHARM REV CODE 636 W HCPCS: Performed by: HOSPITALIST

## 2024-09-09 PROCEDURE — 99900031 HC PATIENT EDUCATION (STAT)

## 2024-09-09 PROCEDURE — 25000003 PHARM REV CODE 250: Performed by: FAMILY MEDICINE

## 2024-09-09 PROCEDURE — 97535 SELF CARE MNGMENT TRAINING: CPT

## 2024-09-09 PROCEDURE — 97530 THERAPEUTIC ACTIVITIES: CPT | Mod: CQ

## 2024-09-09 PROCEDURE — 25000242 PHARM REV CODE 250 ALT 637 W/ HCPCS: Performed by: HOSPITALIST

## 2024-09-09 PROCEDURE — 97110 THERAPEUTIC EXERCISES: CPT

## 2024-09-09 PROCEDURE — 25000003 PHARM REV CODE 250: Performed by: HOSPITALIST

## 2024-09-09 PROCEDURE — 25000003 PHARM REV CODE 250

## 2024-09-09 PROCEDURE — 27000221 HC OXYGEN, UP TO 24 HOURS

## 2024-09-09 RX ORDER — PREDNISONE 5 MG/1
10 TABLET ORAL DAILY
Status: DISCONTINUED | OUTPATIENT
Start: 2024-09-10 | End: 2024-09-14

## 2024-09-09 RX ORDER — LIDOCAINE 50 MG/G
3 PATCH TOPICAL
Status: DISCONTINUED | OUTPATIENT
Start: 2024-09-09 | End: 2024-09-17 | Stop reason: HOSPADM

## 2024-09-09 RX ORDER — POLYETHYLENE GLYCOL 3350 17 G/17G
17 POWDER, FOR SOLUTION ORAL 3 TIMES DAILY
Status: DISCONTINUED | OUTPATIENT
Start: 2024-09-09 | End: 2024-09-11

## 2024-09-09 RX ADMIN — POLYETHYLENE GLYCOL 3350 17 G: 17 POWDER, FOR SOLUTION ORAL at 01:09

## 2024-09-09 RX ADMIN — FAMOTIDINE 20 MG: 10 INJECTION INTRAVENOUS at 10:09

## 2024-09-09 RX ADMIN — GABAPENTIN 400 MG: 300 CAPSULE ORAL at 10:09

## 2024-09-09 RX ADMIN — INSULIN GLARGINE 30 UNITS: 100 INJECTION, SOLUTION SUBCUTANEOUS at 10:09

## 2024-09-09 RX ADMIN — ENOXAPARIN SODIUM 40 MG: 40 INJECTION SUBCUTANEOUS at 10:09

## 2024-09-09 RX ADMIN — SENNOSIDES AND DOCUSATE SODIUM 1 TABLET: 8.6; 5 TABLET ORAL at 09:09

## 2024-09-09 RX ADMIN — POLYETHYLENE GLYCOL 3350 17 G: 17 POWDER, FOR SOLUTION ORAL at 10:09

## 2024-09-09 RX ADMIN — IPRATROPIUM BROMIDE AND ALBUTEROL SULFATE 3 ML: 2.5; .5 SOLUTION RESPIRATORY (INHALATION) at 07:09

## 2024-09-09 RX ADMIN — INSULIN GLARGINE 30 UNITS: 100 INJECTION, SOLUTION SUBCUTANEOUS at 09:09

## 2024-09-09 RX ADMIN — IPRATROPIUM BROMIDE AND ALBUTEROL SULFATE 3 ML: 2.5; .5 SOLUTION RESPIRATORY (INHALATION) at 01:09

## 2024-09-09 RX ADMIN — INSULIN ASPART 4 UNITS: 100 INJECTION, SOLUTION INTRAVENOUS; SUBCUTANEOUS at 09:09

## 2024-09-09 RX ADMIN — LACTULOSE 20 G: 20 SOLUTION ORAL at 09:09

## 2024-09-09 RX ADMIN — GABAPENTIN 400 MG: 300 CAPSULE ORAL at 01:09

## 2024-09-09 RX ADMIN — DILTIAZEM HYDROCHLORIDE 240 MG: 120 CAPSULE, COATED, EXTENDED RELEASE ORAL at 09:09

## 2024-09-09 RX ADMIN — POLYETHYLENE GLYCOL 3350 17 G: 17 POWDER, FOR SOLUTION ORAL at 09:09

## 2024-09-09 RX ADMIN — LACTULOSE 20 G: 20 SOLUTION ORAL at 01:09

## 2024-09-09 RX ADMIN — SODIUM CHLORIDE, SODIUM LACTATE, POTASSIUM CHLORIDE, CALCIUM CHLORIDE AND DEXTROSE MONOHYDRATE: 5; 600; 310; 30; 20 INJECTION, SOLUTION INTRAVENOUS at 11:09

## 2024-09-09 RX ADMIN — ENOXAPARIN SODIUM 40 MG: 40 INJECTION SUBCUTANEOUS at 09:09

## 2024-09-09 RX ADMIN — PREDNISONE 20 MG: 20 TABLET ORAL at 09:09

## 2024-09-09 RX ADMIN — ACETAMINOPHEN 650 MG: 325 TABLET ORAL at 09:09

## 2024-09-09 RX ADMIN — LIDOCAINE 3 PATCH: 50 PATCH TOPICAL at 01:09

## 2024-09-09 RX ADMIN — LACTULOSE 20 G: 20 SOLUTION ORAL at 10:09

## 2024-09-09 RX ADMIN — HYDROCODONE BITARTRATE AND ACETAMINOPHEN 1 TABLET: 5; 325 TABLET ORAL at 01:09

## 2024-09-09 RX ADMIN — INSULIN ASPART 4 UNITS: 100 INJECTION, SOLUTION INTRAVENOUS; SUBCUTANEOUS at 04:09

## 2024-09-09 RX ADMIN — INSULIN ASPART 3 UNITS: 100 INJECTION, SOLUTION INTRAVENOUS; SUBCUTANEOUS at 10:09

## 2024-09-09 RX ADMIN — MECLIZINE HYDROCHLORIDE 25 MG: 12.5 TABLET ORAL at 10:09

## 2024-09-09 RX ADMIN — LEVOTHYROXINE SODIUM 75 MCG: 0.03 TABLET ORAL at 06:09

## 2024-09-09 RX ADMIN — CYANOCOBALAMIN TAB 1000 MCG 1000 MCG: 1000 TAB at 09:09

## 2024-09-09 RX ADMIN — SENNOSIDES AND DOCUSATE SODIUM 1 TABLET: 8.6; 5 TABLET ORAL at 10:09

## 2024-09-09 RX ADMIN — ACETAMINOPHEN 650 MG: 325 TABLET ORAL at 10:09

## 2024-09-09 RX ADMIN — GABAPENTIN 300 MG: 300 CAPSULE ORAL at 09:09

## 2024-09-09 RX ADMIN — LOSARTAN POTASSIUM 100 MG: 50 TABLET, FILM COATED ORAL at 09:09

## 2024-09-09 NOTE — PROGRESS NOTES
[facility]  Adult Nutrition   Progress Note (Follow-Up)    SUMMARY     Recommendations  1) RD recommends to advance pts diet to a 2000 kcal diabetic as soon as medically possible. 2) RD has added Ensure Clear to CL tray to increase protein in diet.    Goals:   Patient to recieve >/= 75% EEN / EPN by follow up.    Nutrition Goal Status: goal not met    Communication of RD Recs: other (comment)    Nutrition Diagnosis PES Statement: Inadequate oral intake related to diet as evidenced by clear liquid diet.     Dietitian Rounds Brief  F/U Nutrition Note: Pt is only having gas and stool mucus. She is not getting out of bed per MD Luong and says her back hurts, her knees hurt, she has vertigo..... Ag explained this would help her GI motility. I also encouraged pt to get OOB and continue to drink her clear liquid diet. I informed her that Ensure Clear has been added to give her extra protein. I encourage to drink it till her diet was advanced. Will follow biweekly and prn.      General Surgery PN today:  Patient seen and examined.  Resting in bed.  Very minimal activity.  Continues to endorse pain in the lower abdomen.  Has been on clears with no nausea or vomiting.  No significant bowel activity.  Continues to get lactulose MiraLax as well as enemas     CT from yesterday reviewed.  No acute processes noted.     Discussed with the patient and family would continue conservative management.  Surgery if any worsening in hemodynamic status or if repeat imaging were to demonstrate any evidence necrosis or inflammation.  Would consider repeat CT scan oral contrast and 48 hours    Nutrition Related Social Determinants of Health: SDOH: None Identified    Malnutrition Assessment  Orbital Region (Subcutaneous Fat Loss): well nourished  Upper Arm Region (Subcutaneous Fat Loss): well nourished   [place] Region (Muscle Loss): well nourished  Clavicle Bone Region (Muscle Loss): well nourished  Clavicle and Acromion  "Bone Region (Muscle Loss): well nourished       Diet order:   Current Diet Order: Clear Liquid      Current Nutrition Support Formula Ordered: Pulmocare  Current Nutrition Support Rate Ordered: 0 (ml)  Current Nutrition Support Frequency Ordered: continuous    Evaluation of Received Nutrient/Fluid Intake  Energy Calories Required: not meeting needs  Protein Required: not meeting needs  Fluid Required: meeting needs  Tolerance: tolerating     % Intake of Estimated Energy Needs: 0%  % Meal Intake: 0 - 25 %      Intake/Output Summary (Last 24 hours) at 9/9/2024 1424  Last data filed at 9/9/2024 1413  Gross per 24 hour   Intake 1360 ml   Output 1100 ml   Net 260 ml        Anthropometrics  Temp: 98.6 °F (37 °C)  Height Method: Stated  Height: 5' 5" (165.1 cm)  Height (inches): 65 in  Weight Method: Standard Scale  Weight: 119.7 kg (264 lb)  Weight (lb): 264 lb  Ideal Body Weight (IBW), Female: 125 lb  % Ideal Body Weight, Female (lb): 224 %  BMI (Calculated): 43.9  BMI Grade: greater than 40 - morbid obesity       Estimated/Assessed Needs  Weight Used For Calorie Calculations: 118.6 kg (261 lb 7.5 oz)  Energy Calorie Requirements (kcal): 3100-9894 kcal (20-25 kcal/ kg bw)  Energy Need Method: Kcal/kg  Protein Requirements: 114-143 gm/day (2.0-2.5 gm/kg IBW  Weight Used For Protein Calculations: 57 kg (125 lb 10.6 oz) (IBW)  Fluid Requirements (mL): per MD  Estimated Fluid Requirement Method: RDA Method  RDA Method (mL): 2372  CHO Requirement: 200-250 gm (40-50% of 2000 kcal)    Reason for Assessment  Reason For Assessment: RD follow-up  Diagnosis: pulmonary disease  Relevant Medical History: ASCVD, DM 2, HTN. COPD(cigarette use)  Interdisciplinary Rounds: did not attend  General Information Comments: Per pulmonary consult: patient with home O2, history of COPD had increased SOB at home and fell.  Nutrition Discharge Planning: DM diet- no artifical sweetners    Nutrition/Diet History  Patient Reported " Diet/Restrictions/Preferences: diabetic diet  Spiritual, Cultural Beliefs, Cheondoism Practices, Values that Affect Care: no  Food Allergies: other (see comments) (artifical sweetners)  Factors Affecting Nutritional Intake: clear liquid diet    Nutrition Risk Screen  Nutrition Risk Screen: no indicators present     MST Score: 0  Have you recently lost weight without trying?: No  Weight loss score: 0  Have you been eating poorly because of a decreased appetite?: No  Appetite score: 0       Weight History:  Wt Readings from Last 10 Encounters:   09/09/24 119.7 kg (264 lb)   08/30/24 122.9 kg (271 lb)   06/20/24 95.6 kg (210 lb 12.2 oz)   06/16/24 97.5 kg (215 lb)   06/16/22 97 kg (213 lb 13.5 oz)   06/14/22 97 kg (213 lb 13.5 oz)   06/14/22 101.5 kg (223 lb 12.3 oz)   06/12/22 101.5 kg (223 lb 12.3 oz)   06/08/22 104.3 kg (229 lb 15 oz)   07/21/20 108.8 kg (239 lb 13.8 oz)        Lab/Procedures/Meds: Pertinent Labs/Meds Reviewed    Medications:Pertinent Medications Reviewed  Scheduled Meds:   albuterol-ipratropium  3 mL Nebulization Q6H    cyanocobalamin  1,000 mcg Oral Daily    diltiaZEM  240 mg Oral Daily    enoxparin  40 mg Subcutaneous Q12H    famotidine (PF)  20 mg Intravenous Q12H    gabapentin  400 mg Oral TID    insulin glargine U-100  30 Units Subcutaneous BID    lactulose  20 g Oral TID    levothyroxine  75 mcg Oral Before breakfast    LIDOcaine  3 patch Transdermal Q24H    losartan  100 mg Oral Daily    polyethylene glycol  17 g Oral TID    predniSONE  20 mg Oral Daily    senna-docusate 8.6-50 mg  1 tablet Oral BID     Continuous Infusions:   dextrose 5% lactated ringers   Intravenous Continuous 75 mL/hr at 09/09/24 1154 New Bag at 09/09/24 1154     PRN Meds:.  Current Facility-Administered Medications:     acetaminophen, 650 mg, Oral, Q8H PRN    calcium gluconate IVPB, 1 g, Intravenous, PRN    calcium gluconate IVPB, 2 g, Intravenous, PRN    calcium gluconate IVPB, 3 g, Intravenous, PRN    dextrose 50%,  12.5 g, Intravenous, PRN    glucagon (human recombinant), 1 mg, Intramuscular, PRN    hydrALAZINE, 10 mg, Intravenous, Q6H PRN    HYDROcodone-acetaminophen, 1 tablet, Oral, Q8H PRN    insulin aspart U-100, 0-10 Units, Subcutaneous, Q6H PRN    magnesium sulfate IVPB, 2 g, Intravenous, PRN    magnesium sulfate IVPB, 4 g, Intravenous, PRN    meclizine, 25 mg, Oral, TID PRN    polyethylene glycol, 17 g, Oral, TID PRN    potassium bicarbonate, 35 mEq, Oral, PRN    potassium bicarbonate, 50 mEq, Oral, PRN    potassium bicarbonate, 60 mEq, Oral, PRN    potassium chloride in water, 40 mEq, Intravenous, PRN **AND** potassium chloride in water, 60 mEq, Intravenous, PRN **AND** potassium chloride in water, 80 mEq, Intravenous, PRN    simethicone, 1 tablet, Oral, TID PRN    sodium chloride 0.9%, 10 mL, Intravenous, PRN    sodium phosphate 15 mmol in D5W 250 mL IVPB, 15 mmol, Intravenous, PRN    sodium phosphate 20.01 mmol in D5W 250 mL IVPB, 20.01 mmol, Intravenous, PRN    sodium phosphate 30 mmol in D5W 250 mL IVPB, 30 mmol, Intravenous, PRN    Labs: Pertinent Labs Reviewed  Clinical Chemistry:  Recent Labs   Lab 09/03/24  0330 09/04/24  0442 09/05/24  0555 09/06/24  1128 09/08/24  0458 09/08/24  0839   * 136 137   < > 137  --    K 3.7 3.7 3.6   < > 4.5  --    CL 96 97 96   < > 97  --    CO2 34* 36* 37*   < > 34*  --    * 153* 113*   < > 90 82   BUN 14 13 15   < > 12  --    CREATININE 0.6 0.5 0.5   < > 0.6  --    CALCIUM 8.6* 8.9 9.0   < > 8.6*  --    PROT 5.3* 5.6* 5.7*  --   --   --    ALBUMIN 3.0* 3.0* 3.1*  --   --   --    BILITOT 0.4 0.4 0.4  --   --   --    ALKPHOS 77 71 69  --   --   --    AST 12 12 12  --   --   --    ALT 10 10 10  --   --   --    ANIONGAP 5* 3* 4*   < > 6*  --    MG 2.0  --   --   --   --   --     < > = values in this interval not displayed.     CBC:   Recent Labs   Lab 09/08/24  7609   WBC 7.37   RBC 2.83*   HGB 8.7*   HCT 29.1*   *   *   MCH 30.7   MCHC 29.9*     Lipid  "Panel:  No results for input(s): "CHOL", "HDL", "LDLCALC", "TRIG", "CHOLHDL" in the last 168 hours.  Cardiac Profile:  No results for input(s): "BNP", "CPK", "CPKMB", "TROPONINI", "CKTOTAL" in the last 168 hours.  Inflammatory Labs:  No results for input(s): "CRP" in the last 168 hours.  Diabetes:  Recent Labs   Lab 09/08/24  2358 09/09/24  0818 09/09/24  1143   POCTGLUCOSE 253* 212* 203*     Thyroid & Parathyroid:  No results for input(s): "TSH", "FREET4", "W8ZNROZ", "M4RIKVH", "THYROIDAB" in the last 168 hours.    Monitor and Evaluation  Food and Nutrient Intake: energy intake, food and beverage intake  Food and Nutrient Adminstration: diet order  Knowledge/Beliefs/Attitudes: food and nutrition knowledge/skill  Physical Activity and Function: nutrition-related ADLs and IADLs  Anthropometric Measurements: weight, weight change  Biochemical Data, Medical Tests and Procedures: gastrointestinal profile, glucose/endocrine profile, inflammatory profile, lipid profile  Nutrition-Focused Physical Findings: overall appearance     Nutrition Risk  Level of Risk/Frequency of Follow-up:  (weekly)     Nutrition Follow-Up  RD Follow-up?: Yes            "

## 2024-09-09 NOTE — PT/OT/SLP PROGRESS
Occupational Therapy   Treatment    Name: Karo Leonard  MRN: [id number]  Admitting Diagnosis:  Acute hypoxic on chronic hypercapnic respiratory failure       Recommendations:     Discharge Recommendations: Moderate Intensity Therapy  Discharge Equipment Recommendations:  to be determined by next level of care  Barriers to discharge:       Assessment:     Karo Leonard is a 64 y.o. female with a medical diagnosis of Acute hypoxic on chronic hypercapnic respiratory failure. Pt agreeable to OT therapy session this AM. Performance deficits affecting function are weakness, impaired endurance, impaired self care skills, impaired functional mobility, gait instability, impaired balance, decreased upper extremity function, decreased lower extremity function, decreased safety awareness, pain, impaired skin, impaired cardiopulmonary response to activity. Pt required increased time during session for self expression. OT spent increased time to adjust patient/pillows in bed for pt's optimal comfort.    Rehab Prognosis:  Fair; patient would benefit from acute skilled OT services to address these deficits and reach maximum level of function.       Plan:     Patient to be seen 5 x/week to address the above listed problems via self-care/home management, therapeutic activities, therapeutic exercises  Plan of Care Expires: 09/22/24  Plan of Care Reviewed with: patient    Subjective     Chief Complaint: abdominal pain  Patient/Family Comments/goals: none stated  Pain/Comfort:  Pain Rating 1:  (not rated)    Objective:     Communicated with: nursing prior to session.  Patient found HOB elevated with oxygen, PureWick, telemetry, bed alarm, peripheral IV upon OT entry to room.    General Precautions: Standard, fall    Orthopedic Precautions:N/A  Braces: N/A  Respiratory Status:  Found on oxymask at 1L; switched to NC 1L to perform self care; switched back to oxymask 1L at end of session     Occupational Performance:     Bed  Mobility:    Declined due to pain    Activities of Daily Living:  Grooming: setup assistance bed level for oral care and to wash face      Therapeutic Exercise:  Reviewed UE exercises to complete with red tband  Pt expressed recent weakness to hand, especially when completing FM tasks; OT reviewed hand exercises to complete and specific FM activities to improve coordination. OT provided pt with handouts and with clothespins and stress ball for overall hand strengthening.    Treatment & Education:  Pt educated on role of OT/POC, importance of OOB/EOB activity, use of call bell, and safety during ADLs, transfers, and functional mobility.    Patient left HOB elevated with all lines intact, call button in reach, and bed alarm on    GOALS:   Multidisciplinary Problems       Occupational Therapy Goals          Problem: Occupational Therapy    Goal Priority Disciplines Outcome Interventions   Occupational Therapy Goal     OT, PT/OT Progressing    Description: Goals to be met by: 9/22/2024     Patient will increase functional independence with ADLs by performing:    LE Dressing with Minimal Assistance.  Grooming while seated with Supervision.  Toileting from bedside commode with Minimal Assistance for hygiene and clothing management.   Supine to sit with Minimal Assistance.  Toilet transfer to bedside commode with Minimal Assistance.  Upper extremity exercise program, with supervision.                         Time Tracking:     OT Date of Treatment: 09/09/24  OT Start Time: 1054  OT Stop Time: 1133  OT Total Time (min): 39 min    Billable Minutes:Self Care/Home Management 10  Therapeutic Exercise 29    OT/NYA: OT       9/9/2024

## 2024-09-09 NOTE — SUBJECTIVE & OBJECTIVE
Interval History:  Had a total four bowel movements with enema and lactulose.  Abdominal pain improved today but still present.  With her abdominal pain and positioning she is unable to work with the physical therapy but is motivated.  Increased gabapentin dose and gave lidocaine patch for back pain.      Review of Systems   HENT:          Hematoma over the right frontal region from the fall   Respiratory:  Positive for shortness of breath.    Gastrointestinal:  Positive for abdominal distention and abdominal pain.   Musculoskeletal:  Positive for back pain.     Objective:     Vital Signs (Most Recent):  Temp: 98.3 °F (36.8 °C) (09/09/24 1530)  Pulse: 86 (09/09/24 1530)  Resp: 18 (09/09/24 1530)  BP: (!) 196/68 (09/09/24 1530)  SpO2: (!) 93 % (09/09/24 1530) Vital Signs (24h Range):  Temp:  [97.5 °F (36.4 °C)-99.4 °F (37.4 °C)] 98.3 °F (36.8 °C)  Pulse:  [64-89] 86  Resp:  [15-19] 18  SpO2:  [88 %-98 %] 93 %  BP: (105-196)/(55-87) 196/68     Weight: 119.7 kg (264 lb)  Body mass index is 43.93 kg/m².    Intake/Output Summary (Last 24 hours) at 9/9/2024 1906  Last data filed at 9/9/2024 1413  Gross per 24 hour   Intake 1240 ml   Output 950 ml   Net 290 ml         Physical Exam  HENT:      Head:      Comments: Ecchymosis around bilateral eyes     Mouth/Throat:      Mouth: Mucous membranes are dry.   Eyes:      Pupils: Pupils are equal, round, and reactive to light.   Cardiovascular:      Rate and Rhythm: Normal rate.   Pulmonary:      Effort: Pulmonary effort is normal. No respiratory distress.      Breath sounds: Normal breath sounds. No stridor. No wheezing.   Abdominal:      General: There is distension.      Palpations: There is no mass.      Tenderness: There is abdominal tenderness. There is no guarding or rebound.   Musculoskeletal:      Right lower leg: Edema present.      Left lower leg: Edema present.   Neurological:      Mental Status: She is alert and oriented to person, place, and time.              Significant Labs: All pertinent labs within the past 24 hours have been reviewed.  CBC:   Recent Labs   Lab 09/08/24 0459   WBC 7.37   HGB 8.7*   HCT 29.1*   *     CMP:   Recent Labs   Lab 09/08/24  0458 09/08/24  0839     --    K 4.5  --    CL 97  --    CO2 34*  --    GLU 90 82   BUN 12  --    CREATININE 0.6  --    CALCIUM 8.6*  --    ANIONGAP 6*  --        Significant Imaging: I have reviewed all pertinent imaging results/findings within the past 24 hours.

## 2024-09-09 NOTE — PROGRESS NOTES
Patient seen and examined.  Resting in bed.  Very minimal activity.  Continues to endorse pain in the lower abdomen.  Has been on clears with no nausea or vomiting.  No significant bowel activity.  Continues to get lactulose MiraLax as well as enemas    CT from yesterday reviewed.  No acute processes noted.    Discussed with the patient and family would continue conservative management.  Surgery if any worsening in hemodynamic status or if repeat imaging were to demonstrate any evidence necrosis or inflammation.  Would consider repeat CT scan oral contrast and 48 hours

## 2024-09-09 NOTE — PLAN OF CARE
Problem: Adult Inpatient Plan of Care  Goal: Plan of Care Review  Outcome: Progressing  Goal: Patient-Specific Goal (Individualized)  Outcome: Progressing  Goal: Absence of Hospital-Acquired Illness or Injury  Outcome: Progressing  Goal: Optimal Comfort and Wellbeing  Outcome: Progressing  Goal: Readiness for Transition of Care  Outcome: Progressing     Problem: Bariatric Environmental Safety  Goal: Safety Maintained with Care  Outcome: Progressing     Problem: Skin Injury Risk Increased  Goal: Skin Health and Integrity  Outcome: Progressing     Problem: Diabetes Comorbidity  Goal: Blood Glucose Level Within Targeted Range  Outcome: Progressing     Problem: Pneumonia  Goal: Fluid Balance  Outcome: Progressing  Goal: Resolution of Infection Signs and Symptoms  Outcome: Progressing  Goal: Effective Oxygenation and Ventilation  Outcome: Progressing     Problem: Infection  Goal: Absence of Infection Signs and Symptoms  Outcome: Progressing     Problem: Fall Injury Risk  Goal: Absence of Fall and Fall-Related Injury  Outcome: Progressing     Problem: Enteral Nutrition  Goal: Feeding Tolerance  Outcome: Progressing     Problem: Mechanical Ventilation Invasive  Goal: Optimal Nutrition Delivery  Outcome: Progressing  Goal: Absence of Device-Related Skin and Tissue Injury  Outcome: Progressing  Goal: Absence of Ventilator-Induced Lung Injury  Outcome: Progressing     Problem: Wound  Goal: Optimal Coping  Outcome: Progressing  Goal: Optimal Functional Ability  Outcome: Progressing  Goal: Absence of Infection Signs and Symptoms  Outcome: Progressing  Goal: Improved Oral Intake  Outcome: Progressing  Goal: Optimal Pain Control and Function  Outcome: Progressing  Goal: Skin Health and Integrity  Outcome: Progressing  Goal: Optimal Wound Healing  Outcome: Progressing     Problem: Bowel Elimination Management  Goal: Effective Bowel Elimination/Continence  Outcome: Progressing

## 2024-09-09 NOTE — PROGRESS NOTES
Pulmonary/Critical Care  Progress Note      Patient name: Karo Leonard  MRN: [id number]  Date: 09/09/2024    Admit Date: 8/25/2024  Consult Requested By: Ashleigh Maldonado, *    Reason for Consult: Respiratory failure, COPD    HPI:    8/25/2024 - 63 yo ASCVD, DM, HTN. COPD(cigarette use) had increased SOB at home and a fall.  EMS was called and brought to ER.  Initially tried on BiPAP without response and was subsequently intubated and placed on ventilation.  Initial ABG showed over ventilation and we have adjusted and ABG are getting better.  She is sedated and not able to provide any history.  D/W  who says that she was supposed to see a pulmonologist but couldn't make appointment, she has been a chronic smoker.  She had increase SOB for a few days.  She did have a fall at home but he reports that she was not down for long.  He does report that she has been having recurring falls at home.      8/26/2024 - Stable overnight, O2 needs still too high to do SBT.  She is responsive and gets agitated on current sedation and we are adjusting.  No other new issues reported.  Hgb has decreased (no active bleeding reported), NA remains low, CPK is better, TFTF noted and c/w hypothyroid (synthroid started).    8/27/2024 - Remains critically ill, O2 needs down a little but still too high.  Difficult to sedate is either agitated or apneic.  Tried SBT this AM and was apneic.  VS reviewed.  She is 3.8 liters +.  Tube feeds have been started.  NA remains low, glucose is elevated.  CXR looks about the same    8/28/2024 - Stable overnight, O2 needs still up.  She does get agitated at times and we have adjusted meds.  Trouble with tube feeds and will hold today and restart tomorrow.  Not ready to wean because on increased FiO2 and will try to increase PEP to see if that helps.  She does not have a lot of secretions.  She is over ventilaed some and we adjusted vent.  CXR is about the same    8/29/2024 - Stable  overnight,, O2 needs still up some (I decreased FiO2 and increased PEEP some) and she is overventilated (we adjusted rate).  Will retry tube feeds ([facility] at 20 to see if she tolerates).  She is 4.6 liters +.  CXR is about the same.    8/30- continues on vent, settings adjusted for alkalosis- now on PS 12/5. CXR looks wet and pt with severe edema- fluids discontinued and starting lasix. Propofol has been weaned a little. Pt is awake and denies pain. Her blood pressure was up to 200 systolic at the time of my evaluation- RN states she just gave am blood pressure med and also Lasix.    8/31- pt awake, off sedation, writing and alert. Denies pain, states feeling better. She was significantly more alkalotic this am due to lasix so given a dose of diamox this am. Tolerating PS 10/5 now- will repeat abg soon and consider extubation     9/1- extubated yesterday and tolerated well    9/3/2024 - STable overnight, no new issues reported and is feeling better ovrall.  She is very weak.  O2 decreased to 5 LPM when I saqw her.  No new issues reported.  BP is up some.  Labs reviewed    9/4/2024 - Stable overnight, no new issues reported, she feels better overall.      9/5/2024 - Stable overnight, she complains of issues with vertigo when getting up.  Possible placement at [facility] is being worked on.  No new respiratory complaints.      9/6 the patient has developed very tender and tympanitic abdomen.  She was supposed to be going to an LTAC today.  She is getting ready to receive an enema which will hopefully clear her rectum and decrease the obstruction.    9/7 the patient has had 2 bowel movements.  The 1 she just had was quite large.  However, she continues to complain of abdominal pain.  She is also complaining that are pain meds have been decreased.  Explained how narcotics slow down GI transit and could be adding to her GI problems    9/8 the patient is still complaining of severe abdominal pain and distention.   She has not yet had her CT today.     The patient is only having gas and stool mucus.  She is not getting out of bed.  Her back hurts, her knees hurt, she has vertigo..... Explained this would help her GI motility.    Review of Systems    Review of Systems   Constitutional:         Diffuse pain   Gastrointestinal:  Positive for abdominal pain and constipation.       Past Medical History    Past Medical History:   Diagnosis Date    Coronary artery disease     cardiac stent    DDD (degenerative disc disease), lumbar     Diabetes mellitus     Hypertension     Thyroid disease        Past Surgical History    Past Surgical History:   Procedure Laterality Date     SECTION  08/1987    x2 1993    CORONARY STENT PLACEMENT      EXPLORATORY LAPAROTOMY      Nondenominational     HEMORRHOID SURGERY      LAPAROSCOPIC CHOLECYSTECTOMY N/A 2022    Procedure: CHOLECYSTECTOMY, LAPAROSCOPIC;  Surgeon: Leonardo Wislon III, MD;  Location: [facility];  Service: General;  Laterality: N/A;    LUMBAR DISC SURGERY      PILONIDAL CYST DRAINAGE      TONSILLECTOMY      as a child (also adenoids)       Medications (scheduled):      albuterol-ipratropium  3 mL Nebulization Q6H    cyanocobalamin  1,000 mcg Oral Daily    diltiaZEM  240 mg Oral Daily    enoxparin  40 mg Subcutaneous Q12H    famotidine (PF)  20 mg Intravenous Q12H    gabapentin  300 mg Oral TID    insulin glargine U-100  30 Units Subcutaneous BID    lactulose  20 g Oral TID    levothyroxine  75 mcg Oral Before breakfast    LIDOcaine  1 patch Transdermal Q24H    losartan  100 mg Oral Daily    predniSONE  20 mg Oral Daily    senna-docusate 8.6-50 mg  1 tablet Oral BID       Medications (infusions):      dextrose 5% lactated ringers   Intravenous Continuous 75 mL/hr at 24 2216 New Bag at 24 2216         Medications (prn):       Current Facility-Administered Medications:     acetaminophen, 650 mg, Oral, Q8H PRN     calcium gluconate IVPB, 1 g, Intravenous, PRN    calcium gluconate IVPB, 2 g, Intravenous, PRN    calcium gluconate IVPB, 3 g, Intravenous, PRN    dextrose 50%, 12.5 g, Intravenous, PRN    glucagon (human recombinant), 1 mg, Intramuscular, PRN    hydrALAZINE, 10 mg, Intravenous, Q6H PRN    HYDROcodone-acetaminophen, 1 tablet, Oral, Q8H PRN    insulin aspart U-100, 0-10 Units, Subcutaneous, Q6H PRN    magnesium sulfate IVPB, 2 g, Intravenous, PRN    magnesium sulfate IVPB, 4 g, Intravenous, PRN    meclizine, 25 mg, Oral, TID PRN    polyethylene glycol, 17 g, Oral, TID PRN    potassium bicarbonate, 35 mEq, Oral, PRN    potassium bicarbonate, 50 mEq, Oral, PRN    potassium bicarbonate, 60 mEq, Oral, PRN    potassium chloride in water, 40 mEq, Intravenous, PRN **AND** potassium chloride in water, 60 mEq, Intravenous, PRN **AND** potassium chloride in water, 80 mEq, Intravenous, PRN    simethicone, 1 tablet, Oral, TID PRN    sodium chloride 0.9%, 10 mL, Intravenous, PRN    sodium phosphate 15 mmol in D5W 250 mL IVPB, 15 mmol, Intravenous, PRN    sodium phosphate 20.01 mmol in D5W 250 mL IVPB, 20.01 mmol, Intravenous, PRN    sodium phosphate 30 mmol in D5W 250 mL IVPB, 30 mmol, Intravenous, PRN    Family History: No family history on file.    Social History: Tobacco:   Social History     Tobacco Use   Smoking Status Not on file   Smokeless Tobacco Not on file                                EtOH:   Social History     Substance and Sexual Activity   Alcohol Use No                                Drugs:   Social History     Substance and Sexual Activity   Drug Use No       Physical Exam    Vital signs:  Temp:  [97.5 °F (36.4 °C)-99.4 °F (37.4 °C)]   Pulse:  [64-82]   Resp:  [17-19]   BP: (105-193)/(55-87)   SpO2:  [89 %-98 %]     Intake/Output:   Intake/Output Summary (Last 24 hours) at 9/9/2024 0914  Last data filed at 9/9/2024 0604  Gross per 24 hour   Intake 660 ml   Output 700 ml   Net -40 ml        BMI: Estimated body  "mass index is 43.93 kg/m² as calculated from the following:    Height as of this encounter: 5' 5" (1.651 m).    Weight as of this encounter: 119.7 kg (264 lb).    Physical Exam  Vitals and nursing note reviewed.   Constitutional:       General: She is not in acute distress.     Appearance: She is not ill-appearing, toxic-appearing or diaphoretic.      Comments: Awake, alert   HENT:      Head: Normocephalic.      Comments: Some bruising right forehead and bilat eyes from her fall     Right Ear: External ear normal.      Left Ear: External ear normal.      Nose: Nose normal. No congestion or rhinorrhea.      Mouth/Throat:      Mouth: Mucous membranes are moist.      Pharynx: Oropharynx is clear. No oropharyngeal exudate or posterior oropharyngeal erythema.   Eyes:      General: No scleral icterus.        Right eye: No discharge.         Left eye: No discharge.      Extraocular Movements: Extraocular movements intact.      Conjunctiva/sclera: Conjunctivae normal.      Pupils: Pupils are equal, round, and reactive to light.   Neck:      Vascular: No carotid bruit.   Cardiovascular:      Rate and Rhythm: Normal rate and regular rhythm.      Pulses: Normal pulses.      Heart sounds: No murmur heard.     No friction rub. No gallop.   Pulmonary:      Effort: Pulmonary effort is normal. No respiratory distress.      Breath sounds: No stridor. No wheezing, rhonchi or rales.   Chest:      Chest wall: No tenderness.   Abdominal:      General: There is distension.      Tenderness: There is abdominal tenderness. There is guarding.      Comments: Still very tender   Musculoskeletal:         General: No swelling. Normal range of motion.      Cervical back: Normal range of motion and neck supple. No rigidity or tenderness.      Right lower leg: Edema present.      Left lower leg: Edema present.   Lymphadenopathy:      Cervical: No cervical adenopathy.   Skin:     General: Skin is warm and dry.      Capillary Refill: Capillary refill " "takes less than 2 seconds.      Coloration: Skin is not jaundiced.      Findings: Bruising (Extensive ecchymoses to the eyes and frontal bone on the right with a large hematoma) present.      Comments: + bruise LLE   Neurological:      General: No focal deficit present.      Cranial Nerves: No cranial nerve deficit.      Sensory: No sensory deficit.      Motor: No weakness.   Psychiatric:         Mood and Affect: Mood normal.         Behavior: Behavior normal.         Laboratory    No results for input(s): "WBC", "RBC", "HGB", "HCT", "PLT", "MCV", "MCH", "MCHC" in the last 24 hours.        No results for input(s): "GLUCOSE", "CALCIUM", "ALBUMIN", "PROT", "NA", "K", "CO2", "CL", "BUN", "CREATININE", "ALKPHOS", "ALT", "AST", "BILITOT" in the last 24 hours.        Microbiology:       Microbiology Results (last 7 days)       ** No results found for the last 168 hours. **            Radiology    CT Abdomen Pelvis  Without Contrast  Narrative: EXAMINATION:  CT ABDOMEN PELVIS WITHOUT CONTRAST    CLINICAL HISTORY:  Bowel obstruction suspected;    TECHNIQUE:  Low dose axial images, sagittal and coronal reformations were obtained from the lung bases to the pubic symphysis.  30 mL of oral Omnipaque 350 was administered.    COMPARISON:  Comparison made with the patient's previous imaging study of 09/06/2024.    FINDINGS:  Visualized portion lung bases demonstrates evidence of a fairly mild circumferential pericardial effusion that appears equal in size upon comparison with large is maximum of pericardial thickness along the posterior aspect of the pericardial space measuring upwards at 15 mm in size.  Next largest diameter of the pericardial effusion is along the lateral margin of the pericardial space measuring 10 mm in size.  Cardiac chambers maintain normal size and overall configuration.    The thoracic aorta is tortuous becoming midline the diaphragmatic hiatus.    Liver appears normal in size and uniform density.  Mild " surrounding perihepatic fluid is noted that appears equal in volume upon comparison.  No significant volume discrepancy between the left and right hepatic lobes.    The gallbladder has undergone previous surgical resection with clips situated in the patient's right upper quadrant.  No evidence of pathologic findings in the gallbladder fossa are demonstrated.    Pancreas appears normal in size and uniform density.  No evidence of loss of lobular cortex.  The adrenal glands are not enlarged.    The kidneys appear normal in size and overall contour with the right kidney malrotated upon its short axis, small spherical low-density focus projecting along the lateral cortex of the right kidney measuring 13 mm in size with minimal internal complexity favors a small hemorrhagic cyst.  No evidence of focal mass.  Linear calcification along the tortuous fascia on the right.    Bowel loops appear of normal caliber without evidence of wall thickening.  There is mild colonic ileus with a large volume of retained fecal load within the ascending colon extending inferiorly.  No primary or secondary findings of appendicitis.    Evaluation the pelvis shows evidence of a normal appearing urinary bladder.  Prostate and seminal vesicles are unremarkable in CT appearance.  Minimal inflammatory stranding in the region of the presacral fat of indeterminate nature.  There is diffuse anasarca with extensive 3rd spacing involving the body wall cavity with fluid density in the right lateral margin of the posterolateral wall.    Osseous structures reveal exaggerated kyphosis of the thoracic spine chronic flowing osteophyte formation about the ventral surface.  No evidence of bone destructive process.  There is hypertrophic facet arthrosis restricted towards the lower most articular facets.  Chronic bilateral degenerative changes of the hip articulations.  Impression: 1.  Dilated large bowel loops mild colonic ileus with a large volume of  "retained fecal load within the ascending colon.    2.  Normal size liver with a small volume of surrounding perihepatic fluid.    3.  Pericardial effusion/thickening appears equal in capacity as compared to previous.    4.  Diffuse inflammatory stranding of the subcutaneous tissues along the lateral chest wall secondary to anasarca with fairly extensive inflammatory thickening and stranding involving the right flank region similar to that from previous.    Electronically signed by: Aleks Lizarraga MD  Date:    09/08/2024  Time:    10:28        Ventilator Information    Oxygen Concentration (%):  [32] 32  The patient is on 3 L OxyMask           No results for input(s): "PH", "PCO2", "PO2", "HCO3", "POCSATURATED", "BE" in the last 72 hours.        Impression    Active Hospital Problems    Diagnosis  POA    *Acute hypoxic on chronic hypercapnic respiratory failure [J96.01, J96.12]  Yes    Ileus [K56.7]  Unknown    Type 2 diabetes mellitus [E11.9]  Yes    Anemia [D64.9]  Yes    Acute metabolic encephalopathy [G93.41]  Yes    Fall [W19.XXXA]  Yes    COPD exacerbation [J44.1]  Yes    Pneumonia [J18.9]  Yes    Hypothyroid [E03.9]  Yes    Hypertension [I10]  Yes      Resolved Hospital Problems    Diagnosis Date Resolved POA    Goals of care, counseling/discussion [Z71.89] 09/07/2024 Not Applicable     Patient's respiratory status is stable.  Her abdomen is still not settled.. Ct shows nothing requiring surgical intervention.     Plan    Continue O2 to maintain sats 89-92%  Continue bipap nightly  Respiratory treatments  Wean steroids Q 3 days  Antihypertensives to continue  Replace electrolytes as needed  Synthroid   Watch BS and treat as needed  Increase activity as able  Please call if pulmonary can be of assistance                "

## 2024-09-09 NOTE — NURSING
Nurses Note -- 4 Eyes      0/9/8/24 22  2204 PM      Skin assessed during: Q Shift Change      [] No Altered Skin Integrity Present    []Prevention Measures Documented      [x] Yes- Altered Skin Integrity Present or Discovered   [] LDA Added if Not in Epic (Describe Wound)   [] New Altered Skin Integrity was Present on Admit and Documented in LDA   [] Wound Image Taken    Wound Care Consulted? Yes, already on case.    Attending Nurse:  CARLOS Byers     Second RN/Staff Member:  CARLOS Diaz

## 2024-09-09 NOTE — CARE UPDATE
09/09/24 0721   Patient Assessment/Suction   Level of Consciousness (AVPU) alert   Respiratory Effort Normal;Unlabored   All Lung Fields Breath Sounds Anterior:;Lateral:;clear;diminished   Rhythm/Pattern, Respiratory unlabored;pattern regular;depth regular   Cough Frequency no cough   Skin Integrity   $ Wound Care Tech Time 15 min   Area Observed Left;Right;Behind ear;Nares   Skin Appearance without discoloration   PRE-TX-O2   Device (Oxygen Therapy) nasal cannula   $ Is the patient on Low Flow Oxygen? Yes   Flow (L/min) (Oxygen Therapy) 2.5   SpO2 95 %   Pulse Oximetry Type Intermittent   $ Pulse Oximetry - Multiple Charge Pulse Oximetry - Multiple   Pulse 72   Resp 18   Aerosol Therapy   $ Aerosol Therapy Charges Aerosol Treatment   Daily Review of Necessity (SVN) completed   Respiratory Treatment Status (SVN) given   Treatment Route (SVN) mask;oxygen   Patient Position HOB elevated   Post Treatment Assessment (SVN) breath sounds improved   Signs of Intolerance (SVN) none   Breath Sounds Post-Respiratory Treatment   Throughout All Fields Post-Treatment All Fields   Throughout All Fields Post-Treatment aeration increased   Post-treatment Heart Rate (beats/min) 75   Post-treatment Resp Rate (breaths/min) 17   Incentive Spirometer   Administration (IS) self-administered   Preset CPAP/BiPAP Settings   Mode Of Delivery BiPAP;Standby   $ CPAP/BiPAP Daily Charge BiPAP/CPAP Daily   Education   $ Education Bronchodilator;15 min

## 2024-09-09 NOTE — PLAN OF CARE
Per Agustina with [facility] pt has been accepted they are just waiting on medical clearance.        09/09/24 1442   Post-Acute Status   Post-Acute Authorization Placement   Post-Acute Placement Status Pending medical clearance/testing   Coverage MEDICARE - MEDICARE PART A & B   Discharge Plan   Discharge Plan A Long-term acute care facility (LTAC)

## 2024-09-09 NOTE — PT/OT/SLP PROGRESS
Physical Therapy Treatment    Patient Name:  Karo Leonard   MRN:  [id number]    Recommendations:     Discharge Recommendations: Moderate Intensity Therapy  Discharge Equipment Recommendations: to be determined by next level of care  Barriers to discharge:  increased assist with mobility, decreased activity tolerance, balance deficits, pain, increased assist with mobility    Assessment:     Karo Leonard is a 64 y.o. female admitted with a medical diagnosis of Acute hypoxic on chronic hypercapnic respiratory failure.  She presents with the following impairments/functional limitations: weakness, impaired endurance, impaired self care skills, impaired functional mobility, gait instability, impaired balance, decreased upper extremity function, decreased lower extremity function, decreased safety awareness, impaired skin, impaired cardiopulmonary response to activity.    Pt agreeable to visit. Pt reports pain in abdomen. Abdomen noted to be distended, due to constipation. Pt declined attempting to stand or up to chair.    Pt performed supine to sit transfer with mod assist and verbal cuing for sequencing and hand placement. Pt able to maintain sitting EOB with stand by assist for 10 minutes.    Pt scooted along EOB with mod to max assist x 2 persons. Pt performed supine to sit transfer with min assist x 2.     Pt rolled side to side for linens to be adjusted with mod to min assist.    Rehab Prognosis: Fair; patient would benefit from acute skilled PT services to address these deficits and reach maximum level of function.    Recent Surgery: * No surgery found *      Plan:     During this hospitalization, patient to be seen 6 x/week to address the identified rehab impairments via gait training, therapeutic activities, therapeutic exercises and progress toward the following goals:    Plan of Care Expires:  10/01/24    Subjective     Chief Complaint: pain in abdomen  Patient/Family Comments/goals: to get  better  Pain/Comfort:  Pain Rating 1: 9/10  Location - Orientation 1: generalized  Location 1: abdomen  Pain Addressed 1: Reposition, Distraction, Cessation of Activity  Pain Rating Post-Intervention 1: 9/10      Objective:     Communicated with nurse prior to session.  Patient found HOB elevated with oxygen, PureWick, peripheral IV, telemetry upon PT entry to room.     General Precautions: Standard, fall  Orthopedic Precautions: N/A  Braces: N/A  Respiratory Status:  Oxymask 3 L O2     Functional Mobility:  Bed Mobility:     Rolling Left:  minimum assistance and moderate assistance  Rolling Right: minimum assistance and moderate assistance  Supine to Sit: moderate assistance  Sit to Supine: minimum assistance and of 2 persons  Balance: sitting EOB with SBA      AM-PAC 6 CLICK MOBILITY          Treatment & Education:  Pt educated on importance of time OOB, importance of intermittent mobility, safe techniques for transfers/ambulation, discharge recommendations/options, and use of call light for assistance and fall prevention.      Patient left HOB elevated with all lines intact, call button in reach, and bed alarm on..    GOALS:   Multidisciplinary Problems       Physical Therapy Goals          Problem: Physical Therapy    Goal Priority Disciplines Outcome Goal Variances Interventions   Physical Therapy Goal     PT, PT/OT Progressing     Description: Goals to be met by: 10/01/24     Patient will increase functional independence with mobility by performin. Supine to sit with minimal assistance  2. Sit to supine with minimal assistance  3. Sit to stand transfer with minimal assistance  4. Gait  x 50 feet  Minimal assistance using Rolling Walker.                          Time Tracking:     PT Received On: 24  PT Start Time: 1145     PT Stop Time: 1211  PT Total Time (min): 26 min     Billable Minutes: Therapeutic Activity 26    Treatment Type: Treatment  PT/PTA: PTA     Number of PTA visits since last PT  visit: 2     09/09/2024

## 2024-09-09 NOTE — PLAN OF CARE
Problem: Skin Injury Risk Increased  Goal: Skin Health and Integrity  Intervention: Promote and Optimize Oral Intake  Flowsheets (Taken 9/9/2024 1421)  Oral Nutrition Promotion: calorie-dense liquids provided     Problem: Oral Intake Inadequate  Goal: Improved Oral Intake  Outcome: Progressing  Intervention: Promote and Optimize Oral Intake  Flowsheets (Taken 9/9/2024 1421)  Oral Nutrition Promotion: calorie-dense liquids provided

## 2024-09-09 NOTE — CARE UPDATE
09/08/24 2020   Patient Assessment/Suction   Level of Consciousness (AVPU) alert   Respiratory Effort Normal;Unlabored   Expansion/Accessory Muscles/Retractions no use of accessory muscles   All Lung Fields Breath Sounds equal bilaterally;diminished;clear   Rhythm/Pattern, Respiratory pattern regular;depth regular   Cough Frequency no cough   PRE-TX-O2   Device (Oxygen Therapy) simple face mask   $ Is the patient on Low Flow Oxygen? Yes   Flow (L/min) (Oxygen Therapy) 3   SpO2 (!) 94 %   Pulse Oximetry Type Continuous   $ Pulse Oximetry - Multiple Charge Pulse Oximetry - Multiple   Pulse 80   Resp 18   Positioning HOB elevated 30 degrees   Positioning   Positioning/Transfer Devices pillows;in use   Aerosol Therapy   $ Aerosol Therapy Charges Aerosol Treatment   Daily Review of Necessity (SVN) completed   Respiratory Treatment Status (SVN) given   Treatment Route (SVN) mask;oxygen   Patient Position HOB elevated   Post Treatment Assessment (SVN) increased aeration   Signs of Intolerance (SVN) none   Incentive Spirometer   $ Incentive Spirometer Charges done with encouragement   Incentive Spirometer Predicted Level (mL) 1500   Administration (IS) self-administered   Number of Repetitions (IS) 5   Level Incentive Spirometer (mL) 1000   Patient Tolerance (IS) good;no adverse signs/symptoms present   Education   $ Education Bronchodilator;15 min

## 2024-09-10 LAB
ALBUMIN SERPL BCP-MCNC: 2.9 G/DL (ref 3.5–5.2)
ALP SERPL-CCNC: 131 U/L (ref 55–135)
ALT SERPL W/O P-5'-P-CCNC: 48 U/L (ref 10–44)
ANION GAP SERPL CALC-SCNC: 8 MMOL/L (ref 8–16)
AST SERPL-CCNC: 42 U/L (ref 10–40)
BILIRUB SERPL-MCNC: 0.5 MG/DL (ref 0.1–1)
BUN SERPL-MCNC: 8 MG/DL (ref 8–23)
CALCIUM SERPL-MCNC: 8 MG/DL (ref 8.7–10.5)
CHLORIDE SERPL-SCNC: 102 MMOL/L (ref 95–110)
CO2 SERPL-SCNC: 32 MMOL/L (ref 23–29)
CREAT SERPL-MCNC: 0.4 MG/DL (ref 0.5–1.4)
ERYTHROCYTE [DISTWIDTH] IN BLOOD BY AUTOMATED COUNT: 17.9 % (ref 11.5–14.5)
EST. GFR  (NO RACE VARIABLE): >60 ML/MIN/1.73 M^2
GLUCOSE SERPL-MCNC: 152 MG/DL (ref 70–110)
HCT VFR BLD AUTO: 28.3 % (ref 37–48.5)
HGB BLD-MCNC: 8.1 G/DL (ref 12–16)
MAGNESIUM SERPL-MCNC: 2.3 MG/DL (ref 1.6–2.6)
MCH RBC QN AUTO: 30.5 PG (ref 27–31)
MCHC RBC AUTO-ENTMCNC: 28.6 G/DL (ref 32–36)
MCV RBC AUTO: 106 FL (ref 82–98)
PLATELET # BLD AUTO: 139 K/UL (ref 150–450)
PMV BLD AUTO: 10.9 FL (ref 9.2–12.9)
POCT GLUCOSE: 168 MG/DL (ref 70–110)
POCT GLUCOSE: 170 MG/DL (ref 70–110)
POCT GLUCOSE: 212 MG/DL (ref 70–110)
POCT GLUCOSE: 237 MG/DL (ref 70–110)
POTASSIUM SERPL-SCNC: 3.3 MMOL/L (ref 3.5–5.1)
PROT SERPL-MCNC: 5.5 G/DL (ref 6–8.4)
RBC # BLD AUTO: 2.66 M/UL (ref 4–5.4)
SODIUM SERPL-SCNC: 142 MMOL/L (ref 136–145)
WBC # BLD AUTO: 8.35 K/UL (ref 3.9–12.7)

## 2024-09-10 PROCEDURE — 63600175 PHARM REV CODE 636 W HCPCS: Performed by: SURGERY

## 2024-09-10 PROCEDURE — 83735 ASSAY OF MAGNESIUM: CPT

## 2024-09-10 PROCEDURE — A4216 STERILE WATER/SALINE, 10 ML: HCPCS | Performed by: HOSPITALIST

## 2024-09-10 PROCEDURE — 25000003 PHARM REV CODE 250: Performed by: FAMILY MEDICINE

## 2024-09-10 PROCEDURE — 97530 THERAPEUTIC ACTIVITIES: CPT

## 2024-09-10 PROCEDURE — 25000003 PHARM REV CODE 250: Performed by: SURGERY

## 2024-09-10 PROCEDURE — 25000003 PHARM REV CODE 250

## 2024-09-10 PROCEDURE — 85027 COMPLETE CBC AUTOMATED: CPT

## 2024-09-10 PROCEDURE — 99900035 HC TECH TIME PER 15 MIN (STAT)

## 2024-09-10 PROCEDURE — 25000003 PHARM REV CODE 250: Performed by: HOSPITALIST

## 2024-09-10 PROCEDURE — 94761 N-INVAS EAR/PLS OXIMETRY MLT: CPT

## 2024-09-10 PROCEDURE — 94640 AIRWAY INHALATION TREATMENT: CPT

## 2024-09-10 PROCEDURE — 25000242 PHARM REV CODE 250 ALT 637 W/ HCPCS: Performed by: HOSPITALIST

## 2024-09-10 PROCEDURE — 94660 CPAP INITIATION&MGMT: CPT

## 2024-09-10 PROCEDURE — 27100171 HC OXYGEN HIGH FLOW UP TO 24 HOURS

## 2024-09-10 PROCEDURE — 80053 COMPREHEN METABOLIC PANEL: CPT

## 2024-09-10 PROCEDURE — 97110 THERAPEUTIC EXERCISES: CPT

## 2024-09-10 PROCEDURE — 25000003 PHARM REV CODE 250: Performed by: INTERNAL MEDICINE

## 2024-09-10 PROCEDURE — 63600175 PHARM REV CODE 636 W HCPCS

## 2024-09-10 PROCEDURE — 63600175 PHARM REV CODE 636 W HCPCS: Performed by: HOSPITALIST

## 2024-09-10 PROCEDURE — 36415 COLL VENOUS BLD VENIPUNCTURE: CPT

## 2024-09-10 PROCEDURE — 21400001 HC TELEMETRY ROOM

## 2024-09-10 PROCEDURE — 99900031 HC PATIENT EDUCATION (STAT)

## 2024-09-10 RX ORDER — METOCLOPRAMIDE HYDROCHLORIDE 5 MG/ML
10 INJECTION INTRAMUSCULAR; INTRAVENOUS EVERY 6 HOURS
Status: DISCONTINUED | OUTPATIENT
Start: 2024-09-10 | End: 2024-09-17 | Stop reason: HOSPADM

## 2024-09-10 RX ORDER — POTASSIUM CHLORIDE 20 MEQ/1
40 TABLET, EXTENDED RELEASE ORAL ONCE
Status: COMPLETED | OUTPATIENT
Start: 2024-09-10 | End: 2024-09-10

## 2024-09-10 RX ORDER — CHLORZOXAZONE 500 MG/1
500 TABLET ORAL ONCE
Status: COMPLETED | OUTPATIENT
Start: 2024-09-10 | End: 2024-09-10

## 2024-09-10 RX ADMIN — LIDOCAINE 3 PATCH: 50 PATCH TOPICAL at 11:09

## 2024-09-10 RX ADMIN — SODIUM CHLORIDE, PRESERVATIVE FREE 10 ML: 5 INJECTION INTRAVENOUS at 06:09

## 2024-09-10 RX ADMIN — FAMOTIDINE 20 MG: 10 INJECTION INTRAVENOUS at 09:09

## 2024-09-10 RX ADMIN — CHLORZOXAZONE 500 MG: 500 TABLET ORAL at 01:09

## 2024-09-10 RX ADMIN — POTASSIUM CHLORIDE 40 MEQ: 1500 TABLET, EXTENDED RELEASE ORAL at 09:09

## 2024-09-10 RX ADMIN — IPRATROPIUM BROMIDE AND ALBUTEROL SULFATE 3 ML: 2.5; .5 SOLUTION RESPIRATORY (INHALATION) at 07:09

## 2024-09-10 RX ADMIN — POTASSIUM BICARBONATE 35 MEQ: 391 TABLET, EFFERVESCENT ORAL at 05:09

## 2024-09-10 RX ADMIN — POLYETHYLENE GLYCOL 3350 17 G: 17 POWDER, FOR SOLUTION ORAL at 09:09

## 2024-09-10 RX ADMIN — GABAPENTIN 400 MG: 300 CAPSULE ORAL at 09:09

## 2024-09-10 RX ADMIN — SENNOSIDES AND DOCUSATE SODIUM 1 TABLET: 8.6; 5 TABLET ORAL at 09:09

## 2024-09-10 RX ADMIN — INSULIN GLARGINE 30 UNITS: 100 INJECTION, SOLUTION SUBCUTANEOUS at 09:09

## 2024-09-10 RX ADMIN — METOCLOPRAMIDE HYDROCHLORIDE 10 MG: 5 INJECTION INTRAMUSCULAR; INTRAVENOUS at 05:09

## 2024-09-10 RX ADMIN — SODIUM CHLORIDE, SODIUM LACTATE, POTASSIUM CHLORIDE, CALCIUM CHLORIDE AND DEXTROSE MONOHYDRATE: 5; 600; 310; 30; 20 INJECTION, SOLUTION INTRAVENOUS at 04:09

## 2024-09-10 RX ADMIN — INSULIN ASPART 2 UNITS: 100 INJECTION, SOLUTION INTRAVENOUS; SUBCUTANEOUS at 01:09

## 2024-09-10 RX ADMIN — INSULIN ASPART 2 UNITS: 100 INJECTION, SOLUTION INTRAVENOUS; SUBCUTANEOUS at 10:09

## 2024-09-10 RX ADMIN — LEVOTHYROXINE SODIUM 75 MCG: 0.03 TABLET ORAL at 06:09

## 2024-09-10 RX ADMIN — INSULIN ASPART 4 UNITS: 100 INJECTION, SOLUTION INTRAVENOUS; SUBCUTANEOUS at 05:09

## 2024-09-10 RX ADMIN — ACETAMINOPHEN 650 MG: 325 TABLET ORAL at 09:09

## 2024-09-10 RX ADMIN — IPRATROPIUM BROMIDE AND ALBUTEROL SULFATE 3 ML: 2.5; .5 SOLUTION RESPIRATORY (INHALATION) at 01:09

## 2024-09-10 RX ADMIN — SIMETHICONE 80 MG: 80 TABLET, CHEWABLE ORAL at 09:09

## 2024-09-10 RX ADMIN — MECLIZINE HYDROCHLORIDE 25 MG: 12.5 TABLET ORAL at 09:09

## 2024-09-10 RX ADMIN — HYDROCODONE BITARTRATE AND ACETAMINOPHEN 1 TABLET: 5; 325 TABLET ORAL at 12:09

## 2024-09-10 RX ADMIN — PREDNISONE 10 MG: 5 TABLET ORAL at 09:09

## 2024-09-10 RX ADMIN — SODIUM CHLORIDE, SODIUM LACTATE, POTASSIUM CHLORIDE, CALCIUM CHLORIDE AND DEXTROSE MONOHYDRATE: 5; 600; 310; 30; 20 INJECTION, SOLUTION INTRAVENOUS at 02:09

## 2024-09-10 RX ADMIN — INSULIN ASPART 2 UNITS: 100 INJECTION, SOLUTION INTRAVENOUS; SUBCUTANEOUS at 09:09

## 2024-09-10 RX ADMIN — LACTULOSE 20 G: 20 SOLUTION ORAL at 09:09

## 2024-09-10 RX ADMIN — ENOXAPARIN SODIUM 40 MG: 40 INJECTION SUBCUTANEOUS at 09:09

## 2024-09-10 RX ADMIN — LOSARTAN POTASSIUM 100 MG: 50 TABLET, FILM COATED ORAL at 09:09

## 2024-09-10 RX ADMIN — DILTIAZEM HYDROCHLORIDE 240 MG: 120 CAPSULE, COATED, EXTENDED RELEASE ORAL at 09:09

## 2024-09-10 RX ADMIN — INSULIN GLARGINE 30 UNITS: 100 INJECTION, SOLUTION SUBCUTANEOUS at 10:09

## 2024-09-10 RX ADMIN — HYDROCODONE BITARTRATE AND ACETAMINOPHEN 1 TABLET: 5; 325 TABLET ORAL at 09:09

## 2024-09-10 RX ADMIN — GABAPENTIN 400 MG: 300 CAPSULE ORAL at 03:09

## 2024-09-10 RX ADMIN — CYANOCOBALAMIN TAB 1000 MCG 1000 MCG: 1000 TAB at 09:09

## 2024-09-10 NOTE — PROGRESS NOTES
Pt seen and examined. Reports having multipel BMs over past 24 hours.  Some relieve in pain.  No n/v.  Tolerating clears    Wt Readings from Last 3 Encounters:   09/10/24 120.2 kg (265 lb)   08/30/24 122.9 kg (271 lb)   06/20/24 95.6 kg (210 lb 12.2 oz)     Temp Readings from Last 3 Encounters:   09/10/24 98 °F (36.7 °C) (Oral)   06/20/24 98 °F (36.7 °C) (Oral)   06/30/22 97.7 °F (36.5 °C)     BP Readings from Last 3 Encounters:   09/10/24 (!) 125/58   06/20/24 139/63   06/30/22 117/73     Pulse Readings from Last 3 Encounters:   09/10/24 72   06/20/24 64   06/30/22 73     AAox3  Soft/dist/obese    Lab Results   Component Value Date    WBC 8.35 09/10/2024    HGB 8.1 (L) 09/10/2024    HCT 28.3 (L) 09/10/2024     (H) 09/10/2024     (L) 09/10/2024       BMP  Lab Results   Component Value Date     09/10/2024    K 3.3 (L) 09/10/2024     09/10/2024    CO2 32 (H) 09/10/2024    BUN 8 09/10/2024    CREATININE 0.4 (L) 09/10/2024    CALCIUM 8.0 (L) 09/10/2024    ANIONGAP 8 09/10/2024    EGFRNORACEVR >60.0 09/10/2024     A/P: constipation/ ileus  Ok to adv diet to fulls from surgical stdpt  NO acute surgical intervention.  WOuld cont laxative and lactulose until normalization of bowels.  If desired could consider GI evaluation for consideration of endoscopic decompression vs consideration of gastrograffin enema  Will follow from periphery

## 2024-09-10 NOTE — NURSING
Nurses Note -- 4 Eyes      9/10/2024   1:43 PM      Skin assessed during: Q Shift Change      [] No Altered Skin Integrity Present    []Prevention Measures Documented      [x] Yes- Altered Skin Integrity Present or Discovered   [] LDA Added if Not in Epic (Describe Wound)   [] New Altered Skin Integrity was Present on Admit and Documented in LDA   [] Wound Image Taken    Wound Care Consulted? Yes    Attending Nurse:  Dina Forte RN/Staff Member:  Tiffani

## 2024-09-10 NOTE — PLAN OF CARE
Problem: Physical Therapy  Goal: Physical Therapy Goal  Description: Goals to be met by: 10/01/24     Patient will increase functional independence with mobility by performin. Supine to sit with minimal assistance  2. Sit to supine with minimal assistance  3. Sit to stand transfer with minimal assistance  4. Gait  x 50 feet  Minimal assistance using Rolling Walker.     Outcome: Progressing

## 2024-09-10 NOTE — CARE UPDATE
09/10/24 0119   Patient Assessment/Suction   Level of Consciousness (AVPU) alert   Respiratory Effort Normal;Unlabored   Expansion/Accessory Muscles/Retractions no use of accessory muscles   All Lung Fields Breath Sounds diminished;clear   Rhythm/Pattern, Respiratory pattern regular   Cough Frequency no cough   PRE-TX-O2   Device (Oxygen Therapy) BIPAP   $ Is the patient on High Flow Oxygen? Yes   Oxygen Concentration (%) 32   SpO2 97 %   Pulse Oximetry Type Continuous   $ Pulse Oximetry - Multiple Charge Pulse Oximetry - Multiple   Pulse 80   Resp (!) 24   Positioning Left side   Positioning   Head of Bed (HOB) Positioning HOB at 20-30 degrees   Positioning/Transfer Devices pillows;in use   Aerosol Therapy   $ Aerosol Therapy Charges Aerosol Treatment   Daily Review of Necessity (SVN) completed   Respiratory Treatment Status (SVN) given   Treatment Route (SVN) mask;oxygen   Patient Position HOB elevated   Post Treatment Assessment (SVN) increased aeration   Signs of Intolerance (SVN) none   Ready to Wean/Extubation Screen   FIO2<=50 (chart decimal) 0.32   Preset CPAP/BiPAP Settings   Mode Of Delivery BiPAP   $ Initial CPAP/BiPAP Setup? No   $ Is patient using? Yes   Size of Mask Small/Medium   Sized Appropriately? Yes   Equipment Type V60   Airway Device Type medium full face mask   Humidifier not applicable   Ipap 12   EPAP (cm H2O) 6   Pressure Support (cm H2O) 6   Set Rate (Breaths/Min) 16   ITime (sec) 1   Rise Time (sec) 3   Patient CPAP/BiPAP Settings   CPAP/BIPAP ID 15   Timed Inspiration (Sec) 1   IPAP Rise Time (sec) 3   RR Total (Breaths/Min) 20   Tidal Volume (mL) 486   VE Minute Ventilation (L/min) 9.5 L/min   Peak Inspiratory Pressure (cm H2O) 13   TiTOT (%) 29   Total Leak (L/Min) 18   Patient Trigger - ST Mode Only (%) 94   CPAP/BiPAP Backup Settings   Backup Rate 16 breaths per minute (bpm)   CPAP/BiPAP Alarms   High Pressure (cm H2O) 40   Low Pressure (cm H2O) 5   Minute Ventilation (L/Min) 3    High RR (breaths/min) 40   Low RR (breaths/min) 8   Apnea (Sec) 20

## 2024-09-10 NOTE — PROGRESS NOTES
[facility]  Progress Note    Patient Name: Karo Leonard  MRN: [id number]  Patient Class: IP- Inpatient   Admission Date: 8/25/2024  Length of Stay: 15 days  Attending Physician: Ashleigh Maldonado, *  Primary Care Provider: Navneet Hernandez FNP        Subjective:     Principal Problem:Acute hypoxic on chronic hypercapnic respiratory failure        HPI:  64F p/w fall in the context of shortness of breath. EMS reportedly gave narcan w/o appreciable response, then she was given duoneb en route to [facility] ED. Upon arrival, she was tachypneic, had respy sounding phonation, but she denied chest pain, fever, or chills. She was placed on BiPAP, and initially demonstrated improvement. However, she became less responsive and appeared to tire out. She was given additional narcan w/o appreciable response, so she was intubated. She had some hypotension after receiving sedation, so she was placed on low dose levophed. Pickens was placed, lasix was given, as was Abx. Large bruise noted on LLE, so x-ray ordered.     Overview/Hospital Course:  64 F Patient with hx of COPD, morbid obesity was admitted for acute on chronic hypoxemic hypercapnic respiratory failure with sever fall with hit to the face . Patient was intubated after failing  BiPAP. Finished course of IV Levofloxacin for COPD exacerbation and possible pneumonia. Pulm has been managing her steroid dose. Patient was started on iron and B12 supplement for anemia. S/P exubation on 8/31. For her hypertension, patient was started on losartan and her home diltiazem was resumed.  Patient working with PT and OT.  Fall precautions have been addressed with the patient.  Patient moving to medicine telemetry.  Supplemental oxygen weaning is in process.  Patient encouraged to continue BiPAP use at night or as needed as before.  Patient oxygen requirement progressively improving.  Patient is physically deconditioned and working with PT and OT.   Awaiting skilled nursing facility placement.  Steroid dose is being tapered down.  Patient complained of bloated and also passing a lot of gas and Gas x was given but later complained of mild abdominal pain and KUB was done and found to have Gas in stool distended loops of large bowel measuring up to 6.5 cm.  Findings could reflect ileus or obstruction.  CT scan done demonstrated significant colonic distention consistent with ileus with a large amount of stool in ascending colon, colon measuring up to 11 cm.  General surgery evaluated the patient, NG tube was placed, patient continued on MiraLax and enemas, CT scan was repeated on 09/08/2024 which still shows significant constipation.  She had a total of 3 bowel movements in the last 24 hours, responded well to lactulose.  Abdominal pain improved    Interval History:  Had a total four bowel movements with enema and lactulose.  Abdominal pain improved today but still present.  With her abdominal pain and positioning she is unable to work with the physical therapy but is motivated.  Increased gabapentin dose and gave lidocaine patch for back pain.      Review of Systems   HENT:          Hematoma over the right frontal region from the fall   Respiratory:  Positive for shortness of breath.    Gastrointestinal:  Positive for abdominal distention and abdominal pain.   Musculoskeletal:  Positive for back pain.     Objective:     Vital Signs (Most Recent):  Temp: 98.3 °F (36.8 °C) (09/09/24 1530)  Pulse: 86 (09/09/24 1530)  Resp: 18 (09/09/24 1530)  BP: (!) 196/68 (09/09/24 1530)  SpO2: (!) 93 % (09/09/24 1530) Vital Signs (24h Range):  Temp:  [97.5 °F (36.4 °C)-99.4 °F (37.4 °C)] 98.3 °F (36.8 °C)  Pulse:  [64-89] 86  Resp:  [15-19] 18  SpO2:  [88 %-98 %] 93 %  BP: (105-196)/(55-87) 196/68     Weight: 119.7 kg (264 lb)  Body mass index is 43.93 kg/m².    Intake/Output Summary (Last 24 hours) at 9/9/2024 1906  Last data filed at 9/9/2024 1413  Gross per 24 hour   Intake  1240 ml   Output 950 ml   Net 290 ml         Physical Exam  HENT:      Head:      Comments: Ecchymosis around bilateral eyes     Mouth/Throat:      Mouth: Mucous membranes are dry.   Eyes:      Pupils: Pupils are equal, round, and reactive to light.   Cardiovascular:      Rate and Rhythm: Normal rate.   Pulmonary:      Effort: Pulmonary effort is normal. No respiratory distress.      Breath sounds: Normal breath sounds. No stridor. No wheezing.   Abdominal:      General: There is distension.      Palpations: There is no mass.      Tenderness: There is abdominal tenderness. There is no guarding or rebound.   Musculoskeletal:      Right lower leg: Edema present.      Left lower leg: Edema present.   Neurological:      Mental Status: She is alert and oriented to person, place, and time.             Significant Labs: All pertinent labs within the past 24 hours have been reviewed.  CBC:   Recent Labs   Lab 09/08/24  0459   WBC 7.37   HGB 8.7*   HCT 29.1*   *     CMP:   Recent Labs   Lab 09/08/24  0458 09/08/24  0839     --    K 4.5  --    CL 97  --    CO2 34*  --    GLU 90 82   BUN 12  --    CREATININE 0.6  --    CALCIUM 8.6*  --    ANIONGAP 6*  --        Significant Imaging: I have reviewed all pertinent imaging results/findings within the past 24 hours.    Assessment/Plan:      * Acute hypoxic on chronic hypercapnic respiratory failure  Appear secondary to COPD exacerbation and failed BiPAP   Improving  Likely multifactorial  S/P extubation on 8/31  Oxygen supplement as need, patient already on home oxygen 2 to 3 L  EF 60-65%  Pulmonology is following patient  Having problem with placement because she need BiPAP at night along with oxygen 7 L and facility can not accept with the level of BiPAP  Trying to send to  LTAC  Encouraged incentive spirometry given her continuous lying in bed and positioning, she demonstrates understanding and is motivated    Ileus  KUB with Gas in stool distended loops of large  "bowel measuring up to 6.5 cm.  Findings could reflect ileus or obstruction.  CT scan done demonstrated significant colonic distention consistent with ileus with a large amount of stool in ascending colon, colon measuring up to 11 cm. General surgery evaluated the patient, NG tube was placed, patient continued on MiraLax and enemas  Avoid scheduled opiates   Ordered Tylenol and ketorolac for pain  IV fluids given poor oral intake  Repeat CT showed significant constipation   General surgery recommended lactulose and repeat enema   Tolerating clear liquid diet today  Had 4 bowel movements in the last 24hrs       Type 2 diabetes mellitus  Continue with SSI   We will reduce glargine given poor oral intake, episode of hypoglycemia this morning    Make adjustment as needed    Anemia  Likely from Iron and b12 deficiency  Started on Iron and B12 supplement on 8/27    Fall  Imaging ruled out acute fracture but severe swelling noted.  No visual disturbance  US confirmed hematoma on left lower extremity which is getting smaller  Fall precautions discussed with the patient.    PT OT and SNF placement /LTAC placement    Acute metabolic encephalopathy  Resolved  CT head: no acute changes  Continue to monitor     Pneumonia  MRSA neg, procal neg, respiratory panel neg  Completed Levofloxacin antibiotic course on 9/1    COPD exacerbation  Continue with steroids  Taper prednisone   Completed Levofloxacin antibiotic course on 9/1    Hypertension  Started Losartan on 8/28 and amlodipine on 8/31  Can make adjustment as needed  Resumed home diltiazem on 9/1    Hypothyroid  Continue home levothyroxine, increased dose         VTE Risk Mitigation (From admission, onward)           Ordered     enoxaparin injection 40 mg  Every 12 hours        Note to Pharmacy: Ht: 5' 5" (1.651 m)  Wt: 127 kg (280 lb)  Estimated Creatinine Clearance: 92.8 mL/min (based on SCr of 0.8 mg/dL).  Body mass index is 46.59 kg/m².    08/25/24 0627     IP VTE HIGH RISK " PATIENT  Once         08/25/24 0627     Place sequential compression device  Until discontinued         08/25/24 0627                    Discharge Planning   CHELE: 9/11/2024     Code Status: Full Code   Is the patient medically ready for discharge?:     Reason for patient still in hospital (select all that apply): Treatment  Discharge Plan A: Long-term acute care facility (LTAC)   Discharge Delays: None known at this time              Ashleigh Maldonado MD  Department of Hospital Medicine   [facility]

## 2024-09-10 NOTE — RESPIRATORY THERAPY
09/10/24 0722   Patient Assessment/Suction   Level of Consciousness (AVPU) responds to voice   Respiratory Effort Unlabored   All Lung Fields Breath Sounds clear;diminished   PRE-TX-O2   Device (Oxygen Therapy) BIPAP   $ Is the patient on High Flow Oxygen? Yes   Oxygen Concentration (%) 32   SpO2 (!) 93 %   Pulse Oximetry Type Intermittent   $ Pulse Oximetry - Multiple Charge Pulse Oximetry - Multiple   Pulse (!) 56   Resp (!) 22   Aerosol Therapy   $ Aerosol Therapy Charges Aerosol Treatment   Respiratory Treatment Status (SVN) given   Treatment Route (SVN) in-line  (bipap)   Patient Position HOB elevated   Post Treatment Assessment (SVN) increased aeration   Signs of Intolerance (SVN) none   Breath Sounds Post-Respiratory Treatment   Post-treatment Heart Rate (beats/min) 64   Post-treatment Resp Rate (breaths/min) 19   Ready to Wean/Extubation Screen   FIO2<=50 (chart decimal) 0.32   Preset CPAP/BiPAP Settings   Mode Of Delivery BiPAP S/T   $ CPAP/BiPAP Daily Charge BiPAP/CPAP Daily   $ Is patient using? Yes   Equipment Type V60   Airway Device Type medium full face mask   Ipap 12   EPAP (cm H2O) 6   Pressure Support (cm H2O) 6   ITime (sec) 1   Rise Time (sec) 3   Patient CPAP/BiPAP Settings   RR Total (Breaths/Min) 22   Tidal Volume (mL) 331   VE Minute Ventilation (L/min) 6.3 L/min   Peak Inspiratory Pressure (cm H2O) 12   Total Leak (L/Min) 14   CPAP/BiPAP Backup Settings   Backup Rate 16 breaths per minute (bpm)   CPAP/BiPAP Alarms   High Pressure (cm H2O) 40   Low Pressure (cm H2O) 5   Minute Ventilation (L/Min) 3   High RR (breaths/min) 40   Low RR (breaths/min) 8   Apnea (Sec) 20

## 2024-09-10 NOTE — PT/OT/SLP PROGRESS
Occupational Therapy   Treatment    Name: Karo Leonard  MRN: [id number]  Admitting Diagnosis:  Acute hypoxic on chronic hypercapnic respiratory failure       Recommendations:     Discharge Recommendations: Moderate Intensity Therapy  Discharge Equipment Recommendations:  to be determined by next level of care  Barriers to discharge:       Assessment:     Karo Leonard is a 64 y.o. female with a medical diagnosis of Acute hypoxic on chronic hypercapnic respiratory failure. Pt agreeable to OT therapy session this AM. Performance deficits affecting function are weakness, impaired endurance, impaired self care skills, impaired functional mobility, gait instability, impaired balance, decreased safety awareness, pain, impaired cardiopulmonary response to activity.     Rehab Prognosis:  Fair; patient would benefit from acute skilled OT services to address these deficits and reach maximum level of function.       Plan:     Patient to be seen 5 x/week to address the above listed problems via self-care/home management, therapeutic activities, therapeutic exercises  Plan of Care Expires: 09/22/24  Plan of Care Reviewed with: patient    Subjective     Chief Complaint: none stated  Patient/Family Comments/goals: none stated  Pain/Comfort:  Pain Rating 1:  (not rated)  Location 1:  (abdomen)  Pain Addressed 1: Reposition, Cessation of Activity    Objective:     Communicated with: nursing prior to session.  Patient found sitting edge of bed with PT with bed alarm, oxygen, PureWick, peripheral IV, telemetry upon OT entry to room.    General Precautions: Standard, fall    Orthopedic Precautions:N/A  Braces: N/A  Respiratory Status:  oxymask 2L     Occupational Performance:     Bed Mobility:    Patient completed Sit to Supine with minimum assistance for BLE's    Therapeutic Exercise:  BUE ROM exercises x 5 reps each in all major planes of motion to improve pt's strength, ROM, and endurance needed for ADLs. Pt tolerated  well  Bilateral ball squeezes for  strengthening  Reviewed handouts on FM activities and general hand strengthening activities     Treatment & Education:  Pt educated on role of OT/POC, importance of OOB/EOB activity, use of call bell, and safety during ADLs, transfers, and functional mobility.    Patient left HOB elevated with all lines intact, call button in reach, and bed alarm on    GOALS:   Multidisciplinary Problems       Occupational Therapy Goals          Problem: Occupational Therapy    Goal Priority Disciplines Outcome Interventions   Occupational Therapy Goal     OT, PT/OT Progressing    Description: Goals to be met by: 9/22/2024     Patient will increase functional independence with ADLs by performing:    LE Dressing with Minimal Assistance.  Grooming while seated with Supervision.  Toileting from bedside commode with Minimal Assistance for hygiene and clothing management.   Supine to sit with Minimal Assistance.  Toilet transfer to bedside commode with Minimal Assistance.  Upper extremity exercise program, with supervision.                         Time Tracking:     OT Date of Treatment: 09/10/24  OT Start Time: 1119  OT Stop Time: 1139  OT Total Time (min): 20 min    Billable Minutes:Therapeutic Exercise 20    OT/NYA: OT       9/10/2024

## 2024-09-10 NOTE — SUBJECTIVE & OBJECTIVE
"Interval History:        Review of Systems   HENT:          Hematoma over the right frontal region from the fall   Gastrointestinal:  Positive for abdominal distention and abdominal pain.   Musculoskeletal:  Positive for back pain.     Objective:     Vital Signs (Most Recent):  Temp: 98 °F (36.7 °C) (09/10/24 0706)  Pulse: (!) 56 (09/10/24 0722)  Resp: (!) 22 (09/10/24 0722)  BP: (!) 125/58 (09/10/24 0706)  SpO2: (!) 93 % (09/10/24 0722) Vital Signs (24h Range):  Temp:  [98 °F (36.7 °C)-98.6 °F (37 °C)] 98 °F (36.7 °C)  Pulse:  [56-89] 56  Resp:  [15-24] 22  SpO2:  [88 %-98 %] 93 %  BP: (125-196)/(50-68) 125/58     Weight: 120.2 kg (265 lb)  Body mass index is 44.1 kg/m².    Intake/Output Summary (Last 24 hours) at 9/10/2024 0840  Last data filed at 9/10/2024 0621  Gross per 24 hour   Intake 1660 ml   Output 850 ml   Net 810 ml         Physical Exam  HENT:      Head:      Comments: Ecchymosis around bilateral eyes     Mouth/Throat:      Mouth: Mucous membranes are dry.   Eyes:      Pupils: Pupils are equal, round, and reactive to light.   Cardiovascular:      Rate and Rhythm: Normal rate.   Pulmonary:      Effort: Pulmonary effort is normal. No respiratory distress.      Breath sounds: Normal breath sounds. No stridor. No wheezing.   Abdominal:      General: There is distension.      Palpations: There is no mass.      Tenderness: There is abdominal tenderness. There is no guarding or rebound.   Musculoskeletal:      Right lower leg: Edema present.      Left lower leg: Edema present.   Neurological:      Mental Status: She is alert and oriented to person, place, and time.             Significant Labs: All pertinent labs within the past 24 hours have been reviewed.  CBC:   No results for input(s): "WBC", "HGB", "HCT", "PLT" in the last 48 hours.    CMP:   No results for input(s): "NA", "K", "CL", "CO2", "GLU", "BUN", "CREATININE", "CALCIUM", "PROT", "ALBUMIN", "BILITOT", "ALKPHOS", "AST", "ALT", "ANIONGAP", " ""EGFRNONAA" in the last 48 hours.    Invalid input(s): "ESTGF[place]"      Significant Imaging: I have reviewed all pertinent imaging results/findings within the past 24 hours.  "

## 2024-09-10 NOTE — CARE UPDATE
09/09/24 1947   Patient Assessment/Suction   Level of Consciousness (AVPU) alert   Respiratory Effort Normal;Unlabored   Expansion/Accessory Muscles/Retractions no use of accessory muscles   All Lung Fields Breath Sounds diminished;clear   Rhythm/Pattern, Respiratory unlabored;pattern regular   Cough Frequency no cough   Skin Integrity   $ Wound Care Tech Time 15 min   Area Observed Left;Right;Cheek   Skin Appearance without discoloration   PRE-TX-O2   Device (Oxygen Therapy) Oxymask   $ Is the patient on Low Flow Oxygen? Yes   Flow (L/min) (Oxygen Therapy) 3   SpO2 96 %   Pulse Oximetry Type Continuous   $ Pulse Oximetry - Multiple Charge Pulse Oximetry - Multiple   Pulse 81   Resp 19   Positioning HOB elevated 30 degrees   Positioning   Positioning/Transfer Devices pillows;in use   Aerosol Therapy   $ Aerosol Therapy Charges Aerosol Treatment   Daily Review of Necessity (SVN) completed   Respiratory Treatment Status (SVN) given   Treatment Route (SVN) mask;oxygen   Patient Position HOB elevated   Post Treatment Assessment (SVN) patient reports breathing improved   Signs of Intolerance (SVN) none   Incentive Spirometer   $ Incentive Spirometer Charges done with encouragement   Incentive Spirometer Predicted Level (mL) 1500   Administration (IS) self-administered   Number of Repetitions (IS) 5   Level Incentive Spirometer (mL) 1000   Patient Tolerance (IS) good;no adverse signs/symptoms present   Education   $ Education Bronchodilator;Incentive Spirometry;30 min;PEP Therapy

## 2024-09-10 NOTE — PLAN OF CARE
Problem: Adult Inpatient Plan of Care  Goal: Plan of Care Review  Outcome: Progressing  Goal: Patient-Specific Goal (Individualized)  Outcome: Progressing  Goal: Absence of Hospital-Acquired Illness or Injury  Outcome: Progressing  Goal: Optimal Comfort and Wellbeing  Outcome: Progressing  Goal: Readiness for Transition of Care  Outcome: Progressing     Problem: Bariatric Environmental Safety  Goal: Safety Maintained with Care  Outcome: Progressing     Problem: Skin Injury Risk Increased  Goal: Skin Health and Integrity  Outcome: Progressing     Problem: Diabetes Comorbidity  Goal: Blood Glucose Level Within Targeted Range  Outcome: Progressing     Problem: Pneumonia  Goal: Fluid Balance  Outcome: Progressing  Goal: Resolution of Infection Signs and Symptoms  Outcome: Progressing  Goal: Effective Oxygenation and Ventilation  Outcome: Progressing     Problem: Infection  Goal: Absence of Infection Signs and Symptoms  Outcome: Progressing     Problem: Fall Injury Risk  Goal: Absence of Fall and Fall-Related Injury  Outcome: Progressing     Problem: Enteral Nutrition  Goal: Feeding Tolerance  Outcome: Progressing     Problem: Mechanical Ventilation Invasive  Goal: Optimal Nutrition Delivery  Outcome: Progressing  Goal: Absence of Device-Related Skin and Tissue Injury  Outcome: Progressing  Goal: Absence of Ventilator-Induced Lung Injury  Outcome: Progressing     Problem: Wound  Goal: Optimal Coping  Outcome: Progressing  Goal: Optimal Functional Ability  Outcome: Progressing  Goal: Absence of Infection Signs and Symptoms  Outcome: Progressing  Goal: Improved Oral Intake  Outcome: Progressing  Goal: Optimal Pain Control and Function  Outcome: Progressing  Goal: Skin Health and Integrity  Outcome: Progressing  Goal: Optimal Wound Healing  Outcome: Progressing     Problem: Bowel Elimination Management  Goal: Effective Bowel Elimination/Continence  Outcome: Progressing     Problem: Oral Intake Inadequate  Goal: Improved  Oral Intake  Outcome: Progressing

## 2024-09-10 NOTE — NURSING
Nurses Note -- 4 Eyes      9/9/2024   8:31 PM      Skin assessed during: Q Shift Change      [] No Altered Skin Integrity Present    []Prevention Measures Documented      [x] Yes- Altered Skin Integrity Present or Discovered   [] LDA Added if Not in Epic (Describe Wound)   [] New Altered Skin Integrity was Present on Admit and Documented in LDA   [] Wound Image Taken    Wound Care Consulted? Yes, already on case.    Attending Nurse:  CARLOS Byers    Second RN/Staff Member:  CARLOS Diaz

## 2024-09-10 NOTE — PT/OT/SLP PROGRESS
"Physical Therapy Treatment    Patient Name:  Karo Leonard   MRN:  [id number]    Recommendations:     Discharge Recommendations: Moderate Intensity Therapy  Discharge Equipment Recommendations: to be determined by next level of care  Barriers to discharge: decreased activity tolerance, pain    Assessment:     Karo Leonard is a 64 y.o. female admitted with a medical diagnosis of Acute hypoxic on chronic hypercapnic respiratory failure.  She presents with the following impairments/functional limitations: weakness, impaired endurance, impaired functional mobility, decreased lower extremity function, pain, edema, impaired cardiopulmonary response to activity. Patient is agreeable to participation with PT treatment. She requires SBA for supine to sit and SBA for sitting balance. She declines attempting standing or transferring to a chair stating "the chair is too low". She was able to scoot laterally to the left x2 trials with SBA. She was left sitting EOB with OT present for treatment.     Rehab Prognosis: Fair; patient would benefit from acute skilled PT services to address these deficits and reach maximum level of function.    Recent Surgery: * No surgery found *      Plan:     During this hospitalization, patient to be seen 5 x/week to address the identified rehab impairments via gait training, therapeutic activities, therapeutic exercises and progress toward the following goals:    Plan of Care Expires:  10/01/24    Subjective     Chief Complaint: abdominal pain/bloating   Patient/Family Comments/goals: SNF  Pain/Comfort:  Pain Rating 1:  (not rated)  Location 1: abdomen  Pain Addressed 1: Reposition, Distraction      Objective:     Communicated with CARLOS Arroyo prior to session.  Patient found HOB elevated with bed alarm, oxygen, PureWick, peripheral IV, telemetry upon PT entry to room.     General Precautions: Standard, fall, respiratory  Orthopedic Precautions: N/A  Braces: N/A  Respiratory Status:  oxymask " 3L     Functional Mobility:  Bed Mobility:     Lateral Scooting: stand by assistance  Supine to Sit: stand by assistance      AM-PAC 6 CLICK MOBILITY  Turning over in bed (including adjusting bedclothes, sheets and blankets)?: 4  Sitting down on and standing up from a chair with arms (e.g., wheelchair, bedside commode, etc.): 1  Moving from lying on back to sitting on the side of the bed?: 4  Moving to and from a bed to a chair (including a wheelchair)?: 1  Need to walk in hospital room?: 1  Climbing 3-5 steps with a railing?: 1  Basic Mobility Total Score: 12       Treatment & Education:  Patient was educated on the importance of OOB activity and functional mobility to negate negative effects of prolonged bed rest during hospitalization, safe transfers, and D/C planning     Patient left sitting edge of bed with all lines intact, call button in reach, RN notified, and OT present..    GOALS:   Multidisciplinary Problems       Physical Therapy Goals          Problem: Physical Therapy    Goal Priority Disciplines Outcome Goal Variances Interventions   Physical Therapy Goal     PT, PT/OT Progressing     Description: Goals to be met by: 10/01/24     Patient will increase functional independence with mobility by performin. Supine to sit with minimal assistance  2. Sit to supine with minimal assistance  3. Sit to stand transfer with minimal assistance  4. Gait  x 50 feet  Minimal assistance using Rolling Walker.                          Time Tracking:     PT Received On: 09/10/24  PT Start Time: 1103     PT Stop Time: 1120  PT Total Time (min): 17 min     Billable Minutes: Therapeutic Activity 17    Treatment Type: Treatment  PT/PTA: PT     Number of PTA visits since last PT visit: 0     09/10/2024

## 2024-09-10 NOTE — PROGRESS NOTES
[facility]  Progress Note    Patient Name: Karo Leonard  MRN: [id number]  Patient Class: IP- Inpatient   Admission Date: 8/25/2024  Length of Stay: 16 days  Attending Physician: Ashleigh Maldonado, *  Primary Care Provider: Navneet Hernandez FNP        Subjective:     Principal Problem:Acute hypoxic on chronic hypercapnic respiratory failure        HPI:  64F p/w fall in the context of shortness of breath. EMS reportedly gave narcan w/o appreciable response, then she was given duoneb en route to [facility] ED. Upon arrival, she was tachypneic, had respy sounding phonation, but she denied chest pain, fever, or chills. She was placed on BiPAP, and initially demonstrated improvement. However, she became less responsive and appeared to tire out. She was given additional narcan w/o appreciable response, so she was intubated. She had some hypotension after receiving sedation, so she was placed on low dose levophed. [place] was placed, lasix was given, as was Abx. Large bruise noted on LLE, so x-ray ordered.     Overview/Hospital Course:  64 F Patient with hx of COPD, morbid obesity was admitted for acute on chronic hypoxemic hypercapnic respiratory failure with sever fall with hit to the face . Patient was intubated after failing  BiPAP. Finished course of IV Levofloxacin for COPD exacerbation and possible pneumonia. Pulm has been managing her steroid dose. Patient was started on iron and B12 supplement for anemia. S/P exubation on 8/31. For her hypertension, patient was started on losartan and her home diltiazem was resumed.  Patient working with PT and OT.  Fall precautions have been addressed with the patient.  Patient moving to medicine telemetry.  Supplemental oxygen weaning is in process.  Patient encouraged to continue BiPAP use at night or as needed as before.  Patient oxygen requirement progressively improving.  Patient is physically deconditioned and working with PT and OT.   Awaiting skilled nursing facility placement.  Steroid dose is being tapered down.  Patient complained of bloated and also passing a lot of gas and Gas x was given but later complained of mild abdominal pain and KUB was done and found to have Gas in stool distended loops of large bowel measuring up to 6.5 cm.  Findings could reflect ileus or obstruction.  CT scan done demonstrated significant colonic distention consistent with ileus with a large amount of stool in ascending colon, colon measuring up to 11 cm.  General surgery evaluated the patient, NG tube was placed, patient continued on MiraLax and enemas, CT scan was repeated on 09/08/2024 which still shows significant constipation.  She had a total of 3 bowel movements in the last 24 hours, responded well to lactulose.  Abdominal pain improved    Interval History:        Review of Systems   HENT:          Hematoma over the right frontal region from the fall   Gastrointestinal:  Positive for abdominal distention and abdominal pain.   Musculoskeletal:  Positive for back pain.     Objective:     Vital Signs (Most Recent):  Temp: 98 °F (36.7 °C) (09/10/24 0706)  Pulse: (!) 56 (09/10/24 0722)  Resp: (!) 22 (09/10/24 0722)  BP: (!) 125/58 (09/10/24 0706)  SpO2: (!) 93 % (09/10/24 0722) Vital Signs (24h Range):  Temp:  [98 °F (36.7 °C)-98.6 °F (37 °C)] 98 °F (36.7 °C)  Pulse:  [56-89] 56  Resp:  [15-24] 22  SpO2:  [88 %-98 %] 93 %  BP: (125-196)/(50-68) 125/58     Weight: 120.2 kg (265 lb)  Body mass index is 44.1 kg/m².    Intake/Output Summary (Last 24 hours) at 9/10/2024 0840  Last data filed at 9/10/2024 0621  Gross per 24 hour   Intake 1660 ml   Output 850 ml   Net 810 ml         Physical Exam  HENT:      Head:      Comments: Ecchymosis around bilateral eyes     Mouth/Throat:      Mouth: Mucous membranes are moist.   Eyes:      Pupils: Pupils are equal, round, and reactive to light.   Cardiovascular:      Rate and Rhythm: Normal rate.   Pulmonary:      Effort:  Pulmonary effort is normal. No respiratory distress.      Breath sounds: Normal breath sounds. No stridor. No wheezing.   Abdominal:      General: There is distension.      Palpations: There is no mass.      Tenderness: There is abdominal tenderness, over the right quadrants. There is no guarding or rebound.   Musculoskeletal:      Right lower leg: Edema present which patient mentions it is baseline.      Left lower leg: Edema present which patient mentions that his baseline.   Neurological:      Mental Status: She is alert and oriented to person, place, and time.             Significant Labs: All pertinent labs within the past 24 hours have been reviewed.  CBC:       Significant Imaging: I have reviewed all pertinent imaging results/findings within the past 24 hours.    Assessment/Plan:      * Acute hypoxic on chronic hypercapnic respiratory failure  Appear secondary to COPD exacerbation and failed BiPAP   Improving  Likely multifactorial  S/P extubation on 8/31  Oxygen supplement as need, patient already on home oxygen 2 to 3 L  EF 60-65%  Pulmonology is following patient  Having problem with placement because she need BiPAP at night along with oxygen 7 L and facility can not accept with the level of BiPAP  Trying to send to  LTAC  Encouraged incentive spirometry given her continuous lying in bed and positioning, she demonstrates understanding and is motivated    Ileus  KUB with Gas in stool distended loops of large bowel measuring up to 6.5 cm.  Findings could reflect ileus or obstruction.  CT scan done demonstrated significant colonic distention consistent with ileus with a large amount of stool in ascending colon, colon measuring up to 11 cm. General surgery evaluated the patient, NG tube was placed, patient continued on MiraLax and enemas  Avoid scheduled opiates   Ordered Tylenol and ketorolac for pain  IV fluids given poor oral intake  Repeat CT showed significant constipation   General surgery recommended  "lactulose and repeat enema   Tolerating clear liquid diet   Currently is having loose bowel movements 3-4 day  Repeat KUB today showed similar changes   We will consider CT scan repeat tomorrow       Type 2 diabetes mellitus  Continue with SSI   We will reduce glargine given poor oral intake, episode of hypoglycemia this morning    Make adjustment as needed    Anemia  Likely from Iron and b12 deficiency  Started on Iron and B12 supplement on 8/27    Fall  Imaging ruled out acute fracture but severe swelling noted.  No visual disturbance  US confirmed hematoma on left lower extremity which is getting smaller  Fall precautions discussed with the patient.    PT OT and SNF placement /LTAC placement    Acute metabolic encephalopathy  Resolved  CT head: no acute changes  Continue to monitor     Pneumonia  MRSA neg, procal neg, respiratory panel neg  Completed Levofloxacin antibiotic course on 9/1    COPD exacerbation  Continue with steroids  Taper prednisone   Completed Levofloxacin antibiotic course on 9/1    Hypertension  Started Losartan on 8/28 and amlodipine on 8/31  Can make adjustment as needed  Resumed home diltiazem on 9/1    Hypothyroid  Continue home levothyroxine, increased dose         VTE Risk Mitigation (From admission, onward)           Ordered     enoxaparin injection 40 mg  Every 12 hours        Note to Pharmacy: Ht: 5' 5" (1.651 m)  Wt: 127 kg (280 lb)  Estimated Creatinine Clearance: 92.8 mL/min (based on SCr of 0.8 mg/dL).  Body mass index is 46.59 kg/m².    08/25/24 0627     IP VTE HIGH RISK PATIENT  Once         08/25/24 0627     Place sequential compression device  Until discontinued         08/25/24 0627                    Discharge Planning   CHELE: 9/11/2024     Code Status: Full Code   Is the patient medically ready for discharge?:     Reason for patient still in hospital (select all that apply): Treatment  Discharge Plan A: Long-term acute care facility (LTAC)   Discharge Delays: None known at " this time              Ashleigh Maldonado MD  Department of Hospital Medicine   [facility]

## 2024-09-10 NOTE — ASSESSMENT & PLAN NOTE
KUB with Gas in stool distended loops of large bowel measuring up to 6.5 cm.  Findings could reflect ileus or obstruction.  CT scan done demonstrated significant colonic distention consistent with ileus with a large amount of stool in ascending colon, colon measuring up to 11 cm. General surgery evaluated the patient, NG tube was placed, patient continued on MiraLax and enemas  Avoid scheduled opiates   Ordered Tylenol and ketorolac for pain  IV fluids given poor oral intake  Repeat CT showed significant constipation   General surgery recommended lactulose and repeat enema   Tolerating clear liquid diet today  Had 4 bowel movements in the last 24hrs

## 2024-09-11 PROBLEM — E87.6 HYPOKALEMIA: Status: ACTIVE | Noted: 2024-09-11

## 2024-09-11 LAB
ALBUMIN SERPL BCP-MCNC: 2.6 G/DL (ref 3.5–5.2)
ALP SERPL-CCNC: 128 U/L (ref 55–135)
ALT SERPL W/O P-5'-P-CCNC: 46 U/L (ref 10–44)
ANION GAP SERPL CALC-SCNC: 3 MMOL/L (ref 8–16)
AST SERPL-CCNC: 33 U/L (ref 10–40)
BILIRUB SERPL-MCNC: 0.4 MG/DL (ref 0.1–1)
BUN SERPL-MCNC: 8 MG/DL (ref 8–23)
CALCIUM SERPL-MCNC: 8.1 MG/DL (ref 8.7–10.5)
CHLORIDE SERPL-SCNC: 102 MMOL/L (ref 95–110)
CO2 SERPL-SCNC: 37 MMOL/L (ref 23–29)
CREAT SERPL-MCNC: 0.4 MG/DL (ref 0.5–1.4)
EST. GFR  (NO RACE VARIABLE): >60 ML/MIN/1.73 M^2
GLUCOSE SERPL-MCNC: 133 MG/DL (ref 70–110)
MAGNESIUM SERPL-MCNC: 2.1 MG/DL (ref 1.6–2.6)
POCT GLUCOSE: 133 MG/DL (ref 70–110)
POCT GLUCOSE: 137 MG/DL (ref 70–110)
POCT GLUCOSE: 171 MG/DL (ref 70–110)
POCT GLUCOSE: 69 MG/DL (ref 70–110)
POTASSIUM SERPL-SCNC: 3.4 MMOL/L (ref 3.5–5.1)
PROT SERPL-MCNC: 5 G/DL (ref 6–8.4)
SODIUM SERPL-SCNC: 142 MMOL/L (ref 136–145)
T4 FREE SERPL-MCNC: 0.47 NG/DL (ref 0.71–1.51)
TSH SERPL DL<=0.005 MIU/L-ACNC: 45.63 UIU/ML (ref 0.34–5.6)

## 2024-09-11 PROCEDURE — 25000242 PHARM REV CODE 250 ALT 637 W/ HCPCS: Performed by: HOSPITALIST

## 2024-09-11 PROCEDURE — 84481 FREE ASSAY (FT-3): CPT | Performed by: INTERNAL MEDICINE

## 2024-09-11 PROCEDURE — 94640 AIRWAY INHALATION TREATMENT: CPT

## 2024-09-11 PROCEDURE — 63600175 PHARM REV CODE 636 W HCPCS

## 2024-09-11 PROCEDURE — 25000003 PHARM REV CODE 250

## 2024-09-11 PROCEDURE — 25000003 PHARM REV CODE 250: Performed by: INTERNAL MEDICINE

## 2024-09-11 PROCEDURE — 83735 ASSAY OF MAGNESIUM: CPT | Performed by: INTERNAL MEDICINE

## 2024-09-11 PROCEDURE — 94660 CPAP INITIATION&MGMT: CPT

## 2024-09-11 PROCEDURE — 25000003 PHARM REV CODE 250: Performed by: SURGERY

## 2024-09-11 PROCEDURE — 63600175 PHARM REV CODE 636 W HCPCS: Performed by: SURGERY

## 2024-09-11 PROCEDURE — 27100171 HC OXYGEN HIGH FLOW UP TO 24 HOURS

## 2024-09-11 PROCEDURE — 99900035 HC TECH TIME PER 15 MIN (STAT)

## 2024-09-11 PROCEDURE — 97530 THERAPEUTIC ACTIVITIES: CPT | Mod: CQ

## 2024-09-11 PROCEDURE — 80053 COMPREHEN METABOLIC PANEL: CPT | Performed by: INTERNAL MEDICINE

## 2024-09-11 PROCEDURE — 20000000 HC ICU ROOM

## 2024-09-11 PROCEDURE — 94799 UNLISTED PULMONARY SVC/PX: CPT

## 2024-09-11 PROCEDURE — 99900031 HC PATIENT EDUCATION (STAT)

## 2024-09-11 PROCEDURE — 63600175 PHARM REV CODE 636 W HCPCS: Performed by: INTERNAL MEDICINE

## 2024-09-11 PROCEDURE — 36415 COLL VENOUS BLD VENIPUNCTURE: CPT | Performed by: INTERNAL MEDICINE

## 2024-09-11 PROCEDURE — 84443 ASSAY THYROID STIM HORMONE: CPT | Performed by: INTERNAL MEDICINE

## 2024-09-11 PROCEDURE — 25000242 PHARM REV CODE 250 ALT 637 W/ HCPCS: Performed by: INTERNAL MEDICINE

## 2024-09-11 PROCEDURE — 84439 ASSAY OF FREE THYROXINE: CPT | Performed by: INTERNAL MEDICINE

## 2024-09-11 PROCEDURE — 94761 N-INVAS EAR/PLS OXIMETRY MLT: CPT

## 2024-09-11 RX ORDER — LEVOTHYROXINE SODIUM 25 UG/1
25 TABLET ORAL ONCE
Status: COMPLETED | OUTPATIENT
Start: 2024-09-11 | End: 2024-09-11

## 2024-09-11 RX ORDER — LEVOTHYROXINE SODIUM 100 UG/1
100 TABLET ORAL
Status: DISCONTINUED | OUTPATIENT
Start: 2024-09-12 | End: 2024-09-17 | Stop reason: HOSPADM

## 2024-09-11 RX ORDER — NEOSTIGMINE METHYLSULFATE 1 MG/ML
2 INJECTION INTRAVENOUS ONCE
Status: DISCONTINUED | OUTPATIENT
Start: 2024-09-11 | End: 2024-09-15

## 2024-09-11 RX ORDER — ATROPINE SULFATE 0.1 MG/ML
1 INJECTION INTRAVENOUS
Status: DISCONTINUED | OUTPATIENT
Start: 2024-09-11 | End: 2024-09-15

## 2024-09-11 RX ORDER — POLYETHYLENE GLYCOL 3350 17 G/17G
238 POWDER, FOR SOLUTION ORAL ONCE
Status: COMPLETED | OUTPATIENT
Start: 2024-09-11 | End: 2024-09-11

## 2024-09-11 RX ORDER — POTASSIUM CHLORIDE 20 MEQ/1
40 TABLET, EXTENDED RELEASE ORAL ONCE
Status: COMPLETED | OUTPATIENT
Start: 2024-09-11 | End: 2024-09-11

## 2024-09-11 RX ADMIN — IPRATROPIUM BROMIDE AND ALBUTEROL SULFATE 3 ML: 2.5; .5 SOLUTION RESPIRATORY (INHALATION) at 07:09

## 2024-09-11 RX ADMIN — SIMETHICONE 80 MG: 80 TABLET, CHEWABLE ORAL at 06:09

## 2024-09-11 RX ADMIN — POTASSIUM CHLORIDE 40 MEQ: 1500 TABLET, EXTENDED RELEASE ORAL at 08:09

## 2024-09-11 RX ADMIN — IPRATROPIUM BROMIDE AND ALBUTEROL SULFATE 3 ML: 2.5; .5 SOLUTION RESPIRATORY (INHALATION) at 01:09

## 2024-09-11 RX ADMIN — HYDROCODONE BITARTRATE AND ACETAMINOPHEN 1 TABLET: 5; 325 TABLET ORAL at 12:09

## 2024-09-11 RX ADMIN — GABAPENTIN 400 MG: 300 CAPSULE ORAL at 08:09

## 2024-09-11 RX ADMIN — SENNOSIDES AND DOCUSATE SODIUM 1 TABLET: 8.6; 5 TABLET ORAL at 09:09

## 2024-09-11 RX ADMIN — POLYETHYLENE GLYCOL 3350 238 G: 17 POWDER, FOR SOLUTION ORAL at 06:09

## 2024-09-11 RX ADMIN — LEVOTHYROXINE SODIUM 25 MCG: 0.03 TABLET ORAL at 08:09

## 2024-09-11 RX ADMIN — LIDOCAINE 3 PATCH: 50 PATCH TOPICAL at 10:09

## 2024-09-11 RX ADMIN — ACETAMINOPHEN 650 MG: 325 TABLET ORAL at 06:09

## 2024-09-11 RX ADMIN — PREDNISONE 10 MG: 5 TABLET ORAL at 10:09

## 2024-09-11 RX ADMIN — CYANOCOBALAMIN TAB 1000 MCG 1000 MCG: 1000 TAB at 10:09

## 2024-09-11 RX ADMIN — GABAPENTIN 400 MG: 300 CAPSULE ORAL at 10:09

## 2024-09-11 RX ADMIN — METOCLOPRAMIDE HYDROCHLORIDE 10 MG: 5 INJECTION INTRAMUSCULAR; INTRAVENOUS at 07:09

## 2024-09-11 RX ADMIN — LACTULOSE 20 G: 20 SOLUTION ORAL at 09:09

## 2024-09-11 RX ADMIN — METOCLOPRAMIDE HYDROCHLORIDE 10 MG: 5 INJECTION INTRAMUSCULAR; INTRAVENOUS at 10:09

## 2024-09-11 RX ADMIN — METOCLOPRAMIDE HYDROCHLORIDE 10 MG: 5 INJECTION INTRAMUSCULAR; INTRAVENOUS at 12:09

## 2024-09-11 RX ADMIN — ACETAMINOPHEN 650 MG: 325 TABLET ORAL at 10:09

## 2024-09-11 RX ADMIN — FAMOTIDINE 20 MG: 10 INJECTION INTRAVENOUS at 10:09

## 2024-09-11 RX ADMIN — METOCLOPRAMIDE HYDROCHLORIDE 10 MG: 5 INJECTION INTRAMUSCULAR; INTRAVENOUS at 06:09

## 2024-09-11 RX ADMIN — SIMETHICONE 80 MG: 80 TABLET, CHEWABLE ORAL at 10:09

## 2024-09-11 RX ADMIN — INSULIN GLARGINE 30 UNITS: 100 INJECTION, SOLUTION SUBCUTANEOUS at 10:09

## 2024-09-11 RX ADMIN — LEVOTHYROXINE SODIUM 75 MCG: 0.03 TABLET ORAL at 07:09

## 2024-09-11 RX ADMIN — LOSARTAN POTASSIUM 100 MG: 50 TABLET, FILM COATED ORAL at 10:09

## 2024-09-11 RX ADMIN — FAMOTIDINE 20 MG: 10 INJECTION INTRAVENOUS at 09:09

## 2024-09-11 RX ADMIN — SENNOSIDES AND DOCUSATE SODIUM 1 TABLET: 8.6; 5 TABLET ORAL at 10:09

## 2024-09-11 RX ADMIN — ENOXAPARIN SODIUM 40 MG: 40 INJECTION SUBCUTANEOUS at 10:09

## 2024-09-11 RX ADMIN — DILTIAZEM HYDROCHLORIDE 240 MG: 120 CAPSULE, COATED, EXTENDED RELEASE ORAL at 10:09

## 2024-09-11 RX ADMIN — LACTULOSE 20 G: 20 SOLUTION ORAL at 10:09

## 2024-09-11 RX ADMIN — ACETAMINOPHEN 650 MG: 325 TABLET ORAL at 12:09

## 2024-09-11 NOTE — ASSESSMENT & PLAN NOTE
Currently on increased dose of levothyroxine  TSH today elevated at 45, Free T4 low at 0.47  Will increase the dose further to 100 mcg  Will give additional 25 mcg one dose now  As of now, mental status normal, temperature normal, no bradycardia, hypoglycemia likely from poor oral intake and high dose insulin  Given her edema, significant constipation, can consider IV Levothyroxine if fails to improve  Monitor closely in ICU

## 2024-09-11 NOTE — SUBJECTIVE & OBJECTIVE
Interval History: Lactulose was added and she had loose watery bowel movements, following which she was started on clears, advanced to full liquids, but her KUB was unchanged and hence we consulted gastroenterology who recommended neostigmine while monitoring in ICU given the risk of bradycardia and her tenuous respiratory status.     Review of Systems   HENT:  Positive for facial swelling.         Hematoma over the right frontal region from the fall   Gastrointestinal:  Positive for abdominal distention and abdominal pain.        Today pain worsened compared to yesterday.   Musculoskeletal:  Positive for back pain.       Objective:     Vital Signs (Most Recent):  Temp: 98.2 °F (36.8 °C) (09/11/24 1501)  Pulse: 66 (09/11/24 1701)  Resp: (!) 9 (09/11/24 1701)  BP: (!) 113/56 (09/11/24 1701)  SpO2: 96 % (09/11/24 1701) Vital Signs (24h Range):  Temp:  [97.5 °F (36.4 °C)-98.3 °F (36.8 °C)] 98.2 °F (36.8 °C)  Pulse:  [64-89] 66  Resp:  [9-22] 9  SpO2:  [90 %-97 %] 96 %  BP: (113-149)/(49-88) 113/56     Weight: 120.2 kg (265 lb)  Body mass index is 44.1 kg/m².    Intake/Output Summary (Last 24 hours) at 9/11/2024 1830  Last data filed at 9/11/2024 0935  Gross per 24 hour   Intake 300 ml   Output --   Net 300 ml         Physical Exam  HENT:      Head:      Comments: Ecchymosis around bilateral eyes     Mouth/Throat:      Mouth: Mucous membranes are moist.   Eyes:      Pupils: Pupils are equal, round, and reactive to light.   Cardiovascular:      Rate and Rhythm: Normal rate.      Heart sounds:      Gallop present.   Pulmonary:      Effort: No respiratory distress.      Breath sounds: Normal breath sounds. No stridor. No wheezing.      Comments: Unable to take a deep breath given her abdominal pain  Abdominal:      General: There is distension.      Palpations: There is no mass.      Tenderness: There is abdominal tenderness. There is no guarding or rebound.   Musculoskeletal:      Right lower leg: Edema present.      Left  lower leg: Edema present.   Neurological:      Mental Status: She is alert and oriented to person, place, and time.             Significant Labs: All pertinent labs within the past 24 hours have been reviewed.    Significant Imaging: I have reviewed all pertinent imaging results/findings within the past 24 hours.

## 2024-09-11 NOTE — ASSESSMENT & PLAN NOTE
Appear secondary to COPD exacerbation    Improving  Likely multifactorial  S/P extubation on 8/31  Oxygen supplement as need, patient already on home oxygen 2 to 3 L  EF 60-65%  Pulmonology is following patient  Having problem with placement because she need BiPAP at night along with oxygen up-to 7 L intermittently and facility can not accept with the level of BiPAP  Trying to send to LTAC after her condition is stable  Encouraged incentive spirometry given her continuous lying in bed and positioning, she demonstrates understanding and is motivated

## 2024-09-11 NOTE — PROGRESS NOTES
Pt seen and examined.  COntinues to have BMs  No n/v  Tolerating full liquid  WOuld cont aggressive laxative use  Surgery to sign off reconsult prn

## 2024-09-11 NOTE — PT/OT/SLP PROGRESS
Physical Therapy Treatment    Patient Name:  Karo Leonard   MRN:  [id number]    Recommendations:     Discharge Recommendations: Moderate Intensity Therapy  Discharge Equipment Recommendations: to be determined by next level of care  Barriers to discharge:   increased assist with mobility, decreased activity tolerance, balance deficits, pain, increased assist with mobility    Assessment:     Karo Leonard is a 64 y.o. female admitted with a medical diagnosis of Acute hypoxic on chronic hypercapnic respiratory failure.  She presents with the following impairments/functional limitations: weakness, impaired endurance, impaired functional mobility, decreased lower extremity function, pain, edema, impaired cardiopulmonary response to activity.    Pt agreeable to visit for transfer to EOB as she is uncomfortable and would like to reposition.    Pt stand by assist for supine to sit transfer with extra time due to pain. Pt tolerated sitting EOB with stand by assist. Pt performed a few LE TE while seated EOB, limited due to pain.    Nurse entered and applied lidocaine patches while pt was seated EOB. Pt reports bowel accident. Pt returned to supine with min assist, mostly to get BLEs onto bed.    Pt required max assist x 2 for scooting up in bed. Nurse and PCT informed that pt had BM and request to be cleaned up.    Rehab Prognosis: Fair; patient would benefit from acute skilled PT services to address these deficits and reach maximum level of function.    Recent Surgery: Procedure(s) (LRB):  DECOMPRESSION, COLON (N/A)      Plan:     During this hospitalization, patient to be seen 5 x/week to address the identified rehab impairments via gait training, therapeutic activities, therapeutic exercises and progress toward the following goals:    Plan of Care Expires:  10/01/24    Subjective     Chief Complaint: pain in abdomen  Patient/Family Comments/goals: to get better  Pain/Comfort:  Pain Rating 1: other (see comments)  (not rated)  Location - Orientation 1: generalized  Location 1: abdomen  Pain Addressed 1: Reposition, Distraction, Cessation of Activity  Pain Rating Post-Intervention 1: other (see comments) (not rated)      Objective:     Communicated with nurse prior to session.  Patient found HOB elevated with bed alarm, oxygen, PureWick, peripheral IV, telemetry upon PT entry to room.     General Precautions: Standard, fall, respiratory  Orthopedic Precautions: N/A  Braces: N/A  Respiratory Status:  Oxymask 2 L O2      Functional Mobility:  Bed Mobility:     Scooting: maximal assistance and of 2 persons  Supine to Sit: stand by assistance  Sit to Supine: minimum assistance  Balance: sitting EOB with SBA, LE TE seated EOB      AM-PAC 6 CLICK MOBILITY          Treatment & Education:  Pt educated on importance of time OOB, importance of intermittent mobility, safe techniques for transfers/ambulation, discharge recommendations/options, and use of call light for assistance and fall prevention.    Pt performed LE TE seated EOB x 5 each including LAQ, marches, and AP. Limited due to pain and bowel accident.      Patient left HOB elevated with all lines intact, call button in reach, bed alarm on, and nurse notified..    GOALS:   Multidisciplinary Problems       Physical Therapy Goals          Problem: Physical Therapy    Goal Priority Disciplines Outcome Goal Variances Interventions   Physical Therapy Goal     PT, PT/OT Progressing     Description: Goals to be met by: 10/01/24     Patient will increase functional independence with mobility by performin. Supine to sit with minimal assistance  2. Sit to supine with minimal assistance  3. Sit to stand transfer with minimal assistance  4. Gait  x 50 feet  Minimal assistance using Rolling Walker.                          Time Tracking:     PT Received On: 24  PT Start Time: 1037     PT Stop Time: 1056  PT Total Time (min): 19 min     Billable Minutes: Therapeutic Activity  19    Treatment Type: Treatment  PT/PTA: PTA     Number of PTA visits since last PT visit: 1 09/11/2024
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
## 2024-09-11 NOTE — PROGRESS NOTES
[facility] Medicine  Progress Note    Patient Name: Karo Leonard  MRN: [id number]  Patient Class: IP- Inpatient   Admission Date: 8/25/2024  Length of Stay: 17 days  Attending Physician: Ashleigh Maldonado, *  Primary Care Provider: Navneet Hernandez FNP        Subjective:     Principal Problem:Acute hypoxic on chronic hypercapnic respiratory failure        HPI:  64F p/w fall in the context of shortness of breath. EMS reportedly gave narcan w/o appreciable response, then she was given duoneb en route to [facility] ED. Upon arrival, she was tachypneic, had respy sounding phonation, but she denied chest pain, fever, or chills. She was placed on BiPAP, and initially demonstrated improvement. However, she became less responsive and appeared to tire out. She was given additional narcan w/o appreciable response, so she was intubated. She had some hypotension after receiving sedation, so she was placed on low dose levophed. Pick[place] was placed, lasix was given, as was Abx. Large bruise noted on LLE, so x-ray ordered.     Overview/Hospital Course:  64 F Patient with hx of COPD, morbid obesity was admitted for acute on chronic hypoxemic hypercapnic respiratory failure with sever fall with hit to the face . Patient was intubated after failing  BiPAP. Finished course of IV Levofloxacin for COPD exacerbation and possible pneumonia. Pulm has been managing her steroid dose. Patient was started on iron and B12 supplement for anemia. S/P exubation on 8/31. For her hypertension, patient was started on losartan and her home diltiazem was resumed.  Patient working with PT and OT.  Fall precautions have been addressed with the patient.  Patient moving to medicine telemetry.  Supplemental oxygen weaning is in process.  Patient encouraged to continue BiPAP use at night or as needed as before. Steroid dose is being tapered down. Pulmonology followed the patient.    During her hospital course, patient complained of  bloated and also passing a lot of gas and Gas x was given but later complained of mild abdominal pain and KUB was done and found to have Gas in stool distended loops of large bowel measuring up to 6.5 cm.  Findings could reflect ileus or obstruction.  CT scan done demonstrated significant colonic distention consistent with ileus with a large amount of stool in ascending colon, colon measuring up to 11 cm.  General surgery evaluated the patient, NG tube was placed, patient continued on MiraLax and enemas, CT scan was repeated on 09/08/2024 which still shows significant constipation.  Lactulose was added and she had loose watery bowel movements, following which she was started on clears, advanced to full liquids, but her KUB was unchanged and hence we consulted gastroenterology who recommended neostigmine while monitoring in ICU given the risk of bradycardia and her tenuous respiratory status.    Interval History: Lactulose was added and she had loose watery bowel movements, following which she was started on clears, advanced to full liquids, but her KUB was unchanged and hence we consulted gastroenterology who recommended neostigmine while monitoring in ICU given the risk of bradycardia and her tenuous respiratory status.     Review of Systems   HENT:  Positive for facial swelling.         Hematoma over the right frontal region from the fall   Gastrointestinal:  Positive for abdominal distention and abdominal pain.        Today pain worsened compared to yesterday.   Musculoskeletal:  Positive for back pain.       Objective:     Vital Signs (Most Recent):  Temp: 98.2 °F (36.8 °C) (09/11/24 1501)  Pulse: 66 (09/11/24 1701)  Resp: (!) 9 (09/11/24 1701)  BP: (!) 113/56 (09/11/24 1701)  SpO2: 96 % (09/11/24 1701) Vital Signs (24h Range):  Temp:  [97.5 °F (36.4 °C)-98.3 °F (36.8 °C)] 98.2 °F (36.8 °C)  Pulse:  [64-89] 66  Resp:  [9-22] 9  SpO2:  [90 %-97 %] 96 %  BP: (113-149)/(49-88) 113/56     Weight: 120.2 kg (265  lb)  Body mass index is 44.1 kg/m².    Intake/Output Summary (Last 24 hours) at 9/11/2024 1830  Last data filed at 9/11/2024 0935  Gross per 24 hour   Intake 300 ml   Output --   Net 300 ml         Physical Exam  HENT:      Head:      Comments: Ecchymosis around bilateral eyes     Mouth/Throat:      Mouth: Mucous membranes are moist.   Eyes:      Pupils: Pupils are equal, round, and reactive to light.   Cardiovascular:      Rate and Rhythm: Normal rate.      Heart sounds:      Gallop present.   Pulmonary:      Effort: No respiratory distress.      Breath sounds: Normal breath sounds. No stridor. No wheezing.      Comments: Unable to take a deep breath given her abdominal pain  Abdominal:      General: There is distension.      Palpations: There is no mass.      Tenderness: There is abdominal tenderness. There is no guarding or rebound.   Musculoskeletal:      Right lower leg: Edema present.      Left lower leg: Edema present.   Neurological:      Mental Status: She is alert and oriented to person, place, and time.             Significant Labs: All pertinent labs within the past 24 hours have been reviewed.    Significant Imaging: I have reviewed all pertinent imaging results/findings within the past 24 hours.    Assessment/Plan:      * Acute hypoxic on chronic hypercapnic respiratory failure  Appear secondary to COPD exacerbation    Improving  Likely multifactorial  S/P extubation on 8/31  Oxygen supplement as need, patient already on home oxygen 2 to 3 L  EF 60-65%  Pulmonology is following patient  Having problem with placement because she need BiPAP at night along with oxygen up-to 7 L intermittently and facility can not accept with the level of BiPAP  Trying to send to LTAC after her condition is stable  Encouraged incentive spirometry given her continuous lying in bed and positioning, she demonstrates understanding and is motivated     Hypokalemia  Patient's most recent potassium results are listed below.    Recent Labs     09/10/24  0913 09/11/24  1700   K 3.3* 3.4*     Plan  - Repleted potassium - Monitor potassium Daily  - Patient's hypokalemia is stable      Ileus  KUB with Gas in stool distended loops of large bowel measuring up to 6.5 cm.  Findings could reflect ileus or obstruction.  CT scan done demonstrated significant colonic distention consistent with ileus with a large amount of stool in ascending colon, colon measuring up to 11 cm. General surgery evaluated the patient, NG tube was placed, patient continued on MiraLax and enemas  Avoid scheduled opiates   Ordered Tylenol and ketorolac for pain  IV fluids given poor oral intake  Repeat CT showed significant constipation and persistent colon dilation  General surgery recommended lactulose and miralax, s/p enemas  Tolerating full liquid diet   Currently is having loose bowel movements 3-4 day  Repeat KUB today showed similar changes but her pain is persistent  Consulted GI  Planned for neostigmine in ICU and possible colonic decompression.         Type 2 diabetes mellitus  Continue with SSI   We will reduce glargine given poor oral intake, episode of hypoglycemia this morning    Make adjustment as needed    Anemia  Likely from Iron and b12 deficiency  Started on Iron and B12 supplement on 8/27    Fall  Imaging ruled out acute fracture but severe swelling noted.  No visual disturbance  US confirmed hematoma on left lower extremity which is getting smaller  Fall precautions discussed with the patient.    PT OT and SNF placement /LTAC placement    Acute metabolic encephalopathy  Resolved  CT head: no acute changes  Continue to monitor     Pneumonia  MRSA neg, procal neg, respiratory panel neg  Completed Levofloxacin antibiotic course on 9/1    COPD exacerbation  Continue with steroids  Taper prednisone, currently on 10mg daily  Completed Levofloxacin antibiotic course on 9/1  Pulmonology following    Hypertension  Started Losartan on 8/28 and amlodipine on  "8/31  Can make adjustment as needed  Resumed home diltiazem on 9/1    Hypothyroid  Currently on increased dose of levothyroxine  TSH today elevated at 45, Free T4 low at 0.47  Will increase the dose further to 100 mcg  Will give additional 25 mcg one dose now  As of now, mental status normal, temperature normal, no bradycardia, hypoglycemia likely from poor oral intake and high dose insulin  Given her edema, significant constipation, can consider IV Levothyroxine if fails to improve  Monitor closely in ICU        VTE Risk Mitigation (From admission, onward)           Ordered     enoxaparin injection 40 mg  Every 12 hours        Note to Pharmacy: Ht: 5' 5" (1.651 m)  Wt: 127 kg (280 lb)  Estimated Creatinine Clearance: 92.8 mL/min (based on SCr of 0.8 mg/dL).  Body mass index is 46.59 kg/m².    08/25/24 0627     IP VTE HIGH RISK PATIENT  Once         08/25/24 0627     Place sequential compression device  Until discontinued         08/25/24 0627                    Discharge Planning   CHELE: 9/12/2024     Code Status: Full Code   Is the patient medically ready for discharge?:     Reason for patient still in hospital (select all that apply): Treatment  Discharge Plan A: Long-term acute care facility (LTAC)   Discharge Delays: None known at this time        Critical care time spent on the evaluation and treatment of severe organ dysfunction, review of pertinent labs and imaging studies, discussions with consulting providers and discussions with patient/family: 40 minutes.      Ashleigh Maldonado MD  Department of Hospital Medicine   [facility]    "

## 2024-09-11 NOTE — CARE UPDATE
09/10/24 1935   Patient Assessment/Suction   Level of Consciousness (AVPU) alert   Respiratory Effort Normal;Unlabored   Expansion/Accessory Muscles/Retractions expansion symmetric;expansion asymmetric   All Lung Fields Breath Sounds clear;diminished   Rhythm/Pattern, Respiratory no shortness of breath reported   Cough Frequency no cough   PRE-TX-O2   Device (Oxygen Therapy) Oxymask   $ Is the patient on Low Flow Oxygen? Yes   Flow (L/min) (Oxygen Therapy) 2   SpO2 (!) 93 %   Pulse Oximetry Type Continuous   $ Pulse Oximetry - Multiple Charge Pulse Oximetry - Multiple   Pulse 72   Resp 18   Positioning HOB elevated 30 degrees   Positioning   Head of Bed (HOB) Positioning HOB at 30 degrees   Aerosol Therapy   $ Aerosol Therapy Charges Aerosol Treatment   Daily Review of Necessity (SVN) completed   Respiratory Treatment Status (SVN) given   Treatment Route (SVN) mask;oxygen   Patient Position HOB elevated   Post Treatment Assessment (SVN) patient reports breathing improved   Signs of Intolerance (SVN) none   Education   $ Education Bronchodilator;15 min

## 2024-09-11 NOTE — PT/OT/SLP PROGRESS
Occupational Therapy      Patient Name:  Karo Leonard   MRN:  [id number]    Attempted OT tx. Patient receiving care from PCTs. Will follow-up later today if time permits.    9/11/2024

## 2024-09-11 NOTE — NURSING
Spoke to Dr. Antonio. Informed that pt has arrived to ICU but has no orders yet for neostigmine. Order clarification received. See telephone orders placed.

## 2024-09-11 NOTE — CARE UPDATE
09/11/24 0714   Patient Assessment/Suction   Level of Consciousness (AVPU) alert   Respiratory Effort Normal;Unlabored   Expansion/Accessory Muscles/Retractions no use of accessory muscles;no retractions;expansion symmetric   All Lung Fields Breath Sounds diminished;clear   Rhythm/Pattern, Respiratory unlabored;pattern regular;depth regular;chest wiggle adequate;no shortness of breath reported   Cough Frequency no cough   Skin Integrity   $ Wound Care Tech Time 15 min   Area Observed Bridge of nose   Skin Appearance without discoloration   Barrier used? Transparent Film   PRE-TX-O2   Device (Oxygen Therapy) BIPAP   $ Is the patient on Low Flow Oxygen? Yes   $ Is the patient on High Flow Oxygen? Yes   Flow (L/min) (Oxygen Therapy) 2   SpO2 95 %   Pulse Oximetry Type Intermittent   $ Pulse Oximetry - Multiple Charge Pulse Oximetry - Multiple   Pulse 70   Resp 19   Aerosol Therapy   $ Aerosol Therapy Charges Aerosol Treatment   Daily Review of Necessity (SVN) completed   Respiratory Treatment Status (SVN) given   Treatment Route (SVN) in-line   Patient Position HOB elevated   Post Treatment Assessment (SVN) increased aeration   Signs of Intolerance (SVN) none   Breath Sounds Post-Respiratory Treatment   Throughout All Fields Post-Treatment All Fields   Throughout All Fields Post-Treatment aeration increased   Post-treatment Heart Rate (beats/min) 75   Post-treatment Resp Rate (breaths/min) 19   Incentive Spirometer   $ Incentive Spirometer Charges done with encouragement   Incentive Spirometer Predicted Level (mL) 1500   Administration (IS) self-administered   Number of Repetitions (IS) 6   Level Incentive Spirometer (mL) 750   Patient Tolerance (IS) fair;no adverse signs/symptoms present   Preset CPAP/BiPAP Settings   Mode Of Delivery BiPAP   $ CPAP/BiPAP Daily Charge BiPAP/CPAP Daily   $ Initial CPAP/BiPAP Setup? No   $ Is patient using? Yes   Size of Mask Small/Medium   Sized Appropriately? Yes   Equipment Type  V60   Airway Device Type medium full face mask   Humidifier not applicable   Ipap 16   EPAP (cm H2O) 6   Pressure Support (cm H2O) 10   Set Rate (Breaths/Min) 16   ITime (sec) 1   Rise Time (sec) 3   Education   $ Education Bronchodilator;15 min

## 2024-09-11 NOTE — NURSING
Nurses Note -- 4 Eyes      9/11/2024   1:42 AM      Skin assessed during: Q Shift Change      [x] No Altered Skin Integrity Present    []Prevention Measures Documented      [] Yes- Altered Skin Integrity Present or Discovered   [] LDA Added if Not in Epic (Describe Wound)   [] New Altered Skin Integrity was Present on Admit and Documented in LDA   [] Wound Image Taken    Wound Care Consulted? No    Attending Nurse:  CARLOS Byers     Second RN/Staff Member:  CARLOS Astudillo

## 2024-09-11 NOTE — CONSULTS
GASTROENTEROLOGY INPATIENT CONSULT NOTE  Patient Name: Karo Leonard  Patient MRN: [id number]  Patient : 1960    Admit Date: 2024  Service date: 2024    Reason for Consult: ileus    PCP: Navneet Hernandez FNP    Chief Complaint   Patient presents with    Low oxygen      Patient arrived via [place] [facility] transports unit 31. Per medic, patient is oxygen dependent and was found down by  lying on the oxygen tubing with low oxygen saturation. On EMS arrival patient's oxygen was in the 80s. Patient is drowsy but easily arousal. Patient received Narcan 4 mg intranasal and a DUONEB.         HPI: Patient is 64-year-old female who has been admitted to the hospital for the last 2 weeks. Patient initially presented following a fall at home with the associated facial trauma. On presentation she was in respiratory failure requiring intubation and ICU admission. Patient continues to require oxygen support but has been extubated and transferred to the floor. She was no longer requiring any pressure support. Patient has noted increased abdominal pain and distention for the last several days. He is uncertain when she last had a bowel movement. Given pain and distention she had a CT scan performed today which did demonstrate significant distention of the colon with a large amount of stool in the right colon. The ascending colon measured up to 11 cm. On CT scan there is no evidence of a small-bowel obstruction. Surgery was consulted for evaluation. Patient only surgical history is that of a laparoscopic cholecystectomy and  x2.      Dr. Cuevas has been following patient since , recommendation yesterday of Gi consult for decompression versus gastrograffin enema.  Patient ate yogurt this morning and is tolerating grits and a soft diet but abdominal imaging shows persistent colonic distention.  She is having liquid bowel movements and denying any abdominal pain    CHART REVIEW:  No endoscopy  found on epic chart review    Past Medical History:  Past Medical History:   Diagnosis Date    Coronary artery disease 2017    cardiac stent    DDD (degenerative disc disease), lumbar     Diabetes mellitus     Hypertension     Thyroid disease         Past Surgical History:  Past Surgical History:   Procedure Laterality Date     SECTION  08/1987    x2 1993    CORONARY STENT PLACEMENT  2017    EXPLORATORY LAPAROTOMY  1982    [facility]     HEMORRHOID SURGERY      LAPAROSCOPIC CHOLECYSTECTOMY N/A 2022    Procedure: CHOLECYSTECTOMY, LAPAROSCOPIC;  Surgeon: Leonardo Wilson III, MD;  Location: [facility];  Service: General;  Laterality: N/A;    LUMBAR DISC SURGERY      PILONIDAL CYST DRAINAGE      TONSILLECTOMY      as a child (also adenoids)        Home Medications:  Medications Prior to Admission   Medication Sig Dispense Refill Last Dose    chlorzoxazone (LORZONE) 750 mg Tab Take 1 tablet by mouth every 6 (six) hours.       promethazine (PHENERGAN) 25 MG tablet Take 25 mg by mouth every 6 (six) hours as needed.       TRELEGY ELLIPTA 200-62.5-25 mcg inhaler Inhale 1 puff into the lungs once daily.       albuterol (PROVENTIL/VENTOLIN HFA) 90 mcg/actuation inhaler Inhale 1 puff into the lungs every 4 (four) hours as needed for Wheezing or Shortness of Breath.       albuterol-ipratropium (DUO-NEB) 2.5 mg-0.5 mg/3 mL nebulizer solution Take 3 mLs by nebulization every 6 (six) hours as needed for Wheezing or Shortness of Breath. Rescue 75 mL 3     apixaban (ELIQUIS) 5 mg Tab Take 1 tablet (5 mg total) by mouth 2 (two) times daily. 180 tablet 0     arnica 20 % Tinc Apply 1 application topically once daily.       ascorbic acid, vitamin C, (VITAMIN C) 500 MG tablet Take 500 mg by mouth once daily.       aspirin 81 MG Chew Take 81 mg by mouth once daily.       atorvastatin (LIPITOR) 80 MG tablet Take 80 mg by mouth every evening.       desonide (DESOWEN) 0.05 % cream Apply 1  application topically 2 (two) times daily.       diltiaZEM (CARDIZEM CD) 240 MG 24 hr capsule Take 1 capsule (240 mg total) by mouth once daily. 90 capsule 0     estradioL (ESTRACE) 0.01 % (0.1 mg/gram) vaginal cream Place 1 g vaginally every 7 days. Monday's       ezetimibe (ZETIA) 10 mg tablet Take 10 mg by mouth once daily. (Patient not taking: Reported on 6/26/2024)       furosemide (LASIX) 20 MG tablet Take 1 tablet (20 mg total) by mouth once daily. 90 tablet 0     gabapentin (NEURONTIN) 600 MG tablet Take 600 mg by mouth 3 (three) times daily.       guaiFENesin (MUCINEX) 600 mg 12 hr tablet Take 1,200 mg by mouth 2 (two) times daily. (Patient not taking: Reported on 7/9/2024)       hydrocortisone 0.5 % cream Apply 1 application topically 2 (two) times daily as needed.       JANUVIA 50 mg Tab Take 50 mg by mouth once daily.       meclizine (ANTIVERT) 25 mg tablet Take 25 mg by mouth 4 (four) times daily as needed for Dizziness or Nausea. (Patient not taking: Reported on 7/9/2024)       pantoprazole (PROTONIX) 40 MG tablet Take 1 tablet (40 mg total) by mouth 2 (two) times daily. 60 tablet 1     potassium chloride SA (K-DUR,KLOR-CON M) 10 MEQ tablet Take 1 tablet (10 mEq total) by mouth once daily. 90 tablet 0     tiotropium (SPIRIVA) 18 mcg inhalation capsule Inhale 1 capsule into the lungs once daily.          Inpatient Medications:  Review of patient's allergies indicates:   Allergen Reactions    Pcn [penicillins] Anaphylaxis    Adhesive     Floxacillin     Keflex [cephalexin]     Sulfa (sulfonamide antibiotics)     Zithromax [azithromycin]     Zofran [ondansetron hcl (pf)]      Dizzy vomiting         Social History:   Social History     Occupational History    Not on file   Tobacco Use    Smoking status: Not on file    Smokeless tobacco: Not on file   Substance and Sexual Activity    Alcohol use: No    Drug use: No    Sexual activity: Not Currently       Family History:   No family history on  "file.    Review of Systems:  GENERAL: No fever, chills, weight loss  SKIN: No rashes, jaundice, pruritus  HEENT: No rhinorrhea, epistaxis, vision changes.  No trauma, tinnitus, lymphadenopathy or pharyngitis  CV: No chest pain, palpitations, edima or [place]  PULM: No cough or sputum production.  No wheezing.  GI: AS IN HPI  URINARY:  No hematuria, dysuria  MS: No change in muscle or joint pain, weakness, or ROM  Neuro: No focal neurologic changes  PSYCH: No change in mood or personality.  No suicidal ideation.  ENDOCRINE: No fatigue      OBJECTIVE:    Vitals:    09/11/24 0124 09/11/24 0330 09/11/24 0714 09/11/24 0746   BP:  (!) 115/49  125/73   BP Location:  Left arm     Patient Position:  Lying     Pulse: 70 64 70 74   Resp: (!) 22 20 19 19   Temp:  97.5 °F (36.4 °C)  98 °F (36.7 °C)   TempSrc:  Axillary  Oral   SpO2: 95% (!) 90% 95% 95%   Weight:       Height:         Physical Exam:    GEN:  Morbidly obese   HEENT: PERRL, sclera anicteric, oral mucosa pink and moist without lesion  NECK: trachea midline; Good ROM  CV: regular rate and rhythm, no murmurs or gallops  RESP: clear to auscultation bilaterally, no wheezes, rhonci or rales  ABD: soft, non-tender, non-distended, normal bowel sounds  EXT: no swelling or edema, 2+ pulses distally  SKIN: no rashes or jaundice  PSYCH: normal affect    Labs:   Recent Labs   Lab 09/05/24  0555 09/08/24  0459 09/10/24  0913   WBC 9.10 7.37 8.35   HGB 9.2* 8.7* 8.1*    125* 139*   * 103* 106*     No results for input(s): "IRON", "FERRITIN" in the last 168 hours.    Invalid input(s): "IRONSAT"  Recent Labs   Lab 09/05/24  0555 09/06/24  1128 09/08/24  0458 09/10/24  0913    135* 137 142   K 3.6 3.9 4.5 3.3*   BUN 15 13 12 8   CREATININE 0.5 0.5 0.6 0.4*   ALBUMIN 3.1*  --   --  2.9*   AST 12  --   --  42*   ALT 10  --   --  48*   ALKPHOS 69  --   --  131            Radiology Review:  Imaging Results              CT Chest Without Contrast (Final result)  Result " time 08/25/24 08:57:04      Final result by Malik Diez MD (08/25/24 08:57:04)                   Impression:      1. Alveolar infiltrates in the left upper and lower lobes compatible with pneumonia.  Scattered small foci of atelectasis or infiltrates on the right.  Small bilateral pleural effusions.  2. Stable cardiomegaly.  Small to moderate-sized pericardial effusion is increased compared to June 2024.  3. Support devices in place as above.  Additional stable findings as noted.      Electronically signed by: Malik Diez  Date:    08/25/2024  Time:    08:57               Narrative:    EXAMINATION:  CT CHEST WITHOUT CONTRAST    CLINICAL HISTORY:  Aspiration;.    TECHNIQUE:  Axial imaging through the chest was performed from the thoracic inlet to the adrenal glands. No IV contrast was administered.    COMPARISON:  Same date chest radiograph; CTA chest June 2024    FINDINGS:  The heart is mildly enlarged.  There is a small to moderate-sized pericardial effusion, increased compared to prior study.  Multifocal coronary artery atherosclerotic calcification is noted.  There is ectasia of the ascending aorta at up to 3.9 cm in transverse diameter, stable.  There is no mediastinal mass or pathologic lymphadenopathy.  Endotracheal tube is present with tip at the level similar to the aortic knob.  Nasogastric tube extends to the stomach.    Images at lung windows demonstrate alveolar infiltrates in the apicoposterior segment of the left upper lobe and within dependent portions of the left lower lobe compatible with pneumonia.  There is mild atelectasis or infiltrate at the posterior right lung base, with minimal atelectasis or infiltrate in the posterior segment of the right upper lobe and in the right middle lobe.  Small bilateral pleural effusions are noted.    Images of the upper abdomen demonstrate no acute findings.  No acute osseous abnormalities are identified.  Multilevel degenerative disc disease is  noted in the thoracic spine.                                       CT Head Without Contrast (Final result)  Result time 08/25/24 08:50:58      Final result by Malik Diez MD (08/25/24 08:50:58)                   Impression:      1. Normal CT appearance of the brain and calvarium.      Electronically signed by: Malik Diez  Date:    08/25/2024  Time:    08:50               Narrative:    EXAMINATION:  CT HEAD WITHOUT CONTRAST    CLINICAL HISTORY:  Head trauma, minor (Age >= 65y);.    TECHNIQUE:  Thin section axial images were obtained.  No contrast was administered.    COMPARISON:  None.    FINDINGS:  There is no evidence of intracranial mass, hemorrhage, or midline shift.  The ventricles and sulci are within normal limits.  There are no pathologic extra-axial fluid collections.    There is no evidence of ischemic change or edema.  Cerebellum and brainstem are unremarkable.    The calvarium is intact.  Endotracheal tube is noted.                                       X-Ray Tibia Fibula 2 View Left (Final result)  Result time 08/25/24 07:14:23      Final result by Malik Diez MD (08/25/24 07:14:23)                   Impression:      No acute radiographic abnormalities.      Electronically signed by: Malik Diez  Date:    08/25/2024  Time:    07:14               Narrative:    EXAMINATION:  XR TIBIA FIBULA 2 VIEW LEFT    CLINICAL HISTORY:  .  Unspecified fall, initial encounter    COMPARISON:  None.    FINDINGS:  AP and lateral views are negative for fracture or osseous destructive lesion. There is no periosteal reaction.  No focal soft tissue abnormality is identified.                                       X-Ray Chest AP Portable (Final result)  Result time 08/25/24 05:36:21      Final result by Arelis Humphreys MD (08/25/24 05:36:21)                   Impression:      Please see above.      Electronically signed by: Arelis Humphreys MD  Date:    08/25/2024  Time:    05:36                Narrative:    EXAMINATION:  XR CHEST AP PORTABLE    CLINICAL HISTORY:  et tube placement;    TECHNIQUE:  Single frontal view of the chest was performed.    COMPARISON:  08/25/2024 at 02:44 a.m..    FINDINGS:  Interval intubation with endotracheal tube tip appearing to project at the level of the clavicular heads.  Enteric tube courses below the diaphragm, extending beyond the field of view.  No interval detrimental change in lung aeration or evidence of new or enlarging pneumothorax relative to examination referenced above.                                       X-Ray Chest AP Portable (Final result)  Result time 08/25/24 04:01:05      Final result by Arelis Humphreys MD (08/25/24 04:01:05)                   Impression:      Please see above.      Electronically signed by: Arelis Humphreys MD  Date:    08/25/2024  Time:    04:01               Narrative:    EXAMINATION:  XR CHEST AP PORTABLE    CLINICAL HISTORY:  Chest Pain;    TECHNIQUE:  Single frontal view of the chest was performed.    COMPARISON:  06/14/2024    FINDINGS:  Image quality is degraded by suboptimal patient positioning and limited field of view.  There is increased opacity within the left mid lower lung zone concerning for possible underlying consolidative change/infectious process.  The aerated portion of the left upper lung zone and the right lung demonstrates diffuse increased interstitial prominence.  No definite pneumothorax noting presumed prominent skin fold projects over the right hemithorax on initial acquired image of 02:44.  The cardiomediastinal silhouette appears unchanged noting obscuration of the left heart border due to aforementioned pleuroparenchymal findings.  There is atherosclerosis of the thoracic aorta.  Osseous structures are intact.                                          IMPRESSION / RECOMMENDATIONS:   Active Problem List with Overview Notes    Diagnosis Date Noted    Ileus 09/06/2024    Type 2 diabetes mellitus 08/28/2024     Anemia 08/27/2024    Acute metabolic encephalopathy 08/25/2024    Fall 08/25/2024    Severe obesity (BMI 35.0-39.9) with comorbidity 06/20/2024    Acute hypoxic on chronic hypercapnic respiratory failure 06/17/2024    New onset a-fib 06/16/2024    COPD exacerbation 06/14/2024    History of coronary artery stent placement 06/14/2024    Pneumonia 06/14/2024    Diabetes 06/14/2024    Calculus of gallbladder with chronic cholecystitis without obstruction 06/09/2022    Hypoxemia 07/23/2020    Physical debility 07/23/2020    BMI 39.0-39.9,adult 07/22/2020    Diabetes type 2, uncontrolled 07/22/2020    Atherosclerosis of native coronary artery of native heart with unstable angina pectoris 07/22/2020    Hypothyroid 08/13/2013    Hypertension 08/13/2013    Hidradenitis suppurativa 08/13/2013    Pain, chronic 08/13/2013   63 yo female with colonic distension/[place]'s syndrome.  Colonic distention/[place]'s patient ate yogurt today and anesthesia sedation can not be performed right now.  Gastrografin enema ordered.  Neostigmine can be considered but patient would need to be moved to the ICU for direct monitoring with neostigmine administered with the following protocol:  Neostigmine (2 to 5 mg) should be delivered by slow intravenous injection over five minutes, with continuous monitoring of vital signs and electrocardiograph for 30 minutes and continuous clinical assessment for 15 to 30 minutes [43,47]. Patients should be kept supine on a bedpan, and atropine should be available at the bedside to treat bradycardia associated with neostigmine [47].  Some side effects (particularly bradycardia and bronchoconstriction) might be reduced by coadministration of glycopyrrolate, an anticholinergic agent that has limited activity on the muscarinic receptors of the colon [48]. In the author's experience, lower doses (1.5 mg) may also be effective and may be associated with less abdominal cramping, nausea, and vomiting. A further  reduction in dosage to 0.5 or 1 mg is indicated in patients with new-onset heart block, a history of second-degree heart block, or following bowel resection with primary anastomosis.  In addition we will initiate bowel preparation for colonoscopy with decompression in a.m.  Correct all electrolyte abnormalities.  Patient was noted to have an extraordinarily high TSH level August 31st at 42.681 and would repeat this level if hypothyroidism is contributing to current status       Thank you for this consult.    Tesha Antonio  9/11/2024  8:44 AM  Radiology reports they are unable to perform therapeutic enema so would recommend ICU transfer for neostigmine administration

## 2024-09-11 NOTE — ASSESSMENT & PLAN NOTE
Patient's most recent potassium results are listed below.   Recent Labs     09/10/24  0913 09/11/24  1700   K 3.3* 3.4*     Plan  - Repleted potassium - Monitor potassium Daily  - Patient's hypokalemia is stable

## 2024-09-11 NOTE — ASSESSMENT & PLAN NOTE
Continue with steroids  Taper prednisone, currently on 10mg daily  Completed Levofloxacin antibiotic course on 9/1  Pulmonology following

## 2024-09-11 NOTE — ASSESSMENT & PLAN NOTE
KUB with Gas in stool distended loops of large bowel measuring up to 6.5 cm.  Findings could reflect ileus or obstruction.  CT scan done demonstrated significant colonic distention consistent with ileus with a large amount of stool in ascending colon, colon measuring up to 11 cm. General surgery evaluated the patient, NG tube was placed, patient continued on MiraLax and enemas  Avoid scheduled opiates   Ordered Tylenol and ketorolac for pain  IV fluids given poor oral intake  Repeat CT showed significant constipation and persistent colon dilation  General surgery recommended lactulose and miralax, s/p enemas  Tolerating full liquid diet   Currently is having loose bowel movements 3-4 day  Repeat KUB today showed similar changes but her pain is persistent  Consulted GI  Planned for neostigmine in ICU and possible colonic decompression.

## 2024-09-12 ENCOUNTER — ANESTHESIA (OUTPATIENT)
Dept: SURGERY | Facility: HOSPITAL | Age: 64
End: 2024-09-12
Payer: MEDICARE

## 2024-09-12 ENCOUNTER — ANESTHESIA EVENT (OUTPATIENT)
Dept: SURGERY | Facility: HOSPITAL | Age: 64
End: 2024-09-12
Payer: MEDICARE

## 2024-09-12 VITALS
DIASTOLIC BLOOD PRESSURE: 51 MMHG | RESPIRATION RATE: 14 BRPM | OXYGEN SATURATION: 100 % | HEART RATE: 73 BPM | SYSTOLIC BLOOD PRESSURE: 105 MMHG

## 2024-09-12 LAB
ALBUMIN SERPL BCP-MCNC: 2.7 G/DL (ref 3.5–5.2)
ALP SERPL-CCNC: 134 U/L (ref 55–135)
ALT SERPL W/O P-5'-P-CCNC: 45 U/L (ref 10–44)
ANION GAP SERPL CALC-SCNC: 2 MMOL/L (ref 8–16)
AST SERPL-CCNC: 29 U/L (ref 10–40)
BASOPHILS # BLD AUTO: 0.01 K/UL (ref 0–0.2)
BASOPHILS NFR BLD: 0.1 % (ref 0–1.9)
BILIRUB SERPL-MCNC: 0.4 MG/DL (ref 0.1–1)
BUN SERPL-MCNC: 8 MG/DL (ref 8–23)
CALCIUM SERPL-MCNC: 8.3 MG/DL (ref 8.7–10.5)
CHLORIDE SERPL-SCNC: 102 MMOL/L (ref 95–110)
CO2 SERPL-SCNC: 39 MMOL/L (ref 23–29)
CREAT SERPL-MCNC: 0.4 MG/DL (ref 0.5–1.4)
DIFFERENTIAL METHOD BLD: ABNORMAL
EOSINOPHIL # BLD AUTO: 0.1 K/UL (ref 0–0.5)
EOSINOPHIL NFR BLD: 0.7 % (ref 0–8)
ERYTHROCYTE [DISTWIDTH] IN BLOOD BY AUTOMATED COUNT: 17.8 % (ref 11.5–14.5)
EST. GFR  (NO RACE VARIABLE): >60 ML/MIN/1.73 M^2
GLUCOSE SERPL-MCNC: 130 MG/DL (ref 70–110)
HCT VFR BLD AUTO: 27.2 % (ref 37–48.5)
HGB BLD-MCNC: 7.9 G/DL (ref 12–16)
IMM GRANULOCYTES # BLD AUTO: 0.02 K/UL (ref 0–0.04)
IMM GRANULOCYTES NFR BLD AUTO: 0.2 % (ref 0–0.5)
LYMPHOCYTES # BLD AUTO: 0.9 K/UL (ref 1–4.8)
LYMPHOCYTES NFR BLD: 11 % (ref 18–48)
MAGNESIUM SERPL-MCNC: 2.1 MG/DL (ref 1.6–2.6)
MCH RBC QN AUTO: 31.3 PG (ref 27–31)
MCHC RBC AUTO-ENTMCNC: 29 G/DL (ref 32–36)
MCV RBC AUTO: 108 FL (ref 82–98)
MONOCYTES # BLD AUTO: 0.5 K/UL (ref 0.3–1)
MONOCYTES NFR BLD: 6.6 % (ref 4–15)
NEUTROPHILS # BLD AUTO: 6.6 K/UL (ref 1.8–7.7)
NEUTROPHILS NFR BLD: 81.4 % (ref 38–73)
NRBC BLD-RTO: 0 /100 WBC
OHS QRS DURATION: 84 MS
OHS QTC CALCULATION: 418 MS
PLATELET # BLD AUTO: 172 K/UL (ref 150–450)
PMV BLD AUTO: 11 FL (ref 9.2–12.9)
POCT GLUCOSE: 102 MG/DL (ref 70–110)
POCT GLUCOSE: 183 MG/DL (ref 70–110)
POCT GLUCOSE: 84 MG/DL (ref 70–110)
POTASSIUM SERPL-SCNC: 3.9 MMOL/L (ref 3.5–5.1)
PROT SERPL-MCNC: 5.2 G/DL (ref 6–8.4)
RBC # BLD AUTO: 2.52 M/UL (ref 4–5.4)
SODIUM SERPL-SCNC: 143 MMOL/L (ref 136–145)
WBC # BLD AUTO: 8.16 K/UL (ref 3.9–12.7)

## 2024-09-12 PROCEDURE — 85025 COMPLETE CBC W/AUTO DIFF WBC: CPT | Performed by: INTERNAL MEDICINE

## 2024-09-12 PROCEDURE — 94799 UNLISTED PULMONARY SVC/PX: CPT

## 2024-09-12 PROCEDURE — 94660 CPAP INITIATION&MGMT: CPT

## 2024-09-12 PROCEDURE — 25000242 PHARM REV CODE 250 ALT 637 W/ HCPCS: Performed by: INTERNAL MEDICINE

## 2024-09-12 PROCEDURE — 99233 SBSQ HOSP IP/OBS HIGH 50: CPT | Mod: ,,, | Performed by: INTERNAL MEDICINE

## 2024-09-12 PROCEDURE — 37000008 HC ANESTHESIA 1ST 15 MINUTES: Performed by: INTERNAL MEDICINE

## 2024-09-12 PROCEDURE — 63600175 PHARM REV CODE 636 W HCPCS: Performed by: INTERNAL MEDICINE

## 2024-09-12 PROCEDURE — 27000221 HC OXYGEN, UP TO 24 HOURS

## 2024-09-12 PROCEDURE — 25000003 PHARM REV CODE 250: Performed by: NURSE ANESTHETIST, CERTIFIED REGISTERED

## 2024-09-12 PROCEDURE — 25000003 PHARM REV CODE 250: Performed by: INTERNAL MEDICINE

## 2024-09-12 PROCEDURE — 25000003 PHARM REV CODE 250: Performed by: SURGERY

## 2024-09-12 PROCEDURE — 83735 ASSAY OF MAGNESIUM: CPT | Performed by: INTERNAL MEDICINE

## 2024-09-12 PROCEDURE — 63600175 PHARM REV CODE 636 W HCPCS: Performed by: SURGERY

## 2024-09-12 PROCEDURE — 94640 AIRWAY INHALATION TREATMENT: CPT

## 2024-09-12 PROCEDURE — 99900035 HC TECH TIME PER 15 MIN (STAT)

## 2024-09-12 PROCEDURE — 94761 N-INVAS EAR/PLS OXIMETRY MLT: CPT

## 2024-09-12 PROCEDURE — 0DJD8ZZ INSPECTION OF LOWER INTESTINAL TRACT, VIA NATURAL OR ARTIFICIAL OPENING ENDOSCOPIC: ICD-10-PCS | Performed by: INTERNAL MEDICINE

## 2024-09-12 PROCEDURE — 80053 COMPREHEN METABOLIC PANEL: CPT | Performed by: INTERNAL MEDICINE

## 2024-09-12 PROCEDURE — 51702 INSERT TEMP BLADDER CATH: CPT

## 2024-09-12 PROCEDURE — 25000003 PHARM REV CODE 250

## 2024-09-12 PROCEDURE — 21400001 HC TELEMETRY ROOM

## 2024-09-12 PROCEDURE — 45378 DIAGNOSTIC COLONOSCOPY: CPT | Mod: 74 | Performed by: INTERNAL MEDICINE

## 2024-09-12 PROCEDURE — 63600175 PHARM REV CODE 636 W HCPCS

## 2024-09-12 PROCEDURE — 63600175 PHARM REV CODE 636 W HCPCS: Performed by: NURSE ANESTHETIST, CERTIFIED REGISTERED

## 2024-09-12 PROCEDURE — 27100171 HC OXYGEN HIGH FLOW UP TO 24 HOURS

## 2024-09-12 PROCEDURE — 37000009 HC ANESTHESIA EA ADD 15 MINS: Performed by: INTERNAL MEDICINE

## 2024-09-12 RX ORDER — PROPOFOL 10 MG/ML
VIAL (ML) INTRAVENOUS
Status: DISCONTINUED | OUTPATIENT
Start: 2024-09-12 | End: 2024-09-12

## 2024-09-12 RX ADMIN — GABAPENTIN 400 MG: 300 CAPSULE ORAL at 09:09

## 2024-09-12 RX ADMIN — IPRATROPIUM BROMIDE AND ALBUTEROL SULFATE 3 ML: 2.5; .5 SOLUTION RESPIRATORY (INHALATION) at 07:09

## 2024-09-12 RX ADMIN — METOCLOPRAMIDE HYDROCHLORIDE 10 MG: 5 INJECTION INTRAMUSCULAR; INTRAVENOUS at 12:09

## 2024-09-12 RX ADMIN — PROPOFOL 50 MG: 10 INJECTION, EMULSION INTRAVENOUS at 01:09

## 2024-09-12 RX ADMIN — METOCLOPRAMIDE HYDROCHLORIDE 10 MG: 5 INJECTION INTRAMUSCULAR; INTRAVENOUS at 05:09

## 2024-09-12 RX ADMIN — SODIUM CHLORIDE: 0.9 INJECTION, SOLUTION INTRAVENOUS at 01:09

## 2024-09-12 RX ADMIN — LEVOTHYROXINE SODIUM 100 MCG: 0.1 TABLET ORAL at 05:09

## 2024-09-12 RX ADMIN — FAMOTIDINE 20 MG: 10 INJECTION INTRAVENOUS at 09:09

## 2024-09-12 RX ADMIN — CYANOCOBALAMIN TAB 1000 MCG 1000 MCG: 1000 TAB at 10:09

## 2024-09-12 RX ADMIN — LOSARTAN POTASSIUM 100 MG: 50 TABLET, FILM COATED ORAL at 10:09

## 2024-09-12 RX ADMIN — LACTULOSE 20 G: 20 SOLUTION ORAL at 10:09

## 2024-09-12 RX ADMIN — METOCLOPRAMIDE HYDROCHLORIDE 10 MG: 5 INJECTION INTRAMUSCULAR; INTRAVENOUS at 08:09

## 2024-09-12 RX ADMIN — SENNOSIDES AND DOCUSATE SODIUM 1 TABLET: 8.6; 5 TABLET ORAL at 10:09

## 2024-09-12 RX ADMIN — INSULIN GLARGINE 30 UNITS: 100 INJECTION, SOLUTION SUBCUTANEOUS at 09:09

## 2024-09-12 RX ADMIN — FAMOTIDINE 20 MG: 10 INJECTION INTRAVENOUS at 10:09

## 2024-09-12 RX ADMIN — LIDOCAINE 3 PATCH: 50 PATCH TOPICAL at 10:09

## 2024-09-12 RX ADMIN — INSULIN ASPART 1 UNITS: 100 INJECTION, SOLUTION INTRAVENOUS; SUBCUTANEOUS at 09:09

## 2024-09-12 RX ADMIN — HYDROCODONE BITARTRATE AND ACETAMINOPHEN 1 TABLET: 5; 325 TABLET ORAL at 05:09

## 2024-09-12 RX ADMIN — DILTIAZEM HYDROCHLORIDE 240 MG: 120 CAPSULE, COATED, EXTENDED RELEASE ORAL at 10:09

## 2024-09-12 RX ADMIN — ENOXAPARIN SODIUM 40 MG: 40 INJECTION SUBCUTANEOUS at 10:09

## 2024-09-12 RX ADMIN — PROPOFOL 20 MG: 10 INJECTION, EMULSION INTRAVENOUS at 02:09

## 2024-09-12 RX ADMIN — HYDROCODONE BITARTRATE AND ACETAMINOPHEN 1 TABLET: 5; 325 TABLET ORAL at 08:09

## 2024-09-12 RX ADMIN — METOCLOPRAMIDE HYDROCHLORIDE 10 MG: 5 INJECTION INTRAMUSCULAR; INTRAVENOUS at 01:09

## 2024-09-12 RX ADMIN — GABAPENTIN 400 MG: 300 CAPSULE ORAL at 10:09

## 2024-09-12 RX ADMIN — PREDNISONE 10 MG: 5 TABLET ORAL at 10:09

## 2024-09-12 RX ADMIN — IPRATROPIUM BROMIDE AND ALBUTEROL SULFATE 3 ML: 2.5; .5 SOLUTION RESPIRATORY (INHALATION) at 12:09

## 2024-09-12 RX ADMIN — ENOXAPARIN SODIUM 40 MG: 40 INJECTION SUBCUTANEOUS at 11:09

## 2024-09-12 RX ADMIN — SENNOSIDES AND DOCUSATE SODIUM 1 TABLET: 8.6; 5 TABLET ORAL at 09:09

## 2024-09-12 NOTE — ANESTHESIA POSTPROCEDURE EVALUATION
Anesthesia Post Evaluation    Patient: Karo Leonard    Procedure(s) Performed: Procedure(s) (LRB):  DECOMPRESSION, COLON (N/A)    Final Anesthesia Type: general      Patient location during evaluation: ICU  Patient participation: Yes- Able to Participate  Level of consciousness: awake and alert  Post-procedure vital signs: reviewed and stable  Pain management: adequate  Airway patency: patent    PONV status at discharge: No PONV  Anesthetic complications: no      Cardiovascular status: blood pressure returned to baseline and stable  Respiratory status: unassisted and nasal cannula  Hydration status: euvolemic  Follow-up not needed.              Vitals Value Taken Time   /63 09/12/24 1540   Temp 98 09/12/24 1545   Pulse 67 09/12/24 1543   Resp 17 09/12/24 1543   SpO2 96 % 09/12/24 1543   Vitals shown include unfiled device data.      No case tracking events are documented in the log.      Pain/Evonne Score: Pain Rating Prior to Med Admin: 6 (9/12/2024  5:15 AM)  Pain Rating Post Med Admin: 0 (9/11/2024  7:45 PM)

## 2024-09-12 NOTE — ANESTHESIA PREPROCEDURE EVALUATION
2024  Karo Leonard is a 64 y.o., female.      Patient Active Problem List   Diagnosis    Hypothyroid    Hypertension    Hidradenitis suppurativa    Pain, chronic    BMI 39.0-39.9,adult    Diabetes type 2, uncontrolled    Atherosclerosis of native coronary artery of native heart with unstable angina pectoris    Hypoxemia    Physical debility    Calculus of gallbladder with chronic cholecystitis without obstruction    COPD exacerbation    History of coronary artery stent placement    Pneumonia    Diabetes    New onset a-fib    Acute hypoxic on chronic hypercapnic respiratory failure    Severe obesity (BMI 35.0-39.9) with comorbidity    Acute metabolic encephalopathy    Fall    Anemia    Type 2 diabetes mellitus    Ileus    Hypokalemia       Past Surgical History:   Procedure Laterality Date     SECTION  08/1987    x2 1993    CORONARY STENT PLACEMENT      EXPLORATORY LAPAROTOMY      [facility]     HEMORRHOID SURGERY      LAPAROSCOPIC CHOLECYSTECTOMY N/A 2022    Procedure: CHOLECYSTECTOMY, LAPAROSCOPIC;  Surgeon: Leonardo Wilson III, MD;  Location: [facility];  Service: General;  Laterality: N/A;    LUMBAR DISC SURGERY      PILONIDAL CYST DRAINAGE      TONSILLECTOMY      as a child (also adenoids)        Tobacco Use:  The patient  has no history on file for tobacco use.     Results for orders placed or performed during the hospital encounter of 24   EKG 12-lead    Collection Time: 24  9:56 AM   Result Value Ref Range    QRS Duration 84 ms    [facility] QTC Calculation 418 ms    Narrative    Test Reason : I48.91,    Vent. Rate : 149 BPM     Atrial Rate : 000 BPM     P-R Int : 000 ms          QRS Dur : 084 ms      QT Int : 266 ms       P-R-T Axes : 000 021 229 degrees     QTc Int : 418 ms    Atrial fibrillation with rapid ventricular response  Low voltage  QRS  ST and T wave abnormality, consider inferior ischemia  Abnormal ECG  When compared with ECG of 31-AUG-2024 21:09,  Nonspecific T wave abnormality has replaced inverted T waves in Anterior  leads    Referred By: [email or web address]   SELF           Confirmed By:         Imaging Results              CT Chest Without Contrast (Final result)  Result time 08/25/24 08:57:04      Final result by Malik Diez MD (08/25/24 08:57:04)                   Impression:      1. Alveolar infiltrates in the left upper and lower lobes compatible with pneumonia.  Scattered small foci of atelectasis or infiltrates on the right.  Small bilateral pleural effusions.  2. Stable cardiomegaly.  Small to moderate-sized pericardial effusion is increased compared to June 2024.  3. Support devices in place as above.  Additional stable findings as noted.      Electronically signed by: Malik Diez  Date:    08/25/2024  Time:    08:57               Narrative:    EXAMINATION:  CT CHEST WITHOUT CONTRAST    CLINICAL HISTORY:  Aspiration;.    TECHNIQUE:  Axial imaging through the chest was performed from the thoracic inlet to the adrenal glands. No IV contrast was administered.    COMPARISON:  Same date chest radiograph; CTA chest June 2024    FINDINGS:  The heart is mildly enlarged.  There is a small to moderate-sized pericardial effusion, increased compared to prior study.  Multifocal coronary artery atherosclerotic calcification is noted.  There is ectasia of the ascending aorta at up to 3.9 cm in transverse diameter, stable.  There is no mediastinal mass or pathologic lymphadenopathy.  Endotracheal tube is present with tip at the level similar to the aortic knob.  Nasogastric tube extends to the stomach.    Images at lung windows demonstrate alveolar infiltrates in the apicoposterior segment of the left upper lobe and within dependent portions of the left lower lobe compatible with pneumonia.  There is mild atelectasis or infiltrate at the  posterior right lung base, with minimal atelectasis or infiltrate in the posterior segment of the right upper lobe and in the right middle lobe.  Small bilateral pleural effusions are noted.    Images of the upper abdomen demonstrate no acute findings.  No acute osseous abnormalities are identified.  Multilevel degenerative disc disease is noted in the thoracic spine.                                       CT Head Without Contrast (Final result)  Result time 08/25/24 08:50:58      Final result by Malik Diez MD (08/25/24 08:50:58)                   Impression:      1. Normal CT appearance of the brain and calvarium.      Electronically signed by: Malik Diez  Date:    08/25/2024  Time:    08:50               Narrative:    EXAMINATION:  CT HEAD WITHOUT CONTRAST    CLINICAL HISTORY:  Head trauma, minor (Age >= 65y);.    TECHNIQUE:  Thin section axial images were obtained.  No contrast was administered.    COMPARISON:  None.    FINDINGS:  There is no evidence of intracranial mass, hemorrhage, or midline shift.  The ventricles and sulci are within normal limits.  There are no pathologic extra-axial fluid collections.    There is no evidence of ischemic change or edema.  Cerebellum and brainstem are unremarkable.    The calvarium is intact.  Endotracheal tube is noted.                                       X-Ray Tibia Fibula 2 View Left (Final result)  Result time 08/25/24 07:14:23      Final result by Malik Diez MD (08/25/24 07:14:23)                   Impression:      No acute radiographic abnormalities.      Electronically signed by: Malik Diez  Date:    08/25/2024  Time:    07:14               Narrative:    EXAMINATION:  XR TIBIA FIBULA 2 VIEW LEFT    CLINICAL HISTORY:  .  Unspecified fall, initial encounter    COMPARISON:  None.    FINDINGS:  AP and lateral views are negative for fracture or osseous destructive lesion. There is no periosteal reaction.  No focal soft tissue abnormality is  identified.                                       X-Ray Chest AP Portable (Final result)  Result time 08/25/24 05:36:21      Final result by Arelis Humphreys MD (08/25/24 05:36:21)                   Impression:      Please see above.      Electronically signed by: Arelis Humphreys MD  Date:    08/25/2024  Time:    05:36               Narrative:    EXAMINATION:  XR CHEST AP PORTABLE    CLINICAL HISTORY:  et tube placement;    TECHNIQUE:  Single frontal view of the chest was performed.    COMPARISON:  08/25/2024 at 02:44 a.m..    FINDINGS:  Interval intubation with endotracheal tube tip appearing to project at the level of the clavicular heads.  Enteric tube courses below the diaphragm, extending beyond the field of view.  No interval detrimental change in lung aeration or evidence of new or enlarging pneumothorax relative to examination referenced above.                                       X-Ray Chest AP Portable (Final result)  Result time 08/25/24 04:01:05      Final result by Arelis Humphreys MD (08/25/24 04:01:05)                   Impression:      Please see above.      Electronically signed by: Arelis Humphreys MD  Date:    08/25/2024  Time:    04:01               Narrative:    EXAMINATION:  XR CHEST AP PORTABLE    CLINICAL HISTORY:  Chest Pain;    TECHNIQUE:  Single frontal view of the chest was performed.    COMPARISON:  06/14/2024    FINDINGS:  Image quality is degraded by suboptimal patient positioning and limited field of view.  There is increased opacity within the left mid lower lung zone concerning for possible underlying consolidative change/infectious process.  The aerated portion of the left upper lung zone and the right lung demonstrates diffuse increased interstitial prominence.  No definite pneumothorax noting presumed prominent skin fold projects over the right hemithorax on initial acquired image of 02:44.  The cardiomediastinal silhouette appears unchanged noting obscuration of the left heart border  due to aforementioned pleuroparenchymal findings.  There is atherosclerosis of the thoracic aorta.  Osseous structures are intact.                                       Lab Results   Component Value Date    WBC 8.16 09/12/2024    HGB 7.9 (L) 09/12/2024    HCT 27.2 (L) 09/12/2024     (H) 09/12/2024     09/12/2024     BMP  Lab Results   Component Value Date     09/12/2024    K 3.9 09/12/2024     09/12/2024    CO2 39 (H) 09/12/2024    BUN 8 09/12/2024    CREATININE 0.4 (L) 09/12/2024    CALCIUM 8.3 (L) 09/12/2024    ANIONGAP 2 (L) 09/12/2024     (H) 09/12/2024     (H) 09/11/2024     (H) 09/10/2024       Results for orders placed during the hospital encounter of 06/07/22    Echo Saline Bubble? No    Interpretation Summary  · The left ventricle is mildly enlarged with concentric remodeling and normal systolic function.  · The estimated ejection fraction is 55%.  · Grade I left ventricular diastolic dysfunction.  · Normal right ventricular size with normal right ventricular systolic function.  · Moderate left atrial enlargement.  · Normal central venous pressure (3 mmHg).    NM Myocardial Perfusion Spect Multi Pharmacologic  Order: [id number]  Status: Final result    Visible to patient: Yes (not seen)    Next appt: 06/30/2022 at 09:00 AM in General Surgery (Leonardo Wilson III, MD)    Dx: Chest pain, unspecified type    0 Result Notes    Details    Reading Physician Reading Date Result Priority   Christiano Tirado MD  [phone] 6/9/2022      Narrative & Impression  HISTORY:  Chest pain, dyspnea, hypertension, previous myocardial infarction.     TECHNIQUE:  11 mCi technetium 99m Myoview was administered IV for the rest portion of the exam, with 25.4 mCi for the stress portion of the exam, following a 1 day protocol.  The patient was stressed with Lexiscan 0.4 mg IV, and achieved a maximum heart rate of 88 beats per minute.     FINDINGS:  Comparison to prior exam of  07/22/2020. There is normal and homogeneous radiotracer uptake by the left ventricular myocardium, with no photopenic defects to suggest pharmacologically induced reversible ischemia or infarct.  There are no wall motion abnormalities on the gated cine images, with no abnormal transient left ventricular dilatation on stress images.     Ejection fraction is calculated at 55%.  The polar map images confirm the planar SPECT findings.     IMPRESSION:  1. No evidence of pharmacologically induced reversible ischemia or infarct.  2. Normal left ventricular wall motion.  3. Calculated ejection fraction of 55%.     Electronically signed by:  Christiano Tirado MD  6/9/2022 11:11 AM CDT Workstation: [id number]         Pre-op Assessment    I have reviewed the Patient Summary Reports.     I have reviewed the Nursing Notes. I have reviewed the NPO Status.   I have reviewed the Medications.     Review of Systems  Anesthesia Hx:  No problems with previous Anesthesia             Denies Family Hx of Anesthesia complications.    Denies Personal Hx of Anesthesia complications.                    Social:  No Alcohol Use, Former Smoker       Hematology/Oncology:    Oncology Normal                Hematology Comments: Plavix therapy                    EENT/Dental:  EENT/Dental Normal           Cardiovascular:     Hypertension  Past MI CAD  asymptomatic CABG/stent           ECG has been reviewed. Patient with coronary artery stent placed 5 years ago  Myocardial infarction around 2017.                           Pulmonary:  Pneumonia COPD   Shortness of breath  Sleep Apnea, CPAP Home oxygen therapy 2 L per nasal cannula  Recent hospital admission for shortness of breath - patient treatment respiratory therapies  Patient followed by Dr. Luong  Patient without daily maintenance inhaler use  Patient without rescue inhaler use  Nebulizer use q.6 hours  Oxygen saturation 92% 2 L per nasal cannula prior to procedure          Education provided  regarding risk of obstructive sleep apnea            Renal/:   renal calculi               Hepatic/GI:        Patient has Wallis's syndrome.  Currently impacted.          Musculoskeletal:  Arthritis          Spine Disorders: lumbar Degenerative disease and Chronic Pain           Neurological:        Chronic Pain Syndrome   Peripheral Neuropathy                       Movement Disorder Dx, tremor   Endocrine:  Diabetes, poorly controlled, type 2 Hypothyroidism        Morbid Obesity / BMI > 40  Dermatological:  Hidradenitis suppurativa       Physical Exam  General: Well nourished, Cooperative, Alert and Oriented    Airway:  Mallampati: III   Mouth Opening: Normal  TM Distance: > 6 cm  Tongue: Normal  Neck ROM: Normal ROM    Dental:  Caps / Implants, Periodontal disease    Chest/Lungs:  Clear to auscultation, Normal Respiratory Rate    Heart:  Rate: Normal  Rhythm: Regular Rhythm  Sounds: Normal    Abdomen:  Normal, Soft, Nontender        Anesthesia Plan  Type of Anesthesia, risks & benefits discussed:    Anesthesia Type: Gen Natural Airway  Intra-op Monitoring Plan: Standard ASA Monitors  Post Op Pain Control Plan: multimodal analgesia and IV/PO Opioids PRN  Induction:  IV  Airway Plan: Video and Direct, Post-Induction  Informed Consent: Informed consent signed with the Patient and all parties understand the risks and agree with anesthesia plan.  All questions answered.   ASA Score: 3  Anesthesia Plan Notes:       Ready For Surgery From Anesthesia Perspective.     .

## 2024-09-12 NOTE — PT/OT/SLP PROGRESS
Physical Therapy      Patient Name:  Karo Leonard   MRN:  [id number]    Patient not seen today secondary to off floor for procedure. Will follow-up 9/13/24.

## 2024-09-12 NOTE — RESPIRATORY THERAPY
Pt is having a procedure at the bedside   09/12/24 1320   Aerosol Therapy   $ Aerosol Therapy Charges Patient unavailable

## 2024-09-12 NOTE — TRANSFER OF CARE
"Anesthesia Transfer of Care Note    Patient: Karo Leonard    Procedure(s) Performed: Procedure(s) (LRB):  DECOMPRESSION, COLON (N/A)    Patient location: ICU    Anesthesia Type: general    Transport from OR: Transported from OR on 6-10 L/min O2 by face mask with adequate spontaneous ventilation    Post pain: adequate analgesia    Post assessment: no apparent anesthetic complications and tolerated procedure well    Post vital signs: stable    Level of consciousness: awake    Nausea/Vomiting: no nausea/vomiting    Complications: none    Transfer of care protocol was followed      Last vitals: Visit Vitals  BP (!) 131/58   Pulse 68   Temp 36.7 °C (98 °F) (Oral)   Resp 11   Ht 5' 5" (1.651 m)   Wt 124.2 kg (273 lb 13 oz)   SpO2 99%   BMI 45.56 kg/m²     "

## 2024-09-12 NOTE — RESPIRATORY THERAPY
Pt asked for an oxymask she states it is better for her. She is on home o2 settings 2.5 lpm   09/12/24 0728   PRE-TX-O2   Device (Oxygen Therapy) Oxymask  (2.5 lpm)   $ Is the patient on Low Flow Oxygen? Yes   Flow (L/min) (Oxygen Therapy) 2.5   SpO2 (!) 94 %   Pulse 73   Resp (!) 22

## 2024-09-12 NOTE — NURSING
Nurses Note -- 4 Eyes      9/11/2024   11:45 PM      Skin assessed during: Daily Assessment      [] No Altered Skin Integrity Present    []Prevention Measures Documented      [x] Yes- Altered Skin Integrity Present or Discovered   [] LDA Added if Not in Epic (Describe Wound)   [] New Altered Skin Integrity was Present on Admit and Documented in LDA   [] Wound Image Taken    Wound Care Consulted? No    Attending Nurse:  Vijay Forte RN/Staff Member:  Selin GONZALEZ

## 2024-09-12 NOTE — PROGRESS NOTES
Pulmonary/Critical Care  Progress Note      Patient name: Karo Leonard  MRN: [id number]  Date: 09/12/2024    Admit Date: 8/25/2024  Consult Requested By: Carmen Quiles MD    Reason for Consult: Respiratory failure, COPD    HPI:    8/25/2024 - 65 yo ASCVD, DM, HTN. COPD(cigarette use) had increased SOB at home and a fall.  EMS was called and brought to ER.  Initially tried on BiPAP without response and was subsequently intubated and placed on ventilation.  Initial ABG showed over ventilation and we have adjusted and ABG are getting better.  She is sedated and not able to provide any history.  D/W  who says that she was supposed to see a pulmonologist but couldn't make appointment, she has been a chronic smoker.  She had increase SOB for a few days.  She did have a fall at home but he reports that she was not down for long.  He does report that she has been having recurring falls at home.      8/26/2024 - Stable overnight, O2 needs still too high to do SBT.  She is responsive and gets agitated on current sedation and we are adjusting.  No other new issues reported.  Hgb has decreased (no active bleeding reported), NA remains low, CPK is better, TFTF noted and c/w hypothyroid (synthroid started).    8/27/2024 - Remains critically ill, O2 needs down a little but still too high.  Difficult to sedate is either agitated or apneic.  Tried SBT this AM and was apneic.  VS reviewed.  She is 3.8 liters +.  Tube feeds have been started.  NA remains low, glucose is elevated.  CXR looks about the same    8/28/2024 - Stable overnight, O2 needs still up.  She does get agitated at times and we have adjusted meds.  Trouble with tube feeds and will hold today and restart tomorrow.  Not ready to wean because on increased FiO2 and will try to increase PEP to see if that helps.  She does not have a lot of secretions.  She is over ventilaed some and we adjusted vent.  CXR is about the same    8/29/2024 - Stable overnight,,  O2 needs still up some (I decreased FiO2 and increased PEEP some) and she is overventilated (we adjusted rate).  Will retry tube feeds ([facility] at 20 to see if she tolerates).  She is 4.6 liters +.  CXR is about the same.    8/30- continues on vent, settings adjusted for alkalosis- now on PS 12/5. CXR looks wet and pt with severe edema- fluids discontinued and starting lasix. Propofol has been weaned a little. Pt is awake and denies pain. Her blood pressure was up to 200 systolic at the time of my evaluation- RN states she just gave am blood pressure med and also Lasix.    8/31- pt awake, off sedation, writing and alert. Denies pain, states feeling better. She was significantly more alkalotic this am due to lasix so given a dose of diamox this am. Tolerating PS 10/5 now- will repeat abg soon and consider extubation     9/1- extubated yesterday and tolerated well    9/3/2024 - STable overnight, no new issues reported and is feeling better ovrall.  She is very weak.  O2 decreased to 5 LPM when I saqw her.  No new issues reported.  BP is up some.  Labs reviewed    9/4/2024 - Stable overnight, no new issues reported, she feels better overall.      9/5/2024 - Stable overnight, she complains of issues with vertigo when getting up.  Possible placement at [facility] is being worked on.  No new respiratory complaints.      9/6 the patient has developed very tender and tympanitic abdomen.  She was supposed to be going to an LTAC today.  She is getting ready to receive an enema which will hopefully clear her rectum and decrease the obstruction.    9/7 the patient has had 2 bowel movements.  The 1 she just had was quite large.  However, she continues to complain of abdominal pain.  She is also complaining that are pain meds have been decreased.  Explained how narcotics slow down GI transit and could be adding to her GI problems    9/8 the patient is still complaining of severe abdominal pain and distention.  She has not  yet had her CT today.     The patient is only having gas and stool mucus.  She is not getting out of bed.  Her back hurts, her knees hurt, she has vertigo..... Explained this would help her GI motility.    2024 - For an unclear reason she dropped from our list.  Respiratory status stable but was moved to ICU yesterday GI issues predominate and she was given neostigmine to try and help.  She remains very distended and complaints of decreased respiratory ability (also decreased IS volumes).  No distress.  For possible endoscopic decompression today.  CT showed persistent GI issues and RLL infiltrate/atelectasis    Review of Systems    Review of Systems   Constitutional:         Diffuse pain   Gastrointestinal:  Positive for abdominal pain and constipation.       Past Medical History    Past Medical History:   Diagnosis Date    Coronary artery disease     cardiac stent    DDD (degenerative disc disease), lumbar 2000    Diabetes mellitus     Hypertension     Thyroid disease        Past Surgical History    Past Surgical History:   Procedure Laterality Date     SECTION  08/1987    x2 1993    CORONARY STENT PLACEMENT      EXPLORATORY LAPAROTOMY      [facility]     HEMORRHOID SURGERY      LAPAROSCOPIC CHOLECYSTECTOMY N/A 2022    Procedure: CHOLECYSTECTOMY, LAPAROSCOPIC;  Surgeon: Leonardo Wilson III, MD;  Location: [facility];  Service: General;  Laterality: N/A;    LUMBAR DISC SURGERY      PILONIDAL CYST DRAINAGE      TONSILLECTOMY      as a child (also adenoids)       Medications (scheduled):      albuterol-ipratropium  3 mL Nebulization Q6H    cyanocobalamin  1,000 mcg Oral Daily    diltiaZEM  240 mg Oral Daily    enoxparin  40 mg Subcutaneous Q12H    famotidine (PF)  20 mg Intravenous Q12H    gabapentin  400 mg Oral TID    insulin glargine U-100  30 Units Subcutaneous BID    lactulose  20 g Oral TID    levothyroxine  100 mcg Oral Before breakfast     LIDOcaine  3 patch Transdermal Q24H    losartan  100 mg Oral Daily    metoclopramide  10 mg Intravenous Q6H    neostigmine  2 mg Intravenous Once    predniSONE  10 mg Oral Daily    senna-docusate 8.6-50 mg  1 tablet Oral BID       Medications (infusions):             Medications (prn):       Current Facility-Administered Medications:     acetaminophen, 650 mg, Oral, Q8H PRN    atropine, 1 mg, Intravenous, PRN    calcium gluconate IVPB, 1 g, Intravenous, PRN    calcium gluconate IVPB, 2 g, Intravenous, PRN    calcium gluconate IVPB, 3 g, Intravenous, PRN    dextrose 50%, 12.5 g, Intravenous, PRN    glucagon (human recombinant), 1 mg, Intramuscular, PRN    glycopyrrolate (PF), 0.1 mg, Intravenous, Once PRN    hydrALAZINE, 10 mg, Intravenous, Q6H PRN    HYDROcodone-acetaminophen, 1 tablet, Oral, Q8H PRN    insulin aspart U-100, 0-10 Units, Subcutaneous, Q6H PRN    magnesium sulfate IVPB, 2 g, Intravenous, PRN    magnesium sulfate IVPB, 4 g, Intravenous, PRN    meclizine, 25 mg, Oral, TID PRN    polyethylene glycol, 17 g, Oral, TID PRN    potassium bicarbonate, 35 mEq, Oral, PRN    potassium bicarbonate, 50 mEq, Oral, PRN    potassium bicarbonate, 60 mEq, Oral, PRN    potassium chloride in water, 40 mEq, Intravenous, PRN **AND** potassium chloride in water, 60 mEq, Intravenous, PRN **AND** potassium chloride in water, 80 mEq, Intravenous, PRN    simethicone, 1 tablet, Oral, TID PRN    sodium chloride 0.9%, 10 mL, Intravenous, PRN    sodium phosphate 15 mmol in D5W 250 mL IVPB, 15 mmol, Intravenous, PRN    sodium phosphate 20.01 mmol in D5W 250 mL IVPB, 20.01 mmol, Intravenous, PRN    sodium phosphate 30 mmol in D5W 250 mL IVPB, 30 mmol, Intravenous, PRN    Family History: No family history on file.    Social History: Tobacco:   Social History     Tobacco Use   Smoking Status Not on file   Smokeless Tobacco Not on file                                EtOH:   Social History     Substance and Sexual Activity   Alcohol Use  "No                                Drugs:   Social History     Substance and Sexual Activity   Drug Use No       Physical Exam    Vital signs:  Temp:  [97.9 °F (36.6 °C)-98.3 °F (36.8 °C)]   Pulse:  [64-89]   Resp:  [9-30]   BP: (113-155)/(56-77)   SpO2:  [90 %-100 %]     Intake/Output:   Intake/Output Summary (Last 24 hours) at 9/12/2024 1024  Last data filed at 9/12/2024 0600  Gross per 24 hour   Intake 1440 ml   Output 950 ml   Net 490 ml        BMI: Estimated body mass index is 45.56 kg/m² as calculated from the following:    Height as of this encounter: 5' 5" (1.651 m).    Weight as of this encounter: 124.2 kg (273 lb 13 oz).    Physical Exam  Vitals and nursing note reviewed.   Constitutional:       General: She is not in acute distress.     Appearance: She is not ill-appearing, toxic-appearing or diaphoretic.      Comments: Awake, alert   HENT:      Head: Normocephalic.      Comments: Some bruising right forehead and bilat eyes from her fall     Right Ear: External ear normal.      Left Ear: External ear normal.      Nose: Nose normal. No congestion or rhinorrhea.      Mouth/Throat:      Mouth: Mucous membranes are moist.      Pharynx: Oropharynx is clear. No oropharyngeal exudate or posterior oropharyngeal erythema.   Eyes:      General: No scleral icterus.        Right eye: No discharge.         Left eye: No discharge.      Extraocular Movements: Extraocular movements intact.      Conjunctiva/sclera: Conjunctivae normal.      Pupils: Pupils are equal, round, and reactive to light.   Neck:      Vascular: No carotid bruit.   Cardiovascular:      Rate and Rhythm: Normal rate and regular rhythm.      Pulses: Normal pulses.      Heart sounds: No murmur heard.     No friction rub. No gallop.   Pulmonary:      Effort: Pulmonary effort is normal. No respiratory distress.      Breath sounds: No stridor. No wheezing, rhonchi or rales.   Chest:      Chest wall: No tenderness.   Abdominal:      General: There is " distension.      Tenderness: There is abdominal tenderness. There is guarding.      Comments: Still very tender  No BS heard   Musculoskeletal:         General: No swelling. Normal range of motion.      Cervical back: Normal range of motion and neck supple. No rigidity or tenderness.      Right lower leg: Edema present.      Left lower leg: Edema present.   Lymphadenopathy:      Cervical: No cervical adenopathy.   Skin:     General: Skin is warm and dry.      Capillary Refill: Capillary refill takes less than 2 seconds.      Coloration: Skin is not jaundiced.      Findings: Bruising (Extensive ecchymoses to the eyes and frontal bone on the right with a large hematoma) present.      Comments: + bruise LLE   Neurological:      General: No focal deficit present.      Cranial Nerves: No cranial nerve deficit.      Sensory: No sensory deficit.      Motor: No weakness.   Psychiatric:         Mood and Affect: Mood normal.         Behavior: Behavior normal.         Laboratory    Recent Labs   Lab 09/12/24  0233   WBC 8.16   RBC 2.52*   HGB 7.9*   HCT 27.2*      *   MCH 31.3*   MCHC 29.0*           Recent Labs   Lab 09/12/24  0233   CALCIUM 8.3*   ALBUMIN 2.7*   PROT 5.2*      K 3.9   CO2 39*      BUN 8   CREATININE 0.4*   ALKPHOS 134   ALT 45*   AST 29   BILITOT 0.4           Microbiology:       Microbiology Results (last 7 days)       ** No results found for the last 168 hours. **            Radiology    X-Ray KUB  Narrative: EXAMINATION:  XR KUB    CLINICAL HISTORY:  Abdominal distension    COMPARISON:  September 11    FINDINGS:  Two supine images are limited by body habitus.  There is persistent moderate gaseous distension of the cecum, ascending colon, and transverse colon, similar to the prior study.  No mass, suspicious calcification, or free air is identified.    There is persistent atelectasis or mild infiltrate formation at the lung bases, with small bilateral pleural effusions,  "unchanged.  Impression: 1. No radiographic changes when compared to September 11 at 15:15 hours.    Electronically signed by: Malik Diez  Date:    09/12/2024  Time:    07:14        Ventilator Information    Oxygen Concentration (%):  [40] 40  The patient is on 3 L OxyMask           No results for input(s): "PH", "PCO2", "PO2", "HCO3", "POCSATURATED", "BE" in the last 72 hours.        Impression    Active Hospital Problems    Diagnosis  POA    *Acute hypoxic on chronic hypercapnic respiratory failure [J96.01, J96.12]  Yes    Hypokalemia [E87.6]  No    Ileus [K56.7]  Unknown    Type 2 diabetes mellitus [E11.9]  Yes    Anemia [D64.9]  Yes    Acute metabolic encephalopathy [G93.41]  Yes    Fall [W19.XXXA]  Yes    COPD exacerbation [J44.1]  Yes    Pneumonia [J18.9]  Yes    Hypothyroid [E03.9]  Yes    Hypertension [I10]  Yes      Resolved Hospital Problems    Diagnosis Date Resolved POA    Goals of care, counseling/discussion [Z71.89] 09/07/2024 Not Applicable     Patient's respiratory status is stable.  Her abdomen is still not settled.. Ct shows nothing requiring surgical intervention.     Plan    Continue O2 to maintain sats 89-92%  Continue bipap nightly  Respiratory treatments  Prednisone being weaned  Antihypertensives to continue  Replace electrolytes as needed  Synthroid adjusted, TSH still very elevated  Watch BS and treat as needed  Increase activity as able  Ileus per GI      Sonido Zamora MD  [facility] Pulmonary/Critical Care  09/12/2024                  "

## 2024-09-12 NOTE — PROVATION PATIENT INSTRUCTIONS
Discharge Summary/Instructions after an Endoscopic Procedure  Patient Name: Karo Leonard  Patient MRN: [id number]  Patient YOB: 1960 Thursday, September 12, 2024  Reza Chaudhari MD  RESTRICTIONS:  During your procedure today, you received medications for sedation.  These   medications may affect your judgment, balance and coordination.  Therefore,   for 24 hours, you have the following restrictions:   - DO NOT drive a car, operate machinery, make legal/financial decisions,   sign important papers or drink alcohol.    ACTIVITY:  Today: no heavy lifting, straining or running due to procedural   sedation/anesthesia.  The following day: return to full activity including work.  DIET:  Eat and drink normally unless instructed otherwise.     TREATMENT FOR COMMON SIDE EFFECTS:  - Mild abdominal pain, nausea, belching, bloating or excessive gas:  rest,   eat lightly and use a heating pad.  - Sore Throat: treat with throat lozenges and/or gargle with warm salt   water.  - Because air was used during the procedure, expelling large amounts of air   from your rectum or belching is normal.  - If a bowel prep was taken, you may not have a bowel movement for 1-3 days.    This is normal.  SYMPTOMS TO WATCH FOR AND REPORT TO YOUR PHYSICIAN:  1. Abdominal pain or bloating, other than gas cramps.  2. Chest pain.  3. Back pain.  4. Signs of infection such as: chills or fever occurring within 24 hours   after the procedure.  5. Rectal bleeding, which would show as bright red, maroon, or black stools.   (A tablespoon of blood from the rectum is not serious, especially if   hemorrhoids are present.)  6. Vomiting.  7. Weakness or dizziness.  GO DIRECTLY TO THE NEAREST EMERGENCY ROOM IF YOU HAVE ANY OF THE FOLLOWING:      Difficulty breathing              Chills and/or fever over 101 F   Persistent vomiting and/or vomiting blood   Severe abdominal pain   Severe chest pain   Black, tarry stools   Bleeding- more than one  tablespoon   Any other symptom or condition that you feel may need urgent attention  Your doctor recommends these additional instructions:  If any biopsies were taken, your doctors clinic will contact you in 1 to 2   weeks with any results.  - [facility].   - Clear liquid diet today.   - Repeat colonoscopy (date not yet determined) for screening purposes.  For questions, problems or results please call your physician - Reza Chaudhari MD at Work:  [phone].  [facility], EMERGENCY ROOM PHONE NUMBER: [phone]  IF A COMPLICATION OR EMERGENCY SITUATION ARISES AND YOU ARE UNABLE TO REACH   YOUR PHYSICIAN - GO DIRECTLY TO THE EMERGENCY ROOM.  MD Reza Duke MD  9/12/2024 2:23:15 PM  This report has been verified and signed electronically.  Dear patient,  As a result of recent federal legislation (The Federal Cures Act), you may   receive lab or pathology results from your procedure in your [facility]   account before your physician is able to contact you. Your physician or   their representative will relay the results to you with their   recommendations at their soonest availability.  Thank you,  PROVATION

## 2024-09-12 NOTE — RESPIRATORY THERAPY
09/12/24 0708   Patient Assessment/Suction   Level of Consciousness (AVPU) responds to voice   Respiratory Effort Normal;Unlabored   Expansion/Accessory Muscles/Retractions no use of accessory muscles;no retractions;expansion symmetric   All Lung Fields Breath Sounds Anterior:;Lateral:   HARRISON Breath Sounds diminished   LLL Breath Sounds diminished   RUL Breath Sounds diminished   RML Breath Sounds diminished   RLL Breath Sounds diminished   Rhythm/Pattern, Respiratory unlabored;pattern regular;shallow   Cough Frequency no cough   PRE-TX-O2   Device (Oxygen Therapy) BIPAP   $ Is the patient on High Flow Oxygen? Yes   SpO2 97 %   Pulse Oximetry Type Continuous   $ Pulse Oximetry - Multiple Charge Pulse Oximetry - Multiple   Pulse 67   Resp 12   Aerosol Therapy   $ Aerosol Therapy Charges Aerosol Treatment   Daily Review of Necessity (SVN) completed   Respiratory Treatment Status (SVN) given   Treatment Route (SVN) in-line;other (see comments)  (BIPAP)   Patient Position HOB elevated   Post Treatment Assessment (SVN) breath sounds unchanged   Signs of Intolerance (SVN) none   Breath Sounds Post-Respiratory Treatment   Throughout All Fields Post-Treatment All Fields   Throughout All Fields Post-Treatment no change   Post-treatment Heart Rate (beats/min) 67   Post-treatment Resp Rate (breaths/min) 10   Preset CPAP/BiPAP Settings   Mode Of Delivery BiPAP S/T   $ CPAP/BiPAP Daily Charge BiPAP/CPAP Daily   $ Is patient using? Yes   Size of Mask Small/Medium   Equipment Type V60   Ipap 12   EPAP (cm H2O) 5   Pressure Support (cm H2O) 7   Set Rate (Breaths/Min) 16   ITime (sec) 1   Rise Time (sec) 3   Patient CPAP/BiPAP Settings   CPAP/BIPAP ID 15   Tidal Volume (mL) 280   Peak Inspiratory Pressure (cm H2O) 15   TiTOT (%) 31   Total Leak (L/Min) 25   Patient Trigger - ST Mode Only (%) 51   CPAP/BiPAP Alarms   High Pressure (cm H2O) 30   Low Pressure (cm H2O) 3   Minute Ventilation (L/Min) 3   High RR (breaths/min) 40   Low  RR (breaths/min) 8   Apnea (Sec) 20

## 2024-09-12 NOTE — PT/OT/SLP PROGRESS
Occupational Therapy      Patient Name:  Karo Leonard   MRN:  [id number]    Patient not seen today secondary to Off the floor for procedure/surgery. Will follow-up 9/13/24.    9/12/2024

## 2024-09-13 LAB
ANION GAP SERPL CALC-SCNC: 4 MMOL/L (ref 8–16)
BASOPHILS # BLD AUTO: 0.01 K/UL (ref 0–0.2)
BASOPHILS NFR BLD: 0.1 % (ref 0–1.9)
BUN SERPL-MCNC: 8 MG/DL (ref 8–23)
CALCIUM SERPL-MCNC: 7.8 MG/DL (ref 8.7–10.5)
CHLORIDE SERPL-SCNC: 102 MMOL/L (ref 95–110)
CO2 SERPL-SCNC: 36 MMOL/L (ref 23–29)
CREAT SERPL-MCNC: 0.4 MG/DL (ref 0.5–1.4)
DIFFERENTIAL METHOD BLD: ABNORMAL
EOSINOPHIL # BLD AUTO: 0.1 K/UL (ref 0–0.5)
EOSINOPHIL NFR BLD: 1 % (ref 0–8)
ERYTHROCYTE [DISTWIDTH] IN BLOOD BY AUTOMATED COUNT: 17.4 % (ref 11.5–14.5)
EST. GFR  (NO RACE VARIABLE): >60 ML/MIN/1.73 M^2
GLUCOSE SERPL-MCNC: 41 MG/DL (ref 70–110)
HCT VFR BLD AUTO: 27.2 % (ref 37–48.5)
HGB BLD-MCNC: 7.9 G/DL (ref 12–16)
IMM GRANULOCYTES # BLD AUTO: 0.02 K/UL (ref 0–0.04)
IMM GRANULOCYTES NFR BLD AUTO: 0.3 % (ref 0–0.5)
LYMPHOCYTES # BLD AUTO: 0.8 K/UL (ref 1–4.8)
LYMPHOCYTES NFR BLD: 12 % (ref 18–48)
MAGNESIUM SERPL-MCNC: 1.9 MG/DL (ref 1.6–2.6)
MCH RBC QN AUTO: 31.9 PG (ref 27–31)
MCHC RBC AUTO-ENTMCNC: 29 G/DL (ref 32–36)
MCV RBC AUTO: 110 FL (ref 82–98)
MONOCYTES # BLD AUTO: 0.3 K/UL (ref 0.3–1)
MONOCYTES NFR BLD: 4.4 % (ref 4–15)
NEUTROPHILS # BLD AUTO: 5.5 K/UL (ref 1.8–7.7)
NEUTROPHILS NFR BLD: 82.2 % (ref 38–73)
NRBC BLD-RTO: 0 /100 WBC
PLATELET # BLD AUTO: 155 K/UL (ref 150–450)
PMV BLD AUTO: 10.4 FL (ref 9.2–12.9)
POCT GLUCOSE: 106 MG/DL (ref 70–110)
POCT GLUCOSE: 145 MG/DL (ref 70–110)
POCT GLUCOSE: 152 MG/DL (ref 70–110)
POCT GLUCOSE: 294 MG/DL (ref 70–110)
POTASSIUM SERPL-SCNC: 3.6 MMOL/L (ref 3.5–5.1)
RBC # BLD AUTO: 2.48 M/UL (ref 4–5.4)
SODIUM SERPL-SCNC: 142 MMOL/L (ref 136–145)
T3FREE SERPL-MCNC: 1.2 PG/ML (ref 2–4.4)
WBC # BLD AUTO: 6.75 K/UL (ref 3.9–12.7)

## 2024-09-13 PROCEDURE — 36415 COLL VENOUS BLD VENIPUNCTURE: CPT | Performed by: HOSPITALIST

## 2024-09-13 PROCEDURE — 25000003 PHARM REV CODE 250: Performed by: INTERNAL MEDICINE

## 2024-09-13 PROCEDURE — 94761 N-INVAS EAR/PLS OXIMETRY MLT: CPT

## 2024-09-13 PROCEDURE — 94640 AIRWAY INHALATION TREATMENT: CPT

## 2024-09-13 PROCEDURE — 99900035 HC TECH TIME PER 15 MIN (STAT)

## 2024-09-13 PROCEDURE — 27100171 HC OXYGEN HIGH FLOW UP TO 24 HOURS

## 2024-09-13 PROCEDURE — 63600175 PHARM REV CODE 636 W HCPCS: Performed by: INTERNAL MEDICINE

## 2024-09-13 PROCEDURE — 63600175 PHARM REV CODE 636 W HCPCS: Performed by: HOSPITALIST

## 2024-09-13 PROCEDURE — 94799 UNLISTED PULMONARY SVC/PX: CPT

## 2024-09-13 PROCEDURE — 82962 GLUCOSE BLOOD TEST: CPT

## 2024-09-13 PROCEDURE — 83735 ASSAY OF MAGNESIUM: CPT | Performed by: HOSPITALIST

## 2024-09-13 PROCEDURE — 94660 CPAP INITIATION&MGMT: CPT

## 2024-09-13 PROCEDURE — 99900031 HC PATIENT EDUCATION (STAT)

## 2024-09-13 PROCEDURE — 99233 SBSQ HOSP IP/OBS HIGH 50: CPT | Mod: ,,, | Performed by: INTERNAL MEDICINE

## 2024-09-13 PROCEDURE — 63600175 PHARM REV CODE 636 W HCPCS: Performed by: SURGERY

## 2024-09-13 PROCEDURE — 25000242 PHARM REV CODE 250 ALT 637 W/ HCPCS: Performed by: INTERNAL MEDICINE

## 2024-09-13 PROCEDURE — 80048 BASIC METABOLIC PNL TOTAL CA: CPT | Performed by: HOSPITALIST

## 2024-09-13 PROCEDURE — 25000003 PHARM REV CODE 250

## 2024-09-13 PROCEDURE — 63600175 PHARM REV CODE 636 W HCPCS

## 2024-09-13 PROCEDURE — 21400001 HC TELEMETRY ROOM

## 2024-09-13 PROCEDURE — 25000003 PHARM REV CODE 250: Performed by: SURGERY

## 2024-09-13 PROCEDURE — 85025 COMPLETE CBC W/AUTO DIFF WBC: CPT | Performed by: HOSPITALIST

## 2024-09-13 RX ORDER — ACETAMINOPHEN 325 MG/1
650 TABLET ORAL EVERY 8 HOURS PRN
Status: DISCONTINUED | OUTPATIENT
Start: 2024-09-13 | End: 2024-09-17 | Stop reason: HOSPADM

## 2024-09-13 RX ORDER — KETOROLAC TROMETHAMINE 30 MG/ML
15 INJECTION, SOLUTION INTRAMUSCULAR; INTRAVENOUS EVERY 6 HOURS PRN
Status: DISPENSED | OUTPATIENT
Start: 2024-09-13 | End: 2024-09-16

## 2024-09-13 RX ORDER — FUROSEMIDE 10 MG/ML
20 INJECTION INTRAMUSCULAR; INTRAVENOUS ONCE
Status: COMPLETED | OUTPATIENT
Start: 2024-09-13 | End: 2024-09-13

## 2024-09-13 RX ORDER — FUROSEMIDE 10 MG/ML
20 INJECTION INTRAMUSCULAR; INTRAVENOUS ONCE
Status: DISCONTINUED | OUTPATIENT
Start: 2024-09-13 | End: 2024-09-13

## 2024-09-13 RX ORDER — FUROSEMIDE 10 MG/ML
40 INJECTION INTRAMUSCULAR; INTRAVENOUS ONCE
Status: DISCONTINUED | OUTPATIENT
Start: 2024-09-13 | End: 2024-09-13

## 2024-09-13 RX ADMIN — ENOXAPARIN SODIUM 40 MG: 40 INJECTION SUBCUTANEOUS at 11:09

## 2024-09-13 RX ADMIN — FUROSEMIDE 20 MG: 10 INJECTION, SOLUTION INTRAMUSCULAR; INTRAVENOUS at 12:09

## 2024-09-13 RX ADMIN — GABAPENTIN 400 MG: 300 CAPSULE ORAL at 04:09

## 2024-09-13 RX ADMIN — HYDROCODONE BITARTRATE AND ACETAMINOPHEN 1 TABLET: 5; 325 TABLET ORAL at 08:09

## 2024-09-13 RX ADMIN — FAMOTIDINE 20 MG: 10 INJECTION INTRAVENOUS at 08:09

## 2024-09-13 RX ADMIN — IPRATROPIUM BROMIDE AND ALBUTEROL SULFATE 3 ML: 2.5; .5 SOLUTION RESPIRATORY (INHALATION) at 12:09

## 2024-09-13 RX ADMIN — METOCLOPRAMIDE HYDROCHLORIDE 10 MG: 5 INJECTION INTRAMUSCULAR; INTRAVENOUS at 11:09

## 2024-09-13 RX ADMIN — LOSARTAN POTASSIUM 100 MG: 50 TABLET, FILM COATED ORAL at 08:09

## 2024-09-13 RX ADMIN — DILTIAZEM HYDROCHLORIDE 240 MG: 120 CAPSULE, COATED, EXTENDED RELEASE ORAL at 08:09

## 2024-09-13 RX ADMIN — METOCLOPRAMIDE HYDROCHLORIDE 10 MG: 5 INJECTION INTRAMUSCULAR; INTRAVENOUS at 06:09

## 2024-09-13 RX ADMIN — LIDOCAINE 3 PATCH: 50 PATCH TOPICAL at 11:09

## 2024-09-13 RX ADMIN — INSULIN GLARGINE 30 UNITS: 100 INJECTION, SOLUTION SUBCUTANEOUS at 08:09

## 2024-09-13 RX ADMIN — LACTULOSE 20 G: 20 SOLUTION ORAL at 08:09

## 2024-09-13 RX ADMIN — SODIUM CHLORIDE 125 MG: 9 INJECTION, SOLUTION INTRAVENOUS at 04:09

## 2024-09-13 RX ADMIN — CYANOCOBALAMIN TAB 1000 MCG 1000 MCG: 1000 TAB at 08:09

## 2024-09-13 RX ADMIN — INSULIN ASPART 3 UNITS: 100 INJECTION, SOLUTION INTRAVENOUS; SUBCUTANEOUS at 08:09

## 2024-09-13 RX ADMIN — METOCLOPRAMIDE HYDROCHLORIDE 10 MG: 5 INJECTION INTRAMUSCULAR; INTRAVENOUS at 05:09

## 2024-09-13 RX ADMIN — METOCLOPRAMIDE HYDROCHLORIDE 10 MG: 5 INJECTION INTRAMUSCULAR; INTRAVENOUS at 01:09

## 2024-09-13 RX ADMIN — IPRATROPIUM BROMIDE AND ALBUTEROL SULFATE 3 ML: 2.5; .5 SOLUTION RESPIRATORY (INHALATION) at 07:09

## 2024-09-13 RX ADMIN — SENNOSIDES AND DOCUSATE SODIUM 1 TABLET: 8.6; 5 TABLET ORAL at 08:09

## 2024-09-13 RX ADMIN — KETOROLAC TROMETHAMINE 15 MG: 30 INJECTION, SOLUTION INTRAMUSCULAR at 12:09

## 2024-09-13 RX ADMIN — GABAPENTIN 400 MG: 300 CAPSULE ORAL at 08:09

## 2024-09-13 RX ADMIN — POTASSIUM BICARBONATE 50 MEQ: 977.5 TABLET, EFFERVESCENT ORAL at 08:09

## 2024-09-13 RX ADMIN — PREDNISONE 10 MG: 5 TABLET ORAL at 08:09

## 2024-09-13 RX ADMIN — LEVOTHYROXINE SODIUM 100 MCG: 0.1 TABLET ORAL at 05:09

## 2024-09-13 RX ADMIN — INSULIN ASPART 2 UNITS: 100 INJECTION, SOLUTION INTRAVENOUS; SUBCUTANEOUS at 11:09

## 2024-09-13 NOTE — PT/OT/SLP PROGRESS
Physical Therapy      Patient Name:  Karo Leonard   MRN:  [id number]    Patient not seen today secondary to Pain. Will follow-up .

## 2024-09-13 NOTE — CARE UPDATE
09/12/24 1926   Patient Assessment/Suction   Level of Consciousness (AVPU) alert   Respiratory Effort Normal;Unlabored   Expansion/Accessory Muscles/Retractions no use of accessory muscles;no retractions;expansion symmetric   All Lung Fields Breath Sounds diminished   Rhythm/Pattern, Respiratory unlabored;no shortness of breath reported   Cough Frequency infrequent   PRE-TX-O2   Device (Oxygen Therapy) Oxymask   Flow (L/min) (Oxygen Therapy) 9   SpO2 100 %   Pulse Oximetry Type Continuous   Pulse 70   Resp 17   /60   Aerosol Therapy   $ Aerosol Therapy Charges Aerosol Treatment  (duo)   Daily Review of Necessity (SVN) completed   Respiratory Treatment Status (SVN) given   Treatment Route (SVN) mask;oxygen   Patient Position HOB elevated   Post Treatment Assessment (SVN) increased aeration   Signs of Intolerance (SVN) none   Breath Sounds Post-Respiratory Treatment   Throughout All Fields Post-Treatment All Fields   Throughout All Fields Post-Treatment aeration increased   Post-treatment Heart Rate (beats/min) 70   Post-treatment Resp Rate (breaths/min) 17   Incentive Spirometer   $ Incentive Spirometer Charges done with encouragement   Incentive Spirometer Predicted Level (mL) 1500   Administration (IS) self-administered   Number of Repetitions (IS) 10   Level Incentive Spirometer (mL) 750   Patient Tolerance (IS) no adverse signs/symptoms present

## 2024-09-13 NOTE — PROGRESS NOTES
GASTROENTEROLOGY INPATIENT PROGRESS NOTE  Patient Name: Karo Leonard  Patient MRN: [id number]  Patient : 1960    Admit Date: 2024  Service date: 2024    PCP: Navneet Hernandez FNP      HPI: Patient is a 64 y.o. female with PMHx  Patient is 64-year-old female who has been admitted to the hospital for the last 2 weeks. Patient initially presented following a fall at home with the associated facial trauma. On presentation she was in respiratory failure requiring intubation and ICU admission. Patient continues to require oxygen support but has been extubated and transferred to the floor. She was no longer requiring any pressure support. Patient has noted increased abdominal pain and distention for the last several days. He is uncertain when she last had a bowel movement. Given pain and distention she had a CT scan performed today which did demonstrate significant distention of the colon with a large amount of stool in the right colon. The ascending colon measured up to 11 cm. On CT scan there is no evidence of a small-bowel obstruction. Surgery was consulted for evaluation. Patient only surgical history is that of a laparoscopic cholecystectomy and  x2.  .       S:  Patient underwent colonoscopy yesterday with findings of ascending colon colitis suspicious for ischemia.  She is having less volume of bowel movements but still having good bowel movements.  Repeat KUB shows nonobstructive bowel gas pattern.     Inpatient Medications:   albuterol-ipratropium  3 mL Nebulization Q6H    cyanocobalamin  1,000 mcg Oral Daily    diltiaZEM  240 mg Oral Daily    enoxparin  40 mg Subcutaneous Q12H    famotidine (PF)  20 mg Intravenous Q12H    gabapentin  400 mg Oral TID    insulin glargine U-100  30 Units Subcutaneous BID    lactulose  20 g Oral TID    levothyroxine  100 mcg Oral Before breakfast    LIDOcaine  3 patch Transdermal Q24H    losartan  100 mg Oral Daily    metoclopramide  10 mg Intravenous  "Q6H    neostigmine  2 mg Intravenous Once    predniSONE  10 mg Oral Daily    senna-docusate 8.6-50 mg  1 tablet Oral BID         Review of Systems:  GENERAL: No fever, chills, weight loss  SKIN: No rashes, jaundice, pruritus  HEENT: No rhinorrhea, epistaxis, vision changes.  No trauma, tinnitus, lymphadenopathy or pharyngitis  CV: No chest pain, palpitations, edima or CARLTON  PULM: No cough or sputum production.  No wheezing.  GI: AS IN HPI  URINARY:  No hematuria, dysuria  MS: No change in muscle or joint pain, weakness, or ROM  Neuro: No focal neurologic changes  PSYCH: No change in mood or personality.  No suicidal ideation.  ENDOCRINE: No fatigue      OBJECTIVE:    Vitals:    09/13/24 0730 09/13/24 0850 09/13/24 1200 09/13/24 1244   BP: (!) 106/51 (!) 120/56 (!) 142/66    BP Location:       Patient Position:       Pulse: 61  86 80   Resp: 12 18 19   Temp:   98.2 °F (36.8 °C)    TempSrc:   Oral    SpO2: 99%  (!) 92% 96%   Weight:       Height:           Physical Exam:    GEN: well-developed, well-nourished, awake and alert, non-toxic appearing adult  HEENT: PERRL, sclera anicteric, oral mucosa pink and moist without lesion  NECK: trachea midline; Good ROM  CV: regular rate and rhythm, no murmurs or gallops  RESP: clear to auscultation bilaterally, no wheezes, rhonci or rales  ABD: soft, non-tender, non-distended, normal bowel sounds  EXT: no swelling or edema, 2+ pulses distally  SKIN: no rashes or jaundice  PSYCH: normal affect    Labs:   Recent Labs   Lab 09/10/24  0913 09/12/24  0233 09/13/24  1216   WBC 8.35 8.16 6.75   HGB 8.1* 7.9* 7.9*   * 172 155   * 108* 110*     No results for input(s): "IRON", "FERRITIN" in the last 168 hours.    Invalid input(s): "IRONSAT"  Recent Labs   Lab 09/10/24  0913 09/11/24  1700 09/12/24  0233 09/13/24  0835    142 143 142   K 3.3* 3.4* 3.9 3.6   BUN 8 8 8 8   CREATININE 0.4* 0.4* 0.4* 0.4*   ALBUMIN 2.9* 2.6* 2.7*  --    AST 42* 33 29  --    ALT 48* 46* 45* "  --    ALKPHOS 131 128 134  --          Radiology Review:    KUB 09/13/2024-  Impression:     Nonobstructive bowel gas pattern.  IMPRESSION / RECOMMENDATIONS:  64-year-old female patient with morbid obesity acute on chronic hypercapnic respiratory failure  Patient with resolving colonic ileus and awaiting pathology of colitis changes suspicious for ischemia.  Okay to advance diet.  No leukocytosis no metabolic acidosis   Anemia secondary to colitis.  Recommend IV iron  We will sign off please re-consult with any new issues that arise      Tesha Antonio  9/13/2024  1:42 PM

## 2024-09-13 NOTE — PLAN OF CARE
CM met with pt and spouse to discuss discharge planning.  They continue to hope for pt to improve to the point she is able to discharge to LTAC with [facility]. Pt with gastric decompression 9/12 and pt with 4L Oxymask today.  Still requiring BiPAP at HS at 40%.  CM will continue to follow.       09/13/24 8800   Discharge Reassessment   Assessment Type Discharge Planning Reassessment   Did the patient's condition or plan change since previous assessment? Yes   Discharge Plan discussed with: Patient   Discharge Plan A Long-term acute care facility (LTAC)   Discharge Plan B Skilled Nursing Facility   DME Needed Upon Discharge  none   Transition of Care Barriers None   Why the patient remains in the hospital Requires continued medical care   Post-Acute Status   Post-Acute Authorization Placement   Post-Acute Placement Status Pending medical clearance/testing   Discharge Delays None known at this time

## 2024-09-13 NOTE — SUBJECTIVE & OBJECTIVE
Interval History: Ct of abdomen showed cecum is 11 cm.    Review of Systems   HENT:  Positive for facial swelling.         Hematoma over the right frontal region from the fall   Gastrointestinal:  Positive for abdominal distention and abdominal pain.        Today pain worsened compared to yesterday.   Musculoskeletal:  Positive for back pain.       Objective:     Vital Signs (Most Recent):  Temp: 98.2 °F (36.8 °C) (09/13/24 0301)  Pulse: (!) 59 (09/13/24 0704)  Resp: 16 (09/13/24 0704)  BP: (!) 105/49 (09/13/24 0704)  SpO2: 99 % (09/13/24 0704) Vital Signs (24h Range):  Temp:  [98 °F (36.7 °C)-98.3 °F (36.8 °C)] 98.2 °F (36.8 °C)  Pulse:  [59-83] 59  Resp:  [9-20] 16  SpO2:  [90 %-100 %] 99 %  BP: (102-159)/(49-67) 105/49     Weight: 124.2 kg (273 lb 13 oz)  Body mass index is 45.56 kg/m².    Intake/Output Summary (Last 24 hours) at 9/13/2024 0823  Last data filed at 9/13/2024 0601  Gross per 24 hour   Intake 400 ml   Output 1045 ml   Net -645 ml         Physical Exam  HENT:      Head:      Comments: Ecchymosis around bilateral eyes     Mouth/Throat:      Mouth: Mucous membranes are moist.   Eyes:      Pupils: Pupils are equal, round, and reactive to light.   Cardiovascular:      Rate and Rhythm: Normal rate.      Heart sounds:      Gallop present.   Pulmonary:      Effort: No respiratory distress.      Breath sounds: Normal breath sounds. No stridor. No wheezing.      Comments: Unable to take a deep breath given her abdominal pain  Abdominal:      General: There is distension.      Palpations: There is no mass.      Tenderness: There is abdominal tenderness. There is no guarding or rebound.   Musculoskeletal:      Right lower leg: Edema present.      Left lower leg: Edema present.   Neurological:      Mental Status: She is alert and oriented to person, place, and time.             Significant Labs: All pertinent labs within the past 24 hours have been reviewed.    Significant Imaging: I have reviewed all pertinent  imaging results/findings within the past 24 hours.

## 2024-09-13 NOTE — PROGRESS NOTES
[facility] Medicine  Progress Note    Patient Name: Karo Leonard  MRN: [id number]  Patient Class: IP- Inpatient   Admission Date: 8/25/2024  Length of Stay: 19 days  Attending Physician: Carmen Quiles MD  Primary Care Provider: Navneet Hernandez FNP        Subjective:     Principal Problem:Acute hypoxic on chronic hypercapnic respiratory failure        HPI:  64F p/w fall in the context of shortness of breath. EMS reportedly gave narcan w/o appreciable response, then she was given duoneb en route to [facility] ED. Upon arrival, she was tachypneic, had respy sounding phonation, but she denied chest pain, fever, or chills. She was placed on BiPAP, and initially demonstrated improvement. However, she became less responsive and appeared to tire out. She was given additional narcan w/o appreciable response, so she was intubated. She had some hypotension after receiving sedation, so she was placed on low dose levophed. [place] was placed, lasix was given, as was Abx. Large bruise noted on LLE, so x-ray ordered.     Overview/Hospital Course:  64 F Patient with hx of COPD, morbid obesity was admitted for acute on chronic hypoxemic hypercapnic respiratory failure with sever fall with hit to the face . Patient was intubated after failing  BiPAP. Finished course of IV Levofloxacin for COPD exacerbation and possible pneumonia. Pulm has been managing her steroid dose. Patient was started on iron and B12 supplement for anemia. S/P exubation on 8/31. For her hypertension, patient was started on losartan and her home diltiazem was resumed.  Patient working with PT and OT.  Fall precautions have been addressed with the patient.  Patient moving to medicine telemetry.  Supplemental oxygen weaning is in process.  Patient encouraged to continue BiPAP use at night or as needed as before. Steroid dose is being tapered down. Pulmonology followed the patient.    During her hospital course, patient complained of  bloated and also passing a lot of gas and Gas x was given but later complained of mild abdominal pain and KUB was done and found to have Gas in stool distended loops of large bowel measuring up to 6.5 cm.  Findings could reflect ileus or obstruction.  CT scan done demonstrated significant colonic distention consistent with ileus with a large amount of stool in ascending colon, colon measuring up to 11 cm.  General surgery evaluated the patient, NG tube was placed, patient continued on MiraLax and enemas, CT scan was repeated on 09/08/2024 which still shows significant constipation.  Lactulose was added and she had loose watery bowel movements, following which she was started on clears, advanced to full liquids, but her KUB was unchanged and hence we consulted gastroenterology who recommended neostigmine while monitoring in ICU given the risk of bradycardia and her tenuous respiratory status.    GI:  64 F with colonic ileus and ischemic changes of                          the suspected proximal ascending colon and cecum.                          Unable to intubate the cecum due to body habitus,                          poor visualization. Air was suctioned out and                          procedure aborted. Significant improvement in                          distension of abdomen post endoscopy. Will need                          serial abdominal exams and daily KUB. If running                          fever, rising wbc I would have concern for                          gangrenous right colon but as of now the mucosa                          appears to be viable on limited examination.                          Clears okay today.                          Recommend that surgery continue to follow along                          Optimize all electrolytes (K/Mg/Phos)                          - Preparation of the colon was poor.                          - Dilated in the transverse colon, at the hepatic                           flexure and in the ascending colon.                          - Mucosal ulceration.                          - No specimens collected.     Interval History: Ct of abdomen showed cecum is 11 cm.    Review of Systems   HENT:  Positive for facial swelling.         Hematoma over the right frontal region from the fall   Gastrointestinal:  Positive for abdominal distention and abdominal pain.        Today pain worsened compared to yesterday.   Musculoskeletal:  Positive for back pain.       Objective:     Vital Signs (Most Recent):  Temp: 98.2 °F (36.8 °C) (09/13/24 0301)  Pulse: (!) 59 (09/13/24 0704)  Resp: 16 (09/13/24 0704)  BP: (!) 105/49 (09/13/24 0704)  SpO2: 99 % (09/13/24 0704) Vital Signs (24h Range):  Temp:  [98 °F (36.7 °C)-98.3 °F (36.8 °C)] 98.2 °F (36.8 °C)  Pulse:  [59-83] 59  Resp:  [9-20] 16  SpO2:  [90 %-100 %] 99 %  BP: (102-159)/(49-67) 105/49     Weight: 124.2 kg (273 lb 13 oz)  Body mass index is 45.56 kg/m².    Intake/Output Summary (Last 24 hours) at 9/13/2024 0823  Last data filed at 9/13/2024 0601  Gross per 24 hour   Intake 400 ml   Output 1045 ml   Net -645 ml         Physical Exam  HENT:      Head:      Comments: Ecchymosis around bilateral eyes     Mouth/Throat:      Mouth: Mucous membranes are moist.   Eyes:      Pupils: Pupils are equal, round, and reactive to light.   Cardiovascular:      Rate and Rhythm: Normal rate.      Heart sounds:      Gallop present.   Pulmonary:      Effort: No respiratory distress.      Breath sounds: Normal breath sounds. No stridor. No wheezing.      Comments: Unable to take a deep breath given her abdominal pain  Abdominal:      General: There is distension.      Palpations: There is no mass.      Tenderness: There is abdominal tenderness. There is no guarding or rebound.   Musculoskeletal:      Right lower leg: Edema present.      Left lower leg: Edema present.   Neurological:      Mental Status: She is alert and oriented to person, place, and time.              Significant Labs: All pertinent labs within the past 24 hours have been reviewed.    Significant Imaging: I have reviewed all pertinent imaging results/findings within the past 24 hours.    Assessment/Plan:      * Acute hypoxic on chronic hypercapnic respiratory failure  Appear secondary to COPD exacerbation    Improving  Likely multifactorial  S/P extubation on 8/31  Oxygen supplement as need, patient already on home oxygen 2 to 3 L  EF 60-65%  Pulmonology is following patient  Having problem with placement because she need BiPAP at night along with oxygen up-to 7 L intermittently and facility can not accept with the level of BiPAP  Trying to send to LTAC after her condition is stable  Encouraged incentive spirometry given her continuous lying in bed and positioning, she demonstrates understanding and is motivated     Hypokalemia  Patient's most recent potassium results are listed below.   Recent Labs     09/10/24  0913 09/11/24  1700   K 3.3* 3.4*     Plan  - Repleted potassium - Monitor potassium Daily  - Patient's hypokalemia is stable      Ileus  KUB with Gas in stool distended loops of large bowel measuring up to 6.5 cm.  Findings could reflect ileus or obstruction.  CT scan done demonstrated significant colonic distention consistent with ileus with a large amount of stool in ascending colon, colon measuring up to 11 cm. General surgery evaluated the patient, NG tube was placed, patient continued on MiraLax and enemas  Avoid scheduled opiates   Ordered Tylenol and ketorolac for pain  IV fluids given poor oral intake  Repeat CT showed significant constipation and persistent colon dilation  General surgery recommended lactulose and miralax, s/p enemas  Tolerating full liquid diet   Currently is having loose bowel movements 3-4 day  Repeat KUB today showed similar changes but her pain is persistent  Consulted GI  Planned for neostigmine in ICU and possible colonic decompression.- did not receive medication  "  Underwent decompression by c/scope   Ischemic changes noted on c/scope, GI recommends surgery continue to follow          Type 2 diabetes mellitus  Continue with SSI   We will reduce glargine given poor oral intake, episode of hypoglycemia this morning    Make adjustment as needed    Anemia  Likely from Iron and b12 deficiency  Started on Iron and B12 supplement on 8/27    Fall  Imaging ruled out acute fracture but severe swelling noted.  No visual disturbance  US confirmed hematoma on left lower extremity which is getting smaller  Fall precautions discussed with the patient.    PT OT and SNF placement /LTAC placement    Acute metabolic encephalopathy  Resolved  CT head: no acute changes  Continue to monitor     Pneumonia  MRSA neg, procal neg, respiratory panel neg  Completed Levofloxacin antibiotic course on 9/1    COPD exacerbation  Continue with steroids  Taper prednisone, currently on 10mg daily  Completed Levofloxacin antibiotic course on 9/1  Pulmonology following    Hypertension  Started Losartan on 8/28 and amlodipine on 8/31  Can make adjustment as needed  Resumed home diltiazem on 9/1    Hypothyroid  Currently on increased dose of levothyroxine  TSH today elevated at 45, Free T4 low at 0.47  Will increase the dose further to 100 mcg  Will give additional 25 mcg one dose now  As of now, mental status normal, temperature normal, no bradycardia, hypoglycemia likely from poor oral intake and high dose insulin  Given her edema, significant constipation, can consider IV Levothyroxine if fails to improve  Monitor closely in ICU        VTE Risk Mitigation (From admission, onward)           Ordered     enoxaparin injection 40 mg  Every 12 hours        Note to Pharmacy: Ht: 5' 5" (1.651 m)  Wt: 127 kg (280 lb)  Estimated Creatinine Clearance: 92.8 mL/min (based on SCr of 0.8 mg/dL).  Body mass index is 46.59 kg/m².    08/25/24 0627     IP VTE HIGH RISK PATIENT  Once         08/25/24 0627     Place sequential " compression device  Until discontinued         08/25/24 0627                    Discharge Planning   CHELE: 9/13/2024     Code Status: Full Code   Is the patient medically ready for discharge?:     Reason for patient still in hospital (select all that apply): Patient trending condition  Discharge Plan A: Long-term acute care facility (LTAC)   Discharge Delays: None known at this time        Critical care time spent on the evaluation and treatment of severe organ dysfunction, review of pertinent labs and imaging studies, discussions with consulting providers and discussions with patient/family: 15 minutes.      Carmen Quiles MD  Department of Hospital Medicine   [facility]

## 2024-09-13 NOTE — PLAN OF CARE
Problem: Adult Inpatient Plan of Care  Goal: Plan of Care Review  Outcome: Progressing  Goal: Patient-Specific Goal (Individualized)  Outcome: Progressing  Goal: Absence of Hospital-Acquired Illness or Injury  Outcome: Progressing  Goal: Optimal Comfort and Wellbeing  Outcome: Progressing  Goal: Readiness for Transition of Care  Outcome: Progressing     Problem: Bariatric Environmental Safety  Goal: Safety Maintained with Care  Outcome: Progressing     Problem: Skin Injury Risk Increased  Goal: Skin Health and Integrity  Outcome: Progressing     Problem: Diabetes Comorbidity  Goal: Blood Glucose Level Within Targeted Range  Outcome: Progressing

## 2024-09-13 NOTE — ASSESSMENT & PLAN NOTE
KUB with Gas in stool distended loops of large bowel measuring up to 6.5 cm.  Findings could reflect ileus or obstruction.  CT scan done demonstrated significant colonic distention consistent with ileus with a large amount of stool in ascending colon, colon measuring up to 11 cm. General surgery evaluated the patient, NG tube was placed, patient continued on MiraLax and enemas  Avoid scheduled opiates   Ordered Tylenol and ketorolac for pain  IV fluids given poor oral intake  Repeat CT showed significant constipation and persistent colon dilation  General surgery recommended lactulose and miralax, s/p enemas  Tolerating full liquid diet   Currently is having loose bowel movements 3-4 day  Repeat KUB today showed similar changes but her pain is persistent  Consulted GI  Planned for neostigmine in ICU and possible colonic decompression.- did not receive medication   Underwent decompression by c/scope   Ischemic changes noted on c/scope, GI recommends surgery continue to follow

## 2024-09-13 NOTE — PROGRESS NOTES
[facility] Medicine  Progress Note    Patient Name: Karo Leonard  MRN: [id number]  Patient Class: IP- Inpatient   Admission Date: 8/25/2024  Length of Stay: 19 days  Attending Physician: Carmen Quiles MD  Primary Care Provider: Navneet Hernandez FNP        Subjective:     Principal Problem:Acute hypoxic on chronic hypercapnic respiratory failure        HPI:  64F p/w fall in the context of shortness of breath. EMS reportedly gave narcan w/o appreciable response, then she was given duoneb en route to [facility] ED. Upon arrival, she was tachypneic, had respy sounding phonation, but she denied chest pain, fever, or chills. She was placed on BiPAP, and initially demonstrated improvement. However, she became less responsive and appeared to tire out. She was given additional narcan w/o appreciable response, so she was intubated. She had some hypotension after receiving sedation, so she was placed on low dose levophed. [place] was placed, lasix was given, as was Abx. Large bruise noted on LLE, so x-ray ordered.     Overview/Hospital Course:  64 F Patient with hx of COPD, morbid obesity was admitted for acute on chronic hypoxemic hypercapnic respiratory failure with sever fall with hit to the face . Patient was intubated after failing  BiPAP. Finished course of IV Levofloxacin for COPD exacerbation and possible pneumonia. Pulm has been managing her steroid dose. Patient was started on iron and B12 supplement for anemia. S/P exubation on 8/31. For her hypertension, patient was started on losartan and her home diltiazem was resumed.  Patient working with PT and OT.  Fall precautions have been addressed with the patient.  Patient moving to medicine telemetry.  Supplemental oxygen weaning is in process.  Patient encouraged to continue BiPAP use at night or as needed as before. Steroid dose is being tapered down. Pulmonology followed the patient.    During her hospital course, patient complained of  bloated and also passing a lot of gas and Gas x was given but later complained of mild abdominal pain and KUB was done and found to have Gas in stool distended loops of large bowel measuring up to 6.5 cm.  Findings could reflect ileus or obstruction.  CT scan done demonstrated significant colonic distention consistent with ileus with a large amount of stool in ascending colon, colon measuring up to 11 cm.  General surgery evaluated the patient, NG tube was placed, patient continued on MiraLax and enemas, CT scan was repeated on 09/08/2024 which still shows significant constipation.  Lactulose was added and she had loose watery bowel movements, following which she was started on clears, advanced to full liquids, but her KUB was unchanged and hence we consulted gastroenterology who recommended neostigmine while monitoring in ICU given the risk of bradycardia and her tenuous respiratory status.    GI:  64 F with colonic ileus and ischemic changes of                          the suspected proximal ascending colon and cecum.                          Unable to intubate the cecum due to body habitus,                          poor visualization. Air was suctioned out and                          procedure aborted. Significant improvement in                          distension of abdomen post endoscopy. Will need                          serial abdominal exams and daily KUB. If running                          fever, rising wbc I would have concern for                          gangrenous right colon but as of now the mucosa                          appears to be viable on limited examination.                          Clears okay today.                          Recommend that surgery continue to follow along                          Optimize all electrolytes (K/Mg/Phos)                          - Preparation of the colon was poor.                          - Dilated in the transverse colon, at the hepatic                           flexure and in the ascending colon.                          - Mucosal ulceration.                          - No specimens collected.     KUB improving 9/13/24    Interval History: Ct of abdomen showed cecum is 11 cm. KUB daily     Review of Systems   HENT:  Positive for facial swelling.         Hematoma over the right frontal region from the fall   Gastrointestinal:  Positive for abdominal distention and abdominal pain.        Today pain worsened compared to yesterday.   Musculoskeletal:  Positive for back pain.       Objective:     Vital Signs (Most Recent):  Temp: 98.2 °F (36.8 °C) (09/13/24 1200)  Pulse: 80 (09/13/24 1244)  Resp: 19 (09/13/24 1244)  BP: (!) 142/66 (09/13/24 1200)  SpO2: (!) 93 % (09/13/24 1734) Vital Signs (24h Range):  Temp:  [98 °F (36.7 °C)-98.4 °F (36.9 °C)] 98.2 °F (36.8 °C)  Pulse:  [59-86] 80  Resp:  [9-20] 19  SpO2:  [90 %-100 %] 93 %  BP: (105-142)/(49-66) 142/66     Weight: 124.2 kg (273 lb 13 oz)  Body mass index is 45.56 kg/m².    Intake/Output Summary (Last 24 hours) at 9/13/2024 1805  Last data filed at 9/13/2024 1715  Gross per 24 hour   Intake 720 ml   Output 595 ml   Net 125 ml         Physical Exam  HENT:      Head:      Comments: Ecchymosis around bilateral eyes     Mouth/Throat:      Mouth: Mucous membranes are moist.   Eyes:      Pupils: Pupils are equal, round, and reactive to light.   Cardiovascular:      Rate and Rhythm: Normal rate.      Heart sounds:      Gallop present.   Pulmonary:      Effort: No respiratory distress.      Breath sounds: Normal breath sounds. No stridor. No wheezing.      Comments: Unable to take a deep breath given her abdominal pain  Abdominal:      General: There is distension.      Palpations: There is no mass.      Tenderness: There is abdominal tenderness. There is no guarding or rebound.   Musculoskeletal:      Right lower leg: Edema present.      Left lower leg: Edema present.   Neurological:      Mental Status: She is alert and oriented  to person, place, and time.             Significant Labs: All pertinent labs within the past 24 hours have been reviewed.    Significant Imaging: I have reviewed all pertinent imaging results/findings within the past 24 hours.    Assessment/Plan:      * Acute hypoxic on chronic hypercapnic respiratory failure  Appear secondary to COPD exacerbation    Improving  Likely multifactorial  S/P extubation on 8/31  Oxygen supplement as need, patient already on home oxygen 2 to 3 L  EF 60-65%  Pulmonology is following patient  Having problem with placement because she need BiPAP at night along with oxygen up-to 7 L intermittently and facility can not accept with the level of BiPAP  Trying to send to LTAC after her condition is stable  Encouraged incentive spirometry given her continuous lying in bed and positioning, she demonstrates understanding and is motivated     Hypokalemia  Patient's most recent potassium results are listed below.   Recent Labs     09/10/24  0913 09/11/24  1700   K 3.3* 3.4*     Plan  - Repleted potassium - Monitor potassium Daily  - Patient's hypokalemia is stable      Ileus  KUB with Gas in stool distended loops of large bowel measuring up to 6.5 cm.  Findings could reflect ileus or obstruction.  CT scan done demonstrated significant colonic distention consistent with ileus with a large amount of stool in ascending colon, colon measuring up to 11 cm. General surgery evaluated the patient, NG tube was placed, patient continued on MiraLax and enemas  Avoid scheduled opiates   Ordered Tylenol and ketorolac for pain  IV fluids given poor oral intake  Repeat CT showed significant constipation and persistent colon dilation  General surgery recommended lactulose and miralax, s/p enemas  Tolerating full liquid diet   Currently is having loose bowel movements 3-4 day  Repeat KUB today showed similar changes but her pain is persistent  Consulted GI  Planned for neostigmine in ICU and possible colonic  "decompression.- did not receive medication   Underwent decompression by c/scope   Ischemic changes noted on c/scope, GI recommends surgery continue to follow          Type 2 diabetes mellitus  Continue with SSI   We will reduce glargine given poor oral intake, episode of hypoglycemia this morning    Make adjustment as needed    Anemia  Likely from Iron and b12 deficiency  Started on Iron and B12 supplement on 8/27    Fall  Imaging ruled out acute fracture but severe swelling noted.  No visual disturbance  US confirmed hematoma on left lower extremity which is getting smaller  Fall precautions discussed with the patient.    PT OT and SNF placement /LTAC placement    Acute metabolic encephalopathy  Resolved  CT head: no acute changes  Continue to monitor     Pneumonia  MRSA neg, procal neg, respiratory panel neg  Completed Levofloxacin antibiotic course on 9/1    COPD exacerbation  Continue with steroids  Taper prednisone, currently on 10mg daily  Completed Levofloxacin antibiotic course on 9/1  Pulmonology following    Hypertension  Started Losartan on 8/28 and amlodipine on 8/31  Can make adjustment as needed  Resumed home diltiazem on 9/1    Hypothyroid  Currently on increased dose of levothyroxine  TSH today elevated at 45, Free T4 low at 0.47  Will increase the dose further to 100 mcg  Will give additional 25 mcg one dose now  As of now, mental status normal, temperature normal, no bradycardia, hypoglycemia likely from poor oral intake and high dose insulin  Given her edema, significant constipation, can consider IV Levothyroxine if fails to improve  Monitor closely in ICU        VTE Risk Mitigation (From admission, onward)           Ordered     enoxaparin injection 40 mg  Every 12 hours        Note to Pharmacy: Ht: 5' 5" (1.651 m)  Wt: 127 kg (280 lb)  Estimated Creatinine Clearance: 92.8 mL/min (based on SCr of 0.8 mg/dL).  Body mass index is 46.59 kg/m².    08/25/24 0627     IP VTE HIGH RISK PATIENT  Once      "    08/25/24 0627     Place sequential compression device  Until discontinued         08/25/24 0627                    Discharge Planning   CHELE: 9/16/2024     Code Status: Full Code   Is the patient medically ready for discharge?:     Reason for patient still in hospital (select all that apply): Patient trending condition  Discharge Plan A: Long-term acute care facility (LTAC)   Discharge Delays: None known at this time        Critical care time spent on the evaluation and treatment of severe organ dysfunction, review of pertinent labs and imaging studies, discussions with consulting providers and discussions with patient/family: 15 minutes.      Carmen Quiles MD  Department of Hospital Medicine   [facility]

## 2024-09-13 NOTE — PROGRESS NOTES
Pulmonary/Critical Care  Progress Note      Patient name: Karo Leonard  MRN: [id number]  Date: 09/13/2024    Admit Date: 8/25/2024  Consult Requested By: Carmen Quiles MD    Reason for Consult: Respiratory failure, COPD    HPI:    8/25/2024 - 65 yo ASCVD, DM, HTN. COPD(cigarette use) had increased SOB at home and a fall.  EMS was called and brought to ER.  Initially tried on BiPAP without response and was subsequently intubated and placed on ventilation.  Initial ABG showed over ventilation and we have adjusted and ABG are getting better.  She is sedated and not able to provide any history.  D/W  who says that she was supposed to see a pulmonologist but couldn't make appointment, she has been a chronic smoker.  She had increase SOB for a few days.  She did have a fall at home but he reports that she was not down for long.  He does report that she has been having recurring falls at home.      8/26/2024 - Stable overnight, O2 needs still too high to do SBT.  She is responsive and gets agitated on current sedation and we are adjusting.  No other new issues reported.  Hgb has decreased (no active bleeding reported), NA remains low, CPK is better, TFTF noted and c/w hypothyroid (synthroid started).    8/27/2024 - Remains critically ill, O2 needs down a little but still too high.  Difficult to sedate is either agitated or apneic.  Tried SBT this AM and was apneic.  VS reviewed.  She is 3.8 liters +.  Tube feeds have been started.  NA remains low, glucose is elevated.  CXR looks about the same    8/28/2024 - Stable overnight, O2 needs still up.  She does get agitated at times and we have adjusted meds.  Trouble with tube feeds and will hold today and restart tomorrow.  Not ready to wean because on increased FiO2 and will try to increase PEP to see if that helps.  She does not have a lot of secretions.  She is over ventilaed some and we adjusted vent.  CXR is about the same    8/29/2024 - Stable overnight,,  O2 needs still up some (I decreased FiO2 and increased PEEP some) and she is overventilated (we adjusted rate).  Will retry tube feeds ([facility] at 20 to see if she tolerates).  She is 4.6 liters +.  CXR is about the same.    8/30- continues on vent, settings adjusted for alkalosis- now on PS 12/5. CXR looks wet and pt with severe edema- fluids discontinued and starting lasix. Propofol has been weaned a little. Pt is awake and denies pain. Her blood pressure was up to 200 systolic at the time of my evaluation- RN states she just gave am blood pressure med and also Lasix.    8/31- pt awake, off sedation, writing and alert. Denies pain, states feeling better. She was significantly more alkalotic this am due to lasix so given a dose of diamox this am. Tolerating PS 10/5 now- will repeat abg soon and consider extubation     9/1- extubated yesterday and tolerated well    9/3/2024 - STable overnight, no new issues reported and is feeling better ovrall.  She is very weak.  O2 decreased to 5 LPM when I saqw her.  No new issues reported.  BP is up some.  Labs reviewed    9/4/2024 - Stable overnight, no new issues reported, she feels better overall.      9/5/2024 - Stable overnight, she complains of issues with vertigo when getting up.  Possible placement at [facility] is being worked on.  No new respiratory complaints.      9/6 the patient has developed very tender and tympanitic abdomen.  She was supposed to be going to an LTAC today.  She is getting ready to receive an enema which will hopefully clear her rectum and decrease the obstruction.    9/7 the patient has had 2 bowel movements.  The 1 she just had was quite large.  However, she continues to complain of abdominal pain.  She is also complaining that are pain meds have been decreased.  Explained how narcotics slow down GI transit and could be adding to her GI problems    9/8 the patient is still complaining of severe abdominal pain and distention.  She has not  yet had her CT today.     The patient is only having gas and stool mucus.  She is not getting out of bed.  Her back hurts, her knees hurt, she has vertigo..... Explained this would help her GI motility.    2024 - For an unclear reason she dropped from our list.  Respiratory status stable but was moved to ICU yesterday GI issues predominate and she was given neostigmine to try and help.  She remains very distended and complaints of decreased respiratory ability (also decreased IS volumes).  No distress.  For possible endoscopic decompression today.  CT showed persistent GI issues and RLL infiltrate/atelectasis    2024 - HAd decompression yesterday and nursing reports some liquid stool.  Adbomen is stil distended but seems better.  She was sleeping on BiPAP when I saw her.  No other new issues reported.  KUB is better this AM.      Review of Systems    Review of Systems   Constitutional:         Diffuse pain   Gastrointestinal:  Positive for abdominal pain and constipation.       Past Medical History    Past Medical History:   Diagnosis Date    Coronary artery disease     cardiac stent    DDD (degenerative disc disease), lumbar 2000    Diabetes mellitus     Hypertension     Thyroid disease        Past Surgical History    Past Surgical History:   Procedure Laterality Date     SECTION  08/1987    x2 1993    COLON DECOMPRESSION N/A 2024    Procedure: DECOMPRESSION, COLON;  Surgeon: Reza Chaudhari MD;  Location: [facility] ENDO;  Service: Endoscopy;  Laterality: N/A;    CORONARY STENT PLACEMENT      EXPLORATORY LAPAROTOMY      [place]     HEMORRHOID SURGERY      LAPAROSCOPIC CHOLECYSTECTOMY N/A 2022    Procedure: CHOLECYSTECTOMY, LAPAROSCOPIC;  Surgeon: Leonardo Wilson III, MD;  Location: [facility] OR;  Service: General;  Laterality: N/A;    LUMBAR DISC SURGERY      PILONIDAL CYST DRAINAGE      TONSILLECTOMY      as a child (also adenoids)        Medications (scheduled):      albuterol-ipratropium  3 mL Nebulization Q6H    cyanocobalamin  1,000 mcg Oral Daily    diltiaZEM  240 mg Oral Daily    enoxparin  40 mg Subcutaneous Q12H    famotidine (PF)  20 mg Intravenous Q12H    furosemide (LASIX) injection  40 mg Intravenous Once    gabapentin  400 mg Oral TID    insulin glargine U-100  30 Units Subcutaneous BID    lactulose  20 g Oral TID    levothyroxine  100 mcg Oral Before breakfast    LIDOcaine  3 patch Transdermal Q24H    losartan  100 mg Oral Daily    metoclopramide  10 mg Intravenous Q6H    neostigmine  2 mg Intravenous Once    predniSONE  10 mg Oral Daily    senna-docusate 8.6-50 mg  1 tablet Oral BID       Medications (infusions):             Medications (prn):       Current Facility-Administered Medications:     acetaminophen, 650 mg, Oral, Q8H PRN    atropine, 1 mg, Intravenous, PRN    calcium gluconate IVPB, 1 g, Intravenous, PRN    calcium gluconate IVPB, 2 g, Intravenous, PRN    calcium gluconate IVPB, 3 g, Intravenous, PRN    dextrose 50%, 12.5 g, Intravenous, PRN    glucagon (human recombinant), 1 mg, Intramuscular, PRN    glycopyrrolate (PF), 0.1 mg, Intravenous, Once PRN    hydrALAZINE, 10 mg, Intravenous, Q6H PRN    HYDROcodone-acetaminophen, 1 tablet, Oral, Q8H PRN    insulin aspart U-100, 0-10 Units, Subcutaneous, Q6H PRN    magnesium sulfate IVPB, 2 g, Intravenous, PRN    magnesium sulfate IVPB, 4 g, Intravenous, PRN    meclizine, 25 mg, Oral, TID PRN    polyethylene glycol, 17 g, Oral, TID PRN    potassium bicarbonate, 35 mEq, Oral, PRN    potassium bicarbonate, 50 mEq, Oral, PRN    potassium bicarbonate, 60 mEq, Oral, PRN    potassium chloride in water, 40 mEq, Intravenous, PRN **AND** potassium chloride in water, 60 mEq, Intravenous, PRN **AND** potassium chloride in water, 80 mEq, Intravenous, PRN    simethicone, 1 tablet, Oral, TID PRN    sodium chloride 0.9%, 10 mL, Intravenous, PRN    sodium phosphate 15 mmol in D5W 250 mL  "IVPB, 15 mmol, Intravenous, PRN    sodium phosphate 20.01 mmol in D5W 250 mL IVPB, 20.01 mmol, Intravenous, PRN    sodium phosphate 30 mmol in D5W 250 mL IVPB, 30 mmol, Intravenous, PRN    Family History: No family history on file.    Social History: Tobacco:   Social History     Tobacco Use   Smoking Status Not on file   Smokeless Tobacco Not on file                                EtOH:   Social History     Substance and Sexual Activity   Alcohol Use No                                Drugs:   Social History     Substance and Sexual Activity   Drug Use No       Physical Exam    Vital signs:  Temp:  [98 °F (36.7 °C)-98.3 °F (36.8 °C)]   Pulse:  [59-83]   Resp:  [9-20]   BP: (102-149)/(49-67)   SpO2:  [90 %-100 %]     Intake/Output:   Intake/Output Summary (Last 24 hours) at 9/13/2024 1000  Last data filed at 9/13/2024 0745  Gross per 24 hour   Intake 640 ml   Output 1045 ml   Net -405 ml        BMI: Estimated body mass index is 45.56 kg/m² as calculated from the following:    Height as of this encounter: 5' 5" (1.651 m).    Weight as of this encounter: 124.2 kg (273 lb 13 oz).    Physical Exam  Vitals and nursing note reviewed.   Constitutional:       General: She is not in acute distress.     Appearance: She is not ill-appearing, toxic-appearing or diaphoretic.      Comments: Sleeping on BiPAP   HENT:      Head: Normocephalic.      Comments: Some bruising right forehead and bilat eyes from her fall     Right Ear: External ear normal.      Left Ear: External ear normal.      Nose: Nose normal. No congestion or rhinorrhea.      Mouth/Throat:      Mouth: Mucous membranes are moist.      Pharynx: Oropharynx is clear. No oropharyngeal exudate or posterior oropharyngeal erythema.   Eyes:      General: No scleral icterus.        Right eye: No discharge.         Left eye: No discharge.      Extraocular Movements: Extraocular movements intact.      Conjunctiva/sclera: Conjunctivae normal.      Pupils: Pupils are equal, " "round, and reactive to light.   Neck:      Vascular: No carotid bruit.   Cardiovascular:      Rate and Rhythm: Normal rate and regular rhythm.      Pulses: Normal pulses.      Heart sounds: No murmur heard.     No friction rub. No gallop.   Pulmonary:      Effort: Pulmonary effort is normal. No respiratory distress.      Breath sounds: No stridor. No wheezing, rhonchi or rales.   Chest:      Chest wall: No tenderness.   Abdominal:      General: There is distension (but better).      Tenderness: There is abdominal tenderness. There is guarding.      Comments: No BS heard   Musculoskeletal:         General: No swelling. Normal range of motion.      Cervical back: Normal range of motion and neck supple. No rigidity or tenderness.      Right lower leg: Edema present.      Left lower leg: Edema present.   Lymphadenopathy:      Cervical: No cervical adenopathy.   Skin:     General: Skin is warm and dry.      Capillary Refill: Capillary refill takes less than 2 seconds.      Coloration: Skin is not jaundiced.      Findings: Bruising (Extensive ecchymoses to the eyes and frontal bone on the right with a large hematoma) present.      Comments: + bruise LLE   Neurological:      General: No focal deficit present.      Cranial Nerves: No cranial nerve deficit.      Sensory: No sensory deficit.      Motor: No weakness.   Psychiatric:         Mood and Affect: Mood normal.         Behavior: Behavior normal.         Laboratory    No results for input(s): "WBC", "RBC", "HGB", "HCT", "PLT", "MCV", "MCH", "MCHC" in the last 24 hours.          Recent Labs   Lab 09/13/24  0835   CALCIUM 7.8*      K 3.6   CO2 36*      BUN 8   CREATININE 0.4*           Microbiology:       Microbiology Results (last 7 days)       ** No results found for the last 168 hours. **            Radiology    X-Ray KUB  Narrative: EXAMINATION:  XR KUB    CLINICAL HISTORY:  ileus;    FINDINGS:  Two abdominal radiographs at 08:29 hours are compared to " "prior exams including 09/12/2024, and show a nonobstructive bowel gas pattern.  There are a few air-filled loops of colon in the left mid abdomen, with no evidence of intraperitoneal free air.    There are no acute fractures or destructive osseous lesions.  There are scattered vascular calcifications and calcified phleboliths, with right upper quadrant surgical clips.  Impression: Nonobstructive bowel gas pattern.    Electronically signed by: Christiano Tirado  Date:    09/13/2024  Time:    08:51        Ventilator Information    Oxygen Concentration (%):  [40] 40  The patient is on 3 L OxyMask           No results for input(s): "PH", "PCO2", "PO2", "HCO3", "POCSATURATED", "BE" in the last 72 hours.        Impression    Active Hospital Problems    Diagnosis  POA    *Acute hypoxic on chronic hypercapnic respiratory failure [J96.01, J96.12]  Yes    Hypokalemia [E87.6]  No    Ileus [K56.7]  Yes    Type 2 diabetes mellitus [E11.9]  Yes    Anemia [D64.9]  Yes    Acute metabolic encephalopathy [G93.41]  Yes    Fall [W19.XXXA]  Yes    COPD exacerbation [J44.1]  Yes    Pneumonia [J18.9]  Yes    Hypothyroid [E03.9]  Yes    Hypertension [I10]  Yes      Resolved Hospital Problems    Diagnosis Date Resolved POA    Goals of care, counseling/discussion [Z71.89] 09/07/2024 Not Applicable     Patient's respiratory status is stable.  Her abdomen is still not settled.. Ct shows nothing requiring surgical intervention.     Plan    Continue O2 to maintain sats 89-92%  Continue bipap nightly  Respiratory treatments  Prednisone being weaned  Antihypertensives to continue  Replace electrolytes as needed  Synthroid adjusted, TSH still very elevated  Watch BS and treat as needed  Increase activity as able  GI issues look better today, continue to follow      Sonido Zamora MD  [facility] Pulmonary/Critical Care  09/13/2024                  "

## 2024-09-13 NOTE — NURSING
Nurses Note -- 4 Eyes    9/12/2024   7:30 PM      Skin assessed during: Q Shift Change      [] No Altered Skin Integrity Present    []Prevention Measures Documented      [x] Yes- Altered Skin Integrity Present or Discovered   [] LDA Added if Not in Epic (Describe Wound)   [] New Altered Skin Integrity was Present on Admit and Documented in LDA   [] Wound Image Taken    Wound Care Consulted? Yes    Attending Nurse:  Lavonne Forte RN/Staff Member:  CARLOS Poole

## 2024-09-13 NOTE — CARE UPDATE
Continue tx with bipap while sleeping   09/13/24 0704   Patient Assessment/Suction   Level of Consciousness (AVPU) alert   Respiratory Effort Normal   Expansion/Accessory Muscles/Retractions no use of accessory muscles;expansion symmetric   All Lung Fields Breath Sounds coarse;diminished   HARRISON Breath Sounds coarse   LLL Breath Sounds diminished   RUL Breath Sounds coarse   RML Breath Sounds diminished   RLL Breath Sounds diminished   Rhythm/Pattern, Respiratory assisted mechanically   Skin Integrity   $ Wound Care Tech Time 15 min   Area Observed Bridge of nose   Skin Appearance without discoloration   Barrier used? Transparent Film   PRE-TX-O2   Device (Oxygen Therapy) BIPAP   $ Is the patient on High Flow Oxygen? Yes   Oxygen Concentration (%) 40   SpO2 99 %   Pulse Oximetry Type Continuous   $ Pulse Oximetry - Multiple Charge Pulse Oximetry - Multiple   Pulse (!) 59   Resp 16   Aerosol Therapy   $ Aerosol Therapy Charges Aerosol Treatment   Daily Review of Necessity (SVN) completed   Respiratory Treatment Status (SVN) given   Treatment Route (SVN) mask;oxygen   Patient Position semi-Brand's   Post Treatment Assessment (SVN) increased aeration;breath sounds improved   Signs of Intolerance (SVN) none   Breath Sounds Post-Respiratory Treatment   Throughout All Fields Post-Treatment All Fields   Throughout All Fields Post-Treatment aeration increased   Post-treatment Heart Rate (beats/min) 60   Post-treatment Resp Rate (breaths/min) 19   Ready to Wean/Extubation Screen   FIO2<=50 (chart decimal) 0.4   Preset CPAP/BiPAP Settings   Mode Of Delivery BiPAP S/T   $ CPAP/BiPAP Daily Charge BiPAP/CPAP Daily   $ Is patient using? Yes   Size of Mask Small/Medium   Sized Appropriately? Yes   Equipment Type V60   Ipap 12   EPAP (cm H2O) 6   Pressure Support (cm H2O) 6   Set Rate (Breaths/Min) 16   ITime (sec) 1   Rise Time (sec) 3   Patient CPAP/BiPAP Settings   Timed Inspiration (Sec) 1   IPAP Rise Time (sec) 3   RR Total  (Breaths/Min) 16   Tidal Volume (mL) 305   VE Minute Ventilation (L/min) 4 L/min   Peak Inspiratory Pressure (cm H2O) 12   TiTOT (%) 28   Patient Trigger - ST Mode Only (%) 3020   CPAP/BiPAP Backup Settings   Backup Rate 16 breaths per minute (bpm)   CPAP/BiPAP Alarms   High Pressure (cm H2O) 40   Low Pressure (cm H2O) 8   Minute Ventilation (L/Min) 3   High RR (breaths/min) 40   Low RR (breaths/min) 8   Apnea (Sec) 20   Education   $ Education BiPAP;15 min

## 2024-09-13 NOTE — SUBJECTIVE & OBJECTIVE
Interval History: Ct of abdomen showed cecum is 11 cm. KUB daily     Review of Systems   HENT:  Positive for facial swelling.         Hematoma over the right frontal region from the fall   Gastrointestinal:  Positive for abdominal distention and abdominal pain.        Today pain worsened compared to yesterday.   Musculoskeletal:  Positive for back pain.       Objective:     Vital Signs (Most Recent):  Temp: 98.2 °F (36.8 °C) (09/13/24 1200)  Pulse: 80 (09/13/24 1244)  Resp: 19 (09/13/24 1244)  BP: (!) 142/66 (09/13/24 1200)  SpO2: (!) 93 % (09/13/24 1734) Vital Signs (24h Range):  Temp:  [98 °F (36.7 °C)-98.4 °F (36.9 °C)] 98.2 °F (36.8 °C)  Pulse:  [59-86] 80  Resp:  [9-20] 19  SpO2:  [90 %-100 %] 93 %  BP: (105-142)/(49-66) 142/66     Weight: 124.2 kg (273 lb 13 oz)  Body mass index is 45.56 kg/m².    Intake/Output Summary (Last 24 hours) at 9/13/2024 1805  Last data filed at 9/13/2024 1715  Gross per 24 hour   Intake 720 ml   Output 595 ml   Net 125 ml         Physical Exam  HENT:      Head:      Comments: Ecchymosis around bilateral eyes     Mouth/Throat:      Mouth: Mucous membranes are moist.   Eyes:      Pupils: Pupils are equal, round, and reactive to light.   Cardiovascular:      Rate and Rhythm: Normal rate.      Heart sounds:      Gallop present.   Pulmonary:      Effort: No respiratory distress.      Breath sounds: Normal breath sounds. No stridor. No wheezing.      Comments: Unable to take a deep breath given her abdominal pain  Abdominal:      General: There is distension.      Palpations: There is no mass.      Tenderness: There is abdominal tenderness. There is no guarding or rebound.   Musculoskeletal:      Right lower leg: Edema present.      Left lower leg: Edema present.   Neurological:      Mental Status: She is alert and oriented to person, place, and time.             Significant Labs: All pertinent labs within the past 24 hours have been reviewed.    Significant Imaging: I have reviewed all  pertinent imaging results/findings within the past 24 hours.

## 2024-09-14 PROBLEM — D51.9 ANEMIA DUE TO VITAMIN B12 DEFICIENCY: Status: ACTIVE | Noted: 2024-08-27

## 2024-09-14 PROBLEM — G93.41 ACUTE METABOLIC ENCEPHALOPATHY: Status: RESOLVED | Noted: 2024-08-25 | Resolved: 2024-09-14

## 2024-09-14 LAB
ANION GAP SERPL CALC-SCNC: 4 MMOL/L (ref 8–16)
BUN SERPL-MCNC: 6 MG/DL (ref 8–23)
CALCIUM SERPL-MCNC: 7.8 MG/DL (ref 8.7–10.5)
CHLORIDE SERPL-SCNC: 100 MMOL/L (ref 95–110)
CO2 SERPL-SCNC: 40 MMOL/L (ref 23–29)
CREAT SERPL-MCNC: 0.4 MG/DL (ref 0.5–1.4)
EST. GFR  (NO RACE VARIABLE): >60 ML/MIN/1.73 M^2
GLUCOSE SERPL-MCNC: 88 MG/DL (ref 70–110)
POCT GLUCOSE: 132 MG/DL (ref 70–110)
POCT GLUCOSE: 68 MG/DL (ref 70–110)
POCT GLUCOSE: 72 MG/DL (ref 70–110)
POTASSIUM SERPL-SCNC: 3.3 MMOL/L (ref 3.5–5.1)
SODIUM SERPL-SCNC: 144 MMOL/L (ref 136–145)

## 2024-09-14 PROCEDURE — 99233 SBSQ HOSP IP/OBS HIGH 50: CPT | Mod: ,,, | Performed by: INTERNAL MEDICINE

## 2024-09-14 PROCEDURE — 21400001 HC TELEMETRY ROOM

## 2024-09-14 PROCEDURE — 63600175 PHARM REV CODE 636 W HCPCS: Performed by: HOSPITALIST

## 2024-09-14 PROCEDURE — 80048 BASIC METABOLIC PNL TOTAL CA: CPT | Performed by: HOSPITALIST

## 2024-09-14 PROCEDURE — 27000221 HC OXYGEN, UP TO 24 HOURS

## 2024-09-14 PROCEDURE — 94799 UNLISTED PULMONARY SVC/PX: CPT

## 2024-09-14 PROCEDURE — 82962 GLUCOSE BLOOD TEST: CPT

## 2024-09-14 PROCEDURE — 94761 N-INVAS EAR/PLS OXIMETRY MLT: CPT

## 2024-09-14 PROCEDURE — 99900031 HC PATIENT EDUCATION (STAT)

## 2024-09-14 PROCEDURE — 94640 AIRWAY INHALATION TREATMENT: CPT

## 2024-09-14 PROCEDURE — 27100171 HC OXYGEN HIGH FLOW UP TO 24 HOURS

## 2024-09-14 PROCEDURE — 25000003 PHARM REV CODE 250: Performed by: INTERNAL MEDICINE

## 2024-09-14 PROCEDURE — 63600175 PHARM REV CODE 636 W HCPCS

## 2024-09-14 PROCEDURE — 25000003 PHARM REV CODE 250

## 2024-09-14 PROCEDURE — 99900035 HC TECH TIME PER 15 MIN (STAT)

## 2024-09-14 PROCEDURE — 94660 CPAP INITIATION&MGMT: CPT

## 2024-09-14 PROCEDURE — 36415 COLL VENOUS BLD VENIPUNCTURE: CPT | Performed by: HOSPITALIST

## 2024-09-14 PROCEDURE — 63600175 PHARM REV CODE 636 W HCPCS: Performed by: INTERNAL MEDICINE

## 2024-09-14 PROCEDURE — 25000242 PHARM REV CODE 250 ALT 637 W/ HCPCS: Performed by: INTERNAL MEDICINE

## 2024-09-14 PROCEDURE — 63600175 PHARM REV CODE 636 W HCPCS: Performed by: SURGERY

## 2024-09-14 RX ADMIN — METOCLOPRAMIDE HYDROCHLORIDE 10 MG: 5 INJECTION INTRAMUSCULAR; INTRAVENOUS at 05:09

## 2024-09-14 RX ADMIN — IPRATROPIUM BROMIDE AND ALBUTEROL SULFATE 3 ML: 2.5; .5 SOLUTION RESPIRATORY (INHALATION) at 12:09

## 2024-09-14 RX ADMIN — GABAPENTIN 400 MG: 300 CAPSULE ORAL at 09:09

## 2024-09-14 RX ADMIN — DILTIAZEM HYDROCHLORIDE 240 MG: 120 CAPSULE, COATED, EXTENDED RELEASE ORAL at 08:09

## 2024-09-14 RX ADMIN — METOCLOPRAMIDE HYDROCHLORIDE 10 MG: 5 INJECTION INTRAMUSCULAR; INTRAVENOUS at 12:09

## 2024-09-14 RX ADMIN — FAMOTIDINE 20 MG: 10 INJECTION INTRAVENOUS at 09:09

## 2024-09-14 RX ADMIN — INSULIN GLARGINE 30 UNITS: 100 INJECTION, SOLUTION SUBCUTANEOUS at 08:09

## 2024-09-14 RX ADMIN — KETOROLAC TROMETHAMINE 15 MG: 30 INJECTION, SOLUTION INTRAMUSCULAR at 12:09

## 2024-09-14 RX ADMIN — INSULIN GLARGINE 30 UNITS: 100 INJECTION, SOLUTION SUBCUTANEOUS at 09:09

## 2024-09-14 RX ADMIN — LOSARTAN POTASSIUM 100 MG: 50 TABLET, FILM COATED ORAL at 08:09

## 2024-09-14 RX ADMIN — GABAPENTIN 400 MG: 300 CAPSULE ORAL at 03:09

## 2024-09-14 RX ADMIN — PREDNISONE 10 MG: 5 TABLET ORAL at 08:09

## 2024-09-14 RX ADMIN — HYDROCODONE BITARTRATE AND ACETAMINOPHEN 1 TABLET: 5; 325 TABLET ORAL at 05:09

## 2024-09-14 RX ADMIN — FAMOTIDINE 20 MG: 10 INJECTION INTRAVENOUS at 08:09

## 2024-09-14 RX ADMIN — LEVOTHYROXINE SODIUM 100 MCG: 0.1 TABLET ORAL at 05:09

## 2024-09-14 RX ADMIN — METOCLOPRAMIDE HYDROCHLORIDE 10 MG: 5 INJECTION INTRAMUSCULAR; INTRAVENOUS at 11:09

## 2024-09-14 RX ADMIN — IPRATROPIUM BROMIDE AND ALBUTEROL SULFATE 3 ML: 2.5; .5 SOLUTION RESPIRATORY (INHALATION) at 09:09

## 2024-09-14 RX ADMIN — CYANOCOBALAMIN TAB 1000 MCG 1000 MCG: 1000 TAB at 08:09

## 2024-09-14 RX ADMIN — LIDOCAINE 3 PATCH: 50 PATCH TOPICAL at 12:09

## 2024-09-14 RX ADMIN — ENOXAPARIN SODIUM 40 MG: 40 INJECTION SUBCUTANEOUS at 12:09

## 2024-09-14 RX ADMIN — IPRATROPIUM BROMIDE AND ALBUTEROL SULFATE 3 ML: 2.5; .5 SOLUTION RESPIRATORY (INHALATION) at 08:09

## 2024-09-14 RX ADMIN — SENNOSIDES AND DOCUSATE SODIUM 1 TABLET: 8.6; 5 TABLET ORAL at 08:09

## 2024-09-14 RX ADMIN — ENOXAPARIN SODIUM 40 MG: 40 INJECTION SUBCUTANEOUS at 11:09

## 2024-09-14 RX ADMIN — IPRATROPIUM BROMIDE AND ALBUTEROL SULFATE 3 ML: 2.5; .5 SOLUTION RESPIRATORY (INHALATION) at 01:09

## 2024-09-14 RX ADMIN — GABAPENTIN 400 MG: 300 CAPSULE ORAL at 08:09

## 2024-09-14 RX ADMIN — POTASSIUM BICARBONATE 35 MEQ: 391 TABLET, EFFERVESCENT ORAL at 07:09

## 2024-09-14 RX ADMIN — POTASSIUM BICARBONATE 35 MEQ: 391 TABLET, EFFERVESCENT ORAL at 05:09

## 2024-09-14 NOTE — ASSESSMENT & PLAN NOTE
B12 level low.  Getting supplementation.  Monitor hemoglobin level daily.    Lab Results   Component Value Date    HGB 7.9 (L) 09/13/2024

## 2024-09-14 NOTE — ASSESSMENT & PLAN NOTE
Appear secondary to COPD exacerbation    Improving  Likely multifactorial  S/P extubation on 8/31  Oxygen supplement as need, patient already on home oxygen 2 to 3 L  Pulmonology is following patient  Encouraged incentive spirometry given her continuous lying in bed and positioning, she demonstrates understanding and is motivated

## 2024-09-14 NOTE — CARE UPDATE
09/13/24 1920   Patient Assessment/Suction   Level of Consciousness (AVPU) alert   Respiratory Effort Unlabored   Expansion/Accessory Muscles/Retractions expansion symmetric   All Lung Fields Breath Sounds coarse;diminished   Rhythm/Pattern, Respiratory depth regular;pattern regular   Cough Frequency infrequent   Cough Type good;nonproductive   PRE-TX-O2   Device (Oxygen Therapy) Oxymask   $ Is the patient on Low Flow Oxygen? Yes   Flow (L/min) (Oxygen Therapy) 9   SpO2 96 %   Pulse Oximetry Type Continuous   $ Pulse Oximetry - Multiple Charge Pulse Oximetry - Multiple   Pulse 81   Resp (!) 21   Aerosol Therapy   $ Aerosol Therapy Charges Aerosol Treatment   Daily Review of Necessity (SVN) completed   Respiratory Treatment Status (SVN) given   Treatment Route (SVN) mask   Patient Position HOB elevated   Post Treatment Assessment (SVN) breath sounds unchanged   Signs of Intolerance (SVN) none   Breath Sounds Post-Respiratory Treatment   Throughout All Fields Post-Treatment All Fields   Throughout All Fields Post-Treatment no change   Post-treatment Heart Rate (beats/min) 80   Post-treatment Resp Rate (breaths/min) 16   Incentive Spirometer   $ Incentive Spirometer Charges done with encouragement   Incentive Spirometer Predicted Level (mL) 1500   Administration (IS) instruction provided, follow-up   Number of Repetitions (IS) 8   Level Incentive Spirometer (mL) 750   Patient Tolerance (IS) good   Education   $ Education Bronchodilator;15 min

## 2024-09-14 NOTE — ASSESSMENT & PLAN NOTE
Continue losartan 100 daily and Cardizem 240 daily.  Can make adjustment as needed based on blood pressure response.

## 2024-09-14 NOTE — ASSESSMENT & PLAN NOTE
She underwent decompression by colonoscope.  KUB done this morning showed continued gaseous distension of the large intestine, especially in the lower abdomen.  No air fluid levels.

## 2024-09-14 NOTE — ASSESSMENT & PLAN NOTE
Patient's most recent potassium results are listed below.   Recent Labs     09/12/24  0233 09/13/24  0835 09/14/24  0414   K 3.9 3.6 3.3*       Plan  - Repleted potassium - Monitor potassium Daily  - Patient's hypokalemia is stable

## 2024-09-14 NOTE — ASSESSMENT & PLAN NOTE
Previous hospitalist providers performed GOC discussions with patient and loved ones.  She requests full code status and continued management to reverse problems.

## 2024-09-14 NOTE — ASSESSMENT & PLAN NOTE
Patient's FSGs are controlled on current medication regimen.  Last A1c reviewed-   Lab Results   Component Value Date    HGBA1C 9.5 (H) 08/26/2024     Most recent fingerstick glucose reviewed-   Recent Labs   Lab 09/13/24  1637 09/13/24 2011 09/14/24  1112   POCTGLUCOSE 145* 294* 68*     Current correctional scale  Medium  Maintain anti-hyperglycemic dose as follows-   Antihyperglycemics (From admission, onward)      Start     Stop Route Frequency Ordered    09/08/24 2100  insulin glargine U-100 (Lantus) pen 30 Units         -- SubQ 2 times daily 09/08/24 1708    08/27/24 1022  insulin aspart U-100 pen 0-10 Units         -- SubQ Every 6 hours PRN 08/27/24 0922          Hold Oral hypoglycemics while patient is in the hospital.

## 2024-09-14 NOTE — PROGRESS NOTES
[facility]  Progress Note    Patient Name: Karo Leonard  MRN: [id number]  Patient Class: IP- Inpatient   Admission Date: 8/25/2024  Length of Stay: 20 days  Attending Physician: Michael Multani MD  Primary Care Provider: Navneet Hernandez FNP        Subjective:     Principal Problem:Acute hypoxic on chronic hypercapnic respiratory failure        HPI:  64F p/w fall in the context of shortness of breath. EMS reportedly gave narcan w/o appreciable response, then she was given duoneb en route to [facility] ED. Upon arrival, she was tachypneic, had respy sounding phonation, but she denied chest pain, fever, or chills. She was placed on BiPAP, and initially demonstrated improvement. However, she became less responsive and appeared to tire out. She was given additional narcan w/o appreciable response, so she was intubated. She had some hypotension after receiving sedation, so she was placed on low dose levophed. [place] was placed, lasix was given, as was Abx. Large bruise noted on LLE, so x-ray ordered.     Overview/Hospital Course:  64 F Patient with hx of COPD, morbid obesity was admitted for acute on chronic hypoxemic hypercapnic respiratory failure with sever fall with hit to the face . Patient was intubated after failing  BiPAP. Finished course of IV Levofloxacin for COPD exacerbation and possible pneumonia. Pulm has been managing her steroid dose. Patient was started on iron and B12 supplement for anemia. S/P exubation on 8/31. For her hypertension, patient was started on losartan and her home diltiazem was resumed.  Patient working with PT and OT.  Fall precautions have been addressed with the patient.  Patient moving to medicine telemetry.  Supplemental oxygen weaning is in process.  Patient encouraged to continue BiPAP use at night or as needed as before. Steroid dose is being tapered down. Pulmonology followed the patient.    During her hospital course, patient complained of  bloated and also passing a lot of gas and Gas x was given but later complained of mild abdominal pain and KUB was done and found to have Gas in stool distended loops of large bowel measuring up to 6.5 cm.  Findings could reflect ileus or obstruction.  CT scan done demonstrated significant colonic distention consistent with ileus with a large amount of stool in ascending colon, colon measuring up to 11 cm.  General surgery evaluated the patient, NG tube was placed, patient continued on MiraLax and enemas, CT scan was repeated on 09/08/2024 which still shows significant constipation.  Lactulose was added and she had loose watery bowel movements, following which she was started on clears, advanced to full liquids, but her KUB was unchanged and hence we consulted gastroenterology who recommended neostigmine while monitoring in ICU given the risk of bradycardia and her tenuous respiratory status.    GI:  64 F with colonic ileus and ischemic changes of                          the suspected proximal ascending colon and cecum.                          Unable to intubate the cecum due to body habitus,                          poor visualization. Air was suctioned out and                          procedure aborted. Significant improvement in                          distension of abdomen post endoscopy. Will need                          serial abdominal exams and daily KUB. If running                          fever, rising wbc I would have concern for                          gangrenous right colon but as of now the mucosa                          appears to be viable on limited examination.                          Clears okay today.                          Recommend that surgery continue to follow along                          Optimize all electrolytes (K/Mg/Phos)                          - Preparation of the colon was poor.                          - Dilated in the transverse colon, at the hepatic                           flexure and in the ascending colon.                          - Mucosal ulceration.                          - No specimens collected.     KUB improving 9/13/24    Interval History:  she complaints of continued abdominal pain and distension.  No vomiting.  Vital signs stable.  Has no new complaints.    Review of Systems   Respiratory:  Negative for shortness of breath.    Cardiovascular:  Negative for chest pain.     Objective:     Vital Signs (Most Recent):  Temp: 97.7 °F (36.5 °C) (09/14/24 0745)  Pulse: 77 (09/14/24 1350)  Resp: 16 (09/14/24 1350)  BP: 136/62 (09/14/24 0930)  SpO2: 99 % (09/14/24 1350) Vital Signs (24h Range):  Temp:  [97.7 °F (36.5 °C)-98.3 °F (36.8 °C)] 97.7 °F (36.5 °C)  Pulse:  [65-89] 77  Resp:  [10-21] 16  SpO2:  [93 %-100 %] 99 %  BP: (121-175)/(56-71) 136/62     Weight: 124.2 kg (273 lb 13 oz)  Body mass index is 45.56 kg/m².    Intake/Output Summary (Last 24 hours) at 9/14/2024 1557  Last data filed at 9/14/2024 1200  Gross per 24 hour   Intake 1540 ml   Output 1328 ml   Net 212 ml         Physical Exam  Constitutional:       General: She is not in acute distress.     Appearance: She is not ill-appearing.      Comments: Looks uncomfortable   Eyes:      General:         Right eye: No discharge.         Left eye: No discharge.   Neck:      Vascular: No JVD.   Cardiovascular:      Rate and Rhythm: Normal rate and regular rhythm.   Pulmonary:      Effort: Pulmonary effort is normal.      Breath sounds: Normal breath sounds.   Abdominal:      General: Abdomen is flat. Bowel sounds are increased. There is distension.      Palpations: Abdomen is soft.      Tenderness: There is no abdominal tenderness (lower abdomen).   Musculoskeletal:      Right lower leg: Edema present.      Left lower leg: Edema present.   Skin:     General: Skin is warm and moist.      Findings: No rash.   Neurological:      Mental Status: She is alert and oriented to person, place, and time.   Psychiatric:          Attention and Perception: Attention normal.         Mood and Affect: Mood and affect normal.         Speech: Speech normal.             Significant Labs: All pertinent labs within the past 24 hours have been reviewed.    Significant Imaging: I have reviewed all pertinent imaging results/findings within the past 24 hours.    Assessment/Plan:      * Acute hypoxic on chronic hypercapnic respiratory failure  Appear secondary to COPD exacerbation    Improving  Likely multifactorial  S/P extubation on 8/31  Oxygen supplement as need, patient already on home oxygen 2 to 3 L  Pulmonology is following patient  Encouraged incentive spirometry given her continuous lying in bed and positioning, she demonstrates understanding and is motivated     Hypokalemia  Patient's most recent potassium results are listed below.   Recent Labs     09/12/24  0233 09/13/24  0835 09/14/24  0414   K 3.9 3.6 3.3*       Plan  - Repleted potassium - Monitor potassium Daily  - Patient's hypokalemia is stable      Ileus  She underwent decompression by colonoscope.  KUB done this morning showed continued gaseous distension of the large intestine, especially in the lower abdomen.  No air fluid levels.         Type 2 diabetes mellitus  Patient's FSGs are controlled on current medication regimen.  Last A1c reviewed-   Lab Results   Component Value Date    HGBA1C 9.5 (H) 08/26/2024     Most recent fingerstick glucose reviewed-   Recent Labs   Lab 09/13/24  1637 09/13/24 2011 09/14/24  1112   POCTGLUCOSE 145* 294* 68*     Current correctional scale  Medium  Maintain anti-hyperglycemic dose as follows-   Antihyperglycemics (From admission, onward)      Start     Stop Route Frequency Ordered    09/08/24 2100  insulin glargine U-100 (Lantus) pen 30 Units         -- SubQ 2 times daily 09/08/24 1708    08/27/24 1022  insulin aspart U-100 pen 0-10 Units         -- SubQ Every 6 hours PRN 08/27/24 0922          Hold Oral hypoglycemics while patient is in the  "hospital.      Anemia due to vitamin B12 deficiency  B12 level low.  Getting supplementation.  Monitor hemoglobin level daily.    Lab Results   Component Value Date    HGB 7.9 (L) 09/13/2024         Fall  Imaging ruled out acute fracture but severe swelling noted.  No visual disturbance  US confirmed hematoma on left lower extremity which is getting smaller  Fall precautions discussed with the patient.    PT OT and SNF placement /LTAC placement    Pneumonia  Resolved.  Completed 7 days of levofloxacin.    COPD exacerbation  Completed long prednisone taper.  Pulmonology following.  Continue ALLYSSA/DUC scheduled treatments.    Hypertension  Continue losartan 100 daily and Cardizem 240 daily.  Can make adjustment as needed based on blood pressure response.    Hypothyroid  Currently on increased dose of levothyroxine  TSH today elevated at 45, Free T4 low at 0.47        VTE Risk Mitigation (From admission, onward)           Ordered     enoxaparin injection 40 mg  Every 12 hours        Note to Pharmacy: Ht: 5' 5" (1.651 m)  Wt: 127 kg (280 lb)  Estimated Creatinine Clearance: 92.8 mL/min (based on SCr of 0.8 mg/dL).  Body mass index is 46.59 kg/m².    08/25/24 0627     IP VTE HIGH RISK PATIENT  Once         08/25/24 0627     Place sequential compression device  Until discontinued         08/25/24 0627                    Discharge Planning   CHELE: 9/16/2024     Code Status: Full Code   Is the patient medically ready for discharge?:     Reason for patient still in hospital (select all that apply): Patient trending condition and Treatment  Discharge Plan A: Long-term acute care facility (LTAC)   Discharge Delays: None known at this time          Michael Multani MD  Department of Hospital Medicine   [facility]    "

## 2024-09-14 NOTE — PROGRESS NOTES
Pulmonary/Critical Care  Progress Note      Patient name: Karo Leonard  MRN: [id number]  Date: 09/14/2024    Admit Date: 8/25/2024  Consult Requested By: Michael Multani MD    Reason for Consult: Respiratory failure, COPD    HPI:    8/25/2024 - 63 yo ASCVD, DM, HTN. COPD(cigarette use) had increased SOB at home and a fall.  EMS was called and brought to ER.  Initially tried on BiPAP without response and was subsequently intubated and placed on ventilation.  Initial ABG showed over ventilation and we have adjusted and ABG are getting better.  She is sedated and not able to provide any history.  D/W  who says that she was supposed to see a pulmonologist but couldn't make appointment, she has been a chronic smoker.  She had increase SOB for a few days.  She did have a fall at home but he reports that she was not down for long.  He does report that she has been having recurring falls at home.      8/26/2024 - Stable overnight, O2 needs still too high to do SBT.  She is responsive and gets agitated on current sedation and we are adjusting.  No other new issues reported.  Hgb has decreased (no active bleeding reported), NA remains low, CPK is better, TFTF noted and c/w hypothyroid (synthroid started).    8/27/2024 - Remains critically ill, O2 needs down a little but still too high.  Difficult to sedate is either agitated or apneic.  Tried SBT this AM and was apneic.  VS reviewed.  She is 3.8 liters +.  Tube feeds have been started.  NA remains low, glucose is elevated.  CXR looks about the same    8/28/2024 - Stable overnight, O2 needs still up.  She does get agitated at times and we have adjusted meds.  Trouble with tube feeds and will hold today and restart tomorrow.  Not ready to wean because on increased FiO2 and will try to increase PEP to see if that helps.  She does not have a lot of secretions.  She is over ventilaed some and we adjusted vent.  CXR is about the same    8/29/2024 - Stable overnight,,  O2 needs still up some (I decreased FiO2 and increased PEEP some) and she is overventilated (we adjusted rate).  Will retry tube feeds ([facility] at 20 to see if she tolerates).  She is 4.6 liters +.  CXR is about the same.    8/30- continues on vent, settings adjusted for alkalosis- now on PS 12/5. CXR looks wet and pt with severe edema- fluids discontinued and starting lasix. Propofol has been weaned a little. Pt is awake and denies pain. Her blood pressure was up to 200 systolic at the time of my evaluation- RN states she just gave am blood pressure med and also Lasix.    8/31- pt awake, off sedation, writing and alert. Denies pain, states feeling better. She was significantly more alkalotic this am due to lasix so given a dose of diamox this am. Tolerating PS 10/5 now- will repeat abg soon and consider extubation     9/1- extubated yesterday and tolerated well    9/3/2024 - STable overnight, no new issues reported and is feeling better ovrall.  She is very weak.  O2 decreased to 5 LPM when I saqw her.  No new issues reported.  BP is up some.  Labs reviewed    9/4/2024 - Stable overnight, no new issues reported, she feels better overall.      9/5/2024 - Stable overnight, she complains of issues with vertigo when getting up.  Possible placement at [facility] is being worked on.  No new respiratory complaints.      9/6 the patient has developed very tender and tympanitic abdomen.  She was supposed to be going to an LTAC today.  She is getting ready to receive an enema which will hopefully clear her rectum and decrease the obstruction.    9/7 the patient has had 2 bowel movements.  The 1 she just had was quite large.  However, she continues to complain of abdominal pain.  She is also complaining that are pain meds have been decreased.  Explained how narcotics slow down GI transit and could be adding to her GI problems    9/8 the patient is still complaining of severe abdominal pain and distention.  She has not  yet had her CT today.     The patient is only having gas and stool mucus.  She is not getting out of bed.  Her back hurts, her knees hurt, she has vertigo..... Explained this would help her GI motility.    2024 - For an unclear reason she dropped from our list.  Respiratory status stable but was moved to ICU yesterday GI issues predominate and she was given neostigmine to try and help.  She remains very distended and complaints of decreased respiratory ability (also decreased IS volumes).  No distress.  For possible endoscopic decompression today.  CT showed persistent GI issues and RLL infiltrate/atelectasis    2024 - HAd decompression yesterday and nursing reports some liquid stool.  Adbomen is stil distended but seems better.  She was sleeping on BiPAP when I saw her.  No other new issues reported.  KUB is better this AM.     2024 - Stable overnight, abdomen feels better  no new respiratory complaints  KUB reviewed and still with some gaseous distension.      Review of Systems    Review of Systems   Constitutional:         Diffuse pain   Gastrointestinal:  Positive for abdominal pain and constipation.       Past Medical History    Past Medical History:   Diagnosis Date    Coronary artery disease     cardiac stent    DDD (degenerative disc disease), lumbar 2000    Diabetes mellitus     Hypertension     Thyroid disease        Past Surgical History    Past Surgical History:   Procedure Laterality Date     SECTION  08/1987    x2 1993    COLON DECOMPRESSION N/A 2024    Procedure: DECOMPRESSION, COLON;  Surgeon: Reza Chaudhari MD;  Location: [facility] ENDO;  Service: Endoscopy;  Laterality: N/A;    CORONARY STENT PLACEMENT      EXPLORATORY LAPAROTOMY      [employer]     HEMORRHOID SURGERY      LAPAROSCOPIC CHOLECYSTECTOMY N/A 2022    Procedure: CHOLECYSTECTOMY, LAPAROSCOPIC;  Surgeon: Leonardo Wilson III, MD;  Location: [facility] OR;  Service:  General;  Laterality: N/A;    LUMBAR DISC SURGERY  2000    PILONIDAL CYST DRAINAGE  1976    TONSILLECTOMY      as a child (also adenoids)       Medications (scheduled):      albuterol-ipratropium  3 mL Nebulization Q6H    cyanocobalamin  1,000 mcg Oral Daily    diltiaZEM  240 mg Oral Daily    enoxparin  40 mg Subcutaneous Q12H    famotidine (PF)  20 mg Intravenous Q12H    gabapentin  400 mg Oral TID    insulin glargine U-100  30 Units Subcutaneous BID    lactulose  20 g Oral TID    levothyroxine  100 mcg Oral Before breakfast    LIDOcaine  3 patch Transdermal Q24H    losartan  100 mg Oral Daily    metoclopramide  10 mg Intravenous Q6H    neostigmine  2 mg Intravenous Once    predniSONE  10 mg Oral Daily    senna-docusate 8.6-50 mg  1 tablet Oral BID       Medications (infusions):             Medications (prn):       Current Facility-Administered Medications:     acetaminophen, 650 mg, Oral, Q8H PRN    atropine, 1 mg, Intravenous, PRN    calcium gluconate IVPB, 1 g, Intravenous, PRN    calcium gluconate IVPB, 2 g, Intravenous, PRN    calcium gluconate IVPB, 3 g, Intravenous, PRN    dextrose 50%, 12.5 g, Intravenous, PRN    glucagon (human recombinant), 1 mg, Intramuscular, PRN    glycopyrrolate (PF), 0.1 mg, Intravenous, Once PRN    hydrALAZINE, 10 mg, Intravenous, Q6H PRN    HYDROcodone-acetaminophen, 1 tablet, Oral, Q8H PRN    insulin aspart U-100, 0-10 Units, Subcutaneous, Q6H PRN    ketorolac, 15 mg, Intravenous, Q6H PRN    magnesium sulfate IVPB, 2 g, Intravenous, PRN    magnesium sulfate IVPB, 4 g, Intravenous, PRN    meclizine, 25 mg, Oral, TID PRN    polyethylene glycol, 17 g, Oral, TID PRN    potassium bicarbonate, 35 mEq, Oral, PRN    potassium bicarbonate, 50 mEq, Oral, PRN    potassium bicarbonate, 60 mEq, Oral, PRN    potassium chloride in water, 40 mEq, Intravenous, PRN **AND** potassium chloride in water, 60 mEq, Intravenous, PRN **AND** potassium chloride in water, 80 mEq, Intravenous, PRN     "simethicone, 1 tablet, Oral, TID PRN    sodium chloride 0.9%, 10 mL, Intravenous, PRN    sodium phosphate 15 mmol in D5W 250 mL IVPB, 15 mmol, Intravenous, PRN    sodium phosphate 20.01 mmol in D5W 250 mL IVPB, 20.01 mmol, Intravenous, PRN    sodium phosphate 30 mmol in D5W 250 mL IVPB, 30 mmol, Intravenous, PRN    Family History: No family history on file.    Social History: Tobacco:   Social History     Tobacco Use   Smoking Status Not on file   Smokeless Tobacco Not on file                                EtOH:   Social History     Substance and Sexual Activity   Alcohol Use No                                Drugs:   Social History     Substance and Sexual Activity   Drug Use No       Physical Exam    Vital signs:  Temp:  [97.7 °F (36.5 °C)-98.3 °F (36.8 °C)]   Pulse:  [65-89]   Resp:  [10-21]   BP: (121-175)/(56-71)   SpO2:  [92 %-100 %]     Intake/Output:   Intake/Output Summary (Last 24 hours) at 9/14/2024 1030  Last data filed at 9/14/2024 0745  Gross per 24 hour   Intake 1540 ml   Output 2778 ml   Net -1238 ml        BMI: Estimated body mass index is 45.56 kg/m² as calculated from the following:    Height as of this encounter: 5' 5" (1.651 m).    Weight as of this encounter: 124.2 kg (273 lb 13 oz).    Physical Exam  Vitals and nursing note reviewed.   Constitutional:       General: She is not in acute distress.     Appearance: She is not ill-appearing, toxic-appearing or diaphoretic.      Comments: Sleeping on BiPAP   HENT:      Head: Normocephalic.      Comments: Some bruising right forehead and bilat eyes from her fall     Right Ear: External ear normal.      Left Ear: External ear normal.      Nose: Nose normal. No congestion or rhinorrhea.      Mouth/Throat:      Mouth: Mucous membranes are moist.      Pharynx: Oropharynx is clear. No oropharyngeal exudate or posterior oropharyngeal erythema.   Eyes:      General: No scleral icterus.        Right eye: No discharge.         Left eye: No discharge.      " Extraocular Movements: Extraocular movements intact.      Conjunctiva/sclera: Conjunctivae normal.      Pupils: Pupils are equal, round, and reactive to light.   Neck:      Vascular: No carotid bruit.   Cardiovascular:      Rate and Rhythm: Normal rate and regular rhythm.      Pulses: Normal pulses.      Heart sounds: No murmur heard.     No friction rub. No gallop.   Pulmonary:      Effort: Pulmonary effort is normal. No respiratory distress.      Breath sounds: No stridor. No wheezing, rhonchi or rales.   Chest:      Chest wall: No tenderness.   Abdominal:      General: There is distension (but better).      Tenderness: There is abdominal tenderness. There is guarding.      Comments: No BS heard   Musculoskeletal:         General: No swelling. Normal range of motion.      Cervical back: Normal range of motion and neck supple. No rigidity or tenderness.      Right lower leg: Edema present.      Left lower leg: Edema present.   Lymphadenopathy:      Cervical: No cervical adenopathy.   Skin:     General: Skin is warm and dry.      Capillary Refill: Capillary refill takes less than 2 seconds.      Coloration: Skin is not jaundiced.      Findings: Bruising (Extensive ecchymoses to the eyes and frontal bone on the right with a large hematoma) present.      Comments: + bruise LLE   Neurological:      General: No focal deficit present.      Cranial Nerves: No cranial nerve deficit.      Sensory: No sensory deficit.      Motor: No weakness.   Psychiatric:         Mood and Affect: Mood normal.         Behavior: Behavior normal.         Laboratory    Recent Labs   Lab 09/13/24  1216   WBC 6.75   RBC 2.48*   HGB 7.9*   HCT 27.2*      *   MCH 31.9*   MCHC 29.0*             Recent Labs   Lab 09/14/24  0414   CALCIUM 7.8*      K 3.3*   CO2 40*      BUN 6*   CREATININE 0.4*           Microbiology:       Microbiology Results (last 7 days)       ** No results found for the last 168 hours. **       "      Radiology    X-Ray KUB  Narrative: EXAMINATION:  XR KUB    CLINICAL HISTORY:  Abdominal distension, ileus    COMPARISON:  September 13    FINDINGS:  Two images are submitted, significantly limited by body habitus and oblique positioning.    There is persistent gaseous distension of the colon in the lower abdomen.  No mass, suspicious calcification, or free air is identified.    Ill-defined airspace disease is noted in the right mid and lower lung, with hazy increased density over the lower lung zones likely indicating small pleural effusions.  Impression: 1. Limited examination, demonstrating persistent gaseous distension of the colon in the lower abdomen.  2. Probable bilateral pleural effusions, with ill-defined airspace disease in the right mid and lower lung.    Electronically signed by: Malik Diez  Date:    09/14/2024  Time:    07:39        Ventilator Information    Oxygen Concentration (%):  [4-40] 40  The patient is on 3 L OxyMask           No results for input(s): "PH", "PCO2", "PO2", "HCO3", "POCSATURATED", "BE" in the last 72 hours.        Impression    Active Hospital Problems    Diagnosis  POA    *Acute hypoxic on chronic hypercapnic respiratory failure [J96.01, J96.12]  Yes    Hypokalemia [E87.6]  No    Ileus [K56.7]  Yes    Type 2 diabetes mellitus [E11.9]  Yes    Anemia [D64.9]  Yes    Acute metabolic encephalopathy [G93.41]  Yes    Fall [W19.XXXA]  Yes    COPD exacerbation [J44.1]  Yes    Pneumonia [J18.9]  Yes    Hypothyroid [E03.9]  Yes    Hypertension [I10]  Yes      Resolved Hospital Problems    Diagnosis Date Resolved POA    Goals of care, counseling/discussion [Z71.89] 09/07/2024 Not Applicable     Patient's respiratory status is stable.  Her abdomen is still not settled.. Ct shows nothing requiring surgical intervention.     Plan    Continue O2 to maintain sats 89-92%  Continue bipap nightly and if needed during the day  Respiratory treatments  Stop prednisone  Antihypertensives " to continue  Replace electrolytes as needed  Synthroid adjusted, TSH still very elevated  Watch BS and treat as needed  Increase activity as able  GI issues look better today, continue to follow      Sonido Zamora MD  [facility] Pulmonary/Critical Care  09/14/2024

## 2024-09-14 NOTE — CARE UPDATE
09/14/24 0808   Patient Assessment/Suction   Level of Consciousness (AVPU) alert   Respiratory Effort Unlabored;Normal   Expansion/Accessory Muscles/Retractions no use of accessory muscles;no retractions   All Lung Fields Breath Sounds coarse   Rhythm/Pattern, Respiratory unlabored;pattern regular;depth regular   PRE-TX-O2   Device (Oxygen Therapy) Oxymask   $ Is the patient on High Flow Oxygen? Yes   Flow (L/min) (Oxygen Therapy) 3   SpO2 99 %   Pulse Oximetry Type Continuous   $ Pulse Oximetry - Multiple Charge Pulse Oximetry - Multiple   Pulse 78   Resp 16   Aerosol Therapy   $ Aerosol Therapy Charges Aerosol Treatment   Daily Review of Necessity (SVN) completed   Respiratory Treatment Status (SVN) given   Treatment Route (SVN) mask;oxygen   Patient Position HOB elevated   Post Treatment Assessment (SVN) breath sounds unchanged   Signs of Intolerance (SVN) none   Breath Sounds Post-Respiratory Treatment   Post-treatment Heart Rate (beats/min) 76   Post-treatment Resp Rate (breaths/min) 13   Preset CPAP/BiPAP Settings   Mode Of Delivery BiPAP S/T;Standby   $ CPAP/BiPAP Daily Charge BiPAP/CPAP Daily   Education   $ Education Bronchodilator;BiPAP;15 min

## 2024-09-14 NOTE — SUBJECTIVE & OBJECTIVE
Interval History:  she complaints of continued abdominal pain and distension.  No vomiting.  Vital signs stable.  Has no new complaints.    Review of Systems   Respiratory:  Negative for shortness of breath.    Cardiovascular:  Negative for chest pain.     Objective:     Vital Signs (Most Recent):  Temp: 97.7 °F (36.5 °C) (09/14/24 0745)  Pulse: 77 (09/14/24 1350)  Resp: 16 (09/14/24 1350)  BP: 136/62 (09/14/24 0930)  SpO2: 99 % (09/14/24 1350) Vital Signs (24h Range):  Temp:  [97.7 °F (36.5 °C)-98.3 °F (36.8 °C)] 97.7 °F (36.5 °C)  Pulse:  [65-89] 77  Resp:  [10-21] 16  SpO2:  [93 %-100 %] 99 %  BP: (121-175)/(56-71) 136/62     Weight: 124.2 kg (273 lb 13 oz)  Body mass index is 45.56 kg/m².    Intake/Output Summary (Last 24 hours) at 9/14/2024 1557  Last data filed at 9/14/2024 1200  Gross per 24 hour   Intake 1540 ml   Output 1328 ml   Net 212 ml         Physical Exam  Constitutional:       General: She is not in acute distress.     Appearance: She is not ill-appearing.      Comments: Looks uncomfortable   Eyes:      General:         Right eye: No discharge.         Left eye: No discharge.   Neck:      Vascular: No JVD.   Cardiovascular:      Rate and Rhythm: Normal rate and regular rhythm.   Pulmonary:      Effort: Pulmonary effort is normal.      Breath sounds: Normal breath sounds.   Abdominal:      General: Abdomen is flat. Bowel sounds are increased. There is distension.      Palpations: Abdomen is soft.      Tenderness: There is no abdominal tenderness (lower abdomen).   Musculoskeletal:      Right lower leg: Edema present.      Left lower leg: Edema present.   Skin:     General: Skin is warm and moist.      Findings: No rash.   Neurological:      Mental Status: She is alert and oriented to person, place, and time.   Psychiatric:         Attention and Perception: Attention normal.         Mood and Affect: Mood and affect normal.         Speech: Speech normal.             Significant Labs: All pertinent labs  within the past 24 hours have been reviewed.    Significant Imaging: I have reviewed all pertinent imaging results/findings within the past 24 hours.

## 2024-09-14 NOTE — ASSESSMENT & PLAN NOTE
Completed long prednisone taper.  Pulmonology following.  Continue ALLYSSA/DUC scheduled treatments.

## 2024-09-15 PROBLEM — I48.0 PAROXYSMAL ATRIAL FIBRILLATION: Status: ACTIVE | Noted: 2024-06-16

## 2024-09-15 PROBLEM — J18.9 PNEUMONIA: Status: RESOLVED | Noted: 2024-06-14 | Resolved: 2024-09-15

## 2024-09-15 LAB
ANION GAP SERPL CALC-SCNC: 2 MMOL/L (ref 8–16)
BASOPHILS # BLD AUTO: 0.01 K/UL (ref 0–0.2)
BASOPHILS NFR BLD: 0.2 % (ref 0–1.9)
BUN SERPL-MCNC: 5 MG/DL (ref 8–23)
CALCIUM SERPL-MCNC: 7.8 MG/DL (ref 8.7–10.5)
CHLORIDE SERPL-SCNC: 100 MMOL/L (ref 95–110)
CO2 SERPL-SCNC: 43 MMOL/L (ref 23–29)
CREAT SERPL-MCNC: 0.4 MG/DL (ref 0.5–1.4)
DIFFERENTIAL METHOD BLD: ABNORMAL
EOSINOPHIL # BLD AUTO: 0.1 K/UL (ref 0–0.5)
EOSINOPHIL NFR BLD: 2.3 % (ref 0–8)
ERYTHROCYTE [DISTWIDTH] IN BLOOD BY AUTOMATED COUNT: 17.2 % (ref 11.5–14.5)
EST. GFR  (NO RACE VARIABLE): >60 ML/MIN/1.73 M^2
GLUCOSE SERPL-MCNC: 34 MG/DL (ref 70–110)
HCT VFR BLD AUTO: 25.5 % (ref 37–48.5)
HGB BLD-MCNC: 7.4 G/DL (ref 12–16)
IMM GRANULOCYTES # BLD AUTO: 0.03 K/UL (ref 0–0.04)
IMM GRANULOCYTES NFR BLD AUTO: 0.6 % (ref 0–0.5)
LYMPHOCYTES # BLD AUTO: 1.6 K/UL (ref 1–4.8)
LYMPHOCYTES NFR BLD: 29.9 % (ref 18–48)
MAGNESIUM SERPL-MCNC: 1.7 MG/DL (ref 1.6–2.6)
MAGNESIUM SERPL-MCNC: 2.1 MG/DL (ref 1.6–2.6)
MCH RBC QN AUTO: 31.5 PG (ref 27–31)
MCHC RBC AUTO-ENTMCNC: 29 G/DL (ref 32–36)
MCV RBC AUTO: 109 FL (ref 82–98)
MONOCYTES # BLD AUTO: 0.3 K/UL (ref 0.3–1)
MONOCYTES NFR BLD: 6.5 % (ref 4–15)
NEUTROPHILS # BLD AUTO: 3.2 K/UL (ref 1.8–7.7)
NEUTROPHILS NFR BLD: 60.5 % (ref 38–73)
NRBC BLD-RTO: 0 /100 WBC
PHOSPHATE SERPL-MCNC: 2.8 MG/DL (ref 2.7–4.5)
PLATELET # BLD AUTO: 167 K/UL (ref 150–450)
PMV BLD AUTO: 9.8 FL (ref 9.2–12.9)
POCT GLUCOSE: 142 MG/DL (ref 70–110)
POCT GLUCOSE: 145 MG/DL (ref 70–110)
POCT GLUCOSE: 179 MG/DL (ref 70–110)
POCT GLUCOSE: 28 MG/DL (ref 70–110)
POCT GLUCOSE: 31 MG/DL (ref 70–110)
POCT GLUCOSE: 60 MG/DL (ref 70–110)
POTASSIUM SERPL-SCNC: 3 MMOL/L (ref 3.5–5.1)
POTASSIUM SERPL-SCNC: 3.9 MMOL/L (ref 3.5–5.1)
RBC # BLD AUTO: 2.35 M/UL (ref 4–5.4)
SODIUM SERPL-SCNC: 145 MMOL/L (ref 136–145)
WBC # BLD AUTO: 5.21 K/UL (ref 3.9–12.7)

## 2024-09-15 PROCEDURE — 25000242 PHARM REV CODE 250 ALT 637 W/ HCPCS: Performed by: INTERNAL MEDICINE

## 2024-09-15 PROCEDURE — 94761 N-INVAS EAR/PLS OXIMETRY MLT: CPT

## 2024-09-15 PROCEDURE — 25000003 PHARM REV CODE 250: Performed by: INTERNAL MEDICINE

## 2024-09-15 PROCEDURE — 63600175 PHARM REV CODE 636 W HCPCS: Performed by: INTERNAL MEDICINE

## 2024-09-15 PROCEDURE — 94640 AIRWAY INHALATION TREATMENT: CPT

## 2024-09-15 PROCEDURE — 25000003 PHARM REV CODE 250: Performed by: HOSPITALIST

## 2024-09-15 PROCEDURE — 83735 ASSAY OF MAGNESIUM: CPT | Mod: 91 | Performed by: HOSPITALIST

## 2024-09-15 PROCEDURE — 63600175 PHARM REV CODE 636 W HCPCS: Performed by: SURGERY

## 2024-09-15 PROCEDURE — 94660 CPAP INITIATION&MGMT: CPT

## 2024-09-15 PROCEDURE — 25000003 PHARM REV CODE 250

## 2024-09-15 PROCEDURE — 85025 COMPLETE CBC W/AUTO DIFF WBC: CPT | Performed by: HOSPITALIST

## 2024-09-15 PROCEDURE — 99233 SBSQ HOSP IP/OBS HIGH 50: CPT | Mod: ,,, | Performed by: INTERNAL MEDICINE

## 2024-09-15 PROCEDURE — 80048 BASIC METABOLIC PNL TOTAL CA: CPT | Performed by: HOSPITALIST

## 2024-09-15 PROCEDURE — 27000221 HC OXYGEN, UP TO 24 HOURS

## 2024-09-15 PROCEDURE — 84132 ASSAY OF SERUM POTASSIUM: CPT | Performed by: HOSPITALIST

## 2024-09-15 PROCEDURE — 99900031 HC PATIENT EDUCATION (STAT)

## 2024-09-15 PROCEDURE — 99900035 HC TECH TIME PER 15 MIN (STAT)

## 2024-09-15 PROCEDURE — 84100 ASSAY OF PHOSPHORUS: CPT | Performed by: HOSPITALIST

## 2024-09-15 PROCEDURE — 12000002 HC ACUTE/MED SURGE SEMI-PRIVATE ROOM

## 2024-09-15 RX ORDER — METOCLOPRAMIDE HYDROCHLORIDE 5 MG/ML
10 INJECTION INTRAMUSCULAR; INTRAVENOUS ONCE
Status: COMPLETED | OUTPATIENT
Start: 2024-09-15 | End: 2024-09-15

## 2024-09-15 RX ORDER — ATORVASTATIN CALCIUM 40 MG/1
80 TABLET, FILM COATED ORAL NIGHTLY
Status: DISCONTINUED | OUTPATIENT
Start: 2024-09-15 | End: 2024-09-17 | Stop reason: HOSPADM

## 2024-09-15 RX ORDER — INSULIN ASPART 100 [IU]/ML
0-5 INJECTION, SOLUTION INTRAVENOUS; SUBCUTANEOUS
Status: DISCONTINUED | OUTPATIENT
Start: 2024-09-15 | End: 2024-09-17 | Stop reason: HOSPADM

## 2024-09-15 RX ORDER — IBUPROFEN 200 MG
16 TABLET ORAL
Status: DISCONTINUED | OUTPATIENT
Start: 2024-09-15 | End: 2024-09-17 | Stop reason: HOSPADM

## 2024-09-15 RX ORDER — IBUPROFEN 200 MG
24 TABLET ORAL
Status: DISCONTINUED | OUTPATIENT
Start: 2024-09-15 | End: 2024-09-17 | Stop reason: HOSPADM

## 2024-09-15 RX ORDER — ESTRADIOL 0.1 MG/G
1 CREAM VAGINAL
Status: DISCONTINUED | OUTPATIENT
Start: 2024-09-15 | End: 2024-09-16

## 2024-09-15 RX ORDER — INSULIN GLARGINE 100 [IU]/ML
10 INJECTION, SOLUTION SUBCUTANEOUS DAILY
Status: DISCONTINUED | OUTPATIENT
Start: 2024-09-16 | End: 2024-09-17 | Stop reason: HOSPADM

## 2024-09-15 RX ORDER — GLUCAGON 1 MG
1 KIT INJECTION
Status: DISCONTINUED | OUTPATIENT
Start: 2024-09-15 | End: 2024-09-17 | Stop reason: HOSPADM

## 2024-09-15 RX ADMIN — GABAPENTIN 400 MG: 300 CAPSULE ORAL at 08:09

## 2024-09-15 RX ADMIN — METOCLOPRAMIDE HYDROCHLORIDE 10 MG: 5 INJECTION INTRAMUSCULAR; INTRAVENOUS at 01:09

## 2024-09-15 RX ADMIN — DILTIAZEM HYDROCHLORIDE 240 MG: 120 CAPSULE, COATED, EXTENDED RELEASE ORAL at 08:09

## 2024-09-15 RX ADMIN — POTASSIUM BICARBONATE 60 MEQ: 782 TABLET, EFFERVESCENT ORAL at 09:09

## 2024-09-15 RX ADMIN — DEXTROSE MONOHYDRATE 12.5 G: 25 INJECTION, SOLUTION INTRAVENOUS at 06:09

## 2024-09-15 RX ADMIN — HYDROCODONE BITARTRATE AND ACETAMINOPHEN 1 TABLET: 5; 325 TABLET ORAL at 09:09

## 2024-09-15 RX ADMIN — MAGNESIUM SULFATE HEPTAHYDRATE 2 G: 2 INJECTION, SOLUTION INTRAVENOUS at 08:09

## 2024-09-15 RX ADMIN — ATORVASTATIN CALCIUM 80 MG: 40 TABLET, FILM COATED ORAL at 08:09

## 2024-09-15 RX ADMIN — APIXABAN 5 MG: 5 TABLET, FILM COATED ORAL at 09:09

## 2024-09-15 RX ADMIN — IPRATROPIUM BROMIDE AND ALBUTEROL SULFATE 3 ML: 2.5; .5 SOLUTION RESPIRATORY (INHALATION) at 12:09

## 2024-09-15 RX ADMIN — FAMOTIDINE 20 MG: 10 INJECTION INTRAVENOUS at 09:09

## 2024-09-15 RX ADMIN — MECLIZINE HYDROCHLORIDE 25 MG: 12.5 TABLET ORAL at 11:09

## 2024-09-15 RX ADMIN — HYDROCODONE BITARTRATE AND ACETAMINOPHEN 1 TABLET: 5; 325 TABLET ORAL at 06:09

## 2024-09-15 RX ADMIN — MECLIZINE HYDROCHLORIDE 25 MG: 12.5 TABLET ORAL at 09:09

## 2024-09-15 RX ADMIN — ACETAMINOPHEN 650 MG: 325 TABLET ORAL at 02:09

## 2024-09-15 RX ADMIN — IPRATROPIUM BROMIDE AND ALBUTEROL SULFATE 3 ML: 2.5; .5 SOLUTION RESPIRATORY (INHALATION) at 08:09

## 2024-09-15 RX ADMIN — SENNOSIDES AND DOCUSATE SODIUM 1 TABLET: 8.6; 5 TABLET ORAL at 09:09

## 2024-09-15 RX ADMIN — LOSARTAN POTASSIUM 100 MG: 50 TABLET, FILM COATED ORAL at 08:09

## 2024-09-15 RX ADMIN — METOCLOPRAMIDE HYDROCHLORIDE 10 MG: 5 INJECTION INTRAMUSCULAR; INTRAVENOUS at 09:09

## 2024-09-15 RX ADMIN — POTASSIUM BICARBONATE 60 MEQ: 782 TABLET, EFFERVESCENT ORAL at 08:09

## 2024-09-15 RX ADMIN — LEVOTHYROXINE SODIUM 100 MCG: 0.1 TABLET ORAL at 05:09

## 2024-09-15 RX ADMIN — SENNOSIDES AND DOCUSATE SODIUM 1 TABLET: 8.6; 5 TABLET ORAL at 08:09

## 2024-09-15 RX ADMIN — METOCLOPRAMIDE HYDROCHLORIDE 10 MG: 5 INJECTION INTRAMUSCULAR; INTRAVENOUS at 05:09

## 2024-09-15 RX ADMIN — ENOXAPARIN SODIUM 40 MG: 40 INJECTION SUBCUTANEOUS at 10:09

## 2024-09-15 RX ADMIN — FAMOTIDINE 20 MG: 10 INJECTION INTRAVENOUS at 08:09

## 2024-09-15 RX ADMIN — GABAPENTIN 400 MG: 300 CAPSULE ORAL at 02:09

## 2024-09-15 RX ADMIN — APIXABAN 5 MG: 5 TABLET, FILM COATED ORAL at 01:09

## 2024-09-15 RX ADMIN — METOCLOPRAMIDE HYDROCHLORIDE 10 MG: 5 INJECTION INTRAMUSCULAR; INTRAVENOUS at 06:09

## 2024-09-15 RX ADMIN — CYANOCOBALAMIN TAB 1000 MCG 1000 MCG: 1000 TAB at 08:09

## 2024-09-15 RX ADMIN — LIDOCAINE 3 PATCH: 50 PATCH TOPICAL at 10:09

## 2024-09-15 RX ADMIN — IPRATROPIUM BROMIDE AND ALBUTEROL SULFATE 3 ML: 2.5; .5 SOLUTION RESPIRATORY (INHALATION) at 01:09

## 2024-09-15 NOTE — CARE UPDATE
09/14/24 2104   Patient Assessment/Suction   Level of Consciousness (AVPU) alert   Respiratory Effort Normal;Unlabored   Expansion/Accessory Muscles/Retractions no use of accessory muscles;no retractions;expansion symmetric   All Lung Fields Breath Sounds diminished;coarse;equal bilaterally   Rhythm/Pattern, Respiratory no shortness of breath reported   Cough Frequency infrequent   Cough Type good;nonproductive   Skin Integrity   $ Wound Care Tech Time 15 min   Area Observed Cheek;Bridge of nose   Skin Appearance without discoloration   Barrier used? Liquid Skin Barrier   PRE-TX-O2   Device (Oxygen Therapy) Oxymask   $ Is the patient on Low Flow Oxygen? Yes   Flow (L/min) (Oxygen Therapy) 3   SpO2 98 %   Pulse Oximetry Type Continuous   $ Pulse Oximetry - Multiple Charge Pulse Oximetry - Multiple   Pulse 81   Resp 16   BP (!) 156/64   Aerosol Therapy   $ Aerosol Therapy Charges Aerosol Treatment   Daily Review of Necessity (SVN) completed   Treatment Route (SVN) mask;oxygen   Patient Position HOB elevated   Post Treatment Assessment (SVN) breath sounds unchanged   Signs of Intolerance (SVN) none   Breath Sounds Post-Respiratory Treatment   Throughout All Fields Post-Treatment All Fields   Throughout All Fields Post-Treatment no change   Post-treatment Heart Rate (beats/min) 79   Post-treatment Resp Rate (breaths/min) 18   Preset CPAP/BiPAP Settings   Mode Of Delivery Standby;BiPAP S/T   $ CPAP/BiPAP Daily Charge BiPAP/CPAP Daily   $ Initial CPAP/BiPAP Setup? No   $ Is patient using? Yes  (brad place pt on bipap by 12 am per pt request)   Size of Mask Small/Medium   Sized Appropriately? Yes   Equipment Type V60

## 2024-09-15 NOTE — NURSING
Nurses Note -- 4 Eyes      9/15/2024   4:57 PM      Skin assessed during: Transfer      [] No Altered Skin Integrity Present    []Prevention Measures Documented      [x] Yes- Altered Skin Integrity Present or Discovered   [x] LDA Added if Not in Epic (Describe Wound)   [x] New Altered Skin Integrity was Present on Admit and Documented in LDA   [x] Wound Image Taken    Wound Care Consulted? Yes    Attending Nurse:  Alicia Forte RN/Staff Member:  CARLOS Guerrier

## 2024-09-15 NOTE — ASSESSMENT & PLAN NOTE
Patient's FSGs are low on current medication regimen.  Last A1c reviewed-   Lab Results   Component Value Date    HGBA1C 9.5 (H) 08/26/2024     Most recent fingerstick glucose reviewed-   Recent Labs   Lab 09/15/24  0644 09/15/24  0716 09/15/24  0936 09/15/24  1127   POCTGLUCOSE 28* 60* 145* 179*       Change insulin sliding scale to low dose.  Reduce Lantus to 10 units daily.    Antihyperglycemics (From admission, onward)      Start     Stop Route Frequency Ordered    09/16/24 0900  insulin glargine U-100 (Lantus) pen 10 Units         -- SubQ Daily 09/15/24 1103    09/15/24 1206  insulin aspart U-100 pen 0-5 Units         -- SubQ Before meals & nightly PRN 09/15/24 1106          Hold Oral hypoglycemics while patient is in the hospital.

## 2024-09-15 NOTE — PLAN OF CARE
09/15/24 0811   Patient Assessment/Suction   Level of Consciousness (AVPU) alert   Respiratory Effort Normal;Unlabored   Expansion/Accessory Muscles/Retractions no use of accessory muscles;no retractions   All Lung Fields Breath Sounds diminished   PRE-TX-O2   Device (Oxygen Therapy) nasal cannula   $ Is the patient on Low Flow Oxygen? Yes   Flow (L/min) (Oxygen Therapy) 3   SpO2 95 %   Pulse Oximetry Type Continuous   $ Pulse Oximetry - Multiple Charge Pulse Oximetry - Multiple   Pulse 78   Resp 20   Aerosol Therapy   $ Aerosol Therapy Charges Aerosol Treatment   Daily Review of Necessity (SVN) completed   Respiratory Treatment Status (SVN) given   Treatment Route (SVN) mask;oxygen   Patient Position HOB elevated   Post Treatment Assessment (SVN) breath sounds unchanged   Signs of Intolerance (SVN) none   Breath Sounds Post-Respiratory Treatment   Post-treatment Heart Rate (beats/min) 75   Post-treatment Resp Rate (breaths/min) 15   Preset CPAP/BiPAP Settings   Mode Of Delivery BiPAP S/T;Standby   $ CPAP/BiPAP Daily Charge BiPAP/CPAP Daily   Education   $ Education Bronchodilator;15 min

## 2024-09-15 NOTE — NURSING
BG was 31 when checked at 0641.  Patient was drowsy, but oriented X4.  Speech was slightly slurred and she had delayed responses.  Rechecked BG at 0644 to be sure it was not a false reading, and it was 28.  1/2 amp of D50 was given.  BG was rechecked ar 0716 and BG was 60.  Breakfast tray was set up for patient.  Dr Multani was notified of low BG and adjustments were made to insulin orders.

## 2024-09-15 NOTE — ASSESSMENT & PLAN NOTE
Patient with Paroxysmal (<7 days) atrial fibrillation which is controlled currently with Calcium Channel Blocker. Patient is currently in atrial fibrillation.[id number] Score: 4.  Anticoagulation indicated. Anticoagulation done with apixaban .

## 2024-09-15 NOTE — ASSESSMENT & PLAN NOTE
Patient's most recent potassium results are listed below.   Recent Labs     09/14/24  0414 09/15/24  0639 09/15/24  1416   K 3.3* 3.0* 3.9       Plan  - Repleted potassium - Monitor potassium Daily  - Patient's hypokalemia is improving

## 2024-09-15 NOTE — NURSING
Nurses Note -- 4 Eyes      9/14/2024   7:44 PM      Skin assessed during: Q Shift Change      [] No Altered Skin Integrity Present    []Prevention Measures Documented      [x] Yes- Altered Skin Integrity Present or Discovered   [] LDA Added if Not in Epic (Describe Wound)   [] New Altered Skin Integrity was Present on Admit and Documented in LDA   [] Wound Image Taken    Wound Care Consulted? Yes    Attending Nurse:  Lavonne Forte RN/Staff Member:  CARLOS Poole

## 2024-09-15 NOTE — NURSING
Arrived to unit via bed with icu nurse and  at bedside. AAOx4 in stable condition, bruising noted from previous fall. No further concerns at this time bed in lowest position, call light and person items within reach, spouse at bedside educated to use call light if anything is needed.

## 2024-09-15 NOTE — NURSING
Nurses Note -- 4 Eyes      9/13/2024   7:45 PM      Skin assessed during: Q shift change      [] No Altered Skin Integrity Present    []Prevention Measures Documented      [x] Yes- Altered Skin Integrity Present or Discovered   [] LDA Added if Not in Epic (Describe Wound)   [] New Altered Skin Integrity was Present on Admit and Documented in LDA   [] Wound Image Taken    Wound Care Consulted? Yes    Attending Nurse:  Lavonne Forte RN/Staff Member:  CARLOS Hernández

## 2024-09-15 NOTE — PROGRESS NOTES
Pulmonary/Critical Care  Progress Note      Patient name: Karo Leonard  MRN: [id number]  Date: 09/15/2024    Admit Date: 8/25/2024  Consult Requested By: Michael Multani MD    Reason for Consult: Respiratory failure, COPD    HPI:    8/25/2024 - 63 yo ASCVD, DM, HTN. COPD(cigarette use) had increased SOB at home and a fall.  EMS was called and brought to ER.  Initially tried on BiPAP without response and was subsequently intubated and placed on ventilation.  Initial ABG showed over ventilation and we have adjusted and ABG are getting better.  She is sedated and not able to provide any history.  D/W  who says that she was supposed to see a pulmonologist but couldn't make appointment, she has been a chronic smoker.  She had increase SOB for a few days.  She did have a fall at home but he reports that she was not down for long.  He does report that she has been having recurring falls at home.      8/26/2024 - Stable overnight, O2 needs still too high to do SBT.  She is responsive and gets agitated on current sedation and we are adjusting.  No other new issues reported.  Hgb has decreased (no active bleeding reported), NA remains low, CPK is better, TFTF noted and c/w hypothyroid (synthroid started).    8/27/2024 - Remains critically ill, O2 needs down a little but still too high.  Difficult to sedate is either agitated or apneic.  Tried SBT this AM and was apneic.  VS reviewed.  She is 3.8 liters +.  Tube feeds have been started.  NA remains low, glucose is elevated.  CXR looks about the same    8/28/2024 - Stable overnight, O2 needs still up.  She does get agitated at times and we have adjusted meds.  Trouble with tube feeds and will hold today and restart tomorrow.  Not ready to wean because on increased FiO2 and will try to increase PEP to see if that helps.  She does not have a lot of secretions.  She is over ventilaed some and we adjusted vent.  CXR is about the same    8/29/2024 - Stable overnight,,  O2 needs still up some (I decreased FiO2 and increased PEEP some) and she is overventilated (we adjusted rate).  Will retry tube feeds ([facility] at 20 to see if she tolerates).  She is 4.6 liters +.  CXR is about the same.    8/30- continues on vent, settings adjusted for alkalosis- now on PS 12/5. CXR looks wet and pt with severe edema- fluids discontinued and starting lasix. Propofol has been weaned a little. Pt is awake and denies pain. Her blood pressure was up to 200 systolic at the time of my evaluation- RN states she just gave am blood pressure med and also Lasix.    8/31- pt awake, off sedation, writing and alert. Denies pain, states feeling better. She was significantly more alkalotic this am due to lasix so given a dose of diamox this am. Tolerating PS 10/5 now- will repeat abg soon and consider extubation     9/1- extubated yesterday and tolerated well    9/3/2024 - STable overnight, no new issues reported and is feeling better ovrall.  She is very weak.  O2 decreased to 5 LPM when I saqw her.  No new issues reported.  BP is up some.  Labs reviewed    9/4/2024 - Stable overnight, no new issues reported, she feels better overall.      9/5/2024 - Stable overnight, she complains of issues with vertigo when getting up.  Possible placement at [facility] is being worked on.  No new respiratory complaints.      9/6 the patient has developed very tender and tympanitic abdomen.  She was supposed to be going to an LTAC today.  She is getting ready to receive an enema which will hopefully clear her rectum and decrease the obstruction.    9/7 the patient has had 2 bowel movements.  The 1 she just had was quite large.  However, she continues to complain of abdominal pain.  She is also complaining that are pain meds have been decreased.  Explained how narcotics slow down GI transit and could be adding to her GI problems    9/8 the patient is still complaining of severe abdominal pain and distention.  She has not  yet had her CT today.     The patient is only having gas and stool mucus.  She is not getting out of bed.  Her back hurts, her knees hurt, she has vertigo..... Explained this would help her GI motility.    2024 - For an unclear reason she dropped from our list.  Respiratory status stable but was moved to ICU yesterday GI issues predominate and she was given neostigmine to try and help.  She remains very distended and complaints of decreased respiratory ability (also decreased IS volumes).  No distress.  For possible endoscopic decompression today.  CT showed persistent GI issues and RLL infiltrate/atelectasis    2024 - HAd decompression yesterday and nursing reports some liquid stool.  Adbomen is stil distended but seems better.  She was sleeping on BiPAP when I saw her.  No other new issues reported.  KUB is better this AM.     2024 - Stable overnight, abdomen feels better  no new respiratory complaints  KUB reviewed and still with some gaseous distension.     9/15/2024 - Stable overnight, no new issues reported  Respiratory status remains stable.  KUB reviewed    Review of Systems    Review of Systems   Constitutional:         Diffuse pain   Gastrointestinal:  Positive for abdominal pain and constipation.       Past Medical History    Past Medical History:   Diagnosis Date    Coronary artery disease     cardiac stent    DDD (degenerative disc disease), lumbar 2000    Diabetes mellitus     Hypertension     Thyroid disease        Past Surgical History    Past Surgical History:   Procedure Laterality Date     SECTION  08/1987    x2 1993    COLON DECOMPRESSION N/A 2024    Procedure: DECOMPRESSION, COLON;  Surgeon: Reza Chaudhari MD;  Location: [facility];  Service: Endoscopy;  Laterality: N/A;    CORONARY STENT PLACEMENT  2017    EXPLORATORY LAPAROTOMY  1982    [facility]     HEMORRHOID SURGERY      LAPAROSCOPIC CHOLECYSTECTOMY N/A 2022    Procedure:  CHOLECYSTECTOMY, LAPAROSCOPIC;  Surgeon: Leonardo Wilson III, MD;  Location: [facility];  Service: General;  Laterality: N/A;    LUMBAR DISC SURGERY  2000    PILONIDAL CYST DRAINAGE  1976    TONSILLECTOMY      as a child (also adenoids)       Medications (scheduled):      albuterol-ipratropium  3 mL Nebulization Q6H    apixaban  5 mg Oral BID    atorvastatin  80 mg Oral QHS    cyanocobalamin  1,000 mcg Oral Daily    diltiaZEM  240 mg Oral Daily    estradioL  1 g Vaginal Twice Weekly    famotidine (PF)  20 mg Intravenous Q12H    gabapentin  400 mg Oral TID    [START ON 9/16/2024] insulin glargine U-100  10 Units Subcutaneous Daily    levothyroxine  100 mcg Oral Before breakfast    LIDOcaine  3 patch Transdermal Q24H    losartan  100 mg Oral Daily    metoclopramide  10 mg Intravenous Q6H    senna-docusate 8.6-50 mg  1 tablet Oral BID       Medications (infusions):             Medications (prn):       Current Facility-Administered Medications:     acetaminophen, 650 mg, Oral, Q8H PRN    calcium gluconate IVPB, 1 g, Intravenous, PRN    calcium gluconate IVPB, 2 g, Intravenous, PRN    calcium gluconate IVPB, 3 g, Intravenous, PRN    dextrose 50%, 12.5 g, Intravenous, PRN    dextrose 50%, 25 g, Intravenous, PRN    glucagon (human recombinant), 1 mg, Intramuscular, PRN    glucose, 16 g, Oral, PRN    glucose, 24 g, Oral, PRN    hydrALAZINE, 10 mg, Intravenous, Q6H PRN    HYDROcodone-acetaminophen, 1 tablet, Oral, Q8H PRN    insulin aspart U-100, 0-5 Units, Subcutaneous, QID (AC + HS) PRN    ketorolac, 15 mg, Intravenous, Q6H PRN    magnesium sulfate IVPB, 2 g, Intravenous, PRN    magnesium sulfate IVPB, 4 g, Intravenous, PRN    meclizine, 25 mg, Oral, TID PRN    polyethylene glycol, 17 g, Oral, TID PRN    potassium bicarbonate, 35 mEq, Oral, PRN    potassium bicarbonate, 50 mEq, Oral, PRN    potassium bicarbonate, 60 mEq, Oral, PRN    potassium chloride in water, 40 mEq, Intravenous, PRN **AND** potassium chloride in water,  "60 mEq, Intravenous, PRN **AND** potassium chloride in water, 80 mEq, Intravenous, PRN    simethicone, 1 tablet, Oral, TID PRN    sodium chloride 0.9%, 10 mL, Intravenous, PRN    sodium phosphate 15 mmol in D5W 250 mL IVPB, 15 mmol, Intravenous, PRN    sodium phosphate 20.01 mmol in D5W 250 mL IVPB, 20.01 mmol, Intravenous, PRN    sodium phosphate 30 mmol in D5W 250 mL IVPB, 30 mmol, Intravenous, PRN    Family History: No family history on file.    Social History: Tobacco:   Social History     Tobacco Use   Smoking Status Not on file   Smokeless Tobacco Not on file                                EtOH:   Social History     Substance and Sexual Activity   Alcohol Use No                                Drugs:   Social History     Substance and Sexual Activity   Drug Use No       Physical Exam    Vital signs:  Temp:  [98 °F (36.7 °C)-98.6 °F (37 °C)]   Pulse:  [64-92]   Resp:  [10-30]   BP: ()/(44-77)   SpO2:  [92 %-100 %]     Intake/Output:   Intake/Output Summary (Last 24 hours) at 9/15/2024 1203  Last data filed at 9/15/2024 1131  Gross per 24 hour   Intake 960 ml   Output 2488 ml   Net -1528 ml        BMI: Estimated body mass index is 45.56 kg/m² as calculated from the following:    Height as of this encounter: 5' 5" (1.651 m).    Weight as of this encounter: 124.2 kg (273 lb 13 oz).    Physical Exam  Vitals and nursing note reviewed.   Constitutional:       General: She is not in acute distress.     Appearance: She is not ill-appearing, toxic-appearing or diaphoretic.      Comments: Sleeping on BiPAP   HENT:      Head: Normocephalic.      Comments: Some bruising right forehead and bilat eyes from her fall     Right Ear: External ear normal.      Left Ear: External ear normal.      Nose: Nose normal. No congestion or rhinorrhea.      Mouth/Throat:      Mouth: Mucous membranes are moist.      Pharynx: Oropharynx is clear. No oropharyngeal exudate or posterior oropharyngeal erythema.   Eyes:      General: No " scleral icterus.        Right eye: No discharge.         Left eye: No discharge.      Extraocular Movements: Extraocular movements intact.      Conjunctiva/sclera: Conjunctivae normal.      Pupils: Pupils are equal, round, and reactive to light.   Neck:      Vascular: No carotid bruit.   Cardiovascular:      Rate and Rhythm: Normal rate and regular rhythm.      Pulses: Normal pulses.      Heart sounds: No murmur heard.     No friction rub. No gallop.   Pulmonary:      Effort: Pulmonary effort is normal. No respiratory distress.      Breath sounds: No stridor. No wheezing, rhonchi or rales.   Chest:      Chest wall: No tenderness.   Abdominal:      General: There is distension (but better).      Tenderness: There is abdominal tenderness. There is guarding.      Comments: No BS heard   Musculoskeletal:         General: No swelling. Normal range of motion.      Cervical back: Normal range of motion and neck supple. No rigidity or tenderness.      Right lower leg: Edema present.      Left lower leg: Edema present.   Lymphadenopathy:      Cervical: No cervical adenopathy.   Skin:     General: Skin is warm and dry.      Capillary Refill: Capillary refill takes less than 2 seconds.      Coloration: Skin is not jaundiced.      Findings: Bruising (Extensive ecchymoses to the eyes and frontal bone on the right with a large hematoma) present.      Comments: + bruise LLE   Neurological:      General: No focal deficit present.      Cranial Nerves: No cranial nerve deficit.      Sensory: No sensory deficit.      Motor: No weakness.   Psychiatric:         Mood and Affect: Mood normal.         Behavior: Behavior normal.         Laboratory    Recent Labs   Lab 09/15/24  0639   WBC 5.21   RBC 2.35*   HGB 7.4*   HCT 25.5*      *   MCH 31.5*   MCHC 29.0*             Recent Labs   Lab 09/15/24  0639   CALCIUM 7.8*      K 3.0*   CO2 43*      BUN 5*   CREATININE 0.4*           Microbiology:       Microbiology  "Results (last 7 days)       ** No results found for the last 168 hours. **            Radiology    X-Ray KUB  Narrative: EXAMINATION:  XR KUB    CLINICAL HISTORY:  Abdominal distension    COMPARISON:  September 14, 2024    FINDINGS:  Three supine views are submitted.  Exam is limited by body habitus.    Previously reported gaseous distension of the colon appears mildly improved on today's examination, with no definite radiographic evidence of obstruction.  No mass, suspicious calcification, or free air is identified.  Impression: 1. Limited examination, demonstrating mildly decreasing gaseous distension of the colon.  No new abnormalities.    Electronically signed by: Malik Diez  Date:    09/15/2024  Time:    07:38        Ventilator Information    Oxygen Concentration (%):  [40] 40  The patient is on 3 L OxyMask           No results for input(s): "PH", "PCO2", "PO2", "HCO3", "POCSATURATED", "BE" in the last 72 hours.        Impression    Active Hospital Problems    Diagnosis  POA    *Acute hypoxic on chronic hypercapnic respiratory failure [J96.01, J96.12]  Yes    Hypokalemia [E87.6]  No    Ileus [K56.7]  Yes    Type 2 diabetes mellitus [E11.9]  Yes    Anemia due to vitamin B12 deficiency [D51.9]  Yes    Fall [W19.XXXA]  Yes    Paroxysmal atrial fibrillation [I48.0]  Yes    COPD exacerbation [J44.1]  Yes    Pneumonia [J18.9]  Yes    Hypothyroid [E03.9]  Yes    Hypertension [I10]  Yes      Resolved Hospital Problems    Diagnosis Date Resolved POA    Goals of care, counseling/discussion [Z71.89] 09/07/2024 Not Applicable    Acute metabolic encephalopathy [G93.41] 09/14/2024 Yes     Patient's respiratory status is stable.  Her abdomen is still not settled.. Ct shows nothing requiring surgical intervention.     Plan    Continue O2 to maintain sats 89-92%  Continue bipap nightly and if needed during the day  Respiratory treatments recheck 9/18  Antihypertensives to continue  Replace electrolytes as " needed  Synthroid adjusted, TSH still very elevated  Watch BS and treat as needed  Increase activity as able  GI issues look better today, continue to follow      Sonido Zamora MD  [facility] Pulmonary/Critical Care  09/15/2024

## 2024-09-15 NOTE — SUBJECTIVE & OBJECTIVE
Interval History:  Less abdominal distension.  Improving gas pattern on this morning's X-ray.  No vomiting.  Vital signs stable.  Has no new complaints.  Can move out of ICU and back to floor.    Review of Systems   Respiratory:  Negative for shortness of breath.    Cardiovascular:  Negative for chest pain.     Objective:     Vital Signs (Most Recent):  Temp: 98.4 °F (36.9 °C) (09/15/24 1501)  Pulse: 84 (09/15/24 1637)  Resp: 18 (09/15/24 1637)  BP: (!) 145/79 (09/15/24 1637)  SpO2: (!) 94 % (09/15/24 1600) Vital Signs (24h Range):  Temp:  [98 °F (36.7 °C)-98.6 °F (37 °C)] 98.4 °F (36.9 °C)  Pulse:  [64-92] 84  Resp:  [10-30] 18  SpO2:  [86 %-100 %] 94 %  BP: ()/(44-79) 145/79     Weight: 124.2 kg (273 lb 13 oz)  Body mass index is 45.56 kg/m².    Intake/Output Summary (Last 24 hours) at 9/15/2024 1759  Last data filed at 9/15/2024 1651  Gross per 24 hour   Intake 660 ml   Output 1668 ml   Net -1008 ml         Physical Exam  Constitutional:       General: She is not in acute distress.     Appearance: She is not ill-appearing.      Comments: Looks uncomfortable   Eyes:      General:         Right eye: No discharge.         Left eye: No discharge.   Neck:      Vascular: No JVD.   Cardiovascular:      Rate and Rhythm: Normal rate and regular rhythm.   Pulmonary:      Effort: Pulmonary effort is normal.      Breath sounds: Normal breath sounds.   Abdominal:      General: Abdomen is flat. Bowel sounds are normal. There is distension (less so today).      Palpations: Abdomen is soft.      Tenderness: There is no abdominal tenderness (lower abdomen).   Musculoskeletal:      Right lower leg: Edema present.      Left lower leg: Edema present.   Skin:     General: Skin is warm and moist.      Findings: No rash.   Neurological:      Mental Status: She is alert and oriented to person, place, and time.   Psychiatric:         Attention and Perception: Attention normal.         Mood and Affect: Mood and affect normal.          Speech: Speech normal.             Significant Labs: All pertinent labs within the past 24 hours have been reviewed.    Significant Imaging: I have reviewed all pertinent imaging results/findings within the past 24 hours.

## 2024-09-15 NOTE — PLAN OF CARE
Problem: Adult Inpatient Plan of Care  Goal: Plan of Care Review  Outcome: Progressing  Goal: Patient-Specific Goal (Individualized)  Outcome: Progressing  Goal: Absence of Hospital-Acquired Illness or Injury  Outcome: Progressing  Goal: Optimal Comfort and Wellbeing  Outcome: Progressing  Goal: Readiness for Transition of Care  Outcome: Progressing     Problem: Bariatric Environmental Safety  Goal: Safety Maintained with Care  Outcome: Progressing     Problem: Skin Injury Risk Increased  Goal: Skin Health and Integrity  Outcome: Progressing     Problem: Diabetes Comorbidity  Goal: Blood Glucose Level Within Targeted Range  Outcome: Progressing     Problem: Pneumonia  Goal: Fluid Balance  Outcome: Progressing  Goal: Resolution of Infection Signs and Symptoms  Outcome: Progressing  Goal: Effective Oxygenation and Ventilation  Outcome: Progressing     Problem: Infection  Goal: Absence of Infection Signs and Symptoms  Outcome: Progressing     Problem: Fall Injury Risk  Goal: Absence of Fall and Fall-Related Injury  Outcome: Progressing     Problem: Enteral Nutrition  Goal: Feeding Tolerance  Outcome: Progressing     Problem: Wound  Goal: Optimal Coping  Outcome: Progressing  Goal: Optimal Functional Ability  Outcome: Progressing  Goal: Absence of Infection Signs and Symptoms  Outcome: Progressing  Goal: Improved Oral Intake  Outcome: Progressing  Goal: Optimal Pain Control and Function  Outcome: Progressing  Goal: Skin Health and Integrity  Outcome: Progressing  Goal: Optimal Wound Healing  Outcome: Progressing     Problem: Bowel Elimination Management  Goal: Effective Bowel Elimination/Continence  Outcome: Progressing     Problem: Oral Intake Inadequate  Goal: Improved Oral Intake  Outcome: Progressing

## 2024-09-15 NOTE — ASSESSMENT & PLAN NOTE
She underwent decompression by colonoscope.  The ileus is improving, based on physical exam and abdominal X-rays.  No need for cholinergic therapy.

## 2024-09-15 NOTE — PROGRESS NOTES
[facility]  Progress Note    Patient Name: Karo Leonard  MRN: [id number]  Patient Class: IP- Inpatient   Admission Date: 8/25/2024  Length of Stay: 21 days  Attending Physician: Michael Multani MD  Primary Care Provider: Navneet Hernandez FNP        Subjective:     Principal Problem:Acute hypoxic on chronic hypercapnic respiratory failure        HPI:  64F p/w fall in the context of shortness of breath. EMS reportedly gave narcan w/o appreciable response, then she was given duoneb en route to [facility] ED. Upon arrival, she was tachypneic, had respy sounding phonation, but she denied chest pain, fever, or chills. She was placed on BiPAP, and initially demonstrated improvement. However, she became less responsive and appeared to tire out. She was given additional narcan w/o appreciable response, so she was intubated. She had some hypotension after receiving sedation, so she was placed on low dose levophed. Pick[place] was placed, lasix was given, as was Abx. Large bruise noted on LLE, so x-ray ordered.     Overview/Hospital Course:  64 F Patient with hx of COPD, morbid obesity was admitted for acute on chronic hypoxemic hypercapnic respiratory failure with sever fall with hit to the face . Patient was intubated after failing  BiPAP. Finished course of IV Levofloxacin for COPD exacerbation and possible pneumonia. Pulm has been managing her steroid dose. Patient was started on iron and B12 supplement for anemia. S/P exubation on 8/31. For her hypertension, patient was started on losartan and her home diltiazem was resumed.  Patient working with PT and OT.  Fall precautions have been addressed with the patient.  Patient moving to medicine telemetry.  Supplemental oxygen weaning is in process.  Patient encouraged to continue BiPAP use at night or as needed as before. Steroid dose is being tapered down. Pulmonology followed the patient.    During her hospital course, patient complained of  bloated and also passing a lot of gas and Gas x was given but later complained of mild abdominal pain and KUB was done and found to have Gas in stool distended loops of large bowel measuring up to 6.5 cm.  Findings could reflect ileus or obstruction.  CT scan done demonstrated significant colonic distention consistent with ileus with a large amount of stool in ascending colon, colon measuring up to 11 cm.  General surgery evaluated the patient, NG tube was placed, patient continued on MiraLax and enemas, CT scan was repeated on 09/08/2024 which still shows significant constipation.  Lactulose was added and she had loose watery bowel movements, following which she was started on clears, advanced to full liquids, but her KUB was unchanged and hence we consulted gastroenterology who recommended neostigmine while monitoring in ICU given the risk of bradycardia and her tenuous respiratory status.    Lower endoscopy report:  64 F with colonic ileus and ischemic changes of                          the suspected proximal ascending colon and cecum.                          Unable to intubate the cecum due to body habitus,                          poor visualization. Air was suctioned out and                          procedure aborted. Significant improvement in                          distension of abdomen post endoscopy. Will need                          serial abdominal exams and daily KUB. If running                          fever, rising wbc I would have concern for                          gangrenous right colon but as of now the mucosa                          appears to be viable on limited examination.                          Clears okay today.                          Recommend that surgery continue to follow along                          Optimize all electrolytes (K/Mg/Phos)                          - Preparation of the colon was poor.                          - Dilated in the transverse colon, at the hepatic                           flexure and in the ascending colon.                          - Mucosal ulceration.                          - No specimens collected.     The plan to use neostigmine was canceled.  Patient began to have many bowel movements, and the gas pattern on X-ray began to appear less remarkable.  She was therefore moved out of ICU and back to remote telemetry.    Interval History:  Less abdominal distension.  Improving gas pattern on this morning's X-ray.  No vomiting.  Vital signs stable.  Has no new complaints.  Can move out of ICU and back to floor.    Review of Systems   Respiratory:  Negative for shortness of breath.    Cardiovascular:  Negative for chest pain.     Objective:     Vital Signs (Most Recent):  Temp: 98.4 °F (36.9 °C) (09/15/24 1501)  Pulse: 84 (09/15/24 1637)  Resp: 18 (09/15/24 1637)  BP: (!) 145/79 (09/15/24 1637)  SpO2: (!) 94 % (09/15/24 1600) Vital Signs (24h Range):  Temp:  [98 °F (36.7 °C)-98.6 °F (37 °C)] 98.4 °F (36.9 °C)  Pulse:  [64-92] 84  Resp:  [10-30] 18  SpO2:  [86 %-100 %] 94 %  BP: ()/(44-79) 145/79     Weight: 124.2 kg (273 lb 13 oz)  Body mass index is 45.56 kg/m².    Intake/Output Summary (Last 24 hours) at 9/15/2024 1759  Last data filed at 9/15/2024 1651  Gross per 24 hour   Intake 660 ml   Output 1668 ml   Net -1008 ml         Physical Exam  Constitutional:       General: She is not in acute distress.     Appearance: She is not ill-appearing.      Comments: Looks uncomfortable   Eyes:      General:         Right eye: No discharge.         Left eye: No discharge.   Neck:      Vascular: No JVD.   Cardiovascular:      Rate and Rhythm: Normal rate and regular rhythm.   Pulmonary:      Effort: Pulmonary effort is normal.      Breath sounds: Normal breath sounds.   Abdominal:      General: Abdomen is flat. Bowel sounds are normal. There is distension (less so today).      Palpations: Abdomen is soft.      Tenderness: There is no abdominal tenderness (lower  abdomen).   Musculoskeletal:      Right lower leg: Edema present.      Left lower leg: Edema present.   Skin:     General: Skin is warm and moist.      Findings: No rash.   Neurological:      Mental Status: She is alert and oriented to person, place, and time.   Psychiatric:         Attention and Perception: Attention normal.         Mood and Affect: Mood and affect normal.         Speech: Speech normal.             Significant Labs: All pertinent labs within the past 24 hours have been reviewed.    Significant Imaging: I have reviewed all pertinent imaging results/findings within the past 24 hours.    Assessment/Plan:      * Acute hypoxic on chronic hypercapnic respiratory failure  Appear secondary to COPD exacerbation    Improving  Likely multifactorial  S/P extubation on 8/31  Oxygen supplement as need, patient already on home oxygen 2 to 3 L  Pulmonology is following patient  Encouraged incentive spirometry given her continuous lying in bed and positioning, she demonstrates understanding and is motivated     Hypokalemia  Patient's most recent potassium results are listed below.   Recent Labs     09/14/24  0414 09/15/24  0639 09/15/24  1416   K 3.3* 3.0* 3.9       Plan  - Repleted potassium - Monitor potassium Daily  - Patient's hypokalemia is improving      Ileus  She underwent decompression by colonoscope.  The ileus is improving, based on physical exam and abdominal X-rays.  No need for cholinergic therapy.         Type 2 diabetes mellitus  Patient's FSGs are low on current medication regimen.  Last A1c reviewed-   Lab Results   Component Value Date    HGBA1C 9.5 (H) 08/26/2024     Most recent fingerstick glucose reviewed-   Recent Labs   Lab 09/15/24  0644 09/15/24  0716 09/15/24  0936 09/15/24  1127   POCTGLUCOSE 28* 60* 145* 179*       Change insulin sliding scale to low dose.  Reduce Lantus to 10 units daily.    Antihyperglycemics (From admission, onward)      Start     Stop Route Frequency Ordered     09/16/24 0900  insulin glargine U-100 (Lantus) pen 10 Units         -- SubQ Daily 09/15/24 1103    09/15/24 1206  insulin aspart U-100 pen 0-5 Units         -- SubQ Before meals & nightly PRN 09/15/24 1106          Hold Oral hypoglycemics while patient is in the hospital.      Anemia due to vitamin B12 deficiency  B12 level low.  Getting supplementation.  Monitor hemoglobin level daily.  Hemoglobin level is unstable.    Lab Results   Component Value Date    HGB 7.4 (L) 09/15/2024         Fall  Imaging ruled out acute fracture but severe swelling noted.  No visual disturbance  US confirmed hematoma on left lower extremity which is getting smaller  Fall precautions discussed with the patient.    PT OT and SNF placement /LTAC placement    Paroxysmal atrial fibrillation  Patient with Paroxysmal (<7 days) atrial fibrillation which is controlled currently with Calcium Channel Blocker. Patient is currently in atrial fibrillation.[id number] Score: 4.  Anticoagulation indicated. Anticoagulation done with apixaban .    COPD exacerbation  Completed long prednisone taper.  Pulmonology following.  Continue ALLYSSA/DUC scheduled treatments.    Hypertension  Continue losartan 100 daily and Cardizem 240 daily.  Can make adjustment as needed based on blood pressure response.    Hypothyroid  Currently on increased dose of levothyroxine  TSH today elevated at 45, Free T4 low at 0.47        VTE Risk Mitigation (From admission, onward)           Ordered     apixaban tablet 5 mg  2 times daily         09/15/24 1107     IP VTE HIGH RISK PATIENT  Once         08/25/24 0627     Place sequential compression device  Until discontinued         08/25/24 0627                    Discharge Planning   CHELE: 9/16/2024     Code Status: Full Code   Is the patient medically ready for discharge?:     Reason for patient still in hospital (select all that apply): Patient trending condition and Treatment  Discharge Plan A: Long-term acute care facility (LTAC)    Discharge Delays: None known at this time              Michael Multani MD  Department of Hospital Medicine   [facility]

## 2024-09-16 LAB
POCT GLUCOSE: 136 MG/DL (ref 70–110)
POCT GLUCOSE: 159 MG/DL (ref 70–110)
POCT GLUCOSE: 189 MG/DL (ref 70–110)
POCT GLUCOSE: 289 MG/DL (ref 70–110)

## 2024-09-16 PROCEDURE — 25000003 PHARM REV CODE 250: Performed by: INTERNAL MEDICINE

## 2024-09-16 PROCEDURE — 63600175 PHARM REV CODE 636 W HCPCS: Performed by: SURGERY

## 2024-09-16 PROCEDURE — 12000002 HC ACUTE/MED SURGE SEMI-PRIVATE ROOM

## 2024-09-16 PROCEDURE — 97530 THERAPEUTIC ACTIVITIES: CPT

## 2024-09-16 PROCEDURE — 94640 AIRWAY INHALATION TREATMENT: CPT

## 2024-09-16 PROCEDURE — 25000003 PHARM REV CODE 250

## 2024-09-16 PROCEDURE — 97535 SELF CARE MNGMENT TRAINING: CPT

## 2024-09-16 PROCEDURE — 99900031 HC PATIENT EDUCATION (STAT)

## 2024-09-16 PROCEDURE — 94799 UNLISTED PULMONARY SVC/PX: CPT | Mod: XB

## 2024-09-16 PROCEDURE — 99232 SBSQ HOSP IP/OBS MODERATE 35: CPT | Mod: ,,, | Performed by: INTERNAL MEDICINE

## 2024-09-16 PROCEDURE — 99900035 HC TECH TIME PER 15 MIN (STAT)

## 2024-09-16 PROCEDURE — 27000221 HC OXYGEN, UP TO 24 HOURS

## 2024-09-16 PROCEDURE — 63600175 PHARM REV CODE 636 W HCPCS: Performed by: HOSPITALIST

## 2024-09-16 PROCEDURE — 94761 N-INVAS EAR/PLS OXIMETRY MLT: CPT

## 2024-09-16 PROCEDURE — 94799 UNLISTED PULMONARY SVC/PX: CPT

## 2024-09-16 PROCEDURE — 25000242 PHARM REV CODE 250 ALT 637 W/ HCPCS: Performed by: INTERNAL MEDICINE

## 2024-09-16 PROCEDURE — 25000003 PHARM REV CODE 250: Performed by: HOSPITALIST

## 2024-09-16 RX ORDER — IPRATROPIUM BROMIDE AND ALBUTEROL SULFATE 2.5; .5 MG/3ML; MG/3ML
3 SOLUTION RESPIRATORY (INHALATION) EVERY 6 HOURS PRN
Status: DISCONTINUED | OUTPATIENT
Start: 2024-09-16 | End: 2024-09-17 | Stop reason: HOSPADM

## 2024-09-16 RX ORDER — DIAZEPAM 5 MG/1
5 TABLET ORAL ONCE
Status: COMPLETED | OUTPATIENT
Start: 2024-09-16 | End: 2024-09-16

## 2024-09-16 RX ADMIN — METOCLOPRAMIDE HYDROCHLORIDE 10 MG: 5 INJECTION INTRAMUSCULAR; INTRAVENOUS at 12:09

## 2024-09-16 RX ADMIN — FAMOTIDINE 20 MG: 10 INJECTION INTRAVENOUS at 09:09

## 2024-09-16 RX ADMIN — HYDROCODONE BITARTRATE AND ACETAMINOPHEN 1 TABLET: 5; 325 TABLET ORAL at 06:09

## 2024-09-16 RX ADMIN — APIXABAN 5 MG: 5 TABLET, FILM COATED ORAL at 09:09

## 2024-09-16 RX ADMIN — DILTIAZEM HYDROCHLORIDE 240 MG: 120 CAPSULE, COATED, EXTENDED RELEASE ORAL at 09:09

## 2024-09-16 RX ADMIN — INSULIN ASPART 1 UNITS: 100 INJECTION, SOLUTION INTRAVENOUS; SUBCUTANEOUS at 09:09

## 2024-09-16 RX ADMIN — DIAZEPAM 5 MG: 5 TABLET ORAL at 10:09

## 2024-09-16 RX ADMIN — MECLIZINE HYDROCHLORIDE 25 MG: 12.5 TABLET ORAL at 12:09

## 2024-09-16 RX ADMIN — IPRATROPIUM BROMIDE AND ALBUTEROL SULFATE 3 ML: 2.5; .5 SOLUTION RESPIRATORY (INHALATION) at 07:09

## 2024-09-16 RX ADMIN — SENNOSIDES AND DOCUSATE SODIUM 1 TABLET: 8.6; 5 TABLET ORAL at 09:09

## 2024-09-16 RX ADMIN — LOSARTAN POTASSIUM 100 MG: 50 TABLET, FILM COATED ORAL at 09:09

## 2024-09-16 RX ADMIN — GABAPENTIN 400 MG: 300 CAPSULE ORAL at 09:09

## 2024-09-16 RX ADMIN — CYANOCOBALAMIN TAB 1000 MCG 1000 MCG: 1000 TAB at 09:09

## 2024-09-16 RX ADMIN — LEVOTHYROXINE SODIUM 100 MCG: 0.1 TABLET ORAL at 05:09

## 2024-09-16 RX ADMIN — IPRATROPIUM BROMIDE AND ALBUTEROL SULFATE 3 ML: 2.5; .5 SOLUTION RESPIRATORY (INHALATION) at 12:09

## 2024-09-16 RX ADMIN — LIDOCAINE 3 PATCH: 50 PATCH TOPICAL at 12:09

## 2024-09-16 RX ADMIN — METOCLOPRAMIDE HYDROCHLORIDE 10 MG: 5 INJECTION INTRAMUSCULAR; INTRAVENOUS at 11:09

## 2024-09-16 RX ADMIN — KETOROLAC TROMETHAMINE 15 MG: 30 INJECTION, SOLUTION INTRAMUSCULAR at 07:09

## 2024-09-16 RX ADMIN — METOCLOPRAMIDE HYDROCHLORIDE 10 MG: 5 INJECTION INTRAMUSCULAR; INTRAVENOUS at 06:09

## 2024-09-16 RX ADMIN — ATORVASTATIN CALCIUM 80 MG: 40 TABLET, FILM COATED ORAL at 09:09

## 2024-09-16 RX ADMIN — INSULIN GLARGINE 10 UNITS: 100 INJECTION, SOLUTION SUBCUTANEOUS at 09:09

## 2024-09-16 RX ADMIN — METOCLOPRAMIDE HYDROCHLORIDE 10 MG: 5 INJECTION INTRAMUSCULAR; INTRAVENOUS at 05:09

## 2024-09-16 NOTE — ASSESSMENT & PLAN NOTE
Patient with Paroxysmal (<7 days) atrial fibrillation which is controlled currently with Calcium Channel Blocker. Patient is currently in atrial fibrillation.[id number] Score: 4.  Anticoagulation indicated. Anticoagulation done with apixaban .

## 2024-09-16 NOTE — CARE UPDATE
09/15/24 2012   Patient Assessment/Suction   Level of Consciousness (AVPU) alert   Respiratory Effort Normal;Unlabored   Expansion/Accessory Muscles/Retractions no use of accessory muscles   All Lung Fields Breath Sounds diminished   Rhythm/Pattern, Respiratory unlabored;pattern regular   PRE-TX-O2   Device (Oxygen Therapy) Oxymask   Flow (L/min) (Oxygen Therapy) 3   SpO2 (!) 92 %   Pulse Oximetry Type Intermittent   $ Pulse Oximetry - Multiple Charge Pulse Oximetry - Multiple   Pulse 88   Resp (!) 22   Aerosol Therapy   $ Aerosol Therapy Charges Refused  (pt stated she is too nauseous for her tx at this time)   Preset CPAP/BiPAP Settings   Mode Of Delivery BiPAP S/T;Standby   Education   $ Education Bronchodilator;Oxygen;15 min

## 2024-09-16 NOTE — CARE UPDATE
09/16/24 0724   Patient Assessment/Suction   Level of Consciousness (AVPU) alert   Respiratory Effort Normal;Unlabored   Expansion/Accessory Muscles/Retractions intercostal retractions;no retractions;expansion symmetric;no use of accessory muscles   All Lung Fields Breath Sounds diminished;clear   Rhythm/Pattern, Respiratory Cheyne-Khan;unlabored;pattern regular;depth regular;no shortness of breath reported   Cough Frequency no cough   PRE-TX-O2   Device (Oxygen Therapy) Oxymask   $ Is the patient on Low Flow Oxygen? Yes   Flow (L/min) (Oxygen Therapy) 4   SpO2 (!) 93 %   Pulse Oximetry Type Intermittent   $ Pulse Oximetry - Multiple Charge Pulse Oximetry - Multiple   Pulse 75   Resp 18   Aerosol Therapy   $ Aerosol Therapy Charges Aerosol Treatment   Daily Review of Necessity (SVN) completed   Respiratory Treatment Status (SVN) given   Treatment Route (SVN) mask;oxygen   Patient Position HOB elevated   Post Treatment Assessment (SVN) breath sounds unchanged   Signs of Intolerance (SVN) none   Breath Sounds Post-Respiratory Treatment   Throughout All Fields Post-Treatment All Fields   Throughout All Fields Post-Treatment grunting, expiratory;no change   Post-treatment Heart Rate (beats/min) 76   Post-treatment Resp Rate (breaths/min) 17   Incentive Spirometer   $ Incentive Spirometer Charges done with encouragement   Incentive Spirometer Predicted Level (mL) 1500   Administration (IS) proper technique demonstrated   Number of Repetitions (IS) 10   Level Incentive Spirometer (mL) 750   Patient Tolerance (IS) good   Education   $ Education BiPAP;Oxygen;Bronchodilator;Suction;15 min

## 2024-09-16 NOTE — SUBJECTIVE & OBJECTIVE
Interval History:  Patient with no acute problems.  Transferred out of ICU last evening.  Having smoked urine in urine bag.  Currently on 4 liter/minute supplemental oxygen via nasal cannula.  Awaiting LTAC placement.  Patient's  is present at bedside.  Tolerating diet.  Reporting normal bowel movement.  No abdominal pain present.      Review of Systems   Respiratory:  Negative for shortness of breath.    Cardiovascular:  Negative for chest pain.     Objective:     Vital Signs (Most Recent):  Temp: 98.1 °F (36.7 °C) (09/16/24 0743)  Pulse: 83 (09/16/24 0743)  Resp: 18 (09/16/24 0743)  BP: (!) 146/73 (09/16/24 0743)  SpO2: (!) 94 % (09/16/24 0743) Vital Signs (24h Range):  Temp:  [98 °F (36.7 °C)-98.8 °F (37.1 °C)] 98.1 °F (36.7 °C)  Pulse:  [69-92] 83  Resp:  [13-30] 18  SpO2:  [86 %-98 %] 94 %  BP: (139-153)/(62-79) 146/73     Weight: 124.2 kg (273 lb 13 oz)  Body mass index is 45.56 kg/m².    Intake/Output Summary (Last 24 hours) at 9/16/2024 0945  Last data filed at 9/16/2024 0910  Gross per 24 hour   Intake 1620 ml   Output 1330 ml   Net 290 ml         Physical Exam  Constitutional:       General: She is not in acute distress.     Appearance: She is not ill-appearing.      Comments: Looks uncomfortable   Eyes:      General:         Right eye: No discharge.         Left eye: No discharge.   Neck:      Vascular: No JVD.   Cardiovascular:      Rate and Rhythm: Normal rate and regular rhythm.   Pulmonary:      Effort: Pulmonary effort is normal.      Breath sounds: Normal breath sounds.   Abdominal:      General: Abdomen is flat. Bowel sounds are normal. There is distension (less so today).      Palpations: Abdomen is soft.      Tenderness: There is no abdominal tenderness (lower abdomen).   Musculoskeletal:      Right lower leg: Edema present.      Left lower leg: Edema present.   Skin:     General: Skin is warm and moist.      Findings: No rash.   Neurological:      Mental Status: She is alert and oriented  to person, place, and time.   Psychiatric:         Attention and Perception: Attention normal.         Mood and Affect: Mood and affect normal.         Speech: Speech normal.             Significant Labs:  Lab Results   Component Value Date    WBC 5.21 09/15/2024    HGB 7.4 (L) 09/15/2024    HCT 25.5 (L) 09/15/2024     (H) 09/15/2024     09/15/2024       CMP  Sodium   Date Value Ref Range Status   09/15/2024 145 136 - 145 mmol/L Final     Potassium   Date Value Ref Range Status   09/15/2024 3.9 3.5 - 5.1 mmol/L Final     Chloride   Date Value Ref Range Status   09/15/2024 100 95 - 110 mmol/L Final     CO2   Date Value Ref Range Status   09/15/2024 43 (HH) 23 - 29 mmol/L Final     Comment:     Critical result CO2 43 mmol/L called to and read back by Yi Penn  RN/si [facility]2024 07[facility]  Result Rechecked       Glucose   Date Value Ref Range Status   09/15/2024 34 (LL) 70 - 110 mg/dL Final     Comment:     Critical result GLU 34 mg/dL called to and read back by Yi Penn  RN/si [facility] 15[facility]Sep[facility]2024 07:[facility]  Result Rechecked       BUN   Date Value Ref Range Status   09/15/2024 5 (L) 8 - 23 mg/dL Final     Creatinine   Date Value Ref Range Status   09/15/2024 0.4 (L) 0.5 - 1.4 mg/dL Final     Calcium   Date Value Ref Range Status   09/15/2024 7.8 (L) 8.7 - 10.5 mg/dL Final     Total Protein   Date Value Ref Range Status   09/12/2024 5.2 (L) 6.0 - 8.4 g/dL Final     Albumin   Date Value Ref Range Status   09/12/2024 2.7 (L) 3.5 - 5.2 g/dL Final     Total Bilirubin   Date Value Ref Range Status   09/12/2024 0.4 0.1 - 1.0 mg/dL Final     Comment:     For infants and newborns, interpretation of results should be based  on gestational age, weight and in agreement with clinical  observations.    Premature Infant recommended reference ranges:  Up to 24 hours.............<8.0 mg/dL  Up to 48 hours............<12.0 mg/dL  3-5 days..................<15.0 mg/dL  6-29  days.................<15.0 mg/dL       Alkaline Phosphatase   Date Value Ref Range Status   09/12/2024 134 55 - 135 U/L Final     AST   Date Value Ref Range Status   09/12/2024 29 10 - 40 U/L Final     ALT   Date Value Ref Range Status   09/12/2024 45 (H) 10 - 44 U/L Final     Anion Gap   Date Value Ref Range Status   09/15/2024 2 (L) 8 - 16 mmol/L Final     eGFR   Date Value Ref Range Status   09/15/2024 >60.0 >60 mL/min/1.73 m^2 Final     Microbiology Results (last 7 days)       ** No results found for the last 168 hours. **          Significant Imaging:   ECHO (8/30/24):    Left Ventricle: The left ventricle is normal in size. Mildly increased wall thickness. There is mild concentric hypertrophy. Normal wall motion. There is normal systolic function with a visually estimated ejection fraction of 60 - 65%. There is normal diastolic function.    Right Ventricle: Normal right ventricular cavity size. Wall thickness is normal. Systolic function is normal.    IVC/SVC: Normal venous pressure at 3 mmHg.    Colonoscopy:   64 F with colonic ileus and ischemic changes of                          the suspected proximal ascending colon and cecum.                          Unable to intubate the cecum due to body habitus,                          poor visualization. Air was suctioned out and                          procedure aborted. Significant improvement in                          distension of abdomen post endoscopy. Will need                          serial abdominal exams and daily KUB. If running                          fever, rising wbc I would have concern for                          gangrenous right colon but as of now the mucosa                          appears to be viable on limited examination.                          Clears okay today.                          Recommend that surgery continue to follow along                          Optimize all electrolytes (K/Mg/Phos)                          - Preparation  of the colon was poor.                          - Dilated in the transverse colon, at the hepatic                          flexure and in the ascending colon.                          - Mucosal ulceration.                          - No specimens collected.     CXR:   Interval intubation with endotracheal tube tip appearing to project at the level of the clavicular heads. Enteric tube courses below the diaphragm, extending beyond the field of view. No interval detrimental change in lung aeration or evidence of new or enlarging pneumothorax relative to examination referenced above.     X-ray left tibia/fibula:  No acute radiographic abnormalities.     CT Head: Normal CT appearance of the brain and calvarium.     CT Chest without contrast:  1. Alveolar infiltrates in the left upper and lower lobes compatible with pneumonia.  Scattered small foci of atelectasis or infiltrates on the right.  Small bilateral pleural effusions.  2. Stable cardiomegaly.  Small to moderate-sized pericardial effusion is increased compared to June 2024.  3. Support devices in place as above.  Additional stable findings as noted.    CXR: No significant interval change.     US Left leg:  Complex mass in the left pretibial subcutaneous fat, presumably reflecting hematoma given clinical history.     CT abdomen and pelvis without contrast:  Gaseous distension of the ascending colon.  The cecum measures 11.4 cm.  There is no small bowel obstruction  Prior cholecystectomy  Tiny pleural effusions and bibasilar atelectasis  Small pericardial effusion  Dependent anasarca    CT abdomen and pelvis without contrast:  1.  Dilated large bowel loops mild colonic ileus with a large volume of retained fecal load within the ascending colon.  2.  Normal size liver with a small volume of surrounding perihepatic fluid.  3.  Pericardial effusion/thickening appears equal in capacity as compared to previous.  4.  Diffuse inflammatory stranding of the subcutaneous  tissues along the lateral chest wall secondary to anasarca with fairly extensive inflammatory thickening and stranding involving the right flank region similar to that from previous.    CT abdomen and pelvis without contrast:  Gaseous distention of the portions of the colon with some liquid stool.  Small-bowel obstruction is not demonstrated.  Left abdomen and pelvic subcutaneous anasarca.  Prior cholecystectomy.  Dense consolidated infiltrate of the right lower lobe consistent with pneumonia with small right pleural effusion and mild atelectasis and trace left pleural effusion.    KUB:  Nonspecific, nonobstructive bowel gas pattern with scattered stool and gas in the large and small bowel.

## 2024-09-16 NOTE — ASSESSMENT & PLAN NOTE
B12 level low.  Getting supplementation.  Monitor hemoglobin level daily.  Hemoglobin level is unstable.    Lab Results   Component Value Date    HGB 7.4 (L) 09/15/2024

## 2024-09-16 NOTE — PROGRESS NOTES
[facility] Medicine  Progress Note    Patient Name: Karo Leonard  MRN: [id number]  Patient Class: IP- Inpatient   Admission Date: 8/25/2024  Length of Stay: 22 days  Attending Physician: Terrance Garcia MD  Primary Care Provider: Navneet Hernandez FNP        Subjective:     Principal Problem:Acute hypoxic on chronic hypercapnic respiratory failure        HPI:  64F p/w fall in the context of shortness of breath. EMS reportedly gave narcan w/o appreciable response, then she was given duoneb en route to [facility] ED. Upon arrival, she was tachypneic, had respy sounding phonation, but she denied chest pain, fever, or chills. She was placed on BiPAP, and initially demonstrated improvement. However, she became less responsive and appeared to tire out. She was given additional narcan w/o appreciable response, so she was intubated. She had some hypotension after receiving sedation, so she was placed on low dose levophed. Pickens was placed, lasix was given, as was Abx. Large bruise noted on LLE, so x-ray ordered.     Overview/Hospital Course:  64 F Patient with hx of COPD, morbid obesity was admitted for acute on chronic hypoxemic hypercapnic respiratory failure with sever fall with hit to the face . Patient was intubated after failing  BiPAP. Finished course of IV Levofloxacin for COPD exacerbation and possible pneumonia. Pulm has been managing her steroid dose. Patient was started on iron and B12 supplement for anemia. S/P exubation on 8/31. For her hypertension, patient was started on losartan and her home diltiazem was resumed.  Patient working with PT and OT.  Fall precautions have been addressed with the patient.  Patient moving to medicine telemetry.  Supplemental oxygen weaning is in process.  Patient encouraged to continue BiPAP use at night or as needed as before. Steroid dose is being tapered down. Pulmonology followed the patient.    During her hospital course, patient complained of bloated  and also passing a lot of gas and Gas x was given but later complained of mild abdominal pain and KUB was done and found to have Gas in stool distended loops of large bowel measuring up to 6.5 cm.  Findings could reflect ileus or obstruction.  CT scan done demonstrated significant colonic distention consistent with ileus with a large amount of stool in ascending colon, colon measuring up to 11 cm.  General surgery evaluated the patient, NG tube was placed, patient continued on MiraLax and enemas, CT scan was repeated on 09/08/2024 which still shows significant constipation.  Lactulose was added and she had loose watery bowel movements, following which she was started on clears, advanced to full liquids, but her KUB was unchanged and hence we consulted gastroenterology who recommended neostigmine while monitoring in ICU given the risk of bradycardia and her tenuous respiratory status.    Lower endoscopy report:  64 F with colonic ileus and ischemic changes of                          the suspected proximal ascending colon and cecum.                          Unable to intubate the cecum due to body habitus,                          poor visualization. Air was suctioned out and                          procedure aborted. Significant improvement in                          distension of abdomen post endoscopy. Will need                          serial abdominal exams and daily KUB. If running                          fever, rising wbc I would have concern for                          gangrenous right colon but as of now the mucosa                          appears to be viable on limited examination.                          Clears okay today.                          Recommend that surgery continue to follow along                          Optimize all electrolytes (K/Mg/Phos)                          - Preparation of the colon was poor.                          - Dilated in the transverse colon, at the hepatic                           flexure and in the ascending colon.                          - Mucosal ulceration.                          - No specimens collected.     The plan to use neostigmine was canceled.  Patient began to have many bowel movements, and the gas pattern on X-ray began to appear less remarkable.  She was therefore moved out of ICU and back to remote telemetry.    Interval History:  Patient with no acute problems.  Transferred out of ICU last evening.  Having smoked urine in urine bag.  Currently on 4 liter/minute supplemental oxygen via nasal cannula.  Awaiting LTAC placement.  Patient's  is present at bedside.  Tolerating diet.  Reporting normal bowel movement.  No abdominal pain present.      Review of Systems   Respiratory:  Negative for shortness of breath.    Cardiovascular:  Negative for chest pain.     Objective:     Vital Signs (Most Recent):  Temp: 98.1 °F (36.7 °C) (09/16/24 0743)  Pulse: 83 (09/16/24 0743)  Resp: 18 (09/16/24 0743)  BP: (!) 146/73 (09/16/24 0743)  SpO2: (!) 94 % (09/16/24 0743) Vital Signs (24h Range):  Temp:  [98 °F (36.7 °C)-98.8 °F (37.1 °C)] 98.1 °F (36.7 °C)  Pulse:  [69-92] 83  Resp:  [13-30] 18  SpO2:  [86 %-98 %] 94 %  BP: (139-153)/(62-79) 146/73     Weight: 124.2 kg (273 lb 13 oz)  Body mass index is 45.56 kg/m².    Intake/Output Summary (Last 24 hours) at 9/16/2024 0945  Last data filed at 9/16/2024 0910  Gross per 24 hour   Intake 1620 ml   Output 1330 ml   Net 290 ml         Physical Exam  Constitutional:       General: She is not in acute distress.     Appearance: She is not ill-appearing.      Comments: Looks uncomfortable   Eyes:      General:         Right eye: No discharge.         Left eye: No discharge.   Neck:      Vascular: No JVD.   Cardiovascular:      Rate and Rhythm: Normal rate and regular rhythm.   Pulmonary:      Effort: Pulmonary effort is normal.      Breath sounds: Normal breath sounds.   Abdominal:      General: Abdomen is flat. Bowel sounds  are normal. There is distension (less so today).      Palpations: Abdomen is soft.      Tenderness: There is no abdominal tenderness (lower abdomen).   Musculoskeletal:      Right lower leg: Edema present.      Left lower leg: Edema present.   Skin:     General: Skin is warm and moist.      Findings: No rash.   Neurological:      Mental Status: She is alert and oriented to person, place, and time.   Psychiatric:         Attention and Perception: Attention normal.         Mood and Affect: Mood and affect normal.         Speech: Speech normal.             Significant Labs:  Lab Results   Component Value Date    WBC 5.21 09/15/2024    HGB 7.4 (L) 09/15/2024    HCT 25.5 (L) 09/15/2024     (H) 09/15/2024     09/15/2024       CMP  Sodium   Date Value Ref Range Status   09/15/2024 145 136 - 145 mmol/L Final     Potassium   Date Value Ref Range Status   09/15/2024 3.9 3.5 - 5.1 mmol/L Final     Chloride   Date Value Ref Range Status   09/15/2024 100 95 - 110 mmol/L Final     CO2   Date Value Ref Range Status   09/15/2024 43 (HH) 23 - 29 mmol/L Final     Comment:     Critical result CO2 43 mmol/L called to and read back by Yi Penn  RN/si at 15-Sep-2024 07:56 [facility].  Result Rechecked       Glucose   Date Value Ref Range Status   09/15/2024 34 (LL) 70 - 110 mg/dL Final     Comment:     Critical result GLU 34 mg/dL called to and read back by Yi Penn  RN/si at 15-Sep-2024 07:56 by [facility].  Result Rechecked       BUN   Date Value Ref Range Status   09/15/2024 5 (L) 8 - 23 mg/dL Final     Creatinine   Date Value Ref Range Status   09/15/2024 0.4 (L) 0.5 - 1.4 mg/dL Final     Calcium   Date Value Ref Range Status   09/15/2024 7.8 (L) 8.7 - 10.5 mg/dL Final     Total Protein   Date Value Ref Range Status   09/12/2024 5.2 (L) 6.0 - 8.4 g/dL Final     Albumin   Date Value Ref Range Status   09/12/2024 2.7 (L) 3.5 - 5.2 g/dL Final     Total Bilirubin   Date Value Ref Range Status    09/12/2024 0.4 0.1 - 1.0 mg/dL Final     Comment:     For infants and newborns, interpretation of results should be based  on gestational age, weight and in agreement with clinical  observations.    Premature Infant recommended reference ranges:  Up to 24 hours.............<8.0 mg/dL  Up to 48 hours............<12.0 mg/dL  3-5 days..................<15.0 mg/dL  6-29 days.................<15.0 mg/dL       Alkaline Phosphatase   Date Value Ref Range Status   09/12/2024 134 55 - 135 U/L Final     AST   Date Value Ref Range Status   09/12/2024 29 10 - 40 U/L Final     ALT   Date Value Ref Range Status   09/12/2024 45 (H) 10 - 44 U/L Final     Anion Gap   Date Value Ref Range Status   09/15/2024 2 (L) 8 - 16 mmol/L Final     eGFR   Date Value Ref Range Status   09/15/2024 >60.0 >60 mL/min/1.73 m^2 Final     Microbiology Results (last 7 days)       ** No results found for the last 168 hours. **          Significant Imaging:   ECHO (8/30/24):    Left Ventricle: The left ventricle is normal in size. Mildly increased wall thickness. There is mild concentric hypertrophy. Normal wall motion. There is normal systolic function with a visually estimated ejection fraction of 60 - 65%. There is normal diastolic function.    Right Ventricle: Normal right ventricular cavity size. Wall thickness is normal. Systolic function is normal.    IVC/SVC: Normal venous pressure at 3 mmHg.    Colonoscopy:   64 F with colonic ileus and ischemic changes of                          the suspected proximal ascending colon and cecum.                          Unable to intubate the cecum due to body habitus,                          poor visualization. Air was suctioned out and                          procedure aborted. Significant improvement in                          distension of abdomen post endoscopy. Will need                          serial abdominal exams and daily KUB. If running                          fever, rising wbc I would have  concern for                          gangrenous right colon but as of now the mucosa                          appears to be viable on limited examination.                          Clears okay today.                          Recommend that surgery continue to follow along                          Optimize all electrolytes (K/Mg/Phos)                          - Preparation of the colon was poor.                          - Dilated in the transverse colon, at the hepatic                          flexure and in the ascending colon.                          - Mucosal ulceration.                          - No specimens collected.     CXR:   Interval intubation with endotracheal tube tip appearing to project at the level of the clavicular heads. Enteric tube courses below the diaphragm, extending beyond the field of view. No interval detrimental change in lung aeration or evidence of new or enlarging pneumothorax relative to examination referenced above.     X-ray left tibia/fibula:  No acute radiographic abnormalities.     CT Head: Normal CT appearance of the brain and calvarium.     CT Chest without contrast:  1. Alveolar infiltrates in the left upper and lower lobes compatible with pneumonia.  Scattered small foci of atelectasis or infiltrates on the right.  Small bilateral pleural effusions.  2. Stable cardiomegaly.  Small to moderate-sized pericardial effusion is increased compared to June 2024.  3. Support devices in place as above.  Additional stable findings as noted.    CXR: No significant interval change.     US Left leg:  Complex mass in the left pretibial subcutaneous fat, presumably reflecting hematoma given clinical history.     CT abdomen and pelvis without contrast:  Gaseous distension of the ascending colon.  The cecum measures 11.4 cm.  There is no small bowel obstruction  Prior cholecystectomy  Tiny pleural effusions and bibasilar atelectasis  Small pericardial effusion  Dependent anasarca    CT abdomen  and pelvis without contrast:  1.  Dilated large bowel loops mild colonic ileus with a large volume of retained fecal load within the ascending colon.  2.  Normal size liver with a small volume of surrounding perihepatic fluid.  3.  Pericardial effusion/thickening appears equal in capacity as compared to previous.  4.  Diffuse inflammatory stranding of the subcutaneous tissues along the lateral chest wall secondary to anasarca with fairly extensive inflammatory thickening and stranding involving the right flank region similar to that from previous.    CT abdomen and pelvis without contrast:  Gaseous distention of the portions of the colon with some liquid stool.  Small-bowel obstruction is not demonstrated.  Left abdomen and pelvic subcutaneous anasarca.  Prior cholecystectomy.  Dense consolidated infiltrate of the right lower lobe consistent with pneumonia with small right pleural effusion and mild atelectasis and trace left pleural effusion.    KUB:  Nonspecific, nonobstructive bowel gas pattern with scattered stool and gas in the large and small bowel.     Assessment/Plan:      * Acute hypoxic on chronic hypercapnic respiratory failure  Appear secondary to COPD exacerbation    Improving  Likely multifactorial  S/P extubation on 8/31  Oxygen supplement as need, patient already on home oxygen 2 to 3 L  Pulmonology is following patient  Encouraged incentive spirometry given her continuous lying in bed and positioning, she demonstrates understanding and is motivated     Hypokalemia  Patient's most recent potassium results are listed below.   Recent Labs     09/14/24  0414 09/15/24  0639 09/15/24  1416   K 3.3* 3.0* 3.9       Plan  - Repleted potassium - Monitor potassium Daily  - Patient's hypokalemia is improving      Ileus  She underwent decompression by colonoscope.  The ileus is improving, based on physical exam and abdominal X-rays.  No need for cholinergic therapy.         Type 2 diabetes mellitus  Patient's  FSGs are low on current medication regimen.  Last A1c reviewed-   Lab Results   Component Value Date    HGBA1C 9.5 (H) 08/26/2024     Most recent fingerstick glucose reviewed-   Recent Labs   Lab 09/15/24  1127 09/15/24  1950 09/16/24  0753   POCTGLUCOSE 179* 142* 136*       Change insulin sliding scale to low dose.  Reduce Lantus to 10 units daily.    Antihyperglycemics (From admission, onward)      Start     Stop Route Frequency Ordered    09/16/24 0900  insulin glargine U-100 (Lantus) pen 10 Units         -- SubQ Daily 09/15/24 1103    09/15/24 1206  insulin aspart U-100 pen 0-5 Units         -- SubQ Before meals & nightly PRN 09/15/24 1106          Hold Oral hypoglycemics while patient is in the hospital.      Anemia due to vitamin B12 deficiency  B12 level low.  Getting supplementation.  Monitor hemoglobin level daily.  Hemoglobin level is unstable.    Lab Results   Component Value Date    HGB 7.4 (L) 09/15/2024         Fall  Imaging ruled out acute fracture but severe swelling noted.  No visual disturbance  US confirmed hematoma on left lower extremity which is getting smaller  Fall precautions discussed with the patient.    PT OT and SNF placement /LTAC placement    Paroxysmal atrial fibrillation  Patient with Paroxysmal (<7 days) atrial fibrillation which is controlled currently with Calcium Channel Blocker. Patient is currently in atrial fibrillation.[id number] Score: 4.  Anticoagulation indicated. Anticoagulation done with apixaban .    COPD exacerbation  Completed long prednisone taper.  Pulmonology following.  Continue ALLYSSA/DUC scheduled treatments.    Hypertension  Continue losartan 100 daily and Cardizem 240 daily.  Can make adjustment as needed based on blood pressure response.    Hypothyroid  Currently on increased dose of levothyroxine        Discussed with patient's  at bedside,  and bedside nurse.  Will clamp Pickens and plan to discontinue this afternoon.  Use PureWick.  Likely  DC to LTAC tomorrow.  VTE Risk Mitigation (From admission, onward)           Ordered     apixaban tablet 5 mg  2 times daily         09/15/24 1107     IP VTE HIGH RISK PATIENT  Once         08/25/24 0627     Place sequential compression device  Until discontinued         08/25/24 0627                    Discharge Planning   CHELE: 9/18/2024     Code Status: Full Code   Is the patient medically ready for discharge?:     Reason for patient still in hospital (select all that apply): Patient trending condition and Consult recommendations  Discharge Plan A: Long-term acute care facility (LTAC)   Discharge Delays: None known at this time              Terrance Garcia MD  Department of Hospital Medicine   [facility]

## 2024-09-16 NOTE — PT/OT/SLP PROGRESS
Physical Therapy      Patient Name:  Karo Leonard   MRN:  [id number]    Patient not seen today secondary to Other (Comment) (therapist unavailable). Will follow-up 9/17/24.

## 2024-09-16 NOTE — CARE UPDATE
09/16/24 1322   Patient Assessment/Suction   Level of Consciousness (AVPU) alert   Respiratory Effort Normal;Unlabored   Expansion/Accessory Muscles/Retractions no use of accessory muscles;no retractions;expansion symmetric   All Lung Fields Breath Sounds clear   Rhythm/Pattern, Respiratory [place] respirations;pursed lip breathing;unlabored;pattern regular;depth regular;no shortness of breath reported   Cough Frequency no cough   PRE-TX-O2   Device (Oxygen Therapy) nasal cannula with humidification   Flow (L/min) (Oxygen Therapy) 5  (found on 5L and titrated to to 4L)   SpO2 97 %   Pulse Oximetry Type Intermittent   $ Pulse Oximetry - Multiple Charge Pulse Oximetry - Multiple   Aerosol Therapy   $ Aerosol Therapy Charges PRN treatment not required  (tx changed to PRN and pt. is clear)   Daily Review of Necessity (SVN) completed   Respiratory Treatment Status (SVN) PRN treatment not required   Incentive Spirometer   $ Incentive Spirometer Charges done with encouragement   Incentive Spirometer Predicted Level (mL) 1500   Administration (IS) instruction provided, follow-up   Number of Repetitions (IS) 10   Level Incentive Spirometer (mL) 750   Patient Tolerance (IS) good   Education   $ Education 15 min;Oxygen;Incentive Spirometry

## 2024-09-16 NOTE — PROGRESS NOTES
Pulmonary/Critical Care  Progress Note      Patient name: Karo Leonard  MRN: [id number]  Date: 09/16/2024    Admit Date: 8/25/2024  Consult Requested By: Terrance Garcia MD    Reason for Consult: Respiratory failure, COPD    HPI:    8/25/2024 - 65 yo ASCVD, DM, HTN. COPD(cigarette use) had increased SOB at home and a fall.  EMS was called and brought to ER.  Initially tried on BiPAP without response and was subsequently intubated and placed on ventilation.  Initial ABG showed over ventilation and we have adjusted and ABG are getting better.  She is sedated and not able to provide any history.  D/W  who says that she was supposed to see a pulmonologist but couldn't make appointment, she has been a chronic smoker.  She had increase SOB for a few days.  She did have a fall at home but he reports that she was not down for long.  He does report that she has been having recurring falls at home.      8/26/2024 - Stable overnight, O2 needs still too high to do SBT.  She is responsive and gets agitated on current sedation and we are adjusting.  No other new issues reported.  Hgb has decreased (no active bleeding reported), NA remains low, CPK is better, TFTF noted and c/w hypothyroid (synthroid started).    8/27/2024 - Remains critically ill, O2 needs down a little but still too high.  Difficult to sedate is either agitated or apneic.  Tried SBT this AM and was apneic.  VS reviewed.  She is 3.8 liters +.  Tube feeds have been started.  NA remains low, glucose is elevated.  CXR looks about the same    8/28/2024 - Stable overnight, O2 needs still up.  She does get agitated at times and we have adjusted meds.  Trouble with tube feeds and will hold today and restart tomorrow.  Not ready to wean because on increased FiO2 and will try to increase PEP to see if that helps.  She does not have a lot of secretions.  She is over ventilaed some and we adjusted vent.  CXR is about the same    8/29/2024 - Stable overnight,, O2  needs still up some (I decreased FiO2 and increased PEEP some) and she is overventilated (we adjusted rate).  Will retry tube feeds ([facility] at 20 to see if she tolerates).  She is 4.6 liters +.  CXR is about the same.    8/30- continues on vent, settings adjusted for alkalosis- now on PS 12/5. CXR looks wet and pt with severe edema- fluids discontinued and starting lasix. Propofol has been weaned a little. Pt is awake and denies pain. Her blood pressure was up to 200 systolic at the time of my evaluation- RN states she just gave am blood pressure med and also Lasix.    8/31- pt awake, off sedation, writing and alert. Denies pain, states feeling better. She was significantly more alkalotic this am due to lasix so given a dose of diamox this am. Tolerating PS 10/5 now- will repeat abg soon and consider extubation     9/1- extubated yesterday and tolerated well    9/3/2024 - STable overnight, no new issues reported and is feeling better ovrall.  She is very weak.  O2 decreased to 5 LPM when I saqw her.  No new issues reported.  BP is up some.  Labs reviewed    9/4/2024 - Stable overnight, no new issues reported, she feels better overall.      9/5/2024 - Stable overnight, she complains of issues with vertigo when getting up.  Possible placement at [facility] is being worked on.  No new respiratory complaints.      9/6 the patient has developed very tender and tympanitic abdomen.  She was supposed to be going to an LTAC today.  She is getting ready to receive an enema which will hopefully clear her rectum and decrease the obstruction.    9/7 the patient has had 2 bowel movements.  The 1 she just had was quite large.  However, she continues to complain of abdominal pain.  She is also complaining that are pain meds have been decreased.  Explained how narcotics slow down GI transit and could be adding to her GI problems    9/8 the patient is still complaining of severe abdominal pain and distention.  She has not yet  had her CT today.     The patient is only having gas and stool mucus.  She is not getting out of bed.  Her back hurts, her knees hurt, she has vertigo..... Explained this would help her GI motility.    2024 - For an unclear reason she dropped from our list.  Respiratory status stable but was moved to ICU yesterday GI issues predominate and she was given neostigmine to try and help.  She remains very distended and complaints of decreased respiratory ability (also decreased IS volumes).  No distress.  For possible endoscopic decompression today.  CT showed persistent GI issues and RLL infiltrate/atelectasis    2024 - HAd decompression yesterday and nursing reports some liquid stool.  Adbomen is stil distended but seems better.  She was sleeping on BiPAP when I saw her.  No other new issues reported.  KUB is better this AM.     2024 - Stable overnight, abdomen feels better  no new respiratory complaints  KUB reviewed and still with some gaseous distension.     9/15/2024 - Stable overnight, no new issues reported  Respiratory status remains stable.  KUB reviewed    2024 - Stable overnight but now with hematuria.  Gi issues are better.  No new respiratory complaints.  She does remain weak and needs more work with therapy.  No new labs, KUB is stable    Review of Systems    Review of Systems   Constitutional:         Diffuse pain   Respiratory:  Positive for shortness of breath.    Gastrointestinal:  Negative for abdominal pain and constipation.   Genitourinary:  Positive for hematuria.   Neurological:  Positive for weakness.       Past Medical History    Past Medical History:   Diagnosis Date    Coronary artery disease     cardiac stent    DDD (degenerative disc disease), lumbar 2000    Diabetes mellitus     Hypertension     Thyroid disease        Past Surgical History    Past Surgical History:   Procedure Laterality Date     SECTION  08/1987    x2 1993    COLON  DECOMPRESSION N/A 9/12/2024    Procedure: DECOMPRESSION, COLON;  Surgeon: Reza Chaudhari MD;  Location: [facility] ENDO;  Service: Endoscopy;  Laterality: N/A;    CORONARY STENT PLACEMENT  2017    EXPLORATORY LAPAROTOMY  1982    Scientologist     HEMORRHOID SURGERY  1990    LAPAROSCOPIC CHOLECYSTECTOMY N/A 6/16/2022    Procedure: CHOLECYSTECTOMY, LAPAROSCOPIC;  Surgeon: Leonardo Wilson III, MD;  Location: [facility] OR;  Service: General;  Laterality: N/A;    LUMBAR DISC SURGERY  2000    PILONIDAL CYST DRAINAGE  1976    TONSILLECTOMY      as a child (also adenoids)       Medications (scheduled):      albuterol-ipratropium  3 mL Nebulization Q6H    apixaban  5 mg Oral BID    atorvastatin  80 mg Oral QHS    cyanocobalamin  1,000 mcg Oral Daily    diltiaZEM  240 mg Oral Daily    famotidine (PF)  20 mg Intravenous Q12H    gabapentin  400 mg Oral TID    insulin glargine U-100  10 Units Subcutaneous Daily    levothyroxine  100 mcg Oral Before breakfast    LIDOcaine  3 patch Transdermal Q24H    losartan  100 mg Oral Daily    metoclopramide  10 mg Intravenous Q6H    senna-docusate 8.6-50 mg  1 tablet Oral BID       Medications (infusions):             Medications (prn):       Current Facility-Administered Medications:     acetaminophen, 650 mg, Oral, Q8H PRN    calcium gluconate IVPB, 1 g, Intravenous, PRN    calcium gluconate IVPB, 2 g, Intravenous, PRN    calcium gluconate IVPB, 3 g, Intravenous, PRN    dextrose 50%, 12.5 g, Intravenous, PRN    dextrose 50%, 25 g, Intravenous, PRN    glucagon (human recombinant), 1 mg, Intramuscular, PRN    glucose, 16 g, Oral, PRN    glucose, 24 g, Oral, PRN    hydrALAZINE, 10 mg, Intravenous, Q6H PRN    HYDROcodone-acetaminophen, 1 tablet, Oral, Q8H PRN    insulin aspart U-100, 0-5 Units, Subcutaneous, QID (AC + HS) PRN    magnesium sulfate IVPB, 2 g, Intravenous, PRN    magnesium sulfate IVPB, 4 g, Intravenous, PRN    meclizine, 25 mg, Oral, TID PRN    polyethylene glycol, 17 g, Oral, TID PRN     "potassium bicarbonate, 35 mEq, Oral, PRN    potassium bicarbonate, 50 mEq, Oral, PRN    potassium bicarbonate, 60 mEq, Oral, PRN    potassium chloride in water, 40 mEq, Intravenous, PRN **AND** potassium chloride in water, 60 mEq, Intravenous, PRN **AND** potassium chloride in water, 80 mEq, Intravenous, PRN    simethicone, 1 tablet, Oral, TID PRN    sodium chloride 0.9%, 10 mL, Intravenous, PRN    sodium phosphate 15 mmol in D5W 250 mL IVPB, 15 mmol, Intravenous, PRN    sodium phosphate 20.01 mmol in D5W 250 mL IVPB, 20.01 mmol, Intravenous, PRN    sodium phosphate 30 mmol in D5W 250 mL IVPB, 30 mmol, Intravenous, PRN    Family History: No family history on file.    Social History: Tobacco:   Social History     Tobacco Use   Smoking Status Not on file   Smokeless Tobacco Not on file                                EtOH:   Social History     Substance and Sexual Activity   Alcohol Use No                                Drugs:   Social History     Substance and Sexual Activity   Drug Use No       Physical Exam    Vital signs:  Temp:  [98.1 °F (36.7 °C)-98.8 °F (37.1 °C)]   Pulse:  [69-91]   Resp:  [13-22]   BP: (139-153)/(62-79)   SpO2:  [86 %-98 %]     Intake/Output:   Intake/Output Summary (Last 24 hours) at 9/16/2024 1302  Last data filed at 9/16/2024 1045  Gross per 24 hour   Intake 1200 ml   Output 1625 ml   Net -425 ml        BMI: Estimated body mass index is 45.56 kg/m² as calculated from the following:    Height as of this encounter: 5' 5" (1.651 m).    Weight as of this encounter: 124.2 kg (273 lb 13 oz).    Physical Exam  Vitals and nursing note reviewed.   Constitutional:       General: She is not in acute distress.     Appearance: She is not ill-appearing, toxic-appearing or diaphoretic.   HENT:      Head: Normocephalic.      Comments: Some bruising right forehead and bilat eyes from her fall     Right Ear: External ear normal.      Left Ear: External ear normal.      Nose: Nose normal. No congestion or " rhinorrhea.      Mouth/Throat:      Mouth: Mucous membranes are moist.      Pharynx: Oropharynx is clear. No oropharyngeal exudate or posterior oropharyngeal erythema.   Eyes:      General: No scleral icterus.        Right eye: No discharge.         Left eye: No discharge.      Extraocular Movements: Extraocular movements intact.      Conjunctiva/sclera: Conjunctivae normal.      Pupils: Pupils are equal, round, and reactive to light.   Neck:      Vascular: No carotid bruit.   Cardiovascular:      Rate and Rhythm: Normal rate and regular rhythm.      Pulses: Normal pulses.      Heart sounds: No murmur heard.     No friction rub. No gallop.   Pulmonary:      Effort: Pulmonary effort is normal. No respiratory distress.      Breath sounds: No stridor. No wheezing, rhonchi or rales.   Chest:      Chest wall: No tenderness.   Abdominal:      General: There is distension (but better).      Tenderness: There is no abdominal tenderness. There is no guarding.      Comments: Hypoactive BS   Musculoskeletal:         General: No swelling. Normal range of motion.      Cervical back: Normal range of motion and neck supple. No rigidity or tenderness.      Right lower leg: Edema present.      Left lower leg: Edema present.   Lymphadenopathy:      Cervical: No cervical adenopathy.   Skin:     General: Skin is warm and dry.      Capillary Refill: Capillary refill takes less than 2 seconds.      Coloration: Skin is not jaundiced.      Findings: Bruising (Extensive ecchymoses to the eyes and frontal bone on the right with a large hematoma) present.      Comments: + bruise LLE   Neurological:      General: No focal deficit present.      Mental Status: She is oriented to person, place, and time. Mental status is at baseline.      Cranial Nerves: No cranial nerve deficit.      Sensory: No sensory deficit.      Motor: No weakness.   Psychiatric:         Mood and Affect: Mood normal.         Behavior: Behavior normal.  "        Laboratory    No results for input(s): "WBC", "RBC", "HGB", "HCT", "PLT", "MCV", "MCH", "MCHC" in the last 24 hours.            Recent Labs   Lab 09/15/24  1416   K 3.9           Microbiology:       Microbiology Results (last 7 days)       ** No results found for the last 168 hours. **            Radiology    X-Ray KUB  Narrative: CLINICAL HISTORY:  ([id number])63 y/o  (1960) F    ileus, distension;    TECHNIQUE:  (A#[id number], exam time 9/16/2024 6:31)    XR KUB [id number]    COMPARISON:  Radiograph from 09/15/2024.    FINDINGS:  The lung bases show new airspace opacity at the left lung base compatible with a small pleural effusion and adjacent atelectasis/consolidation.  Mild interstitial opacities are seen at both lung bases suggestive of trace edema as well.    There are no abnormally dilated gas filled loops of large or small bowel to suggest bowel obstruction.  There is scattered stool and gas in the large and small bowel.  There is no pneumatosis or gross pneumoperitoneum. Osseous structures show degenerative change with levoscoliosis at the thoracolumbar junction..  Impression: Nonspecific, nonobstructive bowel gas pattern with scattered stool and gas in the large and small bowel.    Electronically signed by: Aleks Nascimento  Date:    09/16/2024  Time:    07:23        Ventilator Information    Oxygen Concentration (%):  [40] 40  The patient is on 3 L OxyMask           No results for input(s): "PH", "PCO2", "PO2", "HCO3", "POCSATURATED", "BE" in the last 72 hours.        Impression    Active Hospital Problems    Diagnosis  POA    *Acute hypoxic on chronic hypercapnic respiratory failure [J96.01, J96.12]  Yes    Hypokalemia [E87.6]  No    Ileus [K56.7]  Yes    Type 2 diabetes mellitus [E11.9]  Yes    Anemia due to vitamin B12 deficiency [D51.9]  Yes    Fall [W19.XXXA]  Yes    Paroxysmal atrial fibrillation [I48.0]  Yes    COPD exacerbation [J44.1]  Yes    Hypothyroid [E03.9]  Yes    Hypertension [I10]  " Yes      Resolved Hospital Problems    Diagnosis Date Resolved POA    Goals of care, counseling/discussion [Z71.89] 09/07/2024 Not Applicable    Acute metabolic encephalopathy [G93.41] 09/14/2024 Yes    Pneumonia [J18.9] 09/15/2024 Yes     Patient's respiratory status is stable.  Her abdomen is still not settled.. Ct shows nothing requiring surgical intervention.     Plan    Continue O2 to maintain sats 89-92%  Continue bipap nightly and if needed during the day  Respiratory treatments change to q6 prn  Antihypertensives to continue  Replace electrolytes as needed  Synthroid adjusted, TSH still very elevated recheck 9/18  Watch BS and treat as needed  Increase activity as able  GI issues look better today, continue to follow  Hematuria being evaluated      Sonido Zamora MD  [facility] Pulmonary/Critical Care  09/16/2024

## 2024-09-16 NOTE — PLAN OF CARE
Updated Agustina that patient should d/c 9/17.  They will have bed available for patient.     Per Agustina with [facility], they will have a bed open this afternoon.

## 2024-09-16 NOTE — PT/OT/SLP PROGRESS
Occupational Therapy   Treatment    Name: Karo Leonard  MRN: [id number]  Admitting Diagnosis:  Acute hypoxic on chronic hypercapnic respiratory failure  4 Days Post-Op    Recommendations:     Discharge Recommendations: Moderate Intensity Therapy  Discharge Equipment Recommendations:  to be determined by next level of care  Barriers to discharge:  Decreased caregiver support    Assessment:     Karo Leonard is a 64 y.o. female with a medical diagnosis of Acute hypoxic on chronic hypercapnic respiratory failure.  She presents with general soreness and weakness. Patient participated in log rolling, toilet hygiene bed level, bed mobility, unsupported sitting EOB and sit to stand x1 trial, RW. Performance deficits affecting function are weakness, impaired endurance, impaired self care skills, impaired functional mobility, gait instability, impaired balance, decreased safety awareness, decreased lower extremity function, decreased upper extremity function, pain.     Rehab Prognosis:  Fair; patient would benefit from acute skilled OT services to address these deficits and reach maximum level of function.       Plan:     Patient to be seen 5 x/week to address the above listed problems via self-care/home management, therapeutic activities, therapeutic exercises  Plan of Care Expires: 09/22/24  Plan of Care Reviewed with: patient, spouse    Subjective     Chief Complaint: Dizziness and general weakness  Patient/Family Comments/goals: Reduced dizziness, improved functional mobility and ADL independence.  Pain/Comfort:  Pain Rating 1: 7/10  Location 1:  (All Over)  Pain Addressed 1: Distraction, Reposition  Pain Rating Post-Intervention 1: 7/10    Objective:     Communicated with: nurse prior to session.  Patient found HOB elevated with telemetry, peripheral IV, shah catheter, oxygen upon OT entry to room.    General Precautions: Standard, fall    Orthopedic Precautions:N/A  Braces: N/A  Respiratory Status:  5 L O2 via  Oximask     Occupational Performance:     Bed Mobility:    Patient completed Rolling/Turning to Left with  maximal assistance  Patient completed Rolling/Turning to Right with maximal assistance  Patient completed Scooting/Bridging with minimum assistance  Patient completed Supine to Sit with minimum assistance  Patient completed Sit to Supine with minimum assistance  Performed unsupported sitting EOB with contact guard assistance.     Functional Mobility/Transfers:  Patient completed Sit <> Stand x1 Trial with minimum assistance using rolling walker     Activities of Daily Living:  Toileting: maximal assistance to perform toilet hygiene bed level.    [facility] 6 Click ADL:      Treatment & Education:  Patient encouraged to get OOB in future sessions.     Patient left HOB elevated with all lines intact, call button in reach, bed alarm on, and spouse present    GOALS:   Multidisciplinary Problems       Occupational Therapy Goals          Problem: Occupational Therapy    Goal Priority Disciplines Outcome Interventions   Occupational Therapy Goal     OT, PT/OT Progressing    Description: Goals to be met by: 9/22/2024     Patient will increase functional independence with ADLs by performing:    LE Dressing with Minimal Assistance.  Grooming while seated with Supervision.  Toileting from bedside commode with Minimal Assistance for hygiene and clothing management.   Supine to sit with Minimal Assistance.  Toilet transfer to bedside commode with Minimal Assistance.  Upper extremity exercise program, with supervision.                         Time Tracking:     OT Date of Treatment: 09/16/24  OT Start Time: 1123  OT Stop Time: 1207  OT Total Time (min): 44 min    Billable Minutes:Self Care/Home Management 15  Therapeutic Activity 29    OT/[facility]: OT     Number of [facility] visits since last OT visit: 1    9/16/2024

## 2024-09-16 NOTE — ASSESSMENT & PLAN NOTE
Patient's FSGs are low on current medication regimen.  Last A1c reviewed-   Lab Results   Component Value Date    HGBA1C 9.5 (H) 08/26/2024     Most recent fingerstick glucose reviewed-   Recent Labs   Lab 09/15/24  1127 09/15/24  1950 09/16/24  0753   POCTGLUCOSE 179* 142* 136*       Change insulin sliding scale to low dose.  Reduce Lantus to 10 units daily.    Antihyperglycemics (From admission, onward)    Start     Stop Route Frequency Ordered    09/16/24 0900  insulin glargine U-100 (Lantus) pen 10 Units         -- SubQ Daily 09/15/24 1103    09/15/24 1206  insulin aspart U-100 pen 0-5 Units         -- SubQ Before meals & nightly PRN 09/15/24 1106        Hold Oral hypoglycemics while patient is in the hospital.

## 2024-09-16 NOTE — NURSING
"Blood noted in patient's shah bag, Dr Cheng informed. Instructed to irrigate bladder and asked "can we dc any blood thinners or antiplatelets" Informed Dr Cheng that patient had a history of Afib. No new orders given.   "

## 2024-09-17 VITALS
SYSTOLIC BLOOD PRESSURE: 112 MMHG | WEIGHT: 273.81 LBS | RESPIRATION RATE: 16 BRPM | TEMPERATURE: 99 F | HEART RATE: 85 BPM | OXYGEN SATURATION: 93 % | HEIGHT: 65 IN | DIASTOLIC BLOOD PRESSURE: 63 MMHG | BODY MASS INDEX: 45.62 KG/M2

## 2024-09-17 LAB
ALBUMIN SERPL BCP-MCNC: 2.7 G/DL (ref 3.5–5.2)
ALP SERPL-CCNC: 96 U/L (ref 55–135)
ALT SERPL W/O P-5'-P-CCNC: 10 U/L (ref 10–44)
ANION GAP SERPL CALC-SCNC: 6 MMOL/L (ref 8–16)
AST SERPL-CCNC: 13 U/L (ref 10–40)
BASOPHILS # BLD AUTO: 0.02 K/UL (ref 0–0.2)
BASOPHILS NFR BLD: 0.3 % (ref 0–1.9)
BILIRUB SERPL-MCNC: 0.3 MG/DL (ref 0.1–1)
BUN SERPL-MCNC: 9 MG/DL (ref 8–23)
CALCIUM SERPL-MCNC: 8.6 MG/DL (ref 8.7–10.5)
CHLORIDE SERPL-SCNC: 95 MMOL/L (ref 95–110)
CO2 SERPL-SCNC: 40 MMOL/L (ref 23–29)
CREAT SERPL-MCNC: 0.6 MG/DL (ref 0.5–1.4)
DIFFERENTIAL METHOD BLD: ABNORMAL
EOSINOPHIL # BLD AUTO: 0.1 K/UL (ref 0–0.5)
EOSINOPHIL NFR BLD: 1.8 % (ref 0–8)
ERYTHROCYTE [DISTWIDTH] IN BLOOD BY AUTOMATED COUNT: 17.5 % (ref 11.5–14.5)
EST. GFR  (NO RACE VARIABLE): >60 ML/MIN/1.73 M^2
GLUCOSE SERPL-MCNC: 201 MG/DL (ref 70–110)
HCT VFR BLD AUTO: 27 % (ref 37–48.5)
HGB BLD-MCNC: 8.1 G/DL (ref 12–16)
IMM GRANULOCYTES # BLD AUTO: 0.02 K/UL (ref 0–0.04)
IMM GRANULOCYTES NFR BLD AUTO: 0.3 % (ref 0–0.5)
LYMPHOCYTES # BLD AUTO: 0.9 K/UL (ref 1–4.8)
LYMPHOCYTES NFR BLD: 14.7 % (ref 18–48)
MCH RBC QN AUTO: 32.3 PG (ref 27–31)
MCHC RBC AUTO-ENTMCNC: 30 G/DL (ref 32–36)
MCV RBC AUTO: 108 FL (ref 82–98)
MONOCYTES # BLD AUTO: 0.4 K/UL (ref 0.3–1)
MONOCYTES NFR BLD: 6.7 % (ref 4–15)
NEUTROPHILS # BLD AUTO: 4.7 K/UL (ref 1.8–7.7)
NEUTROPHILS NFR BLD: 76.2 % (ref 38–73)
NRBC BLD-RTO: 0 /100 WBC
PLATELET # BLD AUTO: 189 K/UL (ref 150–450)
PMV BLD AUTO: 10 FL (ref 9.2–12.9)
POCT GLUCOSE: 159 MG/DL (ref 70–110)
POCT GLUCOSE: 173 MG/DL (ref 70–110)
POTASSIUM SERPL-SCNC: 3.6 MMOL/L (ref 3.5–5.1)
PROT SERPL-MCNC: 5.3 G/DL (ref 6–8.4)
RBC # BLD AUTO: 2.51 M/UL (ref 4–5.4)
SODIUM SERPL-SCNC: 141 MMOL/L (ref 136–145)
WBC # BLD AUTO: 6.11 K/UL (ref 3.9–12.7)

## 2024-09-17 PROCEDURE — 25000242 PHARM REV CODE 250 ALT 637 W/ HCPCS: Performed by: INTERNAL MEDICINE

## 2024-09-17 PROCEDURE — 94761 N-INVAS EAR/PLS OXIMETRY MLT: CPT

## 2024-09-17 PROCEDURE — 36415 COLL VENOUS BLD VENIPUNCTURE: CPT | Performed by: INTERNAL MEDICINE

## 2024-09-17 PROCEDURE — 27000221 HC OXYGEN, UP TO 24 HOURS

## 2024-09-17 PROCEDURE — 63600175 PHARM REV CODE 636 W HCPCS: Performed by: SURGERY

## 2024-09-17 PROCEDURE — 85025 COMPLETE CBC W/AUTO DIFF WBC: CPT | Performed by: INTERNAL MEDICINE

## 2024-09-17 PROCEDURE — 94640 AIRWAY INHALATION TREATMENT: CPT

## 2024-09-17 PROCEDURE — 25000003 PHARM REV CODE 250: Performed by: INTERNAL MEDICINE

## 2024-09-17 PROCEDURE — 99232 SBSQ HOSP IP/OBS MODERATE 35: CPT | Mod: ,,, | Performed by: INTERNAL MEDICINE

## 2024-09-17 PROCEDURE — 94799 UNLISTED PULMONARY SVC/PX: CPT | Mod: XB

## 2024-09-17 PROCEDURE — 25000003 PHARM REV CODE 250: Performed by: HOSPITALIST

## 2024-09-17 PROCEDURE — 80053 COMPREHEN METABOLIC PANEL: CPT | Performed by: INTERNAL MEDICINE

## 2024-09-17 PROCEDURE — 97535 SELF CARE MNGMENT TRAINING: CPT

## 2024-09-17 PROCEDURE — 25000003 PHARM REV CODE 250

## 2024-09-17 RX ORDER — AMOXICILLIN 250 MG
1 CAPSULE ORAL 2 TIMES DAILY
Start: 2024-09-17

## 2024-09-17 RX ORDER — LOSARTAN POTASSIUM 100 MG/1
100 TABLET ORAL DAILY
Start: 2024-09-18 | End: 2025-09-18

## 2024-09-17 RX ORDER — LEVOTHYROXINE SODIUM 100 UG/1
100 TABLET ORAL
Start: 2024-09-18 | End: 2025-09-18

## 2024-09-17 RX ORDER — LIDOCAINE 50 MG/G
3 PATCH TOPICAL DAILY
Start: 2024-09-17

## 2024-09-17 RX ORDER — LANOLIN ALCOHOL/MO/W.PET/CERES
1000 CREAM (GRAM) TOPICAL DAILY
Start: 2024-09-18

## 2024-09-17 RX ORDER — INSULIN GLARGINE 100 [IU]/ML
10 INJECTION, SOLUTION SUBCUTANEOUS DAILY
Start: 2024-09-18 | End: 2025-09-18

## 2024-09-17 RX ADMIN — APIXABAN 5 MG: 5 TABLET, FILM COATED ORAL at 08:09

## 2024-09-17 RX ADMIN — METOCLOPRAMIDE HYDROCHLORIDE 10 MG: 5 INJECTION INTRAMUSCULAR; INTRAVENOUS at 12:09

## 2024-09-17 RX ADMIN — GABAPENTIN 400 MG: 300 CAPSULE ORAL at 08:09

## 2024-09-17 RX ADMIN — SENNOSIDES AND DOCUSATE SODIUM 1 TABLET: 8.6; 5 TABLET ORAL at 08:09

## 2024-09-17 RX ADMIN — METOCLOPRAMIDE HYDROCHLORIDE 10 MG: 5 INJECTION INTRAMUSCULAR; INTRAVENOUS at 05:09

## 2024-09-17 RX ADMIN — MECLIZINE HYDROCHLORIDE 25 MG: 12.5 TABLET ORAL at 02:09

## 2024-09-17 RX ADMIN — HYDROCODONE BITARTRATE AND ACETAMINOPHEN 1 TABLET: 5; 325 TABLET ORAL at 02:09

## 2024-09-17 RX ADMIN — CYANOCOBALAMIN TAB 1000 MCG 1000 MCG: 1000 TAB at 08:09

## 2024-09-17 RX ADMIN — LOSARTAN POTASSIUM 100 MG: 50 TABLET, FILM COATED ORAL at 08:09

## 2024-09-17 RX ADMIN — MECLIZINE HYDROCHLORIDE 25 MG: 12.5 TABLET ORAL at 05:09

## 2024-09-17 RX ADMIN — LIDOCAINE 3 PATCH: 50 PATCH TOPICAL at 12:09

## 2024-09-17 RX ADMIN — DILTIAZEM HYDROCHLORIDE 240 MG: 120 CAPSULE, COATED, EXTENDED RELEASE ORAL at 08:09

## 2024-09-17 RX ADMIN — IPRATROPIUM BROMIDE AND ALBUTEROL SULFATE 3 ML: .5; 3 SOLUTION RESPIRATORY (INHALATION) at 02:09

## 2024-09-17 RX ADMIN — HYDROCODONE BITARTRATE AND ACETAMINOPHEN 1 TABLET: 5; 325 TABLET ORAL at 05:09

## 2024-09-17 RX ADMIN — IPRATROPIUM BROMIDE AND ALBUTEROL SULFATE 3 ML: .5; 3 SOLUTION RESPIRATORY (INHALATION) at 07:09

## 2024-09-17 RX ADMIN — FAMOTIDINE 20 MG: 10 INJECTION INTRAVENOUS at 09:09

## 2024-09-17 RX ADMIN — INSULIN GLARGINE 10 UNITS: 100 INJECTION, SOLUTION SUBCUTANEOUS at 08:09

## 2024-09-17 RX ADMIN — ACETAMINOPHEN 650 MG: 325 TABLET ORAL at 09:09

## 2024-09-17 RX ADMIN — LEVOTHYROXINE SODIUM 100 MCG: 0.1 TABLET ORAL at 05:09

## 2024-09-17 NOTE — PLAN OF CARE
Facility transfer orders sent to [facility]- awaiting review and information to call report.

## 2024-09-17 NOTE — DISCHARGE INSTRUCTIONS
Continue use of supplemental oxygen 4-5 liter/minute via nasal cannula to maintain pulse ox 90-92%.    Use BiPAP therapy for sleep and as needed.    [facility]  Facility Transfer Orders        Admit to: [facility]    Diagnoses:   Active Hospital Problems    Diagnosis  POA    *Acute hypoxic on chronic hypercapnic respiratory failure [J96.01, J96.12]  Yes    Hypokalemia [E87.6]  No    Ileus [K56.7]  Yes    Type 2 diabetes mellitus [E11.9]  Yes    Anemia due to vitamin B12 deficiency [D51.9]  Yes    Fall [W19.XXXA]  Yes    Paroxysmal atrial fibrillation [I48.0]  Yes    COPD exacerbation [J44.1]  Yes    Hypothyroid [E03.9]  Yes    Hypertension [I10]  Yes      Resolved Hospital Problems    Diagnosis Date Resolved POA    Goals of care, counseling/discussion [Z71.89] 09/07/2024 Not Applicable    Acute metabolic encephalopathy [G93.41] 09/14/2024 Yes    Pneumonia [J18.9] 09/15/2024 Yes     Allergies:   Review of patient's allergies indicates:   Allergen Reactions    Pcn [penicillins] Anaphylaxis    Adhesive     Floxacillin     Keflex [cephalexin]     Sulfa (sulfonamide antibiotics)     Zithromax [azithromycin]     Zofran [ondansetron hcl (pf)]      Dizzy vomiting         Code Status: Full    Vitals: Routine       Diet: cardiac diet and diabetic diet: 2000 calorie    Activity:  Up with assist, observe fall precautions.    Nursing Precautions: Fall    Bed/Surface: Pressure Redistribution    Consults: PT to evaluate and treat- 5 times a week and OT to evaluate and treat- 5 times a week    Oxygen: 4 liters/min via nasal cannula to maintain spo2 90-92%    Dialysis: Patient is not on dialysis.     Labs:  Check CBC and CMP by weekly.  Pending Diagnostic Studies:       None          Imaging:  None    Miscellaneous Care:   Routine Skin for Bedridden Patients:  Apply moisture barrier cream to all  Continue supplemental oxygen at 4-5 liter/minute via nasal cannula to maintain pulse ox 90-92%.  Use BiPAP therapy for sleep  and as needed.    IV Access:  None     Medications: Discontinue all previous medication orders, if any. See new list below.  Current Discharge Medication List        START taking these medications    Details   cyanocobalamin (VITAMIN B-12) 1000 MCG tablet Take 1 tablet (1,000 mcg total) by mouth once daily.      insulin glargine U-100, Lantus, 100 unit/mL (3 mL) SubQ InPn pen Inject 10 Units into the skin once daily.      levothyroxine (SYNTHROID) 100 MCG tablet Take 1 tablet (100 mcg total) by mouth before breakfast.      LIDOcaine (LIDODERM) 5 % Place 3 patches onto the skin once daily. Remove & Discard patch within 12 hours or as directed by MD      losartan (COZAAR) 100 MG tablet Take 1 tablet (100 mg total) by mouth once daily.    Comments: .      senna-docusate 8.6-50 mg (PERICOLACE) 8.6-50 mg per tablet Take 1 tablet by mouth 2 (two) times daily.           CONTINUE these medications which have NOT CHANGED    Details   chlorzoxazone (LORZONE) 750 mg Tab Take 1 tablet by mouth every 6 (six) hours.      promethazine (PHENERGAN) 25 MG tablet Take 25 mg by mouth every 6 (six) hours as needed.      TRELEGY ELLIPTA 200-62.5-25 mcg inhaler Inhale 1 puff into the lungs once daily.      albuterol (PROVENTIL/VENTOLIN HFA) 90 mcg/actuation inhaler Inhale 1 puff into the lungs every 4 (four) hours as needed for Wheezing or Shortness of Breath.      albuterol-ipratropium (DUO-NEB) 2.5 mg-0.5 mg/3 mL nebulizer solution Take 3 mLs by nebulization every 6 (six) hours as needed for Wheezing or Shortness of Breath. Rescue  Qty: 75 mL, Refills: 3      apixaban (ELIQUIS) 5 mg Tab Take 1 tablet (5 mg total) by mouth 2 (two) times daily.  Qty: 180 tablet, Refills: 0      arnica 20 % Tinc Apply 1 application topically once daily.      ascorbic acid, vitamin C, (VITAMIN C) 500 MG tablet Take 500 mg by mouth once daily.      aspirin 81 MG Chew Take 81 mg by mouth once daily.      atorvastatin (LIPITOR) 80 MG tablet Take 80 mg by mouth  every evening.      desonide (DESOWEN) 0.05 % cream Apply 1 application topically 2 (two) times daily.      diltiaZEM (CARDIZEM CD) 240 MG 24 hr capsule Take 1 capsule (240 mg total) by mouth once daily.  Qty: 90 capsule, Refills: 0      estradioL (ESTRACE) 0.01 % (0.1 mg/gram) vaginal cream Place 1 g vaginally every 7 days. Monday's      furosemide (LASIX) 20 MG tablet Take 1 tablet (20 mg total) by mouth once daily.  Qty: 90 tablet, Refills: 0      gabapentin (NEURONTIN) 600 MG tablet Take 600 mg by mouth 3 (three) times daily.      hydrocortisone 0.5 % cream Apply 1 application topically 2 (two) times daily as needed.      JANUVIA 50 mg Tab Take 50 mg by mouth once daily.      meclizine (ANTIVERT) 25 mg tablet Take 25 mg by mouth 4 (four) times daily as needed for Dizziness or Nausea.      pantoprazole (PROTONIX) 40 MG tablet Take 1 tablet (40 mg total) by mouth 2 (two) times daily.  Qty: 60 tablet, Refills: 1    Comments: Further refills to come from your primary care physician      potassium chloride SA (K-DUR,KLOR-CON M) 10 MEQ tablet Take 1 tablet (10 mEq total) by mouth once daily.  Qty: 90 tablet, Refills: 0      tiotropium (SPIRIVA) 18 mcg inhalation capsule Inhale 1 capsule into the lungs once daily.           STOP taking these medications       ezetimibe (ZETIA) 10 mg tablet Comments:   Reason for Stopping:         guaiFENesin (MUCINEX) 600 mg 12 hr tablet Comments:   Reason for Stopping:             Follow up:    Follow-up Information       Navneet Hernandez FNP Follow up in 2 week(s).    Specialty: Family Medicine  Contact information:  [address] [facility] [address] [phone]               Sonido Zamora MD Follow up in 2 week(s).    Specialty: Pulmonary Disease  Contact information:  [address] [facility]  [id number]  [place] [address]  [phone]               Tesha Antonio MD Follow up in 1 month(s).    Specialty: Gastroenterology  Contact information:  [address]  [place] [address]  [phone]                               Immunizations Administered as of 9/17/2024       No immunizations on file.            Terrance Garcia MD

## 2024-09-17 NOTE — PT/OT/SLP PROGRESS
Occupational Therapy   Treatment    Name: Karo Leonard  MRN: [id number]  Admitting Diagnosis:  Acute hypoxic on chronic hypercapnic respiratory failure  5 Days Post-Op    Recommendations:     Discharge Recommendations: Moderate Intensity Therapy  Discharge Equipment Recommendations:  to be determined by next level of care  Barriers to discharge:  Decreased caregiver support    Assessment:     Karo Leonard is a 64 y.o. female with a medical diagnosis of Acute hypoxic on chronic hypercapnic respiratory failure.  She presents with improving medical acuity and functional mobility. Patient participated in toilet hygiene bed level, bed mobility, unsupported sitting EOB and sit to stand x2 trials using rolling walker. Performance deficits affecting function are weakness, impaired endurance, impaired self care skills, impaired functional mobility, gait instability, impaired balance, decreased lower extremity function, decreased upper extremity function, decreased safety awareness.     Rehab Prognosis:  Fair; patient would benefit from acute skilled OT services to address these deficits and reach maximum level of function.       Plan:     Patient to be seen 5 x/week to address the above listed problems via self-care/home management, therapeutic activities, therapeutic exercises  Plan of Care Expires: 09/22/24  Plan of Care Reviewed with: patient, spouse    Subjective     Chief Complaint: Dizziness and general weakness  Patient/Family Comments/goals: Improved functional mobility and ADL independence.  Pain/Comfort:  Pain Rating 1: 4/10  Location 1:  (All Over)  Pain Addressed 1: Reposition, Distraction  Pain Rating Post-Intervention 1: 4/10    Objective:     Communicated with: nurse prior to session.  Patient found HOB elevated with telemetry, PICC line, oxygen upon OT entry to room.    General Precautions: Standard, fall    Orthopedic Precautions:N/A  Braces: N/A  Respiratory Status:  5L O2 via Oximask      Occupational Performance:     Bed Mobility:    Patient completed Rolling/Turning to Left with  moderate assistance  Patient completed Rolling/Turning to Right with moderate assistance  Patient completed Scooting/Bridging with minimum assistance  Patient completed Supine to Sit with minimum assistance  Patient completed Sit to Supine with minimum assistance  Performed unsupported sitting EOB with contact guard assistance.     Functional Mobility/Transfers:  Patient completed Sit <> Stand 2 Trials for 30 seconds with minimum assistance using hand-held assist and rolling walker     Activities of Daily Living:  Toileting: maximal assistance to perform toilet hygiene bed level.      [facility] 6 Click ADL: 16    Treatment & Education:  Patient encouraged to work towards OOB activity in future sessions.    Patient left HOB elevated with all lines intact, call button in reach, and spouse present    GOALS:   Multidisciplinary Problems       Occupational Therapy Goals          Problem: Occupational Therapy    Goal Priority Disciplines Outcome Interventions   Occupational Therapy Goal     OT, PT/OT Progressing    Description: Goals to be met by: 9/22/2024     Patient will increase functional independence with ADLs by performing:    LE Dressing with Minimal Assistance.  Grooming while seated with Supervision.  Toileting from bedside commode with Minimal Assistance for hygiene and clothing management.   Supine to sit with Minimal Assistance.  Toilet transfer to bedside commode with Minimal Assistance.  Upper extremity exercise program, with supervision.                         Time Tracking:     OT Date of Treatment: 09/17/24  OT Start Time: 1127  OT Stop Time: 1144  OT Total Time (min): 17 min    Billable Minutes:Self Care/Home Management 17    OT/[facility]: OT     Number of [facility] visits since last OT visit: 1    9/17/2024

## 2024-09-17 NOTE — PROGRESS NOTES
Pulmonary/Critical Care  Progress Note      Patient name: Karo Leonard  MRN: [id number]  Date: 09/17/2024    Admit Date: 8/25/2024  Consult Requested By: Terrance Garcia MD    Reason for Consult: Respiratory failure, COPD    HPI:    8/25/2024 - 65 yo ASCVD, DM, HTN. COPD(cigarette use) had increased SOB at home and a fall.  EMS was called and brought to ER.  Initially tried on BiPAP without response and was subsequently intubated and placed on ventilation.  Initial ABG showed over ventilation and we have adjusted and ABG are getting better.  She is sedated and not able to provide any history.  D/W  who says that she was supposed to see a pulmonologist but couldn't make appointment, she has been a chronic smoker.  She had increase SOB for a few days.  She did have a fall at home but he reports that she was not down for long.  He does report that she has been having recurring falls at home.      8/26/2024 - Stable overnight, O2 needs still too high to do SBT.  She is responsive and gets agitated on current sedation and we are adjusting.  No other new issues reported.  Hgb has decreased (no active bleeding reported), NA remains low, CPK is better, TFTF noted and c/w hypothyroid (synthroid started).    8/27/2024 - Remains critically ill, O2 needs down a little but still too high.  Difficult to sedate is either agitated or apneic.  Tried SBT this AM and was apneic.  VS reviewed.  She is 3.8 liters +.  Tube feeds have been started.  NA remains low, glucose is elevated.  CXR looks about the same    8/28/2024 - Stable overnight, O2 needs still up.  She does get agitated at times and we have adjusted meds.  Trouble with tube feeds and will hold today and restart tomorrow.  Not ready to wean because on increased FiO2 and will try to increase PEP to see if that helps.  She does not have a lot of secretions.  She is over ventilaed some and we adjusted vent.  CXR is about the same    8/29/2024 - Stable overnight,, O2  needs still up some (I decreased FiO2 and increased PEEP some) and she is overventilated (we adjusted rate).  Will retry tube feeds ([facility] at 20 to see if she tolerates).  She is 4.6 liters +.  CXR is about the same.    8/30- continues on vent, settings adjusted for alkalosis- now on PS 12/5. CXR looks wet and pt with severe edema- fluids discontinued and starting lasix. Propofol has been weaned a little. Pt is awake and denies pain. Her blood pressure was up to 200 systolic at the time of my evaluation- RN states she just gave am blood pressure med and also Lasix.    8/31- pt awake, off sedation, writing and alert. Denies pain, states feeling better. She was significantly more alkalotic this am due to lasix so given a dose of diamox this am. Tolerating PS 10/5 now- will repeat abg soon and consider extubation     9/1- extubated yesterday and tolerated well    9/3/2024 - STable overnight, no new issues reported and is feeling better ovrall.  She is very weak.  O2 decreased to 5 LPM when I saqw her.  No new issues reported.  BP is up some.  Labs reviewed    9/4/2024 - Stable overnight, no new issues reported, she feels better overall.      9/5/2024 - Stable overnight, she complains of issues with vertigo when getting up.  Possible placement at [facility] is being worked on.  No new respiratory complaints.      9/6 the patient has developed very tender and tympanitic abdomen.  She was supposed to be going to an LTAC today.  She is getting ready to receive an enema which will hopefully clear her rectum and decrease the obstruction.    9/7 the patient has had 2 bowel movements.  The 1 she just had was quite large.  However, she continues to complain of abdominal pain.  She is also complaining that are pain meds have been decreased.  Explained how narcotics slow down GI transit and could be adding to her GI problems    9/8 the patient is still complaining of severe abdominal pain and distention.  She has not yet  had her CT today.    9/9 The patient is only having gas and stool mucus.  She is not getting out of bed.  Her back hurts, her knees hurt, she has vertigo..... Explained this would help her GI motility.    9/12/2024 - For an unclear reason she dropped from our list.  Respiratory status stable but was moved to ICU yesterday GI issues predominate and she was given neostigmine to try and help.  She remains very distended and complaints of decreased respiratory ability (also decreased IS volumes).  No distress.  For possible endoscopic decompression today.  CT showed persistent GI issues and RLL infiltrate/atelectasis    9/13/2024 - HAd decompression yesterday and nursing reports some liquid stool.  Adbomen is stil distended but seems better.  She was sleeping on BiPAP when I saw her.  No other new issues reported.  KUB is better this AM.     9/14/2024 - Stable overnight, abdomen feels better  no new respiratory complaints  KUB reviewed and still with some gaseous distension.     9/15/2024 - Stable overnight, no new issues reported  Respiratory status remains stable.  KUB reviewed    9/16/2024 - Stable overnight but now with hematuria.  Gi issues are better.  No new respiratory complaints.  She does remain weak and needs more work with therapy.  No new labs, KUB is stable    9/17/2024 - Stable overnight, no new issues reported and she feels better.  Remains very weak and in need of further therapy (there are some limitations here due to her back issues as well).  Gi symptoms are better.  [place] is out and she has purewick and urine looks pretty clear.  She tells me that she may be transferred to [facility] today.    Review of Systems    Review of Systems   Constitutional:         Diffuse pain   Respiratory:  Positive for shortness of breath.    Gastrointestinal:  Negative for abdominal pain and constipation.   Genitourinary:  Positive for hematuria.   Neurological:  Positive for weakness.       Past Medical History    Past  Medical History:   Diagnosis Date    Coronary artery disease     cardiac stent    DDD (degenerative disc disease), lumbar     Diabetes mellitus     Hypertension     Thyroid disease        Past Surgical History    Past Surgical History:   Procedure Laterality Date     SECTION  08/1987    x2 1993    COLON DECOMPRESSION N/A 2024    Procedure: DECOMPRESSION, COLON;  Surgeon: Reza Chaudhari MD;  Location: [facility] ENDO;  Service: Endoscopy;  Laterality: N/A;    CORONARY STENT PLACEMENT      EXPLORATORY LAPAROTOMY      Anabaptism     HEMORRHOID SURGERY      LAPAROSCOPIC CHOLECYSTECTOMY N/A 2022    Procedure: CHOLECYSTECTOMY, LAPAROSCOPIC;  Surgeon: Leonardo Wilson III, MD;  Location: [facility] OR;  Service: General;  Laterality: N/A;    LUMBAR DISC SURGERY      PILONIDAL CYST DRAINAGE      TONSILLECTOMY      as a child (also adenoids)       Medications (scheduled):      apixaban  5 mg Oral BID    atorvastatin  80 mg Oral QHS    cyanocobalamin  1,000 mcg Oral Daily    diltiaZEM  240 mg Oral Daily    famotidine (PF)  20 mg Intravenous Q12H    gabapentin  400 mg Oral TID    insulin glargine U-100  10 Units Subcutaneous Daily    levothyroxine  100 mcg Oral Before breakfast    LIDOcaine  3 patch Transdermal Q24H    losartan  100 mg Oral Daily    metoclopramide  10 mg Intravenous Q6H    senna-docusate 8.6-50 mg  1 tablet Oral BID       Medications (infusions):             Medications (prn):       Current Facility-Administered Medications:     acetaminophen, 650 mg, Oral, Q8H PRN    albuterol-ipratropium, 3 mL, Nebulization, Q6H PRN    calcium gluconate IVPB, 1 g, Intravenous, PRN    calcium gluconate IVPB, 2 g, Intravenous, PRN    calcium gluconate IVPB, 3 g, Intravenous, PRN    dextrose 50%, 12.5 g, Intravenous, PRN    dextrose 50%, 25 g, Intravenous, PRN    glucagon (human recombinant), 1 mg, Intramuscular, PRN    glucose, 16 g, Oral, PRN    glucose, 24 g, Oral,  "PRN    hydrALAZINE, 10 mg, Intravenous, Q6H PRN    HYDROcodone-acetaminophen, 1 tablet, Oral, Q8H PRN    insulin aspart U-100, 0-5 Units, Subcutaneous, QID (AC + HS) PRN    magnesium sulfate IVPB, 2 g, Intravenous, PRN    magnesium sulfate IVPB, 4 g, Intravenous, PRN    meclizine, 25 mg, Oral, TID PRN    polyethylene glycol, 17 g, Oral, TID PRN    potassium bicarbonate, 35 mEq, Oral, PRN    potassium bicarbonate, 50 mEq, Oral, PRN    potassium bicarbonate, 60 mEq, Oral, PRN    potassium chloride in water, 40 mEq, Intravenous, PRN **AND** potassium chloride in water, 60 mEq, Intravenous, PRN **AND** potassium chloride in water, 80 mEq, Intravenous, PRN    simethicone, 1 tablet, Oral, TID PRN    sodium chloride 0.9%, 10 mL, Intravenous, PRN    sodium phosphate 15 mmol in D5W 250 mL IVPB, 15 mmol, Intravenous, PRN    sodium phosphate 20.01 mmol in D5W 250 mL IVPB, 20.01 mmol, Intravenous, PRN    sodium phosphate 30 mmol in D5W 250 mL IVPB, 30 mmol, Intravenous, PRN    Family History: No family history on file.    Social History: Tobacco:   Social History     Tobacco Use   Smoking Status Not on file   Smokeless Tobacco Not on file                                EtOH:   Social History     Substance and Sexual Activity   Alcohol Use No                                Drugs:   Social History     Substance and Sexual Activity   Drug Use No       Physical Exam    Vital signs:  Temp:  [98 °F (36.7 °C)-98.9 °F (37.2 °C)]   Pulse:  [71-89]   Resp:  [18-22]   BP: (129-172)/(61-73)   SpO2:  [93 %-97 %]     Intake/Output:   Intake/Output Summary (Last 24 hours) at 9/17/2024 0852  Last data filed at 9/17/2024 0832  Gross per 24 hour   Intake 1440 ml   Output 2402 ml   Net -962 ml        BMI: Estimated body mass index is 45.56 kg/m² as calculated from the following:    Height as of this encounter: 5' 5" (1.651 m).    Weight as of this encounter: 124.2 kg (273 lb 13 oz).    Physical Exam  Vitals and nursing note reviewed. "   Constitutional:       General: She is not in acute distress.     Appearance: She is not ill-appearing, toxic-appearing or diaphoretic.   HENT:      Head: Normocephalic.      Comments: Some bruising right forehead and bilat eyes from her fall     Right Ear: External ear normal.      Left Ear: External ear normal.      Nose: Nose normal. No congestion or rhinorrhea.      Mouth/Throat:      Mouth: Mucous membranes are moist.      Pharynx: Oropharynx is clear. No oropharyngeal exudate or posterior oropharyngeal erythema.   Eyes:      General: No scleral icterus.        Right eye: No discharge.         Left eye: No discharge.      Extraocular Movements: Extraocular movements intact.      Conjunctiva/sclera: Conjunctivae normal.      Pupils: Pupils are equal, round, and reactive to light.   Neck:      Vascular: No carotid bruit.   Cardiovascular:      Rate and Rhythm: Normal rate and regular rhythm.      Pulses: Normal pulses.      Heart sounds: No murmur heard.     No friction rub. No gallop.   Pulmonary:      Effort: Pulmonary effort is normal. No respiratory distress.      Breath sounds: No stridor. No wheezing, rhonchi or rales.   Chest:      Chest wall: No tenderness.   Abdominal:      General: There is distension (but better).      Tenderness: There is no abdominal tenderness. There is no guarding.      Comments: Hypoactive BS   Musculoskeletal:         General: No swelling. Normal range of motion.      Cervical back: Normal range of motion and neck supple. No rigidity or tenderness.      Right lower leg: Edema present.      Left lower leg: Edema present.   Lymphadenopathy:      Cervical: No cervical adenopathy.   Skin:     General: Skin is warm and dry.      Capillary Refill: Capillary refill takes less than 2 seconds.      Coloration: Skin is not jaundiced.      Findings: Bruising (Extensive ecchymoses to the eyes and frontal bone on the right with a large hematoma) present.      Comments: + bruise LLE  "  Neurological:      General: No focal deficit present.      Mental Status: She is oriented to person, place, and time. Mental status is at baseline.      Cranial Nerves: No cranial nerve deficit.      Sensory: No sensory deficit.      Motor: No weakness.   Psychiatric:         Mood and Affect: Mood normal.         Behavior: Behavior normal.         Laboratory    No results for input(s): "WBC", "RBC", "HGB", "HCT", "PLT", "MCV", "MCH", "MCHC" in the last 24 hours.            No results for input(s): "GLUCOSE", "CALCIUM", "ALBUMIN", "PROT", "NA", "K", "CO2", "CL", "BUN", "CREATININE", "ALKPHOS", "ALT", "AST", "BILITOT" in the last 24 hours.          Microbiology:       Microbiology Results (last 7 days)       ** No results found for the last 168 hours. **            Radiology    X-Ray KUB  Narrative: CLINICAL HISTORY:  ([id number] y/o  (1960) F    ileus, distension;    TECHNIQUE:  (A#[id number], exam time 9/16/2024 6:31)    XR KUB [id number]    COMPARISON:  Radiograph from 09/15/2024.    FINDINGS:  The lung bases show new airspace opacity at the left lung base compatible with a small pleural effusion and adjacent atelectasis/consolidation.  Mild interstitial opacities are seen at both lung bases suggestive of trace edema as well.    There are no abnormally dilated gas filled loops of large or small bowel to suggest bowel obstruction.  There is scattered stool and gas in the large and small bowel.  There is no pneumatosis or gross pneumoperitoneum. Osseous structures show degenerative change with levoscoliosis at the thoracolumbar junction..  Impression: Nonspecific, nonobstructive bowel gas pattern with scattered stool and gas in the large and small bowel.    Electronically signed by: Aleks Nascimento  Date:    09/16/2024  Time:    07:23        Ventilator Information    Oxygen Concentration (%):  [40] 40  The patient is on 3 L OxyMask           No results for input(s): "PH", "PCO2", "PO2", "HCO3", "POCSATURATED", " ""BE" in the last 72 hours.        Impression    Active Hospital Problems    Diagnosis  POA    *Acute hypoxic on chronic hypercapnic respiratory failure [J96.01, J96.12]  Yes    Hypokalemia [E87.6]  No    Ileus [K56.7]  Yes    Type 2 diabetes mellitus [E11.9]  Yes    Anemia due to vitamin B12 deficiency [D51.9]  Yes    Fall [W19.XXXA]  Yes    Paroxysmal atrial fibrillation [I48.0]  Yes    COPD exacerbation [J44.1]  Yes    Hypothyroid [E03.9]  Yes    Hypertension [I10]  Yes      Resolved Hospital Problems    Diagnosis Date Resolved POA    Goals of care, counseling/discussion [Z71.89] 09/07/2024 Not Applicable    Acute metabolic encephalopathy [G93.41] 09/14/2024 Yes    Pneumonia [J18.9] 09/15/2024 Yes     Patient's respiratory status is stable.  Her abdomen is still not settled.. Ct shows nothing requiring surgical intervention.     Plan    Continue O2 to maintain sats 89-92%  Continue bipap nightly and if needed during the day  Respiratory treatments change to q6 prn  Antihypertensives to continue  Replace electrolytes as needed  Synthroid adjusted, TSH still very elevated recheck 9/18  Watch BS and treat as needed  Increase activity as able  I have no objections to her being transferred to [facility]    Respiratory status stable, I will sign off.  Please reconsult if I can be of further assistance.  Thank you for this consult.        Sonido Zamora MD  [facility] Pulmonary/Critical Care  09/17/2024                  "

## 2024-09-17 NOTE — PT/OT/SLP PROGRESS
Physical Therapy      Patient Name:  Karo Leonard   MRN:  [id number]    Patient not seen today secondary to Declined PT due to anticipated imminent discharge. Will follow-up 9/18/24 if discharge does not proceed as planned.

## 2024-09-17 NOTE — NURSING
Nurses Note -- 4 Eyes      9/16/2024   11:24 PM      Skin assessed during: Q Shift Change      [] No Altered Skin Integrity Present    []Prevention Measures Documented      [x] Yes- Altered Skin Integrity Present or Discovered   [] LDA Added if Not in Epic (Describe Wound)   [] New Altered Skin Integrity was Present on Admit and Documented in LDA   [] Wound Image Taken  X previously present, no changes     Wound Care Consulted? No    Attending Nurse:  Willian Forte RN/Staff Member:

## 2024-09-17 NOTE — PLAN OF CARE
Discharge orders and chart reviewed with no further post-acute discharge needs identified at this time.     Pt is discharging to [facility] in [place], [facility] will transport. Call to report to [phone], option for nurse's station, ask for Tea. Floor nurse notified.    At this time, patient is cleared for discharge from Case Management standpoint.         09/17/24 1339   Final Note   Assessment Type Final Discharge Note   Anticipated Discharge Disposition Long Term DC   What phone number can be called within the next 1-3 days to see how you are doing after discharge? [phone]   Post-Acute Status   Post-Acute Authorization Placement   Post-Acute Placement Status Set-up Complete/Auth obtained   Coverage MEDICARE - MEDICARE PART A & B -   Discharge Delays None known at this time

## 2024-09-17 NOTE — DISCHARGE SUMMARY
[facility]  Discharge Summary      Patient Name: Karo Leonard  MRN: [id number]  SERGO: [id number]  Patient Class: IP- Inpatient  Admission Date: 8/25/2024  Hospital Length of Stay: 23 days  Discharge Date and Time:  09/17/2024 11:01 AM  Attending Physician: Terrance Garcia MD   Discharging Provider: Terrance Garcia MD  Primary Care Provider: Navneet Hernandez FNP    Primary Care Team: Networked reference to record PCT     HPI:   64F p/w fall in the context of shortness of breath. EMS reportedly gave narcan w/o appreciable response, then she was given duoneb en route to [facility] ED. Upon arrival, she was tachypneic, had respy sounding phonation, but she denied chest pain, fever, or chills. She was placed on BiPAP, and initially demonstrated improvement. However, she became less responsive and appeared to tire out. She was given additional narcan w/o appreciable response, so she was intubated. She had some hypotension after receiving sedation, so she was placed on low dose levophed. Pickens was placed, lasix was given, as was Abx. Large bruise noted on LLE, so x-ray ordered.     Procedure(s) (LRB):  DECOMPRESSION, COLON (N/A)      Hospital Course:   64 F Patient with hx of COPD, morbid obesity was admitted for acute on chronic hypoxemic hypercapnic respiratory failure with sever fall with hit to the face . Patient was intubated after failing  BiPAP. Finished course of IV Levofloxacin for COPD exacerbation and possible pneumonia. Pulm has been managing her steroid dose. Patient was started on iron and B12 supplement for anemia. S/P exubation on 8/31. For her hypertension, patient was started on losartan and her home diltiazem was resumed.  Patient working with PT and OT.  Fall precautions have been addressed with the patient.  Patient moving to medicine telemetry.  Supplemental oxygen weaning is in process.  Patient encouraged to continue BiPAP use at night or as needed as before. Steroid  dose is being tapered down. Pulmonology followed the patient.    During her hospital course, patient complained of bloated and also passing a lot of gas and Gas x was given but later complained of mild abdominal pain and KUB was done and found to have Gas in stool distended loops of large bowel measuring up to 6.5 cm.  Findings could reflect ileus or obstruction.  CT scan done demonstrated significant colonic distention consistent with ileus with a large amount of stool in ascending colon, colon measuring up to 11 cm.  General surgery evaluated the patient, NG tube was placed, patient continued on MiraLax and enemas, CT scan was repeated on 09/08/2024 which still shows significant constipation.  Lactulose was added and she had loose watery bowel movements, following which she was started on clears, advanced to full liquids, but her KUB was unchanged and hence we consulted gastroenterology who recommended neostigmine while monitoring in ICU given the risk of bradycardia and her tenuous respiratory status.    Lower endoscopy report:  64 F with colonic ileus and ischemic changes of                          the suspected proximal ascending colon and cecum.                          Unable to intubate the cecum due to body habitus,                          poor visualization. Air was suctioned out and                          procedure aborted. Significant improvement in                          distension of abdomen post endoscopy. Will need                          serial abdominal exams and daily KUB. If running                          fever, rising wbc I would have concern for                          gangrenous right colon but as of now the mucosa                          appears to be viable on limited examination.                          Clears okay today.                          Recommend that surgery continue to follow along                          Optimize all electrolytes (K/Mg/Phos)                           - Preparation of the colon was poor.                          - Dilated in the transverse colon, at the hepatic                          flexure and in the ascending colon.                          - Mucosal ulceration.                          - No specimens collected.     The plan to use neostigmine was canceled.  Patient began to have many bowel movements, and the gas pattern on X-ray began to appear less remarkable.  She was therefore moved out of ICU and back to remote telemetry.  Slowly patient's bowel function resumed.  Patient tolerating diet.  Patient working with PT and OT and has been accepted at long-term acute care facility for further rehab and oxygen weaning.    Goals of Care Treatment Preferences:  Code Status: Full Code      SDOH Screening:  The patient was screened for utility difficulties, food insecurity, transport difficulties, housing insecurity, and interpersonal safety and there were no concerns identified this admission.     Consults:   Consults (From admission, onward)          Status Ordering Provider     Inpatient consult to Gastroenterology  Once        Provider:  Tesha Antonio MD    Completed PARVEZ SAM     Inpatient consult to   Once        Provider:  (Not yet assigned)    Acknowledged GALO FRIAS     Inpatient consult to General Surgery  Once        Provider:  Bunny Cuevas MD    Completed GALO FRIAS     Inpatient consult to Social Work/Case Management  Once        Provider:  (Not yet assigned)    Acknowledged MARY GRACE CARNES     Inpatient consult to Registered Dietitian/Nutritionist  Once        Provider:  (Not yet assigned)    Completed RUBEN SULTANA     Inpatient consult to Registered Dietitian/Nutritionist  Once        Provider:  (Not yet assigned)    Completed UYEN HSU     Inpatient consult to Pulmonology  Once        Provider:  (Not yet assigned)    Completed CAMI JIMENEZ          Microbiology Results (last 7 days)       **  No results found for the last 168 hours. **          ECHO (8/30/24):    Left Ventricle: The left ventricle is normal in size. Mildly increased wall thickness. There is mild concentric hypertrophy. Normal wall motion. There is normal systolic function with a visually estimated ejection fraction of 60 - 65%. There is normal diastolic function.    Right Ventricle: Normal right ventricular cavity size. Wall thickness is normal. Systolic function is normal.    IVC/SVC: Normal venous pressure at 3 mmHg.     Colonoscopy:   64 F with colonic ileus and ischemic changes of                          the suspected proximal ascending colon and cecum.                          Unable to intubate the cecum due to body habitus,                          poor visualization. Air was suctioned out and                          procedure aborted. Significant improvement in                          distension of abdomen post endoscopy. Will need                          serial abdominal exams and daily KUB. If running                          fever, rising wbc I would have concern for                          gangrenous right colon but as of now the mucosa                          appears to be viable on limited examination.                          Clears okay today.                          Recommend that surgery continue to follow along                          Optimize all electrolytes (K/Mg/Phos)                          - Preparation of the colon was poor.                          - Dilated in the transverse colon, at the hepatic                          flexure and in the ascending colon.                          - Mucosal ulceration.                          - No specimens collected.      CXR:   Interval intubation with endotracheal tube tip appearing to project at the level of the clavicular heads. Enteric tube courses below the diaphragm, extending beyond the field of view. No interval detrimental change in lung aeration or  evidence of new or enlarging pneumothorax relative to examination referenced above.      X-ray left tibia/fibula:  No acute radiographic abnormalities.      CT Head: Normal CT appearance of the brain and calvarium.      CT Chest without contrast:  1. Alveolar infiltrates in the left upper and lower lobes compatible with pneumonia.  Scattered small foci of atelectasis or infiltrates on the right.  Small bilateral pleural effusions.  2. Stable cardiomegaly.  Small to moderate-sized pericardial effusion is increased compared to June 2024.  3. Support devices in place as above.  Additional stable findings as noted.     CXR: No significant interval change.      US Left leg:  Complex mass in the left pretibial subcutaneous fat, presumably reflecting hematoma given clinical history.      CT abdomen and pelvis without contrast:  Gaseous distension of the ascending colon.  The cecum measures 11.4 cm.  There is no small bowel obstruction  Prior cholecystectomy  Tiny pleural effusions and bibasilar atelectasis  Small pericardial effusion  Dependent anasarca     CT abdomen and pelvis without contrast:  1.  Dilated large bowel loops mild colonic ileus with a large volume of retained fecal load within the ascending colon.  2.  Normal size liver with a small volume of surrounding perihepatic fluid.  3.  Pericardial effusion/thickening appears equal in capacity as compared to previous.  4.  Diffuse inflammatory stranding of the subcutaneous tissues along the lateral chest wall secondary to anasarca with fairly extensive inflammatory thickening and stranding involving the right flank region similar to that from previous.     CT abdomen and pelvis without contrast:  Gaseous distention of the portions of the colon with some liquid stool.  Small-bowel obstruction is not demonstrated.  Left abdomen and pelvic subcutaneous anasarca.  Prior cholecystectomy.  Dense consolidated infiltrate of the right lower lobe consistent with pneumonia  with small right pleural effusion and mild atelectasis and trace left pleural effusion.     KUB:  Nonspecific, nonobstructive bowel gas pattern with scattered stool and gas in the large and small bowel.     Final Active Diagnoses:    Diagnosis Date Noted POA    PRINCIPAL PROBLEM:  Acute hypoxic on chronic hypercapnic respiratory failure [J96.01, J96.12] 06/17/2024 Yes    Hypokalemia [E87.6] 09/11/2024 No    Ileus [K56.7] 09/06/2024 Yes    Type 2 diabetes mellitus [E11.9] 08/28/2024 Yes    Anemia due to vitamin B12 deficiency [D51.9] 08/27/2024 Yes    Fall [W19.XXXA] 08/25/2024 Yes    Paroxysmal atrial fibrillation [I48.0] 06/16/2024 Yes    COPD exacerbation [J44.1] 06/14/2024 Yes    Hypothyroid [E03.9] 08/13/2013 Yes    Hypertension [I10] 08/13/2013 Yes      Problems Resolved During this Admission:    Diagnosis Date Noted Date Resolved POA    Goals of care, counseling/discussion [Z71.89] 08/28/2024 09/07/2024 Not Applicable    Acute metabolic encephalopathy [G93.41] 08/25/2024 09/14/2024 Yes    Pneumonia [J18.9] 06/14/2024 09/15/2024 Yes       Discharged Condition: good    Disposition: Long Term Acute Care    Follow Up:   Follow-up Information       Navneet Hernandez FNP Follow up in 2 week(s).    Specialty: Family Medicine  Contact information:  [address] [facility] [address]  [phone]               Sonido Zamora MD Follow up in 2 week(s).    Specialty: Pulmonary Disease  Contact information:  [address] [address] [facility]  [phone]  [place] [address]  [phone]               Tesha Antonio MD Follow up in 1 month(s).    Specialty: Gastroenterology  Contact information:  [address]   [place] [address] [phone]                           Patient Instructions:      Diet Cardiac     Notify your health care provider if you experience any of the following:  temperature >100.4     Notify your health care provider if you experience any of the following:  persistent nausea and vomiting  "or diarrhea     Notify your health care provider if you experience any of the following:  severe uncontrolled pain     Notify your health care provider if you experience any of the following:  redness, tenderness, or signs of infection (pain, swelling, redness, odor or green/yellow discharge around incision site)     Notify your health care provider if you experience any of the following:  difficulty breathing or increased cough     Notify your health care provider if you experience any of the following:  severe persistent headache     Notify your health care provider if you experience any of the following:  worsening rash     Notify your health care provider if you experience any of the following:  persistent dizziness, light-headedness, or visual disturbances     Notify your health care provider if you experience any of the following:  increased confusion or weakness     Activity as tolerated   Order Comments: Up with assist, observe fall precautions       Significant Diagnostic Studies: Labs: CMP   Recent Labs   Lab 09/15/24  1416   K 3.9   , CBC No results for input(s): "WBC", "HGB", "HCT", "PLT" in the last 48 hours., and INR No results found for: "INR", "PROTIME"  Microbiology: Blood Culture   Lab Results   Component Value Date    LABBLOO No growth after 5 days. 06/14/2024   , Sputum Culture   Lab Results   Component Value Date    GSRESP <10 epithelial cells per low power field. 08/25/2024    GSRESP Few WBC's 08/25/2024    GSRESP Few Gram positive cocci 08/25/2024    RESPIRATORYC Normal respiratory denise 08/25/2024   , and Urine Culture  No results found for: "LABURIN"    Pending Diagnostic Studies:       None           Medications:  Reconciled Home Medications:      Medication List        START taking these medications      cyanocobalamin 1000 MCG tablet  Commonly known as: VITAMIN B-12  Take 1 tablet (1,000 mcg total) by mouth once daily.  Start taking on: September 18, 2024     insulin glargine U-100 " (Lantus) 100 unit/mL (3 mL) Inpn pen  Inject 10 Units into the skin once daily.  Start taking on: September 18, 2024     levothyroxine 100 MCG tablet  Commonly known as: SYNTHROID  Take 1 tablet (100 mcg total) by mouth before breakfast.  Start taking on: September 18, 2024     LIDOcaine 5 %  Commonly known as: LIDODERM  Place 3 patches onto the skin once daily. Remove & Discard patch within 12 hours or as directed by MD     losartan 100 MG tablet  Commonly known as: COZAAR  Take 1 tablet (100 mg total) by mouth once daily.  Start taking on: September 18, 2024     senna-docusate 8.6-50 mg 8.6-50 mg per tablet  Commonly known as: PERICOLACE  Take 1 tablet by mouth 2 (two) times daily.            CONTINUE taking these medications      albuterol 90 mcg/actuation inhaler  Commonly known as: PROVENTIL/VENTOLIN HFA  Inhale 1 puff into the lungs every 4 (four) hours as needed for Wheezing or Shortness of Breath.     albuterol-ipratropium 2.5 mg-0.5 mg/3 mL nebulizer solution  Commonly known as: DUO-NEB  Take 3 mLs by nebulization every 6 (six) hours as needed for Wheezing or Shortness of Breath. Rescue     apixaban 5 mg Tab  Commonly known as: ELIQUIS  Take 1 tablet (5 mg total) by mouth 2 (two) times daily.     arnica 20 % Tinc  Apply 1 application topically once daily.     ascorbic acid (vitamin C) 500 MG tablet  Commonly known as: VITAMIN C  Take 500 mg by mouth once daily.     aspirin 81 MG Chew  Take 81 mg by mouth once daily.     atorvastatin 80 MG tablet  Commonly known as: LIPITOR  Take 80 mg by mouth every evening.     chlorzoxazone 750 mg Tab  Commonly known as: LORZONE  Take 1 tablet by mouth every 6 (six) hours.     desonide 0.05 % cream  Commonly known as: DESOWEN  Apply 1 application topically 2 (two) times daily.     diltiaZEM 240 MG 24 hr capsule  Commonly known as: CARDIZEM CD  Take 1 capsule (240 mg total) by mouth once daily.     estradioL 0.01 % (0.1 mg/gram) vaginal cream  Commonly known as:  ESTRACE  Place 1 g vaginally every 7 days. Monday's     furosemide 20 MG tablet  Commonly known as: LASIX  Take 1 tablet (20 mg total) by mouth once daily.     gabapentin 600 MG tablet  Commonly known as: NEURONTIN  Take 600 mg by mouth 3 (three) times daily.     hydrocortisone 0.5 % cream  Apply 1 application topically 2 (two) times daily as needed.     JANUVIA 50 mg Tab  Generic drug: SITagliptin phosphate  Take 50 mg by mouth once daily.     pantoprazole 40 MG tablet  Commonly known as: PROTONIX  Take 1 tablet (40 mg total) by mouth 2 (two) times daily.     potassium chloride SA 10 MEQ tablet  Commonly known as: K-DUR,KLOR-CON M  Take 1 tablet (10 mEq total) by mouth once daily.     promethazine 25 MG tablet  Commonly known as: PHENERGAN  Take 25 mg by mouth every 6 (six) hours as needed.     tiotropium 18 mcg inhalation capsule  Commonly known as: SPIRIVA  Inhale 1 capsule into the lungs once daily.     TRELEGY ELLIPTA 200-62.5-25 mcg inhaler  Generic drug: fluticasone-umeclidin-vilanter  Inhale 1 puff into the lungs once daily.            STOP taking these medications      ezetimibe 10 mg tablet  Commonly known as: ZETIA     guaiFENesin 600 mg 12 hr tablet  Commonly known as: MUCINEX            ASK your doctor about these medications      meclizine 25 mg tablet  Commonly known as: ANTIVERT  Take 25 mg by mouth 4 (four) times daily as needed for Dizziness or Nausea.              Indwelling Lines/Drains at time of discharge:   Lines/Drains/Airways       None                   Time spent on the discharge of patient: 37 minutes         Terrance Garcia MD  Department of Hospital Medicine  [facility]

## 2024-10-30 ENCOUNTER — TELEPHONE (OUTPATIENT)
Dept: OTOLARYNGOLOGY | Facility: CLINIC | Age: 64
End: 2024-10-30
Payer: MEDICARE

## 2024-10-30 NOTE — TELEPHONE ENCOUNTER
Called Devyn @ [facility]. We received an incoming referral from JED Macario for an ENT referral. However, it is unclear from the notes what the patient is being referred to ENT for. Devyn spoke with JED Hernandez and stated that the patient is being referred for Chronic Dizziness and Vertigo. Advised Devyn that we will schedule patient with our PA for the dizziness, Dr. Menendez is not treating dizziness at this time. Devyn stated that any provider in our office is ok with JED Hernandez. Thanks, Meron

## 2024-11-01 NOTE — TELEPHONE ENCOUNTER
Left message on voicemail for pt to call back. Received incoming referral to ENT. Can schedule Audiogram and ENT appt with Mr. Quincy PA-C for vertigo. Thanks, Meron

## 2024-11-05 NOTE — TELEPHONE ENCOUNTER
Called for pt again. Spoke with family member who answered the phone. He stated that pt was not available-was with someone who came to see her today. Let family member know that I was calling to schedule the ENT referral. He stated that pt is not able to come in right now due to other belen concerns. They will call back if interested in the future. Thanks, Meron

## 2025-05-05 NOTE — CARE UPDATE
08/25/24 1000   PRE-TX-O2   Oxygen Concentration (%) 50   SpO2 (!) 94 %   Pulse (!) 57   Resp 10   BP (!) 94/53   Vent Select   Charged w/in last 24h YES   Preset Conventional Ventilator Settings   Ventilation Type VC   Vent Mode A/C   Set Rate 10 BPM   Vt Set 400 mL   PEEP/CPAP 5 cmH20   Peak Flow 60 L/min   Peak End Inspiratory Pressure 19 cmH20   I-Trigger Type  V-TRIG   Trigger Sensitivity Flow/I-Trigger 3 L/min   Patient Ventilator Parameters   Resp Rate Total 10 br/min   Peak Airway Pressure 23 cmH20   Mean Airway Pressure 7.3 cmH20   Plateau Pressure 0 cmH20   Exhaled Vt 402 mL   Total Ve 4.02 L/m   I:E Ratio Measured 1:7.3   Auto PEEP 0 cmH20   Conventional Ventilator Alarms   Ve High Alarm 20 L/min   Ve Low Alarm 2 L/min   Resp Rate High Alarm 40 br/min   Apnea Rate 10   Apnea Volume (mL) 0 mL   Apnea Oxygen Concentration  100   Apnea Flow Rate (L/min) 60   T Apnea 20 sec(s)   Ready to Wean/Extubation Screen   FIO2<=50 (chart decimal) 0.5   MV<16L (chart vol.) 4.02   PEEP <=8 (chart #) 5   Ready to Wean Parameters   F/VT Ratio<105 (RSBI) (!) 24.88   Vital Capacity   Vital Capacity (mL) 0   Labs   $ Was an ABG obtained? Arterial Puncture;POCT - Blood gas;POCT - Calcium;POCT - Hematocrit;POCT - Potassium;POCT - Sodium   $ Labs Tech Time 15 min   Critical Value Communication   Date Result Received 08/25/24   Time Result Received 1010   Resulting Department of Critical Value resp   Who communicated critical value from resulting department? [facility]   Critical Test #1 pco2   Name of Notified Physician/Designee seamus   Date Notified 08/25/24   Time Notified 1011   Read Back Verification Yes        patient

## (undated) DEVICE — SUTURE MONOCRYL 4-0 PS-2 27 MCP426H

## (undated) DEVICE — SUCTION/IRRIGATOR W/TIP

## (undated) DEVICE — SOLUTION IRRI NS BOTTLE 1000ML R5200-01

## (undated) DEVICE — PAD BOVIE ADULT

## (undated) DEVICE — ELECTRODE OLSEN HOOK L-SHAPED 13INX330MM

## (undated) DEVICE — SLEEVE TROCAR 5MMX100MM  CTS02

## (undated) DEVICE — APPLIER MULTIPLE CLIP LG 12MM ER420

## (undated) DEVICE — POUCH RETRIEVAL 10MM APP. MED.     CD001

## (undated) DEVICE — SET INSUFFLATION TUBING HIGH FLOW SMOKE EVACUATION PNEUMOCLE

## (undated) DEVICE — TROCAR BALL. BLNT HASSON C0R47

## (undated) DEVICE — CUTTER LINEAR FLEX ARTICNG 45MM ATS45

## (undated) DEVICE — SUTURE VICRYL #0 27 UR-6 VCP603H

## (undated) DEVICE — SOLUTION IRRI H2O BOTTLE 1000ML

## (undated) DEVICE — TROCAR OPTICAL ZTHREAD 12MMX100MM CTF73

## (undated) DEVICE — TROCAR OPTICAL ZTHREAD 5MMX100MM CTF03

## (undated) DEVICE — Device

## (undated) DEVICE — TRAY GENERAL LAPAROSCOPY

## (undated) DEVICE — DERMABOND HVD MINI  DHVM12

## (undated) DEVICE — GLOVE BIOGEL MICRO SURGEON PINK SZ 7.5

## (undated) DEVICE — APPLIER CLIP  5MM

## (undated) DEVICE — UNDERGLOVE BIOGEL PI MICRO BLUE SZ 8

## (undated) DEVICE — SCISSORS 5MM APPLIED MEDICAL   CB030

## (undated) DEVICE — SOLUTION NACL 0.9% 3000ML

## (undated) DEVICE — DISSECTOR KITTNER 13300

## (undated) DEVICE — CABLE MONOPOLAR 10FT DISPOSABLE